# Patient Record
Sex: FEMALE | Race: WHITE | NOT HISPANIC OR LATINO | Employment: UNEMPLOYED | URBAN - METROPOLITAN AREA
[De-identification: names, ages, dates, MRNs, and addresses within clinical notes are randomized per-mention and may not be internally consistent; named-entity substitution may affect disease eponyms.]

---

## 2017-01-13 ENCOUNTER — ALLSCRIPTS OFFICE VISIT (OUTPATIENT)
Dept: OTHER | Facility: OTHER | Age: 52
End: 2017-01-13

## 2017-02-04 ENCOUNTER — TRANSCRIBE ORDERS (OUTPATIENT)
Dept: ADMINISTRATIVE | Age: 52
End: 2017-02-04

## 2017-02-04 ENCOUNTER — APPOINTMENT (OUTPATIENT)
Dept: LAB | Age: 52
End: 2017-02-04
Payer: COMMERCIAL

## 2017-02-04 DIAGNOSIS — E78.00 PURE HYPERCHOLESTEROLEMIA: ICD-10-CM

## 2017-02-04 DIAGNOSIS — E78.00 PURE HYPERCHOLESTEROLEMIA: Primary | ICD-10-CM

## 2017-02-04 LAB
ALBUMIN SERPL BCP-MCNC: 3.8 G/DL (ref 3.5–5)
ALP SERPL-CCNC: 67 U/L (ref 46–116)
ALT SERPL W P-5'-P-CCNC: 27 U/L (ref 12–78)
ANION GAP SERPL CALCULATED.3IONS-SCNC: 7 MMOL/L (ref 4–13)
AST SERPL W P-5'-P-CCNC: 20 U/L (ref 5–45)
BILIRUB SERPL-MCNC: 0.35 MG/DL (ref 0.2–1)
BUN SERPL-MCNC: 13 MG/DL (ref 5–25)
CALCIUM SERPL-MCNC: 9.3 MG/DL (ref 8.3–10.1)
CHLORIDE SERPL-SCNC: 103 MMOL/L (ref 100–108)
CHOLEST SERPL-MCNC: 182 MG/DL (ref 50–200)
CK MB SERPL-MCNC: 2 % (ref 0–2.5)
CK MB SERPL-MCNC: 3.3 NG/ML (ref 0–5)
CK SERPL-CCNC: 161 U/L (ref 26–192)
CO2 SERPL-SCNC: 29 MMOL/L (ref 21–32)
CREAT SERPL-MCNC: 0.73 MG/DL (ref 0.6–1.3)
GFR SERPL CREATININE-BSD FRML MDRD: >60 ML/MIN/1.73SQ M
GLUCOSE SERPL-MCNC: 87 MG/DL (ref 65–140)
HDLC SERPL-MCNC: 83 MG/DL (ref 40–60)
LDLC SERPL CALC-MCNC: 90 MG/DL (ref 0–100)
POTASSIUM SERPL-SCNC: 4.5 MMOL/L (ref 3.5–5.3)
PROT SERPL-MCNC: 7.2 G/DL (ref 6.4–8.2)
SODIUM SERPL-SCNC: 139 MMOL/L (ref 136–145)
TRIGL SERPL-MCNC: 44 MG/DL

## 2017-02-04 PROCEDURE — 82550 ASSAY OF CK (CPK): CPT

## 2017-02-04 PROCEDURE — 80061 LIPID PANEL: CPT

## 2017-02-04 PROCEDURE — 82553 CREATINE MB FRACTION: CPT

## 2017-02-04 PROCEDURE — 36415 COLL VENOUS BLD VENIPUNCTURE: CPT

## 2017-02-04 PROCEDURE — 80053 COMPREHEN METABOLIC PANEL: CPT

## 2017-02-07 DIAGNOSIS — Z12.31 ENCOUNTER FOR SCREENING MAMMOGRAM FOR MALIGNANT NEOPLASM OF BREAST: ICD-10-CM

## 2017-02-07 RX ORDER — CALCIUM GLUCONATE 45(500) MG
500 TABLET ORAL DAILY
Status: ON HOLD | COMMUNITY
End: 2017-02-10

## 2017-02-07 RX ORDER — DIAZEPAM 5 MG/1
5 TABLET ORAL
Status: ON HOLD | COMMUNITY
End: 2018-04-25 | Stop reason: ALTCHOICE

## 2017-02-07 RX ORDER — METHOCARBAMOL 750 MG/1
750 TABLET, FILM COATED ORAL 3 TIMES DAILY
Status: ON HOLD | COMMUNITY
End: 2018-04-25 | Stop reason: ALTCHOICE

## 2017-02-07 RX ORDER — DIPHENHYDRAMINE HYDROCHLORIDE 25 MG/1
5000 TABLET ORAL 2 TIMES DAILY
Status: ON HOLD | COMMUNITY
End: 2017-02-10

## 2017-02-07 RX ORDER — OMEGA-3 FATTY ACIDS/FISH OIL 300-1000MG
1000 CAPSULE ORAL
Status: ON HOLD | COMMUNITY
End: 2017-02-10

## 2017-02-10 ENCOUNTER — GENERIC CONVERSION - ENCOUNTER (OUTPATIENT)
Dept: GASTROENTEROLOGY | Facility: MEDICAL CENTER | Age: 52
End: 2017-02-10

## 2017-02-10 ENCOUNTER — ANESTHESIA EVENT (OUTPATIENT)
Dept: GASTROENTEROLOGY | Facility: MEDICAL CENTER | Age: 52
End: 2017-02-10
Payer: COMMERCIAL

## 2017-02-10 ENCOUNTER — HOSPITAL ENCOUNTER (OUTPATIENT)
Facility: MEDICAL CENTER | Age: 52
Setting detail: OUTPATIENT SURGERY
Discharge: HOME | End: 2017-02-10
Attending: INTERNAL MEDICINE | Admitting: INTERNAL MEDICINE
Payer: COMMERCIAL

## 2017-02-10 ENCOUNTER — ANESTHESIA (OUTPATIENT)
Dept: GASTROENTEROLOGY | Facility: MEDICAL CENTER | Age: 52
End: 2017-02-10
Payer: COMMERCIAL

## 2017-02-10 VITALS
BODY MASS INDEX: 20.39 KG/M2 | OXYGEN SATURATION: 97 % | WEIGHT: 108 LBS | RESPIRATION RATE: 16 BRPM | HEIGHT: 61 IN | DIASTOLIC BLOOD PRESSURE: 60 MMHG | HEART RATE: 79 BPM | SYSTOLIC BLOOD PRESSURE: 111 MMHG | TEMPERATURE: 98 F

## 2017-02-10 DIAGNOSIS — K59.00 CONSTIPATION: ICD-10-CM

## 2017-02-10 PROCEDURE — 88305 TISSUE EXAM BY PATHOLOGIST: CPT | Performed by: INTERNAL MEDICINE

## 2017-02-10 RX ORDER — PROPOFOL 10 MG/ML
INJECTION, EMULSION INTRAVENOUS AS NEEDED
Status: DISCONTINUED | OUTPATIENT
Start: 2017-02-10 | End: 2017-02-10 | Stop reason: SURG

## 2017-02-10 RX ORDER — SODIUM CHLORIDE 9 MG/ML
125 INJECTION, SOLUTION INTRAVENOUS CONTINUOUS
Status: DISCONTINUED | OUTPATIENT
Start: 2017-02-10 | End: 2017-02-10 | Stop reason: HOSPADM

## 2017-02-10 RX ORDER — ONDANSETRON 2 MG/ML
INJECTION INTRAMUSCULAR; INTRAVENOUS AS NEEDED
Status: DISCONTINUED | OUTPATIENT
Start: 2017-02-10 | End: 2017-02-10 | Stop reason: SURG

## 2017-02-10 RX ADMIN — PROPOFOL 50 MG: 10 INJECTION, EMULSION INTRAVENOUS at 10:32

## 2017-02-10 RX ADMIN — PROPOFOL 50 MG: 10 INJECTION, EMULSION INTRAVENOUS at 10:38

## 2017-02-10 RX ADMIN — SODIUM CHLORIDE 125 ML/HR: 0.9 INJECTION, SOLUTION INTRAVENOUS at 09:55

## 2017-02-10 RX ADMIN — ONDANSETRON HYDROCHLORIDE 4 MG: 2 INJECTION, SOLUTION INTRAVENOUS at 10:22

## 2017-02-10 RX ADMIN — PROPOFOL 100 MG: 10 INJECTION, EMULSION INTRAVENOUS at 10:24

## 2017-02-17 ENCOUNTER — GENERIC CONVERSION - ENCOUNTER (OUTPATIENT)
Dept: OTHER | Facility: OTHER | Age: 52
End: 2017-02-17

## 2017-05-04 ENCOUNTER — ALLSCRIPTS OFFICE VISIT (OUTPATIENT)
Dept: OTHER | Facility: OTHER | Age: 52
End: 2017-05-04

## 2017-05-12 ENCOUNTER — ALLSCRIPTS OFFICE VISIT (OUTPATIENT)
Dept: OTHER | Facility: OTHER | Age: 52
End: 2017-05-12

## 2017-05-12 DIAGNOSIS — R14.0 ABDOMINAL DISTENSION (GASEOUS): ICD-10-CM

## 2017-05-12 DIAGNOSIS — K59.01 SLOW TRANSIT CONSTIPATION: ICD-10-CM

## 2017-05-12 DIAGNOSIS — E78.00 PURE HYPERCHOLESTEROLEMIA: ICD-10-CM

## 2017-05-15 ENCOUNTER — HOSPITAL ENCOUNTER (OUTPATIENT)
Dept: RADIOLOGY | Age: 52
Discharge: HOME | End: 2017-05-15
Payer: COMMERCIAL

## 2017-05-15 ENCOUNTER — TRANSCRIBE ORDERS (OUTPATIENT)
Dept: ADMINISTRATIVE | Age: 52
End: 2017-05-15

## 2017-05-15 DIAGNOSIS — K59.01 SLOW TRANSIT CONSTIPATION: ICD-10-CM

## 2017-05-15 PROCEDURE — 74000 HB X-RAY EXAM OF ABDOMEN (SINGLE ANTEROPOSTERIOR VIEW): CPT

## 2017-05-16 ENCOUNTER — HOSPITAL ENCOUNTER (OUTPATIENT)
Dept: RADIOLOGY | Age: 52
Discharge: HOME | End: 2017-05-16
Payer: COMMERCIAL

## 2017-05-16 DIAGNOSIS — R14.0 ABDOMINAL DISTENSION (GASEOUS): ICD-10-CM

## 2017-05-16 PROCEDURE — 76705 ECHO EXAM OF ABDOMEN: CPT

## 2017-06-16 ENCOUNTER — GENERIC CONVERSION - ENCOUNTER (OUTPATIENT)
Dept: OTHER | Facility: OTHER | Age: 52
End: 2017-06-16

## 2017-09-08 ENCOUNTER — GENERIC CONVERSION - ENCOUNTER (OUTPATIENT)
Dept: INTERNAL MEDICINE CLINIC | Facility: CLINIC | Age: 52
End: 2017-09-08

## 2017-09-14 ENCOUNTER — ALLSCRIPTS OFFICE VISIT (OUTPATIENT)
Dept: OTHER | Facility: OTHER | Age: 52
End: 2017-09-14

## 2017-10-26 NOTE — PROGRESS NOTES
Assessment  1  Acquired trigger thumb (727 03) (M65 30)    Discussion/Summary    Right trigger thumbinjection given into the right tendon sheath  follow-up with us in a month see how she is doing she like to have surgery on Thanksgiving if she needs it  She does see how she does after the month  Chief Complaint  1  Finger Problem    History of Present Illness  HPI: She comes in today with regards to her right thumb  She's been having locking and burning sensation along the flexor tendon sheath  Pain can be 10 out of 10 at times  It's aggravated with movement  It's alleviated with rest      Review of Systems    Constitutional: No fever, no chills, feels well, no tiredness, no recent weight gain or loss  Eyes: No complaints of eyesight problems, no red eyes  ENT: no loss of hearing, no nosebleeds, no sore throat  Cardiovascular: No complaints of chest pain, no palpitations, no leg claudication or lower extremity edema  Respiratory: no compliants of shortness of breath, no wheezing, no cough  Gastrointestinal: no complaints of abdominal pain, no constipation, no nausea or diarrhea, no vomiting, no bloody stools  Genitourinary: no complaints of dysuria, no incontinence  Musculoskeletal: as noted in HPI  Integumentary: no complaints of skin rash or lesion, no itching or dry skin, no skin wounds  Neurological: no complaints of headache, no confusion, no numbness or tingling, no dizziness  Endocrine: No complaints of muscle weakness, no feelings of weakness, no frequent urination, no excessive thirst    Psychiatric: No suicidal thoughts, no anxiety, no feelings of depression  Active Problems  1  Abdominal bloating (787 3) (R14 0)   2  Acquired trigger thumb (727 03) (M65 30)   3  Acute medial meniscus tear of left knee, initial encounter (836 0) (S83 242A)   4  Acute pain of left knee (719 46) (M25 562)   5  Aftercare following surgery of the musculoskeletal system (P84 78) (G65 15)   6   Anemia (285  9) (D64 9)   7  Anxiety (300 00) (F41 9)   8  Cervical post-laminectomy syndrome (722 81) (M96 1)   9  Cervical radiculopathy (723 4) (M54 12)   10  Chronic low back pain (724 2,338 29) (M54 5,G89 29)   11  Chronic pain syndrome (338 4) (G89 4)   12  Constipation (564 00) (K59 00)   13  History of allergy (V15 09) (Z88 9)   14  Hypercholesterolemia (272 0) (E78 00)   15  Lumbar degenerative disc disease (722 52) (M51 36)   16  Lumbar radiculopathy (724 4) (M54 16)   17  Neck pain, chronic (723 1,338 29) (M54 2,G89 29)   18  Other abnormal finding of urine (791 9) (R82 99)   19  Overactive bladder (596 51) (N32 81)   20  Pain of left thumb (729 5) (M79 645)   21  Right elbow pain (719 42) (M25 521)   22  Slow transit constipation (564 01) (K59 01)   23  Spondylosis of cervical region without myelopathy or radiculopathy (721 0) (M47 812)   24  Spondylosis of lumbar region without myelopathy or radiculopathy (721 3) (M47 816)    Past Medical History   · History of Acute pain of right knee (719 46) (M25 561)   · History of Acute URI (465 9) (J06 9)   · History of Acute UTI (599 0) (N39 0)   · History of headache (V13 89) (K32 239)   · History of hematuria (V13 09) (Z87 448)   · History of urinary incontinence (V13 09) (V72 919)   · History of Influenza vaccine needed (V04 81) (Z23)   · History of Other chronic pain (338 29) (G89 29)   · History of Other muscle spasm (728 85) (A06 406)   · History of Other screening mammogram (V76 12) (Z12 31)   · History of Preoperative examination (V72 84) (Z01 818)   · History of Screening for colon cancer (V76 51) (Z12 11)   · History of Visit for pre-operative examination (V72 84) (Z01 818)    The active problems and past medical history were reviewed and updated today        Surgical History   · History of Knee Surgery   · History of Lipectomy Of Thigh   · History of Neck Surgery   · History of Total Abdominal Hysterectomy   · History of Tubal Ligation    The surgical history was reviewed and updated today  Family History  Mother    · Family history of Carcinoma   · Family history of Hypertension (V17 49)  Father    · Family history of prostate cancer (V16 39) (Z80 45)  Daughter    · Family history of Carcinoma  Family History    · Family history of Stroke Syndrome (V17 1)    The family history was reviewed and updated today  Social History   · Being A Social Drinker   · Current every day smoker (305 1) (F17 200)   · Never Used Drugs  The social history was reviewed and updated today  Current Meds   1  Atorvastatin Calcium 10 MG Oral Tablet (Lipitor); TAKE 1 TABLET DAILY; Therapy: 86DFU4204 to (Evaluate:18Nov2017)  Requested for: 20WPG4431; Last   Rx:35Tcb1324; Status: ACTIVE - Renewal Denied Ordered   2  Colace 100 MG Oral Capsule; Take 1 capsule twice daily; Therapy: 61XUZ4670 to (Evaluate:95Nkb0177)  Requested for: 39LUA6422; Last   Rx:15Jun2017 Ordered   3  DiazePAM 5 MG Oral Tablet; TAKE 2 TABLET Bedtime; Therapy: 97EBA6193 to (21 ); Last Rx:72Ooh4625 Ordered   4  DULoxetine HCl - 30 MG Oral Capsule Delayed Release Particles; TAKE 1 CAPSULE   DAILY; Therapy: 44Xxm7410 to (Evaluate:19Oct2017)  Requested for: 11QNA2386; Last   Rx:19Fmf0178; Status: ACTIVE - Renewal Denied Ordered   5  Meloxicam 15 MG Oral Tablet; TAKE 1 TABLET DAILY AS NEEDED; Therapy: 25DAX3080 to (Evaluate:04Vxj7088)  Requested for: 10UWV8116; Last   Rx:14Oct2016 Ordered   6  Methocarbamol 750 MG Oral Tablet; TAKE 1 TABLET 3 TIMES DAILY; Therapy: 02TBY5162 to (Laura Willoughby)  Requested for: 15JDK7917; Last   Rx:72Ohu8647 Ordered   7  MiraLax Oral Packet (Polyethylene Glycol 3350); MIX 1 PACKET IN 8 OUNCES OF   LIQUID AND DRINK ONCE DAILY  Hold if loose diarrhea; Therapy: 26UBM0946 to (Evaluate:42Ucz4226)  Requested for: 09VUE5308; Last   Rx:15Jun2017 Ordered   8  Multivitamins Oral Capsule; TAKE 1 CAPSULE DAILY; Therapy: 02MWY3670 to Recorded   9   Oxaprozin 600 MG Oral Tablet (Daypro); TAKE 1 TABLET TWICE DAILY; Therapy: 53UKF6166 to (Laura Cooper)  Requested for: 01Apr2016; Last   Rx:01Apr2016 Ordered   10  OxyCODONE HCl - 5 MG Oral Tablet; TAKE 1 TO 2 TABLETS EVERY 4 TO 6 HOURS AS    NEEDED FOR PAIN; Last Rx:85Trb6757 Ordered   11  PA Biotin 5000 MCG Oral Capsule; TAKE 2 CAPSULE Daily; Therapy: 42Vws8885 to Recorded   12  RA Vitamin D-3 2000 UNIT Oral Capsule; TAKE 2 CAPSULE Daily; Therapy: 05Sjt6022 to Recorded   13  TraMADol HCl - 50 MG Oral Tablet; Take 1-2 tablets po q6h for pain; Therapy: 43USO8046 to (Last Rx:14Oct2016)  Requested for: 14Oct2016 Ordered   14  Voltaren 1 % Transdermal Gel (Diclofenac Sodium); apply to effected areas BID; Therapy: 56BMM5416 to (Last Rx:01Apr2016)  Requested for: 01Apr2016 Ordered    The medication list was reviewed and updated today  Allergies  1  Blue Dyes   2  Macrobid CAPS   3  Nitrofurantoin Monohyd Macro CAPS   4  Ultram TABS    Vitals   Recorded: 14Sep2017 11:37AM   Heart Rate 79   Systolic 960   Diastolic 69   Height 5 ft 1 in   Weight 119 lb 4 oz   BMI Calculated 22 53   BSA Calculated 1 52     Physical Exam  Positive palpable nodular density over the flexor tendon of the thumb  Constitutional - General appearance: Normal    Musculoskeletal - Digits and nails: Normal -- Inspection/palpation of joints, bones, and muscles: Normal -- Muscle strength/tone: Normal -- Upper extremity compartments: Normal    Cardiovascular - Pulses: Normal    Skin - Skin and subcutaneous tissue: Normal    Neurologic - Sensation: Normal -- Upper extremity peripheral neuro exam: Normal    Psychiatric - Orientation to person, place, and time: Normal -- Mood and affect: Normal    Eyes   Conjunctiva and lids: Normal        Procedure    Procedure: Injection of the tendon sheath on the right   Indication: inflammation  Risk and bleeding risk were discussed with the patient  Verbal consent was obtained prior to the procedure  Preparation: alcohol was used to prep the area  ethyl chloride spray was used as a topical anesthetic  Procedure Note: Using sterile technique, the aspiration/injection needle was then directed from a direct aspect  Was used to inject 1 mL 0 25% Bupivacaine  Post-Procedure: the patient tolerated the procedure well  Complications: None        Future Appointments    Date/Time Provider Specialty Site   11/20/2017 09:00 AM Angy Rodriguez DO Internal Medicine [facility]     Signatures   Electronically signed by : Alexandrea Faust DO; Sep 14 2017 12:01PM EST                       (Author)

## 2017-11-29 ENCOUNTER — GENERIC CONVERSION - ENCOUNTER (OUTPATIENT)
Dept: OTHER | Facility: OTHER | Age: 52
End: 2017-11-29

## 2017-11-29 ENCOUNTER — APPOINTMENT (OUTPATIENT)
Dept: RADIOLOGY | Facility: MEDICAL CENTER | Age: 52
End: 2017-11-29
Payer: COMMERCIAL

## 2017-11-29 ENCOUNTER — ALLSCRIPTS OFFICE VISIT (OUTPATIENT)
Dept: OTHER | Facility: OTHER | Age: 52
End: 2017-11-29

## 2017-11-29 DIAGNOSIS — M72.2 PLANTAR FASCIAL FIBROMATOSIS: ICD-10-CM

## 2017-11-29 DIAGNOSIS — M79.673 PAIN OF FOOT: ICD-10-CM

## 2017-11-29 DIAGNOSIS — M54.50 LOW BACK PAIN: ICD-10-CM

## 2017-11-29 DIAGNOSIS — R31.9 HEMATURIA: ICD-10-CM

## 2017-11-29 DIAGNOSIS — M54.12 RADICULOPATHY OF CERVICAL REGION: ICD-10-CM

## 2017-11-29 PROCEDURE — 73630 X-RAY EXAM OF FOOT: CPT

## 2017-12-01 ENCOUNTER — GENERIC CONVERSION - ENCOUNTER (OUTPATIENT)
Dept: OTHER | Facility: OTHER | Age: 52
End: 2017-12-01

## 2017-12-05 NOTE — PROGRESS NOTES
Assessment    1  Hypercholesterolemia (272 0) (E78 00)   2  Elevated CPK (790 5) (R74 8)   3  Neck pain, chronic (723 1,338 29) (M54 2,G89 29)   4  Cervical radiculopathy (723 4) (M54 12)   5  Chronic low back pain (724 2,338 29) (M54 5,G89 29)    Plan  Acute pain of left knee    · Meloxicam 15 MG Oral Tablet; TAKE 1 TABLET DAILY AS NEEDED   · OxyCODONE HCl - 5 MG Oral Tablet; TAKE 1 TO 2 TABLETS EVERY 4 TO 6 HOURS AS  NEEDED FOR PAIN  Chronic low back pain    · (1) CK (CPK); Status:Active; Requested for:29Nov2017;    · Follow-up Visit in 4 Weeks Evaluation and Treatment  Follow-up  Status: Hold For - Scheduling   Requested for: 68RJO2821  Hypercholesterolemia    · Fish Oil 1200 MG Oral Capsule  Spondylosis of cervical region without myelopathy or radiculopathy    · Oxaprozin 600 MG Oral Tablet (Daypro)   · Methocarbamol 750 MG Oral Tablet; TAKE 1 TABLET 3 TIMES DAILY   · TraMADol HCl - 50 MG Oral Tablet; Take 1-2 tablets po q6h for pain    Discussion/Summary  Discussion Summary:   Patient has been taking daypro and meloxicam simultaneously   will d/c daypro as she states the meloxicam helps in the am     pt to stay off atorvastatin and repeat cpk in 4 weeks then f/u with Dr Mag Escudero for tx plan for cholesterol  Chief Complaint  Chief Complaint Free Text Note Form: Patient is here today for a follow up visit for hypercholesterolemia  She stopped the Atorvastatin per instructions due to elevated CK level  Blood testing done 11/18/17  She questions dose of Vitamin D  Med refills needed        History of Present Illness  HPI: f/u for elevated CPK (679) - has been controlled in past  lfts stable   pt has been taking atorvastatin (1/2 10mg tablet) daily   patient has noted cramping in muscles and achiness in muscles  she has not noticed a change in this since stopping the atorvastatin 1 week ago   has been taking fish oils daily   pt is very active at work and does heavy lifting for this as well     pt reports she takes the tramadol but sometimes it causes constipation  she takes this in am and feels ok   she tries to take this first but states when she is at work and lifting and labor she needs the oxycodone     pt reports the oxycodone does not constipate her   pt states the meloxicam helps in am now (this time of year her arthritis is worse)       Review of Systems  Complete-Female:   Gastrointestinal: constipation, but no abdominal pain, no nausea and no diarrhea  Genitourinary: no dysuria  Musculoskeletal: arthralgias and myalgias, but no joint swelling  Integumentary: no rashes and no itching  Neurological: no headache and no numbness  Psychiatric: no anxiety, no sleep disturbances and no depression  Endocrine: no hot flashes  Hematologic/Lymphatic: no swollen glands and no tendency for easy bleeding  ROS Reviewed:   ROS reviewed  Active Problems    1  Abdominal bloating (787 3) (R14 0)   2  Acquired trigger thumb (727 03) (M65 30)   3  Acute medial meniscus tear of left knee, initial encounter (836 0) (S83 242A)   4  Acute pain of left knee (719 46) (M25 562)   5  Aftercare following surgery of the musculoskeletal system (V58 78) (Z47 89)   6  Anemia (285 9) (D64 9)   7  Anxiety (300 00) (F41 9)   8  Cervical post-laminectomy syndrome (722 81) (M96 1)   9  Cervical radiculopathy (723 4) (M54 12)   10  Chronic low back pain (724 2,338 29) (M54 5,G89 29)   11  Chronic pain syndrome (338 4) (G89 4)   12  Constipation (564 00) (K59 00)   13  Heel pain (729 5) (M79 673)   14  History of allergy (V15 09) (Z88 9)   15  Hypercholesterolemia (272 0) (E78 00)   16  Lumbar degenerative disc disease (722 52) (M51 36)   17  Lumbar radiculopathy (724 4) (M54 16)   18  Neck pain, chronic (723 1,338 29) (M54 2,G89 29)   19  Other abnormal finding of urine (791 9) (R82 99)   20  Overactive bladder (596 51) (N32 81)   21  Pain of left thumb (729 5) (M79 645)   22  Plantar fasciitis, right (242 66) (M72 2)   23  Right elbow pain (719 42) (M25 521)   24  Slow transit constipation (564 01) (K59 01)   25  Spondylosis of cervical region without myelopathy or radiculopathy (721 0) (M47 812)   26  Spondylosis of lumbar region without myelopathy or radiculopathy (721 3) (M47 816)    Past Medical History    1  History of Acute pain of right knee (719 46) (M25 561)   2  History of Acute URI (465 9) (J06 9)   3  History of Acute UTI (599 0) (N39 0)   4  History of headache (V13 89) (Z87 898)   5  History of hematuria (V13 09) (Z87 448)   6  History of urinary incontinence (V13 09) (Z87 898)   7  History of Influenza vaccine needed (V04 81) (Z23)   8  History of Other chronic pain (338 29) (G89 29)   9  History of Other muscle spasm (728 85) (M62 838)   10  History of Other screening mammogram (V76 12) (Z12 31)   11  History of Preoperative examination (V72 84) (Z01 818)   12  History of Screening for colon cancer (V76 51) (Z12 11)   13  History of Visit for pre-operative examination (V72 84) (G84 539)  Active Problems And Past Medical History Reviewed: The active problems and past medical history were reviewed and updated today  Surgical History    1  History of Knee Surgery   2  History of Lipectomy Of Thigh   3  History of Neck Surgery   4  History of Total Abdominal Hysterectomy   5  History of Tubal Ligation  Surgical History Reviewed: The surgical history was reviewed and updated today  Family History  Mother    1  Family history of Carcinoma   2  Family history of Hypertension (V17 49)  Father    3  Family history of prostate cancer (V16 42) (Z80 42)  Daughter    4  Family history of Carcinoma  Family History    5  Family history of Stroke Syndrome (V17 1)  Family History Reviewed: The family history was reviewed and updated today  Social History    · Being A Social Drinker   · Current every day smoker (305 1) (F17 200)   · Never Used Drugs  Social History Reviewed:  The social history was reviewed and updated today  Current Meds   1  Colace 100 MG Oral Capsule; Take 1 capsule twice daily; Therapy: 73PZP1926 to (Evaluate:71Mut3550)  Requested for: 59NYW1752; Last Rx:15Jun2017   Ordered   2  DiazePAM 5 MG Oral Tablet; TAKE 2 TABLET Bedtime; Therapy: 15STW5245 to [phone]); Last Rx:29Ifj3750 Ordered   3  Fish Oil 1200 MG Oral Capsule; 1 CAPSULE DAILY; Therapy: (Recorded:29Nov2017) to Recorded   4  Magnesium 400 MG CAPS; 2 CAPSULES DAILY; Therapy: (Recorded:29Nov2017) to Recorded   5  Meloxicam 15 MG Oral Tablet; TAKE 1 TABLET DAILY AS NEEDED; Therapy: 82WZN4566 to (Evaluate:74Qjb7834)  Requested for: 74BPB6159; Last Rx:14Oct2016   Ordered   6  Methocarbamol 750 MG Oral Tablet; TAKE 1 TABLET 3 TIMES DAILY; Therapy: 15HVI8847 to (Paul Mejia)  Requested for: 46IIC3560; Last Rx:35Ybx8850   Ordered   7  MiraLax Oral Packet; MIX 1 PACKET IN 8 OUNCES OF LIQUID AND DRINK ONCE DAILY  Hold if loose   diarrhea; Therapy: 96DIA2862 to (Evaluate:79Zgu5507)  Requested for: 61HWP8992; Last Rx:15Jun2017   Ordered   8  Multivitamins Oral Capsule; TAKE 1 CAPSULE DAILY; Therapy: 71HRY6458 to Recorded   9  Oxaprozin 600 MG Oral Tablet; TAKE 1 TABLET TWICE DAILY; Therapy: 92EMY5376 to (Davian Mccloud)  Requested for: 01Apr2016; Last Rx:01Apr2016   Ordered   10  OxyCODONE HCl - 5 MG Oral Tablet; TAKE 1 TO 2 TABLETS EVERY 4 TO 6 HOURS AS NEEDED FOR    PAIN; Last Rx:19Oct2017 Ordered   11  PA Biotin 5000 MCG Oral Capsule; TAKE 2 CAPSULE Daily; Therapy: 88Yfx3016 to Recorded   12  RA Vitamin D-3 2000 UNIT Oral Capsule; TAKE 2 CAPSULE Daily; Therapy: 22Yxg1335 to Recorded   13  TraMADol HCl - 50 MG Oral Tablet; Take 1-2 tablets po q6h for pain; Therapy: 47PYR5217 to (Last Rx:14Oct2016)  Requested for: 14Oct2016 Ordered  Medication List Reviewed: The medication list was reviewed and updated today  Allergies    1  Blue Dyes   2  Macrobid CAPS   3   Nitrofurantoin Monohyd Macro CAPS 4  Penicillins   5  Ultram TABS    Vitals  Vital Signs    Recorded: 29FFI3938 04:27PM   Temperature 98 5 F, Tympanic   Heart Rate 72, L Radial   Pulse Quality Regular, L Radial   Respiration 16   Systolic 584, LUE, Sitting   Diastolic 60, LUE, Sitting   Height 5 ft 0 63 in   Weight 116 lb 9 6 oz   BMI Calculated 22 3   BSA Calculated 1 5   O2 Saturation 96, RA     Physical Exam    Constitutional   General appearance: No acute distress, well appearing and well nourished  Eyes   Conjunctiva and lids: No swelling, erythema or discharge  Pupils and irises: Equal, round and reactive to light  Ears, Nose, Mouth, and Throat   External inspection of ears and nose: Normal     Otoscopic examination: Tympanic membranes translucent with normal light reflex  Canals patent without erythema  Oropharynx: Normal with no erythema, edema, exudate or lesions  Pulmonary   Respiratory effort: No increased work of breathing or signs of respiratory distress  Auscultation of lungs: Clear to auscultation  no wheezes  Cardiovascular   Auscultation of heart: Normal rate and rhythm, normal S1 and S2, without murmurs  Examination of extremities for edema and/or varicosities: Normal     Abdomen   Abdomen: Non-tender, no masses  Lymphatic   Palpation of lymph nodes in neck: No lymphadenopathy  Musculoskeletal   Gait and station: Normal     Inspection/palpation of joints, bones, and muscles: Normal     Skin   Skin and subcutaneous tissue: Normal without rashes or lesions  Neurologic   Cranial nerves: Cranial nerves 2-12 intact  Psychiatric   Orientation to person, place, and time: Normal     Mood and affect: Normal          Health Management  Constipation   COLONOSCOPY (GI, SURG); every 3 years; Last 15XNH1977; Next Due: 83Xxb0636; Active  History of Other screening mammogram   Digital Bilateral Screening Mammogram With CAD; every 1 year; Last 74Gwr8624; Next Due:  29Ipy6193;  Overdue    Future Appointments    Date/Time Provider Specialty Site   01/09/2018 10:40 AM Viviana Hillman DO Sports Medicine [facility] [address]     Signatures   Electronically signed by : Jim Gruber [facility]; Nov 29 2017  5:22PM EST                       (Author)

## 2017-12-23 ENCOUNTER — TRANSCRIBE ORDERS (OUTPATIENT)
Dept: ADMINISTRATIVE | Age: 52
End: 2017-12-23

## 2017-12-23 ENCOUNTER — APPOINTMENT (OUTPATIENT)
Dept: LAB | Age: 52
End: 2017-12-23
Payer: COMMERCIAL

## 2017-12-23 DIAGNOSIS — M54.50 LOW BACK PAIN: ICD-10-CM

## 2017-12-23 DIAGNOSIS — R31.9 HEMATURIA: ICD-10-CM

## 2017-12-23 LAB
BACTERIA UR QL AUTO: ABNORMAL /HPF
BILIRUB UR QL STRIP: NEGATIVE
CK MB SERPL-MCNC: 1.8 % (ref 0–2.5)
CK MB SERPL-MCNC: 3.2 NG/ML (ref 0–5)
CK SERPL-CCNC: 180 U/L (ref 26–192)
CLARITY UR: CLEAR
COLOR UR: YELLOW
GLUCOSE UR STRIP-MCNC: NEGATIVE MG/DL
HGB UR QL STRIP.AUTO: ABNORMAL
HYALINE CASTS #/AREA URNS LPF: ABNORMAL /LPF
KETONES UR STRIP-MCNC: NEGATIVE MG/DL
LEUKOCYTE ESTERASE UR QL STRIP: NEGATIVE
NITRITE UR QL STRIP: NEGATIVE
NON-SQ EPI CELLS URNS QL MICRO: ABNORMAL /HPF
PH UR STRIP.AUTO: 7 [PH] (ref 4.5–8)
PROT UR STRIP-MCNC: NEGATIVE MG/DL
RBC #/AREA URNS AUTO: ABNORMAL /HPF
SP GR UR STRIP.AUTO: 1.02 (ref 1–1.03)
UROBILINOGEN UR QL STRIP.AUTO: 0.2 E.U./DL
WBC #/AREA URNS AUTO: ABNORMAL /HPF

## 2017-12-23 PROCEDURE — 82553 CREATINE MB FRACTION: CPT

## 2017-12-23 PROCEDURE — 81001 URINALYSIS AUTO W/SCOPE: CPT

## 2017-12-23 PROCEDURE — 82550 ASSAY OF CK (CPK): CPT

## 2017-12-23 PROCEDURE — 36415 COLL VENOUS BLD VENIPUNCTURE: CPT

## 2017-12-27 ENCOUNTER — GENERIC CONVERSION - ENCOUNTER (OUTPATIENT)
Dept: OTHER | Facility: OTHER | Age: 52
End: 2017-12-27

## 2017-12-29 ENCOUNTER — TRANSCRIBE ORDERS (OUTPATIENT)
Dept: ADMINISTRATIVE | Facility: HOSPITAL | Age: 52
End: 2017-12-29

## 2017-12-29 ENCOUNTER — ALLSCRIPTS OFFICE VISIT (OUTPATIENT)
Dept: OTHER | Facility: OTHER | Age: 52
End: 2017-12-29

## 2017-12-29 DIAGNOSIS — M51.34 DEGENERATIVE DISC DISEASE, THORACIC: ICD-10-CM

## 2017-12-29 DIAGNOSIS — M51.36 DEGENERATIVE LUMBAR DISC: ICD-10-CM

## 2017-12-29 DIAGNOSIS — M50.30 DEGENERATIVE DISC DISEASE, CERVICAL: Primary | ICD-10-CM

## 2017-12-30 NOTE — PROGRESS NOTES
Assessment   1  Neck pain, chronic (723 1,338 29) (M54 2,G89 29)   2  Cervical radiculopathy (723 4) (M54 12)   3  Bilateral carpal tunnel syndrome (354 0) (G56 03)   4  Neuroforaminal stenosis of cervical spine (723 0) (M99 81)   5  Lumbar radiculopathy (724 4) (M54 16)   6  Chronic bilateral thoracic back pain (724 1,338 29) (M54 6,G89 29)    Plan   Acute pain of left knee    · OxyCODONE HCl - 5 MG Oral Tablet; TAKE 1 TO 2 TABLETS EVERY 4 TO 6 HOURS AS    NEEDED FOR PAIN  Cervical radiculopathy    · * MRI CERVICAL SPINE WO CONTRAST; Status:Need Information - Financial Authorization; Requested for:26Meg5151;   Chronic bilateral thoracic back pain    · * MRI THORACIC SPINE WO CONTRAST; Status:Need Information - Financial Authorization; Requested for:18Tyw4794;   Lumbar radiculopathy    · Lyrica 50 MG Oral Capsule; TAKE 1 CAPSULE TWICE DAILY   · * MRI LUMBAR SPINE WO CONTRAST; Status:Need Information - Financial Authorization; Requested    for:17Nxj6965;    · Follow Up if Not Better Evaluation and Treatment  Follow-up  Status: Complete  Done: 57TIO9030    10:23AM  PMH: Other muscle spasm    · DiazePAM 5 MG Oral Tablet; TAKE 2 TABLET Bedtime    Discussion/Summary   Discussion Summary:    EMG noted, refill meds, labs reviewed  check MRI of entire spine due to worsening symptoms and radiculopathy  last one was 2013  add lyrica- call if any issues  may need to increase  Counseling Documentation With Imm: The patient was counseled regarding diagnostic results  Chief Complaint   Chief Complaint Free Text Note Form: Patient is here for a 1 m f/u visit for neck pain, cervical radiculopathy and bilateral carpal tunnel syndrome  Review labs  and EMG/NCV  Refill meds  Chief Complaint Chronic Condition [facility]: Patient is here today for follow up of chronic conditions described in HPI        History of Present Illness   HPI: f/u for elevated CPK (679) - has been controlled in past stable  has been taking atorvastatin (1/2 10mg tablet) daily  has noted cramping in muscles and achiness in muscles has not noticed a change in this since stopping the atorvastatin 1 week ago  been taking fish oils daily  is very active at work and does heavy lifting for this as well    reports she takes the tramadol but sometimes it causes constipation takes this in am and feels ok  tries to take this first but states when she is at work and lifting and labor she needs the oxycodone    reports the oxycodone does not constipate her  states the meloxicam helps in am now (this time of year her arthritis is worse)        Hyperlipidemia (Follow-Up): The patient states her hyperlipidemia has been under good control since the last visit  She has no significant interval events  Symptoms: denies chest pain-- and-- denies intermittent leg claudication  Associated symptoms include no focal neurologic deficits-- and-- no memory loss  Medications: the patient is not adherent with her medication regimen  Review of Systems   Complete-Female:      Constitutional: no fever-- and-- no chills  Eyes: no eye pain-- and-- no eyesight problems  ENT: no hearing loss--       The patient presents with complaints of no nosebleeds (Occasionally when she takes too many Excedrin for her tension headaches)  Cardiovascular: no chest pain-- and-- no palpitations  Respiratory: no shortness of breath-- and-- no shortness of breath during exertion  Gastrointestinal: abdominal pain-- and-- constipation  Genitourinary: incontinence, but-- no dysuria  Musculoskeletal: arthralgias, but-- no myalgias  Integumentary: a rash, but-- no itching-- and-- no skin lesions  Neurological: headache  Psychiatric: no anxiety-- and-- no depression  Endocrine: muscle weakness  Hematologic/Lymphatic: a tendency for easy bleeding-- and-- a tendency for easy bruising  Active Problems   1   Abdominal bloating (070 3) (R14 0)   2  Acquired trigger thumb (727 03) (M65 30)   3  Acute medial meniscus tear of left knee, initial encounter (836 0) (S83 242A)   4  Acute pain of left knee (719 46) (M25 562)   5  Aftercare following surgery of the musculoskeletal system (V58 78) (Z47 89)   6  Anemia (285 9) (D64 9)   7  Anxiety (300 00) (F41 9)   8  Bilateral carpal tunnel syndrome (354 0) (G56 03)   9  Cervical post-laminectomy syndrome (722 81) (M96 1)   10  Cervical radiculopathy (723 4) (M54 12)   11  Chronic low back pain (724 2,338 29) (M54 5,G89 29)   12  Chronic pain syndrome (338 4) (G89 4)   13  Constipation (564 00) (K59 00)   14  Elevated CPK (790 5) (R74 8)   15  Encounter for screening mammogram for breast cancer (V76 12) (Z12 31)   16  Heel pain (729 5) (M79 673)   17  Hematuria, unspecified type (599 70) (R31 9)   18  History of allergy (V15 09) (Z88 9)   19  Hypercholesterolemia (272 0) (E78 00)   20  Lumbar degenerative disc disease (722 52) (M51 36)   21  Lumbar radiculopathy (724 4) (M54 16)   22  Neck pain, chronic (723 1,338 29) (M54 2,G89 29)   23  Other abnormal finding of urine (791 9) (R82 99)   24  Overactive bladder (596 51) (N32 81)   25  Pain of left thumb (729 5) (M79 645)   26  Plantar fasciitis of right foot (728 71) (M72 2)   27  Plantar fasciitis, right (728 71) (M72 2)   28  Right elbow pain (719 42) (M25 521)   29  Slow transit constipation (564 01) (K59 01)   30  Spondylosis of cervical region without myelopathy or radiculopathy (721 0) (M47 812)   31  Spondylosis of lumbar region without myelopathy or radiculopathy (721 3) (M47 816)    Past Medical History   1  History of Acute pain of right knee (719 46) (M25 561)   2  History of Acute URI (465 9) (J06 9)   3  History of Acute UTI (599 0) (N39 0)   4  History of headache (V13 89) (Z87 898)   5  History of urinary incontinence (V13 09) (Z87 898)   6  History of Influenza vaccine needed (V04 81) (Z23)   7   History of Other chronic pain (338 29) (G89 29)   8  History of Other muscle spasm (728 85) (M62 838)   9  History of Other screening mammogram (V76 12) (Z12 31)   10  History of Preoperative examination (V72 84) (Z01 818)   11  History of Screening for colon cancer (V76 51) (Z12 11)   12  History of Visit for pre-operative examination (V72 84) (D56 343)    Surgical History   1  History of Knee Surgery   2  History of Lipectomy Of Thigh   3  History of Neck Surgery   4  History of Total Abdominal Hysterectomy   5  History of Tubal Ligation    Family History   Mother    1  Family history of Carcinoma   2  Family history of Hypertension (V17 49)  Father    3  Family history of prostate cancer (V16 42) (Z80 42)  Daughter    4  Family history of Carcinoma  Family History    5  Family history of Stroke Syndrome (V17 1)    Social History    · Being A Social Drinker   · Current every day smoker (305 1) (F17 200)   · Never Used Drugs  Social History Reviewed: The social history was reviewed and updated today  The social history was reviewed and is unchanged  Current Meds    1  Colace 100 MG Oral Capsule; Take 1 capsule twice daily; Therapy: 48ELI6384 to (Evaluate:04Loy3971)  Requested for: 62INE1427; Last Rx:15Jun2017     Ordered   2  DiazePAM 5 MG Oral Tablet; TAKE 2 TABLET Bedtime; Therapy: 36ZNZ5450 to (21 ); Last Rx:74Ypl0661 Ordered   3  Magnesium 400 MG CAPS; 2 CAPSULES DAILY; Therapy: (Recorded:29Nov2017) to Recorded   4  Meloxicam 15 MG Oral Tablet; TAKE 1 TABLET DAILY AS NEEDED; Therapy: 99KYN7805 to (Guy Cotton)  Requested for: 40PIG8074; Last Rx:29Nov2017     Ordered   5  Methocarbamol 750 MG Oral Tablet; TAKE 1 TABLET 3 TIMES DAILY; Therapy: 19GWY9683 to (Evaluate:65Pge8726)  Requested for: 30AHI0448; Last Rx:29Nov2017     Ordered   6  MiraLax Oral Packet; MIX 1 PACKET IN 8 OUNCES OF LIQUID AND DRINK ONCE DAILY  Hold if loose     diarrhea;      Therapy: 16OAR3410 to (Evaluate:48Lxg4208)  Requested for: 91FXZ2940; Last Rx:15Jun2017     Ordered   7  Multivitamins Oral Capsule; TAKE 1 CAPSULE DAILY; Therapy: 15LBV9760 to Recorded   8  OxyCODONE HCl - 5 MG Oral Tablet; TAKE 1 TO 2 TABLETS EVERY 4 TO 6 HOURS AS NEEDED FOR     PAIN; Last Rx:29Nov2017 Ordered   9  PA Biotin 5000 MCG Oral Capsule; TAKE 2 CAPSULE Daily; Therapy: 72Lnk5917 to Recorded   10  RA Vitamin D-3 2000 UNIT Oral Capsule; TAKE 2 CAPSULE Daily; Therapy: 19Sep2014 to Recorded   11  TraMADol HCl - 50 MG Oral Tablet; Take 1-2 tablets po q6h for pain; Therapy: 50GBS1865 to (Last Rx:29Nov2017)  Requested for: 52YMR4050 Ordered    Allergies   1  Blue Dyes   2  Macrobid CAPS   3  Nitrofurantoin Monohyd Macro CAPS   4  Penicillins   5  Ultram TABS    Vitals   Vital Signs    Recorded: 29Dec2017 09:53AM   Temperature 97 7 F, Tympanic   Heart Rate 92   Systolic 832, LUE, Sitting   Diastolic 78, LUE, Sitting   Height 5 ft 0 63 in   Weight 115 lb 8 oz   BMI Calculated 22 09   BSA Calculated 1 49   O2 Saturation 98     Physical Exam        Constitutional      General appearance: No acute distress, well appearing and well nourished  Eyes      Conjunctiva and lids: No swelling, erythema or discharge  Pupils and irises: Equal, round and reactive to light  Ears, Nose, Mouth, and Throat      External inspection of ears and nose: Normal        Otoscopic examination: Tympanic membranes translucent with normal light reflex  Canals patent without erythema  Oropharynx: Normal with no erythema, edema, exudate or lesions  Pulmonary      Respiratory effort: No increased work of breathing or signs of respiratory distress  Auscultation of lungs: Clear to auscultation  -- no wheezes  Cardiovascular      Auscultation of heart: Normal rate and rhythm, normal S1 and S2, without murmurs  Examination of extremities for edema and/or varicosities: Normal        Abdomen      Abdomen: Non-tender, no masses         Lymphatic Palpation of lymph nodes in neck: No lymphadenopathy  Musculoskeletal      Gait and station: Normal        Inspection/palpation of joints, bones, and muscles: Normal        Skin      Skin and subcutaneous tissue: Normal without rashes or lesions  Neurologic      Cranial nerves: Cranial nerves 2-12 intact  Psychiatric      Orientation to person, place, and time: Normal        Mood and affect: Normal           Health Management   Constipation   COLONOSCOPY (GI, SURG); every 3 years; Last 20VHB6226; Next Due: 09Pby4706; Active  Encounter for screening mammogram for breast cancer   * MAMMO SCREENING BILATERAL W CAD; every 1 year; Last 78YNZ1954; Next Due: 12Eqc6888; Active  History of Other screening mammogram   Digital Bilateral Screening Mammogram With CAD; every 1 year; Last 22Jnc4937; Next Due:    18Mzk0099;  Overdue    Future Appointments      Date/Time Provider Specialty Site   01/09/2018 10:40 AM Stevenson Zavala DO Sports Medicine [facility] [address]     Signatures    Electronically signed by : Reginald Zhang DO; Dec 29 2017 10:25AM EST                       (Author)

## 2018-01-04 ENCOUNTER — HOSPITAL ENCOUNTER (OUTPATIENT)
Dept: RADIOLOGY | Age: 53
Discharge: HOME | End: 2018-01-04
Payer: COMMERCIAL

## 2018-01-04 ENCOUNTER — APPOINTMENT (OUTPATIENT)
Dept: RADIOLOGY | Age: 53
End: 2018-01-04
Payer: COMMERCIAL

## 2018-01-04 ENCOUNTER — GENERIC CONVERSION - ENCOUNTER (OUTPATIENT)
Dept: OTHER | Facility: OTHER | Age: 53
End: 2018-01-04

## 2018-01-04 DIAGNOSIS — M54.12 RADICULOPATHY OF CERVICAL REGION: ICD-10-CM

## 2018-01-04 PROCEDURE — 72141 MRI NECK SPINE W/O DYE: CPT

## 2018-01-09 ENCOUNTER — GENERIC CONVERSION - ENCOUNTER (OUTPATIENT)
Dept: OTHER | Facility: OTHER | Age: 53
End: 2018-01-09

## 2018-01-09 DIAGNOSIS — M72.2 PLANTAR FASCIAL FIBROMATOSIS: ICD-10-CM

## 2018-01-12 VITALS
SYSTOLIC BLOOD PRESSURE: 106 MMHG | RESPIRATION RATE: 18 BRPM | BODY MASS INDEX: 21.14 KG/M2 | HEART RATE: 73 BPM | WEIGHT: 112 LBS | TEMPERATURE: 96.7 F | DIASTOLIC BLOOD PRESSURE: 78 MMHG | HEIGHT: 61 IN

## 2018-01-12 NOTE — MISCELLANEOUS
Message   Recorded as Task   Date: 07/20/2016 02:36 PM, Created By: Henrietta Osborne 17   Task Name: Care Coordination   Assigned To: [facility] stim,Team   Regarding Patient: Johnson Bueno, Status: In Progress   Comment:    Nora Tom - 20 Jul 2016 2:36 PM     TASK CREATED  Discussed results of MRI C-spine with the patient She is not interested in undergoing epidural steroid injections, but the [facility] SCS trial is something she is interested in undergoing     Please mail patient a SCS informational packet with [facility] technology and schedule educational session with [facility] for patient    Patient also requested to call as close to 9:30 am (or slightly later) as possible since she works 11:30 pm - 8:00 am and typically sleeps in the later morning and early afternoon  Mariza Castellanos - 21 Jul 2016 3:39 PM     TASK EDITED  Mailed out [facility] booklet to pt as request  Also sent email to Nannette from [facility] to contact pt for education   Mariza Castellanos - 21 Jul 2016 3:39 PM     TASK IN PROGRESS   Mariza Castellanos - 19 Aug 2016 10:13 AM     TASK EDITED   Pt to be a  [facility] cervical SCS trial with Dr Melisa Lees psych eval has been faxed to [facility] at [facility]    1  Abdominal bloating (787 3) (R14 0)   2  Acquired trigger thumb (727 03) (M65 30)   3  Acute medial meniscus tear of left knee, initial encounter (836 0) (S83 242A)   4  Acute pain of left knee (719 46) (M25 562)   5  Aftercare following surgery of the musculoskeletal system (V58 78) (Z47 89)   6  Anemia (285 9) (D64 9)   7  Anxiety (300 00) (F41 9)   8  Cervical post-laminectomy syndrome (722 81) (M96 1)   9  Cervical radiculopathy (723 4) (M54 12)   10  Chronic low back pain (724 2,338 29) (M54 5,G89 29)   11  Chronic pain syndrome (338 4) (G89 4)   12  Constipation (564 00) (K59 00)   13  History of allergy (V15 09) (Z88 9)   14  Hypercholesterolemia (272 0) (E78 00)   15   Lumbar degenerative disc disease (722 52) (M51 36)   16  Lumbar radiculopathy (724 4) (M54 16)   17  Neck pain, chronic (723 1,338 29) (M54 2,G89 29)   18  Other abnormal finding of urine (791 9) (R82 99)   19  Overactive bladder (596 51) (N32 81)   20  Pain of left thumb (729 5) (M79 645)   21  Right elbow pain (719 42) (M25 521)   22  Slow transit constipation (564 01) (K59 01)   23  Spondylosis of cervical region without myelopathy or radiculopathy (721 0) (M47 812)   24  Spondylosis of lumbar region without myelopathy or radiculopathy (721 3) (M47 816)    Current Meds   1  Atorvastatin Calcium 10 MG Oral Tablet (Lipitor); TAKE 1 TABLET DAILY; Therapy: 66VDP7349 to (Evaluate:21Jra1682)  Requested for: 27FDV4428; Last   Rx:12May2017 Ordered   2  Colace 100 MG Oral Capsule; Take 1 capsule twice daily; Therapy: 50MPT4947 to (Evaluate:42Gdw2946)  Requested for: 73JTI0616; Last   Rx:15Jun2017 Ordered   3  DiazePAM 5 MG Oral Tablet; TAKE 2 TABLET Bedtime; Therapy: 69HLT6094 to (Evaluate:14Apr2017); Last Rx:13Feb2017 Ordered   4  DULoxetine HCl - 30 MG Oral Capsule Delayed Release Particles; TAKE 1 CAPSULE   DAILY; Therapy: 87Vfq2860 to (Evaluate:08Jun2017)  Requested for: 59BEV4604; Last   Rx:10Mar2017 Ordered   5  Meloxicam 15 MG Oral Tablet; TAKE 1 TABLET DAILY AS NEEDED; Therapy: 90MCB0596 to (Evaluate:08Lgu7143)  Requested for: 53XJS0193; Last   Rx:67Vjm6979 Ordered   6  Methocarbamol 750 MG Oral Tablet; TAKE 1 TABLET 3 TIMES DAILY; Therapy: 08RZH8019 to (Ellen Laird)  Requested for: 03JTT6392; Last   Rx:12May2017 Ordered   7  MiraLax Oral Packet (Polyethylene Glycol 3350); MIX 1 PACKET IN 8 OUNCES OF   LIQUID AND DRINK ONCE DAILY  Hold if loose diarrhea; Therapy: 46CTJ2868 to (Evaluate:56Lwc6391)  Requested for: 59IYL1208; Last   Rx:15Jun2017 Ordered   8  Multivitamins Oral Capsule; TAKE 1 CAPSULE DAILY; Therapy: 39FDY4049 to Recorded   9  Oxaprozin 600 MG Oral Tablet (Daypro); TAKE 1 TABLET TWICE DAILY;    Therapy: 74QYW4013 to (Evaluate:91Ryc7071)  Requested for: 01Apr2016; Last   Rx:01Apr2016 Ordered   10  OxyCODONE HCl - 5 MG Oral Tablet; TAKE 1 TO 2 TABLETS EVERY 4 TO 6 HOURS AS    NEEDED FOR PAIN; Last Rx:43Mjg5515 Ordered   11  PA Biotin 5000 MCG Oral Capsule; TAKE 2 CAPSULE Daily; Therapy: 82Qaf7687 to Recorded   12  RA Vitamin D-3 2000 UNIT Oral Capsule; TAKE 2 CAPSULE Daily; Therapy: 09Ugo8332 to Recorded   13  TraMADol HCl - 50 MG Oral Tablet; Take 1-2 tablets po q6h for pain; Therapy: 21RUG7024 to (Last Rx:66Xlq9200)  Requested for: 14Oct2016 Ordered   14  Voltaren 1 % Transdermal Gel (Diclofenac Sodium); apply to effected areas BID; Therapy: 14IQX7208 to (Last Rx:01Apr2016)  Requested for: 01Apr2016 Ordered    Allergies    1  Blue Dyes   2  Macrobid CAPS   3  Nitrofurantoin Monohyd Macro CAPS   4   Ultram TABS    Signatures   Electronically signed by : Juanjose Scales, ; Jun 16 2017 11:04AM EST                       (Author)

## 2018-01-12 NOTE — MISCELLANEOUS
Message   Recorded as Task   Date: 10/05/2016 10:52 AM, Created By: Norma South   Task Name: Med Renewal Request   Assigned To: [address] [facility] [address] [facility],Team   Regarding Patient: Nick Tovar, Status: In Progress   Comment:    Angela Licona - 05 Oct 2016 10:52 AM     TASK CREATED  Caller: Self; Renew Medication; [phone] (Home)  Pt called the [facility] office asking for RF of her generic cymbalta 30mg 1 tab daily, she has 4 left  Stated it is working well  Pt uses LORENA Rolle on file  **Pt said it's ok to leave vm on her home # once Rx has been sent b/c she is going to bed b/c she works swing shift  **    Please send task back to the [place] clinical team    V[address] 17 - 05 Oct 2016 11:00 AM     TASK REPLIED TO: Previously Assigned To [facility]  Taylor Yanes - 05 Oct 2016 11:07 AM     TASK REPLIED TO: Previously Assigned To [address]  S/w pt  and advised of above  Pt  verbalized understanding and was appreciative  Taylor Yanes - 05 Oct 2016 11:07 AM     TASK IN PROGRESS        Active Problems    1  Acute medial meniscus tear of left knee, initial encounter (836 0) (S83 242A)   2  Acute pain of left knee (719 46) (M25 562)   3  Aftercare following surgery of the musculoskeletal system (V58 78) (Z47 89)   4  Anemia (285 9) (D64 9)   5  Anxiety (300 00) (F41 9)   6  Cervical post-laminectomy syndrome (722 81) (M96 1)   7  Cervical radiculopathy (723 4) (M54 12)   8  Chronic low back pain (724 2,338 29) (M54 5,G89 29)   9  Chronic pain syndrome (338 4) (G89 4)   10  History of allergy (V15 09) (Z88 9)   11  Hypercholesterolemia (272 0) (E78 00)   12  Lumbar degenerative disc disease (722 52) (M51 36)   13  Lumbar radiculopathy (724 4) (M54 16)   14  Neck pain, chronic (723 1,338 29) (M54 2,G89 29)   15  Overactive bladder (596 51) (N32 81)   16  Right elbow pain (719 42) (M25 521)   17  Spondylosis of cervical region without myelopathy or radiculopathy (721 0) (M47 812)   18  Spondylosis of lumbar region without myelopathy or radiculopathy (721 3) (M42 053)    Current Meds   1  Calcium 500 MG TABS; TAKE 1 TABLET DAILY; Therapy: 52YPI6447 to Recorded   2  Diazepam 5 MG Oral Tablet; TAKE 2 TABLET Bedtime; Therapy: 74EWR4539 to (Evaluate:10Qsw3278); Last Rx:99Zoj3197 Ordered   3  DULoxetine HCl - 30 MG Oral Capsule Delayed Release Particles; TAKE 1 CAPSULE   DAILY; Therapy: 56Ygk0974 to (Evaluate:00Kxc6449)  Requested for: 05Oct2016; Last   Rx:05Oct2016 Ordered   4  Meloxicam 15 MG Oral Tablet; TAKE 1 TABLET DAILY AS NEEDED; Therapy: 00UZN9837 to (Evaluate:11Mar2016)  Requested for: 94NQB9540; Last   Rx:27Qzq0145 Ordered   5  Methocarbamol 750 MG Oral Tablet; TAKE 1 TABLET 3 TIMES DAILY; Therapy: 66EVQ5349 to (Dane Wadsworth)  Requested for: 01Apr2016; Last   Rx:01Apr2016 Ordered   6  Multivitamins Oral Capsule; TAKE 1 CAPSULE DAILY; Therapy: 30IKP5530 to Recorded   7  Oxaprozin 600 MG Oral Tablet (Daypro); TAKE 1 TABLET TWICE DAILY; Therapy: 04LSC8753 to (Dane Wadsworth)  Requested for: 01Apr2016; Last   Rx:01Apr2016 Ordered   8  OxyCODONE HCl - 5 MG Oral Tablet; TAKE 1 TO 2 TABLETS EVERY 4 TO 6 HOURS AS   NEEDED FOR PAIN; Last Rx:68Ris7626 Ordered   9  PA Biotin 5000 MCG Oral Capsule; TAKE 2 CAPSULE Daily; Therapy: 62Hgx3567 to Recorded   10  RA Vitamin D-3 2000 UNIT Oral Capsule; TAKE 2 CAPSULE Daily; Therapy: 58Dtx6219 to Recorded   11  TraMADol HCl - 50 MG Oral Tablet; Take 1-2 tablets po q6h for pain; Therapy: 84KOG6495 to (Last Rx:64Klh0908)  Requested for: 21Jul2016 Ordered   12  Vitamin C Oral Tablet Chewable; Take 1 tablet daily; Therapy: 67LTR4947 to Recorded   13  Voltaren 1 % Transdermal Gel (Diclofenac Sodium); apply to effected areas BID; Therapy: 12VVI6260 to (Last Rx:01Apr2016)  Requested for: 01Apr2016 Ordered    Allergies    1  Blue Dyes   2  Macrobid CAPS   3  Nitrofurantoin Monohyd Macro CAPS   4   Ultram TABS    Signatures Electronically signed by : Alexandria Tolliver RN; Oct  5 2016 11:07AM EST                       (Author)

## 2018-01-13 VITALS
SYSTOLIC BLOOD PRESSURE: 122 MMHG | BODY MASS INDEX: 21.24 KG/M2 | WEIGHT: 112.5 LBS | HEART RATE: 82 BPM | OXYGEN SATURATION: 97 % | HEIGHT: 61 IN | TEMPERATURE: 97.5 F | DIASTOLIC BLOOD PRESSURE: 70 MMHG

## 2018-01-13 VITALS
WEIGHT: 112 LBS | DIASTOLIC BLOOD PRESSURE: 70 MMHG | BODY MASS INDEX: 21.14 KG/M2 | SYSTOLIC BLOOD PRESSURE: 143 MMHG | HEIGHT: 61 IN | HEART RATE: 96 BPM

## 2018-01-14 VITALS
SYSTOLIC BLOOD PRESSURE: 112 MMHG | DIASTOLIC BLOOD PRESSURE: 69 MMHG | HEIGHT: 61 IN | WEIGHT: 119.25 LBS | HEART RATE: 79 BPM | BODY MASS INDEX: 22.51 KG/M2

## 2018-01-14 VITALS
HEIGHT: 61 IN | OXYGEN SATURATION: 96 % | BODY MASS INDEX: 22.01 KG/M2 | RESPIRATION RATE: 16 BRPM | TEMPERATURE: 98.5 F | SYSTOLIC BLOOD PRESSURE: 106 MMHG | HEART RATE: 72 BPM | DIASTOLIC BLOOD PRESSURE: 60 MMHG | WEIGHT: 116.6 LBS

## 2018-01-15 NOTE — PROGRESS NOTES
Assessment    1  Acute pain of right knee (387 79) (M25 561)    Plan  Acute pain of right knee    · * MRI KNEE RIGHT  WO CONTRAST; Status:Need Information - Financial Authorization; Requested  for:[id number];     Discussion/Summary  Discussion Summary:   Follow up in 1 week  Counseling Documentation With Imm: The patient was counseled regarding prognosis, impressions  Chief Complaint  Chief Complaint Free Text Note Form: pt is here for 1 wk knee pain f/u  pt is still having pain      History of Present Illness  HPI: Here for follow up of medial left knee pain which began 1 5 weeks ago after she had to shovel her car out  She reports the pain is typically achey and dull but sometimes burning  She has pain with walking, standing  She states it feels best in the morning before she starts her day  She feels she does have some swelling, but she thinks it may be the elastic from her knee brace  She is currently meloxicam daily but reports it only lasts for 6 hours  She uses Kari back and body when the meloxicam wears off  She also uses heat packs and a knee brace at work  She admits to some soreness in her right hip from compensating  She had an xray last week which revealed no bony abnormalities  She states the pain is better this week, she would rate it a 4 5/10  Denies heat, redness, warmth, fevers, bruising, stiffness  Knee Pain:   Jakob Owen presents with complaints of sudden onset of intermittent episodes of moderate left and left medial knee pain, described as dull, aching and burning  On a scale of 1 to 10, the patient rates the pain as 5  Symptoms are improved by rest, ice, brace use and non-opioid analgesics   Symptoms are made worse by knee motion, weight bearing, walking, running, kneeling and squatting Symptoms are improving (Not much better get worst at work as she do standing , bending and lifting )   Associated symptoms include swelling, decreased range of motion, difficulty bearing weight and difficulty ambulating, but no warmth, no redness, no ecchymosis, no stiffness, no locking, no instability, no audible pop at the time of injury, no fever, no chills, no localized rash, no generalized rash and no pain in other joints  Review of Systems  Complete-Female:   Constitutional: no fever and no chills  Eyes: no eye pain and no eyesight problems  ENT: no earache, no sore throat and no nasal discharge  Cardiovascular: no chest pain and no palpitations  Respiratory: no shortness of breath and no cough  Gastrointestinal: no abdominal pain, no nausea, no vomiting and no diarrhea  Genitourinary: no dysuria and no pelvic pain  Musculoskeletal: as noted in HPI  Integumentary: no rashes and no skin lesions  Neurological: no numbness, no tingling, no dizziness and no fainting  Hematologic/Lymphatic: no swollen glands, no tendency for easy bleeding and no tendency for easy bruising  Active Problems    1  Acute pain of right knee (719 46) (M25 561)   2  Anemia (285 9) (D64 9)   3  Anxiety (300 00) (F41 9)   4  Cervical radiculopathy (723 4) (M54 12)   5  Constipation (564 00) (K59 00)   6  History of allergy (V15 09) (Z88 9)   7  Hypercholesterolemia (272 0) (E78 0)   8  Lumbar radiculopathy (724 4) (M54 16)   9  Other abnormal finding of urine (791 9) (R82 99)   10  Other chronic pain (338 29) (G89 29)   11  Other muscle spasm (728 85) (M62 838)   12  Overactive bladder (596 51) (N32 81)   13  Spondylosis of cervical region without myelopathy or radiculopathy (721 0) (M47 812)   14  Spondylosis of lumbar region without myelopathy or radiculopathy (721 3) (M47 816)   15  Urinary incontinence (788 30) (R32)    Past Medical History    1  History of Acute pain of left knee (719 46) (M25 562)   2  History of Acute URI (465 9) (J06 9)   3  History of Encounter for screening mammogram for malignant neoplasm of breast (V76 12) (Z12 31)   4  History of headache (V13 89) (Z87 898)   5   History of hematuria (V13 09) (Z87 448)   6  History of Influenza vaccine needed (V04 81) (Z23)   7  History of Other screening mammogram (V76 12) (Z12 31)   8  History of Preoperative examination (V72 84) (Z01 818)   9  History of Screening for colon cancer (V76 51) (Z12 11)   10  History of Visit for pre-operative examination (V72 84) (I44 107)  Active Problems And Past Medical History Reviewed: The active problems and past medical history were reviewed and updated today  Surgical History    1  History of Lipectomy Of Thigh   2  History of Neck Surgery   3  History of Total Abdominal Hysterectomy   4  History of Tubal Ligation  Surgical History Reviewed: The surgical history was reviewed and updated today  Family History    1  Family history of Carcinoma   2  Family history of Hypertension (V17 49)    3  Family history of Carcinoma    4  Family history of Stroke Syndrome (V17 1)  Family History Reviewed: The family history was reviewed and updated today  Social History    · Being A Social Drinker   · Current every day smoker (305 1) (F17 200)   · Never Used Drugs  Social History Reviewed: The social history was reviewed and updated today  Current Meds   1  Calcium 500 MG Oral Tablet; TAKE 1 TABLET DAILY; Therapy: 64NRI0047 to Recorded   2  Diazepam 5 MG Oral Tablet; TAKE 2 TABLET Bedtime; Therapy: 98FLA0818 to (Evaluate:71Rly8625); Last GH:62LFG9779 Ordered   3  Meloxicam 15 MG Oral Tablet; TAKE 1 TABLET DAILY AS NEEDED; Therapy: 32VHZ8263 to (Evaluate:12Egx3402)  Requested for: 47KJE8533; Last Rx:25Jan2016   Ordered   4  Methocarbamol 750 MG Oral Tablet; TAKE 1 TABLET 3 TIMES DAILY; Therapy: 81UWA9131 to (Jean Mosqueda)  Requested for: 93UFD0383; Last Rx:29Jan2015   Ordered   5  Multivitamins Oral Capsule; TAKE 1 CAPSULE DAILY; Therapy: 34HRB3076 to Recorded   6  Oxaprozin 600 MG Oral Tablet; TAKE 1 TABLET TWICE DAILY;    Therapy: 42SRI9281 to (Evaluate:09Mpt2032)  Requested for: 90TVM6552; Last Rx:29Jan2015   Ordered   7  Oxycodone-Acetaminophen 5-325 MG Oral Tablet; Take 1 tablet 4 times daily; Therapy: 28FJE8358 to (Last WM:66KHK8453) Ordered   8  PA Biotin 5000 MCG Oral Capsule; TAKE 2 CAPSULE Daily; Therapy: 50Ygu5367 to Recorded   9  RA Vitamin D-3 2000 UNIT Oral Capsule; TAKE 2 CAPSULE Daily; Therapy: 21Qxx5234 to Recorded   10  TraMADol HCl - 50 MG Oral Tablet; Take 1-2 tablets po q6h for pain; Therapy: 47DSW1646 to (Last XQ:66SMM1927)  Requested for: 40DPM6564 Ordered   11  Vitamin C Oral Tablet Chewable; Take 1 tablet daily; Therapy: 73JYM4827 to Recorded  Medication List Reviewed: The medication list was reviewed and updated today  Allergies    1  Blue Dyes   2  Macrobid CAPS   3  Nitrofurantoin Monohyd Macro CAPS   4  Ultram TABS    Vitals  Vital Signs [Data Includes: Current Encounter]    Recorded: 54ZEQ9904 10:08AM   Temperature 97 9 F, Oral   Heart Rate 74   Systolic 730, RUE, Sitting   Diastolic 72, RUE, Sitting   Height 5 ft 1 in   Weight 123 lb 4 oz   BMI Calculated 23 29   BSA Calculated 1 54   O2 Saturation 97, RA     Physical Exam    Constitutional   General appearance: No acute distress, well appearing and well nourished  Eyes   Conjunctiva and lids: No swelling, erythema or discharge  Pupils and irises: Equal, round and reactive to light  Ears, Nose, Mouth, and Throat   External inspection of ears and nose: Normal     Otoscopic examination: Tympanic membranes translucent with normal light reflex  Canals patent without erythema  Nasal mucosa, septum, and turbinates: Normal without edema or erythema  Oropharynx: Normal with no erythema, edema, exudate or lesions  Pulmonary   Respiratory effort: No increased work of breathing or signs of respiratory distress  Auscultation of lungs: Clear to auscultation  Cardiovascular   Auscultation of heart: Normal rate and rhythm, normal S1 and S2, without murmurs      Abdomen   Abdomen: Non-tender, no masses  Lymphatic   Palpation of lymph nodes in neck: No lymphadenopathy  Musculoskeletal   Gait and station: Abnormal   Gait evaluation demonstrated antalgia on the left  Digits and nails: Normal without clubbing or cyanosis  Inspection/palpation of joints, bones, and muscles: Abnormal   Appearance - no swelling, no erythema, no ecchymosis, no amputations, no deformity and no asymmetry  Palpation - normal except as noted: left knee tenderness, but no increased warmth, no masses, no click and no crepitus  Decreased range of motion in left knee with flexion only, no other decreased ROM  Tenderness over the medial aspect of the left knee  Skin   Skin and subcutaneous tissue: Normal without rashes or lesions  Neurologic Grossly intact  Sensation: No sensory loss  Psychiatric   Orientation to person, place, and time: Normal     Mood and affect: Normal          Health Management  History of Other screening mammogram   Digital Bilateral Screening Mammogram With CAD; every 1 year; Last 22Aug2014; Next Due:  22Aug2015;  Overdue    Future Appointments    Date/Time Provider Specialty Site   04/01/2016 10:15 AM Juan Luis John DO Internal Medicine [facility]     Signatures   Electronically signed by : Erin Trimble MD; Feb 2 2016 11:25AM EST                       (Author)

## 2018-01-17 NOTE — MISCELLANEOUS
Message  Return to work or school:   Trey Pollock is under my professional care   She was seen in my office on 02/02/2016    She is not able to return to work until     Ashleigh Delatorre is not to return to work unitl after she is seen on 2/10/2016 at 10:15am         Signatures   Electronically signed by : John Lewis MD; Feb 2 2016  2:03PM EST

## 2018-01-18 ENCOUNTER — APPOINTMENT (OUTPATIENT)
Dept: PHYSICAL THERAPY | Age: 53
End: 2018-01-18
Payer: COMMERCIAL

## 2018-01-18 DIAGNOSIS — M72.2 PLANTAR FASCIAL FIBROMATOSIS: ICD-10-CM

## 2018-01-18 PROCEDURE — G8990 OTHER PT/OT CURRENT STATUS: HCPCS

## 2018-01-18 PROCEDURE — 97162 PT EVAL MOD COMPLEX 30 MIN: CPT

## 2018-01-18 PROCEDURE — G8991 OTHER PT/OT GOAL STATUS: HCPCS

## 2018-01-18 NOTE — RESULT NOTES
Message   polyp removed was not precancerous  repeat colonscopy in 3 years due to fair prep  enter reminder  Verified Results  (1) TISSUE EXAM [id number] 10:44AM Kimberli Rodriguez     Test Name Result Flag Reference   LAB AP CASE REPORT (Report)     Surgical Pathology Report             Case: [id number]                   Authorizing Provider: Kavin Up MD       Collected:      02/10/2017 1044        Ordering Location:   [address] [place] [address]    Received:      02/13/2017 [address] [facility]                            Pathologist:      Michelle Marie MD                              Specimen:  Large Intestine, Transverse Colon, Cold bx, polyp   LAB AP FINAL DIAGNOSIS (Report)     A  Colon, transverse, polypectomy:        - Polypoid colonic mucosa with no significant pathologic   alteration         - No dysplasia or malignancy is identified  Interpretation performed at [address] [facility]  Electronically signed by Michelle Marie MD on 2/14/2017 at 2:37 PM   LAB AP SURGICAL ADDITIONAL INFORMATION (Report)     These tests were developed and their performance characteristics   determined by [facility] or [facility]  They may not be cleared or approved by the [place]  Food and   Drug Administration  The FDA has determined that such clearance or   approval is not necessary  These tests are used for clinical purposes  They should not be regarded as investigational or for research  This   laboratory has been approved by CLIA 88, designated as a high-complexity   laboratory and is qualified to perform these tests  LAB AP GROSS DESCRIPTION (Report)     A  The specimen is received in formalin, labeled with the patient's name   and hospital number, and is designated cold biopsy, polyp, is a single   irregularly shaped fragment of tan soft tissue measuring 0 3 cm in   greatest dimension  Entirely submitted   One cassette  Note: The estimated total formalin fixation time based upon information   provided by the submitting clinician and the standard processing schedule   is over 72 hours        RLR

## 2018-01-22 VITALS
WEIGHT: 117 LBS | HEART RATE: 80 BPM | DIASTOLIC BLOOD PRESSURE: 78 MMHG | HEIGHT: 61 IN | SYSTOLIC BLOOD PRESSURE: 123 MMHG | BODY MASS INDEX: 22.09 KG/M2

## 2018-01-23 VITALS
WEIGHT: 115.5 LBS | OXYGEN SATURATION: 98 % | HEART RATE: 92 BPM | SYSTOLIC BLOOD PRESSURE: 114 MMHG | TEMPERATURE: 97.7 F | DIASTOLIC BLOOD PRESSURE: 78 MMHG | HEIGHT: 61 IN | BODY MASS INDEX: 21.81 KG/M2

## 2018-01-23 NOTE — RESULT NOTES
Verified Results  (1) CK (CPK) 13Yjl3285 09:28AM Quincypj Jodi    Order Number: [id number]     Test Name Result Flag Reference   CK (CPK) 180 U/L     CK MB FRACTION 3 2 ng/mL  0 0-5 0   CK-MB INDEX 1 8 %  0 0-2 5

## 2018-01-23 NOTE — RESULT NOTES
Verified Results  * MRI CERVICAL SPINE [address] [employer] [facility] [id number] 06:48AM Dale Sierra Order Number: [id number]    - Patient Instructions: To schedule this appointment, please contact Central Scheduling at [phone]  Test Name Result Flag Reference   MRI CERVICAL SPINE WO CONTRAST (Report)     MRI CERVICAL SPINE WITHOUT CONTRAST     INDICATION: Neck pain and radiculopathy  COMPARISON: MRI dated 7/19/2016     TECHNIQUE: Sagittal T1, sagittal T2, sagittal inversion recovery, axial T2, axial 2D merge sequences were acquired on a closed 1 5 Bernice MR unit without gadolinium  IMAGE QUALITY: Diagnostic     FINDINGS:     ALIGNMENT: The patient is status post ACDF from C5 through C7 with orthopedic hardware resulting in susceptibility artifacts  MARROW SIGNAL: Normal marrow signal is identified within the visualized bony structures  No discrete marrow lesion  CERVICAL AND VISUALIZED THORACIC CORD: Normal signal within the visualized cord  PREVERTEBRAL AND PARASPINAL SOFT TISSUES:  Normal          VISUALIZED POSTERIOR FOSSA: The visualized posterior fossa demonstrates no abnormal signal      CERVICAL DISC SPACES:        C2-C3: Normal      C3-C4: Normal      C4-C5: Disc bulge asymmetric to the right and resulting in moderate right foraminal encroachment  Correlate clinically for right C5 radiculopathy  The left neural foramen remains patent  Mild right central canal stenosis evident  C5-C6: Right greater than left uncovertebral hypertrophy noted resulting in mild right central canal encroachment as well as mild to moderate right foraminal stenosis  The left neural foramen remains patent  C6-C7: Minimal bulge without central canal or foraminal stenosis  C7-T1: Prominent perineural cysts again noted bilaterally  UPPER THORACIC DISC SPACES: Normal        IMPRESSION:   1  Spondylotic changes of the cervical spine as detailed above, similar to prior study     2  Stable postoperative alignment after ACDF from C5 through C7         Workstation performed: [id number]     Signed by:   Carlitos Valdovinos MD   1/4/18

## 2018-01-23 NOTE — MISCELLANEOUS
Message   Recorded as Task   Date: 11/30/2017 07:17 AM, Created By: Janet Holm   Task Name: Care Gap   Assigned To: Janet Holm   Regarding Patient: Yaw Gill, Status: In Progress   Comment:    Cari Richards - 30 Nov 2017 7:17 AM     TASK CREATED  Testing:       Breast Cancer Screening   Date of Service: 8/2017  Place of Service: [address] [place] [address]  Place of Service Contact Info:   #: [phone]   Ree Nixon - 30 Nov 2017 10:52 AM     TASK REASSIGNED: Previously Assigned To CARE Jarne Aguilar - 30 Nov 2017 12:00 PM     TASK EDITED  [facility]30[facility]17 [facility] 30 Nov 2017 12:00 PM     TASK IN Andrea Heredia - 01 Dec 2017 9:55 AM     TASK REPLIED TO: Previously Assigned To Ree Nixon  12/1/17 I received fax from [facility] with pt most recent mammo  Date of service is 9/8/17  I scanned and resulted report in pt chart  Lianna Rodriguez - 01 Dec 2017 10:31 AM     TASK REASSIGNED: Previously Assigned To CARE [facility]        Active Problems    1  Abdominal bloating (787 3) (R14 0)   2  Acquired trigger thumb (727 03) (M65 30)   3  Acute medial meniscus tear of left knee, initial encounter (836 0) (S83 242A)   4  Acute pain of left knee (719 46) (M25 562)   5  Aftercare following surgery of the musculoskeletal system (V58 78) (Z47 89)   6  Anemia (285 9) (D64 9)   7  Anxiety (300 00) (F41 9)   8  Bilateral carpal tunnel syndrome (354 0) (G56 03)   9  Cervical post-laminectomy syndrome (722 81) (M96 1)   10  Cervical radiculopathy (723 4) (M54 12)   11  Chronic low back pain (724 2,338 29) (M54 5,G89 29)   12  Chronic pain syndrome (338 4) (G89 4)   13  Constipation (564 00) (K59 00)   14  Elevated CPK (790 5) (R74 8)   15  Encounter for screening mammogram for breast cancer (V76 12) (Z12 31)   16  Heel pain (729 5) (M79 273)   17  History of allergy (V15 09) (Z88 9)   18  Hypercholesterolemia (272 0) (E78 00)   19  Lumbar degenerative disc disease (722 52) (M51 36)   20  Lumbar radiculopathy (724 4) (M54 16)   21  Neck pain, chronic (723 1,338 29) (M54 2,G89 29)   22  Other abnormal finding of urine (791 9) (R82 99)   23  Overactive bladder (596 51) (N32 81)   24  Pain of left thumb (729 5) (M79 645)   25  Plantar fasciitis of right foot (728 71) (M72 2)   26  Plantar fasciitis, right (728 71) (M72 2)   27  Right elbow pain (719 42) (M25 521)   28  Slow transit constipation (564 01) (K59 01)   29  Spondylosis of cervical region without myelopathy or radiculopathy (721 0) (M47 812)   30  Spondylosis of lumbar region without myelopathy or radiculopathy (721 3) (M47 816)    Current Meds   1  Colace 100 MG Oral Capsule; Take 1 capsule twice daily; Therapy: 81AQC1995 to (Evaluate:85Wyj7770)  Requested for: 40EOF9147; Last   Rx:15Jun2017 Ordered   2  DiazePAM 5 MG Oral Tablet; TAKE 2 TABLET Bedtime; Therapy: 87USU5448 to [phone]); Last Rx:22Tyn9170 Ordered   3  Magnesium 400 MG CAPS; 2 CAPSULES DAILY; Therapy: (Recorded:29Nov2017) to Recorded   4  Meloxicam 15 MG Oral Tablet; TAKE 1 TABLET DAILY AS NEEDED; Therapy: 63NPE9028 to ([phone])  Requested for: 34MZS9483; Last   Rx:29Nov2017 Ordered   5  Methocarbamol 750 MG Oral Tablet; TAKE 1 TABLET 3 TIMES DAILY; Therapy: 18SHQ6406 to (Evaluate:24Qnn4655)  Requested for: 66NWA5591; Last   Rx:29Nov2017 Ordered   6  MiraLax Oral Packet (Polyethylene Glycol 3350); MIX 1 PACKET IN 8 OUNCES OF   LIQUID AND DRINK ONCE DAILY  Hold if loose diarrhea; Therapy: 53BVW3462 to (Evaluate:86Ifu4508)  Requested for: 45MMX0155; Last   Rx:35Jox9665 Ordered   7  Multivitamins Oral Capsule; TAKE 1 CAPSULE DAILY; Therapy: 27HSV0505 to Recorded   8  OxyCODONE HCl - 5 MG Oral Tablet; TAKE 1 TO 2 TABLETS EVERY 4 TO 6 HOURS AS   NEEDED FOR PAIN; Last Rx:29Nov2017 Ordered   9   PA Biotin 5000 MCG Oral Capsule; TAKE 2 CAPSULE Daily; Therapy: 19Sep2014 to Recorded   10  RA Vitamin D-3 2000 UNIT Oral Capsule; TAKE 2 CAPSULE Daily; Therapy: 19Sep2014 to Recorded   11  TraMADol HCl - 50 MG Oral Tablet; Take 1-2 tablets po q6h for pain; Therapy: 28ONQ1187 to (Last Rx:29Nov2017)  Requested for: 56UJV2140 Ordered    Allergies    1  Blue Dyes   2  Macrobid CAPS   3  Nitrofurantoin Monohyd Macro CAPS   4  Penicillins   5   Ultram TABS    Signatures   Electronically signed by : Kai Rivas RN; Dec  1 2017  2:05PM EST                       (Author)

## 2018-01-24 ENCOUNTER — OFFICE VISIT (OUTPATIENT)
Dept: PHYSICAL THERAPY | Age: 53
End: 2018-01-24
Payer: COMMERCIAL

## 2018-01-24 VITALS
WEIGHT: 115.5 LBS | BODY MASS INDEX: 21.81 KG/M2 | SYSTOLIC BLOOD PRESSURE: 91 MMHG | HEART RATE: 82 BPM | HEIGHT: 61 IN | DIASTOLIC BLOOD PRESSURE: 58 MMHG

## 2018-01-24 DIAGNOSIS — M72.2 PLANTAR FASCIAL FIBROMATOSIS: Primary | ICD-10-CM

## 2018-01-24 PROCEDURE — 97140 MANUAL THERAPY 1/> REGIONS: CPT | Performed by: PHYSICAL THERAPIST

## 2018-01-24 PROCEDURE — 97110 THERAPEUTIC EXERCISES: CPT | Performed by: PHYSICAL THERAPIST

## 2018-01-24 NOTE — PROGRESS NOTES
Daily Note     Today's date: 2018  Patient name: Js Hall  : 1965  MRN: [id number]  Referring provider: Марина Garcia  Dx:   Encounter Diagnosis   Name Primary?  Plantar fascial fibromatosis Yes                  Subjective: Pt reports post treatment soreness and feels injection was no longer working  Objective: See treatment diary below  There are no active problems to display for this patient  Precautions: HTN; Anxiety    Daily Treatment Diary     Manual  /            Ankle PROM 10'            Graston  8'            Kinesiotape                                           Exercise Diary              Bike 5'            1 st ray DF stretch with green strap :30X5            Gastroc Stretch :30X5            Soleus Stretch :30X5            Toe Splay 2X10            Leg press ecc PF             BAPS CW/CCW             Sl Eversion             Planks             Inclined squats                                                                                                                                                   Modalities              CP PRN                                                 Assessment: Tolerated treatment well  Patient exhibited good technqiue with therapeutic exercises      Plan: Progress treatment as tolerated

## 2018-01-25 ENCOUNTER — OFFICE VISIT (OUTPATIENT)
Dept: PHYSICAL THERAPY | Age: 53
End: 2018-01-25
Payer: COMMERCIAL

## 2018-01-25 DIAGNOSIS — M72.2 PLANTAR FIBROMATOSIS: Primary | ICD-10-CM

## 2018-01-25 PROCEDURE — 97140 MANUAL THERAPY 1/> REGIONS: CPT | Performed by: PHYSICAL THERAPIST

## 2018-01-25 PROCEDURE — 97110 THERAPEUTIC EXERCISES: CPT | Performed by: PHYSICAL THERAPIST

## 2018-01-25 NOTE — PROGRESS NOTES
Daily Note     Today's date: 2018  Patient name: Diana Weinstein  : 1965  MRN: [id number]  Referring provider: Allan Nicole  Dx: No diagnosis found  Subjective: Pt feels no change since last treatment      Objective: See treatment diary below  Precautions: HTN; Anxiety    Daily Treatment Diary     Manual             Ankle PROM 10' 8           Graston  8'            Kinesiotape  8            Button hole intermetatarsal 20%, 60% arch support                              Exercise Diary              Bike 5'            1 st ray DF stretch with green strap :30X5 Manual           Gastroc Stretch :30X5 :30X5           Soleus Stretch :30X5 :30X5           Toe Splay 2X10 2X10           Leg press ecc PF             BAPS CW/CCW             Sl Eversion             Planks             Inclined squats                                                                                                                                                   Modalities              CP PRN                                               Assessment: Tolerated treatment well  Patient could benefit from continued PT   Pt reported no heel pain after Kinesio        Plan: Re-assess benefit of Kinesio next visit and adjust as per pt report

## 2018-01-29 ENCOUNTER — OFFICE VISIT (OUTPATIENT)
Dept: PHYSICAL THERAPY | Age: 53
End: 2018-01-29
Payer: COMMERCIAL

## 2018-01-29 DIAGNOSIS — M72.2 PLANTAR FASCIAL FIBROMATOSIS: ICD-10-CM

## 2018-01-29 DIAGNOSIS — M72.2 PLANTAR FIBROMATOSIS: Primary | ICD-10-CM

## 2018-01-29 PROCEDURE — 97112 NEUROMUSCULAR REEDUCATION: CPT

## 2018-01-29 PROCEDURE — 97110 THERAPEUTIC EXERCISES: CPT

## 2018-01-29 PROCEDURE — 97140 MANUAL THERAPY 1/> REGIONS: CPT

## 2018-01-29 NOTE — PROGRESS NOTES
Daily Note     Today's date: 2018  Patient name: Clementine Conde  : 1965  MRN: [id number]  Referring provider: Wanda Lamb  Dx:   Encounter Diagnoses   Name Primary?  Plantar fibromatosis Yes    Plantar fascial fibromatosis                   Subjective: Pt reports pain relief with Lyrica  Objective: See treatment diary below      Assessment: Tolerated treatment well  Patient demonstrated fatigue post treatment      Plan: Continue per plan of care  Precautions: HTN;  Anxiety     Daily Treatment Diary      Manual  18                 Ankle PROM 10' 8                   Graston  8'    prone 8 min                 Kinesiotape   8  np                  Button hole intermetatarsal 20%, 60% arch support                                                     Exercise Diary       18                 Bike 5'    5 min                 1 st ray DF stretch with green strap :30X5 Manual  standing 5x30"                 Gastroc Stretch :30X5 :30X5  5x30"                 Soleus Stretch :30X5 :30X5  5x30"                 Toe Splay 2X10 2X10  2x10                 Leg press ecc PF      np                 BAPS CW/CCW      10x ea                 Sl Eversion      2x10                 Planks      np                 Inclined squats      np                                                                                                                                                                                                                                                                       Modalities       18                 CP PRN      pt defers                                                                    1:1 treatment

## 2018-02-01 ENCOUNTER — OFFICE VISIT (OUTPATIENT)
Dept: PHYSICAL THERAPY | Age: 53
End: 2018-02-01
Payer: COMMERCIAL

## 2018-02-01 DIAGNOSIS — M72.2 PLANTAR FIBROMATOSIS: Primary | ICD-10-CM

## 2018-02-01 DIAGNOSIS — M72.2 PLANTAR FASCIAL FIBROMATOSIS: ICD-10-CM

## 2018-02-01 PROCEDURE — 97140 MANUAL THERAPY 1/> REGIONS: CPT

## 2018-02-01 PROCEDURE — 97110 THERAPEUTIC EXERCISES: CPT

## 2018-02-01 PROCEDURE — 97112 NEUROMUSCULAR REEDUCATION: CPT

## 2018-02-01 NOTE — PROGRESS NOTES
Daily Note     Today's date: 2018  Patient name: Krzysztof Ibarra  : 1965  MRN: [id number]  Referring provider: Robert Hanson  Dx:   Encounter Diagnoses   Name Primary?  Plantar fibromatosis Yes    Plantar fascial fibromatosis                   Subjective: Pt reports relief with taping  Pt also reports she was fitted for new sneakers and shoe inserts and will receive them in 1-2 weeks  Objective: See treatment diary below      Assessment: Tolerated treatment well  Patient exhibited good technique with therapeutic exercises and would benefit from continued PT  Discussed with pt shoe wearing schedule and how to wean into new shoes until adjusted to their fit  Plan: Continue per plan of care  Precautions: HTN;  Anxiety     Daily Treatment Diary      Manual  18               Ankle PROM 10' 8                   Graston  8'    prone 8 min  8 min               Kinesiotape   8  np                  Button hole intermetatarsal 20%, 60% arch support        10 min                                             Exercise Diary       18               Bike 5'    5 min 5 min               1 st ray DF stretch with green strap :30X5 Manual  standing 5x30"  5x30"               Gastroc Stretch :30X5 :30X5  5x30" 5x30"               Soleus Stretch :30X5 :30X5  5x30"  5x30"               Toe Splay 2X10 2X10  2x10  20x               Leg press ecc PF      np                 BAPS CW/CCW      10x ea  2x10 ea               Sl Eversion      2x10 3x10               Planks      np                 Inclined squats      np  nv                                                                                                                                                                                                                                                                     Modalities       18                 CP PRN      pt defers                                                                    1:1 treatment

## 2018-02-05 ENCOUNTER — OFFICE VISIT (OUTPATIENT)
Dept: PHYSICAL THERAPY | Age: 53
End: 2018-02-05
Payer: COMMERCIAL

## 2018-02-05 DIAGNOSIS — M72.2 PLANTAR FIBROMATOSIS: Primary | ICD-10-CM

## 2018-02-05 DIAGNOSIS — M72.2 PLANTAR FASCIAL FIBROMATOSIS: ICD-10-CM

## 2018-02-05 PROCEDURE — 97112 NEUROMUSCULAR REEDUCATION: CPT

## 2018-02-05 PROCEDURE — 97140 MANUAL THERAPY 1/> REGIONS: CPT

## 2018-02-05 PROCEDURE — 97110 THERAPEUTIC EXERCISES: CPT

## 2018-02-05 NOTE — PROGRESS NOTES
Daily Note     Today's date: 2018  Patient name: Shyann Mario  : 1965  MRN: [id number]  Referring provider: Billie Cardoso  Dx:   Encounter Diagnoses   Name Primary?  Plantar fibromatosis Yes    Plantar fascial fibromatosis                   Subjective:  Pt reports relief with manual treatment      Objective: See treatment diary below      Assessment: Tolerated treatment well  Patient demonstrated fatigue post treatment      Plan: Continue per plan of care  Precautions: HTN;  Anxiety     Daily Treatment Diary      Manual  18             Ankle PROM 10' 8                   Graston  8'    prone 8 min  8 min  8 min             Kinesiotape   8  np                  Button hole intermetatarsal 20%, 60% arch support        10 min                                             Exercise Diary       18             Bike 5'    5 min 5 min  5 min             1 st ray DF stretch with green strap :30X5 Manual  standing 5x30"  5x30"  5x30"             Gastroc Stretch :30X5 :30X5  5x30" 5x30"  5x30"             Soleus Stretch :30X5 :30X5  5x30"  5x30"  5x30"             Toe Splay 2X10 2X10  2x10  20x 20x             Leg press ecc PF      np                 BAPS CW/CCW      10x ea  2x10 ea 20x ea             Sl Eversion      2x10 3x10  3x10 ea             Planks      np                 Inclined squats      np  nv  45# 2x10                                                                                                                                                                                                                                                                   Modalities       18                 CP PRN      pt defers                                                                    1:1 treatment

## 2018-02-07 ENCOUNTER — APPOINTMENT (OUTPATIENT)
Dept: PHYSICAL THERAPY | Age: 53
End: 2018-02-07
Payer: COMMERCIAL

## 2018-02-08 ENCOUNTER — APPOINTMENT (OUTPATIENT)
Dept: PHYSICAL THERAPY | Age: 53
End: 2018-02-08
Payer: COMMERCIAL

## 2018-02-08 ENCOUNTER — OFFICE VISIT (OUTPATIENT)
Dept: PHYSICAL THERAPY | Age: 53
End: 2018-02-08
Payer: COMMERCIAL

## 2018-02-08 DIAGNOSIS — M72.2 PLANTAR FIBROMATOSIS: Primary | ICD-10-CM

## 2018-02-08 PROCEDURE — 97140 MANUAL THERAPY 1/> REGIONS: CPT | Performed by: PHYSICAL THERAPIST

## 2018-02-08 PROCEDURE — 97110 THERAPEUTIC EXERCISES: CPT | Performed by: PHYSICAL THERAPIST

## 2018-02-08 NOTE — PROGRESS NOTES
Daily Note / Discharge Summary    Today's date: 2018  Patient name: Fredy Mack  : 1965  MRN: [id number]  Referring provider: Katie Cornelius  Dx:   Encounter Diagnosis   Name Primary?  Plantar fibromatosis Yes                  Subjective:  Pt reports relief with manual treatment  Reviewed FOTO with pt  Pt feels she has made no progress prior to treatment  Objective: See treatment diary below      Assessment: Tolerated treatment well  Patient reported pain moved from medial heel to lateral heel post treatment  Pt cont  To report less pain with kinesiotape for arch support  Consider hypomobility at [facility] contributing to pain in heel  Re-check when toe box widens  Pt's family called PT and reported pt has suffered a stroke and will no longer be attending Pt  Pt is D/C'd due to medical reasons  Plan: Continue per plan of care  Precautions: HTN; Anxiety     Daily Treatment Diary      Manual  18  2           Ankle PROM 10' 8                   Graston  8'    prone 8 min  8 min  8 min  10min Achillies, Quad P, intertarsal,and heel           Kinesiotape   8  np                  Button hole intermetatarsal 20%, 60% arch support        10 min   10min            jt   mobs            10min                 Exercise Diary       18           Bike 5'    5 min 5 min  5 min  5min           1 st ray DF stretch with green strap :30X5 Manual  standing 5x30"  5x30"  5x30"             Gastroc Stretch :30X5 :30X5  5x30" 5x30"  5x30"             Soleus Stretch :30X5 :30X5  5x30"  5x30"  5x30"             Toe Splay 2X10 2X10  2x10  20x 20x             Leg press ecc PF      np                 BAPS CW/CCW      10x ea  2x10 ea 20x ea             Sl Eversion      2x10 3x10  3x10 ea             Planks      np                 Inclined squats      np  nv  45# 2x10              short foot           2X10                                                                                                                                                                                                                                         Modalities       1/29/18                 CP PRN      pt defers                                                                    1:1 treatment

## 2018-02-09 ENCOUNTER — APPOINTMENT (INPATIENT)
Dept: NON INVASIVE DIAGNOSTICS | Facility: HOSPITAL | Age: 53
DRG: 023 | End: 2018-02-09
Payer: COMMERCIAL

## 2018-02-09 ENCOUNTER — APPOINTMENT (EMERGENCY)
Dept: RADIOLOGY | Facility: HOSPITAL | Age: 53
DRG: 023 | End: 2018-02-09
Payer: COMMERCIAL

## 2018-02-09 ENCOUNTER — APPOINTMENT (INPATIENT)
Dept: RADIOLOGY | Facility: HOSPITAL | Age: 53
DRG: 023 | End: 2018-02-09
Payer: COMMERCIAL

## 2018-02-09 ENCOUNTER — ANESTHESIA (EMERGENCY)
Dept: RADIOLOGY | Facility: HOSPITAL | Age: 53
DRG: 023 | End: 2018-02-09
Payer: COMMERCIAL

## 2018-02-09 ENCOUNTER — ANESTHESIA EVENT (EMERGENCY)
Dept: RADIOLOGY | Facility: HOSPITAL | Age: 53
DRG: 023 | End: 2018-02-09
Payer: COMMERCIAL

## 2018-02-09 ENCOUNTER — HOSPITAL ENCOUNTER (INPATIENT)
Facility: HOSPITAL | Age: 53
LOS: 5 days | DRG: 023 | End: 2018-02-14
Attending: EMERGENCY MEDICINE | Admitting: EMERGENCY MEDICINE
Payer: COMMERCIAL

## 2018-02-09 DIAGNOSIS — R29.90 STROKE-LIKE SYMPTOMS: Primary | ICD-10-CM

## 2018-02-09 DIAGNOSIS — I63.511 ACUTE ISCHEMIC RIGHT MCA STROKE (HCC): ICD-10-CM

## 2018-02-09 DIAGNOSIS — R45.851 SUICIDAL IDEATION: ICD-10-CM

## 2018-02-09 PROBLEM — S00.83XA FOREHEAD CONTUSION: Status: ACTIVE | Noted: 2018-02-09

## 2018-02-09 PROBLEM — S50.02XA LEFT ELBOW CONTUSION: Status: ACTIVE | Noted: 2018-02-09
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
## 2018-02-09 LAB
ABO GROUP BLD: NORMAL
ANION GAP BLD CALC-SCNC: 11 MMOL/L (ref 4–13)
ANION GAP SERPL CALCULATED.3IONS-SCNC: 5 MMOL/L (ref 4–13)
APTT PPP: 22 SECONDS (ref 23–35)
ATRIAL RATE: 67 BPM
BASE EXCESS BLDA CALC-SCNC: 6 MMOL/L (ref -2–3)
BASOPHILS # BLD AUTO: 0.02 THOUSANDS/ΜL (ref 0–0.1)
BASOPHILS NFR BLD AUTO: 0 % (ref 0–1)
BLD GP AB SCN SERPL QL: NEGATIVE
BUN BLD-MCNC: 20 MG/DL (ref 5–25)
BUN SERPL-MCNC: 17 MG/DL (ref 5–25)
CA-I BLD-SCNC: 1.11 MMOL/L (ref 1.12–1.32)
CA-I BLD-SCNC: 1.16 MMOL/L (ref 1.12–1.32)
CALCIUM SERPL-MCNC: 8.6 MG/DL (ref 8.3–10.1)
CHLORIDE BLD-SCNC: 104 MMOL/L (ref 100–108)
CHLORIDE SERPL-SCNC: 107 MMOL/L (ref 100–108)
CO2 SERPL-SCNC: 30 MMOL/L (ref 21–32)
CREAT BLD-MCNC: 0.9 MG/DL (ref 0.6–1.3)
CREAT SERPL-MCNC: 0.76 MG/DL (ref 0.6–1.3)
EOSINOPHIL # BLD AUTO: 0.14 THOUSAND/ΜL (ref 0–0.61)
EOSINOPHIL NFR BLD AUTO: 2 % (ref 0–6)
ERYTHROCYTE [DISTWIDTH] IN BLOOD BY AUTOMATED COUNT: 13.7 % (ref 11.6–15.1)
ERYTHROCYTE [DISTWIDTH] IN BLOOD BY AUTOMATED COUNT: 14.2 % (ref 11.6–15.1)
GFR SERPL CREATININE-BSD FRML MDRD: 73 ML/MIN/1.73SQ M
GFR SERPL CREATININE-BSD FRML MDRD: 90 ML/MIN/1.73SQ M
GLUCOSE SERPL-MCNC: 87 MG/DL (ref 65–140)
GLUCOSE SERPL-MCNC: 87 MG/DL (ref 65–140)
GLUCOSE SERPL-MCNC: 88 MG/DL (ref 65–140)
HCO3 BLDA-SCNC: 31 MMOL/L (ref 24–30)
HCT VFR BLD AUTO: 33 % (ref 34.8–46.1)
HCT VFR BLD AUTO: 33.6 % (ref 34.8–46.1)
HCT VFR BLD AUTO: 38.3 % (ref 34.8–46.1)
HCT VFR BLD CALC: 37 % (ref 34.8–46.1)
HCT VFR BLD CALC: 37 % (ref 34.8–46.1)
HGB BLD-MCNC: 11.2 G/DL (ref 11.5–15.4)
HGB BLD-MCNC: 11.4 G/DL (ref 11.5–15.4)
HGB BLD-MCNC: 13 G/DL (ref 11.5–15.4)
HGB BLDA-MCNC: 12.6 G/DL (ref 11.5–15.4)
HGB BLDA-MCNC: 12.6 G/DL (ref 11.5–15.4)
INR PPP: 0.91 (ref 0.86–1.16)
LYMPHOCYTES # BLD AUTO: 4.43 THOUSANDS/ΜL (ref 0.6–4.47)
LYMPHOCYTES NFR BLD AUTO: 52 % (ref 14–44)
MCH RBC QN AUTO: 32.2 PG (ref 26.8–34.3)
MCH RBC QN AUTO: 32.7 PG (ref 26.8–34.3)
MCHC RBC AUTO-ENTMCNC: 33.3 G/DL (ref 31.4–37.4)
MCHC RBC AUTO-ENTMCNC: 33.9 G/DL (ref 31.4–37.4)
MCV RBC AUTO: 97 FL (ref 82–98)
MCV RBC AUTO: 97 FL (ref 82–98)
MONOCYTES # BLD AUTO: 0.66 THOUSAND/ΜL (ref 0.17–1.22)
MONOCYTES NFR BLD AUTO: 8 % (ref 4–12)
NEUTROPHILS # BLD AUTO: 3.18 THOUSANDS/ΜL (ref 1.85–7.62)
NEUTS SEG NFR BLD AUTO: 38 % (ref 43–75)
NRBC BLD AUTO-RTO: 0 /100 WBCS
P AXIS: 73 DEGREES
PCO2 BLD: 31 MMOL/L (ref 21–32)
PCO2 BLD: 32 MMOL/L (ref 21–32)
PCO2 BLD: 47.5 MM HG (ref 42–50)
PH BLD: 7.42 [PH] (ref 7.3–7.4)
PLATELET # BLD AUTO: 211 THOUSANDS/UL (ref 149–390)
PLATELET # BLD AUTO: 259 THOUSANDS/UL (ref 149–390)
PMV BLD AUTO: 8.6 FL (ref 8.9–12.7)
PMV BLD AUTO: 8.9 FL (ref 8.9–12.7)
PO2 BLD: 34 MM HG (ref 35–45)
POTASSIUM BLD-SCNC: 3.9 MMOL/L (ref 3.5–5.3)
POTASSIUM BLD-SCNC: 4 MMOL/L (ref 3.5–5.3)
POTASSIUM SERPL-SCNC: 3.9 MMOL/L (ref 3.5–5.3)
PROTHROMBIN TIME: 12.3 SECONDS (ref 12.1–14.4)
QRS AXIS: 78 DEGREES
QRSD INTERVAL: 74 MS
QT INTERVAL: 404 MS
QTC INTERVAL: 410 MS
RBC # BLD AUTO: 3.48 MILLION/UL (ref 3.81–5.12)
RBC # BLD AUTO: 3.97 MILLION/UL (ref 3.81–5.12)
RH BLD: POSITIVE
SAO2 % BLD FROM PO2: 66 % (ref 95–98)
SODIUM BLD-SCNC: 141 MMOL/L (ref 136–145)
SODIUM BLD-SCNC: 142 MMOL/L (ref 136–145)
SODIUM SERPL-SCNC: 142 MMOL/L (ref 136–145)
SPECIMEN EXPIRATION DATE: NORMAL
SPECIMEN SOURCE: ABNORMAL
SPECIMEN SOURCE: NORMAL
T WAVE AXIS: 73 DEGREES
VENTRICULAR RATE: 62 BPM
WBC # BLD AUTO: 8.44 THOUSAND/UL (ref 4.31–10.16)
WBC # BLD AUTO: 9.13 THOUSAND/UL (ref 4.31–10.16)

## 2018-02-09 PROCEDURE — 93010 ELECTROCARDIOGRAM REPORT: CPT | Performed by: INTERNAL MEDICINE

## 2018-02-09 PROCEDURE — 93306 TTE W/DOPPLER COMPLETE: CPT | Performed by: INTERNAL MEDICINE

## 2018-02-09 PROCEDURE — 80048 BASIC METABOLIC PNL TOTAL CA: CPT | Performed by: EMERGENCY MEDICINE

## 2018-02-09 PROCEDURE — C1887 CATHETER, GUIDING: HCPCS

## 2018-02-09 PROCEDURE — C1757 CATH, THROMBECTOMY/EMBOLECT: HCPCS

## 2018-02-09 PROCEDURE — 82803 BLOOD GASES ANY COMBINATION: CPT

## 2018-02-09 PROCEDURE — C1894 INTRO/SHEATH, NON-LASER: HCPCS

## 2018-02-09 PROCEDURE — 99255 IP/OBS CONSLTJ NEW/EST HI 80: CPT | Performed by: NEUROLOGICAL SURGERY

## 2018-02-09 PROCEDURE — 86901 BLOOD TYPING SEROLOGIC RH(D): CPT | Performed by: EMERGENCY MEDICINE

## 2018-02-09 PROCEDURE — 93306 TTE W/DOPPLER COMPLETE: CPT

## 2018-02-09 PROCEDURE — 93005 ELECTROCARDIOGRAM TRACING: CPT

## 2018-02-09 PROCEDURE — 70450 CT HEAD/BRAIN W/O DYE: CPT

## 2018-02-09 PROCEDURE — 80061 LIPID PANEL: CPT | Performed by: FAMILY MEDICINE

## 2018-02-09 PROCEDURE — 70551 MRI BRAIN STEM W/O DYE: CPT

## 2018-02-09 PROCEDURE — 83036 HEMOGLOBIN GLYCOSYLATED A1C: CPT | Performed by: FAMILY MEDICINE

## 2018-02-09 PROCEDURE — 71045 X-RAY EXAM CHEST 1 VIEW: CPT

## 2018-02-09 PROCEDURE — 86850 RBC ANTIBODY SCREEN: CPT | Performed by: EMERGENCY MEDICINE

## 2018-02-09 PROCEDURE — C1769 GUIDE WIRE: HCPCS

## 2018-02-09 PROCEDURE — 93005 ELECTROCARDIOGRAM TRACING: CPT | Performed by: EMERGENCY MEDICINE

## 2018-02-09 PROCEDURE — 80047 BASIC METABLC PNL IONIZED CA: CPT

## 2018-02-09 PROCEDURE — 61645 PERQ ART M-THROMBECT &/NFS: CPT

## 2018-02-09 PROCEDURE — 85025 COMPLETE CBC W/AUTO DIFF WBC: CPT | Performed by: EMERGENCY MEDICINE

## 2018-02-09 PROCEDURE — B41FYZZ FLUOROSCOPY OF RIGHT LOWER EXTREMITY ARTERIES USING OTHER CONTRAST: ICD-10-PCS | Performed by: SURGERY

## 2018-02-09 PROCEDURE — 86900 BLOOD TYPING SEROLOGIC ABO: CPT | Performed by: EMERGENCY MEDICINE

## 2018-02-09 PROCEDURE — 85014 HEMATOCRIT: CPT | Performed by: INTERNAL MEDICINE

## 2018-02-09 PROCEDURE — 70496 CT ANGIOGRAPHY HEAD: CPT

## 2018-02-09 PROCEDURE — 99291 CRITICAL CARE FIRST HOUR: CPT | Performed by: EMERGENCY MEDICINE

## 2018-02-09 PROCEDURE — 3E03317 INTRODUCTION OF OTHER THROMBOLYTIC INTO PERIPHERAL VEIN, PERCUTANEOUS APPROACH: ICD-10-PCS | Performed by: SURGERY

## 2018-02-09 PROCEDURE — 82947 ASSAY GLUCOSE BLOOD QUANT: CPT

## 2018-02-09 PROCEDURE — 85014 HEMATOCRIT: CPT

## 2018-02-09 PROCEDURE — 03CG3ZZ EXTIRPATION OF MATTER FROM INTRACRANIAL ARTERY, PERCUTANEOUS APPROACH: ICD-10-PCS | Performed by: SURGERY

## 2018-02-09 PROCEDURE — 61645 PERQ ART M-THROMBECT &/NFS: CPT | Performed by: NEUROLOGICAL SURGERY

## 2018-02-09 PROCEDURE — 92610 EVALUATE SWALLOWING FUNCTION: CPT

## 2018-02-09 PROCEDURE — 85027 COMPLETE CBC AUTOMATED: CPT | Performed by: INTERNAL MEDICINE

## 2018-02-09 PROCEDURE — 73070 X-RAY EXAM OF ELBOW: CPT

## 2018-02-09 PROCEDURE — 72125 CT NECK SPINE W/O DYE: CPT

## 2018-02-09 PROCEDURE — 70498 CT ANGIOGRAPHY NECK: CPT

## 2018-02-09 PROCEDURE — 85610 PROTHROMBIN TIME: CPT | Performed by: EMERGENCY MEDICINE

## 2018-02-09 PROCEDURE — 82330 ASSAY OF CALCIUM: CPT

## 2018-02-09 PROCEDURE — 99232 SBSQ HOSP IP/OBS MODERATE 35: CPT | Performed by: SURGERY

## 2018-02-09 PROCEDURE — 99285 EMERGENCY DEPT VISIT HI MDM: CPT

## 2018-02-09 PROCEDURE — 84132 ASSAY OF SERUM POTASSIUM: CPT

## 2018-02-09 PROCEDURE — 36415 COLL VENOUS BLD VENIPUNCTURE: CPT | Performed by: EMERGENCY MEDICINE

## 2018-02-09 PROCEDURE — 85018 HEMOGLOBIN: CPT | Performed by: INTERNAL MEDICINE

## 2018-02-09 PROCEDURE — 84295 ASSAY OF SERUM SODIUM: CPT

## 2018-02-09 PROCEDURE — 99291 CRITICAL CARE FIRST HOUR: CPT | Performed by: PSYCHIATRY & NEUROLOGY

## 2018-02-09 PROCEDURE — B317YZZ FLUOROSCOPY OF LEFT INTERNAL CAROTID ARTERY USING OTHER CONTRAST: ICD-10-PCS | Performed by: SURGERY

## 2018-02-09 PROCEDURE — B313YZZ FLUOROSCOPY OF RIGHT COMMON CAROTID ARTERY USING OTHER CONTRAST: ICD-10-PCS | Performed by: SURGERY

## 2018-02-09 PROCEDURE — 85730 THROMBOPLASTIN TIME PARTIAL: CPT | Performed by: EMERGENCY MEDICINE

## 2018-02-09 PROCEDURE — C1760 CLOSURE DEV, VASC: HCPCS

## 2018-02-09 RX ORDER — AMOXICILLIN 500 MG
1200 CAPSULE ORAL
Status: ON HOLD | COMMUNITY
End: 2018-04-25 | Stop reason: ALTCHOICE

## 2018-02-09 RX ORDER — DOCUSATE SODIUM 100 MG/1
100 CAPSULE, LIQUID FILLED ORAL 2 TIMES DAILY
Status: DISCONTINUED | OUTPATIENT
Start: 2018-02-09 | End: 2018-02-13

## 2018-02-09 RX ORDER — ATORVASTATIN CALCIUM 80 MG/1
80 TABLET, FILM COATED ORAL EVERY EVENING
Status: DISCONTINUED | OUTPATIENT
Start: 2018-02-09 | End: 2018-02-14 | Stop reason: HOSPADM

## 2018-02-09 RX ORDER — GLYCOPYRROLATE 0.2 MG/ML
INJECTION INTRAMUSCULAR; INTRAVENOUS AS NEEDED
Status: DISCONTINUED | OUTPATIENT
Start: 2018-02-09 | End: 2018-02-09 | Stop reason: SURG

## 2018-02-09 RX ORDER — METHOCARBAMOL 750 MG/1
750 TABLET, FILM COATED ORAL 3 TIMES DAILY
Status: ON HOLD | COMMUNITY
End: 2018-02-09 | Stop reason: ALTCHOICE

## 2018-02-09 RX ORDER — SODIUM CHLORIDE 9 MG/ML
INJECTION, SOLUTION INTRAVENOUS CONTINUOUS PRN
Status: DISCONTINUED | OUTPATIENT
Start: 2018-02-09 | End: 2018-02-09 | Stop reason: SURG

## 2018-02-09 RX ORDER — OXYCODONE HYDROCHLORIDE 5 MG/1
5 TABLET ORAL EVERY 6 HOURS PRN
Status: DISCONTINUED | OUTPATIENT
Start: 2018-02-09 | End: 2018-02-10

## 2018-02-09 RX ORDER — HYDRALAZINE HYDROCHLORIDE 20 MG/ML
5 INJECTION INTRAMUSCULAR; INTRAVENOUS EVERY 6 HOURS PRN
Status: DISCONTINUED | OUTPATIENT
Start: 2018-02-09 | End: 2018-02-09

## 2018-02-09 RX ORDER — PREGABALIN 50 MG/1
50 CAPSULE ORAL 2 TIMES DAILY
COMMUNITY
End: 2018-02-18 | Stop reason: HOSPADM

## 2018-02-09 RX ORDER — ATORVASTATIN CALCIUM 10 MG/1
10 TABLET, FILM COATED ORAL DAILY
Status: ON HOLD | COMMUNITY
End: 2018-02-09 | Stop reason: ALTCHOICE

## 2018-02-09 RX ORDER — MULTIVITAMIN WITH IRON
250 TABLET ORAL DAILY
Status: ON HOLD | COMMUNITY
End: 2018-02-09 | Stop reason: ALTCHOICE

## 2018-02-09 RX ORDER — SUCCINYLCHOLINE CHLORIDE 20 MG/ML
INJECTION INTRAMUSCULAR; INTRAVENOUS AS NEEDED
Status: DISCONTINUED | OUTPATIENT
Start: 2018-02-09 | End: 2018-02-09 | Stop reason: SURG

## 2018-02-09 RX ORDER — DIAZEPAM 5 MG/1
5 TABLET ORAL 2 TIMES DAILY PRN
COMMUNITY
End: 2018-02-18 | Stop reason: HOSPADM

## 2018-02-09 RX ORDER — MELOXICAM 15 MG/1
15 TABLET ORAL DAILY PRN
COMMUNITY
End: 2018-02-18 | Stop reason: HOSPADM

## 2018-02-09 RX ORDER — ROCURONIUM BROMIDE 10 MG/ML
INJECTION, SOLUTION INTRAVENOUS AS NEEDED
Status: DISCONTINUED | OUTPATIENT
Start: 2018-02-09 | End: 2018-02-09 | Stop reason: SURG

## 2018-02-09 RX ORDER — OMEGA-3-ACID ETHYL ESTERS 1 G/1
2 CAPSULE, LIQUID FILLED ORAL 2 TIMES DAILY
Status: ON HOLD | COMMUNITY
End: 2018-02-09 | Stop reason: ALTCHOICE

## 2018-02-09 RX ORDER — DICYCLOMINE HYDROCHLORIDE 10 MG/1
10 CAPSULE ORAL 3 TIMES DAILY PRN
Status: ON HOLD | COMMUNITY
End: 2018-02-09 | Stop reason: ALTCHOICE

## 2018-02-09 RX ORDER — ONDANSETRON 2 MG/ML
4 INJECTION INTRAMUSCULAR; INTRAVENOUS EVERY 6 HOURS PRN
Status: DISCONTINUED | OUTPATIENT
Start: 2018-02-09 | End: 2018-02-14 | Stop reason: HOSPADM

## 2018-02-09 RX ORDER — ACETAMINOPHEN 325 MG/1
650 TABLET ORAL EVERY 6 HOURS PRN
Status: DISCONTINUED | OUTPATIENT
Start: 2018-02-09 | End: 2018-02-10

## 2018-02-09 RX ORDER — CHLORHEXIDINE GLUCONATE 0.12 MG/ML
15 RINSE ORAL EVERY 12 HOURS SCHEDULED
Status: DISCONTINUED | OUTPATIENT
Start: 2018-02-09 | End: 2018-02-09

## 2018-02-09 RX ORDER — PROPOFOL 10 MG/ML
INJECTION, EMULSION INTRAVENOUS AS NEEDED
Status: DISCONTINUED | OUTPATIENT
Start: 2018-02-09 | End: 2018-02-09 | Stop reason: SURG

## 2018-02-09 RX ORDER — ONDANSETRON 2 MG/ML
INJECTION INTRAMUSCULAR; INTRAVENOUS AS NEEDED
Status: DISCONTINUED | OUTPATIENT
Start: 2018-02-09 | End: 2018-02-09 | Stop reason: SURG

## 2018-02-09 RX ORDER — ALBUMIN (HUMAN) 12.5 G/50ML
25 SOLUTION INTRAVENOUS ONCE
Status: COMPLETED | OUTPATIENT
Start: 2018-02-09 | End: 2018-02-10

## 2018-02-09 RX ORDER — FENTANYL CITRATE 50 UG/ML
25 INJECTION, SOLUTION INTRAMUSCULAR; INTRAVENOUS EVERY 2 HOUR PRN
Status: DISCONTINUED | OUTPATIENT
Start: 2018-02-09 | End: 2018-02-10

## 2018-02-09 RX ORDER — BISACODYL 10 MG
10 SUPPOSITORY, RECTAL RECTAL DAILY PRN
Status: DISCONTINUED | OUTPATIENT
Start: 2018-02-09 | End: 2018-02-09

## 2018-02-09 RX ORDER — SENNOSIDES 8.6 MG
1 TABLET ORAL DAILY
Status: DISCONTINUED | OUTPATIENT
Start: 2018-02-09 | End: 2018-02-13

## 2018-02-09 RX ORDER — GABAPENTIN 100 MG/1
100 CAPSULE ORAL 2 TIMES DAILY
Status: DISCONTINUED | OUTPATIENT
Start: 2018-02-09 | End: 2018-02-13

## 2018-02-09 RX ORDER — LIDOCAINE HYDROCHLORIDE 10 MG/ML
INJECTION, SOLUTION INFILTRATION; PERINEURAL AS NEEDED
Status: DISCONTINUED | OUTPATIENT
Start: 2018-02-09 | End: 2018-02-09 | Stop reason: SURG

## 2018-02-09 RX ORDER — EPHEDRINE SULFATE 50 MG/ML
INJECTION, SOLUTION INTRAVENOUS AS NEEDED
Status: DISCONTINUED | OUTPATIENT
Start: 2018-02-09 | End: 2018-02-09 | Stop reason: SURG

## 2018-02-09 RX ADMIN — PHENYLEPHRINE HYDROCHLORIDE 20 MCG/MIN: 10 INJECTION INTRAVENOUS at 08:38

## 2018-02-09 RX ADMIN — EPHEDRINE SULFATE 5 MG: 50 INJECTION, SOLUTION INTRAMUSCULAR; INTRAVENOUS; SUBCUTANEOUS at 08:52

## 2018-02-09 RX ADMIN — PROPOFOL 130 MG: 10 INJECTION, EMULSION INTRAVENOUS at 08:32

## 2018-02-09 RX ADMIN — GLYCOPYRROLATE 0.4 MG: 0.2 INJECTION, SOLUTION INTRAMUSCULAR; INTRAVENOUS at 09:23

## 2018-02-09 RX ADMIN — GLYCOPYRROLATE 0.2 MG: 0.2 INJECTION, SOLUTION INTRAMUSCULAR; INTRAVENOUS at 08:52

## 2018-02-09 RX ADMIN — SODIUM CHLORIDE 500 ML: 0.9 INJECTION, SOLUTION INTRAVENOUS at 16:09

## 2018-02-09 RX ADMIN — DOCUSATE SODIUM 100 MG: 100 CAPSULE, LIQUID FILLED ORAL at 17:26

## 2018-02-09 RX ADMIN — ONDANSETRON 4 MG: 2 INJECTION INTRAMUSCULAR; INTRAVENOUS at 09:31

## 2018-02-09 RX ADMIN — IOHEXOL 85 ML: 350 INJECTION, SOLUTION INTRAVENOUS at 07:58

## 2018-02-09 RX ADMIN — ROCURONIUM BROMIDE 10 MG: 10 INJECTION INTRAVENOUS at 09:04

## 2018-02-09 RX ADMIN — ATORVASTATIN CALCIUM 80 MG: 80 TABLET, FILM COATED ORAL at 17:26

## 2018-02-09 RX ADMIN — SODIUM CHLORIDE: 0.9 INJECTION, SOLUTION INTRAVENOUS at 08:42

## 2018-02-09 RX ADMIN — IODIXANOL 45 ML: 320 INJECTION, SOLUTION INTRAVASCULAR at 09:45

## 2018-02-09 RX ADMIN — ROCURONIUM BROMIDE 15 MG: 10 INJECTION INTRAVENOUS at 08:38

## 2018-02-09 RX ADMIN — NOREPINEPHRINE BITARTRATE 2 MCG/MIN: 1 INJECTION INTRAVENOUS at 17:11

## 2018-02-09 RX ADMIN — FENTANYL CITRATE 25 MCG: 50 INJECTION INTRAMUSCULAR; INTRAVENOUS at 15:18

## 2018-02-09 RX ADMIN — GABAPENTIN 100 MG: 100 CAPSULE ORAL at 17:25

## 2018-02-09 RX ADMIN — OXYCODONE HYDROCHLORIDE 5 MG: 5 TABLET ORAL at 17:35

## 2018-02-09 RX ADMIN — LIDOCAINE HYDROCHLORIDE 50 MG: 10 INJECTION, SOLUTION INFILTRATION; PERINEURAL at 08:32

## 2018-02-09 RX ADMIN — NEOSTIGMINE METHYLSULFATE 4 MG: 1 INJECTION, SOLUTION INTRAMUSCULAR; INTRAVENOUS; SUBCUTANEOUS at 09:23

## 2018-02-09 RX ADMIN — ALBUMIN HUMAN 25 G: 0.25 SOLUTION INTRAVENOUS at 23:35

## 2018-02-09 RX ADMIN — SUCCINYLCHOLINE CHLORIDE 80 MG: 20 INJECTION, SOLUTION INTRAMUSCULAR; INTRAVENOUS at 08:32

## 2018-02-09 RX ADMIN — SODIUM CHLORIDE 500 ML: 0.9 INJECTION, SOLUTION INTRAVENOUS at 11:54

## 2018-02-09 RX ADMIN — SODIUM CHLORIDE 1000 ML: 0.9 INJECTION, SOLUTION INTRAVENOUS at 12:49

## 2018-02-09 RX ADMIN — ALTEPLASE 45.2 MG: KIT at 08:07

## 2018-02-09 RX ADMIN — PHENYLEPHRINE HYDROCHLORIDE 20 MCG/MIN: 10 INJECTION INTRAVENOUS at 14:41

## 2018-02-09 NOTE — CONSULTS
CC:  Right MCA syndrome  HPI:  68-year-old female with a history of tobacco abuse presented with acute left onset weakness followed  She was brought trauma evaluated in emergency room for stroke  Her initial NIHSS was found to be 16  CT and CTA were performed concerning for right M1 occlusion  He was given tPA at 8:03 a m     Last well was 6:30 a m  Review of systems unobtainable  Per  no significant past history  She multiple surgeries in the past including ACDF  Lajean Cassette She is a smoker  She is allergic to blue dye  Home meds include Lipitor Valium Bentyl, Lyrica, Robaxin  Temp:  [98 3 °F (36 8 °C)] 98 3 °F (36 8 °C)  HR:  [66-74] 66  Resp:  [20-22] 20  BP: (101-123)/(50-70) 101/50  She talks  Keeps saying no needles  She is inattentive  She has a right gaze preference  She has left facial weakness  Full strength on the right side  No movement in her left upper extremity  Decreased movement in her left lower extremity  No movement to pain in left upper extremity  I discussed her imaging with her  and reviewed her CTA with him  There is a right M1 occlusion  She has some collateral flow  Given her radiographic findings and symptomatology the discussed the risks and benefits of endovascular thrombectomy  He understood that these included bleeding, infection, stroke, paralysis, vessel injury, and death  He has elected to proceed with emergent procedure  We will proceed immediately to the interventional suite

## 2018-02-09 NOTE — PROGRESS NOTES
Examined postprocedure early  She is awake and more interactive  She follows commands x4  She is antigravity with her left upper extremity  There is clear discoordination with this extremity  Additionally she is moving her left lower extremity  Groin is clean dry and intact  Distal pulses intact

## 2018-02-09 NOTE — PROGRESS NOTES
Patient reexamined and wax and wanes with ability to move lue  SBP  after ivf   Plan to start priya synephrine for goal sbp> 140    Cc time 30min

## 2018-02-09 NOTE — TERTIARY TRAUMA SURVEY
Progress Note - Tertiary Trauma Survery   Jennifer Hills 47 y o  female MRN: [id number]  Unit/Bed#: ICU 06 Encounter: [id number]    Summary of Diagnosed Injuries/Plan:    Left elbow contusion   Assessment & Plan    - xray ordered on tertiary survey  - ice  - pain control        Forehead contusion   Assessment & Plan    - ice  - pain control PRN  - no fractures seen  - EOMI        Fall   Assessment & Plan    - secondary to stroke  - CTH negative for bleed, s/p tPA  - CT C/S negative for fracture   - reevaluated post procedure, moving all extremities, some L sided agnosia/neglect c/w R MCA stroke syndrome   - no midline spinal tenderness or step offs, FROM of neck   - C-Collar removed by myself at 1300h          * Acute ischemic right MCA stroke [facility])   Assessment & Plan    - care per critical care          In the presence of primary medical complaint requiring Critical Care intervention and the absence of acute traumatic injury, Trauma will sign off, please call with any additional questions or concerns  ____________________________________________________________________________________________________________    Mechanism of Injury: Other: medical event    Tertiary Exam Due on: 2/9/2018 afternoon    Vitals: Blood pressure (!) 99/49, pulse 60, temperature 98 1 °F (36 7 °C), temperature source Oral, resp  rate 21, height 5' 1" (1 549 m), weight 56 8 kg (125 lb 3 5 oz), SpO2 99 %  ,Body mass index is 23 66 kg/m²  CT / RADIOGRAPHS: ALL RESULTS MUST BE CONFIRMED BY FACULTY OR PRINTED REPORT    CT HEAD: 1  Findings suspicious for evolving right MCA territory infarction  Recommend follow-up CTA of the neck and brain  2   Cerebral atrophy with chronic small vessel ischemic change  CT CHEST:    CTA HEAD: 1  Occlusion of the M1 segment of the right middle cerebral artery  There is reconstitution of the M2 segments via collateral flow from the anterior and posterior circulations   2  Evolving right MCA territory infarction  No acute hemorrhage  3  Degenerative changes cervical spine  CT ABDOMEN / PELVIS:   CT CERVICAL SPINE: Degenerative and postoperative changes  No cervical spine fracture or traumatic malalignment  XR PELVIS:    CT THORACIC / LUMBAR SPINE:  CXR CHEST:    L Elbow XRay: No acute osseous abnormality  OTHER:    OTHER:  OTHER:    OTHER: OTHER:    OTHER:  OTHER:    OTHER:  OTHER:      Consultants - List Service/ Faculty and Date: per primary    Active medications:           Current Facility-Administered Medications:      EMS REPLENISHMENT MED, , Does not apply, Once    atorvastatin (LIPITOR) tablet 80 mg, 80 mg, Oral, QPM    bisacodyl (DULCOLAX) rectal suppository 10 mg, 10 mg, Rectal, Daily PRN    chlorhexidine (PERIDEX) 0 12 % oral rinse 15 mL, 15 mL, Swish & Spit, Q12H [address]    docusate sodium (COLACE) capsule 100 mg, 100 mg, Oral, BID    fentanyl citrate (PF) 100 MCG/2ML 25 mcg, 25 mcg, Intravenous, Q2H PRN    hydrALAZINE (APRESOLINE) injection 5 mg, 5 mg, Intravenous, Q6H PRN    ondansetron (ZOFRAN) injection 4 mg, 4 mg, Intravenous, Q6H PRN    senna (SENOKOT) tablet 8 6 mg, 1 tablet, Oral, Daily    sodium chloride 0 9 % bolus 1,000 mL, 1,000 mL, Intravenous, Once, 1,000 mL at 02/09/18 1249      Intake/Output Summary (Last 24 hours) at 02/09/18 1316  Last data filed at 02/09/18 1154   Gross per 24 hour   Intake           798 21 ml   Output              350 ml   Net           448 21 ml     Invasive Devices     Peripheral Intravenous Line            Peripheral IV 02/09/18 Left Hand less than 1 day    Peripheral IV 02/09/18 Right Antecubital less than 1 day          Drain            Urethral Catheter Temperature probe 16 Fr  less than 1 day              CAGE-AID Questionnaire:    Was the patient able to participate in the CAGE-AID screening questions on admission? No:   1  Does the patient consume alcohol? b  YES-Patient does consume alcohol     2   Does the patient use street drugs or abuse prescription drugs? a  NO-Patient does not use street drugs or abuse prescription drugs    Did the patient answer YES in one of the questions above? Yes:      3  Has the patient felt he/she ought to cut down on drinking and/or drug use? a  NO    4  Has the patient felt annoyed by others criticizing their drinking and/or drug use? a  NO    5  Has the patient felt bad or guilty about their drinking or drug use? a  NO    6  Has the patient ever had a drink (or used drugs) first thing in the morning to steady their nerves or to get rid of a hangover (eye-opener)? a  NO    Is the patient 65 years or older: No    1  GCS:  GCS Total:  14, Eye Opening: To voice = 3, Motor Response: Obeys commands = 6 and Verbal Response:  Oriented = 5  2  Head:   L sided hematoma  3  Neck:   WNL  4  Chest:   WNL  5  Abdomen/Pelvis:   WNL  6  Back:   WNL  7  Extremities:   Lacs, abrasions, swelling, ecchymosis: L elbow hematoma, L forearm eccymosis form IV infiltration, normal ROM and neurovascular exam   Tenderness, pain with motor, instability: RLE in immobilizer s/p procedure  8  Peripheral Nerves: WNL    Do NOT use the following abbreviations: DTO, gr, Markie, MS, MSO4, MgSO4, Nitro, QD, QID, QOD, u, , ?, ?g or trailing zeros   Always use a zero before a decimal     Labs:   CBC:   Lab Results   Component Value Date    WBC 8 44 02/09/2018    HGB 12 6 02/09/2018    HCT 38 3 02/09/2018    MCV 97 02/09/2018     02/09/2018    MCH 32 7 02/09/2018    MCHC 33 9 02/09/2018    RDW 13 7 02/09/2018    MPV 8 9 02/09/2018    NRBC 0 02/09/2018     CMP:   Lab Results   Component Value Date     02/09/2018     02/09/2018    CO2 30 02/09/2018    ANIONGAP 5 02/09/2018    BUN 17 02/09/2018    CREATININE 0 76 02/09/2018    GLUCOSE 87 02/09/2018    CALCIUM 8 6 02/09/2018    EGFR 73 02/09/2018     Magnesium: No results found for: MG

## 2018-02-09 NOTE — PLAN OF CARE
Problem: SLP ADULT - SWALLOWING, IMPAIRED  Goal: Initial SLP swallow eval performed  Outcome: Completed Date Met: 02/09/18

## 2018-02-09 NOTE — ASSESSMENT & PLAN NOTE
- secondary to stroke  - CTH negative for bleed, s/p tPA  - CT C/S negative for fracture   - reevaluated post procedure, moving all extremities, some L sided agnosia/neglect c/w R MCA stroke syndrome   - no midline spinal tenderness or step offs, FROM of neck   - C-Collar removed by myself at 1300h

## 2018-02-09 NOTE — H&P
History and Physical - Critical Care  Wicho Burrows 47 y o  female MRN: [id number]  Unit/Bed#: ICU 06 Encounter: [id number]     Reason for Admission / Chief Complaint:  Acute ischemic RIGHT MCA stroke     History of Present Illness:  Wicho Burrows is a 47 y o  female with no significant past medical history presents for evaluation of right M1 occlusion and acute onset left-sided weakness  Patient reports that this morning she went outside to have a cigarette and she subsequently fell to the ground hitting her right knee and left side of her head  She states that she did not lose consciousness   came outside saw her on the ground  She had trouble getting back up  Appear to be somewhat confused per   Upon arrival she was noted to have left-sided weakness and left-sided facial droop, right-sided gaze preference  She was last seen well at 6:30 a m  this morning  Stroke alert was called at 7:25 a m  this morning  Patient was given a tPA bolus at 8:03 a m  Gerhardt Sep Upon arrival NIH SS score was 16  Patient had thrombectomy performs  Patient currently alert and oriented x4 with continued off left-sided weakness and left-sided facial droop and mild dysarthria  Of note patient's past medical history includes prior C-spine surgery, lumbar stenosis, chronic pain, anxiety  She denies a history of hypertension, diabetes, previous strokes, coronary artery disease  History obtained from the patient  Past Medical History:  No past medical history on file  Past Surgical History:  No past surgical history on file  Past Family History:  No family history on file       Social History:  History   Smoking Status    Not on file   Smokeless Tobacco    Not on file     History   Alcohol use Not on file     History   Drug use: Unknown     Marital Status: /Civil Union       Medications:  Current Facility-Administered Medications   Medication Dose Route Frequency     EMS REPLENISHMENT MED Does not apply Once     Home medications:  Prior to Admission medications    Medication Sig Start Date End Date Taking? Authorizing Provider   atorvastatin (LIPITOR) 10 mg tablet Take 10 mg by mouth daily   Yes Historical Provider, MD   canagliflozin (INVOKANA) 100 mg Take 100 mg by mouth daily before breakfast   Yes Historical Provider, MD   diazepam (VALIUM) 5 mg tablet Take 5 mg by mouth 2 (two) times a day Take two tablets by mouth at bedtime  Yes Historical Provider, MD   dicyclomine (BENTYL) 10 mg capsule Take 10 mg by mouth 3 (three) times a day as needed   Yes Historical Provider, MD   Magnesium 250 MG TABS Take 250 mg by mouth daily   Yes Historical Provider, MD   meloxicam (MOBIC) 15 mg tablet Take 15 mg by mouth daily   Yes Historical Provider, MD   methocarbamol (ROBAXIN) 750 mg tablet Take 750 mg by mouth 3 (three) times a day   Yes Historical Provider, MD   Omega-3 Fatty Acids (FISH OIL) 1200 MG CAPS Take 1,200 mg by mouth   Yes Historical Provider, MD   omega-3-acid ethyl esters (LOVAZA) 1 g capsule Take 2 g by mouth 2 (two) times a day   Yes Historical Provider, MD   oxaprozin (DAYPRO) 600 MG tablet Take 600 mg by mouth 2 (two) times a day   Yes Historical Provider, MD   OxyCODONE HCl 5 MG TABA Take 1 each by mouth 4 (four) times a day as needed   Yes Historical Provider, MD   pregabalin (LYRICA) 50 mg capsule Take 50 mg by mouth 2 (two) times a day   Yes Historical Provider, MD     Allergies:  Not on File     ROS:   Review of Systems   Constitutional: Negative for activity change, appetite change, fatigue, fever and unexpected weight change  HENT: Negative for congestion, rhinorrhea and sneezing  Eyes: Negative for photophobia, redness and visual disturbance  Respiratory: Negative for cough, chest tightness, shortness of breath and wheezing  Cardiovascular: Negative for chest pain, palpitations and leg swelling     Gastrointestinal: Negative for abdominal distention, abdominal pain, blood in stool, constipation, diarrhea, nausea and rectal pain  Genitourinary: Negative for difficulty urinating, dysuria, flank pain, frequency and hematuria  Musculoskeletal: Positive for neck pain  Negative for back pain  Skin: Negative for color change, pallor and rash  Neurological: Positive for dizziness, facial asymmetry, speech difficulty, weakness and headaches  Negative for seizures, syncope and numbness  Psychiatric/Behavioral: Negative for agitation, confusion and decreased concentration  Vitals:  Vitals:    18 0825 18 1010 18 1039 18 1045   BP: 101/50 98/50 97/54 (!) 76/48   Pulse: 66 (!) 53 70 60   Resp: 20 18 (!) 39 (!) 23   Temp:       TempSrc:       SpO2: 96% 100% 100% 100%   Weight:         Temperature:   Temp (24hrs), Av 3 °F (36 8 °C), Min:98 3 °F (36 8 °C), Max:98 3 °F (36 8 °C)    Current: Temperature: 98 3 °F (36 8 °C)     Weights:   IBW: -92 5 kg  There is no height or weight on file to calculate BMI  Hemodynamic Monitoring:  ICP Mean:  , CPP:       Non-Invasive/Invasive Ventilation Settings:  Respiratory    Lab Data (Last 4 hours)    None         O2/Vent Data (Last 4 hours)    None              No results found for: PHART, DHY9PQS, PO2ART, IKS0VQE, K5WHEQWM, BEART, SOURCE  SpO2: SpO2: 98 %     Physical Exam:  Physical Exam   Constitutional: She is oriented to person, place, and time  She appears well-developed and well-nourished  No distress  HENT:   Head: Normocephalic and atraumatic  Nose: Nose normal    Mouth/Throat: Oropharynx is clear and moist  No oropharyngeal exudate  Eyes: EOM are normal  Pupils are equal, round, and reactive to light  Right eye exhibits no discharge  Left eye exhibits no discharge  No scleral icterus  Neck: Normal range of motion  Neck supple  No JVD present  Cardiovascular: Normal rate, regular rhythm, normal heart sounds and intact distal pulses  Exam reveals no gallop and no friction rub      No murmur heard   Pulmonary/Chest: Effort normal and breath sounds normal  No respiratory distress  She has no wheezes  She has no rales  She exhibits no tenderness  Abdominal: Soft  Bowel sounds are normal  She exhibits no distension and no mass  There is no tenderness  There is no rebound and no guarding  Musculoskeletal: Normal range of motion  She exhibits no edema, tenderness or deformity  Lymphadenopathy:     She has no cervical adenopathy  Neurological: She is alert and oriented to person, place, and time  She has normal reflexes  GCS eye subscore is 4  GCS verbal subscore is 5  GCS motor subscore is 6    3+/5 strength in left upper and left lower extremity  5/5 strength in right upper and lower extremity  Left-sided facial droop noted     Skin: Skin is warm and dry  No rash noted  She is not diaphoretic  No erythema  No pallor  Psychiatric: She has a normal mood and affect  Her behavior is normal         Labs:    Results from last 7 days  Lab Units 02/09/18 0751 02/09/18  0733 02/09/18  0731   WBC Thousand/uL  --  8 44  --    HEMOGLOBIN g/dL  --  13 0  --    I STAT HEMOGLOBIN g/dl 12 6  --  12 6   HEMATOCRIT %  --  38 3  --    PLATELETS Thousands/uL  --  259  --    NEUTROS PCT %  --  38*  --    MONOS PCT %  --  8  --       Results from last 7 days  Lab Units 02/09/18 0751 02/09/18  0733 02/09/18  0731   SODIUM mmol/L  --  142  --    POTASSIUM mmol/L  --  3 9  --    CHLORIDE mmol/L  --  107  --    CO2 mmol/L  --  30  --    BUN mg/dL  --  17  --    CREATININE mg/dL  --  0 76  --    CALCIUM mg/dL  --  8 6  --    GLUCOSE RANDOM mg/dL  --  87  --    GLUCOSE, ISTAT mg/dl 87  --  88                Results from last 7 days  Lab Units 02/09/18  0733   INR  0 91   PTT seconds 22*         No results found for: TROPONINI     Imaging:  I have personally reviewed pertinent reports  EKG: This was personally reviewed by myself     Micro:  No results found for: Yeyo Glez SPUTUMCULTUR    Assessment: 52 y o  female with no significant past medical history presents for evaluation of right M1 occlusion and acute onset left-sided weakness  Plan:                  Neuro:    1  Post tPA and post thrombectomy days 0  · CTA head and neck 2/9/18: Occlusion of the M1 segment of the right middle cerebral artery  There is reconstitution of the M2 segments via collateral flow from the anterior and posterior circulations  No acute hemorrhage  · Goal systolic blood pressure less than 180 / 100  · BP currently 82/48 will give 1 L normal saline bolus  · Will give aspirin 24 hours after tPA  · Will need 24 hour were giving her or she of sleep CC post tPA CT scan without contrast of the head   · Will order echocardiogram, MRI, lipid panel, A1c  · Will hold off on DVT prophylaxis for 24 hours  · Will start high-intensity statin atorvastatin 80 mg  · Given relatively young AH may be need potential JNOATAN and possible hypercoagulable profile in the near future  · Frequent neuro checks    2    Cephalgia  Will start patient on scheduled Tylenol 650 mg q 6h                   CV:    BP goal less than 180/105  Currently BP 82/48 will hold any blood pressure medications at this time  Will give 1 L saline bolus to increase BP  Lipid panel pending  Echo pending                 Lung:  Chest x-ray performed yesterday showed no active pulmonary disease  No active issues at this time, continue to monitor                 GI:  No active issues at this time  Will give bowel regimen if restart home pain meds but currently will hold off on pain meds                 FEN:  Electrolytes within normal limits  Will keep patient NPO  Will order formal speech eval                 :  Monitor I/O's                 ID:  No active issues, no gross evidence of any infection at this time  Afebrile, no leukocytosis will continue to monitor                 Heme:  No active issues, hemoglobin stable  Will hold DVT prophylaxis release 24 hours Endo:  A1c pending, BMP glucose 87  Continue to monitor                  Msk/Skin:  Frequent turning/repositioning  PT/OT  Pm& R eval                 Disposition:  Continue ICU level of care  Management as outlined above     VTE Pharmacologic Prophylaxis: Sequential compression device (Venodyne)   VTE Mechanical Prophylaxis: sequential compression device     Invasive lines and devices: Invasive Devices     Peripheral Intravenous Line            Peripheral IV 02/09/18 Left Hand less than 1 day    Peripheral IV 02/09/18 Right Antecubital less than 1 day    Peripheral IV 02/09/18 Right Antecubital less than 1 day          Drain            Urethral Catheter Temperature probe 16 Fr  less than 1 day                 Code Status: No Order  POA:    POLST:       Given critical illness, patient length of stay will require greater than two midnights  Counseling / Coordination of Care  Total time spent today 60 minutes  Greater than 50% of total time was spent with the patient and / or family counseling and / or coordination of care  A description of the counseling / coordination of care: see hpi     Portions of the record may have been created with voice recognition software  Occasional wrong word or "sound a like" substitutions may have occurred due to the inherent limitations of voice recognition software  Read the chart carefully and recognize, using context, where substitutions have occurred          Srinivasa Viera MD

## 2018-02-09 NOTE — OP NOTE
OPERATIVE REPORT  PATIENT NAME: Katerine Gruber    :  1965  MRN: [id number]   Pt Location: Interventional radiology     SURGERY DATE: 18      Preop Diagnosis:  1  Stroke, right M1 Large Vessel Occlusion  2  Hypertension  3  Tobacco Abuse        Postop Diagnosis  1  Stroke, right M1 Large Vessel Occlusion  2  Hypertension  3  Tobacco Abuse        Procedure:  1  Right Internal Carotid Arteriogram  2  Right MCA thrombectomy with Solitiare Stent Retreiver  3  Right common carotid arteriogram  4  Limited Right Femoral Arteriogram     Surgeon:   Dominic Machuca MD     Specimen(s):  None     Estimated Blood Loss:   None     Drains:  None     Anesthesia Type:   Monitored Anesthesia Care     Complications:  None     Operative Indications: is a very pleasant 47 y o  female who presented with right MCA syndrome  NIHSS was 16  CTA was significant for right M1LVO  The last known well was 2 hours prior  After speaking to his POA the patient was brought emergently for thrombectomy  They understood the risks bleeding, stroke, groin hematoma, and death      Procedure Details:  After obtaining written informed consent, the patient was brought into the operating room and moved to the OR table in supine fashion  The groin was prepped and draped in the usual sterile fashion  Monitored anesthesia care was induced  5cc of intradermal lidocaine was infused into the right femoral area and percutaneous access with a 5-Kyrgyz micropuncture kit was obtained into the right femoral artery  A 6-Kyrgyz introducer sheath was placed  This was then exchanged for a Neuron 088  A Berenstein Selector catheter was then advanced over the aortic arch over an [employer]  The Neuron 088 was advanced into the left ICA       AP and lateral images of the left intracranial carotid circulation were obtained       Next a 5Ace 060 was advanced through the [employer], over a Marksman microcatheter over a Traxcess microwire   The clot was passed, and a microcatheter angiogram was preformed  Next a 4mm x 40mm Solitaire Stent Retriever was deployed into the M2 to M1 segments  The 5Ace intermediate catheter was advanced into the M1 alond the face of the clot  A Solumbra technique was preformed and suction applied  The stent retriever and suction was allowed to sit in place for 5 minutes  These were then removed under fluoroscopy and under constant suction       An AP and lateral L ICA angiogram was preformed  The Neuron sheath was then withdrawn and a limited right femoral arteriogram was run  Puncture site was found to be compatible with a Mynx Closure device and this was done successfully  There was appropriate hemostasis  The patient was then awoken from his monitored anesthesia care and found to be in his neurologic baseline  All sponge and needle counts were correct          INTERPRETATION OF ANGIOGRAPHIC FINDINGS:   1  The right internal carotid artery circulation reveals antegrade flow into the right A1 and contralateral hemisphere with abrupt right M1 occlusion  The capillary and venous phases are unremarkable       2  Microcatheter injection reveals we are past the lesion       3  Post-thrombectomy angiogram reveals TICI 3 revascularization       4  The right common carotid artery has no hemodynamic flow limitation for stenosis as defined by NASCET criteria  5   Limited Right femoral arteriogram reveals normal puncture site anatomy      Impression:  TICI 3 revascularization of a right M1 occlusion       Important times:   In room 0825  Puncture 0842  Past lesion 0858  Recan (TICI 3) 0904     Patient Disposition:  Critical Care Unit     SIGNATURE: Allegra Loyn MD  DATE: 02/09/18

## 2018-02-09 NOTE — ANESTHESIA PREPROCEDURE EVALUATION
Review of Systems/Medical History  Patient summary reviewed  Chart reviewed  History of anesthetic complications PONV    Cardiovascular  Hyperlipidemia,    Pulmonary  Smoker cigarette smoker  ,        GI/Hepatic            Endo/Other     GYN       Hematology   Musculoskeletal    Comment: Degenerative disc disease      Neurology      Comment: Acute right M1 occlusion, receiving tPA  Chronic pain  Hx of cervical spine surgery  Psychology   Anxiety,          History obtained from chart and brief discussion with   No prior complications from anesthesia outside of PONV  Anesthesia Plan  ASA Score- 4 Emergent    Anesthesia Type- general with ASA Monitors  Additional Monitors: arterial line  Airway Plan: ETT  Comment:  interviewed immediately prior to procedure  Consent obtained for GETA and arterial line placement  Discussed possibility of continued mechanical ventilation post procedure pending mental status    Pre-evaluation filed after start of case due to emergent circumstances        Plan Factors-    Induction- intravenous  Postoperative Plan- Plan for postoperative opioid use       Informed Consent-

## 2018-02-09 NOTE — ED ATTENDING ATTESTATION
Radha Fernandes MD, saw and evaluated the patient  I have discussed the patient with the resident/non-physician practitioner and agree with the resident's/non-physician practitioner's findings, Plan of Care, and MDM as documented in the resident's/non-physician practitioner's note, except where noted  All available labs and Radiology studies were reviewed  At this point I agree with the current assessment done in the Emergency Department    I have conducted an independent evaluation of this patient a history and physical is as follows:    Primary note dictated by me    Critical Care Time  CritCare Time    Procedures

## 2018-02-09 NOTE — ANESTHESIA POSTPROCEDURE EVALUATION
Post-Op Assessment Note      CV Status:  Stable    Mental Status:  Alert and awake    Hydration Status:  Euvolemic    PONV Controlled:  Controlled    Airway Patency:  Patent    Post Op Vitals Reviewed: Yes          Staff: Anesthesiologist, CRNA       Comments: A/O x 4, reesting comfortably on RA in holding area; report to neuro RN at bedside          BP   95/54   Temp      Pulse 55   Resp   14   SpO2   100

## 2018-02-09 NOTE — H&P
H&P Exam - Trauma   Miguelangel Palmer 47 y o  female MRN: [id number]  Unit/Bed#: TR 02 Encounter: [id number]    Assessment/Plan   Trauma Alert: Level B  Model of Arrival: Ambulance  Trauma Team: Attending Arti Sharma and Residents Ry Nice  Consultants: Other: Neurology  Time Called 7:30am    Trauma Active Problems:  L eyebrow hematoma  R MCA stroke  L knee abrasion    Trauma Plan:   Admit to neurology  Stroke pathway per neurology  Local wound care to L knee abrasion  Monitor L eyebrow hematoma  Tertiary exam within 24 hours  Will follow  Care per primary team    Chief Complaint: "I don't want any IV lines"    History of Present Illness   HPI:  Miguelangel Palmer is a 47 y o  female who presents s/p fall  Patient reportedly was outside smoking when her  heard her fall  She apparently landed on her L side and was confused and acting erratically on arrival of EMS  Patient reportedly had limited movement of her L side and had R sided gaze preference at the scene  She arrived to trauma bay with GCS 14 with R sided gaze preference and only moving R side  Also with R facial droop  Stroke alert was immediately called  FAST was negative and patient was immediately taken to CT scanner  Patient reportedly takes multiple different medications for chronic pain and hypercholestorolemia per her   Mechanism:Fall    Review of Systems  Unable to obtain 2/2 to confusion    Historical Information   History is unobtainable from the patient due to   Efforts to obtain history included the following sources: other medical personnel, obtained from other records    No past medical history on file  Anxiety  Degenerative disk disease  Hypercholesterolemia  Anemia    No past surgical history on file     C-Spine surgery  Tubal ligation  Hysterectomy    Social History   History   Alcohol use Not on file     History   Drug use: Unknown     History   Smoking Status    Not on file   Smokeless Tobacco    Not on file       There is no immunization history on file for this patient  Last Tetanus: unknown  Family History: Non-contributory  Unable to obtain/limited by       Meds/Allergies   PTA meds:   None       Allergies not on file    PHYSICAL EXAM    PE limited by:     Objective   Vitals:   First set: Temperature: 98 3 °F (36 8 °C) (02/09/18 0722)  Pulse: 74 (02/09/18 0722)  Respirations: 20 (02/09/18 0722)  Blood Pressure: 123/70 (02/09/18 0722)    Primary Survey:   (A) Airway: Intact  (B) Breathing: lungs clear bilaterally  (C) Circulation: Pulses:   carotid  2/4, pedal  2/4, radial  2/4 and femoral  2/4  (D) Disabliity:  GCS Total:  14, Eye Opening:   Spontaneous = 4, Motor Response: Obeys commands = 6 and Verbal Response:  Confused = 4  (E) Expose:  Completed    Secondary Survey: (Click on Physical Exam tab above)  Physical Exam   Constitutional: She appears well-developed and well-nourished  HENT:   Head: Normocephalic  L eyebrow hematoma   Eyes: Pupils are equal, round, and reactive to light  Right eye exhibits no discharge  No scleral icterus  R gaze preference   Neck: Normal range of motion  Neck supple  No tracheal deviation present  No thyromegaly present  Cardiovascular: Normal rate, regular rhythm and normal heart sounds  Pulmonary/Chest: Effort normal and breath sounds normal  No respiratory distress  Abdominal: Soft  Bowel sounds are normal  She exhibits no distension  There is no tenderness  Musculoskeletal: She exhibits no edema or deformity  5/5 strength RUE and RLE  1/5 strength LUE and LLE   Neurological: She is alert  R facial droop   Skin: Skin is warm and dry     L knee abrasion       Invasive Devices          No matching active lines, drains, or airways          Lab Results:   BMP/CMP:   Lab Results   Component Value Date    GLUCOSE 88 02/09/2018   , CBC:   Lab Results   Component Value Date    WBC 8 44 02/09/2018    HGB 13 0 02/09/2018    HCT 38 3 02/09/2018    MCV 97 02/09/2018     02/09/2018 MCH 32 7 02/09/2018    MCHC 33 9 02/09/2018    RDW 13 7 02/09/2018    MPV 8 9 02/09/2018    NRBC 0 02/09/2018   , ABG: No results found for: PHART, FDR6MJK, PO2ART, GBP0MZN, Y4FEMQSM, BEART, SOURCE and ISTAT: No components found for: VBG  Imaging/EKG Studies:     Other Studies:     Code Status: No Order  Advance Directive and Living Will:      Power of :    POLST:

## 2018-02-09 NOTE — CONSULTS
Consultation - Neurology   Nelson Luther 47 y o  female MRN: [id number]  Unit/Bed#: ED 01 Encounter: [id number]      Assessment/Plan   Assessment:  Acute onset left hemiparesis, with associated right M1 occlusion  NIHSS 16  Origin of her stroke not yet clarified  Plan:  1  Patient was given tPA, bolus infused 0803  2  Discussed with neuro interventionalist, endovascular alert activated for IA thrombectomy  3  Postprocedure, admit to ICU for complete stroke workup including echocardiogram, MRI, lipid profile, A1c  Given relatively young age, she may need additional testing with JONATAN and/or hypercoagulable profile  4   Neurology will follow with you and advise further pending her progress    Physician Requesting Consult: No att  providers found  Reason for Consult / Principal Problem:  Stroke alert  Hx and PE limited by:  NA  Patient last known well:  0630  Stroke alert called:  Chan Siddiqui  Neurology time of arrival:  0728  HPI: Nelson Luther is a 47y o  year old female who presents with acute onset left-sided weakness  Patient reportedly stepped out to have a cigarette, her  heard her fall, and discovered her to be somewhat confused  EMS was summoned and noticed her left-sided weakness, right-sided gaze preference  She was brought in as a trauma patient but stroke alert was rapidly activated  She has no prior history of stroke  Per prior record history includes anxiety, prior C-spine surgery, lumbar stenosis, chronic pain  Inpatient consult to Neurology  Consult performed by: Dylan Esposito ordered by: Jenae WORKMAN          Review of Systems   Unable to perform ROS: Acuity of condition       Historical Information   No past medical history on file  No past surgical history on file    Social History   History   Alcohol use Not on file     History   Drug use: Unknown     History   Smoking Status    Not on file   Smokeless Tobacco    Not on file     Family History: Reportedly mother had a stroke at a young age      Meds/Allergies   all current active meds have been reviewed, current meds:   Current Facility-Administered Medications   Medication Dose Route Frequency     EMS REPLENISHMENT MED   Does not apply Once    alteplase (ACTIVASE) 100 mg injection **AcuDose Override Pull**        alteplase (ACTIVASE) infusion 45 2 mg  0 81 mg/kg Intravenous Once    and PTA meds:   None       Allergies not on file    Objective   Vitals:Blood pressure 108/70, pulse 72, temperature 98 3 °F (36 8 °C), temperature source Oral, resp  rate 22, weight 55 8 kg (123 lb 1 oz), SpO2 96 %  ,There is no height or weight on file to calculate BMI  No intake or output data in the 24 hours ending 02/09/18 0806    Invasive Devices: Invasive Devices     Peripheral Intravenous Line            Peripheral IV 02/09/18 Right Antecubital less than 1 day                Physical Exam   Constitutional: She appears well-developed and well-nourished  She appears distressed  HENT:   Head: Normocephalic and atraumatic  Eyes: Conjunctivae are normal  No scleral icterus  Cardiovascular: Normal rate and regular rhythm  No murmur heard  Pulmonary/Chest: Breath sounds normal    Skin: Skin is warm and dry  Psychiatric: Her speech is slurred  Vitals reviewed  Neurologic Exam     Mental Status   Follows 1 step commands  Attention: decreased  Speech: slurred   Level of consciousness: Mildly drowsy  Able to name object  Able to read  Able to repeat  Normal comprehension  Mildly inattentive, but answers questions appropriately and is correctly oriented to month and location  There is no aphasia  Cranial Nerves   Left hemianopsia  Rightward gaze preference, but able to cross midline on command  Left facial weakness, moderate dysarthria     Motor Exam Normal strength with right-sided limbs  Left upper extremity is flaccid  Able to lift left leg against gravity but not sustained       Sensory Exam Absent sensory perception left side limbs     Gait, Coordination, and Reflexes Finger to nose and heel to shin testing within normal limits on the right, unable on the left due to weakness  Babinski sign present on the left     NIHSS:    1a Level of Consciousness: 1 = Not alert, but arousable with minimal stimulation   1b  LOC Questions: 0 = Answers both correctly   1c  LOC Commands: 0 = Obeys both correctly   2  Best Gaze: 2 = Forced Deviation   3  Visual: 1 = Partial hemianopia   4  Facial Palsy: 2=Partial paralysis (total or near total paralysis of the lower face)   5a  Motor Right Arm: 0=No drift, limb holds 90 (or 45) degrees for full 10 seconds   5b  Motor Left Arm: 4=No movement   6a  Motor Right Le=No drift, limb holds 90 (or 45) degrees for full 10 seconds   6b  Motor Left Le=Some effort against gravity, limb cannot get to or maintain (if cured) 90 (or 45) degrees, drifts down to bed, but has some effort against gravity   7  Limb Ataxia:  0=Absent   8  Sensory: 2=Severe to total sensory loss; patient is not aware of being touched in face, arm, leg   9  Best Language:  0=No aphasia, normal   10  Dysarthria: 1=Mild to moderate, patient slurs at least some words and at worst, can be understood with some difficulty   11  Extinction and Inattention (formerly Neglect): 1=Visual, tactile, auditory, spatial or personal inattention or extinction to bilateral simultaneous stimulation in one of the sensory modalities   Total Score: 16                   Lab Results:   I have personally reviewed pertinent reports    , CBC:   Results from last 7 days  Lab Units 18  0751 18  0733 18  0731   WBC Thousand/uL  --  8 44  --    RBC Million/uL  --  3 97  --    HEMOGLOBIN g/dL  --  13 0  --    I STAT HEMOGLOBIN g/dl 12 6  --  12 6   HEMATOCRIT %  --  38 3  --    MCV fL  --  97  --    PLATELETS Thousands/uL  --  259  --    , BMP/CMP:   Results from last 7 days  Lab Units 18  0751 18  47 02/09/18  0731   SODIUM mmol/L  --  142  --    POTASSIUM mmol/L  --  3 9  --    CHLORIDE mmol/L  --  107  --    CO2 mmol/L  --  30  --    ANION GAP mmol/L  --  5  --    BUN mg/dL  --  17  --    CREATININE mg/dL  --  0 76  --    GLUCOSE RANDOM mg/dL  --  87  --    GLUCOSE, ISTAT mg/dl 87  --  88   CALCIUM mg/dL  --  8 6  --    EGFR ml/min/1 73sq m 73 90  --    , Coagulation:   Results from last 7 days  Lab Units 02/09/18  0733   INR  0 91     Imaging Studies: I have personally reviewed pertinent films in PACS  EKG, Pathology, and Other Studies: I have personally reviewed pertinent reports  Code Status: No Order  Advance Directive and Living Will:      Power of :    POLST:      Counseling / Coordination of Care  Total Critical Care time spent 40 minutes excluding procedures, teaching and family updates

## 2018-02-09 NOTE — ED PROVIDER NOTES
History obtained from EMS  History  No chief complaint on file  HPI  This is a 58-year-old woman who works 3rd shift  She apparently went outside to smoke a cigarette at 6:25 a m  This morning  The patient's  heard her hit the ground, and came out to find her unresponsive  He believes that she has slipped on ice  The patient then awoke, but was confused and altered  EMS was called and on arrival, found the patient to be somnolent, following some commands, with right gaze preference  They noted that she was weak on the left, and had signs of trauma to her left temporal area  They could not get any history from the patient whatsoever  The patient's  stated that the patient had a history of chronic pain, and took narcotic pain medications, but was unable to provide the names of any medications that she took her any other medical history  The patient was given Narcan with no improvement in symptoms  Her fingerstick in the field was normal   The patient cannot provide any history, and cannot provide any review of systems  None       No past medical history on file  Per chart review, the patient has a history of disc disease, chronic pain, spinal stenosis  She is not on blood thinners in her chart  No past surgical history on file  The patient has no surgical visits in the last 6 months on the chart  No family history on file  I have reviewed and agree with the history as documented  Social History   Substance Use Topics    Smoking status: Not on file    Smokeless tobacco: Not on file    Alcohol use Not on file        Review of Systems    Unable to be obtained      Physical Exam  ED Triage Vitals [02/09/18 0722]   Temperature Pulse Respirations Blood Pressure SpO2   98 3 °F (36 8 °C) 74 20 123/70 95 %      Temp Source Heart Rate Source Patient Position - Orthostatic VS BP Location FiO2 (%)   Oral Monitor Lying -- --      Pain Score       --           Orthostatic Vital Signs  Vitals:    02/09/18 0722 02/09/18 0729   BP: 123/70 108/70   Pulse: 74 72   Patient Position - Orthostatic VS: Lying Lying       Physical Exam on exam the patient is awake  She follows some commands  She is perseverating, wanting her IV out of her arm  The patient's GCS is 13, -1 for confusion and -1 for eyes  The patient is maintaining an airway with no pooling secretions  She has clear and equal breath sounds with adequate chest wall excursion  The patient's trachea is midline  She has no chest wall crepitus  She has intact pulses throughout  On secondary survey, the patient's pupils are round reactive  She has right gaze deviation, and will not track past midline  She follows some commands, and moves her  Right arm and leg spontaneously, but does not move her left arm or leg  She has upgoing toes on the left  The patient is not compliant with cerebellar testing  She is not compliant with visual field testing  She states that she can feel sharp sensation on her right side, but does not seem to respond to touching the left  The patient's abdomen is soft and nontender  It is not distended  The patient was rolled, and she has no signs of trauma to the back or to the neck  She has a small ecchymotic area to her left frontotemporal area  Her midface is stable  She has no rashes or skin changes beyond the 1 bruise      ED Medications  Medications   alteplase (ACTIVASE) 100 mg injection **AcuDose Override Pull** (not administered)    EMS REPLENISHMENT MED (not administered)   alteplase (ACTIVASE) bolus 5 mg (5 mg Intravenous Given 2/9/18 0803)     Followed by   alteplase (ACTIVASE) infusion 45 2 mg (45 2 mg Intravenous New Bag 2/9/18 0807)   iohexol (OMNIPAQUE) 350 MG/ML injection (MULTI-DOSE) 85 mL (85 mL Intravenous Given 2/9/18 0758)       Diagnostic Studies  Results Reviewed     Procedure Component Value Units Date/Time    APTT [[id number]]  (Abnormal) Collected:  02/09/18 0733    Lab Status: Final result Specimen:  Blood from Arm, Right Updated:  02/09/18 0759     PTT 22 (L) seconds     Narrative: Therapeutic Heparin Range = 60-90 seconds    Protime-INR [[id number]]  (Normal) Collected:  02/09/18 0733    Lab Status:  Final result Specimen:  Blood from Arm, Right Updated:  02/09/18 0759     Protime 12 3 seconds      INR 0 91    POCT Chem 8+ [[id number]]  (Normal) Collected:  02/09/18 0751    Lab Status:  Final result Updated:  02/09/18 0756     SODIUM, I-STAT 141 mmol/l      Potassium, i-STAT 3 9 mmol/L      Chloride, istat 104 mmol/L      CO2, i-STAT 31 mmol/L      Anion Gap, Istat 11 mmol/L      Calcium, Ionized i-STAT 1 16 mmol/L      BUN, I-STAT 20 mg/dl      Creatinine, i-STAT 0 9 mg/dl      eGFR 73 ml/min/1 73sq m      Glucose, i-STAT 87 mg/dl      Hct, i-STAT 37 %      Hgb, i-STAT 12 6 g/dl      Specimen Type VENOUS    Basic metabolic panel [[id number]] Collected:  02/09/18 0733    Lab Status:  Final result Specimen:  Blood from Arm, Right Updated:  02/09/18 0754     Sodium 142 mmol/L      Potassium 3 9 mmol/L      Chloride 107 mmol/L      CO2 30 mmol/L      Anion Gap 5 mmol/L      BUN 17 mg/dL      Creatinine 0 76 mg/dL      Glucose 87 mg/dL      Calcium 8 6 mg/dL      eGFR 90 ml/min/1 73sq m     Narrative:         National Kidney Disease Education Program recommendations are as follows:  GFR calculation is accurate only with a steady state creatinine  Chronic Kidney disease less than 60 ml/min/1 73 sq  meters  Kidney failure less than 15 ml/min/1 73 sq  meters      CBC and differential [[id number]]  (Abnormal) Collected:  02/09/18 0733    Lab Status:  Final result Specimen:  Blood from Arm, Right Updated:  02/09/18 0740     WBC 8 44 Thousand/uL      RBC 3 97 Million/uL      Hemoglobin 13 0 g/dL      Hematocrit 38 3 %      MCV 97 fL      MCH 32 7 pg      MCHC 33 9 g/dL      RDW 13 7 %      MPV 8 9 fL      Platelets 541 Thousands/uL      nRBC 0 /100 WBCs      Neutrophils Relative 38 (L) % Lymphocytes Relative 52 (H) %      Monocytes Relative 8 %      Eosinophils Relative 2 %      Basophils Relative 0 %      Neutrophils Absolute 3 18 Thousands/µL      Lymphocytes Absolute 4 43 Thousands/µL      Monocytes Absolute 0 66 Thousand/µL      Eosinophils Absolute 0 14 Thousand/µL      Basophils Absolute 0 02 Thousands/µL     POCT Blood Gas (CG8+) [[id number]]  (Abnormal) Collected:  02/09/18 0731    Lab Status:  Final result Updated:  02/09/18 0735     ph, Taz ISTAT 7 423 (H)     pCO2, Taz i-STAT 47 5 mm HG      pO2, Taz i-STAT 34 0 (L) mm HG      BE, i-STAT 6 (H) mmol/L      HCO3, Taz i-STAT 31 0 (H) mmol/L      CO2, i-STAT 32 mmol/L      O2 Sat, i-STAT 66 (L) %      SODIUM, I-STAT 142 mmol/l      Potassium, i-STAT 4 0 mmol/L      Calcium, Ionized i-STAT 1 11 (L) mmol/L      Hct, i-STAT 37 %      Hgb, i-STAT 12 6 g/dl      Glucose, i-STAT 88 mg/dl      Specimen Type VENOUS                 TRAUMA - CT spine cervical wo contrast   Final Result by Marbin Morse DO (02/09 1243)   Degenerative and postoperative changes  No cervical spine fracture or traumatic malalignment  I personally discussed this study with Emily Wheatley on 2/9/2018 at 7:47 AM                 Workstation performed: [id number]         CT stroke alert brain   Final Result by Marbin Morse DO (02/09 6481)   1  Findings suspicious for evolving right MCA territory infarction  Recommend follow-up CTA of the neck and brain  2   Cerebral atrophy with chronic small vessel ischemic change  I personally discussed this study with Emily Wheatley on 2/9/2018 at 7:47 AM             Workstation performed: [id number]         CTA stroke alert (head/neck)   Final Result by Marbin Morse DO (02/09 3268)   1  Occlusion of the M1 segment of the right middle cerebral artery  There is reconstitution of the M2 segments via collateral flow from the anterior and posterior circulations  2   Evolving right MCA territory infarction    No acute hemorrhage  3   Degenerative changes cervical spine  I personally discussed this study with Bárbara Lee on 2/9/2018 at 7:47 AM                      Workstation performed: [id number]         XR Trauma multiple    (Results Pending)   XR chest 1 view    (Results Pending)              Procedures  Procedures       Phone Contacts  ED Phone Contact    ED Course  ED Course as of Feb 09 0819 Fri Feb 09, 2018   0818   CT head with dense MCA, CT neck negative        ED course:  Patient seen in the trauma [place], during primary survey decision made to make patient a stroke alert  Differential includes intracranial bleeding versus ischemic stroke, possible etiology for fall  Patient to CT scanner, no overt bleed  Neurology at bedside with patient, reviewed CT scan with both Neurology and with Radiology  Patient with dense MCA  Patient's medications reviewed, no contraindications for tPA  TPA ordered and given, neuro interventional at the bedside at 8:15 a m , plan to take patient to Interventional Radiology  Care discussed with the ICU  Patient required 65 minutes of bedside critical care time outside of separately billable procedures  All labs and imaging were reviewed  MDM  CritCare Time    Disposition  Final diagnoses:   Stroke-like symptoms     Time reflects when diagnosis was documented in both MDM as applicable and the Disposition within this note     Time User Action Codes Description Comment    2/9/2018  7:39 AM Isabel Killian Add [R29 90] Stroke-like symptoms       ED Disposition     None      Follow-up Information    None       Patient's Medications    No medications on file     No discharge procedures on file      ED Provider  Electronically Signed by           Gabbi Awad MD  02/09/18 2574

## 2018-02-09 NOTE — SPEECH THERAPY NOTE
Speech Language/Pathology  Speech/Language Pathology  Assessment    Patient Name: Dashawn Dobbs  JNJOG'E Date: 2/9/2018     Problem List  There is no problem list on file for this patient  Past Medical History  No past medical history on file  Past Surgical History  No past surgical history on file  47 y o  female adm 2/9/18 of right M1 occlusion and acute onset left-sided weakness  Patient reports that this morning she went outside to have a cigarette and she subsequently fell to the ground hitting her right knee and left side of her head  She states that she did not lose consciousness   came outside saw her on the ground  She had trouble getting back up  Appear to be somewhat confused per   Upon arrival she was noted to have left-sided weakness and left-sided facial droop, right-sided gaze preference  She was last seen well at 6:30 a m  this morning  Stroke alert was called at 7:25 a m  this morning  Patient was given a tPA bolus at 8:03 a m  Ruben Barnes Upon arrival NIH SS score was 16  Patient had thrombectomy performed  Patient currently alert and oriented x4 with continued off left-sided weakness and left-sided facial droop and mild dysarthria  Of note patient's past medical history includes prior C-spine surgery, lumbar stenosis, chronic pain, anxiety  She denies a history of hypertension, diabetes, previous strokes, coronary artery disease  CT head-1  Findings suspicious for evolving right MCA territory infarction  Recommend follow-up CTA of the neck and brain  2   Cerebral atrophy with chronic small vessel ischemic change      Past Medical History from past encounter   1  History of Acute pain of right knee (719 46) (M25 561)   2  History of Acute URI (465 9) (J06 9)   3  History of Acute UTI (599 0) (N39 0)   4  History of headache (V13 89) (Z87 898)   5  History of hematuria (V13 09) (Z87 448)   6  History of urinary incontinence (V13 09) (Z87 898)   7   History of Influenza vaccine needed (V04 81) (Z23)   8  History of Other chronic pain (338 29) (G89 29)   9  History of Other muscle spasm (728 85) (M62 838)   10  History of Other screening mammogram (V76 12) (Z12 31)   11  History of Preoperative examination (V72 84) (Z01 818)   12  History of Screening for colon cancer (V76 51) (Z12 11)   13  History of Visit for pre-operative examination (V72 84) (O10 837)     Surgical History  1  History of Knee Surgery   2  History of Lipectomy Of Thigh   3  History of Neck Surgery   4  History of Total Abdominal Hysterectomy   5  History of Tubal Ligation    Reason for consult:  R/o aspiration  New neuro event    Precautions:  Contact  Droplet  [place]    Current diet:  NPO  Premorbid diet[de-identified]  Regular w/ thin  Previous VBS:  -  O2 requirement:  nc  Voice/Speech:  Intelligible, slow at times w/ mild dysarthria  Social:  Works at [employer], lives at home w/   Pt is independent  Follows commands:                   yes       Cognitive Status:  A&OX3  [facility]:  Full dentition  Labial weakness on the L  No lingual deviation  R gaze preference but is looking past midline at her   Items administered:  Puree, soft solid, hard solid,  nectar thick liquid, thin liquids, liquids by cup/straw  Oral stage:  Lip closure: fair, mildly reduced seal but maintains on cup rim  Mastication: slow, deliberate  Bolus formation: mildly reduced  Bolus control: wfl  Transfer: mildly reduced  Oral residue: pt aware of the slower oral stage, waits & clears independently      Pharyngeal stage:  Swallow promptness: +  Laryngeal rise: min reduced  Wet voice: -  Throat clear: -  Cough: no cough    Esophageal stage:  No gross s/s reported- intermittent reflux    Summary:  Pt presents w/ mild oropharyngeal dysphagia w/ decreased oral seal, decreased transfers & reduced hyolaryngeal rise  Her movement are deliberate & slow but she is in control of the bolus & able to tolerate  Recommendations:  Diet: begin level 3 dysphagia advanced  Liquid: thin- ok cup or straw  Meds: as able, none available to trial for assessment  Supervision: please assist  Positioning:Upright  Strategies: slow intake, alternate bites w/ sips  Aspiration precautions    Aspiration precautions posted    Results d/w:  Pt, nursing, family, physician    Goal(s):    Pt will tolerate least restrictive diet w/out s/s aspiration or oral/pharyngeal difficulties

## 2018-02-10 ENCOUNTER — APPOINTMENT (INPATIENT)
Dept: RADIOLOGY | Facility: HOSPITAL | Age: 53
DRG: 023 | End: 2018-02-10
Payer: COMMERCIAL

## 2018-02-10 LAB
ALBUMIN SERPL BCP-MCNC: 3.5 G/DL (ref 3.5–5)
ALP SERPL-CCNC: 59 U/L (ref 46–116)
ALT SERPL W P-5'-P-CCNC: 17 U/L (ref 12–78)
ANION GAP SERPL CALCULATED.3IONS-SCNC: 5 MMOL/L (ref 4–13)
AST SERPL W P-5'-P-CCNC: 12 U/L (ref 5–45)
BILIRUB SERPL-MCNC: 0.48 MG/DL (ref 0.2–1)
BUN SERPL-MCNC: 10 MG/DL (ref 5–25)
CALCIUM SERPL-MCNC: 8.8 MG/DL (ref 8.3–10.1)
CHLORIDE SERPL-SCNC: 108 MMOL/L (ref 100–108)
CHOLEST SERPL-MCNC: 235 MG/DL (ref 50–200)
CO2 SERPL-SCNC: 28 MMOL/L (ref 21–32)
CREAT SERPL-MCNC: 0.82 MG/DL (ref 0.6–1.3)
ERYTHROCYTE [DISTWIDTH] IN BLOOD BY AUTOMATED COUNT: 14 % (ref 11.6–15.1)
EST. AVERAGE GLUCOSE BLD GHB EST-MCNC: 111 MG/DL
GFR SERPL CREATININE-BSD FRML MDRD: 82 ML/MIN/1.73SQ M
GLUCOSE SERPL-MCNC: 114 MG/DL (ref 65–140)
HBA1C MFR BLD: 5.5 % (ref 4.2–6.3)
HCT VFR BLD AUTO: 33.5 % (ref 34.8–46.1)
HDLC SERPL-MCNC: 78 MG/DL (ref 40–60)
HGB BLD-MCNC: 11.5 G/DL (ref 11.5–15.4)
LDLC SERPL CALC-MCNC: 139 MG/DL (ref 0–100)
MAGNESIUM SERPL-MCNC: 1.9 MG/DL (ref 1.6–2.6)
MCH RBC QN AUTO: 33 PG (ref 26.8–34.3)
MCHC RBC AUTO-ENTMCNC: 34.3 G/DL (ref 31.4–37.4)
MCV RBC AUTO: 96 FL (ref 82–98)
PHOSPHATE SERPL-MCNC: 3.8 MG/DL (ref 2.7–4.5)
PLATELET # BLD AUTO: 257 THOUSANDS/UL (ref 149–390)
PMV BLD AUTO: 8.3 FL (ref 8.9–12.7)
POTASSIUM SERPL-SCNC: 4 MMOL/L (ref 3.5–5.3)
PROT SERPL-MCNC: 6.3 G/DL (ref 6.4–8.2)
RBC # BLD AUTO: 3.48 MILLION/UL (ref 3.81–5.12)
SODIUM SERPL-SCNC: 141 MMOL/L (ref 136–145)
TRIGL SERPL-MCNC: 89 MG/DL
WBC # BLD AUTO: 10.85 THOUSAND/UL (ref 4.31–10.16)

## 2018-02-10 PROCEDURE — 99291 CRITICAL CARE FIRST HOUR: CPT | Performed by: EMERGENCY MEDICINE

## 2018-02-10 PROCEDURE — 85027 COMPLETE CBC AUTOMATED: CPT | Performed by: FAMILY MEDICINE

## 2018-02-10 PROCEDURE — 99232 SBSQ HOSP IP/OBS MODERATE 35: CPT | Performed by: PSYCHIATRY & NEUROLOGY

## 2018-02-10 PROCEDURE — 84100 ASSAY OF PHOSPHORUS: CPT | Performed by: FAMILY MEDICINE

## 2018-02-10 PROCEDURE — 99231 SBSQ HOSP IP/OBS SF/LOW 25: CPT | Performed by: NEUROLOGICAL SURGERY

## 2018-02-10 PROCEDURE — 70450 CT HEAD/BRAIN W/O DYE: CPT

## 2018-02-10 PROCEDURE — 80053 COMPREHEN METABOLIC PANEL: CPT | Performed by: FAMILY MEDICINE

## 2018-02-10 PROCEDURE — 93005 ELECTROCARDIOGRAM TRACING: CPT

## 2018-02-10 PROCEDURE — 83735 ASSAY OF MAGNESIUM: CPT | Performed by: FAMILY MEDICINE

## 2018-02-10 RX ORDER — MIRTAZAPINE 15 MG/1
15 TABLET, FILM COATED ORAL
Status: DISCONTINUED | OUTPATIENT
Start: 2018-02-10 | End: 2018-02-14 | Stop reason: HOSPADM

## 2018-02-10 RX ORDER — MIDODRINE HYDROCHLORIDE 5 MG/1
5 TABLET ORAL
Status: DISCONTINUED | OUTPATIENT
Start: 2018-02-10 | End: 2018-02-13

## 2018-02-10 RX ORDER — ACETAMINOPHEN 325 MG/1
650 TABLET ORAL EVERY 6 HOURS PRN
Status: DISCONTINUED | OUTPATIENT
Start: 2018-02-10 | End: 2018-02-14 | Stop reason: HOSPADM

## 2018-02-10 RX ORDER — MIDODRINE HYDROCHLORIDE 5 MG/1
5 TABLET ORAL
Status: DISCONTINUED | OUTPATIENT
Start: 2018-02-10 | End: 2018-02-10

## 2018-02-10 RX ORDER — MAGNESIUM SULFATE HEPTAHYDRATE 40 MG/ML
2 INJECTION, SOLUTION INTRAVENOUS ONCE
Status: COMPLETED | OUTPATIENT
Start: 2018-02-10 | End: 2018-02-10

## 2018-02-10 RX ADMIN — SENNOSIDES 8.6 MG: 8.6 TABLET, FILM COATED ORAL at 08:09

## 2018-02-10 RX ADMIN — MIRTAZAPINE 15 MG: 15 TABLET, FILM COATED ORAL at 21:29

## 2018-02-10 RX ADMIN — DOCUSATE SODIUM 100 MG: 100 CAPSULE, LIQUID FILLED ORAL at 18:19

## 2018-02-10 RX ADMIN — ACETAMINOPHEN 650 MG: 325 TABLET, FILM COATED ORAL at 21:29

## 2018-02-10 RX ADMIN — GABAPENTIN 100 MG: 100 CAPSULE ORAL at 18:19

## 2018-02-10 RX ADMIN — DOCUSATE SODIUM 100 MG: 100 CAPSULE, LIQUID FILLED ORAL at 08:09

## 2018-02-10 RX ADMIN — GABAPENTIN 100 MG: 100 CAPSULE ORAL at 08:09

## 2018-02-10 RX ADMIN — ATORVASTATIN CALCIUM 80 MG: 80 TABLET, FILM COATED ORAL at 18:22

## 2018-02-10 RX ADMIN — MIDODRINE HYDROCHLORIDE 5 MG: 5 TABLET ORAL at 14:51

## 2018-02-10 RX ADMIN — MAGNESIUM SULFATE HEPTAHYDRATE 2 G: 40 INJECTION, SOLUTION INTRAVENOUS at 09:37

## 2018-02-10 RX ADMIN — NOREPINEPHRINE BITARTRATE 6 MCG/MIN: 1 INJECTION INTRAVENOUS at 03:22

## 2018-02-10 RX ADMIN — ACETAMINOPHEN 650 MG: 325 TABLET, FILM COATED ORAL at 02:37

## 2018-02-10 RX ADMIN — ACETAMINOPHEN 650 MG: 325 TABLET, FILM COATED ORAL at 12:20

## 2018-02-10 NOTE — PROGRESS NOTES
Progress Note - Critical Care   Marcell Bruno 47 y o  female MRN: [id number]  Unit/Bed#: ICU 06 Encounter: [id number]    Attending Physician: Aarti Nunez DO      ______________________________________________________________________  Assessment and Plan:   Principal Problem:    Acute ischemic right MCA stroke [facility])  Active Problems:    Fall    Forehead contusion    Left elbow contusion  Resolved Problems:    * No resolved hospital problems  *    Neuro:    R MCA stroke sp tPA and post thrombectomy days 1    · Goal systolic blood pressure less than 180 / 100  · MRI and CT head report pending this morning   · Will give aspirin 24 hours after tPA  · ECHO sowed EF 65% no RWMA normal R and L atrium   · A1c 5 5%, and lipids   HDL 78, ,   · Can start DVT ppx this evening   · Continue atorvastatin 80 mg  · Frequent neuro checks    Suicidal ideations   · Psych consult      Cephalgia    · Tylenol 650 mg q 6h        CV:    · No current issues      Lung:    · No active issues at this time     GI:    · No active issues at this time  · Senna and colace      FEN:    Fluids: none   Electrolytes: will replace mg   Nutrition: Diet ordered      :    · Monitor I/O's  · Bladder scan as needed      ID:   · Afebrile, no leukocytosis      Heme:    · No active issues  · DVT ppx today      Endo:    · No active issues       Msk/Skin:  Frequent turning/repositioning, PT/OT, OOB in a chair      Disposition:  If CT head and MRI are ok then trx to floor; psych consult, 1:1 till psych clears the patient, DC pressors     Code Status: Level 3 - DNAR and DNI  ______________________________________________________________________    Chief Complaint: Pt was seen and examined this morning  She appears depressed and sad, she reports that she wanted to come in front of a train to kill herself  She is refusing picc line     24 Hour Events: Pt levo stayed at 7 overnight   No changes in neuro exam but pt is suicidal      ______________________________________________________________________    Physical Exam   Constitutional: She is oriented to person, place, and time  She appears well-developed and well-nourished  No distress  Hematoma above left eye, left sided facial droop   HENT:   Head: Normocephalic and atraumatic  Eyes: Conjunctivae are normal  Right eye exhibits no discharge  Left eye exhibits no discharge  Neck: Neck supple  No JVD present  Cardiovascular: Normal rate and regular rhythm  Pulmonary/Chest: Breath sounds normal  No respiratory distress  She has no wheezes  Abdominal: Soft  She exhibits no distension  There is no tenderness  There is no rebound and no guarding  Musculoskeletal: She exhibits no edema  Neurological: She is alert and oriented to person, place, and time  No cranial nerve deficit  Pt is weaker on the left side  4/5 on the left upper and lower extremity when compared to right  She does have a facial droop on the left  She seems depressed nd down   Skin: Skin is warm and dry  No rash noted  She is not diaphoretic  No erythema  Psychiatric: Her affect is angry  She exhibits a depressed mood  ______________________________________________________________________  Vitals:    02/10/18 0300 02/10/18 0400 02/10/18 0500 02/10/18 0600   BP: 129/62 128/61 137/75 140/66   Pulse: 58 58 58 72   Resp: 16 13 17 18   Temp:  98 8 °F (37 1 °C)     TempSrc:  Oral     SpO2: 99% 98% 99% 98%   Weight:       Height:           Temperature:   Temp (24hrs), Av 6 °F (37 °C), Min:98 1 °F (36 7 °C), Max:99 8 °F (37 7 °C)    Current Temperature: 98 8 °F (37 1 °C)  Weights:   IBW: 47 8 kg    Body mass index is 23 66 kg/m²    Weight (last 2 days)     Date/Time   Weight    18 1138  56 8 (125 22)    18 0754  55 8 (123 06)          Non-Invasive/Invasive Ventilation Settings:  Respiratory    Lab Data (Last 4 hours)    None         O2/Vent Data (Last 4 hours)    None No results found for: PHART, DSM6VVM, PO2ART, NTF7FGG, Y4FOCFQM, BEART, SOURCE    Intake and Outputs:  I/O       02/08 0701 - 02/09 0700 02/09 0701 - 02/10 0700    I V  (mL/kg)  543 4 (9 6)    IV Piggyback  2048  2    Total Intake(mL/kg)  2591 6 (45 6)    Urine (mL/kg/hr)  2325    Total Output   2325    Net   +266 6              Nutrition:        Diet Orders            Start     Ordered    02/09/18 1413  Diet Dysphagia/Modified Consistency; Dysphagia 3-Dental Soft; Thin Liquid  Diet effective now     Question Answer Comment   Diet Type Dysphagia/Modified Consistency    Dysphagia/Modified Consistency Dysphagia 3-Dental Soft    Liquid Modifier Thin Liquid    RD to adjust diet per protocol? Yes        02/09/18 1412        Labs:     Results from last 7 days  Lab Units 02/10/18  0433 02/09/18  2030 02/09/18  1721  02/09/18  0733   WBC Thousand/uL 10 85*  --  9 13  --  8 44   HEMOGLOBIN g/dL 11 5 11 4* 11 2*  --  13 0   I STAT HEMOGLOBIN   --   --   --   < >  --    HEMATOCRIT % 33 5* 33 0* 33 6*  --  38 3   PLATELETS Thousands/uL 257  --  211  --  259   NEUTROS PCT %  --   --   --   --  38*   MONOS PCT %  --   --   --   --  8   < > = values in this interval not displayed      Results from last 7 days  Lab Units 02/10/18  0433 02/09/18  0751 02/09/18  0733   SODIUM mmol/L 141  --  142   POTASSIUM mmol/L 4 0  --  3 9   CHLORIDE mmol/L 108  --  107   CO2 mmol/L 28  --  30   BUN mg/dL 10  --  17   CREATININE mg/dL 0 82  --  0 76   CALCIUM mg/dL 8 8  --  8 6   TOTAL PROTEIN g/dL 6 3*  --   --    BILIRUBIN TOTAL mg/dL 0 48  --   --    ALK PHOS U/L 59  --   --    ALT U/L 17  --   --    AST U/L 12  --   --    GLUCOSE RANDOM mg/dL 114  --  87   GLUCOSE, ISTAT mg/dl  --  87  --        Results from last 7 days  Lab Units 02/10/18  0433   MAGNESIUM mg/dL 1 9     Lab Results   Component Value Date    PHOS 3 8 02/10/2018        Results from last 7 days  Lab Units 02/09/18  0733   INR  0 91   PTT seconds 22*     No results found for: Gamal Lutz      ABG:No results found for: PHART, QXM4ZIL, PO2ART, ZKN6LJP, C8MFTKMI, BEART, SOURCE  Imaging:   Xr Elbow 2 Vw Left  Result Date: 2/9/2018  Impression: No acute osseous abnormality  Trauma - Ct Spine Cervical Wo Contrast  Result Date: 2/9/2018  Impression: Degenerative and postoperative changes  No cervical spine fracture or traumatic malalignment  Xr Trauma Multiple  Result Date: 2/9/2018  Impression: No acute traumatic injury No active cardiopulmonary disease      Ct Stroke Alert Brain  Result Date: 2/9/2018  Impression: 1  Findings suspicious for evolving right MCA territory infarction  Recommend follow-up CTA of the neck and brain  2   Cerebral atrophy with chronic small vessel ischemic change  Cta Stroke Alert (head/neck)  Result Date: 2/9/2018  Impression: 1  Occlusion of the M1 segment of the right middle cerebral artery  There is reconstitution of the M2 segments via collateral flow from the anterior and posterior circulations  2   Evolving right MCA territory infarction  No acute hemorrhage  3   Degenerative changes cervical spine       EKG: NSR to sinus bradycardia     Medications:   Scheduled Meds:  Current Facility-Administered Medications:  acetaminophen 650 mg Oral Q6H PRN Jannet Wilkins DO    atorvastatin 80 mg Oral QPM Patt Morales MD    docusate sodium 100 mg Oral BID Patt Morales MD    fentanyl citrate (PF) 25 mcg Intravenous Q2H PRN Jannet Wilkins DO    gabapentin 100 mg Oral BID Jannet Wilkins DO    norepinephrine 1-30 mcg/min Intravenous Titrated Jannet Wilkins DO Last Rate: 6 mcg/min (02/10/18 0322)   ondansetron 4 mg Intravenous Q6H PRN Patt Morales MD    oxyCODONE 5 mg Oral Q6H PRN Jannet Wilkins DO    senna 1 tablet Oral Daily Patt Morales MD      Continuous Infusions:  norepinephrine 1-30 mcg/min Last Rate: 6 mcg/min (02/10/18 0322)     PRN Meds:    acetaminophen 650 mg Q6H PRN   fentanyl citrate (PF) 25 mcg Q2H PRN   ondansetron 4 mg Q6H PRN   oxyCODONE 5 mg Q6H PRN     VTE Pharmacologic Prophylaxis: TPA  VTE Mechanical Prophylaxis: sequential compression device  Invasive lines and devices: Invasive Devices     Peripheral Intravenous Line            Peripheral IV 02/09/18 Left Hand less than 1 day    Peripheral IV 02/09/18 Right Antecubital less than 1 day                     Portions of the record may have been created with voice recognition software  Occasional wrong word or "sound a like" substitutions may have occurred due to the inherent limitations of voice recognition software  Read the chart carefully and recognize, using context, where substitutions have occurred      Layne Seo DO

## 2018-02-10 NOTE — CONSULTS
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 not otherwise specified  F41 9 anxiety disorder not otherwise specified    PLAN:   1) start mirtazapine 15 mg p o  q h s  for depression and anxiety and sleep  2) continue one on one observation for the 24 hour this writer will reexamine patient tomorrow however patient currently is not suicidal but could have unpredictable behavior  3) please have the pharmacy sent patient written information regarding mirtazapine as patient wanted to read all the effects and side effects of the medication before starting it she also want to be very sure that the medication does not have anything with blue dye in it that she is allergic to blue dyes  4)   Communicated with patients' RN  Chief Complaint: "I do not like my body  "    History of Present Illness: This is a 58-year-old  female who was admitted after she presented with a fall patient had a had a stroke  Psych consult was requested as patient made comments the has that she did one liver she was suicidal   When this writer talked to patient patient says that she just very unhappy with regarding all the medical problems she is having and the changes her body has had and she cannot do several things that she could do before  It took her awhile to tell this writer that she is not suicidal and she made those comments out of frustration    She has very flat affect and she made little eye contact however she denied any hallucinations delusions denied any suicidal homicidal ideas she said that in the past she has taken Cymbalta that did not help much and currently she is taking Valium it is helping a little bit but she is still not sleeping well she was evasive and guarded however she said that she is just unhappy because of her medical condition and she wants to get better but she feels that she might not get better  She again told this writer that she is not suicidal and she will not hurt herself  PAST PSYCH HISTORY:  Denied any previous inpatient psychiatric hospitalization or previous suicide attempts she said that she was treated for anxiety and depression by her outpatient doctor would not much success      Substance Abuse History:    Denied any drug or alcohol      Family Psychiatric History:   Denied any family history of mental illness    Social History:  Education:  12 grade education  Learning Disabilities: None  Marital history:    Living arrangement, social support:  Patient lives at home with her    Occupational History:  Unemployed Functioning Relationships:  Good support system  Other Pertinent History: None      Traumatic History:   Abuse: None  Other Traumatic Events: None  No past medical history on file  Medical Review Of Systems:  All 12 point review of system is normal except for what is mention in medical hisotry      Scheduled Meds:  Current Facility-Administered Medications:  acetaminophen 650 mg Oral Q6H PRN Jannet Wilkins, DO   atorvastatin 80 mg Oral QPM Oscra Coles MD   docusate sodium 100 mg Oral BID Oscar Coles MD   gabapentin 100 mg Oral BID Jannet Wilkins,    midodrine 5 mg Oral TID AC Jannet Wilkins DO   mirtazapine 15 mg Oral HS Bon Andujar MD   ondansetron 4 mg Intravenous Q6H PRN Oscar Coles MD   senna 1 tablet Oral Daily Oscar Coles MD     Continuous Infusions:   PRN Meds:   acetaminophen    ondansetron     Allergies   Allergen Reactions    Blue Dyes (Parenteral) Anaphylaxis       Vitals:    02/10/18 1500   BP: (!) 75/43   Pulse: 60   Resp: 17   Temp:    SpO2: 99%             Mental Status Evaluation:  Appearance:  She patient was guarded and withdrawn   Behavior:  She agreed to talk to this writer but remained ways if did not make much eye contact and did not want to fall into a lot of information   Speech:  Speech was soft monotonous mumbling however it was goal-directed   Mood:  Depressed   Affect Flat   Language: naming objects and Goal directed  Thought Process:   logical and linear    Thought Content:  Has hopelessness she feels that she is never going to get better   Perceptual Disturbances:  denying any auditory and visual hallucinations  Risk Potential: She is denying any suicidal ideas she said she would never hurt herself however she does not like the way she is living and that is why she made those comments out of frustration  Sensorium:  person, place, time/date, situation, day of week, month of year and year   Cognition:  grossly intact   Consciousness:   alert, awake, and oriented ×3    Attention: Fair attention span   Intellect: Normal   Fund of Knowledge: awareness of current events: normal    Insight:  Good   Judgment: Good   Muscle Strength and Tone: Normal    Gait/Station:  gait was not assessed    Motor Activity: no abnormal movements     Lab Results: I have personally reviewed pertinent lab results  NOTE:  Total of 40 minutes were spent in talking to patient completing this medical record reviewing medical chart medical decision making    Jodie Barbosa MD

## 2018-02-10 NOTE — PROGRESS NOTES
Progress Note - Neurology   Gentry Angel 47 y o  female MRN: [id number]  Unit/Bed#: ICU 06 Encounter: [id number]    Assessment:  1  Status post tPA, thrombectomy for acute right M1 occlusion  2  MRI evidence of a resultant multifocal infarction, right MCA territory, with petechial hemorrhagic conversion (not visible by follow-up CT)  3  CTA describing mild atherosclerotic plaque in the right bulb, but with ulceration, similar changes noted in the aorta  This may be most likely etiology of her stroke  4   2D echo without any apparent embolic source  5   Persistent deficits include left visual field neglect, minimal hemipareses, moderate left facial weakness without significant dysarthria  6  History dyslipidemia, noncompliance with meds  7  Clinically depressed    Plan:  1  In light of MRI evident petechial hemorrhage, which delayed initiation aspirin until tomorrow a m  2   Continue on telemetry to screen for paroxysmal AFib  3  Restart Lipitor 80 mg daily  4  Therapy evaluations and treatment  5  We will consider possible JONATAN, but I think that more likely this was athero embolus from either arch or bulb  6  Agree with psych consult  7  SBP goal 120-160      Subjective:   Patient verbalized some suicidal ideation earlier  She does look quite depressed  Denies any significant headache at present  Acknowledges left side is moving better  ROS:    Review of Systems   Constitutional: Positive for fatigue  Negative for fever  HENT: Negative for trouble swallowing  Eyes: Negative for visual disturbance  Respiratory: Negative for cough and choking  Cardiovascular: Negative for chest pain  Gastrointestinal: Negative  Genitourinary: Negative  Neurological: Positive for weakness  Negative for speech difficulty, numbness and headaches  Psychiatric/Behavioral: Positive for dysphoric mood           Vitals: Blood pressure 142/54, pulse (!) 54, temperature 98 4 °F (36 9 °C), temperature source Oral, resp  rate 16, height 5' 1" (1 549 m), weight 56 8 kg (125 lb 3 5 oz), SpO2 100 %  ,Body mass index is 23 66 kg/m²  Physical Exam   Constitutional: She appears well-developed and well-nourished  No distress  HENT:   Head: Normocephalic  Left periorbital ecchymosis   Eyes: Conjunctivae are normal  No scleral icterus  Neck: Neck supple  Cardiovascular: Normal rate and regular rhythm  Pulmonary/Chest: Effort normal and breath sounds normal    Musculoskeletal: She exhibits no edema  Skin: She is not diaphoretic  Vitals reviewed  Neurologic Exam     Mental Status   Flat affect, poor eye contact  Oriented x3  Speech is fluent without aphasia or significant dysarthria  Reluctant to participate in exam     Cranial Nerves   Mild right gaze preference persists, but able to demonstrate full EOMs with extra cuing  Shows visual neglect on the left  Moderate left facial weakness (lower)     Motor Exam Somewhat limited as patient complains of discomfort with testing but has only minimal decreased left versus right  strength, full bilateral biceps and triceps  Bilateral hip flexion and ankle dorsiflexion also intact  Mild left motor neglect requiring repeat cuing     Sensory Exam   Reports symmetric appreciation of vibration and temperature, no extinction       Lab Results: I have personally reviewed pertinent reports  Imaging Studies: I have personally reviewed pertinent films in PACS  EKG, Pathology, and Other Studies: I have personally reviewed pertinent reports          Counseling / Coordination of Care  The total floor time 30 minutes

## 2018-02-10 NOTE — CASE MANAGEMENT
Initial Clinical Review    Admission: Date/Time/Statement: 2/9/18 @ 0915     Orders Placed This Encounter   Procedures    Inpatient Admission     Standing Status:   Standing     Number of Occurrences:   1     Order Specific Question:   Admitting Physician     Answer:   Emma Crook [[id number]]     Order Specific Question:   Level of Care     Answer:   Critical Care [15]     Order Specific Question:   Bed request comments     Answer:   Neuro ICU s/p TPA     Order Specific Question:   Estimated length of stay     Answer:   More than 2 Midnights     Order Specific Question:   Certification     Answer:   I certify that inpatient services are medically necessary for this patient for a duration of greater than two midnights  See H&P and MD Progress Notes for additional information about the patient's course of treatment  ED: Date/Time/Mode of Arrival:   ED Arrival Information     Expected Arrival Acuity Means of Arrival Escorted By Service Admission Type    - 2/9/2018 07:21 - Ambulance [address] Complaint    fall          Chief Complaint: No chief complaint on file  History of Illness:   Arlys Cogan is a 47 y o  female who presents s/p fall  Patient reportedly was outside smoking when her  heard her fall  She apparently landed on her L side and was confused and acting erratically on arrival of EMS  Patient reportedly had limited movement of her L side and had R sided gaze preference at the scene  She arrived to trauma bay with GCS 14 with R sided gaze preference and only moving R side  Also with R facial droop  Stroke alert was immediately called  FAST was negative and patient was immediately taken to CT scanner   Patient reportedly takes multiple different medications for chronic pain and hypercholestorolemia per her          ED Vital Signs:   ED Triage Vitals   Temperature Pulse Respirations Blood Pressure SpO2   02/09/18 1460 02/09/18 0722 02/09/18 0722 02/09/18 0722 02/09/18 0722   98 3 °F (36 8 °C) 74 20 123/70 95 %      Temp Source Heart Rate Source Patient Position - Orthostatic VS BP Location FiO2 (%)   02/09/18 0722 02/09/18 0722 02/09/18 0722 02/09/18 1100 --   Oral Monitor Lying Left arm       Pain Score       02/09/18 1100       9        Wt Readings from Last 1 Encounters:   02/09/18 56 8 kg (125 lb 3 5 oz)       Vital Signs (abnormal):   02/09/18 1215  --  64   41   98/45  61  100 %  --  --   02/09/18 1209  --  56   28   96/48  63  --  --  --   02/09/18 1154  --  56   28   91/47  58  --  --  --   02/09/18 1139  --  58   31   85/49  64  --  --  --   02/09/18 1124  --   54   33   82/48  62  --  --  --   02/09/18 1109  --  56   31  --  --  --  --  --   02/09/18 1100  98 1 °F (36 7 °C)  58   23   79/47  65  98 %  --  Lying   02/09/18 1054  --  56  20   87/48  60  --  --  --   02/09/18 1045  --  60   23   76/48  59  100 %  --  --   02/09/18 1039  --  70   39  97/54  74  100              Abnormal Labs/Diagnostic Test Results: ptt  22  CTA- 1   Occlusion of the M1 segment of the right middle cerebral artery   There is reconstitution of the M2 segments via collateral flow from the anterior and posterior circulations  2   Evolving right MCA territory infarction   No acute hemorrhage  3   Degenerative changes cervical spine     CT cervical spine-wnl   CT brain - 1   Findings suspicious for evolving right MCA territory infarction   Recommend follow-up CTA of the neck and brain  2   Cerebral atrophy with chronic small vessel ischemic change    EKG-    Age and gender specific ECG analysis   Sinus rhythm with A-V dissociation and Junctional rhythm with Sinus/atrial capture  Septal infarct , age undetermined          ED Treatment:   Medication Administration from 02/09/2018 0717 to 02/09/2018 1032       Date/Time Order Dose Route Action Action by Comments     02/09/2018 1130 iohexol (OMNIPAQUE) 350 MG/ML injection (MULTI-DOSE) 85 mL 85 mL Intravenous Given Nannette Curry      02/09/2018 0803 alteplase (ACTIVASE) bolus 5 mg 5 mg Intravenous Given Alex Pedersen, RN      02/09/2018 0911 alteplase (ACTIVASE) infusion 45 2 mg 0 mg/kg Intravenous Stopped Luiza Jones, RN      02/09/2018 9421 alteplase (ACTIVASE) infusion 45 2 mg 45 2 mg Intravenous [address], RN      02/09/2018 0955 iodixanol (VISIPAQUE) 320 MG/ML injection 200 mL 45 mL Intra-arterial Given Silverio Richardson           Past Medical/Surgical History:    Active Ambulatory Problems     Diagnosis Date Noted    No Active Ambulatory Problems     Resolved Ambulatory Problems     Diagnosis Date Noted    No Resolved Ambulatory Problems     No Additional Past Medical History       Admitting Diagnosis: Injury [T14 90XA]  Stroke-like symptoms [R29 90]    Age/Sex: 47 y o  female    Assessment/Plan:    Assessment/Plan   Trauma Alert: Level B  Model of Arrival: Ambulance  Trauma Team: Attending Berry Vivar and Residents Ahorro Libre  Consultants: Other: Neurology  Time Called 7:30am     Trauma Active Problems:  L eyebrow hematoma  R MCA stroke  L knee abrasion     Trauma Plan:   Admit to neurology  Stroke pathway per neurology  Local wound care to L knee abrasion  Monitor L eyebrow hematoma  Tertiary exam within 24 hours  Will follow  Care per primary team    Admission Orders:  Scheduled Meds:   Current Facility-Administered Medications:  acetaminophen 650 mg Oral Q6H PRN Jannet Kileyid, DO    atorvastatin 80 mg Oral QPM Maru Wahl MD    docusate sodium 100 mg Oral BID Maru Wahl MD    gabapentin 100 mg Oral BID Jannet Kileyid, DO    magnesium sulfate 2 g Intravenous Once Jannet Kileyid, DO    norepinephrine 1-30 mcg/min Intravenous Titrated Jannet Mahid, DO Last Rate: 6 mcg/min (02/10/18 0322)   ondansetron 4 mg Intravenous Q6H PRN Maru Wahl MD    senna 1 tablet Oral Daily Maru Wahl MD      Continuous Infusions:   norepinephrine 1-30 mcg/min Last Rate: 6 mcg/min (02/10/18 0322)     PRN Meds: Nasrin Cheng acetaminophen    ondansetron     Speech eval   Dysphagia diet   PICC line  Bedrest   Neuro checks q1hr  CT head   MRI brain   OT PT eval   O2 keep sat > 92 %   Daily weight  I&O   Neuro and neuro surgical consult     Neuro consult 2/9  Assessment/Plan   Assessment:  Acute onset left hemiparesis, with associated right M1 occlusion  NIHSS 16  Origin of her stroke not yet clarified  Plan:  1  Patient was given tPA, bolus infused 0803  2  Discussed with neuro interventionalist, endovascular alert activated for IA thrombectomy  3  Postprocedure, admit to ICU for complete stroke workup including echocardiogram, MRI, lipid profile, A1c  Given relatively young age, she may need additional testing with JONATAN and/or hypercoagulable profile  4   Neurology will follow with you and advise further pending her progress    Neurosurgical consult 2/9  I discussed her imaging with her  and reviewed her CTA with him  There is a right M1 occlusion  She has some collateral flow  Given her radiographic findings and symptomatology the discussed the risks and benefits of endovascular thrombectomy  He understood that these included bleeding, infection, stroke, paralysis, vessel injury, and death  He has elected to proceed with emergent procedure      We will proceed immediately to the interventional suite        OP note  2/9     Procedure:  1  Right Internal Carotid Arteriogram  2  Right MCA thrombectomy with Solitiare Stent Retreiver  3  Right common carotid arteriogram  4  Limited Right Femoral Arteriogram    Speech eval  2/9  Recommendations:  Diet: begin level 3 dysphagia advanced  Liquid: thin- ok cup or straw  Meds: as able, none available to trial for assessment  Supervision: please assist  Positioning:Upright  Strategies: slow intake, alternate bites w/ sips     Aspiration precautions        Thank you,  [address] [facility] in the [facility] by George Roche for 2017  Network Utilization Review Department  Phone: [phone]; Fax [phone]  ATTENTION: The Network Utilization Review Department is now centralized for our 7 Facilities  Make a note that we have a new phone and fax numbers for our Department  Please call with any questions or concerns to [phone] and carefully follow the prompts so that you are directed to the right person  All voicemails are confidential  Fax any determinations, approvals, denials, and requests for initial or continue stay review clinical to [phone]  Due to HIGH CALL volume, it would be easier if you could please send faxed requests to expedite your requests and in part, help us provide discharge notifications faster

## 2018-02-10 NOTE — PROGRESS NOTES
Postop day 1 right MCA thrombectomy  Temp:  [98 1 °F (36 7 °C)-99 8 °F (37 7 °C)] 98 4 °F (36 9 °C)  HR:  [48-74] 54  Resp:  [10-41] 20  BP: ()/(40-84) 105/52  She is awake alert and oriented  She answers questions slowly  He follows commands x4  She has some left upper extremity drift and discoordination  She has a left facial   She has good strength in her left lower extremity  She has full strength on her right  Her groin is soft and intact  A/p POD1 r m1 thrombectomy  No further neurosurgical intervention required at this time  Defer to Neurology for stroke management and care  Can follow up with Neurosurgery in 3 months  I spent 15 minutes in the care of this patient including 50% of which was care coordination and counseling

## 2018-02-11 LAB
ANION GAP SERPL CALCULATED.3IONS-SCNC: 6 MMOL/L (ref 4–13)
BUN SERPL-MCNC: 11 MG/DL (ref 5–25)
CALCIUM SERPL-MCNC: 8.6 MG/DL (ref 8.3–10.1)
CHLORIDE SERPL-SCNC: 110 MMOL/L (ref 100–108)
CO2 SERPL-SCNC: 27 MMOL/L (ref 21–32)
CREAT SERPL-MCNC: 0.81 MG/DL (ref 0.6–1.3)
ERYTHROCYTE [DISTWIDTH] IN BLOOD BY AUTOMATED COUNT: 14 % (ref 11.6–15.1)
GFR SERPL CREATININE-BSD FRML MDRD: 83 ML/MIN/1.73SQ M
GLUCOSE SERPL-MCNC: 111 MG/DL (ref 65–140)
HCT VFR BLD AUTO: 33.4 % (ref 34.8–46.1)
HGB BLD-MCNC: 11.2 G/DL (ref 11.5–15.4)
MCH RBC QN AUTO: 32.7 PG (ref 26.8–34.3)
MCHC RBC AUTO-ENTMCNC: 33.5 G/DL (ref 31.4–37.4)
MCV RBC AUTO: 98 FL (ref 82–98)
PLATELET # BLD AUTO: 194 THOUSANDS/UL (ref 149–390)
PMV BLD AUTO: 9.5 FL (ref 8.9–12.7)
POTASSIUM SERPL-SCNC: 3.9 MMOL/L (ref 3.5–5.3)
RBC # BLD AUTO: 3.42 MILLION/UL (ref 3.81–5.12)
SODIUM SERPL-SCNC: 143 MMOL/L (ref 136–145)
WBC # BLD AUTO: 7.52 THOUSAND/UL (ref 4.31–10.16)

## 2018-02-11 PROCEDURE — 80048 BASIC METABOLIC PNL TOTAL CA: CPT | Performed by: INTERNAL MEDICINE

## 2018-02-11 PROCEDURE — 99231 SBSQ HOSP IP/OBS SF/LOW 25: CPT | Performed by: PSYCHIATRY & NEUROLOGY

## 2018-02-11 PROCEDURE — 85027 COMPLETE CBC AUTOMATED: CPT | Performed by: INTERNAL MEDICINE

## 2018-02-11 RX ORDER — HEPARIN SODIUM 5000 [USP'U]/ML
5000 INJECTION, SOLUTION INTRAVENOUS; SUBCUTANEOUS EVERY 8 HOURS SCHEDULED
Status: DISCONTINUED | OUTPATIENT
Start: 2018-02-11 | End: 2018-02-14 | Stop reason: HOSPADM

## 2018-02-11 RX ORDER — ASPIRIN 81 MG/1
81 TABLET, CHEWABLE ORAL DAILY
Status: DISCONTINUED | OUTPATIENT
Start: 2018-02-12 | End: 2018-02-14 | Stop reason: HOSPADM

## 2018-02-11 RX ORDER — ASPIRIN 81 MG/1
81 TABLET, CHEWABLE ORAL ONCE
Status: COMPLETED | OUTPATIENT
Start: 2018-02-11 | End: 2018-02-11

## 2018-02-11 RX ADMIN — ACETAMINOPHEN 650 MG: 325 TABLET, FILM COATED ORAL at 14:33

## 2018-02-11 RX ADMIN — MIDODRINE HYDROCHLORIDE 5 MG: 5 TABLET ORAL at 11:51

## 2018-02-11 RX ADMIN — GABAPENTIN 100 MG: 100 CAPSULE ORAL at 08:11

## 2018-02-11 RX ADMIN — MIRTAZAPINE 15 MG: 15 TABLET, FILM COATED ORAL at 21:22

## 2018-02-11 RX ADMIN — ATORVASTATIN CALCIUM 80 MG: 80 TABLET, FILM COATED ORAL at 17:21

## 2018-02-11 RX ADMIN — HEPARIN SODIUM 5000 UNITS: 5000 INJECTION, SOLUTION INTRAVENOUS; SUBCUTANEOUS at 21:22

## 2018-02-11 RX ADMIN — MIDODRINE HYDROCHLORIDE 5 MG: 5 TABLET ORAL at 08:11

## 2018-02-11 RX ADMIN — SENNOSIDES 8.6 MG: 8.6 TABLET, FILM COATED ORAL at 08:11

## 2018-02-11 RX ADMIN — DOCUSATE SODIUM 100 MG: 100 CAPSULE, LIQUID FILLED ORAL at 17:21

## 2018-02-11 RX ADMIN — GABAPENTIN 100 MG: 100 CAPSULE ORAL at 17:21

## 2018-02-11 RX ADMIN — ASPIRIN 81 MG 81 MG: 81 TABLET ORAL at 15:38

## 2018-02-11 RX ADMIN — DOCUSATE SODIUM 100 MG: 100 CAPSULE, LIQUID FILLED ORAL at 08:11

## 2018-02-11 RX ADMIN — HEPARIN SODIUM 5000 UNITS: 5000 INJECTION, SOLUTION INTRAVENOUS; SUBCUTANEOUS at 15:45

## 2018-02-11 RX ADMIN — ACETAMINOPHEN 650 MG: 325 TABLET, FILM COATED ORAL at 05:06

## 2018-02-11 RX ADMIN — MIDODRINE HYDROCHLORIDE 5 MG: 5 TABLET ORAL at 15:38

## 2018-02-11 NOTE — PROGRESS NOTES
The patient without any acute changes  She is more awake today but still with flat affect  Denies any headache or palpitations  Was seen by Psychiatry yesterday, Remeron initiated  Midodrine started per primary service  Had some suspicious cardiac a arrhythmia is with possible AFib, but that was while she was on Levophed  Currently sinus bradycardia  Pressures are low, but acceptable  She was correctly oriented  She is able to speak fluently, follow all commands  Speech is without significant dysarthria  Left lower facial weakness persistent  EOMs were full though again requires extra cuing for full left lateral gaze deviation  Visual field testing intact  Motor exam showed well-preserved strength throughout other than minor left shoulder abductors weakness  Finger taps were symmetric  Finger-to-nose testing normal bilaterally  No sensory asymmetry to light touch and no sensory extinction    16-year-old woman presented with M1 occlusion, status post tPA and thrombectomy, with excellent clinical result  MRI did show resultant multifocal infarction MCA territory associated with petechial hemorrhage  Etiology was stroke not entirely clear  2D echocardiogram was unremarkable  CTA showed atherosclerotic plaque with ulcerative change in the bulb and aorta (no flow limiting stenosis in the carotid)  Now with a couple of episodes of possible paroxysmal AFib (though not clear if that was medication related)  Initiate aspirin today  Okay to transfer to the floor on telemetry  If further PAF is documented, then eventually we should transition to full anticoagulation with NOAC    If there are no additional episodes recorded, (in the absence of epinephrine), then will consider loop recorder placement to better exclude that possibility  JONATAN might better delineate the severity of aortic arch disease which if grade 4, might also warrant anticoagulation independent of AF  Total time spent 20 minutes

## 2018-02-11 NOTE — PROGRESS NOTES
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  F41 9 anxiety disorder not otherwise specified    PLAN:   1) cont mirtazapine 15 mg p o  q h s  for depression and anxiety and sleep  2) DC 1:1 observation   3)    Communicated with patients' RN  4) Out patient follow up for therapy  Interval History:  Patient said that she slept well last night and is doing well now  She deniend any suicidal or homicidal ideas  Her brother was visiting her  She report no side effects from her medications  Scheduled Meds:    Current Facility-Administered Medications:  acetaminophen 650 mg Oral Q6H PRN Jannet Mahid, DO   atorvastatin 80 mg Oral QPM Casandra Garcia MD   docusate sodium 100 mg Oral BID Casandra Garcia MD   gabapentin 100 mg Oral BID Jannet Kileyid, DO   midodrine 5 mg Oral TID AC Jannet Mahid, DO   mirtazapine 15 mg Oral HS Radha Payton MD   ondansetron 4 mg Intravenous Q6H PRN Casandra Garcia MD   senna 1 tablet Oral Daily Casandra Garcia MD     Continuous Infusions:   PRN Meds:   acetaminophen    ondansetron     Allergies   Allergen Reactions    Blue Dyes (Parenteral) Anaphylaxis       Vitals:    02/11/18 0800   BP: 92/57   Pulse: (!) 26   Resp: 13   Temp: 98 6 °F (37 °C)   SpO2: 100%             Mental Status Evaluation:  Appearance:  Improved   Behavior:  cooperative   Speech:  Normal   Mood:  Improved but still depressed  Affect Improved still flat   Language: naming objects and Goal directed  Thought Process:   logical and linear    Thought Content:  normal   Perceptual Disturbances:  denying any auditory and visual hallucinations      Risk Potential: None   Sensorium:  person, place, time/date, situation, day of week, month of year and year   Cognition:  grossly intact   Consciousness:   alert, awake, and oriented ×3    Attention: Fair attention span   Intellect: Normal   Fund of Knowledge: awareness of current events: normal    Insight:  Good   Judgment: Good   Muscle Strength and Tone: Normal    Gait/Station:  gait was not assessed    Motor Activity: no abnormal movements     Lab Results: I have personally reviewed pertinent lab results  NOTE:  Total of 15 minutes were spent in talking to patient completing this medical record reviewing medical chart medical decision making    Nydia Martinez MD

## 2018-02-11 NOTE — PROGRESS NOTES
Progress Note - ICU Transfer to Step down/med  surg  Paula Salinas 47 y o  female MRN: [id number]    Unit/Bed#: ICU 06 Encounter: [id number]      Code Status: Level 3 - DNAR and DNI    Date of ICU admission:2/09/18    Reason for ICU adm:     Active problems:   Patient Active Problem List   Diagnosis    Acute ischemic right MCA stroke ([address] )    Fall    Forehead contusion    Left elbow contusion       Summary of clinical course: Wicho Burrows is a 47 y o  female with no significant past medical history presents Right MCA stroke status post tPA and thrombectomy day 2  Patient reports that this morning she went outside to have a cigarette and she subsequently fell to the ground hitting her right knee and left side of her head  She states that she did not lose consciousness   came outside saw her on the ground  She had trouble getting back up  Appear to be somewhat confused per   Upon arrival she was noted to have left-sided weakness and left-sided facial droop, right-sided gaze preference  She was last seen well at 6:30 a m  this morning  Stroke alert was called at 7:25 a m  this morning  Patient was given a tPA bolus at 8:03 a m  Gerhardt Sep Upon arrival NIH SS score was 16  Patient had thrombectomy performs  Patient remained alert and oriented x4 throughout ICU course  Her left-sided weakness improved as no longer present  Patient still so left-sided facial droop  Patient noted to have hematoma over left eye when she fell  Noted to have hematomas over left arm and abrasions  Patient had x-rays performed which were negative  Of note patient's past medical history includes prior C-spine surgery, lumbar stenosis, chronic pain, anxiety  She denies a history of hypertension, diabetes, previous strokes, coronary artery disease  MRI 2 days ago showed evidence of petechial hemorrhage which delayed initiation of aspirin  Started heparin and DVT prophylaxis today    CT head preformed 2/10/18 showed No intraparenchymal hematoma , evolving infarct right basal ganglia, right insular region, right posterior parietal region, and  no hematoma seen corresponding to the petechial hemorrhage detected on the recent to MRI in the right basal ganglionic region  Echo showed EF 65% no regional wall abnormalities with a normal left atrium  Of note patient's BP was approximately 70/40 status post thrombectomy and was given a 1 5 L normal saline bolus  Norepinephrine and phenylephrine were use peripherally for short duration of time to keep maps greater than 65  Patient was titrated off vasopressors and started on Midrin drain and sustained maps greater than 65  Heart rate was noted to be in the 30s 40s during hospitalization, patient was asymptomatic EKG showed sinus bradycardia  The patient was monitored on telemetry and was noted to have possible runs of atrial fibrillation on February 10th  Patient may need outpatient loop recorder  Patient was also noted to have suicidal ideation and a plan yesterday  Psychiatry saw the patient evaluated and started on mirtazapine at night and a 1 to 1 for approximately 24 hours and then was discontinued  Patient no longer with suicidal ideation  Recent or scheduled procedures:   Xr Elbow 2 Vw Left    Result Date: 2/9/2018  Impression: No acute osseous abnormality  Workstation performed: [id number]     Ct Head Wo Contrast    Result Date: 2/10/2018  Impression: No intraparenchymal hematoma seen Evolving infarct right basal ganglia, right insular region, right posterior parietal region  There is no hematoma seen corresponding to the petechial hemorrhage detected on the recent to MRI in the right basal ganglionic region  Workstation performed: [id number]     Trauma - Ct Spine Cervical Wo Contrast    Result Date: 2/9/2018  Impression: Degenerative and postoperative changes  No cervical spine fracture or traumatic malalignment   I personally discussed this study with Beryle Cristal on 2/9/2018 at 7:47 AM   Workstation performed: [id number]     Mri Brain Wo Contrast    Result Date: 2/10/2018  Impression: Multiple infarcts in the right cerebral hemisphere in the distribution of the right MCA  Deep infarct involving the basal ganglia and cortical infarcts involving the frontal and parietal region are noted  There is a petechial hemorrhage in the infarct noted in the right lentiform nucleus Reconstitution of the flow void in the right MCA post embolectomy No intraparenchymal hematoma seen No midline shift seen  I personally discussed this study with Dr Jarrod Vick on 2/10/2018 at 8:50 AM  Workstation performed: [id number]     Xr Trauma Multiple    Result Date: 2/9/2018  Impression: No acute traumatic injury No active cardiopulmonary disease  Workstation performed: [id number]     Ct Stroke Alert Brain    Result Date: 2/9/2018  Impression: 1  Findings suspicious for evolving right MCA territory infarction  Recommend follow-up CTA of the neck and brain  2   Cerebral atrophy with chronic small vessel ischemic change  I personally discussed this study with Beryle Cristal on 2/9/2018 at 7:47 AM  Workstation performed: [id number]     Cta Stroke Alert (head/neck)    Result Date: 2/9/2018  Impression: 1  Occlusion of the M1 segment of the right middle cerebral artery  There is reconstitution of the M2 segments via collateral flow from the anterior and posterior circulations  2   Evolving right MCA territory infarction  No acute hemorrhage  3   Degenerative changes cervical spine  I personally discussed this study with Beryle Cristal on 2/9/2018 at 7:47 AM  Workstation performed: [id number]     Outstanding/pending diagnostics:  A1C 5 5    Hospital discharge planning:  Continue telemetry monitoring to rule out atrial fibrillation as a cause  Patient will need PT OT evaluation  Patient however is walking currently into and out of bed without problem

## 2018-02-11 NOTE — PROGRESS NOTES
[facility] Unit Transfer Note  Unit/Bed # @DBLINK (REUBEN,78428)@ Encounter: [id number]  SOD Team C           Marifer Alcocer 47 y o  female [address] [facility] Problems: Principal Problem:    Acute ischemic right MCA stroke [facility])  Active Problems:    Fall    Forehead contusion    Left elbow contusion        1  Right MCA stroke status post tPA and thrombectomy day 2  · Goal systolic blood pressure between 120 and 160  · MRI 2/10/18 showed evidence of petechial hemorrhage was delayed initiation of aspirin  · CT head preformed 2/10/18 :No intraparenchymal hematoma , evolving infarct right basal ganglia, right insular region, right posterior parietal region, and  no hematoma seen corresponding to the petechial hemorrhage detected on the recent to MRI in the right basal ganglionic region  · Start aspirin and DVT ppx with heparin  · Echo showed EF 65% no regional wall abnormalities with a normal left atrium  · Continue atorvastatin 80 mg  · Q4 neuro checks   · Mitodrine TID with goal MAP greater than 65  · Continue Dysphagia 3 diet pending Speech swallow eval     2  Suicide ideation  · Patient was noted yesterday to have suicidal ideation and plan  · Continue mirtazapine 15 mg p o  q h s  · Discontinue 1:1 at this time     3  Cephalalgia  · Continue Tylenol 650 mg q 6h  · Gabapentin 100 BID    4  Hypotension  · SBP has been ranging from 's today  · Mitodrine TID with goal MAP greater than 65, -160  · Monitor vitals    5  Bradycardia  · HR has been as low as 28 today but pt remains asymptomatic  · Monitor with tele  · Consider EP consult  · Per neuro, pt will need NOAC if PAF seen on tele again; will consider loop recorder if not seen on tele      VTE Pharmacologic Prophylaxis: Heparin  VTE Mechanical Prophylaxis: sequential compression device    Disposition: Patient requires Med/Surg with Telemetry    Hospital Course: Frieda Artis a 47 y  o  female with no significant past medical history presents Right MCA stroke s/p tPA and thrombectomy day #2  Patient reports that on the morning of 2/9/18 she went outside to have a cigarette and she subsequently fell to the ground hitting her right knee and left side of her head  Augie Ford states that she did not lose consciousness  Laly Kulkarni came outside and saw her on the ground   She had trouble getting back up and appeared to be somewhat confused per        Upon arrival, she was noted to have left-sided weakness and left-sided facial droop, right-sided gaze preference  Augie Ford was last seen well at 6:30 a m on  2/9/18   Stroke alert was called at 7:25 a m  2/9/18  Juan Carlos Carter was given a tPA bolus at 8:03 a m  Oracio Cruz arrival NIHSS score was 16   Patient had thrombectomy performed  Patient remained alert and oriented x4 throughout ICU course  Her left-sided weakness improved and is no longer present  Patient still so left-sided facial droop  Patient noted to have hematoma over left eye from her fall  Noted to have hematomas over left arm and abrasions  Patient had x-rays performed which were negative    Of note patient's past medical history includes prior C-spine surgery, lumbar stenosis, chronic pain, anxiety   She denies a history of hypertension, diabetes, previous strokes, coronary artery disease  MRI 2 days ago showed evidence of petechial hemorrhage which delayed initiation of aspirin  Will start aspirin and DVT prophylaxis with subQ heparin today  CT head preformed 2/10/18 showed no intraparenchymal hematoma , evolving infarct right basal ganglia, right insular region, right posterior parietal region, and  no hematoma seen corresponding to the petechial hemorrhage detected on the recent to MRI in the right basal ganglionic region  Echo showed EF 65% no regional wall abnormalities with a normal left atrium  Of note patient's BP was approximately 70/40 status post thrombectomy and was given a 1 5 L normal saline bolus    Norepinephrine and phenylephrine were use peripherally for short duration of time to keep MAP greater than 65  Patient was titrated off vasopressors and started on mitodrine and sustained MAP greater than 65  Heart rate was noted to be as low as 28 during hospitalization though patient was asymptomatic  EKG showed sinus bradycardia  The patient was monitored on telemetry and was noted to have possible runs of atrial fibrillation on February 10th  Patient may need outpatient loop recorder per neurology  Patient was also noted to have suicidal ideation with plan yesterday  Psychiatry evaluated patient and started on mirtazapine at night and a 1 to 1 for approximately 24 hours which has since been discontinued  Patient no longer with suicidal ideation  Pt will be transferred to floor for Med/Surg with Tele  Heart rate and rhythm will be monitored closely for bradycardia and AFIB  If AFIB is present, will order loop recorder per neuro  Pt will need to be started on NOAC if PAF confirmed  Subjective: Pt states that she feels "fine"  Pt examined at bedside  No acute complaints except mild headache  Objective:     Vitals:    02/11/18 1107 02/11/18 1200 02/11/18 1207 02/11/18 1400   BP: 97/57  110/65 99/57   Pulse: (!) 54 60 60 (!) 48   Resp: (!) 41 19 (!) 27 (!) 31   Temp:  98 5 °F (36 9 °C)     TempSrc:  Oral     SpO2:  100%     Weight:       Height:         I/O last 24 hours: In: 1721 4 [P O :1520; I V :151 4; IV Piggyback:50]  Out: 2750 [Urine:2750]    Physical Exam   Constitutional: She is oriented to person, place, and time  Eyes: Conjunctivae and EOM are normal  Pupils are equal, round, and reactive to light  Cardiovascular: Regular rhythm, normal heart sounds and intact distal pulses  Exam reveals no gallop and no friction rub  No murmur heard  HR 50   Pulmonary/Chest: Effort normal and breath sounds normal  No respiratory distress  She has no wheezes  She has no rales  She exhibits no tenderness  Abdominal: Soft  Bowel sounds are normal  She exhibits no distension and no mass  There is no tenderness  There is no rebound and no guarding  Neurological: She is alert and oriented to person, place, and time  No FND except mild left lower facial droop  Skin: Skin is warm and dry  Contusion L eyebrow  Psychiatric:   Flat affect           Suri Savage MD

## 2018-02-11 NOTE — PROGRESS NOTES
Progress Note - Critical Care   Robby Wolf 47 y o  female MRN: [id number]  Unit/Bed#: ICU 06 Encounter: [id number]    Assessment:  54-year-old male with right MCA stroke status post tPA and thrombectomy day 2    Plan:          Neuro: 1  Right MCA stroke status post tPA and thrombectomy day 2  · Goal systolic blood pressure between 120 and 160  · MRI yesterday showed evidence of petechial hemorrhage was delayed initiation of aspirin  · CT head preformed 2/10/18 :No intraparenchymal hematoma , evolving infarct right basal ganglia, right insular region, right posterior parietal region, and  no hematoma seen corresponding to the petechial hemorrhage detected on the recent to MRI in the right basal ganglionic region  · Will possibly start aspirin today, to discuss with Neurology  · Echo showed EF 65% no regional wall abnormalities with a normal left atrium  · Continue atorvastatin 80 mg  · Frequent neuro checks      2  Suicide ideation  · Patient was noted yesterday to have suicidal ideation and plan  · Psych consult yesterday recommended starting mirtazapine 15 mg p o  q h s  · To continue 1 on 1 observation for the next 24 hours per Psychiatry    3  Cephalalgia  · Continue Tylenol 650 mg q 6h    4  Neuropathy, cervical spine  Continue gabapentin 100 mg b i d  p o  Cv:    · Pt at Goal MAP > 65, continue Midrodrine, NE weaned off yesterday  If neuro exam remains unchanged no pressures to be restarted  · Possible to runs of atrial fibrillation overnight  Patient may require anticoagulation  Continue telemetry to rule out paroxysmal atrial fibrillation as cause  · Sinus bradycardia with heart rates in the 40s and 50s  Currently asymptomatic, on no sedation  Continue to monitor                 Lung:    No active issues at this time                 GI:    No active issues at this time  Continue bowel regimen with senna and Colace                FEN:     P o  diet  Will replace electrolytes as necessary                 :   Monitor I/O's                  ID:   Afebrile, no leukocytosis                 Heme:   No active issues, hemoglobin stable  Will most likely start DVT prophylaxis today                 Endo:   No active issues                            Msk/Skin:   Frequent turning/repositioning  PT/OT                 Disposition:  Continue ICU level care  HPI/24hr events:  Patient seen examined  No acute overnight events  Resting comfortably in bed     Denies any complaints  Physical Exam:     Physical Exam   Constitutional: She is oriented to person, place, and time  She appears well-developed and well-nourished  No distress  HENT:   Head: Normocephalic  Nose: Nose normal    Mouth/Throat: Oropharynx is clear and moist    Hematoma above left eyebrow   Eyes: Conjunctivae and EOM are normal  Pupils are equal, round, and reactive to light  No scleral icterus  Neck: Normal range of motion  Neck supple  No JVD present  Cardiovascular: Normal rate, regular rhythm, normal heart sounds and intact distal pulses  Exam reveals no gallop and no friction rub  No murmur heard  Pulmonary/Chest: Effort normal and breath sounds normal  No respiratory distress  She has no wheezes  Abdominal: Soft  Bowel sounds are normal  She exhibits no distension  There is no tenderness  There is no guarding  Musculoskeletal: Normal range of motion  She exhibits no edema, tenderness or deformity  Lymphadenopathy:     She has no cervical adenopathy  Neurological: She is alert and oriented to person, place, and time  Left facial droop, 5/5 strength left upper and lower extremity  Right upper and lower extremity 5/5 strength   Skin: Skin is warm and dry  She is not diaphoretic         Vitals:    02/11/18 0309 02/11/18 0406 02/11/18 0436 02/11/18 0500   BP: 110/59 96/56 115/61 101/61   Pulse: (!) 54 (!) 50 58 (!) 50   Resp: 13 13 14 18   Temp:  99 2 °F (37 3 °C)     TempSrc:  Oral     SpO2: 99% 100% 100% 100%   Weight:       Height:                 Temperature:   Temp (24hrs), Av 7 °F (37 1 °C), Min:98 4 °F (36 9 °C), Max:99 2 °F (37 3 °C)    Current: Temperature: 99 2 °F (37 3 °C)    Weights:   IBW: 47 8 kg    Body mass index is 23 66 kg/m²  Weight (last 2 days)     Date/Time   Weight    18 1138  56 8 (125 22)    18 0754  55 8 (123 06)              Hemodynamic Monitoring:  ICP Mean:  , CPP:       Non-Invasive/Invasive Ventilation Settings:  Respiratory    Lab Data (Last 4 hours)    None         O2/Vent Data (Last 4 hours)    None              No results found for: PHART, FYS1HFW, PO2ART, SLI3WYY, H0MRSJEP, BEART, SOURCE  SpO2: SpO2: 97 %    Intake and Outputs:  I/O       701 - 02/10 0700 02/10 07 -  0700    P  O   1280    I V  (mL/kg) 543 4 (9 6) 151 4 (2 7)    IV Piggyback 2048  2 50    Total Intake(mL/kg) 2591 6 (45 6) 1481 4 (26 1)    Urine (mL/kg/hr) 2400 2350 (1 7)    Total Output 2400 2350    Net +191 6 -868  6          Unmeasured Urine Occurrence  1 x          Nutrition:        Diet Orders            Start     Ordered    18 1413  Diet Dysphagia/Modified Consistency; Dysphagia 3-Dental Soft; Thin Liquid  Diet effective now     Question Answer Comment   Diet Type Dysphagia/Modified Consistency    Dysphagia/Modified Consistency Dysphagia 3-Dental Soft    Liquid Modifier Thin Liquid    RD to adjust diet per protocol? Yes        18 1412            Labs:     Results from last 7 days  Lab Units 18  0444 02/10/18  0433 18  2030 18  1721  18  0733   WBC Thousand/uL 7 52 10 85*  --  9 13  --  8 44   HEMOGLOBIN g/dL 11 2* 11 5 11 4* 11 2*  --  13 0   I STAT HEMOGLOBIN   --   --   --   --   < >  --    HEMATOCRIT % 33 4* 33 5* 33 0* 33 6*  --  38 3   PLATELETS Thousands/uL 194 257  --  211  --  259   NEUTROS PCT %  --   --   --   --   --  38*   MONOS PCT %  --   --   --   --   --  8   < > = values in this interval not displayed     Results from last 7 days  Lab Units 02/11/18  0444 02/10/18  0433 02/09/18  0751 02/09/18  0733   SODIUM mmol/L 143 141  --  142   POTASSIUM mmol/L 3 9 4 0  --  3 9   CHLORIDE mmol/L 110* 108  --  107   CO2 mmol/L 27 28  --  30   BUN mg/dL 11 10  --  17   CREATININE mg/dL 0 81 0 82  --  0 76   CALCIUM mg/dL 8 6 8 8  --  8 6   TOTAL PROTEIN g/dL  --  6 3*  --   --    BILIRUBIN TOTAL mg/dL  --  0 48  --   --    ALK PHOS U/L  --  59  --   --    ALT U/L  --  17  --   --    AST U/L  --  12  --   --    GLUCOSE RANDOM mg/dL 111 114  --  87   GLUCOSE, ISTAT mg/dl  --   --  87  --        Results from last 7 days  Lab Units 02/10/18  0433   MAGNESIUM mg/dL 1 9       Results from last 7 days  Lab Units 02/10/18  0433   PHOSPHORUS mg/dL 3 8        Results from last 7 days  Lab Units 02/09/18  0733   INR  0 91   PTT seconds 22*         No results found for: TROPONINI    Imaging:  I have personally reviewed pertinent reports  Micro:  No results found for: Haze Rower, SPUTUMCULTUR    Allergies: Allergies   Allergen Reactions    Blue Dyes (Parenteral) Anaphylaxis       Medications:   Scheduled Meds:  Current Facility-Administered Medications:  acetaminophen 650 mg Oral Q6H PRN Jannet Wilkins,    atorvastatin 80 mg Oral QPM Aníbal Aaron MD   docusate sodium 100 mg Oral BID Aníbal Aaron MD   gabapentin 100 mg Oral BID Jannet Wilkins DO   midodrine 5 mg Oral TID AC Jannet Wilkins DO   mirtazapine 15 mg Oral HS Ayde Soler MD   ondansetron 4 mg Intravenous Q6H PRN Aníbal Aaron MD   senna 1 tablet Oral Daily Aníbal Aaron MD     Continuous Infusions:   PRN Meds:    acetaminophen 650 mg Q6H PRN   ondansetron 4 mg Q6H PRN       VTE Pharmacologic Prophylaxis: Sequential compression device (Venodyne)   VTE Mechanical Prophylaxis: sequential compression device    Invasive lines and devices:   Invasive Devices     Peripheral Intravenous Line            Peripheral IV 02/10/18 Left Forearm less than 1 day    Peripheral IV 02/11/18 Right Forearm less than 1 day                   Counseling / Coordination of Care  Total time spent today 30 minutes  Greater than 50% of total time was spent with the patient and / or family counseling and / or coordination of care  A description of the counseling / coordination of care: see hpi     Code Status: Level 3 - DNAR and DNI     Portions of the record may have been created with voice recognition software  Occasional wrong word or "sound a like" substitutions may have occurred due to the inherent limitations of voice recognition software  Read the chart carefully and recognize, using context, where substitutions have occurred       Floresita May MD

## 2018-02-12 ENCOUNTER — APPOINTMENT (OUTPATIENT)
Dept: PHYSICAL THERAPY | Age: 53
End: 2018-02-12
Payer: COMMERCIAL

## 2018-02-12 LAB — DEPRECATED AT III PPP: 113 % OF NORMAL (ref 92–136)

## 2018-02-12 PROCEDURE — 86147 CARDIOLIPIN ANTIBODY EA IG: CPT | Performed by: PSYCHIATRY & NEUROLOGY

## 2018-02-12 PROCEDURE — G8988 SELF CARE GOAL STATUS: HCPCS

## 2018-02-12 PROCEDURE — 99254 IP/OBS CNSLTJ NEW/EST MOD 60: CPT | Performed by: NURSE PRACTITIONER

## 2018-02-12 PROCEDURE — G8979 MOBILITY GOAL STATUS: HCPCS

## 2018-02-12 PROCEDURE — 85670 THROMBIN TIME PLASMA: CPT | Performed by: PSYCHIATRY & NEUROLOGY

## 2018-02-12 PROCEDURE — 85705 THROMBOPLASTIN INHIBITION: CPT | Performed by: PSYCHIATRY & NEUROLOGY

## 2018-02-12 PROCEDURE — 85732 THROMBOPLASTIN TIME PARTIAL: CPT | Performed by: PSYCHIATRY & NEUROLOGY

## 2018-02-12 PROCEDURE — 97167 OT EVAL HIGH COMPLEX 60 MIN: CPT

## 2018-02-12 PROCEDURE — 99233 SBSQ HOSP IP/OBS HIGH 50: CPT | Performed by: INTERNAL MEDICINE

## 2018-02-12 PROCEDURE — G8978 MOBILITY CURRENT STATUS: HCPCS

## 2018-02-12 PROCEDURE — 85303 CLOT INHIBIT PROT C ACTIVITY: CPT | Performed by: PSYCHIATRY & NEUROLOGY

## 2018-02-12 PROCEDURE — 85613 RUSSELL VIPER VENOM DILUTED: CPT | Performed by: PSYCHIATRY & NEUROLOGY

## 2018-02-12 PROCEDURE — 99232 SBSQ HOSP IP/OBS MODERATE 35: CPT | Performed by: PHYSICIAN ASSISTANT

## 2018-02-12 PROCEDURE — 81240 F2 GENE: CPT | Performed by: PSYCHIATRY & NEUROLOGY

## 2018-02-12 PROCEDURE — 97163 PT EVAL HIGH COMPLEX 45 MIN: CPT

## 2018-02-12 PROCEDURE — G8987 SELF CARE CURRENT STATUS: HCPCS

## 2018-02-12 PROCEDURE — 86146 BETA-2 GLYCOPROTEIN ANTIBODY: CPT | Performed by: PSYCHIATRY & NEUROLOGY

## 2018-02-12 PROCEDURE — 85300 ANTITHROMBIN III ACTIVITY: CPT | Performed by: PSYCHIATRY & NEUROLOGY

## 2018-02-12 PROCEDURE — 99253 IP/OBS CNSLTJ NEW/EST LOW 45: CPT | Performed by: NURSE PRACTITIONER

## 2018-02-12 PROCEDURE — 81241 F5 GENE: CPT | Performed by: PSYCHIATRY & NEUROLOGY

## 2018-02-12 PROCEDURE — 85305 CLOT INHIBIT PROT S TOTAL: CPT | Performed by: PSYCHIATRY & NEUROLOGY

## 2018-02-12 PROCEDURE — 85306 CLOT INHIBIT PROT S FREE: CPT | Performed by: PSYCHIATRY & NEUROLOGY

## 2018-02-12 RX ORDER — PREGABALIN 50 MG/1
50 CAPSULE ORAL 2 TIMES DAILY
Status: DISCONTINUED | OUTPATIENT
Start: 2018-02-12 | End: 2018-02-12

## 2018-02-12 RX ORDER — PREGABALIN 50 MG/1
50 CAPSULE ORAL ONCE
Status: COMPLETED | OUTPATIENT
Start: 2018-02-12 | End: 2018-02-12

## 2018-02-12 RX ORDER — GINSENG 100 MG
1 CAPSULE ORAL 2 TIMES DAILY
Status: COMPLETED | OUTPATIENT
Start: 2018-02-12 | End: 2018-02-14

## 2018-02-12 RX ADMIN — MIDODRINE HYDROCHLORIDE 5 MG: 5 TABLET ORAL at 05:38

## 2018-02-12 RX ADMIN — PREGABALIN 50 MG: 50 CAPSULE ORAL at 21:15

## 2018-02-12 RX ADMIN — GABAPENTIN 100 MG: 100 CAPSULE ORAL at 08:02

## 2018-02-12 RX ADMIN — MIRTAZAPINE 15 MG: 15 TABLET, FILM COATED ORAL at 21:15

## 2018-02-12 RX ADMIN — MIDODRINE HYDROCHLORIDE 5 MG: 5 TABLET ORAL at 11:36

## 2018-02-12 RX ADMIN — HEPARIN SODIUM 5000 UNITS: 5000 INJECTION, SOLUTION INTRAVENOUS; SUBCUTANEOUS at 05:33

## 2018-02-12 RX ADMIN — SENNOSIDES 8.6 MG: 8.6 TABLET, FILM COATED ORAL at 08:02

## 2018-02-12 RX ADMIN — DOCUSATE SODIUM 100 MG: 100 CAPSULE, LIQUID FILLED ORAL at 17:09

## 2018-02-12 RX ADMIN — DOCUSATE SODIUM 100 MG: 100 CAPSULE, LIQUID FILLED ORAL at 08:02

## 2018-02-12 RX ADMIN — MIDODRINE HYDROCHLORIDE 5 MG: 5 TABLET ORAL at 17:09

## 2018-02-12 RX ADMIN — ASPIRIN 81 MG 81 MG: 81 TABLET ORAL at 08:02

## 2018-02-12 RX ADMIN — GABAPENTIN 100 MG: 100 CAPSULE ORAL at 17:09

## 2018-02-12 RX ADMIN — BACITRACIN ZINC 1 SMALL APPLICATION: 500 OINTMENT TOPICAL at 21:16

## 2018-02-12 RX ADMIN — ACETAMINOPHEN 650 MG: 325 TABLET, FILM COATED ORAL at 21:15

## 2018-02-12 RX ADMIN — ATORVASTATIN CALCIUM 80 MG: 80 TABLET, FILM COATED ORAL at 17:09

## 2018-02-12 NOTE — CONSULTS
PHYSICAL MEDICINE AND REHABILITATION CONSULT NOTE  Marifer Alcocer 47 y o  female MRN: [id number]  Unit/Bed#: [facility] 705-01 Encounter: [id number]    Requested by (Physician/Service): Fran Dandy, MD  Reason for Consultation:  Assessment of rehabilitation needs    Chief complaint: Right MCA CVA     HPI: Marifer Alcocer is a 47year old female with a PMH of anxiety, prior C-spine surgery, lumbar stenosis, chronic pain and tobacco abuse who presented with acute onset of left sided weakness  Per her  she had stepped out to have a cigarette when he heard fall  She was confused as well  EMS was called and they noted left sided weakness and right gaze preference  CT showed an evolving infarct in the right basal ganglia, right insular region, right posterior parietal region  CTA showed right M1 occlusion  She underwent endovascular thrombectomy  MRI was done on 2/10/18 that showed evidence of petechial hemorrhage  Currently she is on Midodrine TID for MAP goal > 65  ECHO showed EF of 65% with no regional wall abnormalities  She has been bradycardic with heart rate as low as 28  There was questionable afib on telemetry  EP consult was recommended with possible plan for JONATAN and loop recorder placement  She was on a 1:1 and followed by behavioral health for suicidal ideation; however she currently denies further ideation and 1:1 was discontinued  She was started on mirtazapine q hs  She is currently on ASA and Lipitor for stroke prevention  Therapy evaluations are pending      Subjective: Patient reports neck pain which is chronic  She wants to know when she can go home  She reports her  will be able to provide 24/7 supervision/assitance  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  and was independent       Review of Systems: A 10-point review of systems was performed   Negative except as listed above      Assessment:   - Right MCA CVA s/p tPA and thrombectomy  - Hypotension  - Tobacco abuse  - Anxiety/depression  - Bradycardia  - Cephalalgia     Plan:    - Chart reviewed  - Labs reviewed  - Imaging reviewed  - Continue PT/OT while in hospital  - Patient may be a good candidate for acute inpatient rehabilitation once medically stable  Will need completed therapy evaluations to assess current function  Patient will need insurance authorization if appropriate for acute inpatient rehabilitation  Thank you for allowing the PM&R service to participate in the care of this patient  We will continue to follow Paige Rojas progress with you  Please do not hesitate to call with questions or concerns    Physical Exam:  General: alert and fatigued  Head: Normal, normocephalic, atraumatic  Eye: Normal external eye, conjunctiva, lids   Ears: Normal external ears  Nose: Normal external nose, mucus membranes  Pharynx: Dental Hygiene adequate  Normal buccal mucosa  Normal pharynx  Neck / Thyroid: Supple, no masses, nodes, nodules or enlargement    Pulmonary: clear to auscultation bilaterally and no crackles, no wheezes, chest expansion normal  Cardiovascular: normal rate, regular rhythm, normal S1, S2, no murmurs, rubs, clicks or gallops  Abdomen: soft, nontender, nondistended, no masses or organomegaly  Skin/Extremity: ecchymoses - arm(s) bilateral  Neurologic: II: visual acuity abnormal on the left, II: visual field homonymous hemianopsia on the left, VII: lower facial muscle function abnormal on the left and LUE weakness and drift, LUE dysmetria and ataxia  Psych: Very flat affect, alert and oriented to person, place and time   Musculoskeletal - Strength:   Right  Left  Site  Right  Left  Site    5 4 S Ab: Shoulder Abductors  5  5  HF: Hip Flexors    5 4 EF: Elbow Flexors  5 5 KF: Knee Flexors    5  4- EE: Elbow Extensors  5  5  KE: Knee Extensors    5  4- WE: Wrist Extensors  5  5  DR: Dorsi Flexors    5  4- FF: Finger Flexors  5  5  PF: Plantar Flexors    5  4- HI: Hand Intrinsics  5  5 EHL: Extensor Hallucis Longus      [facility] Stroke Scale (NIHSS)          1a  Level of  Consciousness (LOC) 0 = Alert, keenly responsive  1 = Not alert, but arousable by minor        stimulation  2 = Not alert, required repeated stimulation to         attend  3 = Responds only with reflex motor or totally         unresponsive       1a   0   1b  LOC Questions  Asked to say month and his/her age 0 = Answers both questions correctly  1 = Answers one question correctly        (dysarthric, intubated)  2 = Answers neither question correctly        (aphasic, stupor)  1b   0   1c  LOC Commands  Asked to open & close eyes, then  & release with non-affected hand  0 = Performs both tasks correctly  1 = Performs one task correctly  2 = Performs neither task correctly  1c   0     2  Best Gaze  Asked to follow with eyes through horizontal plane  0 = Normal  1 = Partial gaze palsy  2 = Forced deviation or total gaze paresis  2   0   3  Visual  Visual fields (quadrants) tested with finger counting or visual threat  0 = No visual loss  1 = Partial hemianopia (extinction)  2 = Complete hemianopia  3 = Bilateral hemianopia (including         blindness)  3   1   4  Facial Palsy  Asked to show teeth & raise eyebrows  0 = Normal symmetrical movement  1 = Minor paralysis  2 = Partial paralysis (total/near total         paralysis of lower face)  3 = Complete paralysis of one or both         sides (upper & lower)  4   1   5  Motor Arm  Asked to extend arms (palm down) 90º (if sitting) or 45º (if supine) & hold for 10 seconds  0 = No drift; arm stays at 90º/45º for full 10        seconds  1 = Drift; arm drifts down but does not hit         bed or other support  2 = Some effort against gravity; drifts down         to bed or support  3 = No effort against gravity: arm falls to         bed or support  4 = No movement  9 = Amputation, joint fusion   5a  (Left) 1      5b   (Right)        0    6  Motor Leg  While supine, asked to hold leg at 30º for 5 seconds  0 = No drift; leg stays at 30º for full 5        seconds  1 = Drift; leg drifts down but does not hit         the bed or other support  2 = Some effort against gravity; drifts down         to bed or support  3 = No effort against gravity; leg falls to         bed or support  4 = No movement  9 = Amputation, joint fusion  6a   (Left)       0        6b  (Right)       0   7  Limb Ataxia  Finger - nose & heel shin tests on both sides  0 = Absent  1 = Present in one limb  2 = Present in two limbs  7   1   8  Sensory  Sensation or grimace to pin prick or withdrawal from noxious stimuli in obtunded or aphasic patient   0 = Normal; no sensory loss  1 = Mild / moderate sensory loss; may be        dulled / "not as sharp"  2 = Severe / total sensory loss; coma     8   0   9  Best Language  Asked to describe a picture, name objects & read simple words  (See NIHSS language tools)  0 = No aphasia; normal  1 = Mild / moderate aphasia; some loss of        fluency / comprehension without        limitation of expression of ideas (can        identify picture from patient's        responses)  2 = Severe aphasia (cannot identify         pictures from responses)  3 = Mute; global aphasia; no usable        speech; cannot follow simple        commands  9    0   10  Dysarthria   0 = Normal   1 = Mild / moderate; slurs some words;         can be understood  2 = Severe; so slurred as to be         unintelligible; mute;anarthric     9 = Intubated or other physical barrier  10   0   11  Extinction & Inattention  Look at Visual (from #3) and double simultaneous tactile     0 = No abnormality  1 = Inattention or extinction in one sensory         modality    2 = Profound claire inattention or        inattention to more than one modality;        does not recognize own hand; orients        to only one side of space          11   1     Complete NIHSS Score (0-42): 6             [employer] Lives with: lives with their spouse  She lives in [facility]) single family home  The living area: can live on one level  Equipment in home: None  There 1 step to enter the home  Patient/family's goals: Return to previous home/apartment  The patient will have 24 hour ARC Supervision/physical assistance: supervision/physical assistance available upon discharge      Allergies: Allergies   Allergen Reactions    Blue Dyes (Parenteral) Anaphylaxis        Past Medical History:   Past Surgical History:   Family History:   Social history:   No past medical history on file  No past surgical history on file  No family history on file     Social History     Social History    Marital status: /Civil Union     Spouse name: N/A    Number of children: N/A    Years of education: N/A     Social History Main Topics    Smoking status: Current Every Day Smoker     Packs/day: 0 50     Years: 40 00     Types: Cigarettes    Smokeless tobacco: Never Used    Alcohol use No    Drug use: No    Sexual activity: Not on file     Other Topics Concern    Not on file     Social History Narrative    No narrative on file          Vital Signs: Reviewed    Temp:  [98 1 °F (36 7 °C)-99 2 °F (37 3 °C)] 98 2 °F (36 8 °C)  HR:  [48-63] 52  Resp:  [16-31] 18  BP: ()/(52-74) 113/59   Intake/Output Summary (Last 24 hours) at 02/12/18 1133  Last data filed at 02/12/18 0445   Gross per 24 hour   Intake             1300 ml   Output             1150 ml   Net              150 ml        Laboratory: Reviewed    Lab Results   Component Value Date    HGB 11 2 (L) 02/11/2018    HCT 33 4 (L) 02/11/2018    WBC 7 52 02/11/2018     Lab Results   Component Value Date    BUN 11 02/11/2018     02/11/2018    K 3 9 02/11/2018     (H) 02/11/2018    GLUCOSE 111 02/11/2018    GLUCOSE 87 02/09/2018    CREATININE 0 81 02/11/2018     Lab Results   Component Value Date    PROTIME 12 3 02/09/2018    INR 0 91 02/09/2018        Imaging: Reviewed  Xr Elbow 2 Vw Left    Result Date: 2/9/2018  Impression: No acute osseous abnormality  Workstation performed: [id number]     Ct Head Wo Contrast    Result Date: 2/10/2018  Impression: No intraparenchymal hematoma seen Evolving infarct right basal ganglia, right insular region, right posterior parietal region  There is no hematoma seen corresponding to the petechial hemorrhage detected on the recent to MRI in the right basal ganglionic region  Workstation performed: [id number]     Trauma - Ct Spine Cervical Wo Contrast    Result Date: 2/9/2018  Impression: Degenerative and postoperative changes  No cervical spine fracture or traumatic malalignment  I personally discussed this study with Domenico Lan on 2/9/2018 at 7:47 AM   Workstation performed: [id number]     Mri Brain Wo Contrast    Result Date: 2/10/2018  Impression: Multiple infarcts in the right cerebral hemisphere in the distribution of the right MCA  Deep infarct involving the basal ganglia and cortical infarcts involving the frontal and parietal region are noted  There is a petechial hemorrhage in the infarct noted in the right lentiform nucleus Reconstitution of the flow void in the right MCA post embolectomy No intraparenchymal hematoma seen No midline shift seen  I personally discussed this study with Dr Loni Vick on 2/10/2018 at 8:50 AM  Workstation performed: [id number]     Xr Chest 1 View    Result Date: 2/12/2018  Impression: No acute traumatic injury No active cardiopulmonary disease  Workstation performed: [id number]     Xr Trauma Multiple    Result Date: 2/9/2018  Impression: No acute traumatic injury No active cardiopulmonary disease  Workstation performed: [id number]     Ct Stroke Alert Brain    Result Date: 2/9/2018  Impression: 1  Findings suspicious for evolving right MCA territory infarction  Recommend follow-up CTA of the neck and brain   2   Cerebral atrophy with chronic small vessel ischemic change  I personally discussed this study with Jhon Garvina on 2/9/2018 at 7:47 AM  Workstation performed: [id number]     Cta Stroke Alert (head/neck)    Result Date: 2/9/2018  Impression: 1  Occlusion of the M1 segment of the right middle cerebral artery  There is reconstitution of the M2 segments via collateral flow from the anterior and posterior circulations  2   Evolving right MCA territory infarction  No acute hemorrhage  3   Degenerative changes cervical spine     I personally discussed this study with Jhon Holt on 2/9/2018 at 7:47 AM  Workstation performed: [id number]       Current Medications:     Current Facility-Administered Medications:     acetaminophen (TYLENOL) tablet 650 mg, 650 mg, Oral, Q6H PRN, Jannet Wilkins DO, 650 mg at 02/11/18 1433    aspirin chewable tablet 81 mg, 81 mg, Oral, Daily, Deandre Self MD, 81 mg at 02/12/18 0802    atorvastatin (LIPITOR) tablet 80 mg, 80 mg, Oral, QPM, Guillermina Velásquez MD, 80 mg at 02/11/18 1721    docusate sodium (COLACE) capsule 100 mg, 100 mg, Oral, BID, Guillermina Velásquez MD, 100 mg at 02/12/18 0802    gabapentin (NEURONTIN) capsule 100 mg, 100 mg, Oral, BID, Jannet Wilkins DO, 100 mg at 02/12/18 0802    heparin (porcine) subcutaneous injection 5,000 Units, 5,000 Units, Subcutaneous, Q8H [address], Deandre Self MD, 5,000 Units at 02/12/18 0533    midodrine (PROAMATINE) tablet 5 mg, 5 mg, Oral, TID AC, Jannet Wilkins DO, 5 mg at 02/12/18 0538    mirtazapine (REMERON) tablet 15 mg, 15 mg, Oral, HS, Saad Block MD, 15 mg at 02/11/18 2122    ondansetron (ZOFRAN) injection 4 mg, 4 mg, Intravenous, Q6H PRN, Guillermina Velásquez MD    [facility] [employer] [facility]) tablet 8 6 mg, 1 tablet, Oral, Daily, Guillermina Velásquez MD, 8 6 mg at 02/12/18 7310

## 2018-02-12 NOTE — SOCIAL WORK
Met with pt and explained CM role  Pt lives with her  in a 2 story house with 1 [address] and 1st floor setup  Pt was independent PTA and was able to drive  Pt's PCP is Dr Gricel Arroyo  Pt has a shower chair at home  No hx of HHC or rehab  Pt reports no having no hx of mental health issues or drug use  Per pt's medical record and progress note from Dr Yovani Herrera, pt has major depression and anxiety  Pt has a prescription plan and uses [facility] for her prescriptions  Primary contact is pt's  Yue Gregory, but she is unsure of his contact information at this time  Pt's  will provide pt with transportation home upon discharge  Informed pt of therapy's recommendation for inpt acute rehab and pt was agreeable and preferred a referral to [address] referral sent for same

## 2018-02-12 NOTE — SOCIAL WORK
CM met with Pt, spouse and sister per their request to discuss the Pt's emergency contact list  Pt requested her spouse Jaelyn Pruitt be listed as her first emergency contact and daughter Jennifer Sandoval second

## 2018-02-12 NOTE — CONSULTS
Consultation - Cardiology   Dahlia Holman 47 y o  female MRN: [id number]  Unit/Bed#: [facility] 705-01 Encounter: [id number]    Assessment/Plan     Principal Problem:    Acute ischemic right MCA stroke [facility])  Active Problems:    Fall    Forehead contusion    Left elbow contusion      Assessment/Plan    1  Multifocal infarction right MCA territory  There was an acute right M1 occlusion status post tPA and thrombectomy  MRI with resultant multifocal infarction MCA territory with associated petechial hemorrhage  Embolic stroke of unknown source  2D echocardiogram is unremarkable  CTA showed mild atherosclerotic plaque in right bulb and aorta  There is also been some question of atrial fibrillation on telemetry however this was not verified  Pt agreeable for JONATAN and possible loop recorder  Anticoagulant workup per primary team   Patient is currently on midodrine to prevent hypotension and keep SBP greater than 120  She is on aspirin and high-dose statin  Smoking cessation was strongly encouraged  She apparently had suicide ideation and was on a one-to-one which has since been discontinued  History of Present Illness   Physician Requesting Consult: Allie Lynn MD  Reason for Consult / Principal Problem:  Stroke and request for JONATAN/loop recorder  Bradycardia and question atrial fibrillation  HPI: Dahlia Holman is a 47y o  year old female with anxiety, chronic pain and tobacco abuse who presented with acute onset of left-sided weakness  Per her  she had stepped out to have a cigarette when he heard her fall  She was confused  EMS was summoned and they noted left-sided weakness with right gaze preference  CT scan of her head showed an evolving infarct in the right basal ganglia, right insular region, right posterior parietal region  CTA showed right M1 occlusion  She underwent endovascular thrombectomy  Brain MRI was done which showed evidence of petechiae hemorrhage    Her transthoracic echocardiogram revealed normal LV function with no regional wall motion abnormalities  There is mention of bradycardia with heart rate as low as 28 but I do not see this recorded anywhere and I cannot verify  There also is a question of atrial fibrillation on telemetry and once again there are no strips to confirm this  Cardiology was consulted for possible JONATAN and loop recorder  She is a smoker  Her mother had a stroke in her 52's  Inpatient consult to Cardiology  Consult performed by: Aida Dang ordered by: Chela Zhou          Review of Systems    Historical Information   No past medical history on file  No past surgical history on file  History   Alcohol Use No     History   Drug Use No     History   Smoking Status    Current Every Day Smoker    Packs/day: 0 50    Years: 40 00    Types: Cigarettes   Smokeless Tobacco    Never Used     Family History: No family history on file      Meds/Allergies   current meds:   Current Facility-Administered Medications   Medication Dose Route Frequency    acetaminophen (TYLENOL) tablet 650 mg  650 mg Oral Q6H PRN    aspirin chewable tablet 81 mg  81 mg Oral Daily    atorvastatin (LIPITOR) tablet 80 mg  80 mg Oral QPM    docusate sodium (COLACE) capsule 100 mg  100 mg Oral BID    gabapentin (NEURONTIN) capsule 100 mg  100 mg Oral BID    heparin (porcine) subcutaneous injection 5,000 Units  5,000 Units Subcutaneous Q8H [address]    midodrine (PROAMATINE) tablet 5 mg  5 mg Oral TID AC    mirtazapine (REMERON) tablet 15 mg  15 mg Oral HS    ondansetron (ZOFRAN) injection 4 mg  4 mg Intravenous Q6H PRN    senna (SENOKOT) tablet 8 6 mg  1 tablet Oral Daily    and PTA meds:  Prescriptions Prior to Admission   Medication    diazepam (VALIUM) 5 mg tablet    meloxicam (MOBIC) 15 mg tablet    Omega-3 Fatty Acids (FISH OIL) 1200 MG CAPS    oxaprozin (DAYPRO) 600 MG tablet    OxyCODONE HCl 5 MG TABA    pregabalin (LYRICA) 50 mg capsule Allergies   Allergen Reactions    Blue Dyes (Parenteral) Anaphylaxis       Objective   Vitals: Blood pressure 109/62, pulse 64, temperature 98 3 °F (36 8 °C), temperature source Oral, resp  rate 18, height 5' 1" (1 549 m), weight 56 8 kg (125 lb 3 5 oz), SpO2 99 %  Orthostatic Blood Pressures    Flowsheet Row Most Recent Value   Blood Pressure  109/62 filed at 02/12/2018 1138   Patient Position - Orthostatic VS  Sitting filed at 02/12/2018 1138            Intake/Output Summary (Last 24 hours) at 02/12/18 1517  Last data filed at 02/12/18 1140   Gross per 24 hour   Intake              760 ml   Output              550 ml   Net              210 ml       Invasive Devices     Peripheral Intravenous Line            Peripheral IV 02/10/18 Left Forearm 2 days    Peripheral IV 02/11/18 Right Forearm 1 day                Physical Exam: /62 (BP Location: Right arm)   Pulse 64   Temp 98 3 °F (36 8 °C) (Oral)   Resp 18   Ht 5' 1" (1 549 m)   Wt 56 8 kg (125 lb 3 5 oz)   SpO2 99%   BMI 23 66 kg/m²      General Appearance:    Alert, cooperative, no distress, appears stated age   Head:    Normocephalic, no scleral icterus   Eyes:    PERRL   Nose:   Nares normal, septum midline, mucosa normal, no drainage    Throat:   Lips, mucosa, and tongue normal   Neck:   Supple, symmetrical, trachea midline     no JVD       Lungs:     Clear to auscultation bilaterally, respirations unlabored   Chest Wall:    No tenderness or deformity    Heart:    Regular rate and rhythm, S1 and S2 normal, no murmur, rub   or gallop       Extremities:   Extremities normal, atraumatic, no cyanosis or edema       Skin:   Skin warm   Neurologic:   Alert and oriented to person place and time   Flat affect       Lab Results:   Recent Results (from the past 72 hour(s))   CBC    Collection Time: 02/09/18  5:21 PM   Result Value Ref Range    WBC 9 13 4 31 - 10 16 Thousand/uL    RBC 3 48 (L) 3 81 - 5 12 Million/uL    Hemoglobin 11 2 (L) 11 5 - 15 4 g/dL    Hematocrit 33 6 (L) 34 8 - 46 1 %    MCV 97 82 - 98 fL    MCH 32 2 26 8 - 34 3 pg    MCHC 33 3 31 4 - 37 4 g/dL    RDW 14 2 11 6 - 15 1 %    Platelets 963 267 - 891 Thousands/uL    MPV 8 6 (L) 8 9 - 12 7 fL   Hemoglobin and hematocrit, blood    Collection Time: 02/09/18  8:30 PM   Result Value Ref Range    Hemoglobin 11 4 (L) 11 5 - 15 4 g/dL    Hematocrit 33 0 (L) 34 8 - 46 1 %   Hemoglobin A1c    Collection Time: 02/09/18  8:30 PM   Result Value Ref Range    Hemoglobin A1C 5 5 4 2 - 6 3 %     mg/dl   Comprehensive metabolic panel    Collection Time: 02/10/18  4:33 AM   Result Value Ref Range    Sodium 141 136 - 145 mmol/L    Potassium 4 0 3 5 - 5 3 mmol/L    Chloride 108 100 - 108 mmol/L    CO2 28 21 - 32 mmol/L    Anion Gap 5 4 - 13 mmol/L    BUN 10 5 - 25 mg/dL    Creatinine 0 82 0 60 - 1 30 mg/dL    Glucose 114 65 - 140 mg/dL    Calcium 8 8 8 3 - 10 1 mg/dL    AST 12 5 - 45 U/L    ALT 17 12 - 78 U/L    Alkaline Phosphatase 59 46 - 116 U/L    Total Protein 6 3 (L) 6 4 - 8 2 g/dL    Albumin 3 5 3 5 - 5 0 g/dL    Total Bilirubin 0 48 0 20 - 1 00 mg/dL    eGFR 82 ml/min/1 73sq m   Magnesium    Collection Time: 02/10/18  4:33 AM   Result Value Ref Range    Magnesium 1 9 1 6 - 2 6 mg/dL   Phosphorus    Collection Time: 02/10/18  4:33 AM   Result Value Ref Range    Phosphorus 3 8 2 7 - 4 5 mg/dL   CBC (With Platelets)    Collection Time: 02/10/18  4:33 AM   Result Value Ref Range    WBC 10 85 (H) 4 31 - 10 16 Thousand/uL    RBC 3 48 (L) 3 81 - 5 12 Million/uL    Hemoglobin 11 5 11 5 - 15 4 g/dL    Hematocrit 33 5 (L) 34 8 - 46 1 %    MCV 96 82 - 98 fL    MCH 33 0 26 8 - 34 3 pg    MCHC 34 3 31 4 - 37 4 g/dL    RDW 14 0 11 6 - 15 1 %    Platelets 772 968 - 814 Thousands/uL    MPV 8 3 (L) 8 9 - 12 7 fL   CBC    Collection Time: 02/11/18  4:44 AM   Result Value Ref Range    WBC 7 52 4 31 - 10 16 Thousand/uL    RBC 3 42 (L) 3 81 - 5 12 Million/uL    Hemoglobin 11 2 (L) 11 5 - 15 4 g/dL    Hematocrit 33 4 (L) 34 8 - 46 1 %    MCV 98 82 - 98 fL    MCH 32 7 26 8 - 34 3 pg    MCHC 33 5 31 4 - 37 4 g/dL    RDW 14 0 11 6 - 15 1 %    Platelets 411 413 - 976 Thousands/uL    MPV 9 5 8 9 - 12 7 fL   Basic metabolic panel    Collection Time: 18  4:44 AM   Result Value Ref Range    Sodium 143 136 - 145 mmol/L    Potassium 3 9 3 5 - 5 3 mmol/L    Chloride 110 (H) 100 - 108 mmol/L    CO2 27 21 - 32 mmol/L    Anion Gap 6 4 - 13 mmol/L    BUN 11 5 - 25 mg/dL    Creatinine 0 81 0 60 - 1 30 mg/dL    Glucose 111 65 - 140 mg/dL    Calcium 8 6 8 3 - 10 1 mg/dL    eGFR 83 ml/min/1 [address] [facility] 175  [facility] [place], [address] [facility]  [phone]     Transthoracic Echocardiogram  2D, M-mode, Doppler, and Color Doppler     Study date:  2018     Patient: Army Case  MR number: [id number]  Account number: [de-identified]  : 1965  Age: 47 years  Gender: Female  Status: Inpatient  Location: Bedside  Height: 61 in  Weight: 125 lb  BP: 76/ 48 mmHg     Indications: Arterial embolism      Diagnoses: I74 9 - Embolism and thrombosis of unspecified artery     Sonographer:  Ashley Valencia RDCS  Referring Physician:  Grazyna Hunt MD  Group:  [facility]  Interpreting Physician:  Salvador Shanks DO     SUMMARY     LEFT VENTRICLE:  Systolic function was normal  Ejection fraction was estimated to be 65 %  There were no regional wall motion abnormalities  Left ventricular diastolic function parameters were normal      RIGHT VENTRICLE:  The size was normal   Systolic function was normal      TRICUSPID VALVE:  There was mild regurgitation      HISTORY: PRIOR HISTORY: Patient has no history of cardiovascular disease      PROCEDURE: The procedure was performed at the bedside  This was a routine study  The transthoracic approach was used  The study included complete 2D imaging, M-mode, complete spectral Doppler, and color Doppler   The heart rate was 60 bpm,  at the start of the study  Images were obtained from the parasternal, apical, subcostal, and suprasternal notch acoustic windows  Image quality was adequate      LEFT VENTRICLE: Size was normal  Systolic function was normal  Ejection fraction was estimated to be 65 %  There were no regional wall motion abnormalities  Wall thickness was normal  DOPPLER: Left ventricular diastolic function parameters  were normal      RIGHT VENTRICLE: The size was normal  Systolic function was normal  Wall thickness was normal      LEFT ATRIUM: Size was normal      RIGHT ATRIUM: Size was normal      MITRAL VALVE: Valve structure was normal  There was normal leaflet separation  DOPPLER: The transmitral velocity was within the normal range  There was no evidence for stenosis  There was trace regurgitation      AORTIC VALVE: The valve was trileaflet  Leaflets exhibited normal thickness and normal cuspal separation  DOPPLER: Transaortic velocity was within the normal range  There was no evidence for stenosis  There was no regurgitation      TRICUSPID VALVE: The valve structure was normal  There was normal leaflet separation  DOPPLER: The transtricuspid velocity was within the normal range  There was no evidence for stenosis  There was mild regurgitation  Pulmonary artery  systolic pressure was within the normal range      PULMONIC VALVE: Leaflets exhibited normal thickness, no calcification, and normal cuspal separation  DOPPLER: The transpulmonic velocity was within the normal range  There was trace regurgitation      PERICARDIUM: There was no pericardial effusion  The pericardium was normal in appearance      AORTA: The root exhibited normal size      SYSTEMIC VEINS: IVC: The inferior vena cava was normal in size and course  Respirophasic changes were normal      SYSTEM MEASUREMENT TABLES       Imaging: I have personally reviewed pertinent reports      EKG: NSR      Code Status: Level 3 - DNAR and DNI  Advance Directive and Living Will:      Power of :    POLST:      Counseling / Coordination of Care  Total floor / unit time spent today 40 minutes  Greater than 50% of total time was spent with the patient and / or family counseling and / or coordination of care

## 2018-02-12 NOTE — CASE MANAGEMENT
Initial Clinical Review     Admission: Date/Time/Statement: 2/9/18 @ 0915            Orders Placed This Encounter   Procedures    Inpatient Admission       Standing Status:   Standing       Number of Occurrences:   1       Order Specific Question:   Admitting Physician       Answer:   Dionicio Nolen [[id number]]       Order Specific Question:   Level of Care       Answer:   Critical Care [15]       Order Specific Question:   Bed request comments       Answer:   Neuro ICU s/p TPA       Order Specific Question:   Estimated length of stay       Answer:   More than 2 Midnights       Order Specific Question:   Certification       Answer:   I certify that inpatient services are medically necessary for this patient for a duration of greater than two midnights  See H&P and MD Progress Notes for additional information about the patient's course of treatment             ED: Date/Time/Mode of Arrival:             ED Arrival Information      Expected Arrival Acuity Means of Arrival Escorted By Service Admission Type     - 2/9/2018 07:21 - Ambulance [address]     fall             Chief Complaint: No chief complaint on file         History of Illness: Stacey Foss a 47 y  o  female who presents s/p fall  Patient reportedly was outside smoking when her  heard her fall  She apparently landed on her L side and was confused and acting erratically on arrival of EMS  Patient reportedly had limited movement of her L side and had R sided gaze preference at the scene  She arrived to trauma bay with GCS 14 with R sided gaze preference and only moving R side  Also with R facial droop  Stroke alert was immediately called  FAST was negative and patient was immediately taken to CT scanner   Patient reportedly takes multiple different medications for chronic pain and hypercholestorolemia per her           ED Vital Signs:            ED Triage Vitals Temperature Pulse Respirations Blood Pressure SpO2   02/09/18 0722 02/09/18 0722 02/09/18 0722 02/09/18 0722 02/09/18 0722   98 3 °F (36 8 °C) 74 20 123/70 95 %       Temp Source Heart Rate Source Patient Position - Orthostatic VS BP Location FiO2 (%)   02/09/18 0722 02/09/18 0722 02/09/18 0722 02/09/18 1100 --   Oral Monitor Lying Left arm         Pain Score           02/09/18 1100           9                Wt Readings from Last 1 Encounters:   02/09/18 56 8 kg (125 lb 3 5 oz)         Vital Signs (abnormal):   02/09/18 1215   --   64    41    98/45   61   100 %   --   --   02/09/18 1209   --   56    28    96/48   63   --   --   --   02/09/18 1154   --   56    28    91/47   58   --   --   --   02/09/18 1139   --   58    31    85/49   64   --   --   --   02/09/18 1124   --    54    33    82/48   62   --   --   --   02/09/18 1109   --   56    31   --   --   --   --   --   02/09/18 1100   98 1 °F (36 7 °C)   58    23    79/47   65   98 %   --   Lying   02/09/18 1054   --   56   20    87/48   60   --   --   --   02/09/18 1045   --   60    23    76/48   59   100 %   --   --   02/09/18 1039   --   70    39   97/54   74   100                     Abnormal Labs/Diagnostic Test Results: ptt  22  CTA- 1   Occlusion of the M1 segment of the right middle cerebral artery   There is reconstitution of the M2 segments via collateral flow from the anterior and posterior circulations  2   Evolving right MCA territory infarction   No acute hemorrhage  3   Degenerative changes cervical spine     CT cervical spine-wnl   CT brain - 1   Findings suspicious for evolving right MCA territory infarction   Recommend follow-up CTA of the neck and brain  2   Cerebral atrophy with chronic small vessel ischemic change    EKG-     Age and gender specific ECG analysis   Sinus rhythm with A-V dissociation and Junctional rhythm with Sinus/atrial capture  Septal infarct , age undetermined            ED Treatment:              Medication Administration from 02/09/2018 0717 to 02/09/2018 1032        Date/Time Order Dose Route Action Action by Comments       02/09/2018 0758 iohexol (OMNIPAQUE) 350 MG/ML injection (MULTI-DOSE) 85 mL 85 mL Intravenous Given Nannette Pepe         02/09/2018 0803 alteplase (ACTIVASE) bolus 5 mg 5 mg Intravenous Given Марина Hodges, RN         02/09/2018 0911 alteplase (ACTIVASE) infusion 45 2 mg 0 mg/kg Intravenous Stopped Abiodun Farias RN         02/09/2018 0807 alteplase (ACTIVASE) infusion 45 2 mg 45 2 mg Intravenous [address], RN         02/09/2018 0945 iodixanol (VISIPAQUE) 320 MG/ML injection 200 mL 45 mL Intra-arterial Given Ival Grandchild               Past Medical/Surgical History:         Active Ambulatory Problems     Diagnosis Date Noted    No Active Ambulatory Problems           Resolved Ambulatory Problems     Diagnosis Date Noted    No Resolved Ambulatory Problems      No Additional Past Medical History         Admitting Diagnosis: Injury [T14 90XA]  Stroke-like symptoms [R29 90]     Age/Sex: 47 y o  female     Assessment/Plan:    Assessment/Plan   Trauma Alert: Level B  Model of Arrival: Ambulance  Trauma Team: Attending [address] [place] [address] [address]  Consultants: Other: Neurology  Time Called 7:30am     Trauma Active Problems:  L eyebrow hematoma  R MCA stroke  L knee abrasion     Trauma Plan:   Admit to neurology  Stroke pathway per neurology  Local wound care to L knee abrasion  Monitor L eyebrow hematoma  Tertiary exam within 24 hours  Will follow  Care per primary team     Admission Orders:  Scheduled Meds:   Current Facility-Administered Medications:  acetaminophen 650 mg Oral Q6H PRN Jannet Wilkins DO     atorvastatin 80 mg Oral QPM Patt Morales MD     docusate sodium 100 mg Oral BID Patt Morales MD     gabapentin 100 mg Oral BID Jannet Wilkins DO     magnesium sulfate 2 g Intravenous Once Jannet Wilkins DO     norepinephrine 1-30 mcg/min Intravenous Titrated Jannet Wilkins DO Last Rate: 6 mcg/min (02/10/18 0322)   ondansetron 4 mg Intravenous Q6H PRN Governor Paola MD     senna 1 tablet Oral Daily Governor Paola MD        Continuous Infusions:   norepinephrine 1-30 mcg/min Last Rate: 6 mcg/min (02/10/18 0322)      PRN Meds:   acetaminophen    ondansetron      Speech eval   Dysphagia diet   PICC line  Bedrest   Neuro checks q1hr  CT head   MRI brain   OT PT eval   O2 keep sat > 92 %   Daily weight  I&O   Neuro and neuro surgical consult      Neuro consult 2/9  Assessment/Plan   Assessment:  Acute onset left hemiparesis, with associated right M1 occlusion   NIHSS 16  Origin of her stroke not yet clarified  Plan:  1   Patient was given tPA, bolus infused 0803  2   Discussed with neuro interventionalist, endovascular alert activated for IA thrombectomy  3   Postprocedure, admit to ICU for complete stroke workup including echocardiogram, MRI, lipid profile, A1c  Starlet Harness relatively young age, she may need additional testing with JONATAN and/or hypercoagulable profile    4   Neurology will follow with you and advise further pending her progress     Neurosurgical consult 2/9  I discussed her imaging with her  and reviewed her CTA with him  Susu Smith is a right M1 occlusion   She has some collateral flow   Given her radiographic findings and symptomatology the discussed the risks and benefits of endovascular thrombectomy   He understood that these included bleeding, infection, stroke, paralysis, vessel injury, and death  Conrado Kolb has elected to proceed with emergent procedure      We will proceed immediately to the interventional suite         OP note  2/9     Procedure:  1   Right Internal Carotid Arteriogram  2   Right MCA thrombectomy with Solitiare Stent Retreiver  3   Right common carotid arteriogram  4   Limited Right Femoral Arteriogram     Speech eval  2/9  Recommendations:  Diet: begin level 3 dysphagia advanced  Liquid: thin- ok cup or straw  Meds: as able, none available to trial for assessment  Supervision: please assist  Positioning:Upright  Strategies: slow intake, alternate bites w/ sips  Aspiration precautions        Thank you,  [facility] in the [facility] by George Roceh for 2017  Network Utilization Review Department  Phone: [phone]; Fax [phone]  ATTENTION: The Network Utilization Review Department is now centralized for our 7 Facilities  Make a note that we have a new phone and fax numbers for our Department  Please call with any questions or concerns to [phone] and carefully follow the prompts so that you are directed to the right person  All voicemails are confidential  Fax any determinations, approvals, denials, and requests for initial or continue stay review clinical to [phone]   Due to HIGH CALL volume, it would be easier if you could please send faxed requests to expedite your requests and in part, help us provide discharge notifications faster

## 2018-02-12 NOTE — RESTORATIVE TECHNICIAN NOTE
Restorative Specialist Mobility Note       Activity: Ambulate in [place], Ambulate in room, Bathroom privileges, Chair, Stand at bedside, Dangle (Educated/encouraged pt to ambulate w/assistance 3-4 x's/day  Chair alarm on   Pt callbell, phone/tray within reach )     Assistive Device: Front wheel walker       Laura HENDRICKS, Restorative Technician, [employer] [facility]

## 2018-02-12 NOTE — PROGRESS NOTES
Progress Note - Neurology   Nelson Luther 47 y o  female MRN: [id number]  [facility] Encounter: [id number]    Assessment/Plan:  1  Multifocal infarction right MCA territory  -Acute right M1 occlusion s/p tPA and thrombectomy  MRI with resultant multifocal infarction MCA territory with associated petechial hemorrhage   -Embolic stroke of unknown source  Echo unremarkable  CTA showed mild atherosclerotic plaque in right bulb and aorta  Possible PAF on telemetry that may have been medication related  -JONATAN ordered to assess for vegetation/thrombi, made NPO  Will assess need for [facility] and consider ILR at that time   -Thrombosis panel ordered  -Recommend starting to taper of midodrine and only continue if patient becomes symptomatic  -Continue Aspirin and Lipitor 80mg  -Continue Telemetry  -Therapies  -Smoking cessation  -Continue to monitor and notify with changes    2  Depression/Anxiety  -Started on Remeron  -Per Psychiatry    Subjective:   Nelson Luther is a 47 y o  female who presented on 02/09/2018 with acute onset of left-sided weakness, right gaze preference, and confusion after fall  CT showed findings suspicious for evolving right MCA territory infarction  CTA showed occlusion of the M1 segment of the right middle cerebral artery  TPA given  Underwent thrombectomy for acute right M1 occlusion  MRI showed multiple infarcts in right MCA territory with petechial hemorrhage conversion  Today on exam, patient is resting comfortable in chair  No complaints  States she is ready to go home  Denies CP, SOB, headache, dizziness, vision changes, N/V, abdominal pain, weakness or numbness  ROS:  12 point ROS performed, as stated above, all others negative      Scheduled Meds:  Current Facility-Administered Medications:  acetaminophen 650 mg Oral Q6H PRN Jannet Wilkins DO   aspirin 81 mg Oral Daily Rory Almonte MD   atorvastatin 80 mg Oral QPM Isaiah Head MD   docusate sodium 100 mg Oral BID Isaiah Head MD   gabapentin 100 mg Oral BID Jannet Wilkins DO   heparin (porcine) 5,000 Units Subcutaneous [facility] Shelby Kumar MD   midodrine 5 mg Oral TID AC Jannet Wilkins DO   mirtazapine 15 mg Oral HS Harsha Colby MD   ondansetron 4 mg Intravenous Q6H PRN Ignacio Pal MD   senna 1 tablet Oral Daily Ignacio Pal MD     Continuous Infusions:   PRN Meds:   acetaminophen    ondansetron    Vitals: Blood pressure (!) 113/5, pulse (!) 52, temperature 98 2 °F (36 8 °C), temperature source Oral, resp  rate 18, height 5' 1" (1 549 m), weight 56 8 kg (125 lb 3 5 oz), SpO2 97 %  ,Body mass index is 23 66 kg/m²  Physical Exam:  Physical Exam   Constitutional: No distress  HENT:   Head: Normocephalic  Ecchymotic area lateral to left orbit   Eyes: EOM are normal  Pupils are equal, round, and reactive to light  Neck: Normal range of motion  Neck supple  Cardiovascular: Normal rate  Bradycardic   Pulmonary/Chest: Effort normal and breath sounds normal    Abdominal: Soft  Bowel sounds are normal    Neurological: She has a normal Finger-Nose-Finger Test    Skin: Skin is warm and dry  She is not diaphoretic  Psychiatric:   Flat affect     Neurologic Exam     Mental Status   Patient alert and oriented to person place and time  Flat affect, although made a few jokes throughout exam   Attention and concentration normal  No significant dysarthricanoted, no aphasia  Able to identify objects, read and repeat sentences without difficulty  Cranial Nerves     CN II   Visual fields full to confrontation  CN III, IV, VI   Pupils are equal, round, and reactive to light  Extraocular motions are normal    Nystagmus: none   Diplopia: none  Upgaze: normal  Downgaze: normal  Conjugate gaze: present    CN V   Facial sensation intact  CN VIII   CN VIII normal      CN IX, X   CN IX normal      CN XI   CN XI normal      CN XII   CN XII normal    Left lower facial weakness  No gaze preference observed       Motor Exam   Right-sided strength 5/5 throughout  4+/5 left upper extremity  4+/5 left hip flexion, left knee flexion, otherwise 5/5 left lower extremity     Sensory Exam   Light touch intact throughout, no lateralizing deficits or extinction  Gait, Coordination, and Reflexes     Coordination   Finger to nose coordination: normal  Gait deferred  Lab Results: I have personally reviewed pertinent reports  No results found for this or any previous visit (from the past 24 hour(s))  Imaging Studies: No new neuro imaging  EKG, Pathology, and Other Studies: I have personally reviewed pertinent reports      VTE Prophylaxis: Sequential compression device (Venodyne)

## 2018-02-12 NOTE — PLAN OF CARE
Problem: OCCUPATIONAL THERAPY ADULT  Goal: Performs self-care activities at highest level of function for planned discharge setting  See evaluation for individualized goals  Treatment Interventions: ADL retraining, Functional transfer training, UE strengthening/ROM, Visual perceptual retraining, Endurance training, Cognitive reorientation, Patient/family training, Equipment evaluation/education, Neuromuscular reeducation, Compensatory technique education, Continued evaluation, Activityengagement, Energy conservation          See flowsheet documentation for full assessment, interventions and recommendations  Limitation: Decreased ADL status, Decreased UE strength, Decreased Safe judgement during ADL, Decreased cognition, Decreased endurance, Decreased self-care trans, Decreased high-level ADLs, Visual deficit, Mood limitation (BALANCE, MEDICAL STATUS)  Prognosis: Good  Assessment: PT IS A 46 YO F ADMIT S/P FALL  PT NOTED W/ LIMITED MOVEMENT OF L SIDE, R GAZE PREFERENCE AND L SIDED FACIAL DROOP  STROKE ALERT INITIATED  CT BRAIN REVEALED EVOLVING R MCA TERRITORY INFARCTION  TPA ADMINISTERED  PT IS NOW S/P THROMBECTOMY 2/9  PT ADD'L UNDERGOING W/U FOR L ELBOW CONTUSION, FOREHEAD CONTUSION AND SUICIDAL IDEATION  MRI BRAIN REVEALED MULTIPLE INFARCTS IN THE R CEREBRAL HEMISPHERE IN THE DISTRIBUTION OF THE R MCA, DEEP INFARCT INVOLVING THE BASAL GANGLIA,  CORTICAL INFARCTS INVOLVING THE FRONTAL AND PARIETAL REGION AND PETECHIAL HEMORRHAGE IN THE INFARCT NOTED IN THE R LENTIFORM NUCLEUS  PT W/ PMH SIGNIFICANT FOR ANXIETY, DDD, ANEMIA, C SPINE SX, TOBACCO ABUSE, HTN AND DEPRESSION  PT IS FROM HOME W/ SPOUSE AND REPORTS BASELINE INDEPENDENCE IN ADLS/IADLS/DRIVING  NO DME USE AT BASELINE AND NO RECENT H/O FALLS   CURRENTLY PT REQUIRING MIN A UB ADLS, MOD A LB ADLS, MIN A SIT>STAND TRANSFERS/SHORT DISTANCE AMBULATION USING RW  PT APPEARS LIMITED AT THIS TIME 2* IMPAIRED BALANCE, ACTIVITY TOLERANCE, MEDICAL STATUS, ADL IMPAIRMENTS, L VISUAL DEFICIT, MINIMAL LUE STRENGTH DEFICIT, L HEMIBODY/ENVIRONMENTAL INATTENTION AS WELL AS IMPAIRED INSIGHT, SAFETY AND JUDGEMENT  FROM OT PERSPECTIVE, PT WILL BENEFIT FROM CONTINUED OT SERVICES IN AN INPT REHAB SETTING PENDING FURTHER PROGRESS AND MEDICAL STABILITY  WILL CONTINUE TO FOLLOW PT 3-5X/WEEK IN ORDER TO MEET THE BELOW DESCRIBED GOALS IN 7-10 DAYS     Recommendation: Physiatry Consult  OT Discharge Recommendation: Short Term Rehab  OT - OK to Discharge:  (TO STR AT THIS TIME)

## 2018-02-12 NOTE — PLAN OF CARE
Problem: PHYSICAL THERAPY ADULT  Goal: Performs mobility at highest level of function for planned discharge setting  See evaluation for individualized goals  Treatment/Interventions: Functional transfer training, LE strengthening/ROM, Therapeutic exercise, Bed mobility, Gait training, Endurance training, Equipment eval/education  Equipment Recommended: Walker (Rolling walker)       See flowsheet documentation for full assessment, interventions and recommendations  Prognosis: Fair  Problem List: Decreased strength, Decreased endurance, Impaired balance, Decreased mobility, Decreased coordination, Decreased cognition, Decreased safety awareness  Assessment: pt is seen for high complexity PT evaluation  pt is a 46 y/o female with a history of degenerative disk disease, anxiety, anemia, and hypercholestrolemia who is now admitted for acute ischemic R MCA and fall with L sided weakness and R sided gaze  PT consulted to assess transfers, ambulation, and overall transfers  pt presents with decrease LLE strength and coordination, decrease safe ambulation with RW, left sided inattention, decrease standing and ambulation balance  pt will continue to benefit from skilled PT to work on these problems  Recommend rehab when d/c  Recommendation: Post acute IP rehab          See flowsheet documentation for full assessment

## 2018-02-12 NOTE — PLAN OF CARE
Problem: DISCHARGE PLANNING - CARE MANAGEMENT  Goal: Discharge to post-acute care or home with appropriate resources  INTERVENTIONS:  - Conduct assessment to determine patient/family and health care team treatment goals, and need for post-acute services based on payer coverage, community resources, and patient preferences, and barriers to discharge  - Address psychosocial, clinical, and financial barriers to discharge as identified in assessment in conjunction with the patient/family and health care team  - Arrange appropriate level of post-acute services according to patient's   needs and preference and payer coverage in collaboration with the physician and health care team  - Communicate with and update the patient/family, physician, and health care team regarding progress on the discharge plan  - Arrange appropriate transportation to post-acute venues   - Pt will be discharged to home self care  Outcome: Progressing

## 2018-02-12 NOTE — SOCIAL WORK
MCG Guide Used for Initial Round: Stroke: Ischemic RRG  Optimal GLOS: 2  Hospital Day: 3 days  DC Readiness:   Discharge Readiness  Return to top of Stroke: Ischemic RRG - ISC  · Discharge readiness is indicated by patient meeting Recovery Milestones, including ALL of the following:  ? Hemodynamic stability  ? Mental status at baseline or stable  ? Neurologic deficits absent or stable  ? Unimpaired swallowing  ? Tolerates sitting in chair  ? Dangerous arrhythmia absent  ? Oral hydration, medications, diet  ? Discharge plans and education understood    Identified Barriers: May need JONATAN as part of stroke work up  Continue to [facility] telemetry  Discussion Date (Time): 02/12/18 reviewed Dr Lenin Jimenez  Branch's progress note

## 2018-02-12 NOTE — OCCUPATIONAL THERAPY NOTE
[address]  Evaluation     Patient Name: Wicho Burrows  UKERI Date: 2/12/2018  Problem List  Patient Active Problem List   Diagnosis    Acute ischemic right MCA stroke [facility])    Fall    Forehead contusion    Left elbow contusion     Past Medical History  No past medical history on file  Past Surgical History  No past surgical history on file  02/12/18 1017   Note Type   Note type Eval/Treat   Restrictions/Precautions   Weight Bearing Precautions Per Order No   Other Precautions Fall Risk; Chair Alarm; Bed Alarm;Telemetry; Visual impairment;Cognitive   Pain Assessment   Pain Assessment No/denies pain   Pain Score No Pain   Home Living   Type of Home House   Home Layout One level  (0 MAGDALENA)   Bathroom Shower/Tub Tub/shower unit   Bathroom Toilet Standard   Bathroom Accessibility Accessible   Additional Comments NO DME USE AT BASELINE  Prior Function   Level of [place] Independent with ADLs and functional mobility   Lives With Spouse   Receives Help From Family   ADL Assistance Independent   IADLs Independent   Falls in the last 6 months 1 to 4  (NO ADD'L FALLS BESIDES ADMITTING FALL)   Vocational Full time employment   Lifestyle   Autonomy PT REPORTS BASELINE INDEPENDENCE IN ADLS/IADLS/DRIVING  NO DME USE AT BASELINE  Reciprocal Relationships PT RESIDES W/ SPOUSE  SPOUSE WORKS DAY SHIFT  Service to Others PT WORKS FT AT [address], [place]  Intrinsic Gratification PT STATES SHE ENJOYS SMOKING     Psychosocial   Psychosocial (WDL) X   Patient Behaviors/Mood Flat affect   Subjective   Subjective "I HAVE TO GO TO THE BATHROOM"   ADL   Where Assessed Chair   Eating Assistance 5  Supervision/Setup   Grooming Assistance 4  Minimal Assistance   UB Bathing Assistance 4  Minimal [address] 3  Moderate [address] 4  Minimal [address] 3  Moderate [address]  4  Minimal Assistance   Bed Mobility   Supine to Sit 5  Supervision   Sit to Supine Unable to assess   Additional Comments PT LEFT IN ROOM CHAIR POST EVAL W/ CHAIR ALARM ACTIVATED  Transfers   Sit to Stand 4  Minimal assistance   Additional items Assist x 1; Increased time required;Verbal cues   Stand to Sit 4  Minimal assistance   Additional items Assist x 1; Increased time required;Verbal cues   Functional Mobility   Functional Mobility 4  Minimal assistance   Additional Comments ASSIST X1 USING RW -- CUES TO MAINTAIN BODY W/IN CENTER OF RW AS WELL AS CUES FOR SAFETY 2* OBSERVED L HEMIBODY/ENVIRONMENTAL INATTENTION  Additional items Rolling walker   Balance   Static Sitting Fair +   Dynamic Sitting Fair   Static Standing Fair   Dynamic Standing Fair -   Ambulatory Poor +   Activity Tolerance   Activity Tolerance Patient limited by fatigue;Treatment limited secondary to medical complications (Comment)   Medical Staff Made Aware F/U W/ CM MEI/AYAKA   Nurse Made Aware OKAY TO SEE PER RN ELDA   RUE Assessment   RUE Assessment WFL   LUE Assessment   LUE Assessment X   LUE Overall AROM   L Shoulder Flexion WFL   L Elbow Flexion WFL   L Mass Grasp WFL   LUE Strength   L Shoulder Flexion 4/5   L Elbow Flexion 4/5   Hand Function   Gross Motor Coordination Functional   Fine Motor Coordination Functional   Sensation   Light Touch No apparent deficits   Sharp/Dull No apparent deficits   Stereognosis No apparent deficits   Proprioception   Proprioception No apparent deficits   Vision-Basic Assessment   Current Vision Does not wear glasses   Vision - Complex Assessment   Ocular Range of Motion Restricted on the left   Head Position Head turned  (R HEAD TURN PREFERENCE  CUES TO MAINTAIN MIDLINE )   Tracking Requires cues, head turns, or add eye shifts to track; Decreased smoothness of horizontal tracking   Acuity Able to read employee name badge without difficulty; Able to read clock/calendar on wall without difficulty   Additional Comments PT NOTED W/ IMPAIRED TRACKING TO L HOWEVER PT ABLE TO READ NAME BADGE AND CLOCK CORRECTLY  Perception   Inattention/Neglect Cues to attend left visual field;Cues to attend to left side of body   Cognition   Overall Cognitive Status Impaired   Arousal/Participation Alert   Attention Attends with cues to redirect   Orientation Level Oriented X4   Memory Within functional limits   Following Commands Follows one step commands with increased time or repetition   Comments PT ENGAGES IN CONVERSATION HOWEVER DOES APPEAR LIMITED 2* IMPAIRED SAFETY, INSIGHT AND JUDGEMENT  REQUIRES CUES FOR SAFETY T/O FUNCTIONAL ACTIVITY  Assessment   Limitation Decreased ADL status; Decreased UE strength;Decreased Safe judgement during ADL;Decreased cognition;Decreased endurance;Decreased self-care trans;Decreased high-level ADLs; Visual deficit;Mood limitation  (BALANCE, MEDICAL STATUS)   Prognosis Good   Assessment PT IS A 48 YO F ADMIT S/P FALL  PT NOTED W/ LIMITED MOVEMENT OF L SIDE, R GAZE PREFERENCE AND L SIDED FACIAL DROOP  STROKE ALERT INITIATED  CT BRAIN REVEALED EVOLVING R MCA TERRITORY INFARCTION  TPA ADMINISTERED  PT IS NOW S/P THROMBECTOMY 2/9  PT ADD'L UNDERGOING W/U FOR L ELBOW CONTUSION, FOREHEAD CONTUSION AND SUICIDAL IDEATION  MRI BRAIN REVEALED MULTIPLE INFARCTS IN THE R CEREBRAL HEMISPHERE IN THE DISTRIBUTION OF THE R MCA, DEEP INFARCT INVOLVING THE BASAL GANGLIA,  CORTICAL INFARCTS INVOLVING THE FRONTAL AND PARIETAL REGION AND PETECHIAL HEMORRHAGE IN THE INFARCT NOTED IN THE R LENTIFORM NUCLEUS  PT W/ PMH SIGNIFICANT FOR ANXIETY, DDD, ANEMIA, C SPINE SX, TOBACCO ABUSE, HTN AND DEPRESSION  PT IS FROM HOME W/ SPOUSE AND REPORTS BASELINE INDEPENDENCE IN ADLS/IADLS/DRIVING  NO DME USE AT BASELINE AND NO RECENT H/O FALLS   CURRENTLY PT REQUIRING MIN A UB ADLS, MOD A LB ADLS, MIN A SIT>STAND TRANSFERS/SHORT DISTANCE AMBULATION USING RW  PT APPEARS LIMITED AT THIS TIME 2* IMPAIRED BALANCE, ACTIVITY TOLERANCE, MEDICAL STATUS, ADL IMPAIRMENTS, L VISUAL DEFICIT, MINIMAL LUE STRENGTH DEFICIT, L HEMIBODY/ENVIRONMENTAL INATTENTION, MOOD IMPAIRMENT, AS WELL AS IMPAIRED INSIGHT, SAFETY AND JUDGEMENT  FROM OT PERSPECTIVE, PT WILL BENEFIT FROM CONTINUED OT SERVICES IN AN INPT REHAB SETTING PENDING FURTHER PROGRESS AND MEDICAL STABILITY  WILL CONTINUE TO FOLLOW PT 3-5X/WEEK IN ORDER TO MEET THE BELOW DESCRIBED GOALS IN 7-10 DAYS  Goals   Patient Goals PT WOULD LIKE TO GET BETTER   LTG Time Frame 7-10   Long Term Goal #1 PLEASE SEE BELOW DESCRIBED GOALS  Plan   Treatment Interventions ADL retraining;Functional transfer training;UE strengthening/ROM; Visual perceptual retraining; Endurance training;Cognitive reorientation;Patient/family training;Equipment evaluation/education; Neuromuscular reeducation; Compensatory technique education;Continued evaluation; Activityengagement; Energy conservation   Goal Expiration Date 02/22/18   OT Frequency 3-5x/wk   Recommendation   Recommendation Physiatry Consult   OT Discharge Recommendation Short Term Rehab   OT - OK to Discharge (TO STR AT THIS TIME)   Barthel Index   Feeding 10   Bathing 0   Grooming Score 0   Dressing Score 5   Bladder Score 10   Bowels Score 10   Toilet Use Score 5   Transfers (Bed/Chair) Score 10   Mobility (Level Surface) Score 10   Stairs Score 0   Barthel Index Score 60   Modified Scurry Scale   Modified [place]urry Scale 4     GOALS TO BE MET IN 7-10 DAYS:    1) Pt will increase bed mobility to MOD I and transfer EOB to participate in functional activity with G tolerance and balance  2) Pt will improve functional transfers to MOD I on/off all surfaces using DME PRN w/ G balance/safety including toileting  3) Pt will increase independence in all ADLS to MOD I with G balance sitting upright in chair  4) Pt will complete toileting w/ MOD I w/ G hygiene/thoroughness using DME PRN  5) Pt will improve activity tolerance to G for min 30 min txment sessions      6) Pt will participate in light grooming task with MOD I using setup standing at sink ~3-5mins with G safety and balance  7) Pt will be monitored and screened for signs/symptoms of depression through screening tools and discuss positive coping mechanisms and leisure interests to increase positive affect and promote overall well being  8) Pt will ID 2-3 leisure interests to participate while hospitalized to increase overall positive affect, activity tolerance, and increase positive coping  9) Pt will participate in completion of functional recovery indeces to evaluate and identify long term prognostic outcome potential with focus on affected UE, trunk control, anxiety and depression  10) Pt will increase UB strength through therex and review of HEP with G carryover of tech and MOD I to increase strength and coordination 1 MMT     11) Pt will engage in ongoing cognitive assessment(S) w/ G participation to A w/ safe d/c planning/recommendations      12) Pt will follow 100% 2 step commands and be A&O x4 consistently with environmental cues to increase activity participation to [address], [place], OTR/L

## 2018-02-12 NOTE — PHYSICAL THERAPY NOTE
PT EVALUATION      02/12/18 1020   Note Type   Note type Eval only   Pain Assessment   Pain Assessment 0-10   Pain Score No Pain   Home Living   Type of [address] One level   Additional Comments no MAGDALENA   Prior Function   Level of [place] Independent with ADLs and functional mobility   Lives With Spouse   Receives Help From Family   ADL Assistance Independent   IADLs Independent   Falls in the last 6 months 1 to 4   Vocational Full time employment   Restrictions/Precautions   Weight Bearing Precautions Per Order No   Other Precautions Fall Risk; Chair Alarm   Cognition   Overall Cognitive Status WFL   Arousal/Participation Alert   Orientation Level Oriented X4   RLE Assessment   RLE Assessment X  (MMT 4-/5)   LLE Assessment   LLE Assessment X  (MMT 3+/5 strength )   Coordination   Movements are Fluid and Coordinated 0   Coordination and Movement Description pt has a left side inattention    Bed Mobility   Supine to Sit 5  Supervision   Sit to Supine 5  Supervision   Transfers   Sit to Stand 4  Minimal assistance   Additional items Assist x 1   Stand to Sit 4  Minimal assistance   Additional items Assist x 1   Ambulation/Elevation   Gait pattern Ataxia; Short stride; Inconsistent leonidas;Decreased foot clearance;L Knee Isai   Gait Assistance 4  Minimal assist   Additional items Assist x 1;Verbal cues  (pt needed VC to keep walker close and in the center of her)   Assistive Device Rolling walker   Distance 75ft   Balance   Static Sitting Fair +   Dynamic Sitting Fair +   Static Standing Fair -   Dynamic Standing Poor +   Ambulatory Poor   Endurance Deficit   Endurance Deficit Yes   Activity Tolerance   Activity Tolerance Patient limited by fatigue   Nurse Made Aware yes-Donald   Assessment   Prognosis Fair   Problem List Decreased strength;Decreased endurance; Impaired balance;Decreased mobility; Decreased coordination;Decreased cognition;Decreased safety awareness   Assessment pt is seen for high complexity PT evaluation  pt is a 46 y/o female with a history of degenerative disk disease, anxiety, anemia, and hypercholestrolemia who is now admitted for acute ischemic R MCA and fall with L sided weakness and R sided gaze  PT consulted to assess transfers, ambulation, and overall transfers  pt presents with decrease LLE strength and coordination, decrease safe ambulation with RW, left sided inattention, decrease standing and ambulation balance  pt will continue to benefit from skilled PT to work on these problems  Recommend rehab when d/c  Goals   Patient Goals none stated   [facility] Expiration Date 02/26/18   Short Term Goal #1 1-2wks: ambulate 150ft with RW at independent level, improve LLE strenght by 1/2-1 grade, improve transfers to independent, improve standing and ambulation balance to good  Treatment Day 0   Plan   Treatment/Interventions Functional transfer training;LE strengthening/ROM; Therapeutic exercise; Bed mobility;Gait training; Endurance training;Equipment eval/education   PT Frequency 5x/wk  (1 weekend day )   Recommendation   Recommendation Post acute IP rehab   Equipment Recommended Walker  (Rolling walker)   Modified [place] Scale   Modified [place] Scale 4   Barthel Index   Feeding 10   Bathing 0   Grooming Score 5   Dressing Score 5   Bladder Score 10   Bowels Score 10   Toilet Use Score 5   Transfers (Bed/Chair) Score 10   Mobility (Level Surface) Score 10   Stairs Score 0   Barthel Index Score 65     Janeen Mcdonnell, SPT

## 2018-02-13 ENCOUNTER — ANESTHESIA EVENT (INPATIENT)
Dept: NON INVASIVE DIAGNOSTICS | Facility: HOSPITAL | Age: 53
DRG: 023 | End: 2018-02-13
Payer: COMMERCIAL

## 2018-02-13 ENCOUNTER — APPOINTMENT (INPATIENT)
Dept: NON INVASIVE DIAGNOSTICS | Facility: HOSPITAL | Age: 53
DRG: 023 | End: 2018-02-13
Attending: PSYCHIATRY & NEUROLOGY
Payer: COMMERCIAL

## 2018-02-13 ENCOUNTER — APPOINTMENT (INPATIENT)
Dept: NON INVASIVE DIAGNOSTICS | Facility: HOSPITAL | Age: 53
DRG: 023 | End: 2018-02-13
Attending: INTERNAL MEDICINE
Payer: COMMERCIAL

## 2018-02-13 LAB
ANION GAP SERPL CALCULATED.3IONS-SCNC: 9 MMOL/L (ref 4–13)
ATRIAL RATE: 52 BPM
ATRIAL RATE: 52 BPM
BUN SERPL-MCNC: 16 MG/DL (ref 5–25)
CALCIUM SERPL-MCNC: 9 MG/DL (ref 8.3–10.1)
CARDIOLIPIN IGA SER IA-ACNC: <9 APL U/ML (ref 0–11)
CARDIOLIPIN IGG SER IA-ACNC: <9 GPL U/ML (ref 0–14)
CARDIOLIPIN IGM SER IA-ACNC: <9 MPL U/ML (ref 0–12)
CHLORIDE SERPL-SCNC: 106 MMOL/L (ref 100–108)
CO2 SERPL-SCNC: 27 MMOL/L (ref 21–32)
CREAT SERPL-MCNC: 0.86 MG/DL (ref 0.6–1.3)
ERYTHROCYTE [DISTWIDTH] IN BLOOD BY AUTOMATED COUNT: 13.9 % (ref 11.6–15.1)
GFR SERPL CREATININE-BSD FRML MDRD: 77 ML/MIN/1.73SQ M
GLUCOSE SERPL-MCNC: 91 MG/DL (ref 65–140)
HCT VFR BLD AUTO: 34.3 % (ref 34.8–46.1)
HGB BLD-MCNC: 11.5 G/DL (ref 11.5–15.4)
MCH RBC QN AUTO: 32.3 PG (ref 26.8–34.3)
MCHC RBC AUTO-ENTMCNC: 33.5 G/DL (ref 31.4–37.4)
MCV RBC AUTO: 96 FL (ref 82–98)
PLATELET # BLD AUTO: 265 THOUSANDS/UL (ref 149–390)
PMV BLD AUTO: 9.4 FL (ref 8.9–12.7)
POTASSIUM SERPL-SCNC: 3.8 MMOL/L (ref 3.5–5.3)
QRS AXIS: 66 DEGREES
QRS AXIS: 79 DEGREES
QRSD INTERVAL: 67 MS
QRSD INTERVAL: 75 MS
QT INTERVAL: 408 MS
QT INTERVAL: 425 MS
QTC INTERVAL: 380 MS
QTC INTERVAL: 396 MS
RBC # BLD AUTO: 3.56 MILLION/UL (ref 3.81–5.12)
SODIUM SERPL-SCNC: 142 MMOL/L (ref 136–145)
T WAVE AXIS: 64 DEGREES
T WAVE AXIS: 76 DEGREES
VENTRICULAR RATE: 52 BPM
VENTRICULAR RATE: 52 BPM
WBC # BLD AUTO: 8.41 THOUSAND/UL (ref 4.31–10.16)

## 2018-02-13 PROCEDURE — 0JH632Z INSERTION OF MONITORING DEVICE INTO CHEST SUBCUTANEOUS TISSUE AND FASCIA, PERCUTANEOUS APPROACH: ICD-10-PCS | Performed by: INTERNAL MEDICINE

## 2018-02-13 PROCEDURE — 33282 HB IMPLANT PAT-ACTIVE HT RECORD: CPT | Performed by: INTERNAL MEDICINE

## 2018-02-13 PROCEDURE — 97530 THERAPEUTIC ACTIVITIES: CPT

## 2018-02-13 PROCEDURE — 99232 SBSQ HOSP IP/OBS MODERATE 35: CPT | Performed by: INTERNAL MEDICINE

## 2018-02-13 PROCEDURE — 93010 ELECTROCARDIOGRAM REPORT: CPT | Performed by: INTERNAL MEDICINE

## 2018-02-13 PROCEDURE — 33282 PR IMPLANTATION PT-ACTIVATED CARDIAC EVENT RECORDER: CPT | Performed by: INTERNAL MEDICINE

## 2018-02-13 PROCEDURE — 93312 ECHO TRANSESOPHAGEAL: CPT | Performed by: INTERNAL MEDICINE

## 2018-02-13 PROCEDURE — 93312 ECHO TRANSESOPHAGEAL: CPT

## 2018-02-13 PROCEDURE — 97116 GAIT TRAINING THERAPY: CPT

## 2018-02-13 PROCEDURE — 80048 BASIC METABOLIC PNL TOTAL CA: CPT | Performed by: INTERNAL MEDICINE

## 2018-02-13 PROCEDURE — 99233 SBSQ HOSP IP/OBS HIGH 50: CPT | Performed by: PHYSICIAN ASSISTANT

## 2018-02-13 PROCEDURE — 97535 SELF CARE MNGMENT TRAINING: CPT

## 2018-02-13 PROCEDURE — 93320 DOPPLER ECHO COMPLETE: CPT | Performed by: INTERNAL MEDICINE

## 2018-02-13 PROCEDURE — B24BZZ4 ULTRASONOGRAPHY OF HEART WITH AORTA, TRANSESOPHAGEAL: ICD-10-PCS | Performed by: INTERNAL MEDICINE

## 2018-02-13 PROCEDURE — 93325 DOPPLER ECHO COLOR FLOW MAPG: CPT | Performed by: INTERNAL MEDICINE

## 2018-02-13 PROCEDURE — C1764 EVENT RECORDER, CARDIAC: HCPCS

## 2018-02-13 PROCEDURE — 85027 COMPLETE CBC AUTOMATED: CPT | Performed by: INTERNAL MEDICINE

## 2018-02-13 RX ORDER — DOCUSATE SODIUM 100 MG/1
100 CAPSULE, LIQUID FILLED ORAL 2 TIMES DAILY PRN
Status: DISCONTINUED | OUTPATIENT
Start: 2018-02-13 | End: 2018-02-14 | Stop reason: HOSPADM

## 2018-02-13 RX ORDER — PROPOFOL 10 MG/ML
INJECTION, EMULSION INTRAVENOUS AS NEEDED
Status: DISCONTINUED | OUTPATIENT
Start: 2018-02-13 | End: 2018-02-13 | Stop reason: SURG

## 2018-02-13 RX ORDER — MIDODRINE HYDROCHLORIDE 5 MG/1
2.5 TABLET ORAL
Status: DISCONTINUED | OUTPATIENT
Start: 2018-02-13 | End: 2018-02-14

## 2018-02-13 RX ORDER — SENNOSIDES 8.6 MG
1 TABLET ORAL
Status: DISCONTINUED | OUTPATIENT
Start: 2018-02-13 | End: 2018-02-14 | Stop reason: HOSPADM

## 2018-02-13 RX ORDER — PROPOFOL 10 MG/ML
INJECTION, EMULSION INTRAVENOUS CONTINUOUS PRN
Status: DISCONTINUED | OUTPATIENT
Start: 2018-02-13 | End: 2018-02-13 | Stop reason: SURG

## 2018-02-13 RX ORDER — LIDOCAINE HYDROCHLORIDE 10 MG/ML
INJECTION, SOLUTION INFILTRATION; PERINEURAL CODE/TRAUMA/SEDATION MEDICATION
Status: COMPLETED | OUTPATIENT
Start: 2018-02-13 | End: 2018-02-13

## 2018-02-13 RX ORDER — PREGABALIN 25 MG/1
25 CAPSULE ORAL 2 TIMES DAILY
Status: DISCONTINUED | OUTPATIENT
Start: 2018-02-13 | End: 2018-02-14 | Stop reason: HOSPADM

## 2018-02-13 RX ORDER — EPHEDRINE SULFATE 50 MG/ML
INJECTION, SOLUTION INTRAVENOUS AS NEEDED
Status: DISCONTINUED | OUTPATIENT
Start: 2018-02-13 | End: 2018-02-13 | Stop reason: SURG

## 2018-02-13 RX ADMIN — PROPOFOL 100 MCG/KG/MIN: 10 INJECTION, EMULSION INTRAVENOUS at 12:05

## 2018-02-13 RX ADMIN — MIDODRINE HYDROCHLORIDE 2.5 MG: 5 TABLET ORAL at 17:31

## 2018-02-13 RX ADMIN — PREGABALIN 25 MG: 25 CAPSULE ORAL at 17:33

## 2018-02-13 RX ADMIN — DOCUSATE SODIUM 100 MG: 100 CAPSULE, LIQUID FILLED ORAL at 08:01

## 2018-02-13 RX ADMIN — EPHEDRINE SULFATE 5 MG: 50 INJECTION, SOLUTION INTRAMUSCULAR; INTRAVENOUS; SUBCUTANEOUS at 12:25

## 2018-02-13 RX ADMIN — PROPOFOL 20 MG: 10 INJECTION, EMULSION INTRAVENOUS at 12:06

## 2018-02-13 RX ADMIN — SENNOSIDES 8.6 MG: 8.6 TABLET, FILM COATED ORAL at 08:01

## 2018-02-13 RX ADMIN — BACITRACIN ZINC 1 SMALL APPLICATION: 500 OINTMENT TOPICAL at 17:31

## 2018-02-13 RX ADMIN — PROPOFOL 50 MG: 10 INJECTION, EMULSION INTRAVENOUS at 12:05

## 2018-02-13 RX ADMIN — BACITRACIN ZINC 1 SMALL APPLICATION: 500 OINTMENT TOPICAL at 08:01

## 2018-02-13 RX ADMIN — HEPARIN SODIUM 5000 UNITS: 5000 INJECTION, SOLUTION INTRAVENOUS; SUBCUTANEOUS at 05:31

## 2018-02-13 RX ADMIN — LIDOCAINE HYDROCHLORIDE 17 ML: 10 INJECTION, SOLUTION INFILTRATION; PERINEURAL at 15:52

## 2018-02-13 RX ADMIN — MIDODRINE HYDROCHLORIDE 2.5 MG: 5 TABLET ORAL at 13:15

## 2018-02-13 RX ADMIN — EPHEDRINE SULFATE 10 MG: 50 INJECTION, SOLUTION INTRAMUSCULAR; INTRAVENOUS; SUBCUTANEOUS at 12:05

## 2018-02-13 RX ADMIN — HEPARIN SODIUM 5000 UNITS: 5000 INJECTION, SOLUTION INTRAVENOUS; SUBCUTANEOUS at 13:16

## 2018-02-13 RX ADMIN — MIDODRINE HYDROCHLORIDE 5 MG: 5 TABLET ORAL at 05:31

## 2018-02-13 RX ADMIN — MIRTAZAPINE 15 MG: 15 TABLET, FILM COATED ORAL at 21:58

## 2018-02-13 RX ADMIN — GABAPENTIN 100 MG: 100 CAPSULE ORAL at 08:01

## 2018-02-13 RX ADMIN — ATORVASTATIN CALCIUM 80 MG: 80 TABLET, FILM COATED ORAL at 17:31

## 2018-02-13 RX ADMIN — ASPIRIN 81 MG 81 MG: 81 TABLET ORAL at 08:01

## 2018-02-13 RX ADMIN — PROPOFOL 40 MG: 10 INJECTION, EMULSION INTRAVENOUS at 12:11

## 2018-02-13 RX ADMIN — HEPARIN SODIUM 5000 UNITS: 5000 INJECTION, SOLUTION INTRAVENOUS; SUBCUTANEOUS at 21:59

## 2018-02-13 RX ADMIN — PREGABALIN 25 MG: 25 CAPSULE ORAL at 08:37

## 2018-02-13 NOTE — ANESTHESIA POSTPROCEDURE EVALUATION
Post-Op Assessment Note      CV Status:  Stable    Mental Status:  Alert and awake    Hydration Status:  Euvolemic    PONV Controlled:  Controlled    Airway Patency:  Patent    Post Op Vitals Reviewed: Yes          Staff: CRNA, Anesthesiologist           BP   105/53   Temp      Pulse 65   Resp   12   SpO2   100

## 2018-02-13 NOTE — PLAN OF CARE
Activity Intolerance/Impaired Mobility     Mobility/activity is maintained at optimum level for patient Progressing        Communication Impairment     Ability to express needs and understand communication [place]     Discharge to home or other facility with appropriate resources Progressing        DISCHARGE PLANNING - CARE MANAGEMENT     Discharge to post-acute care or home with appropriate resources Progressing        INFECTION - ADULT     Absence or prevention of progression during hospitalization Progressing     Absence of fever/infection during neutropenic period Progressing        Knowledge Deficit     Patient/family/caregiver demonstrates understanding of disease process, treatment plan, medications, and discharge instructions Progressing        Neurological Deficit     Neurological status is stable or improving Progressing        Nutrition     Nutrition/Hydration status is improving Progressing        Nutrition/Hydration-ADULT     Nutrient/Hydration intake appropriate for improving, restoring or maintaining nutritional needs Progressing        PAIN - ADULT     Verbalizes/displays adequate comfort level or baseline comfort level Progressing        Potential for Aspiration     Non-ventilated patient's risk of aspiration is minimized Progressing     Ventilated patient's risk of aspiration is minimized Progressing        Potential for Falls     Patient will remain free of falls Progressing        Prexisting or High Potential for Compromised Skin Integrity     Skin integrity is maintained or improved Progressing        SAFETY ADULT     Maintain or return to baseline ADL function Progressing     Maintain or return mobility status to optimal level Progressing

## 2018-02-13 NOTE — PROGRESS NOTES
Progress Note - Neurology   Gordon Gordillo 47 y o  female [id number]  Unit/Bed#: [facility] 705/[facility] 705-01    Assessment/Plan:  Multifocal infarction right MCA territory  Embolic stroke of unknown origin  Concerning for hypercoagulable state in the setting of multiple family members with stroke in their 46s and daughter with PEs  S/p TPA  S/p R M1 thrombectomy  MRI confirming multiple infarcts in R MCA territory with associated petechial hemorrage  CTA with M1 occlusion  Normal carotids  ECHO with EF 65%, no regional wall motion abnormalities, normal sized atria  JONATAN scheduled for today to assess for vegetation/thrombus  Possible PAF on tele that may have been medication related  Acute ischemic stroke protocol  Continue Telemetry  Aspirin 81mg  Atorvastatin 80mg  Thrombosis panel in progress  Lipid panel with total cholesterol 235, triglycerides 89, HDL 78 and   HbA1C 5 5  Secondary risk factor modification  Smoking cessation  OT/PT/ST  Frequent neurological checks  Stat CT head with acute decline of in exam/mental status  Notify neurology with any changes in examination  Hypotension  Midodrine  SBP goal 120-160  As per primary team    Depression/Anxiety  Remeron  As per psychiatry    Chronic pain  Lyrica    Tobacco use  Smoking cessation       Subjective:   Gordon Gordillo is a 47 y o  female who reports being ambidextrous though writes with her left hand who has a history of chronic pain, anxiety, tobacco use and history of prior C spine surgery who presented on 02/09/2018 with acute onset of left-sided weakness, left facial droop, right gaze preference, and confusion after fall  NIHSS on arrival was 16  CT showed findings suspicious for evolving right MCA territory infarction  CTA showed occlusion of the M1 segment of the right middle cerebral artery  TPA was given  Patient underwent thrombectomy for acute right M1 occlusion    MRI showed multiple infarcts in right MCA territory with petechial hemorrhage conversion  Today on exam, the patient is supine in bed  She has a very noticeable flat affect  She denies being in pain  She also denies chest pain, shortness of breath, headache, vision changes, weakness or numbness  The patient's  and sister were present at bedside  The patient and her family clarified her family history stating that mother, sister and brother all had strokes in tehir 46s, all of them now    Additionally, daughter with history of PE        Past Medical History:   Diagnosis Date    Anxiety     Chronic pain     Lumbar stenosis      Past Surgical History:   Procedure Laterality Date    CERVICAL SPINE SURGERY       Family history:  Family History   Problem Relation Age of Onset    Stroke Mother      46s    Pulmonary embolism Other      In mid 35s    Stroke Sister      46s    Stroke Brother      46s       Social History     Social History    Marital status: /Civil Union     Spouse name: N/A    Number of children: N/A    Years of education: N/A     Social History Main Topics    Smoking status: Current Every Day Smoker     Packs/day: 0 50     Years: 40 00     Types: Cigarettes    Smokeless tobacco: Never Used    Alcohol use No    Drug use: No    Sexual activity: Not Asked     Other Topics Concern    None     Social History Narrative    None       Scheduled Meds:    Current Facility-Administered Medications:  acetaminophen 650 mg Oral Q6H PRN Jannet Claudette, DO   aspirin 81 mg Oral Daily Courtney Fernández MD   atorvastatin 80 mg Oral QPM Gregory Orozco MD   bacitracin 1 small application Topical BID Mary Glass DO   docusate sodium 100 mg Oral BID PRN Max Sites   heparin (porcine) 5,000 Units Subcutaneous Q8H [address] Courtney Fernández MD   midodrine 2 5 mg Oral TID AC Blaine Escalera   mirtazapine 15 mg Oral HS Cheko Chan MD   ondansetron 4 mg Intravenous Q6H PRN Gregory Orozco MD   pregabalin 25 mg Oral BID Blaine Jw   senna 1 tablet Oral HS PRN Max Sites Continuous Infusions:   PRN Meds:   acetaminophen    docusate sodium    ondansetron    senna    ROS: A 12 point ROS was completed and other than the complaints mentioned above in the HPI, all remaining systems were negative  Vitals: /64 (BP Location: Right arm)   Pulse 56   Temp 98 2 °F (36 8 °C) (Oral)   Resp 18   Ht 5' 1" (1 549 m)   Wt 56 8 kg (125 lb 3 5 oz)   SpO2 99%   BMI 23 66 kg/m²     Physical Exam:   Physical Exam   Constitutional: She is oriented to person, place, and time  She appears well-developed and well-nourished  No distress  Awake, supine in bed   HENT:   Head: Normocephalic and atraumatic  Eyes: Conjunctivae and EOM are normal  Pupils are equal, round, and reactive to light  Right eye exhibits no discharge  Left eye exhibits no discharge  No scleral icterus  L lateral eye ecchymosis   Neck: Normal range of motion  Neck supple  Cardiovascular: Normal rate, regular rhythm and intact distal pulses  Exam reveals no gallop and no friction rub  No murmur heard  Pulmonary/Chest: Effort normal and breath sounds normal  No stridor  No respiratory distress  She has no wheezes  She has no rales  She exhibits no tenderness  Musculoskeletal: Normal range of motion  She exhibits no edema, tenderness or deformity  Lymphadenopathy:     She has no cervical adenopathy  Neurological: She is alert and oriented to person, place, and time  She has a normal Finger-Nose-Finger Test (no ataxia, though slightly slower on the L) and a normal Heel to [employer]    Reflex Scores:       Tricep reflexes are 2+ on the right side and 2+ on the left side  Bicep reflexes are 2+ on the right side and 2+ on the left side  Brachioradialis reflexes are 3+ on the right side and 3+ on the left side  Patellar reflexes are 2+ on the right side and 2+ on the left side  Achilles reflexes are 2+ on the right side and 2+ on the left side  Skin: Skin is warm and dry   No rash noted  No erythema  Psychiatric: Her speech is normal    Significantly flat affect     Neurologic Exam     Mental Status   Oriented to person, place, and time  Follows 2 step commands  Attention: normal  Concentration: normal    Speech: speech is normal   Level of consciousness: alert  Knowledge: consistent with education  Able to name object  Normal comprehension  When asked what a quarter, dime and nickel total, patient stating 41 cents  Able to correct herself when I broke the task down into steps  Cranial Nerves     CN II   Visual acuity: (grossly, can count fingers)  Right visual field deficit: none  Left visual field deficit: upper temporal and lower temporal quadrant(s)    CN III, IV, VI   Pupils are equal, round, and reactive to light  Extraocular motions are normal    Nystagmus: none     CN V   Facial sensation intact  CN VIII   Hearing: intact    CN XI   CN XI normal      CN XII   CN XII normal    Patient looses track of examiner's finger when tracking, requiring verbal cuing to refocus  Asymmetric eyes appearing chronic  Left lower face weakness     Motor Exam   Muscle bulk: normal  Overall muscle tone: normal  Right arm pronator drift: absent  Left arm pronator drift: present    Strength   Strength 5/5 except as noted     L wrist extension,  4+  L triceps/biceps 5, though fatiguing quicker than R  L iliopsoas 4+     Sensory Exam   Light touch normal    Vibration normal    Pinprick normal    Sensation to temperature intact bilaterally  Decreased LUE proprioception noted with finger to nose with eyes closed     Gait, Coordination, and Reflexes     Coordination   Finger to nose coordination: normal (no ataxia, though slightly slower on the L)  Heel to shin coordination: normal    Reflexes   Right brachioradialis: 3+  Left brachioradialis: 3+  Right biceps: 2+  Left biceps: 2+  Right triceps: 2+  Left triceps: 2+  Right patellar: 2+  Left patellar: 2+  Right achilles: 2+  Left achilles: 2+  Right plantar: normal  Left plantar: normal      Labs:  Recent Results (from the past 24 hour(s))   Antithrombin III Activity    Collection Time: 02/12/18  3:14 PM   Result Value Ref Range    AntiThrombIN III Activity 113 92 - 136 % of Normal   CBC    Collection Time: 02/13/18  4:44 AM   Result Value Ref Range    WBC 8 41 4 31 - 10 16 Thousand/uL    RBC 3 56 (L) 3 81 - 5 12 Million/uL    Hemoglobin 11 5 11 5 - 15 4 g/dL    Hematocrit 34 3 (L) 34 8 - 46 1 %    MCV 96 82 - 98 fL    MCH 32 3 26 8 - 34 3 pg    MCHC 33 5 31 4 - 37 4 g/dL    RDW 13 9 11 6 - 15 1 %    Platelets 568 962 - 279 Thousands/uL    MPV 9 4 8 9 - 12 7 fL   Basic metabolic panel    Collection Time: 02/13/18  4:44 AM   Result Value Ref Range    Sodium 142 136 - 145 mmol/L    Potassium 3 8 3 5 - 5 3 mmol/L    Chloride 106 100 - 108 mmol/L    CO2 27 21 - 32 mmol/L    Anion Gap 9 4 - 13 mmol/L    BUN 16 5 - 25 mg/dL    Creatinine 0 86 0 60 - 1 30 mg/dL    Glucose 91 65 - 140 mg/dL    Calcium 9 0 8 3 - 10 1 mg/dL    eGFR 77 ml/min/1 73sq m     Imaging:  I have personally reviewed the above imaging and PACS reports       VTE Prophylaxis: Sequential compression device (Venodyne)  and Heparin

## 2018-02-13 NOTE — OCCUPATIONAL THERAPY NOTE
OccupationalTherapy Progress Note     Patient Name: Henok Marks  KBODP'M Date: 2/13/2018  Problem List  Patient Active Problem List   Diagnosis    Acute ischemic right MCA stroke ([address] )    Fall    Forehead contusion    Left elbow contusion                       02/13/18 1050   Restrictions/Precautions   Weight Bearing Precautions Per Order No   Other Precautions Fall Risk;Telemetry; Chair Alarm; Bed Alarm;Pain   Pain Assessment   Pain Assessment 0-10   Pain Score 4   Pain Type Acute pain   Pain Location Back; Foot   Pain Orientation Left   Hospital Pain Intervention(s) Repositioned   Response to Interventions TOLERATED   Bed Mobility   Supine to Sit 5  Supervision   Additional Comments PT LEFT IN ROOM CHAIR POST EVAL W/ CHAIR ALARM ACTIVATED  Transfers   Sit to Stand 5  Supervision   Additional items Increased time required   Stand to Sit 5  Supervision   Additional items Increased time required   Functional Mobility   Functional Mobility 4  Minimal assistance   Additional Comments ASSIST X1 USING RW -- VERBAL CUES AS PT REMAINS UNAWARE OF OBJECTS IN L VISUAL FIELD/ENVIRONMENT  PT HIT TWO OBJECTS -- DOOR AND BEDSIDE TABLE  Additional items Rolling walker   Cognition   Overall Cognitive Status Impaired   Arousal/Participation Alert   Attention Attends with cues to redirect   Orientation Level Oriented X4   Memory Within functional limits   Following Commands Follows multistep commands with increased time or repetition   Comments PT ENGAGES IN APPRROPRIATE CONVERSATION THIS AM  MORE TALKATIVE AND ENGAGED WITH THERAPIST  PT HOWEVER DOES REQUIRE MIN CUES FOR SAFETY T/O FUNCTIONAL ACTIVITY      Neuro QOL Depression Screen - In the Past 7 Days:   I felt depressed 3   I felt hopeless 3   I felt like nothing could cheer me up 4   I felt that my life was empty 4   I felt worthless 4   I felt unhappy 4   I felt I had no reason for living 3   I felt that nothing was interesting 3   Depression Score 28   Neuro QOL Anxiety Screen - In the Past 7 Days:   I felt uneasy 3   I felt nervous 3   Many situations made me worry 3   My worries overwhelmed me 3   I felt tense 2   I had difficulty calming down 2   I had sudden feelings of panic 3   I felt nervous when my normal routine was disturbed 4   Anxiety Score 23     Pt seen for OT treatment session focused on     as well as completion of functional recovery indeces to identify long term prognostic outcome potential with focus on: UE/LE functioning, trunk control, balance, proprioception, cognition and anxious/depressive symptoms  Pt participated in the following assessments: The [place] Prognostic Scale, Motricity Index and Trunk Control Test, Neuro QOL Anxiety Short Form, and Neuro QOL Depression Short Form  Please see detailed scores and interpretation below  Pt educated on outcome measures and functional implications of the same  Pt completed bed mobility and functional transfers w/ SBA this AM  Pt however continues to require MIN A for mobility using RW  Pt completed UB dressing including gown adjustment w/ MIN A and donned socks w/ MIN A sitting at EOB  Pt completed grooming standing sinkside with MIN A to maintain standing balance  Pt required MIN A to complete grooming w/ use of electric toothbrush  Pt unable to maintain toothbrush in L hand and pt utilized R hand to assist LUE with task  Pt remains limited at this time 2* impaired balance, activity tolerance, pain, medical status, ADL impairments, LUE FMC/coordination deficits, mood impairments as well as impaired safety and insight  From OT perspective, pt will benefit from continued OT services in an inpt rehab setting pending further progress and medical stability  Will continue to follow pt on caseload in order to meet previously established goals  [place] Prognostic Score Results  Used to assess UE motor deficit, proprioception, balance and cognition   Scores < 3 2 indicate high likelihood of returning home, scores between 3 2-5 2 indicate that pt 's generally respond better to rehabilitation, scores >5 2 indicate pt 's are typically dependent with an increased risk of institutionalization  Motor deficits in arm: 0 0     Proprioception: 0 8     Balance: 0 4    Cognition (Hodkinson's Mental Test): 0 4     Total: 3 2 - indicating a generally better response to rehabilitation  Anxiety- Short Form:   Used to measure anxious s/s  For scoring purposes, scores of 25+ indicate the threshold for treatment per neurology/SLIM  Score: 23/40  Pt most often chose the response sometimes  Depression- Short Form:   Used to measure depressive s/s  For scoring purposes, scores of 25+ indicate the threshold for treatment per neurology/SLIM    Score: 28/40  Pt most often chose the responses sometimes and often

## 2018-02-13 NOTE — PROCEDURES
Brief procedure note:    Procedure: JONATAN  : BONI Martinez    Anesthesia: Propofol, by the anesthesia service  Easy intubation, one attempt    Briefly, LV and RV function were normal   No significant valvular heart disease  No DENISE thrombus  No PFO or ASD  There was nodular calcium in the proximal ascending aorta, and otherwise mild-moderate atheroma in the more distal descending thoracic aorta  However, no large plaque was seen in the visualized portions of the intrathoracic aorta to explain the CVA  No complications  Full report to follow

## 2018-02-13 NOTE — PROGRESS NOTES
Progress Note - Cardiology   Shabana Mcleod 47 y o  female MRN: [id number]  [facility] Encounter: [id number]    Assessment/Recommendations:  47year old female with ischemic stroke  Asked to evaluate for cardiac source  JONATAN today unrevealing  Given young age, no large atheroma found to explain CVA, reasonable to ask EP to implant a loop recorder to observe for any occult afib  Will arrange  Otherwise for now, from cardiac standpoint no indication for anticoagulation, and would continue with antiplatelet therapy and lipid lowering therapy  Subjective/Objective     Subjective:   No events overnight  Denies palpitations  No chest pain, or shortness of breath  Objective:    Vitals: /56   Pulse (!) 54   Temp 98 °F (36 7 °C) (Oral)   Resp 18   Ht 5' 1" (1 549 m)   Wt 56 8 kg (125 lb 3 5 oz)   SpO2 98%   BMI 23 66 kg/m²   Vitals:    02/09/18 0754 02/09/18 1138   Weight: 55 8 kg (123 lb 1 oz) 56 8 kg (125 lb 3 5 oz)     Orthostatic Blood Pressures    Flowsheet Row Most Recent Value   Blood Pressure  101/56 filed at 02/13/2018 1145   Patient Position - Orthostatic VS  Lying filed at 02/13/2018 0747            Intake/Output Summary (Last 24 hours) at 02/13/18 1230  Last data filed at 02/13/18 0749   Gross per 24 hour   Intake              280 ml   Output             1425 ml   Net            -1145 ml       Physical Exam:   General appearance: Flat affect  Head: eccymoses noted  Neck: no carotid bruit, no JVD and supple, symmetrical, trachea midline  Lungs: clear to auscultation bilaterally  Normal air entry  Normal effort  Heart: S1, S2 normal and no S3 or S4  No murmurs    Abdomen: soft, non-tender; bowel sounds normal; no masses,  no organomegaly  Extremities: extremities normal, atraumatic, no cyanosis or edema  Pulses: 2+ and symmetric bilaterally  Skin: Skin color, texture, turgor normal  No rashes or lesions    Medications:    Current Facility-Administered Medications:    acetaminophen (TYLENOL) tablet 650 mg, 650 mg, Oral, Q6H PRN, Jannetnina Wilkins, DO, 650 mg at 02/12/18 2115    aspirin chewable tablet 81 mg, 81 mg, Oral, Daily, Dudley Bull MD, 81 mg at 02/13/18 0801    atorvastatin (LIPITOR) tablet 80 mg, 80 mg, Oral, QPM, Portia Ellsworth MD, 80 mg at 02/12/18 1709    bacitracin topical ointment 1 small application, 1 small application, Topical, BID, Naveen Juares, DO, 1 small application at 22/99/37 0801    docusate sodium (COLACE) capsule 100 mg, 100 mg, Oral, BID PRN, [place] Melena    heparin (porcine) subcutaneous injection 5,000 Units, 5,000 Units, Subcutaneous, Q8H [facility], Dudley Bull MD, 5,000 Units at 02/13/18 0531    midodrine (PROAMATINE) tablet 2 5 mg, 2 5 mg, Oral, TID AC, [place] Melena    mirtazapine (REMERON) tablet 15 mg, 15 mg, Oral, HS, Mey Burris MD, 15 mg at 02/12/18 2115    ondansetron (ZOFRAN) injection 4 mg, 4 mg, Intravenous, Q6H PRN, Portia Ellsworth MD    pregabalin (LYRICA) capsule 25 mg, 25 mg, Oral, BID, [place] Melena, 25 mg at 02/13/18 7281    senna (SENOKOT) tablet 8 6 mg, 1 tablet, Oral, HS PRN, [place] Melena    Facility-Administered Medications Ordered in Other Encounters:     ePHEDrine injection, , Intravenous, PRN, Daniel Christian CRNA, 5 mg at 81/35/21 1225    lidocaine bolus from bag, , Intravenous, PRN, Daniel Christian CRNA, 60 mg at 02/13/18 1205    propofol (DIPRIVAN) 1000 mg in 100 mL infusion (premix), , Intravenous, Continuous PRN, Daniel Christian CRNA, Stopped at 02/13/18 1220    propofol (DIPRIVAN) 200 MG/20ML bolus injection, , Intravenous, PRN, Daniel Christian CRNA, 40 mg at 02/13/18 1211    Lab Results:        Results from last 7 days  Lab Units 02/13/18  0444 02/11/18  0444 02/10/18  0433   WBC Thousand/uL 8 41 7 52 10 85*   HEMOGLOBIN g/dL 11 5 11 2* 11 5   HEMATOCRIT % 34 3* 33 4* 33 5*   PLATELETS Thousands/uL 265 194 257       Results from last 7 days  Lab Units 02/09/18  0733   CHOLESTEROL mg/dL 235*   TRIGLYCERIDES mg/dL 89   HDL mg/dL 78* Results from last 7 days  Lab Units 02/13/18  0444 02/11/18  0444 02/10/18  0433   SODIUM mmol/L 142 143 141   POTASSIUM mmol/L 3 8 3 9 4 0   CHLORIDE mmol/L 106 110* 108   CO2 mmol/L 27 27 28   BUN mg/dL 16 11 10   CREATININE mg/dL 0 86 0 81 0 82   CALCIUM mg/dL 9 0 8 6 8 8   TOTAL PROTEIN g/dL  --   --  6 3*   BILIRUBIN TOTAL mg/dL  --   --  0 48   ALK PHOS U/L  --   --  59   ALT U/L  --   --  17   AST U/L  --   --  12   GLUCOSE RANDOM mg/dL 91 111 114       Results from last 7 days  Lab Units 02/09/18  0733   INR  0 91   PTT seconds 22*       Results from last 7 days  Lab Units 02/10/18  0433   MAGNESIUM mg/dL 1 9       Telemetry: Personally reviewed  JONATAN:  Briefly, LV and RV function were normal   No significant valvular heart disease  No DENISE thrombus  No PFO or ASD      There was nodular calcium in the proximal ascending aorta, and otherwise mild-moderate atheroma in the more distal descending thoracic aorta  However, no large plaque was seen in the visualized portions of the intrathoracic aorta to explain the CVA

## 2018-02-13 NOTE — DISCHARGE INSTRUCTIONS
Keep loop recorder incision dry for one week  Do not use lotions/powders/creams on incision  Remove outer bandage 48 hours after procedure - leave underlying steri-strips in place, they will either fall off on their own or will be removed at 2 week follow up appointment  Please call the office if you notice redness, swelling, bleeding, or drainage from incision or if you develop fevers  Cardiac Loop Recorder Insertion   WHAT YOU NEED TO KNOW:   A cardiac loop recorder is a device used to diagnose heart rhythm problems, such as a fast or irregular heartbeat  It is implanted in your left chest or armpit, just under the skin  The device records a pattern of your heart's rhythm, called an EKG  Your device records automatic EKGs, depending on how your healthcare provider programs it  You may also receive a handheld controller  You press a button on the controller when you have symptoms, such as dizziness or lightheadedness  The device will record an EKG at that moment  The recording can help your healthcare provider see if your symptoms may be caused by heart rhythm problems  Your healthcare provider will remove the device after it has collected enough data  You may need the device for up to 3 years  The procedure to remove the device is similar to the procedure used to implant it  DISCHARGE INSTRUCTIONS:   Follow up with your cardiologist as directed: You will need to return in 1 to 2 weeks  Your cardiologist will check your incision  He may also program your device settings again  He will retrieve data from the device every 1 to 3 months with a monitor held over your skin  You may be able to transmit data from your device over the phone  You will do this by calling a number provided by your cardiologist  Ask for information about this process  Write down your questions so you remember to ask them during your visits  Wound care:  Carefully wash your incision with soap and water   Keep the area clean and dry until it heals  Return to activity:  Most people can return to normal activities soon after the procedure  Your cardiologist may want to know if your work involves electrical current or high-voltage equipment  Ask about other electrical items that could interfere with your cardiac loop recorder  Contact your cardiologist if:   · You have a fever or chills  · Your wound is red, swollen, or draining pus  · You have questions or concerns about your condition or care  Seek care immediately or call 911 if:   · You feel weak, dizzy, or faint  · You lose consciousness  © 2017 [facility] Information is for End User's use only and may not be sold, redistributed or otherwise used for commercial purposes  All illustrations and images included in CareNotes® are the copyrighted property of [employer]  or George [employer]  The above information is an  only  It is not intended as medical advice for individual conditions or treatments  Talk to your doctor, nurse or pharmacist before following any medical regimen to see if it is safe and effective for you

## 2018-02-13 NOTE — PHYSICAL THERAPY NOTE
PT TREATMENT     02/13/18 1100   Pain Assessment   Pain Assessment 0-10   Pain Score 3   Pain Type Acute pain   Pain Location Hip   Pain Orientation Left   Hospital Pain Intervention(s) Repositioned; Ambulation/increased activity   Response to Interventions pt tolerated treatment    Restrictions/Precautions   Weight Bearing Precautions Per Order No   Other Precautions Fall Risk; Chair Alarm; Bed Alarm;Pain;Telemetry   General   Chart Reviewed Yes   Family/Caregiver Present Yes   Cognition   Overall Cognitive Status [facility]   Arousal/Participation Alert   Attention Attends with cues to redirect   Orientation Level Oriented X4   Memory Within functional limits   Subjective   Subjective was willing to participate in treatment session    Bed Mobility   Sit to Supine 5  Supervision   Additional Comments pt got back in bed at end of session with bed alarm on    Transfers   Sit to Stand 5  Supervision   Additional items Increased time required;Armrests   Stand to Sit 5  Supervision   Additional items Increased time required   Ambulation/Elevation   Gait pattern Excessively slow; Short stride; Inconsistent leonidas;Decreased foot clearance   Gait Assistance 5  Supervision   Additional items Verbal cues  (at times pt runs into things with RW )   Assistive Device Rolling walker   Distance 100ft x2 with seated rest break  (TUG was performed)   Balance   Static Sitting Fair +   Dynamic Sitting Fair +   Static Standing Fair -   Dynamic Standing Fair -   Ambulatory Poor +   Activity Tolerance   Activity Tolerance Patient tolerated treatment well   Nurse Made Aware yes-Penelope   Exercises   Hip Flexion Standing;Bilateral;10 reps  (w/ RW)   Hip Extension Standing;Bilateral;10 reps  (w/ RW)   Marching Standing;10 reps;Bilateral  (w/ RW)   Balance training  side stepping at wall w/ handrail 10ftx2 at supervision level    Assessment   Prognosis Fair   Problem List Decreased strength;Decreased endurance; Impaired balance;Decreased mobility; Decreased coordination;Decreased safety awareness   Assessment pt is seen for PT treatment session and to perform outcome measures  session began with pt seated in chair  pt performed standing balance exercises w/ RW at supervision level  pt demonstrated improved safe ambulation w/ RW, having improved attention of the L side  pt performed the TUG w/ RW which took her 25 89 seconds to perform, increasing her risk of falls  session ended with pt getting back into bed  pt will continue to benefit from skilled PT to improve safe ambulation and standing and ambulation balance and coordination  Continue to recommend rehab when d/c  Goals   STG Expiration Date 02/26/18   Treatment Day 1   Plan   Treatment/Interventions Functional transfer training;LE strengthening/ROM; Therapeutic exercise; Endurance training;Equipment eval/education; Bed mobility;Gait training   PT Frequency 5x/wk  (1 weekend day )   Recommendation   Recommendation Post acute IP rehab   Equipment Recommended Walker  (Rolling walker)     Lucio Jeronimo, EDSON

## 2018-02-13 NOTE — PROGRESS NOTES
IM Residency Progress Note   Unit/Bed#: [facility] 705-01 Encounter: [id number]  SOD Team C       Karel Sessions 47 y o  female [facility] Stay Days: 4      Assessment/Plan:    Principal Problem:    Acute ischemic right MCA stroke [facility])  Active Problems:    Fall    Forehead contusion    Left elbow contusion    1  Right MCA stroke status post tPA and thrombectomy  · Patient intermittently below goal  in first 72 hours post procedure  · MRI 2/10/18 showed evidence of petechial hemorrhage was delayed initiation of aspirin  · CT head preformed 2/10/18 :No intraparenchymal hematoma , evolving infarct right basal ganglia, right insular region, right posterior parietal region, and  no hematoma seen corresponding to the petechial hemorrhage detected on the recent to MRI in the right basal ganglionic region  · Echo showed EF 65% no regional wall abnormalities with a normal left atrium  · Continue atorvastatin 80 mg  · Q4 neuro checks   · Continue telemetry monitoring as atrial fibrillation with embolic stroke remains probability  · Mitodrine TID with goal MAP greater than 65, no strict BP goal for cerebral perfusion at this time  · Continue Dysphagia 3 diet pending Speech swallow eval     2   Suicide ideation  · Patient was noted earlier in hospitalization to have ideation and plan  · Continue mirtazapine 15 mg p o  q h s  · Patient denies further ideation  · Patient states that she takes Lyrica BID  Will investigate and restart if possible      3  Cephalalgia  · Continue Tylenol 650 mg q 6h  · Gabapentin 100 BID     4  Hypotension  · Mitodrine TID with goal MAP greater than 65, -160     5  Bradycardia  · Asymptomatic-continue to monitor on telemetry  Disposition: Continued medical management of MCA stoke, characterization of aortic arch with JONATAN  PT assessment and placement planning  Subjective:   Patient again reports lack of energy this morning    She has continued pain in the left arm   She denies weakness or paresthesia  She denies fevers or chills  She currently has no headache, though requests narcotic medications be available prior to her headache occurring because Tylenol 'works too slow '    No significant overnight events per nursing staff  Telemetry with sinus bradycardia, no atrial fibrillation since admission to hospital        Vitals: Temp (24hrs), Av 2 °F (36 8 °C), Min:97 9 °F (36 6 °C), Max:98 3 °F (36 8 °C)  Current: Temperature: 98 °F (36 7 °C)  Vitals:    18 1523 18 1938 18 2311 18 0405   BP: 108/56 121/53 109/60 (!) 98/43   BP Location: Right arm Right arm Right arm Right arm   Pulse: 56 55 68 60   Resp:    Temp: 97 9 °F (36 6 °C) 98 2 °F (36 8 °C) 98 3 °F (36 8 °C) 98 °F (36 7 °C)   TempSrc: Oral Oral Oral Oral   SpO2: 99% 98% 97%    Weight:       Height:        Body mass index is 23 66 kg/m²  I/O last 24 hours: In: 460 [P O :460]  Out: 1425 [Urine:1425]      Physical Exam:   Gen: Somnolent, lying in bed, thin appearing  HEENT: Ecchymosis lateral to left orbit  Anicteric, no injection, membranes moist  CV: RRR  Pulm: CTA BL  Extr: Ecchymosis left arm  No peripheral C/C/E          Invasive Devices     Peripheral Intravenous Line            Peripheral IV 02/10/18 Left Forearm 2 days    Peripheral IV 18 Right Forearm 2 days                Labs:   Recent Results (from the past 24 hour(s))   Antithrombin III Activity    Collection Time: 18  3:14 PM   Result Value Ref Range    AntiThrombIN III Activity 113 92 - 136 % of Normal   CBC    Collection Time: 18  4:44 AM   Result Value Ref Range    WBC 8 41 4 31 - 10 16 Thousand/uL    RBC 3 56 (L) 3 81 - 5 12 Million/uL    Hemoglobin 11 5 11 5 - 15 4 g/dL    Hematocrit 34 3 (L) 34 8 - 46 1 %    MCV 96 82 - 98 fL    MCH 32 3 26 8 - 34 3 pg    MCHC 33 5 31 4 - 37 4 g/dL    RDW 13 9 11 6 - 15 1 %    Platelets 610 724 - 243 Thousands/uL    MPV 9 4 8 9 - 12 7 fL   Basic metabolic panel    Collection Time: 02/13/18  4:44 AM   Result Value Ref Range    Sodium 142 136 - 145 mmol/L    Potassium 3 8 3 5 - 5 3 mmol/L    Chloride 106 100 - 108 mmol/L    CO2 27 21 - 32 mmol/L    Anion Gap 9 4 - 13 mmol/L    BUN 16 5 - 25 mg/dL    Creatinine 0 86 0 60 - 1 30 mg/dL    Glucose 91 65 - 140 mg/dL    Calcium 9 0 8 3 - 10 1 mg/dL    eGFR 77 ml/min/1 73sq m       Radiology Results: I have personally reviewed pertinent reports  and I have personally reviewed pertinent films in PACS  Xr Elbow 2 Vw Left    Result Date: 2/9/2018  Impression: No acute osseous abnormality  Workstation performed: [id number]     Ct Head Wo Contrast    Result Date: 2/10/2018  Impression: No intraparenchymal hematoma seen Evolving infarct right basal ganglia, right insular region, right posterior parietal region  There is no hematoma seen corresponding to the petechial hemorrhage detected on the recent to MRI in the right basal ganglionic region  Workstation performed: [id number]     Trauma - Ct Spine Cervical Wo Contrast    Result Date: 2/9/2018  Impression: Degenerative and postoperative changes  No cervical spine fracture or traumatic malalignment  I personally discussed this study with Robert Mills on 2/9/2018 at 7:47 AM   Workstation performed: [id number]     Mri Brain Wo Contrast    Result Date: 2/10/2018  Impression: Multiple infarcts in the right cerebral hemisphere in the distribution of the right MCA  Deep infarct involving the basal ganglia and cortical infarcts involving the frontal and parietal region are noted   There is a petechial hemorrhage in the infarct noted in the right lentiform nucleus Reconstitution of the flow void in the right MCA post embolectomy No intraparenchymal hematoma seen No midline shift seen  I personally discussed this study with Dr Clinton Keen on 2/10/2018 at 8:50 AM  Workstation performed: [id number]     Xr Chest 1 View    Result Date: 2/12/2018  Impression: No acute traumatic injury No active cardiopulmonary disease  Workstation performed: [id number]     Xr Trauma Multiple    Result Date: 2/9/2018  Impression: No acute traumatic injury No active cardiopulmonary disease  Workstation performed: [id number]     Ct Stroke Alert Brain    Result Date: 2/9/2018  Impression: 1  Findings suspicious for evolving right MCA territory infarction  Recommend follow-up CTA of the neck and brain  2   Cerebral atrophy with chronic small vessel ischemic change  I personally discussed this study with Martinnina Marengo on 2/9/2018 at 7:47 AM  Workstation performed: [id number]     Cta Stroke Alert (head/neck)    Result Date: 2/9/2018  Impression: 1  Occlusion of the M1 segment of the right middle cerebral artery  There is reconstitution of the M2 segments via collateral flow from the anterior and posterior circulations  2   Evolving right MCA territory infarction  No acute hemorrhage  3   Degenerative changes cervical spine     I personally discussed this study with Yolanda Pizarroval on 2/9/2018 at 7:47 AM  Workstation performed: [id number]       Active Meds:   Current Facility-Administered Medications   Medication Dose Route Frequency    acetaminophen (TYLENOL) tablet 650 mg  650 mg Oral Q6H PRN    aspirin chewable tablet 81 mg  81 mg Oral Daily    atorvastatin (LIPITOR) tablet 80 mg  80 mg Oral QPM    bacitracin topical ointment 1 small application  1 small application Topical BID    docusate sodium (COLACE) capsule 100 mg  100 mg Oral BID    gabapentin (NEURONTIN) capsule 100 mg  100 mg Oral BID    heparin (porcine) subcutaneous injection 5,000 Units  5,000 Units Subcutaneous Q8H [address]    midodrine (PROAMATINE) tablet 5 mg  5 mg Oral TID AC    mirtazapine (REMERON) tablet 15 mg  15 mg Oral HS    ondansetron (ZOFRAN) injection 4 mg  4 mg Intravenous Q6H PRN    senna (SENOKOT) tablet 8 6 mg  1 tablet Oral Daily         VTE Pharmacologic Prophylaxis: Heparin  VTE Mechanical Prophylaxis: sequential compression device    Alex Glass

## 2018-02-13 NOTE — PLAN OF CARE
Problem: OCCUPATIONAL THERAPY ADULT  Goal: Performs self-care activities at highest level of function for planned discharge setting  See evaluation for individualized goals  Treatment Interventions: ADL retraining, Functional transfer training, UE strengthening/ROM, Visual perceptual retraining, Endurance training, Cognitive reorientation, Patient/family training, Equipment evaluation/education, Neuromuscular reeducation, Compensatory technique education, Continued evaluation, Activityengagement, Energy conservation          See flowsheet documentation for full assessment, interventions and recommendations  Outcome: Progressing  Limitation: Decreased ADL status, Decreased UE strength, Decreased Safe judgement during ADL, Decreased cognition, Decreased endurance, Decreased self-care trans, Decreased high-level ADLs, Visual deficit, Mood limitation (BALANCE, MEDICAL STATUS)  Prognosis: Good  Recommendation: Physiatry Consult  OT Discharge Recommendation: Short Term Rehab  OT - OK to Discharge:  (TO STR AT THIS TIME)    Pt seen for OT treatment session focused on     as well as completion of functional recovery indeces to identify long term prognostic outcome potential with focus on: UE/LE functioning, trunk control, balance, proprioception, cognition and anxious/depressive symptoms  Pt participated in the following assessments: The [place] Prognostic Scale, Motricity Index and Trunk Control Test, Neuro QOL Anxiety Short Form, and Neuro QOL Depression Short Form  Please see detailed scores and interpretation below  Pt educated on outcome measures and functional implications of the same  Pt completed bed mobility and functional transfers w/ SBA this AM  Pt however continues to require MIN A for mobility using RW  Pt completed UB dressing including gown adjustment w/ MIN A and donned socks w/ MIN A sitting at EOB  Pt completed grooming standing sinkside with MIN A to maintain standing balance   Pt required MIN A to complete grooming w/ use of electric toothbrush  Pt unable to maintain toothbrush in L hand and pt utilized R hand to assist LUE with task  Pt remains limited at this time 2* impaired balance, activity tolerance, pain, medical status, ADL impairments, LUE FMC/coordination deficits, mood impairments as well as impaired safety and insight  From OT perspective, pt will benefit from continued OT services in an inpt rehab setting pending further progress and medical stability  Will continue to follow pt on caseload in order to meet previously established goals  [place] Prognostic Score Results  Used to assess UE motor deficit, proprioception, balance and cognition  Scores < 3 2 indicate high likelihood of returning home, scores between 3 2-5 2 indicate that pt 's generally respond better to rehabilitation, scores >5 2 indicate pt 's are typically dependent with an increased risk of institutionalization  Motor deficits in arm: 0 0     Proprioception: 0 8     Balance: 0 4    Cognition (Hodkinson's Mental Test): 0 4     Total: 3 2 - indicating a generally better response to rehabilitation  Anxiety- Short Form:   Used to measure anxious s/s  For scoring purposes, scores of 25+ indicate the threshold for treatment per neurology/SLIM  Score: 23/40  Pt most often chose the response sometimes  Depression- Short Form:   Used to measure depressive s/s  For scoring purposes, scores of 25+ indicate the threshold for treatment per neurology/SLIM    Score: 28/40  Pt most often chose the responses sometimes and often

## 2018-02-13 NOTE — ANESTHESIA PREPROCEDURE EVALUATION
Review of Systems/Medical History  Patient summary reviewed  Chart reviewed      Cardiovascular  Hyperlipidemia,   Comment: SUMMARY     LEFT VENTRICLE:  Systolic function was normal  Ejection fraction was estimated to be 65 %  There were no regional wall motion abnormalities  Left ventricular diastolic function parameters were normal      RIGHT VENTRICLE:  The size was normal   Systolic function was normal      TRICUSPID VALVE:  There was mild regurgitation      HISTORY: PRIOR HISTORY: Patient has no history of cardiovascular disease      PROCEDURE: The procedure was performed at the bedside  This was a routine study  The transthoracic approach was used  The study included complete 2D imaging, M-mode, complete spectral Doppler, and color Doppler  The heart rate was 60 bpm,  at the start of the study  Images were obtained from the parasternal, apical, subcostal, and suprasternal notch acoustic windows  Image quality was adequate      LEFT VENTRICLE: Size was normal  Systolic function was normal  Ejection fraction was estimated to be 65 %  There were no regional wall motion abnormalities  Wall thickness was normal  DOPPLER: Left ventricular diastolic function parameters  were normal      RIGHT VENTRICLE: The size was normal  Systolic function was normal  Wall thickness was normal      LEFT ATRIUM: Size was normal      RIGHT ATRIUM: Size was normal      MITRAL VALVE: Valve structure was normal  There was normal leaflet separation  DOPPLER: The transmitral velocity was within the normal range  There was no evidence for stenosis  There was trace regurgitation      AORTIC VALVE: The valve was trileaflet  Leaflets exhibited normal thickness and normal cuspal separation  DOPPLER: Transaortic velocity was within the normal range  There was no evidence for stenosis  There was no regurgitation      TRICUSPID VALVE: The valve structure was normal  There was normal leaflet separation   DOPPLER: The transtricuspid velocity was within the normal range  There was no evidence for stenosis  There was mild regurgitation  Pulmonary artery  systolic pressure was within the normal range      PULMONIC VALVE: Leaflets exhibited normal thickness, no calcification, and normal cuspal separation  DOPPLER: The transpulmonic velocity was within the normal range  There was trace regurgitation      PERICARDIUM: There was no pericardial effusion  The pericardium was normal in appearance      AORTA: The root exhibited normal size      SYSTEMIC VEINS: IVC: The inferior vena cava was normal in size and course  Respirophasic changes were normal      SYSTEM MEASUREMENT TABLES     2D mode  AV Diam: 2 2 cm  LA Diam (2D): 2 3 cm  IVSd (2D): 0 81 cm  LVIDd (2D): 2 91 cm  LVIDs (2D): 2 02 cm  LVPWd (2D): 0 7 cm  RVIDd (2D): 2 73 cm   ,  Pulmonary  Smoker ,        GI/Hepatic  Negative GI/hepatic ROS          Negative  ROS        Endo/Other     GYN       Hematology  Anemia ,     Musculoskeletal       Neurology    CVA , no residual symptoms,   Comment: Acute right M1 occlusion, receiving tPA and thrombectomy  Chronic pain  Hx of cervical spine surgery  Psychology   Anxiety,              Physical Exam    Airway    Mallampati score: I  TM Distance: >3 FB  Neck ROM: limited     Dental       Cardiovascular  Rhythm: regular, Rate: normal,     Pulmonary  Pulmonary exam normal Breath sounds clear to auscultation,     Other Findings        Anesthesia Plan  ASA Score- 3     Anesthesia Type- IV sedation with anesthesia with ASA Monitors  Additional Monitors:   Airway Plan:         Plan Factors-  Patient did not smoke on day of surgery  Induction- intravenous  Postoperative Plan-     Informed Consent- Anesthetic plan and risks discussed with patient  I personally reviewed this patient with the CRNA  Discussed and agreed on the Anesthesia Plan with the CRNA  Rafi Farah

## 2018-02-13 NOTE — PLAN OF CARE
Problem: PHYSICAL THERAPY ADULT  Goal: Performs mobility at highest level of function for planned discharge setting  See evaluation for individualized goals  Treatment/Interventions: Functional transfer training, LE strengthening/ROM, Therapeutic exercise, Bed mobility, Gait training, Endurance training, Equipment eval/education  Equipment Recommended: Walker (Rolling walker)       See flowsheet documentation for full assessment, interventions and recommendations  Outcome: Progressing  Prognosis: Fair  Problem List: Decreased strength, Decreased endurance, Impaired balance, Decreased mobility, Decreased coordination, Decreased safety awareness  Assessment: pt is seen for PT treatment session and to perform outcome measures  session began with pt seated in chair  pt performed standing balance exercises w/ RW at supervision level  pt demonstrated improved safe ambulation w/ RW, having improved attention of the L side  pt performed the TUG w/ RW which took her 25 89 seconds to perform, increasing her risk of falls  session ended with pt getting back into bed  pt will continue to benefit from skilled PT to improve safe ambulation and standing and ambulation balance and coordination  Continue to recommend rehab when d/c  Recommendation: Post acute IP rehab          See flowsheet documentation for full assessment

## 2018-02-13 NOTE — RESTORATIVE TECHNICIAN NOTE
Restorative Specialist Mobility Note       Activity: Ambulate in [place], Ambulate in room, Bathroom privileges, Chair, Dangle, Stand at bedside (Educated/encouraged pt to ambulate w/assistance 3-4 x's/day  Bed alarm on   Pt callbell, phone/tray within reach )     Assistive Device: Front wheel walker          Aurelio HENDRICKS, Restorative Technician, [employer] [facility]

## 2018-02-14 ENCOUNTER — HOSPITAL ENCOUNTER (INPATIENT)
Facility: HOSPITAL | Age: 53
LOS: 4 days | Discharge: HOME | DRG: 057 | End: 2018-02-18
Attending: PHYSICAL MEDICINE & REHABILITATION | Admitting: PHYSICAL MEDICINE & REHABILITATION
Payer: COMMERCIAL

## 2018-02-14 VITALS
TEMPERATURE: 98.1 F | RESPIRATION RATE: 16 BRPM | SYSTOLIC BLOOD PRESSURE: 118 MMHG | HEART RATE: 66 BPM | WEIGHT: 125.22 LBS | OXYGEN SATURATION: 98 % | DIASTOLIC BLOOD PRESSURE: 60 MMHG | BODY MASS INDEX: 23.64 KG/M2 | HEIGHT: 61 IN

## 2018-02-14 DIAGNOSIS — F32.9 MAJOR DEPRESSION: ICD-10-CM

## 2018-02-14 DIAGNOSIS — G89.29 CHRONIC BACK PAIN: ICD-10-CM

## 2018-02-14 DIAGNOSIS — M54.9 CHRONIC BACK PAIN: ICD-10-CM

## 2018-02-14 DIAGNOSIS — I63.511 ACUTE ISCHEMIC RIGHT MCA STROKE (HCC): Primary | ICD-10-CM
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
## 2018-02-14 LAB
APTT SCREEN TO CONFIRM RATIO: 0.9 RATIO (ref 0–1.4)
CONFIRM APTT/NORMAL: 32 SEC (ref 0–55)
LA PPP-IMP: NORMAL
PROT S ACT/NOR PPP: 107 % (ref 57–157)
PROT S ACT/NOR PPP: 90 % (ref 63–140)
PROT S PPP-ACNC: 90 % (ref 60–150)
SCREEN APTT: 27.1 SEC (ref 0–51.9)
SCREEN DRVVT: 28.4 SEC (ref 0–47)
THROMBIN TIME: 21.3 SEC (ref 0–23)
TROPONIN I SERPL-MCNC: <0.02 NG/ML

## 2018-02-14 PROCEDURE — 84484 ASSAY OF TROPONIN QUANT: CPT | Performed by: INTERNAL MEDICINE

## 2018-02-14 PROCEDURE — 97110 THERAPEUTIC EXERCISES: CPT

## 2018-02-14 PROCEDURE — 99233 SBSQ HOSP IP/OBS HIGH 50: CPT | Performed by: INTERNAL MEDICINE

## 2018-02-14 PROCEDURE — 99223 1ST HOSP IP/OBS HIGH 75: CPT | Performed by: PHYSICAL MEDICINE & REHABILITATION

## 2018-02-14 PROCEDURE — 97127 HB COGNITIVE SKILLS DEVELOPMENT, EACH 15 MUNUTES: CPT

## 2018-02-14 PROCEDURE — 99238 HOSP IP/OBS DSCHRG MGMT 30/<: CPT | Performed by: INTERNAL MEDICINE

## 2018-02-14 PROCEDURE — 97530 THERAPEUTIC ACTIVITIES: CPT

## 2018-02-14 PROCEDURE — 97112 NEUROMUSCULAR REEDUCATION: CPT

## 2018-02-14 PROCEDURE — G0515 COGNITIVE SKILLS DEVELOPMENT: HCPCS

## 2018-02-14 PROCEDURE — 97535 SELF CARE MNGMENT TRAINING: CPT

## 2018-02-14 PROCEDURE — 97116 GAIT TRAINING THERAPY: CPT

## 2018-02-14 RX ORDER — ASPIRIN 81 MG/1
81 TABLET, CHEWABLE ORAL DAILY
Status: CANCELLED | OUTPATIENT
Start: 2018-02-15

## 2018-02-14 RX ORDER — DOCUSATE SODIUM 100 MG/1
100 CAPSULE, LIQUID FILLED ORAL 2 TIMES DAILY PRN
Status: CANCELLED | OUTPATIENT
Start: 2018-02-14

## 2018-02-14 RX ORDER — ATORVASTATIN CALCIUM 80 MG/1
80 TABLET, FILM COATED ORAL EVERY EVENING
Status: DISCONTINUED | OUTPATIENT
Start: 2018-02-14 | End: 2018-02-15

## 2018-02-14 RX ORDER — POLYETHYLENE GLYCOL 3350 17 G/17G
17 POWDER, FOR SOLUTION ORAL DAILY PRN
Status: DISCONTINUED | OUTPATIENT
Start: 2018-02-14 | End: 2018-02-18 | Stop reason: HOSPADM

## 2018-02-14 RX ORDER — SENNOSIDES 8.6 MG
1 TABLET ORAL
Status: DISCONTINUED | OUTPATIENT
Start: 2018-02-14 | End: 2018-02-18 | Stop reason: HOSPADM

## 2018-02-14 RX ORDER — ONDANSETRON 2 MG/ML
4 INJECTION INTRAMUSCULAR; INTRAVENOUS EVERY 6 HOURS PRN
Status: DISCONTINUED | OUTPATIENT
Start: 2018-02-14 | End: 2018-02-14

## 2018-02-14 RX ORDER — ATORVASTATIN CALCIUM 80 MG/1
80 TABLET, FILM COATED ORAL EVERY EVENING
Status: CANCELLED | OUTPATIENT
Start: 2018-02-14

## 2018-02-14 RX ORDER — PREGABALIN 25 MG/1
25 CAPSULE ORAL 2 TIMES DAILY
Status: DISCONTINUED | OUTPATIENT
Start: 2018-02-14 | End: 2018-02-18 | Stop reason: HOSPADM

## 2018-02-14 RX ORDER — CHLORAL HYDRATE 500 MG
1000 CAPSULE ORAL DAILY
Status: DISCONTINUED | OUTPATIENT
Start: 2018-02-15 | End: 2018-02-18 | Stop reason: HOSPADM

## 2018-02-14 RX ORDER — MIRTAZAPINE 15 MG/1
15 TABLET, FILM COATED ORAL
Status: DISCONTINUED | OUTPATIENT
Start: 2018-02-14 | End: 2018-02-18 | Stop reason: HOSPADM

## 2018-02-14 RX ORDER — DOCUSATE SODIUM 100 MG/1
100 CAPSULE, LIQUID FILLED ORAL 2 TIMES DAILY PRN
Status: DISCONTINUED | OUTPATIENT
Start: 2018-02-14 | End: 2018-02-18 | Stop reason: HOSPADM

## 2018-02-14 RX ORDER — LACTULOSE 20 G/30ML
20 SOLUTION ORAL ONCE
Status: COMPLETED | OUTPATIENT
Start: 2018-02-14 | End: 2018-02-14

## 2018-02-14 RX ORDER — ACETAMINOPHEN 325 MG/1
650 TABLET ORAL EVERY 6 HOURS PRN
Status: DISCONTINUED | OUTPATIENT
Start: 2018-02-14 | End: 2018-02-18 | Stop reason: HOSPADM

## 2018-02-14 RX ORDER — ONDANSETRON 4 MG/1
4 TABLET, ORALLY DISINTEGRATING ORAL EVERY 6 HOURS PRN
Status: DISCONTINUED | OUTPATIENT
Start: 2018-02-14 | End: 2018-02-18 | Stop reason: HOSPADM

## 2018-02-14 RX ORDER — MIRTAZAPINE 15 MG/1
15 TABLET, FILM COATED ORAL
Status: CANCELLED | OUTPATIENT
Start: 2018-02-14

## 2018-02-14 RX ORDER — ONDANSETRON 2 MG/ML
4 INJECTION INTRAMUSCULAR; INTRAVENOUS EVERY 6 HOURS PRN
Status: CANCELLED | OUTPATIENT
Start: 2018-02-14

## 2018-02-14 RX ORDER — BISACODYL 10 MG
10 SUPPOSITORY, RECTAL RECTAL DAILY PRN
Status: DISCONTINUED | OUTPATIENT
Start: 2018-02-14 | End: 2018-02-18 | Stop reason: HOSPADM

## 2018-02-14 RX ORDER — CHLORAL HYDRATE 500 MG
1000 CAPSULE ORAL DAILY
Status: CANCELLED | OUTPATIENT
Start: 2018-02-14

## 2018-02-14 RX ORDER — ACETAMINOPHEN 325 MG/1
650 TABLET ORAL EVERY 6 HOURS PRN
Status: CANCELLED | OUTPATIENT
Start: 2018-02-14

## 2018-02-14 RX ORDER — SENNOSIDES 8.6 MG
1 TABLET ORAL
Status: CANCELLED | OUTPATIENT
Start: 2018-02-14

## 2018-02-14 RX ORDER — ASPIRIN 81 MG/1
81 TABLET, CHEWABLE ORAL DAILY
Status: DISCONTINUED | OUTPATIENT
Start: 2018-02-15 | End: 2018-02-18 | Stop reason: HOSPADM

## 2018-02-14 RX ORDER — PREGABALIN 25 MG/1
25 CAPSULE ORAL 2 TIMES DAILY
Status: CANCELLED | OUTPATIENT
Start: 2018-02-14

## 2018-02-14 RX ADMIN — HEPARIN SODIUM 5000 UNITS: 5000 INJECTION, SOLUTION INTRAVENOUS; SUBCUTANEOUS at 13:40

## 2018-02-14 RX ADMIN — ASPIRIN 81 MG 81 MG: 81 TABLET ORAL at 08:46

## 2018-02-14 RX ADMIN — LACTULOSE 20 G: 20 SOLUTION ORAL at 17:32

## 2018-02-14 RX ADMIN — HEPARIN SODIUM 5000 UNITS: 5000 INJECTION, SOLUTION INTRAVENOUS; SUBCUTANEOUS at 05:23

## 2018-02-14 RX ADMIN — MIDODRINE HYDROCHLORIDE 2.5 MG: 5 TABLET ORAL at 05:23

## 2018-02-14 RX ADMIN — PREGABALIN 25 MG: 25 CAPSULE ORAL at 08:46

## 2018-02-14 RX ADMIN — MIRTAZAPINE 15 MG: 15 TABLET, FILM COATED ORAL at 21:56

## 2018-02-14 RX ADMIN — BACITRACIN ZINC 1 SMALL APPLICATION: 500 OINTMENT TOPICAL at 08:46

## 2018-02-14 RX ADMIN — PREGABALIN 25 MG: 25 CAPSULE ORAL at 17:32

## 2018-02-14 NOTE — SOCIAL WORK
Informed that pt was medically stable for discharge to [facility] today  CM spoke with Jodi Randle [facility] liaison, to inform of same  She stated that they received insurance auth for her to come today  She stated that she will go to room 966 with a 3:30 pm   Pt, pt's , and pt's bedside RN Marlene Guzmán made aware of same

## 2018-02-14 NOTE — H&P
PHYSICAL MEDICINE AND REHABILITATION H&P/ADMISSION NOTE  Davis Carrel 47 y o  female MRN: [id number]  Unit/Bed#: -01 Encounter: [id number]     Rehab Diagnosis: Stroke:  01 1  Left Body Involvement (Right Brain)    History of Present Illness:   Davis Carrel is a 47 y o  female who presented to the [address] [place], [place] [address] with left sided weakness  Subsequently found to have a right basal ganglia, right insular region, and right posterior parietal region infarction  CTA was performed which demonstrated a right M1 occlusion, for which she underwent an endovascular thrombectomy  MRI confirmed multiple infarcts in the right cerebral hemisphere in the distribution of the right MCA, as well as deep infarctions in the basal ganglia and cortical infarctions in the frontal and parietal regions; there was also noted to be a petechial hemorrhage in the infarction noted in the right lentiform nucleus  TTE demonstrated an EF of 65% with no regional wall motion abnormalities  Patient had a loop recorder placed on 2/13/18  She was accepted to the [facility] on 2/14/18  Of note patient endorsed suicidal idealization early in her hospital course, for which she was started on 1:1; eventually this was discontinued  Patient was seen and examined at bedside  She denies any complaints  Reports improvement with therapies on acute service  Excited to start aggressive inpatient rehabilitation  Restrictions include: Fall precautions     Last Weight:    Wt Readings from Last 1 Encounters:   02/14/18 55 5 kg (122 lb 5 7 oz)     Last BMI:  Estimated body mass index is 23 12 kg/m² as calculated from the following:    Height as of this encounter: 5' 1" (1 549 m)  Weight as of this encounter: 55 5 kg (122 lb 5 7 oz)      Functional History:  Prior to Admission:      Functional Status: Patient is independent with mobility/ambulation, transfers, ADL's, IADL's      Present:  PT:re-eval pending  OT:re-eval pending  SLP:re-eval pending    Past Medical History:   Past Surgical History:   Family History:   Social history:   Past Medical History:   Diagnosis Date    Anxiety     Chronic pain     Lumbar stenosis     Past Surgical History:   Procedure Laterality Date    CERVICAL SPINE SURGERY       Family History   Problem Relation Age of Onset    Stroke Mother      46s    Pulmonary embolism Other      In mid 35s    Stroke Sister      46s    Stroke Brother      46s      Social History     Social History    Marital status: /Civil Union     Spouse name: N/A    Number of children: N/A    Years of education: N/A     Social History Main Topics    Smoking status: Current Every Day Smoker     Packs/day: 0 50     Years: 40 00     Types: Cigarettes    Smokeless tobacco: Never Used    Alcohol use No    Drug use: No    Sexual activity: Not on file     Other Topics Concern    Not on file     Social History Narrative    No narrative on file          Medications:    Current Facility-Administered Medications:     acetaminophen (TYLENOL) tablet 650 mg, 650 mg, Oral, Q6H PRN, Dc Christopher MD    [START ON 2/15/2018] aspirin chewable tablet 81 mg, 81 mg, Oral, Daily, Driss Rodriguez MD    atorvastatin (LIPITOR) tablet 80 mg, 80 mg, Oral, QPM, Driss Rodriguez MD    bisacodyl (DULCOLAX) rectal suppository 10 mg, 10 mg, Rectal, Daily PRN, Dc Christopher MD    docusate sodium (COLACE) capsule 100 mg, 100 mg, Oral, BID PRN, Dc Christopher MD    [START ON 2/15/2018] fish oil capsule 1,000 mg, 1,000 mg, Oral, Daily, Driss Rodriguez MD    mirtazapine (REMERON) tablet 15 mg, 15 mg, Oral, HS, Driss Rodriguez MD    ondansetron (ZOFRAN-ODT) dispersible tablet 4 mg, 4 mg, Oral, Q6H PRN, Driss Rodriguez MD    polyethylene glycol (MIRALAX) packet 17 g, 17 g, Oral, Daily PRN, Driss Rodriguez MD    pregabalin (LYRICA) capsule 25 mg, 25 mg, Oral, BID, Driss Rodriguez MD    senna (SENOKOT) tablet 8 6 mg, 1 tablet, Oral, HS RADHAN, Casandra Lilly MD    Allergies: Allergies   Allergen Reactions    Blue Dyes (Parenteral) Anaphylaxis        Marifer Alcocer lives with their family  She lives in [facility]) single family home  The living area: can live on one level  Equipment in home: None  There 1 step to enter the home  Patient/family's goals: Return to previous home/apartment  The patient will have 24 hour supervision/physical assistance available upon discharge      Review of Systems: A 10-point review of systems was performed  Negative except as listed above  Physical Exam:    Temp:  [97 6 °F (36 4 °C)-98 7 °F (37 1 °C)] 97 7 °F (36 5 °C)  HR:  [62-73] 73  Resp:  [16-18] 18  BP: ()/(42-60) 99/59 No intake or output data in the 24 hours ending 02/14/18 1644 Estimated body mass index is 23 12 kg/m² as calculated from the following:    Height as of this encounter: 5' 1" (1 549 m)  Weight as of this encounter: 55 5 kg (122 lb 5 7 oz)       General: alert, no apparent distress, cooperative and comfortable  Head: Normal, normocephalic, atraumatic  Eye: Normal external eye, conjunctiva, lids   Ears: Normal external ears  Nose: Normal external nose, mucus membranes  Pharynx: Dental Hygiene adequate  Normal buccal mucosa  Normal pharynx  Pulmonary: no retractions, no abnormal respiratory pattern, chest expansion normal  Abdomen: soft, nontender, nondistended, no masses or organomegaly  Skin/Extremity: no rashes, no erythema, no peripheral edema  Neurologic: continues to have left facial droop, left UE dysmetria, mild left sided neglect  Comprehension and repetition are otherwise intact   Following 2 step commands appropriately  Psych: Appropriate affect, alert and oriented to person, place and time   Musculoskeletal - Strength:   Right  Left  Site  Right  Left  Site    5 4  S Ab: Shoulder Abductors  5  4  HF: Hip Flexors    5 4  EF: Elbow Flexors  5  5 KF: Knee Flexors    5  4  EE: Elbow Extensors  5  5  KE: Knee Extensors    5  5  WE: Wrist Extensors  5  5  DR: Dorsi Flexors    5  5  FF: Finger Flexors  5  5  PF: Plantar Flexors    5  5  HI: Hand Intrinsics  5  5  EHL: Extensor Hallucis Longus     Laboratory:      Lab Results   Component Value Date    HGB 11 5 02/13/2018    HCT 34 3 (L) 02/13/2018    WBC 8 41 02/13/2018     Lab Results   Component Value Date    BUN 16 02/13/2018     02/13/2018    K 3 8 02/13/2018     02/13/2018    GLUCOSE 91 02/13/2018    GLUCOSE 87 02/09/2018    CREATININE 0 86 02/13/2018     Lab Results   Component Value Date    PROTIME 12 3 02/09/2018    INR 0 91 02/09/2018        Imaging: reviewed     Medical Diagnoses:   Patient Active Problem List   Diagnosis    Acute ischemic right MCA stroke ([facility] [address] )    Fall    Forehead contusion    Left elbow contusion        Rehabilitation Prognosis: good     Tolerance for three hours of therapy a day: good      1    Current Medical Problems/Risks/Management:    # Stroke: right MCA infarct  - Acute comprehensive interdisciplinary inpatient rehabilitation including PT, OT, SLP, RN, CM, SW, dietary, psychology, etc   - Appreciate Internal Medicine following - Dr Zhane Malave service  - S/p thrombectomy   - continue ASA 81mg   - Continue atorvastatin, will monitor CPK levels during [facility] admission (patient has a history of elevated CPK as outpatient)  - s/p loop recorder placed, f/u with device clinic as outpatient     # Hypotension/bradycardia  - Patient was weaned off midodrine on acute service  - continue to monitor closely    Temp:  [97 6 °F (36 4 °C)-98 7 °F (37 1 °C)] 97 7 °F (36 5 °C)  HR:  [62-73] 73  Resp:  [16-18] 18  BP: ()/(42-60) 99/59    # Leukocytosis (improved)  - VS stable, no fever noted  - continue to monitor    Results from last 7 days  Lab Units 02/13/18  0444 02/11/18  0444 02/10/18  0433   WBC [place]/uL 8 41 7 52 10 85*     # Plantar fasciitis  - patient reports ordering orthotics, family to bring in once delivered to home  - continue with regular stretching activities in therapy    # History of ACDF in 2013  -  MRI c-spine in January 2017 demonstrates spondolytic changes of c-spine as well as stable alignment after ACDF from C5-C7  - patient was prescribed oxycodone and lyrica by PCP at last visit (december 2017)    # Pain  - Continue tylenol PRN, for max of 3gm daily  - Continue lyrica  - hold off on starting oxycodone at this time (patient has not needed since 2/09)    # Rehab Psych  - Continue mirtazipine  - Neuropsych consult, appreciate recs    # FENA/prophy  - Diet: dysphagia diet  SLP and nutrition to monitor and adjust as necessary   - DVT prophy: Sequential compression device (Venodyne)  and Heparin  - GI ppx: None  - Bowel: colace , dulcolax suppository  and miralax PRN  - Nausea: Zofran PRn  - Supplements: MVI  - Sleep: None     CODE: Level 3: DNAR and DNI    2  Bladder and Bowel Problems/Risks/Management:  Monitor bowel/bladder function  3  Nutrition Problems/Risks/Managment:  Appetite: fair  Current Diet: dysphagia diet  4  Psychological/social/spiritual/vocational  referral to Rehabilitation Psychology  5  Family/Patient Goals:  Patient/family's goals: Return to previous home/apartment  Patient will receive PT, OT and ST 60 minutes each per day, five days per week to achieve rehab goals  6   Mobility Goals:   Bed Mobility: Moderate [place]  Bed to wheelchair transfer: Moderate [place]  Sit to stand: Moderate [place]  Ambulation: Moderate [place]  Stairs: Moderate [place]  7  Activities of Daily Living (ADLs) Goals:  Eating: Moderate [place]  Hygiene and Grooming: Moderate [place]  Bathing: Moderate [place]  Upper Extremity Dressing: Moderate [place]  Diet / Swallowing: Moderate [place]  Lower Extremity Dressing: Moderate [place]  Tub/shower Transfers: Moderate [place]  Toilet Transfers: Moderate [place]  Toileting / Hygiene:   Moderate [place]  8  Cognition / Communication:  SLP to evaluate and treat  9  Discharge Planning:  Rehabilitation and discharge goals discussed with the patient and/or family  Case Managment and Social Work to review patient/family resources and to coordinate Discharge Planning  Estimated length of stay: 7 to 10 days    Patient and Family Education and Training:  Rehabilitation and discharge goals discussed with the patient and/or family  Patient/family education/training needs to be discussed in weekly team meeting  Changes Since Pre-admission Assessment: None -This patient's participation in rehab continues to be reasonable, necessary and appropriate  CMS Required Post-Admission Physician Evaluation Elements  History and Physical, including medical history, functional history and active comorbidities as in above text      PostAdmission Physician Evaluation:  The patient has the potential to make improvement and is in need of physical And, occupational Therapy services  The patient may also need nutritional services  Given the patient's complex medical condition and risk of further medical complications, rehabilitative services cannot be safely provided at a lower level of care, such as a skilled nursing facility  I have reviewed the patient's functional and medical status at the time of the preadmission screening and they are the same as on the day of this admission  I acknowledge that I have personally performed a full physical examination on this patient within 24 hours of admission  The patient demonstrated understanding the rehabilitation program and the discharge process after we discussed them      Agree in entirety: yes  Minor adaptions: none    Major changes: none     Melchor Tenorio MD MPH  Physical Medicine and Rehabilitation    ** Please Note: Fluency Direct voice to text software may have been used in the creation of this document   **

## 2018-02-14 NOTE — OCCUPATIONAL THERAPY NOTE
Occupational Therapy Treatment Note         02/14/18 1125   Restrictions/Precautions   Weight Bearing Precautions Per Order No   Other Precautions Telemetry; Fall Risk;Bed Alarm; Chair Alarm   Pain Assessment   Pain Assessment No/denies pain   Pain Score No Pain   ADL   Where Assessed Standing at sink  (eob cog assessment)   Eating Assistance 7  Independent   Grooming Assistance 5  Supervision/Setup   Grooming Deficit Supervision/safety; Impulsive; Increased time to complete   Grooming Comments Pt  in stance at sink ~15 minutes grooming, decreased safety awareness   UB Bathing Assistance 4  Minimal Assistance   LB Bathing Assistance 3  Moderate Assistance   LB Bathing Deficit Supervision/safety; Impulsive; Increased time to complete;Steadying   UB Dressing Assistance 5  Supervision/Setup   LB Dressing Assistance 5  Supervision/Setup   LB Dressing Deficit Impulsive;Supervision/safety; Increased time to complete   LB Dressing Comments pt poor safety awareness   Toileting Assistance  4  Minimal Assistance   Functional Standing Tolerance   Time ~15 minutes   Activity in stance at sink-grooming   Comments fair activity tolerance, poor safety awareness   Bed Mobility   Rolling R 5  Supervision   Rolling L 5  Supervision   Supine to Sit 5  Supervision   Sit to Supine 5  Supervision   Additional Comments pt in bed with alarm set   Transfers   Sit to Stand 5  Supervision   Additional items Increased time required   Stand to Sit 5  Supervision   Additional items Increased time required   Cognition   Overall Cognitive Status [facility]   Arousal/Participation Alert   Attention Attends with cues to redirect   Orientation Level Oriented X4   Memory Within functional limits   Following Commands Follows multistep commands with increased time or repetition   Comments pt flat and blunted, required multiple vc to attend to tasks   Cognition Assessment Tools MOCA   Score 26  (26/30)   Activity Tolerance   Activity Tolerance Patient tolerated treatment well   Medical Staff Made Aware Ruth Ramírez   Assessment   Assessment Pt  Participated in occupational therapy session with focus on  Activity tolerance,Cognitive assessment, grooming/ ADLS  Pt  Cleared by Ruth/ Desiree for participation in occupational therapy session  Pt  Identifiers confirmed  Pt  Received supine in bed  Pt required mod vc to attend to therapy session and not be on the phone while doing so  Pt  Presents with flat/blunted affect  Pt displays decreased safety awareness : walking away from walker and putting RLE up on walker bar while brushing teeth  Pt required mod vc to attend safely to task with proper body mechanics  Pt  Don/doff socks and pants with supervision 2* decreased safety awareness  Pt  In stance ~15 min at sink for ADLS, pt supervision 2* impulsive behavior and decreased balance  Pt  Participated in Tarrs Cognitive Assessment [facility]) with a score of 26/30- normal cognitive range- see full results below  Pt  Required multiple vc to sit up and attend to task during assessment  Pt  Returned to supine and bed alarm on, call bell within reach  Patient will benefit from further rehab for achieving optimal performance with all functional tasks with safety awareness  Plan   Treatment Interventions ADL retraining;Cognitive reorientation; Activityengagement; Compensatory technique education; Functional transfer training;Visual perceptual retraining;Continued evaluation   Goal Expiration Date 02/22/18   Treatment Day 3   OT Frequency 3-5x/wk   Barthel Index   Feeding 10   Bathing 0   Grooming Score 5   Dressing Score 5   Bladder Score 10   Bowels Score 10   Toilet Use Score 5   Transfers (Bed/Chair) Score 10   Mobility (Level Surface) Score 10   Stairs Score 0   Barthel Index Score 65   Modified [place] Scale   Modified [place] Scale 4     Pt completed EVAN COGNITIVE ASSESSMENT (MOCA)  Pt  scored  26 /30 indicating normal range cognition for pt age/education    Pt scores the following in these cognitive areas:    Visuospatial/Executive:   2 /5    Naming: 3 /3    Attention:   6/6    Language:  3 /3    Abstraction: 2 /2    Delayed Recall: 3/5    Orientation:  6 /6    High School Education:  +1    TOTAL   26 /30          IVONNE Thompson student

## 2018-02-14 NOTE — PLAN OF CARE
Problem: PHYSICAL THERAPY ADULT  Goal: Performs mobility at highest level of function for planned discharge setting  See evaluation for individualized goals  Treatment/Interventions: Functional transfer training, LE strengthening/ROM, Therapeutic exercise, Bed mobility, Gait training, Endurance training, Equipment eval/education  Equipment Recommended: Walker (Rolling walker)       See flowsheet documentation for full assessment, interventions and recommendations  Outcome: Progressing  Prognosis: Good  Problem List: Decreased strength, Decreased endurance, Impaired balance, Decreased cognition, Decreased safety awareness  Assessment: Pt is progressing well with functional mobility  Cues required for safety with rolling walker due to impulsivity  Mild loss of balance however pt was able to self correct with contact guard assist   Educated on importance of safety with use of rolling walker as well  Trialed ambulating without device  Pt required min assist to contact guard due to safety  Pt would benefit from continued physical therapy to maximize functional mobility  Recommendation: Post acute IP rehab          See flowsheet documentation for full assessment

## 2018-02-14 NOTE — RESTORATIVE TECHNICIAN NOTE
Restorative Specialist Mobility Note       Activity: Ambulate in [place], Ambulate in room, Bathroom privileges, Chair, Dangle, Stand at bedside (Educated/encouraged pt to ambulate w/assistance 3-4 x's/day  Bed alarm on  Pt callbell, phone/tray within reach )     Assistive Device: Front wheel walker                       Anti-Embolism Device On:  Bilateral, Sequential compression devices, below knee     Taylor HENDRICKS, Restorative Technician, [facility]

## 2018-02-14 NOTE — PLAN OF CARE
Activity Intolerance/Impaired Mobility     Mobility/activity is maintained at optimum level for patient Adequate for Discharge        Communication Impairment     Ability to express needs and understand communication Adequate for Discharge        DISCHARGE PLANNING     Discharge to home or other facility with appropriate resources Adequate for Discharge        DISCHARGE PLANNING - CARE MANAGEMENT     Discharge to post-acute care or home with appropriate resources Adequate for Discharge        INFECTION - ADULT     Absence or prevention of progression during hospitalization Adequate for Discharge     Absence of fever/infection during neutropenic period Adequate for Discharge        Knowledge Deficit     Patient/family/caregiver demonstrates understanding of disease process, treatment plan, medications, and discharge instructions Adequate for Discharge        MUSCULOSKELETAL - ADULT     Maintain or return mobility to safest level of function Adequate for Discharge        Neurological Deficit     Neurological status is stable or improving Adequate for Discharge        NEUROSENSORY - ADULT     Achieves stable or improved neurological status Adequate for Discharge     Achieves maximal functionality and self care Adequate for Discharge        Nutrition     Nutrition/Hydration status is improving Adequate for Discharge        Nutrition/Hydration-ADULT     Nutrient/Hydration intake appropriate for improving, restoring or maintaining nutritional needs Adequate for Discharge        PAIN - ADULT     Verbalizes/displays adequate comfort level or baseline comfort level Adequate for Discharge        Potential for Aspiration     Non-ventilated patient's risk of aspiration is minimized Adequate for Discharge     Ventilated patient's risk of aspiration is minimized Adequate for Discharge        Potential for Falls     Patient will remain free of falls Adequate for Discharge        Prexisting or High Potential for Compromised Skin Integrity     Skin integrity is maintained or improved Adequate for Discharge        SAFETY ADULT     Maintain or return to baseline ADL function Adequate for Discharge     Maintain or return mobility status to optimal level Adequate for Discharge

## 2018-02-14 NOTE — PHYSICAL THERAPY NOTE
Physical Therapy Progress Note     02/14/18 1135   Pain Assessment   Pain Assessment No/denies pain   Pain Score No Pain   Restrictions/Precautions   Weight Bearing Precautions Per Order No   Other Precautions Bed Alarm; Fall Risk   General   Chart Reviewed Yes   Family/Caregiver Present No   Bed Mobility   Rolling R 5  Supervision   Additional items Assist x 1   Rolling L 5  Supervision   Additional items Assist x 1   Supine to Sit 5  Supervision   Additional items Assist x 1   Sit to Supine 5  Supervision   Additional items Assist x 1   Transfers   Sit to Stand 5  Supervision   Additional items Assist x 1;Verbal cues   Stand to Sit 5  Supervision   Additional items Assist x 1;Verbal cues   Ambulation/Elevation   Gait pattern Short stride;Decreased foot clearance   Gait Assistance 5  Supervision   Additional items Assist x 1;Verbal cues   Assistive Device Rolling walker   Distance 200 feet   Balance   Dynamic Standing Fair -   Ambulatory Fair -   Endurance Deficit   Endurance Deficit Yes   Endurance Deficit Description fatigue   Activity Tolerance   Activity Tolerance Patient limited by fatigue   Nurse Made Aware Desiree ROBERTS aware   Exercises   THR Supine;Sitting;20 reps;AROM; Bilateral   Balance training  sidestepping at handrail and ambulation without device 90 feet   Assessment   Prognosis Good   Problem List Decreased strength;Decreased endurance; Impaired balance;Decreased cognition;Decreased safety awareness   Assessment Pt is progressing well with functional mobility  Cues required for safety with rolling walker due to impulsivity  Mild loss of balance however pt was able to self correct with contact guard assist   Educated on importance of safety with use of rolling walker as well  Trialed ambulating without device  Pt required min assist to contact guard due to safety  Pt would benefit from continued physical therapy to maximize functional mobility     Goals   Patient Goals none stated today   STG Expiration Date 02/26/18   Treatment Day 2   Plan   Treatment/Interventions Functional transfer training;LE strengthening/ROM; Therapeutic exercise; Endurance training;Patient/family training;Bed mobility;Gait training   Progress Progressing toward goals   PT Frequency 5x/wk   Recommendation   Recommendation Post acute IP rehab   Equipment Recommended Ray Eddy, PTA

## 2018-02-14 NOTE — PROGRESS NOTES
Consultation - Karma Ibarra 47 y o  female MRN: [id number]    Unit/Bed#: -57 Encounter: [id number]        History of Present Illness     HPI: Karma Ibarra is a 47y o  year old female with a history of HLD, anemia, ACDF and nicotine abuse who presented to the ER after falling at home  According to the , the patient was outside smoking a cigarette when he hard her fall  She was confused when he got to her and was brought to the ER  She had left sided weakness, dysarthria, right gaze preference and left facial droop  CTH was suspect for evolving right MCA territory infarction  CTA head/neck showed occlusion of the M1 segment of the right middle cerebral artery with an evolving right MCA territory infarction  TPA was given  She then went to IR for a right MCA thrombectomy (done 2/9/18)  MRI showed multiple infarcts in the right cerebral hemisphere in the distribution of the right MCA; deep infarct involving the basal ganglia with cortical infarcts involving the frontal and parietal region; petechial hemorrhage in the infarct in the right lentiform nucleus; reconstitution of the flow void in the right MCA post embolectomy; no intraparenchymal hematoma seen  There was no midline shift seen  Repeat CTH on 2/10/18 showed no intraparenchymal hematoma; evolving infarct right basal ganglia, right insular region, right posterior parietal region and no hematoma seen corresponding to the petechial hemorrhage detected on the recent to MRI in the right basal ganglionic region  She was then placed on ASA along with high dose statin  She had some bradycardia which resolved  Cardiology saw her in consult and performed a JONATAN that showed no DENISE thrombus/PFO/ASD  There was some nodular calcium noted in the ascending aorta  Cardiology felt there was no strong indication for anticoagulation  During her stay she required Levophed and then was placed on Midodrine    Eventually, her Midodrine was weaned off and her last dose was early this AM   Her sister mentioned that she had an episode of LLE weakness at work 1-2 weeks prior to admission presumed to be a TIA  Additionally, it was found that she has a strong family history of strokes including her mother at a young age and that her daughter had a PE  A hypercoagulability panel was sent  During her stay, she made comments about suicide and was placed on a 1:1 watch  Psychiatry saw her and placed her on Remeron 15 mg qhs  A LOOP was implanted 2/13/18  Currently, patient has no CP, SOB, dizziness, N/V/D     ROS:  As in HPI, otherwise negative 12 point ROS        Historical Information   Past Medical History:   Diagnosis Date    Anxiety     Chronic pain     Lumbar stenosis      Past Surgical History:   Procedure Laterality Date    CERVICAL SPINE SURGERY       Social History   History   Alcohol Use No     History   Drug Use No     History   Smoking Status    Current Every Day Smoker    Packs/day: 0 50    Years: 40 00    Types: Cigarettes   Smokeless Tobacco    Never Used     Family History   Problem Relation Age of Onset    Stroke Mother      46s    Pulmonary embolism Other      In mid 35s    Stroke Sister      46s    Stroke Brother      46s       Meds/Allergies   current meds:  No current facility-administered medications for this encounter  PTA meds:   Prescriptions Prior to Admission   Medication    diazepam (VALIUM) 5 mg tablet    meloxicam (MOBIC) 15 mg tablet    Omega-3 Fatty Acids (FISH OIL) 1200 MG CAPS    oxaprozin (DAYPRO) 600 MG tablet    OxyCODONE HCl 5 MG TABA    pregabalin (LYRICA) 50 mg capsule     Allergies   Allergen Reactions    Blue Dyes (Parenteral) Anaphylaxis       Objective   Vitals: Blood pressure 99/59, pulse 73, temperature 97 7 °F (36 5 °C), temperature source Tympanic, resp  rate 18, height 5' 1" (1 549 m), weight 55 5 kg (122 lb 5 7 oz), SpO2 95 %      Physical Exam     Constitutional:  NAD; pleasant; nontoxic  HEENT:  AT/NC; oropharynx negative for thrush; ecchymosis left forehead  Neck: negative for JVD  CV:  +S1, S2;  RRR; no rub/murmur;  LOOP site dressing is dry w/o drainage present  Pulmonary:  BBS without crackles/wheeze/rhonci; resp are unlabored  Abdominal:  soft, +BS, ND/NT; no mass  Musculoskeletal:  no edema of LE   :  no case  Skin:  no rashes  Neuro AAO; ZUNIGA 5/5; face is symmetric/tongue midline; speech clear; very flat affect      Lab Results:   Results from last 7 days  Lab Units 02/13/18  0444 02/11/18  0444   WBC Thousand/uL 8 41 7 52   HEMOGLOBIN g/dL 11 5 11 2*   HEMATOCRIT % 34 3* 33 4*   PLATELETS Thousands/uL 265 194       Results from last 7 days  Lab Units 02/13/18  0444 02/11/18  0444   SODIUM mmol/L 142 143   POTASSIUM mmol/L 3 8 3 9   CHLORIDE mmol/L 106 110*   CO2 mmol/L 27 27   BUN mg/dL 16 11   CREATININE mg/dL 0 86 0 81   GLUCOSE RANDOM mg/dL 91 111   CALCIUM mg/dL 9 0 8 6       Results from last 7 days  Lab Units 02/09/18  2030   HEMOGLOBIN A1C % 5 5       Results from last 7 days  Lab Units 02/09/18  0733   INR  0 91       Glucose (mg/dL)   Date Value   02/13/2018 91   02/11/2018 111   02/10/2018 114   02/09/2018 87     Glucose, i-STAT (mg/dl)   Date Value   02/09/2018 87   02/09/2018 88       Labs reviewed    Imaging: reviewed  EKG, Pathology, and Other Studies: I have personally reviewed pertinent reports  VTE Prophylaxis: Heparin    Code Status: Prior       Assessment/Plan     1  Right MCA CVA, suspect embolic, involving right basal ganglia/right insular region/right posterior parietal region:  Continue ASA 81mg qd, Lipitor 80mg qd  Follow up hypercoagulability panel  2   LOOP implant (2/13/18): Watch site    3  Hypotension:  Was on Midodrine 10 mg TID which was weaned to off; LD was this AM   Will watch  She says chronically runs low BP as an OP    4  Depression:  Continue Remeron 15 mg qhs which will be new for her this hospital stay    5    Chronic pain with hx ACDF:  Has chr pain down arms R>L  Continue Lyrica 25 mg BID    6  Tobacco abuse:  Counseled    7  Bradycardia:  Resolved; will watch        Counseling / Coordination of Care  Total floor / unit time spent today 60 minutes  Greater than 50% of total time was spent with the patient and / or family counseling and / or coordination of care        ROSEY Granado

## 2018-02-14 NOTE — PROGRESS NOTES
IM Residency Progress Note   Unit/Bed#: [facility] 705-01 Encounter: [id number]  SOD Team C       Karel Sessions 47 y o  female [facility] Stay Days: 5      Assessment/Plan:    Principal Problem:    Acute ischemic right MCA stroke [facility])  Active Problems:    Fall    Forehead contusion    Left elbow contusion    1  Right MCA stroke status post tPA and thrombectomy  · Patient intermittently below goal  in first 72 hours post procedure  · MRI 2/10/18 showed evidence of petechial hemorrhage was delayed initiation of aspirin  · CT head preformed 2/10/18 :No intraparenchymal hematoma , evolving infarct right basal ganglia, right insular region, right posterior parietal region, and  no hematoma seen corresponding to the petechial hemorrhage detected on the recent to MRI in the right basal ganglionic region  · Echo showed EF 65% no regional wall abnormalities with a normal left atrium  · JONATAN with no significant athroma  · Continue atorvastatin 80 mg  · Q4 neuro checks   · Loop placement as atrial fibrillation with embolic stroke remains probability  · Taper off midodrine TID with goal MAP greater than 65, no strict BP goal for cerebral perfusion at this time  · Patient maintained on ASA and not anticoagulated at this time      2   Suicide ideation  · Patient was noted earlier in hospitalization to have ideation and plan  · Continue mirtazapine 15 mg p o  q h s  · Continue low dose Lyrica BID      3  Cephalalgia  · Continue Tylenol 650 mg q 6h     4  Hypotension  · Titrate off midodrine TID with goal MAP greater than 65, -160     5  Bradycardia  · Asymptomatic-continue to monitor on telemetry  Disposition: Transfer to [facility] for rehabilitation  Discontinue midodrine  Subjective:   Patient feels less tired this morning  Tolerated her procedure yesterday well  Has good appetite  No fevers or chills  No increase in weakness         Vitals: Temp (24hrs), Av 1 °F (36 7 °C), Min:97 6 °F (36 4 °C), Max:98 7 °F (37 1 °C)  Current: Temperature: 98 1 °F (36 7 °C)  Vitals:    02/13/18 1900 02/13/18 2334 02/14/18 0522 02/14/18 0736   BP: 104/53 109/50 94/55 (!) 101/42   BP Location: Right arm Right arm Right arm Right arm   Pulse: 63 69 62 66   Resp: 18 18 16 16   Temp: 97 6 °F (36 4 °C) 98 7 °F (37 1 °C)  98 1 °F (36 7 °C)   TempSrc: Oral Oral  Oral   SpO2: 100% 98%  98%   Weight:       Height:        Body mass index is 23 66 kg/m²  I/O last 24 hours: In: 524 [P O :885]  Out: 1000 [Urine:1000]      Physical Exam:   Gen: Lying in bed, NAD  HEENT: No injection, anicteric, membranes moist   CV: RRR  Pulm: CTA BL  Extr: Ecchymosis left arm  No peripheral C/C/E  Invasive Devices     Peripheral Intravenous Line            Peripheral IV 02/10/18 Left Forearm 4 days    Peripheral IV 02/11/18 Right Forearm 3 days                Labs:   No results found for this or any previous visit (from the past 24 hour(s))  Radiology Results: I have personally reviewed pertinent reports  and I have personally reviewed pertinent films in PACS  Xr Elbow 2 Vw Left    Result Date: 2/9/2018  Impression: No acute osseous abnormality  Workstation performed: [id number]     Ct Head Wo Contrast    Result Date: 2/10/2018  Impression: No intraparenchymal hematoma seen Evolving infarct right basal ganglia, right insular region, right posterior parietal region  There is no hematoma seen corresponding to the petechial hemorrhage detected on the recent to MRI in the right basal ganglionic region  Workstation performed: [id number]     Trauma - Ct Spine Cervical Wo Contrast    Result Date: 2/9/2018  Impression: Degenerative and postoperative changes  No cervical spine fracture or traumatic malalignment   I personally discussed this study with Robert Mills on 2/9/2018 at 7:47 AM   Workstation performed: [id number]     Mri Brain Wo Contrast    Result Date: 2/10/2018  Impression: Multiple infarcts in the right cerebral hemisphere in the distribution of the right MCA  Deep infarct involving the basal ganglia and cortical infarcts involving the frontal and parietal region are noted  There is a petechial hemorrhage in the infarct noted in the right lentiform nucleus Reconstitution of the flow void in the right MCA post embolectomy No intraparenchymal hematoma seen No midline shift seen  I personally discussed this study with Dr Sanjuanita Sandhoff on 2/10/2018 at 8:50 AM  Workstation performed: [id number]     Xr Chest 1 View    Result Date: 2/12/2018  Impression: No acute traumatic injury No active cardiopulmonary disease  Workstation performed: [id number]     Xr Trauma Multiple    Result Date: 2/9/2018  Impression: No acute traumatic injury No active cardiopulmonary disease  Workstation performed: [id number]     Ct Stroke Alert Brain    Result Date: 2/9/2018  Impression: 1  Findings suspicious for evolving right MCA territory infarction  Recommend follow-up CTA of the neck and brain  2   Cerebral atrophy with chronic small vessel ischemic change  I personally discussed this study with Park Lindsay on 2/9/2018 at 7:47 AM  Workstation performed: [id number]     Cta Stroke Alert (head/neck)    Result Date: 2/9/2018  Impression: 1  Occlusion of the M1 segment of the right middle cerebral artery  There is reconstitution of the M2 segments via collateral flow from the anterior and posterior circulations  2   Evolving right MCA territory infarction  No acute hemorrhage  3   Degenerative changes cervical spine     I personally discussed this study with Park Lindsay on 2/9/2018 at 7:47 AM  Workstation performed: [id number]       Active Meds:   Current Facility-Administered Medications   Medication Dose Route Frequency    acetaminophen (TYLENOL) tablet 650 mg  650 mg Oral Q6H PRN    aspirin chewable tablet 81 mg  81 mg Oral Daily    atorvastatin (LIPITOR) tablet 80 mg  80 mg Oral QPM    docusate sodium (COLACE) capsule 100 mg  100 mg Oral BID PRN    heparin (porcine) subcutaneous injection 5,000 Units  5,000 Units Subcutaneous Q8H [facility]    midodrine (PROAMATINE) tablet 2 5 mg  2 5 mg Oral TID AC    mirtazapine (REMERON) tablet 15 mg  15 mg Oral HS    ondansetron (ZOFRAN) injection 4 mg  4 mg Intravenous Q6H PRN    pregabalin (LYRICA) capsule 25 mg  25 mg Oral BID    senna (SENOKOT) tablet 8 6 mg  1 tablet Oral HS PRN         VTE Pharmacologic Prophylaxis: Heparin  VTE Mechanical Prophylaxis: sequential compression device    Amy Sanchez

## 2018-02-14 NOTE — DISCHARGE SUMMARY
IMR Discharge Summary - Medical Seymour Leroy 47 y o  female MRN: [id number]    [facility] Room / Bed: [facility] 705/[facility] 010-24 Encounter: [id number]    BRIEF OVERVIEW    Admitting Provider: Peng Anderson DO  Discharge Provider: Allie Casper MD  Primary Care Physician at Discharge: Harpreet Thomas MD    Dis[place] To:  [place] [id number]     Admission Date: 2/9/2018     Discharge Date: 2/14/2018  3:48 PM    Primary Discharge Diagnosis  Principal Problem:    Acute ischemic right MCA stroke [facility])  Active Problems:    Fall    Forehead contusion    Left elbow contusion    Chronic back pain    Major depression  Resolved Problems:    * No resolved hospital problems  *      Other Problems Addressed: Suicidal ideation  Consulting Providers   Neurosurgery - Dr Yosi Nicolas - Acute Stroke  Neurology - Dr Chantell Tate Stroke  Cardiology - Dr Marleen Boyd - Embolic Stroke   Psychiatry - Dr Justin Keith - Charley Estimable, Major Depression  Trauma - Dr Jayant Pablo - Trauma to Head      Therapeutic Operative Procedures Performed  2/10/2019 - Right MCA embolectomy  Diagnostic Procedures Performed  Procedure Component Value Units Date/Time   CT head wo contrast [[id number]] Collected: 02/10/18 1323   Order Status: Completed Updated: 02/10/18 1330   Narrative:     CT BRAIN - WITHOUT CONTRAST    INDICATION:  Status post embolectomy    COMPARISON:  None      TECHNIQUE:  CT examination of the brain was performed   In addition to axial images, coronal reformatted images were created and submitted for interpretation       Radiation dose length product (DLP) for this visit:  5024 mGy-cm    This examination, like all CT scans performed in the [facility], was performed utilizing techniques to minimize radiation dose exposure, including the use of iterative   reconstruction and automated exposure control       IMAGE QUALITY:  Diagnostic  FINDINGS:     PARENCHYMA: There is a new hypodense area in the right insular region in the region of the right frontal upper,, measuring about 1 3 x 1 2 cm   Compatible with infarct  An additional hypodense area seen in the right parietal region in image 27 of series 2 compatible with infarct  There is no intraparenchymal hematoma seen  Additional hypodense infarct seen in the right caudate nucleus, lentiform nucleus   VENTRICLES AND EXTRA-AXIAL SPACES:  Normal for patient's age  VISUALIZED ORBITS AND PARANASAL SINUSES:  Unremarkable  CALVARIUM AND EXTRACRANIAL SOFT TISSUES:   Normal    Impression:       No intraparenchymal hematoma seen    Evolving infarct right basal ganglia, right insular region, right posterior parietal region  There is no hematoma seen corresponding to the petechial hemorrhage detected on the recent to MRI in the right basal ganglionic region  Workstation performed: [id number]   MRI brain wo contrast [[id number]] Collected: 02/10/18 0819   Order Status: Completed Updated: 02/10/18 0851   Narrative:     MRI BRAIN WITHOUT CONTRAST    INDICATION:  Status posterior thrombolytic therapy    COMPARISON:   None  TECHNIQUE:  Sagittal T1, axial T2, axial FLAIR, axial T1, axial [place] and axial diffusion imaging  IMAGE QUALITY:  Diagnostic  FINDINGS:    BRAIN PARENCHYMA: There are multiple areas of foot diffusion restriction in the right cerebral hemisphere involving the caudate nucleus, lentiform nucleus   Additional cortical foci of diffusion stricture noted in the right frontal, parietal and insular   region   Petechial hemorrhage is noted in the infarct involving the right lentiform nucleus    Additional area of petechial hemorrhage seen in the right frontal region in image 55 of series 10  Mild white matter T2 hyperintensities noted in the supratentorial region   VENTRICLES:  Mild compression of the right frontal horn noted    SELLA AND PITUITARY GLAND:  Normal     ORBITS:  Normal     PARANASAL SINUSES:  Normal     VASCULATURE: Patient had a occlusion of the right MCA   There is a reconstitution of the flow void of the right MCA embolectomy/lysis     CALVARIUM AND SKULL BASE:  Normal     EXTRACRANIAL SOFT TISSUES:  Normal    Impression:       Multiple infarcts in the right cerebral hemisphere in the distribution of the right MCA   Deep infarct involving the basal ganglia and cortical infarcts involving the frontal and parietal region are noted  There is a petechial hemorrhage in the infarct noted in the right lentiform nucleus    Reconstitution of the flow void in the right MCA post embolectomy    No intraparenchymal hematoma seen    No midline shift seen         I personally discussed this study with Dr Olive Sesay on 2/10/2018 at 8:50 AM           Workstation performed: [id number]   XR elbow 2 vw left [[id number]] Collected: 18 1506   Order Status: Completed Updated: 18 150   Narrative:     LEFT ELBOW    INDICATION: Left elbow pain  COMPARISON: None    VIEWS:  2    IMAGES:  2    FINDINGS:    There is no acute fracture or dislocation  There is no joint effusion  No degenerative changes  No lytic or blastic lesions are seen  Soft tissues are unremarkable  Impression:       No acute osseous abnormality  Workstation performed: [id number]   IR cerebral embolectomy/lysis [[id number]] Resulted: 18 1409   Order Status: Completed Updated: 18 1410   Narrative:     OPERATIVE REPORT  PATIENT NAME: Sammie Cohen    :  1965  MRN: [id number]   Pt Location: Interventional radiology     SURGERY DATE: 18      Preop Diagnosis:  1  Stroke, right M1 Large Vessel Occlusion  2  Hypertension  3  Tobacco Abuse        Postop Diagnosis  1  Stroke, right M1 Large Vessel Occlusion  2  Hypertension  3   Tobacco Abuse        Procedure:  1   Right Internal Carotid Arteriogram  2   Right MCA thrombectomy with Solitiare Stent Retreiver  3   Right common carotid arteriogram  4   Limited Right Femoral Arteriogram     Surgeon:   Juan Miguel Page MD     Specimen(s):  None     Estimated Blood Loss:   None     Drains:  None     Anesthesia Type:   Monitored Anesthesia Care     Complications:  None     Operative Indications: is a very pleasant 47 y o  female who presented   with right MCA syndrome  NIHSS was 16  CTA was significant for right   M1LVO  The last known well was 2 hours prior  After speaking to his POA   the patient was brought emergently for thrombectomy  They understood the   risks bleeding, stroke, groin hematoma, and death      Procedure Details:  After obtaining written informed consent, the patient was brought into the   operating room and moved to the OR table in supine fashion  The groin was   prepped and draped in the usual sterile fashion  Monitored anesthesia care   was induced  5cc of intradermal lidocaine was infused into the right   femoral area and percutaneous access with a 5-Northern Irish micropuncture kit was   obtained into the right femoral artery  A 6-Northern Irish introducer sheath was   placed  This was then exchanged for a Neuron 088  A "[employer]enstein Selector   catheter was then advanced over the aortic arch over an [employer]  The   Neuron 088 was advanced into the left ICA       AP and lateral images of the left intracranial carotid circulation were   obtained       Next a 5Ace 060 was advanced through the [place], over a Marksman   microcatheter over a Tra[employer]cess microwire  The clot was passed, and a   microcatheter angiogram was preformed  Next a 4mm x 40mm Solitaire Stent   Retriever was deployed into the M2 to M1 segments  The 5Ace intermediate   catheter was advanced into the M1 alond the face of the clot  A Solumbra   technique was preformed and suction applied  The stent retriever and   suction was allowed to sit in place for 5 minutes  These were then removed   under fluoroscopy and under constant suction     An AP and lateral L ICA angiogram was preformed  The Neuron sheath was   then withdrawn and a limited right femoral arteriogram was run  Puncture   site was found to be compatible with a Mynx Closure device and this was   done successfully  There was appropriate hemostasis  The patient was   then awoken from his monitored anesthesia care and found to be in his   neurologic baseline  All sponge and needle counts were correct          INTERPRETATION OF ANGIOGRAPHIC FINDINGS:   1  The right internal carotid artery circulation reveals antegrade flow   into the right A1 and contralateral hemisphere with abrupt right M1   occlusion  The capillary and venous phases are unremarkable       2  Microcatheter injection reveals we are past the lesion       3  Post-thrombectomy angiogram reveals TICI 3 revascularization       4   The right common carotid artery has no hemodynamic flow limitation for   stenosis as defined by NASCET criteria       5   Limited Right femoral arteriogram reveals normal puncture site   anatomy      Impression:  TICI 3 revascularization of a right M1 occlusion       Important times: In room 0825  Puncture 0842  Past lesion 0858  Recan (TICI 3) 0904     Patient Disposition:  Critical Care Unit     Sheree Russell MD  DATE: 02/09/18     CTA stroke alert (head/neck) [[id number]] Collected: 02/09/18 0745   Order Status: Completed Updated: 02/09/18 0806   Narrative:     CTA NECK AND BRAIN WITH AND WITHOUT CONTRAST    INDICATION: Trauma   Right-sided weakness  COMPARISON:   None  TECHNIQUE:  Routine CT imaging of the Brain without contrast   Post contrast imaging was performed after administration of iodinated contrast through the neck and brain  Post contrast axial 0 625 mm images timed to opacify the arterial system       3D rendering was performed on an independent workstation    MIP reconstructions performed   Coronal reconstructions were performed of the noncontrast portion of the brain       Radiation dose length product (DLP) for this visit:  450 9 mGy-cm    This examination, like all CT scans performed in the [facility], was performed utilizing techniques to minimize radiation dose exposure, including the use of iterative   reconstruction and automated exposure control        IV Contrast:  100 mL Omnipaque 350     IMAGE QUALITY:   Diagnostic    FINDINGS:  NONCONTRAST BRAIN    PARENCHYMA:    No acute intraparenchymal hemorrhage or extra-axial fluid collections  There is subtle loss of the gray-white matter differentiation in the right temporal lobe   There is increased density of the proximal M1 segment of the right middle cerebral artery       Areas of decreased attenuation in the periventricular regions and centrum semiovale bilaterally, consistent with chronic small vessel ischemic change  VENTRICLES AND EXTRA-AXIAL SPACES:  Age-appropriate volume loss is noted   No hydrocephalus  VISUALIZED ORBITS AND PARANASAL SINUSES:  Orbits appear normal   Mild scattered sinus mucosal thickening is noted   No fluid levels are seen  CALVARIUM AND EXTRACRANIAL SOFT TISSUES:   Normal       CERVICAL VASCULATURE    AORTIC ARCH AND GREAT VESSELS:  Normal aortic arch and great vessel origins   Areas of calcified atherosclerotic plaque formation in the aortic arch with ulceration   Calcification at the origin of the left subclavian artery   No flow-limiting stenosis  RIGHT VERTEBRAL ARTERY CERVICAL SEGMENT:  Normal origin  The vessel is normal in caliber throughout the neck  LEFT VERTEBRAL ARTERY CERVICAL SEGMENT:  Normal origin  The vessel is normal in caliber throughout the neck      RIGHT EXTRACRANIAL CAROTID SEGMENT:  Normal caliber common carotid artery   Mild calcified and noncalcified atherosclerotic plaque formation in the carotid bulb, extending into the proximal internal carotid artery   Focal area of ulceration within the   plaque   No flow-limiting stenosis or dissection  LEFT EXTRACRANIAL CAROTID SEGMENT:  Normal caliber common carotid artery   Mild calcified atherosclerotic plaque formation in the carotid bulb   No flow-limiting stenosis or dissection  NASCET criteria was used to determine the degree of internal carotid artery diameter stenosis  INTRACRANIAL VASCULATURE     INTERNAL CAROTID ARTERIES:    The cervical internal carotid arteries are normal bilaterally  The petrous, cavernous and supraclinoid segments are normal   Ophthalmic artery origins are normal     ANTERIOR CIRCULATION:    The entire left anterior cerebral circulation is hypoplastic but patent  The right anterior cerebral circulation is normal     Normal anterior communicating artery  MIDDLE CEREBRAL ARTERY CIRCULATION:    There is occlusion of the M1 segment of the right middle cerebral artery, just beyond the M1 and A1 junction   There is reconstitution of the distal right middle cerebral artery branches via collateral flow from the anterior circulation as well as the   posterior circulation, along the high frontal and parietal convexities  The left middle cerebral artery circulation is normal     DISTAL VERTEBRAL ARTERIES:  Normal distal vertebral arteries   Posterior inferior cerebellar artery origins are normal  Normal vertebral basilar junction  BASILAR ARTERY:  Basilar artery is normal in caliber   Normal superior cerebellar arteries  POSTERIOR CEREBRAL ARTERIES:   The right posterior cerebral artery circulation is normal     The P1 segment of the left posterior cerebral artery is congenitally absent   There is filling of the P2 segment via a dominant left posterior communicating artery, consistent with fetal origin   This is a normal variant        Normal posterior communicating arteries  DURAL VENOUS SINUSES:  Normal       NON VASCULAR ANATOMY    BONY STRUCTURES:  Mild pansinus disease   Degenerative changes cervical spine      SOFT TISSUES OF THE NECK:  Normal     THORACIC INLET:  Emphysematous changes in the lung apices bilaterally  Impression:     1   Occlusion of the M1 segment of the right middle cerebral artery   There is reconstitution of the M2 segments via collateral flow from the anterior and posterior circulations  2   Evolving right MCA territory infarction   No acute hemorrhage  3   Degenerative changes cervical spine          I personally discussed this study with Bárbara Lee on 2/9/2018 at 7:47 AM               Workstation performed: [id number]   TRAUMA - CT spine cervical wo contrast [[id number]] Collected: 02/09/18 0744   Order Status: Completed Updated: 02/09/18 0811   Narrative:     CT CERVICAL SPINE - WITHOUT CONTRAST    INDICATION: Fall   Left-sided weakness  COMPARISON: None  TECHNIQUE:  CT examination of the cervical spine was performed without intravenous contrast   Contiguous axial images were obtained   Sagittal and coronal reconstructions were performed       Radiation dose length product (DLP) for this visit:    This examination, like all CT scans performed in the [facility], was performed utilizing techniques to minimize radiation dose exposure, including the use of iterative reconstruction   and automated exposure control       IMAGE QUALITY:  Diagnostic  FINDINGS:    ALIGNMENT: Swanson Protestant is straightening of normal cervical lordosis   No subluxation or compression deformity  VERTEBRAL BODIES:  Osseous structures demineralized   No acute fracture   Postoperative changes with anterior cervical fusion C5-C7   Plate and screws are present and in satisfactory position   Intervertebral disc space grafts are present at C5-C6 and   C6-C7   Hardware appears intact  DEGENERATIVE CHANGES:  Disc space narrowing diffusely throughout the cervical spine, most pronounced C4-C5   Small central disc herniation C3-C4   Uncinate process hypertrophy and facet hypertrophic changes throughout the cervical spine   Right-sided   neural foraminal narrowing C4-C5 and C5-C6  PREVERTEBRAL AND PARASPINAL SOFT TISSUES: Normal     THORACIC INLET:  Emphysematous changes are present in the upper lobes bilaterally  Impression:     Degenerative and postoperative changes   No cervical spine fracture or traumatic malalignment  I personally discussed this study with Elvira Sorensen on 2/9/2018 at 7:47 AM            Workstation performed: [id number]   CT stroke alert brain [[id number]] Collected: 02/09/18 0743   Order Status: Completed Updated: 02/09/18 0807   Narrative:     CT BRAIN - WITHOUT CONTRAST    INDICATION:  Fall   Left-sided weakness  COMPARISON:  None  TECHNIQUE:  CT examination of the brain was performed   In addition to axial images, coronal reformatted images were created and submitted for interpretation       Radiation dose length product (DLP) for this visit:    This examination, like all CT scans performed in the [facility], was performed utilizing techniques to minimize radiation dose exposure, including the use of iterative reconstruction   and automated exposure control       IMAGE QUALITY:  Diagnostic  FINDINGS:     PARENCHYMA:    No acute intraparenchymal hemorrhage or extra-axial fluid collections  There is subtle loss of the gray-white matter differentiation in the right temporal lobe   The proximal right MCA appears hyperdense (series 2, image 21)       Areas of decreased attenuation in the periventricular regions and centrum semiovale bilaterally  VENTRICLES AND EXTRA-AXIAL SPACES:  Age-appropriate volume loss is noted   No hydrocephalus  VISUALIZED ORBITS AND PARANASAL SINUSES:  Orbits appear normal   Mild scattered sinus mucosal thickening is noted   No fluid levels are seen  CALVARIUM AND EXTRACRANIAL SOFT TISSUES:   Normal    Impression:     1   Findings suspicious for evolving right MCA territory infarction   Recommend follow-up CTA of the neck and brain    2   Cerebral atrophy with chronic small vessel ischemic change  I personally discussed this study with Sukhjinder Stein on 2/9/2018 at 7:47 AM         Workstation performed: [id number]   XR Trauma multiple [[id number]] Collected: 02/09/18 0832   Order Status: Completed Updated: 02/09/18 0834   Narrative:     CHEST    INDICATION: 79-year-old female, fell, altered mental status  COMPARISON: None    VIEWS:  Single frontal projection    FINDINGS:  The cardiomediastinal silhouette is unremarkable  The lungs are clear  No pleural effusions  Bony thorax unremarkable  Partially visualized cervical plate and screw ACDF    Two-view examination of the chest is recommended when the patient's clinical condition permits   Impression:     No acute traumatic injury  No active cardiopulmonary disease             Workstation performed: [id number]   XR chest 1 view [[id number]] Collected: 02/09/18 6128   Order Status: Completed Updated: 02/12/18 0903   Narrative:     CHEST    INDICATION: 79-year-old female, fell, altered mental status  COMPARISON: None    VIEWS:  Single frontal projection    FINDINGS:  The cardiomediastinal silhouette is unremarkable  The lungs are clear  No pleural effusions  Bony thorax unremarkable  Partially visualized cervical plate and screw ACDF    Two-view examination of the chest is recommended when the patient's clinical condition permits   Impression:     No acute traumatic injury  No active cardiopulmonary disease             Workstation performed: [id number]         Discharge Disposition: [facility]  Discharged With Lines: no    Test Results Pending at Discharge: Full results from hypercoagulability panel  Outpatient Follow-Up  To be scheduled within 2 weeks of discharge from short term rehabilitation  Follow up with consulting providers  To be scheduled with cardiology and neurology as needed  Active Issues Requiring Follow-up   History of CVA, loop recorder placement      Code Status: Level 3 - DNAR and DNI    Medications   See after visit summary for reconciled discharge medications provided to patient and family  Allergies  Allergies   Allergen Reactions    Blue Dyes (Parenteral) Anaphylaxis     Discharge Diet: cardiac diet  Activity restrictions: none    [facility]  Patient presented with MCA CVA and mechanical fall as noted in HPI  She had no significant injury from mechanical fall  She was treated with tPA and embolectomy with resolution of the majority of her symptoms  She was transferred to the ICU for critical care monitoring and was administered midodrine in increase cerebral perfusion pressure  The patient did well through 24 hours and was transferred to the medical floor  CVA was suspected to be from embolic source due to distribution and other imaging findings  Telemetry during hospitalization totaling over 96 hours only demonstrated atrial fibrillation when administered medication with strong ionotropy  JONATAN was performed by cardiology consultant to assess the aortic arch, which demonstrated no significant source of embolization  Ultimately implanted loop recorder was placed to monitor for atrial fibrillation or other arrhythmia that may contribute to stroke  Hypercoagulability panel was drawn and current results demonstrate no abnormalities, though some testing is pending  At time of discharge patient is maintained on aspirin antiplatelet and no anticoagulation  Of note, early in hospitalization patient had suicidal ideation and was started on medication for depression  Formal psychiatric consult was obtained  At time of discharge she jose ramon SI or HI for past 48 hours  She is transferred to [address] rehabilitation [place] for further care        Presenting Problem/History of Present Illness  Principal Problem:    Acute ischemic right MCA stroke [facility])  Active Problems:    Fall    Forehead contusion    Left elbow contusion    Chronic back pain    Major depression  Resolved Problems:    * No resolved hospital problems  *        Discharge Condition: good      Discharge  Statement   I spent 30 minutes minutes discharging the patient  This time was spent on the day of discharge  I had direct contact with the patient on the day of discharge  Additional documentation is required if more than 30 minutes were spent on discharge

## 2018-02-14 NOTE — PROGRESS NOTES
PHYSICAL MEDICINE AND REHABILITATION   PREADMISSION ASSESSMENT     Projected [facility] Diagnoses:  Impairment of mobility, safety, Activities of Daily Living (ADLs), and cognitive/communication skills due to Stroke:  01 1  Left Body Involvement (Right Brain)   Etiologic: Right MCA CVA  Date of Onset: 2/9/18   Date of surgery: 2/9/18    Procedures:  1  Right Internal Carotid Arteriogram  2  Right MCA thrombectomy with Solitiare Stent Retreiver  3  Right common carotid arteriogram  4  Limited Right Femoral Arteriogram    PATIENT INFORMATION  Name: Isma Mandujano Phone #: [phone] (home)   Address: [address] [place] [address]  YOB: 1965 Age: 47 y o  SS#   Marital Status: /Civil Union  Ethnicity:   Employment Status: currently employed  Extended Emergency Contact Information  [facility]  Mobile Phone: [phone]  Relation: Spouse  Secondary Emergency Contact: Kam [address] Phone: [phone]  Relation: Child  Advance Directive: Level 3 DNR and DNI, Unknown Advanced Directive    INSURANCE/COVERAGE:     Primary Payor: BLUE CROSS / Plan: Local Reputation PLAN 361 / Product Type: Blue Fee for Service /   Secondary Payer: Private Pay   Payer Contact: Avery Coles Payer Contact:   Contact Phone: [phone]  Contact Fax: [phone] Contact Phone:   Authorization #: [id number]   Coverage Dates: 2/14/18 - 2/20/18  LCD: 2/20/18 (7days)  Medicare Days:  Medical Record #: [id number]    REFERRAL SOURCE:   Referring provider: Arianna Corrales MD  Referring facility: [address] [place] [address]  Room: [place] [address]  PCP: Tanmay Quinn DO PCP phone number: [phone]    MEDICAL INFORMATION  HPI: Patient is a 47year old female who presented to [facility] on 2/9/18    Patient was on the phone with her , while she was at work   Per  patient seemed to be confused when suddenly he heard her fall  Came to find that patient had become unresponsive  Upon EMS arrival patient was found to be somnolent, following only some commands and with a right gaze preference  Patient was given tPA  Patient was found to have acute onset of left hemiparesis with associated right M1 occlusion and with an NIH SS score of 16  On 2/9/18 patient had gone to IR to have the following done: right internal carotid arteriogram, right MCA thrombectomy with solitiare stent retreiver, right common carotid arteriogram, as well as a limited right femoral arteriogram   CTH was negative for a bleed  MRI did however reveal evidence of a resultant multifocal infarction, right MCA territory, with petechial hemorrhagic conversion (not visible by follow-up Ct)  Of note patient's BP was approximately 70/40 status post thrombectomy and was given a 1 5 L normal saline bolus  Norepinephrine and phenylephrine were use peripherally for short duration of time to keep maps greater than 65  Patient was titrated off vasopressors and started on Midrin drain and sustained maps greater than 65  Heart rate was noted to be in the 30s 40s during hospitalization, patient was asymptomatic EKG showed sinus bradycardia  The patient was monitored on telemetry and was noted to have possible runs of atrial fibrillation on February 10th  Psych was consulted during patients stay secondary to patient making comments about being suicidal   Per psych consult patient was unhappy regarding all of the medical problems she was having and the changes that her body has undergone  Patient had expressed how she was upset that she could not do the things that she could do before  Per psych patient is admitting that she is no longer suicidal and that she will not hurt herself  Patient did have an echo done which showed an EF 65% and no regional wall abnormalities with a normal left atrium    PM&R was consulted and stated that patient would be a good candidate for inpatient acute rehab  On 2/13/18 patient did undergo a JONATAN with no significant athroma  Patient did have a loop recorder placed on 2/13/18 as well  PT/OT/ST continue to follow patient and are recommending inpatient acute rehab  Insurance authorization has been obtained and patient has been cleared by her attending to be admitted to inpatient acute rehab  Past Medical History:   Past Surgical History: Allergies:     Past Medical History:   Diagnosis Date    Anxiety     Chronic pain     Lumbar stenosis     Past Surgical History:   Procedure Laterality Date    CERVICAL SPINE SURGERY       Allergies   Allergen Reactions    Blue Dyes (Parenteral) Anaphylaxis         Comorbidities: forehead contusion, left elbow contusion, cephalalgia, suicideal ideation (noted in earlier part of hospitalization), hypotension, bradycardia, anxiety, major depressive disorder    CURRENT VITAL SIGNS:   Temp:  [97 6 °F (36 4 °C)-98 7 °F (37 1 °C)] 98 1 °F (36 7 °C)  HR:  [62-69] 66  Resp:  [16-18] 16  BP: ()/(42-60) 118/60   Intake/Output Summary (Last 24 hours) at 02/14/18 1454  Last data filed at 02/14/18 1125   Gross per 24 hour   Intake             1245 ml   Output             1000 ml   Net              245 ml        LABORATORY RESULTS:      Lab Results   Component Value Date    HGB 11 5 02/13/2018    HCT 34 3 (L) 02/13/2018    WBC 8 41 02/13/2018     Lab Results   Component Value Date    BUN 16 02/13/2018     02/13/2018    K 3 8 02/13/2018     02/13/2018    GLUCOSE 91 02/13/2018    GLUCOSE 87 02/09/2018    CREATININE 0 86 02/13/2018     Lab Results   Component Value Date    PROTIME 12 3 02/09/2018    INR 0 91 02/09/2018        DIAGNOSTIC STUDIES:  Xr Elbow 2 Vw Left    Result Date: 2/9/2018  Impression: No acute osseous abnormality   Workstation performed: [id number]     Ct Head Wo Contrast    Result Date: 2/10/2018  Impression: No intraparenchymal hematoma seen Evolving infarct right basal ganglia, right insular region, right posterior parietal region  There is no hematoma seen corresponding to the petechial hemorrhage detected on the recent to MRI in the right basal ganglionic region  Workstation performed: [id number]     Trauma - Ct Spine Cervical Wo Contrast    Result Date: 2/9/2018  Impression: Degenerative and postoperative changes  No cervical spine fracture or traumatic malalignment  I personally discussed this study with Domenico Lan on 2/9/2018 at 7:47 AM   Workstation performed: [id number]     Mri Brain Wo Contrast    Result Date: 2/10/2018  Impression: Multiple infarcts in the right cerebral hemisphere in the distribution of the right MCA  Deep infarct involving the basal ganglia and cortical infarcts involving the frontal and parietal region are noted  There is a petechial hemorrhage in the infarct noted in the right lentiform nucleus Reconstitution of the flow void in the right MCA post embolectomy No intraparenchymal hematoma seen No midline shift seen  I personally discussed this study with Dr Loni Vick on 2/10/2018 at 8:50 AM  Workstation performed: [id number]     Xr Chest 1 View    Result Date: 2/12/2018  Impression: No acute traumatic injury No active cardiopulmonary disease  Workstation performed: [id number]     Xr Trauma Multiple    Result Date: 2/9/2018  Impression: No acute traumatic injury No active cardiopulmonary disease  Workstation performed: [id number]     Ct Stroke Alert Brain    Result Date: 2/9/2018  Impression: 1  Findings suspicious for evolving right MCA territory infarction  Recommend follow-up CTA of the neck and brain  2   Cerebral atrophy with chronic small vessel ischemic change  I personally discussed this study with Domenico Lan on 2/9/2018 at 7:47 AM  Workstation performed: [id number]     Cta Stroke Alert (head/neck)    Result Date: 2/9/2018  Impression: 1    Occlusion of the M1 segment of the right middle cerebral artery  There is reconstitution of the M2 segments via collateral flow from the anterior and posterior circulations  2   Evolving right MCA territory infarction  No acute hemorrhage  3   Degenerative changes cervical spine     I personally discussed this study with Gloria Galarza on 2/9/2018 at 7:47 AM  Workstation performed: [id number]       PRECAUTIONS/SPECIAL NEEDS:  Tobacco:   History   Smoking Status    Current Every Day Smoker    Packs/day: 0 50    Years: 40 00    Types: Cigarettes   Smokeless Tobacco    Never Used   , Alcohol:    History   Alcohol Use No   , Anticoagulation:  heparin, Blood Sugar Management: per MD recommendations, Edema Management, Safety Concerns, Pain Management, Aspiration Risk/Precautions, Dietary Restrictions: Dysphagia 3 Dental Soft - Thin Liquids, Language Preference: English and fall precautions    MEDICATIONS:     Current Facility-Administered Medications:     acetaminophen (TYLENOL) tablet 650 mg, 650 mg, Oral, Q6H PRN, Jannet Wilkins, , 650 mg at 02/12/18 2115    aspirin chewable tablet 81 mg, 81 mg, Oral, Daily, Lucas Lugo MD, 81 mg at 02/14/18 0846    atorvastatin (LIPITOR) tablet 80 mg, 80 mg, Oral, QPM, Casandra Garcia MD, 80 mg at 02/13/18 1731    docusate sodium (COLACE) capsule 100 mg, 100 mg, Oral, BID PRN, Vianey Carlton    heparin (porcine) subcutaneous injection 5,000 Units, 5,000 Units, Subcutaneous, Q8H [address], Lucas Lugo MD, 5,000 Units at 02/14/18 1340    mirtazapine (REMERON) tablet 15 mg, 15 mg, Oral, HS, Radha Payton MD, 15 mg at 02/13/18 2158    ondansetron (ZOFRAN) injection 4 mg, 4 mg, Intravenous, Q6H PRN, Casandra Garcia MD    pregabalin (LYRICA) capsule 25 mg, 25 mg, Oral, BID, Vianey Carlton, 25 mg at 02/14/18 0846    senna (SENOKOT) tablet 8 6 mg, 1 tablet, Oral, HS PRN, Duongtta Blanks    SKIN INTEGRITY:   left mid chest incision (had loop placed), right anterior groin incison    PRIOR LEVEL OF FUNCTION:  She lives in a(n) single family home  Mariya Mcduffie is  and lives with their spouse  Self Care: Independent, Indoor Mobility: Independent, Stairs (in/outdoor): Independent and Cognition: Independent    HOME ENVIRONMENT:  The living area: can live on one level  There 1 step to enter the home  The patient will have 24 hour supervision/physical assistance available upon discharge  After speaking with both patients  and sister, they stated that someone will be with the patient 24/7 between the both of them, to help out at time of discharge  PREVIOUS DME:  Equipment in home (previous DME): None    FUNCTIONAL STATUS:  Physical Therapy Occupational Therapy Speech Therapy   2/14/18    Bed Mobility   Rolling R 5  Supervision   Additional items Assist x 1   Rolling L 5  Supervision   Additional items Assist x 1   Supine to Sit 5  Supervision   Additional items Assist x 1   Sit to Supine 5  Supervision   Additional items Assist x 1   Transfers   Sit to Stand 5  Supervision   Additional items Assist x 1;Verbal cues   Stand to Sit 5  Supervision   Additional items Assist x 1;Verbal cues   Ambulation/Elevation   Gait pattern Short stride;Decreased foot clearance   Gait Assistance 5  Supervision   Additional items Assist x 1;Verbal cues   Assistive Device Rolling walker   Distance 200 feet   Balance   Dynamic Standing Fair -   Ambulatory Fair -   Endurance Deficit   Endurance Deficit Yes   Endurance Deficit Description fatigue   Activity Tolerance   Activity Tolerance Patient limited by fatigue   Nurse Made Aware Desiree ROBERTS aware   Exercises   THR Supine;Sitting;20 reps;AROM; Bilateral   Balance training  sidestepping at handrail and ambulation without device 90 feet   Assessment   Prognosis Good   Problem List Decreased strength;Decreased endurance; Impaired balance;Decreased cognition;Decreased safety awareness   Assessment Pt is progressing well with functional mobility    Cues required for safety with rolling walker due to impulsivity  Mild loss of balance however pt was able to self correct with contact guard assist   Educated on importance of safety with use of rolling walker as well  Trialed ambulating without device  Pt required min assist to contact guard due to safety  Pt would benefit from continued physical therapy to maximize functional mobility  2/14/18    ADL   Where Assessed Standing at sink  (eob cog assessment)   Eating Assistance 7  Independent   Grooming Assistance 5  Supervision/Setup   Grooming Deficit Supervision/safety; Impulsive; Increased time to complete   Grooming Comments Pt  in stance at sink ~15 minutes grooming, decreased safety awareness   UB Bathing Assistance 4  Minimal Assistance   LB Bathing Assistance 3  Moderate Assistance   LB Bathing Deficit Supervision/safety; Impulsive; Increased time to complete;Steadying   UB Dressing Assistance 5  Supervision/Setup   LB Dressing Assistance 5  Supervision/Setup   LB Dressing Deficit Impulsive;Supervision/safety; Increased time to complete   LB Dressing Comments pt poor safety awareness   Toileting Assistance  4  Minimal Assistance   Functional Standing Tolerance   Time ~15 minutes   Activity in stance at sink-grooming   Comments fair activity tolerance, poor safety awareness   Bed Mobility   Rolling R 5  Supervision   Rolling L 5  Supervision   Supine to Sit 5  Supervision   Sit to Supine 5  Supervision   Additional Comments pt in bed with alarm set   Transfers   Sit to Stand 5  Supervision   Additional items Increased time required   Stand to Sit 5  Supervision   Additional items Increased time required   Cognition   Overall Cognitive Status [facility]   Arousal/Participation Alert   Attention Attends with cues to redirect   Orientation Level Oriented X4   Memory Within functional limits   Following Commands Follows multistep commands with increased time or repetition   Comments pt flat and blunted, required multiple vc to attend to tasks   Cognition Assessment Tools MOCA   Score 26  (26/30)   Activity Tolerance   Activity Tolerance Patient tolerated treatment well   Medical Staff Made Aware Ruth Ramírez   Assessment   Assessment Pt  Participated in occupational therapy session with focus on  Activity tolerance,Cognitive assessment, grooming/ ADLS  Pt  Cleared by Ruth/ Desiree for participation in occupational therapy session  Pt  Identifiers confirmed  Pt  Received supine in bed  Pt required mod vc to attend to therapy session and not be on the phone while doing so  Pt  Presents with flat/blunted affect  Pt displays decreased safety awareness : walking away from walker and putting RLE up on walker bar while brushing teeth  Pt required mod vc to attend safely to task with proper body mechanics  Pt  Don/doff socks and pants with supervision 2* decreased safety awareness  Pt  In stance ~15 min at sink for ADLS, pt supervision 2* impulsive behavior and decreased balance  Pt  Participated in [place] Cognitive Assessment [facility]) with a score of 26/30- normal cognitive range- see full results below  Pt  Required multiple vc to sit up and attend to task during assessment  Pt  Returned to supine and bed alarm on, call bell within reach  Patient will benefit from further rehab for achieving optimal performance with all functional tasks with safety awareness  2/9/18    Past Medical History  Medical History   No past medical history on file  Past Surgical History  Surgical History   No past surgical history on file  47 y  o  female adm 2/9/18 of right M1 occlusion and acute onset left-sided weakness   Patient reports that this morning she went outside to have a cigarette and she subsequently fell to the ground hitting her right knee and left side of her head   She states that she did not lose consciousness  Sammibernadine Carpio came outside saw her on the ground   She had trouble getting back up   Appear to be somewhat confused per   Aleida Lissy arrival she was noted to have left-sided weakness and left-sided facial droop, right-sided gaze preference   She was last seen well at 6:30 a m  this morning   Stroke alert was called at 7:25 a m  this morning   Patient was given a tPA bolus at 8:03 a m  Jeneen Fire arrival NIH SS score was 16   Patient had thrombectomy performed  Patient currently alert and oriented x4 with continued off left-sided weakness and left-sided facial droop and mild dysarthria   Of note patient's past medical history includes prior C-spine surgery, lumbar stenosis, chronic pain, anxiety   She denies a history of hypertension, diabetes, previous strokes, coronary artery disease  CT head-1   Findings suspicious for evolving right MCA territory infarction   Recommend follow-up CTA of the neck and brain    2   Cerebral atrophy with chronic small vessel ischemic change      Past Medical History from past encounter   1  History of Acute pain of right knee (719 46) (M25 561)   2  History of Acute URI (465 9) (J06 9)   3  History of Acute UTI (599 0) (N39 0)   4  History of headache (V13 89) (O72 567)   5  History of hematuria (V13 09) (P07 971)   6  History of urinary incontinence (V13 09) (P77 793)   7  History of Influenza vaccine needed (V04 81) (Z23)   8  History of Other chronic pain (338 29) (G89 29)   9  History of Other muscle spasm (728 85) (G14 995)   10  History of Other screening mammogram (V76 12) (Z12 31)   11  History of Preoperative examination (V72 84) (Z01 818)   12  History of Screening for colon cancer (V76 51) (Z12 11)   13  History of Visit for pre-operative examination (V72 84) (J18 899)     Surgical History  1  History of Knee Surgery   2  History of Lipectomy Of Thigh   3  History of Neck Surgery   4  History of Total Abdominal Hysterectomy   5  History of Tubal Ligation     Reason for consult:  R/o aspiration  New neuro event     Precautions:  Contact  Droplet  [place]     Current diet:  NPO  Premorbid diet[de-identified]  Regular w/ thin  Previous VBS:  -  O2 requirement:  nc  Voice/Speech:  Intelligible, slow at times w/ mild dysarthria  Social:  Works at [employer], lives at home w/   Pt is independent  Follows commands:                   yes       Cognitive Status:  A&OX3  Oral [facility] exam:  Full dentition  Labial weakness on the L  No lingual deviation  R gaze preference but is looking past midline at her        Items administered:  Puree, soft solid, hard solid,  nectar thick liquid, thin liquids, liquids by cup/straw  Oral stage:  Lip closure: fair, mildly reduced seal but maintains on cup rim  Mastication: slow, deliberate  Bolus formation: mildly reduced  Bolus control: wfl  Transfer: mildly reduced  Oral residue: pt aware of the slower oral stage, waits & clears independently        Pharyngeal stage:  Swallow promptness: +  Laryngeal rise: min reduced  Wet voice: -  Throat clear: -  Cough: no cough     Esophageal stage:  No gross s/s reported- intermittent reflux     Summary:  Pt presents w/ mild oropharyngeal dysphagia w/ decreased oral seal, decreased transfers & reduced hyolaryngeal rise  Her movement are deliberate & slow but she is in control of the bolus & able to tolerate       Recommendations:  Diet: begin level 3 dysphagia advanced  Liquid: thin- ok cup or straw  Meds: as able, none available to trial for assessment  Supervision: please assist  Positioning:Upright  Strategies: slow intake, alternate bites w/ sips  Aspiration precautions     Aspiration precautions posted     Results d/w:  Pt, nursing, family, physician     Goal(s):     Pt will tolerate least restrictive diet w/out s/s aspiration or oral/pharyngeal difficulties  CURRENT GAP IN FUNCTION     Prior to Admission:     Functional Status: Patient was independent with mobility/ambulation, transfers, ADL's, IADL's  Patient was working Full Time prior to admission as well      Estimated length of stay: 7 to 10 days    Anticipated Post-Discharge Disposition/Treatment  Disposition: Return to previous home/apartment  Outpatient Services: Physical Therapy (PT), Occupational Therapy (OT) and Speech Therapy    BARRIERS TO DISCHARGE  Weakness, Pain, Diminished cognition/Mentation change, Balance Difficulty, Fatigue, Home Accessibility, Caregiver Accessibility, Financial Resources, Equipment Needs and Resource Availability    INTERVENTIONS FOR DISCHARGE  Adaptive equipment, Patient/Family/Caregiver Education, Support Group, Financial Assistance, Arrange DME needs, Medication Changes per MD recommendations, Therapy exercises, Center of balance support  and Energy conservation education     REQUIRED THERAPY:  Patient will require PT, OT and ST 60 minutes each per day, five days per week to achieve rehab goals  REQUIRED FUNCTIONAL AND MEDICAL MANAGEMENT FOR INPATIENT REHABILITATION:  Skin:  left mid chest incision, right groin incision, Pain Management: Overall pain is well controlled, Deep Vein Thrombosis (DVT) Prophylaxis:  heparin, Diabetes Management: continue sliding scale insulin, patient to do finger sticks as ordered, SLIM to continue to manage diabetes, and diet, and further internal medicine managemetn of additional medical conditions while on the ARC, PT/OT/ST intervention, patient/family education and training, and any needed consults PRN    RECOMMENDED LEVEL OF CARE:  Patient is a 47year old female who presented to the [address] [place] [address] on 2/9/18 after she had fallen she was on the phone with her   Prior to the fall  states that patient sounded confused  Upon EMS arrival patient was found to be somnolent, following only some commands and with a right gaze preference  Imaging confirmed that patient had sustained a Right MCA CVA  Prior to admission patient was fully independent with both ADL's and IADLs  Patient was driving and working full time    Currently patient is supervision level with both transfers and ambulation with the use of a rolling walker  Patient requires min assist with UB bathing, and then moderate assist with LB bathing  Patient is also being seen by speech therapy and is on a dysphagia 3 dental soft diet with thin liquids  Patient would benefit from the stroke education series as well as participation in the [place] program while on the [facility]  Close medical management and PM&R management is recommended at this time for patient while on the ARC to help monitor labs as well as other medical conditions  Nursing management will be required to monitor bowel/bladder function to prevent incontinent episodes and skin breakdown as well as education on medication changes  Inpatient acute rehab is recommended at this time for patient to maximize overall strength, endurance, self care, and mobility upon discharge to home with the support of her family

## 2018-02-14 NOTE — CONSULTS
Consultation - Marcell Bruno 47 y o  female MRN: [id number]    Unit/Bed#: -77 Encounter: [id number]        History of Present Illness     HPI: Marcell Bruno is a 47y o  year old female with a history of HLD, anemia, ACDF and nicotine abuse who presented to the ER after falling at home  According to the , the patient was outside smoking a cigarette when he hard her fall  She was confused when he got to her and was brought to the ER  She had left sided weakness, dysarthria, right gaze preference and left facial droop  CTH was suspect for evolving right MCA territory infarction  CTA head/neck showed occlusion of the M1 segment of the right middle cerebral artery with an evolving right MCA territory infarction  TPA was given  She then went to IR for a right MCA thrombectomy (done 2/9/18)  MRI showed multiple infarcts in the right cerebral hemisphere in the distribution of the right MCA; deep infarct involving the basal ganglia with cortical infarcts involving the frontal and parietal region; petechial hemorrhage in the infarct in the right lentiform nucleus; reconstitution of the flow void in the right MCA post embolectomy; no intraparenchymal hematoma seen  There was no midline shift seen  Repeat CTH on 2/10/18 showed no intraparenchymal hematoma; evolving infarct right basal ganglia, right insular region, right posterior parietal region and no hematoma seen corresponding to the petechial hemorrhage detected on the recent to MRI in the right basal ganglionic region  She was then placed on ASA along with high dose statin      She had some bradycardia which resolved  Cardiology saw her in consult and performed a JONATAN that showed no DENISE thrombus/PFO/ASD  There was some nodular calcium noted in the ascending aorta  Cardiology felt there was no strong indication for anticoagulation  During her stay she required Levophed and then was placed on Midodrine    Eventually, her Midodrine was weaned off and her last dose was early this AM   Her sister mentioned that she had an episode of LLE weakness at work 1-2 weeks prior to admission presumed to be a TIA  Additionally, it was found that she has a strong family history of strokes including her mother at a young age and that her daughter had a PE  A hypercoagulability panel was sent      During her stay, she made comments about suicide and was placed on a 1:1 watch  Psychiatry saw her and placed her on Remeron 15 mg qhs  A LOOP was implanted 2/13/18       Currently, patient has no CP, SOB, dizziness, N/V/D  No BM since ? ? - she does not know  Some CW soreness associated with the LOOP implant      ROS:     As in HPI, otherwise negative 12 point ROS        Historical Information   Past Medical History:   Diagnosis Date    Anxiety     Chronic pain     Lumbar stenosis      Past Surgical History:   Procedure Laterality Date    CERVICAL SPINE SURGERY       Social History   History   Alcohol Use No     History   Drug Use No     History   Smoking Status    Current Every Day Smoker    Packs/day: 0 50    Years: 40 00    Types: Cigarettes   Smokeless Tobacco    Never Used     Family History   Problem Relation Age of Onset    Stroke Mother      46s    Pulmonary embolism Other      In mid 35s    Stroke Sister      46s    Stroke Brother      46s       Meds/Allergies   current meds:  Current Facility-Administered Medications   Medication Dose Route Frequency    acetaminophen (TYLENOL) tablet 650 mg  650 mg Oral Q6H PRN    [START ON 2/15/2018] aspirin chewable tablet 81 mg  81 mg Oral Daily    atorvastatin (LIPITOR) tablet 80 mg  80 mg Oral QPM    bisacodyl (DULCOLAX) rectal suppository 10 mg  10 mg Rectal Daily PRN    docusate sodium (COLACE) capsule 100 mg  100 mg Oral BID PRN    [START ON 2/15/2018] fish oil capsule 1,000 mg  1,000 mg Oral Daily    mirtazapine (REMERON) tablet 15 mg  15 mg Oral HS    ondansetron (ZOFRAN-ODT) dispersible tablet 4 mg  4 mg Oral Q6H PRN    polyethylene glycol (MIRALAX) packet 17 g  17 g Oral Daily PRN    pregabalin (LYRICA) capsule 25 mg  25 mg Oral BID    senna (SENOKOT) tablet 8 6 mg  1 tablet Oral HS PRN       PTA meds:   Prescriptions Prior to Admission   Medication    diazepam (VALIUM) 5 mg tablet    meloxicam (MOBIC) 15 mg tablet    Omega-3 Fatty Acids (FISH OIL) 1200 MG CAPS    oxaprozin (DAYPRO) 600 MG tablet    OxyCODONE HCl 5 MG TABA    pregabalin (LYRICA) 50 mg capsule     Allergies   Allergen Reactions    Blue Dyes (Parenteral) Anaphylaxis       Objective   Vitals: Blood pressure 99/59, pulse 73, temperature 97 7 °F (36 5 °C), temperature source Tympanic, resp  rate 18, height 5' 1" (1 549 m), weight 55 5 kg (122 lb 5 7 oz), SpO2 95 %      Physical Exam     Constitutional:  NAD; pleasant; nontoxic  HEENT:  AT/NC; oropharynx negative for thrush; ecchymosis left forehead  Neck: negative for JVD  CV:  +S1, S2;  RRR; no rub/murmur;  LOOP site dressing is dry w/o drainage present  Pulmonary:  BBS without crackles/wheeze/rhonci; resp are unlabored  Abdominal:  soft, +BS, ND/NT; no mass  Musculoskeletal:  no edema of LE   :  no case  Skin:  no rashes  Neuro AAO; ZUNIGA 5/5; face is symmetric/tongue midline; speech clear; very flat affect      Lab Results:   Results from last 7 days  Lab Units 02/13/18  0444 02/11/18  0444   WBC Thousand/uL 8 41 7 52   HEMOGLOBIN g/dL 11 5 11 2*   HEMATOCRIT % 34 3* 33 4*   PLATELETS Thousands/uL 265 194       Results from last 7 days  Lab Units 02/13/18  0444 02/11/18  0444   SODIUM mmol/L 142 143   POTASSIUM mmol/L 3 8 3 9   CHLORIDE mmol/L 106 110*   CO2 mmol/L 27 27   BUN mg/dL 16 11   CREATININE mg/dL 0 86 0 81   GLUCOSE RANDOM mg/dL 91 111   CALCIUM mg/dL 9 0 8 6       Results from last 7 days  Lab Units 02/09/18  2030   HEMOGLOBIN A1C % 5 5       Results from last 7 days  Lab Units 02/09/18  0733   INR  0 91       Glucose (mg/dL)   Date Value   02/13/2018 91 02/11/2018 111   02/10/2018 114   02/09/2018 87     Glucose, i-STAT (mg/dl)   Date Value   02/09/2018 87   02/09/2018 88       Labs reviewed    Imaging: reviewed  EKG, Pathology, and Other Studies: I have personally reviewed pertinent reports  VTE Prophylaxis: Heparin    Code Status: Level 3 - DNAR and DNI     Assessment/Plan     1  Right MCA CVA, suspect embolic, involving right basal ganglia/right insular region/right posterior parietal region:  Continue ASA 81mg qd, Lipitor 80mg qd  Follow up hypercoagulability panel        2  LOOP implant (2/13/18): Watch site     3  Hypotension:  Was on Midodrine 10 mg TID which was weaned to off; LD was this AM   Will watch  She says chronically runs low BP as an OP     4  Depression:  Continue Remeron 15 mg qhs which will be new for her this hospital stay     5  Chronic pain with hx ACDF:  Has chr pain down arms R>L  Continue Lyrica 25 mg BID     6  Tobacco abuse:  Counseled     7  Bradycardia:  Resolved; will watch    8  Constipation:  No BM for quite some time and at least since before admit to the hospital; will give Lactulose  She states that she goes long periods at home w/o having a BM up to 2 weeks sometimes  Doesn't want Senokot or Colace      Counseling / Coordination of Care  Total floor / unit time spent today 65 minutes  Greater than 50% of total time was spent with the patient and / or family counseling and / or coordination of care        ROSEY Reza

## 2018-02-14 NOTE — PLAN OF CARE
Problem: OCCUPATIONAL THERAPY ADULT  Goal: Performs self-care activities at highest level of function for planned discharge setting  See evaluation for individualized goals  Treatment Interventions: ADL retraining, Functional transfer training, UE strengthening/ROM, Visual perceptual retraining, Endurance training, Cognitive reorientation, Patient/family training, Equipment evaluation/education, Neuromuscular reeducation, Compensatory technique education, Continued evaluation, Activityengagement, Energy conservation          See flowsheet documentation for full assessment, interventions and recommendations  Outcome: Progressing  Limitation: Decreased ADL status, Decreased UE strength, Decreased Safe judgement during ADL, Decreased cognition, Decreased endurance, Decreased self-care trans, Decreased high-level ADLs, Visual deficit, Mood limitation (BALANCE, MEDICAL STATUS)  Prognosis: Good  Assessment: Pt  Participated in occupational therapy session with focus on  Activity tolerance,Cognitive assessment, grooming/ ADLS  Pt  Cleared by Ruth/ Desiree for participation in occupational therapy session  Pt  Identifiers confirmed  Pt  Received supine in bed  Pt required mod vc to attend to therapy session and not be on the phone while doing so  Pt  Presents with flat/blunted affect  Pt displays decreased safety awareness : walking away from walker and putting RLE up on walker bar while brushing teeth  Pt required mod vc to attend safely to task with proper body mechanics  Pt  Don/doff socks and pants with supervision 2* decreased safety awareness  Pt  In stance ~15 min at sink for ADLS, pt supervision 2* impulsive behavior and decreased balance  Pt  Participated in [place] Cognitive Assessment [facility]) with a score of 26/30- normal cognitive range- see full results below  Pt  Required multiple vc to sit up and attend to task during assessment  Pt  Returned to supine and bed alarm on, call bell within reach  Patient will benefit from further rehab for achieving optimal performance with all functional tasks with safety awareness     Recommendation: Physiatry Consult  OT Discharge Recommendation: Short Term Rehab  OT - OK to Discharge:  (TO STR AT THIS TIME)  Ludivina Conway  ot student

## 2018-02-14 NOTE — PLAN OF CARE
Activity Intolerance/Impaired Mobility     Mobility/activity is maintained at optimum level for patient Progressing        Communication Impairment     Ability to express needs and understand communication [place]     Discharge to home or other facility with appropriate resources Progressing        DISCHARGE PLANNING - CARE MANAGEMENT     Discharge to post-acute care or home with appropriate resources Progressing        INFECTION - ADULT     Absence or prevention of progression during hospitalization Progressing     Absence of fever/infection during neutropenic period Progressing        Knowledge Deficit     Patient/family/caregiver demonstrates understanding of disease process, treatment plan, medications, and discharge instructions Progressing        MUSCULOSKELETAL - ADULT     Maintain or return mobility to safest level of function Progressing        Neurological Deficit     Neurological status is stable or improving Progressing        NEUROSENSORY - ADULT     Achieves stable or improved neurological status Progressing     Achieves maximal functionality and self care Progressing        Nutrition     Nutrition/Hydration status is improving Progressing        Nutrition/Hydration-ADULT     Nutrient/Hydration intake appropriate for improving, restoring or maintaining nutritional needs Progressing        PAIN - ADULT     Verbalizes/displays adequate comfort level or baseline comfort level Progressing        Potential for Aspiration     Non-ventilated patient's risk of aspiration is minimized Progressing     Ventilated patient's risk of aspiration is minimized Progressing        Potential for Falls     Patient will remain free of falls Progressing        Prexisting or High Potential for Compromised Skin Integrity     Skin integrity is maintained or improved Progressing        SAFETY ADULT     Maintain or return to baseline ADL function Progressing     Maintain or return mobility status to optimal level Progressing

## 2018-02-15 ENCOUNTER — APPOINTMENT (OUTPATIENT)
Dept: PHYSICAL THERAPY | Age: 53
End: 2018-02-15
Payer: COMMERCIAL

## 2018-02-15 PROBLEM — M54.9 CHRONIC BACK PAIN: Status: ACTIVE | Noted: 2018-02-15

## 2018-02-15 PROBLEM — F32.9 MAJOR DEPRESSION: Status: ACTIVE | Noted: 2018-02-15

## 2018-02-15 PROBLEM — G89.29 CHRONIC BACK PAIN: Status: ACTIVE | Noted: 2018-02-15

## 2018-02-15 LAB
B2 GLYCOPROT1 IGA SER-ACNC: <9 GPI IGA UNITS (ref 0–25)
B2 GLYCOPROT1 IGG SER-ACNC: <9 GPI IGG UNITS (ref 0–20)
B2 GLYCOPROT1 IGM SER-ACNC: <9 GPI IGM UNITS (ref 0–32)
PROT C AG ACT/NOR PPP IA: >150 % OF NORMAL (ref 60–150)

## 2018-02-15 PROCEDURE — 97530 THERAPEUTIC ACTIVITIES: CPT

## 2018-02-15 PROCEDURE — 93325 DOPPLER ECHO COLOR FLOW MAPG: CPT | Performed by: INTERNAL MEDICINE

## 2018-02-15 PROCEDURE — 93312 ECHO TRANSESOPHAGEAL: CPT | Performed by: INTERNAL MEDICINE

## 2018-02-15 PROCEDURE — 97535 SELF CARE MNGMENT TRAINING: CPT

## 2018-02-15 PROCEDURE — 92610 EVALUATE SWALLOWING FUNCTION: CPT

## 2018-02-15 PROCEDURE — 93320 DOPPLER ECHO COMPLETE: CPT | Performed by: INTERNAL MEDICINE

## 2018-02-15 PROCEDURE — 97162 PT EVAL MOD COMPLEX 30 MIN: CPT

## 2018-02-15 PROCEDURE — 99232 SBSQ HOSP IP/OBS MODERATE 35: CPT | Performed by: PHYSICAL MEDICINE & REHABILITATION

## 2018-02-15 PROCEDURE — 97166 OT EVAL MOD COMPLEX 45 MIN: CPT

## 2018-02-15 PROCEDURE — 97112 NEUROMUSCULAR REEDUCATION: CPT

## 2018-02-15 PROCEDURE — 97116 GAIT TRAINING THERAPY: CPT

## 2018-02-15 RX ORDER — PRAVASTATIN SODIUM 40 MG
40 TABLET ORAL
Status: DISCONTINUED | OUTPATIENT
Start: 2018-02-15 | End: 2018-02-18 | Stop reason: HOSPADM

## 2018-02-15 RX ORDER — LACTULOSE 20 G/30ML
20 SOLUTION ORAL ONCE
Status: COMPLETED | OUTPATIENT
Start: 2018-02-15 | End: 2018-02-15

## 2018-02-15 RX ADMIN — Medication 1000 MG: at 10:03

## 2018-02-15 RX ADMIN — ASPIRIN 81 MG 81 MG: 81 TABLET ORAL at 10:02

## 2018-02-15 RX ADMIN — ACETAMINOPHEN 650 MG: 325 TABLET, FILM COATED ORAL at 22:03

## 2018-02-15 RX ADMIN — PRAVASTATIN SODIUM 40 MG: 40 TABLET ORAL at 16:19

## 2018-02-15 RX ADMIN — MIRTAZAPINE 15 MG: 15 TABLET, FILM COATED ORAL at 22:04

## 2018-02-15 RX ADMIN — LACTULOSE 20 G: 20 SOLUTION ORAL at 16:19

## 2018-02-15 RX ADMIN — PREGABALIN 25 MG: 25 CAPSULE ORAL at 10:02

## 2018-02-15 RX ADMIN — PREGABALIN 25 MG: 25 CAPSULE ORAL at 19:00

## 2018-02-15 NOTE — SOCIAL WORK
Met w/pt and spouse and reviewed rehab routine and cm role  Pt resides w/spouse and plans on return home  Therapy does not expect a long los due to how quickly pt has recovered  Pt reports she has a shower seat and her dtr has a walker if needed  Pt has been going to outpt physical therapy at [facility] location and is wondering if she can continue to go there if recommended  Pt uses [facility] needs and has been made aware of [facility]  Pt and spouse had questions about driving on dc, made dr garcia aware  Following to assist w/dc planning needs

## 2018-02-15 NOTE — PROGRESS NOTES
OT LTG     02/15/18 0730   Rehab Team Interventions   OT Interventions Self Care;Home Management; Therapeutic Exercise   Grooming Goal   QI: Oral Hygiene Goal 06  Independent - Patient completes the activity by him/herself with no assistance from a helper  FIM Grooming Goal Independant   Task Wash/Dry Face;Wash/Dry Hands;Brush Teeth;Comb Hair; Shave; Make Up;Acquire Items; Initiate Task;Complete Groom   Environment Stand at [employer] Precaution   Status Target goal - one week   Intervention Assistive Device;Balance Work; Therapeutic Exercise; Tolerance Work   Bathing Goal   QI: Shower/bathe self Goal 06  Independent - Patient completes the activity by him/herself with no assistance from a helper  FIM Bathing Goal Moderate [place]   Environment Tub; Shower;Seated;Standing   Adaptive Equipment Seat without back   Safety Precautions Safety Precaution   Status Target goal - one week   Intervention ADL Training; Therapeutic Exercise   Upper Body Dressing Goal   QI: Upper body dressing Goal 06  Independent - Patient completes the activity by him/herself with no assistance from a helper  FIM Upper Body Dressing Goal Independant   Environment Seated;Standing   Safety Precautions Safety Precaution   Status Target goal - one week   Intervention Balance Work; Therapeutic Exercise; Tolerance Work   [place] Dressing Goal   QI: Lower body dressing Goal 06  Independent - Patient completes the activity by him/herself with no assistance from a helper  QI: Putting on/taking off footwear Goal 06  Independent - Patient completes the activity by him/herself with no assistance from a helper  FIM Lower Body Dressing Goal Independant   Task Lower Body;Shoe/Slipper;Socks;Pants; Undergarment   Environment Seated;Standing   Safety Precautions Safety Precaution   Status Target goal - one week   Intervention Balance Work; Therapeutic Exercise; Tolerance Work   Toileting Goal   QI: Toileting hygiene Goal 06  Independent - Patient completes the activity by him/herself with no assistance from a helper  FIM Toileting Goal Independant   Task Pants Up;Pants Down;Hygiene   Status Target goal - one week   Intervention ADL Training;Balance Work   Toileting Transfer Goal   QI: Toilet transfer Goal 06  Independent - Patient completes the activity by him/herself with no assistance from a helper  FIM Toilet Transfer Goal Independant   Status Target goal - one week   Intervention ADL Training;Balance Work;Assistive Device   Tub/Shower Transfer Goal   FIM Tub/ Shower Transfer Goal Modified [place]   Method Tub Shower   Status Target goal - one week   Interventions ADL Training;Assistive Device   Comprehension Goal   Comprehension Assist Level Moderate [place]   Function Demand Safety Strategy   Status Target goal - one week   Interventions Receptive Language Tasks; Reading Tasks;Use of Gesture   Expression Goal   Expression Assist Level Independant   Status Target goal - one week   Intervention Assistive Device; Expressive Language Tasks;Writing Tasks   Social Interaction Goal   Social Interaction Assist Level Independant   Behaviors Appropriate; Cooperative;Participate; Initiate;Safe;Follow Routine; Motivated;Pragmatic   Status Target goal - one week   Intervention Social Skill Training;Stress Management;1:1 Counseling   Problem Solving Goal   Problem Solving Assist Level Moderate [place]   Basic Function Problem Recognition;Routine Solution   Executive Function Cause/Effect;Provide Solution;Reason/; Thought Organ;Sequencing   Status Target goal - one week   Intervention Safety Education   Memory Goal   Memory Assist Level Moderate [place]   Short-Term Memory Orientation; Recent Recall   Long-Term Memory Past Events; [facility];Medication; Safety Strategy   Status Target goal - one week   Intervention Assistive Device;Cognitive Training;Memory Book;Precautions Review Community Reintegration Goal   Goal pt will be mod I with commnity reintegration activities    Status Target - one week   Interventions [employer]; Activity Tolerance;Leisure Activity;Pre-vocation Re-Education   Object Retrieval Goal   QI: Picking up object Goal 06  Independent - Patient completes the activity by him/herself with no assistance from a helper  Home Management Goal   Goal Pt will be I with home management task stated below   Assist Level Independent   Status Target - one week   Interventions Activity Tolerance;Clothing Care;House Cleaning;Leisure Skills;Meal Preparation;Money Management; Shopping;Telephone Use

## 2018-02-15 NOTE — PROGRESS NOTES
Physical Therapy Initial Evaluation   02/15/18 1000   Patient Data   Rehab Impairment Decline in functional mobility due to Stroke   Etiologic Diagnosis Right MCA CVA   Date of Onset 02/09/18   Support System   Name Juan   Relationship spouse   Home Setup   Type of Home Multi Level   Method of Entry Curb   Number of Stairs 1   Number of Stairs in Home 12   First Floor Bathroom Full   First Floor Setup Available Yes   Home Modifications Necessary? No   Prior Level of Function   Self-Care 3  Independent - Patient completed the activities by him/herself, with or without an assistive device, with no assistance from a helper  Indoor-Mobility (Ambulation) 3  Independent - Patient completed the activities by him/herself, with or without an assistive device, with no assistance from a helper  Stairs 3  Independent - Patient completed the activities by him/herself, with or without an assistive device, with no assistance from a helper  Functional Cognition 3  Independent - Patient completed the activities by him/herself, with or without an assistive device, with no assistance from a helper     Psychosocial   Psychosocial (WDL) WDL   Restrictions/Precautions   Precautions Fall Risk   Pain Assessment   Pain Assessment No/denies pain   QI: Picking Up Object   Assistance Needed Set-up / clean-up   Picking Up Object CARE Score 5   QI: Roll Left and Right   Assistance Needed Independent   Roll Left and Right CARE Score 6   QI: Sit to Lying   Assistance Needed Independent   Sit to Lying CARE Score 6   QI: Lying to Sitting on Side of Bed   Assistance Needed Independent   Lying to Sitting on Side of Bed CARE Score 6   QI: Sit to Stand   Assistance Needed Independent   Sit to Stand CARE Score 6   QI: Chair/Bed-to-Chair Transfer   Assistance Needed Independent   Chair/Bed-to-Chair Transfer CARE Score 6   QI: Car Transfer   Assistance Needed Set-up / clean-up   Car Transfer CARE Score 5   Transfer Bed/Chair/Wheelchair   Adaptive Equipment None   Bed, Chair, Wheelchair Transfer (FIM) 6 - Patient requires assistive device/extra time/safety concerns but completes independently   QI: Walk 10 Feet   Assistance Needed Set-up / clean-up   Walk 10 Feet CARE Score 5   QI: Walk 50 Feet with Two Turns   Assistance Needed Set-up / clean-up   Walk 50 Feet with Two Turns CARE Score 5   QI: Walk 150 Feet   Assistance Needed Supervision   Walk 150 Feet CARE Score 4   QI: Walking 10 Feet on Uneven Surfaces   Assistance Needed Supervision   Walking 10 Feet on Uneven Surfaces CARE Score 4   Ambulation   Does the patient walk? 2  Yes   Primary Discharge Mode of Locomotion Walk   Walk Assist Level Supervision   Assist Device (no device)   Distance Walked (feet) 600 ft   Limitations Noted In Heel Strike   Walking (FIM) 5 - Patient requires supervision/monitoring AND distance 150 feet or more, no rest   Wheelchair mobility   QI: Does the patient use a wheelchair? 0   No   QI: 1 Step (Curb)   Assistance Needed Supervision   1 Step (Curb) CARE Score 4   QI: 4 Steps   Assistance Needed Supervision   4 Steps CARE Score 4   QI: 12 Steps   Assistance Needed Supervision   12 Steps CARE Score 4   Stairs   Type Stairs   # of Steps 12   Assist Devices Single Rail   Findings pt easily distracted, reminder to make sure she gets left foot completely on step above   Stairs (FIM) 5 - Patient requires supervision/monitoring AND goes up and down full flight (12- 14 stairs)   Comprehension   Comprehension (FIM) 6 - Understands complex/abstract but requires more time   Expression   Expression (FIM) 6 - Expresses complex/abstract but requires:  more time   Social Interaction   Social Interaction (FIM) 6 - Interacts appropriately with others BUT requires extra  time   Problem Solving   Problem solving (FIM) 5 - Solves complex problems But requires cues from helper   Memory   Memory (FIM) 5 - Recalls/performs request 90% of time   RLE Assessment   RLE Assessment WFL   LLE Assessment LLE Assessment WFL   Strength LLE   LLE Overall Strength 4/5   Coordination   Movements are Fluid and Coordinated 1   Sensation   Light Touch No apparent deficits   Cognition   Overall Cognitive Status WFL   Objective Measure   PT Measure(s) TUG multi, Jones Balance, 10 m walk   PT Findings TUG= 10 sec for all 3 trials, 10 m walk= 8 sec, Jones = Low fall risk 54/56   Discharge Information   Patient's Discharge Plan To return home with family A from  and sister   Patient's Rehab Expectations To continue to get strongre and go home soon   Barriers to Discharge Home Safety Considerations;Decreased Endurance   Impressions Pt seen for initial evaluation s/p Right MCA CVA  Pt received TPA and had arteriograms and thrombectomy as well  Pt presents with very minimal left sided strength deficits  Performed standardized tests, pt Low fall risk, demonstrates normal community gait speed  Practiced functional mobilit in room and walking outsode all without device  Pt spouse present throughout , discussed POC, PLOF and safety  Spouse reports he has noted drastic improvement of his wife functionally over the past few days  Pt admits that she is easily distracted, forgetful at times, used to constantly multitasking  Pt works 2nd shift, poor sleep habits and has HX of bladder surgery, now with urinary urgency  Pt also with Hx of R UE radiculopathy, on [place]  Pt will benefit from short rehab stay to work on overall activity tolerance, safety awarenss and education for stroke prevention  Excellent rehab potential    PT Therapy Minutes   PT Time In 1000   PT Time Out 1130   PT Total Time (minutes) 90   PT Mode of treatment - Individual (minutes) 0   PT Mode of treatment - Concurrent (minutes) 0   PT Mode of treatment - Group (minutes) 0   PT Mode of treatment - Co-treat (minutes) 0   PT Mode of Teatment - Total time(minutes) 0 minutes

## 2018-02-15 NOTE — PROGRESS NOTES
Physical Medicine and Rehabilitation Progress Note:  Stroke:  01 1  Left Body Involvement (Right Brain)  Miguelangel Palmer 47 y o  female MRN: [id number]  Unit/Bed#: -77 Encounter: [id number]    Assessment:   Miguelangel Palmer is a 47 y o  female who presented to the [address] with left sided weakness  Subsequently found to have a right basal ganglia, right insular region, and right posterior parietal region infarction,  for which she underwent an endovascular thrombectomy  She was accepted to the [facility] on 2/14/18  Subjective: Patient seen and examined at bedside  States dressing covering loop recorder site itches and burns  Otherwise no other complaints  SO at bedside, happy with her progress  No other complaints  ROS: A 10-point ROS was performed  Negative except as listed above  Disposition: Likely home early next week    Plan:    # Stroke: right MCA infarct  - Acute comprehensive interdisciplinary inpatient rehabilitation including PT, OT, SLP, RN, CM, SW, dietary, psychology, etc   - Appreciate Internal Medicine following - Dr Ela Caldera service  - S/p thrombectomy   - continue ASA 81mg   - patient refusing atorvastatin, will switch to pravastatin   - s/p loop recorder placed, f/u with device clinic as outpatient  Removed dressing, covered lightly with 2x2       # Hypotension/bradycardia  - Patient was weaned off midodrine on acute service  - continue to monitor closely     Temp:  [97 7 °F (36 5 °C)-98 1 °F (36 7 °C)] 97 9 °F (36 6 °C)  HR:  [61-73] 61  Resp:  [18-20] 20  BP: ()/(58-59) 98/58     # Leukocytosis (improved)  - VS stable, no fever noted  - continue to monitor       Results from last 7 days  Lab Units 02/13/18  0444 02/11/18  0444 02/10/18  0433   WBC Thousand/uL 8 41 7 52 10 85*     # Plantar fasciitis  - patient reports ordering orthotics, family to bring in once delivered to home  - continue with regular stretching activities in therapy     # History of ACDF in 2013  -  MRI c-spine in January 2017 demonstrates spondolytic changes of c-spine as well as stable alignment after ACDF from C5-C7  - patient was prescribed oxycodone and lyrica by PCP at last visit (december 2017)     # Pain  - Continue tylenol PRN, for max of 3gm daily     - Continue lyrica  - hold off on starting oxycodone at this time (patient has not needed since 2/09)     # Rehab Psych  - Continue mirtazipine  - Neuropsych consult, appreciate recs     # FENA/prophy  - Diet: dysphagia diet  SLP and nutrition to monitor and adjust as necessary   - DVT prophy: Sequential compression device (Venodyne)  and Heparin  - GI ppx: None  - Bowel: colace , dulcolax suppository  and miralax PRN  - Nausea: Zofran PRn  - Supplements: MVI  - Sleep: None      CODE: Level 3: DNAR and DNI    Objective:  Nursing staff reporting: no problems    Functional Update:  Physical Therapy Occupational Therapy Speech Therapy      Transfers   Sit to Stand 5  Supervision   Additional items Increased time required;Armrests   Stand to Sit 5  Supervision   Additional items Increased time required   Ambulation/Elevation   Gait pattern Excessively slow; Short stride; Inconsistent leonidas;Decreased foot clearance   Gait Assistance 5  Supervision   Additional items Verbal cues  (at times pt runs into things with RW )   Assistive Device Rolling walker   Distance 100ft x2 with seated rest break  (TUG was performed)         Functional Mobility   Functional Mobility 4  Minimal assistance   Additional Comments ASSIST X1 USING RW -- VERBAL CUES AS PT REMAINS UNAWARE OF OBJECTS IN L VISUAL FIELD/ENVIRONMENT  PT HIT TWO OBJECTS -- DOOR AND BEDSIDE TABLE     Additional items Rolling walker   Cognition   Overall Cognitive Status Impaired   Arousal/Participation Alert   Attention Attends with cues to redirect   Orientation Level Oriented X4   Memory Within functional limits   Following Commands Follows multistep commands with increased time or repetition   Comments PT ENGAGES IN APPRROPRIATE CONVERSATION THIS AM  MORE TALKATIVE AND ENGAGED WITH THERAPIST  PT HOWEVER DOES REQUIRE MIN CUES FOR SAFETY T/O FUNCTIONAL ACTIVITY  Allergies and Medications per EMR    Physical Exam:  General: alert, no apparent distress, cooperative and comfortable  HENMT: Head: Normal, normocephalic, atraumatic  Eye: Normal external eye, conjunctiva, lids   Ears: Normal external ears  Nose: Normal external nose, mucus membranes  Pulmonary: chest expansion normal, no retractions, no accessory muscle usage  Abdomen: soft, nontender, nondistended, no masses or organomegaly  Skin/Extremity: no rashes, no erythema, no peripheral edema  Loop recorder Incisions: C/D/I, with no erythema  No striketrough into dressing  Mildly tender around incision site  Neurologic: Awake alert orientedx3   Continues to have left side facial droop  Psych: normal mood, behavior, speech, dress, and thought processes    Diagnostic Studies: reviewed, no new imaging      Vitals:  Temp:  [97 7 °F (36 5 °C)-98 1 °F (36 7 °C)] 97 9 °F (36 6 °C)  HR:  [61-73] 61  Resp:  [18-20] 20  BP: ()/(58-59) 98/58   Intake/Output Summary (Last 24 hours) at 02/15/18 1211  Last data filed at 02/15/18 0934   Gross per 24 hour   Intake              240 ml   Output              300 ml   Net              -60 ml        Laboratory:    Lab Results   Component Value Date    HGB 11 5 02/13/2018    HCT 34 3 (L) 02/13/2018    WBC 8 41 02/13/2018     Lab Results   Component Value Date    BUN 16 02/13/2018     02/13/2018    K 3 8 02/13/2018     02/13/2018    GLUCOSE 91 02/13/2018    GLUCOSE 87 02/09/2018    CREATININE 0 86 02/13/2018     Lab Results   Component Value Date    PROTIME 12 3 02/09/2018    INR 0 91 02/09/2018        Patient Active Problem List   Diagnosis    Acute ischemic right MCA stroke ([facility] [address] )    Fall    Forehead contusion    Left elbow contusion    Chronic back pain    Major depression       ** Please Note: Fluency Direct voice to text software may have been used in the creation of this document  **    [ x ] Total time spent: 30 Mins, and greater than 50% of this time was spent counseling/coordinating care

## 2018-02-15 NOTE — PROGRESS NOTES
SLP TAA       02/15/18 1150   Patient Data   Rehab Impairment Left Body Involvment (Right Brain)   Etiologic Diagnosis R MCA CVA   Date of Onset 02/09/18   Prior Level of Function   Self-Care 3  Independent - Patient completed the activities by him/herself, with or without an assistive device, with no assistance from a helper  Indoor-Mobility (Ambulation) 3  Independent - Patient completed the activities by him/herself, with or without an assistive device, with no assistance from a helper  Stairs 3  Independent - Patient completed the activities by him/herself, with or without an assistive device, with no assistance from a helper  Functional Cognition 3  Independent - Patient completed the activities by him/herself, with or without an assistive device, with no assistance from a helper  Restrictions/Precautions   Precautions Aspiration; Fall Risk   Pain Assessment   Pain Assessment No/denies pain   QI: [address] Provided by [place] No physical assistance   Eating CARE Score 6   Eating Assessment   Bedside Swallow Results Yes  (initially seen and diet was advanced to level 3/ thins)   VBS Study Results No   Food To Mouth Yes   Able To Cut Yes   Positioning Upright  (EOB)   Meal Assessed Lunch   QI: Swallowing/Nutritional Status Modified food consistency   Current Diet Dysphia III; Thin   Intake Mode PO;Self   Dentures (natural dentition  upper and lower)   Finishes Timely Yes   Opens Packages Yes   Findings Pt's swallow function appears to be grossly [facility]  Refer to SLP Rehab note for full details  Eating (FIM) 6 - Patient requires increased time or safety concern   Discharge Information   Patient's Discharge Plan Home w/ family support   Patient's Rehab Expectations "To get home asap"   Impressions Pt is a good candidate to establish safest least restrictive diet w/o increased signs/sxs of aspiration   Will recommend to upgrade diet to regular w/ thin liquids w/ brief f/u to monitor tolerance of diet upgrade     SLP Therapy Minutes   SLP Time In 1150   SLP Time Out 1713   SLP Total Time (minutes) 30   SLP Mode of treatment - Individual (minutes) 30   SLP Mode of treatment - Concurrent (minutes) 0   SLP Mode of treatment - Group (minutes) 0   SLP Mode of treatment - Co-treat (minutes) 0   SLP Mode of Teatment - Total time(minutes) 30 minutes

## 2018-02-15 NOTE — PROGRESS NOTES
02/15/18 1150   Pain Assessment   Pain Assessment No/denies pain   Restrictions/Precautions   Precautions Aspiration; Fall Risk   Swallow Information   Current Risks for Dysphagia & Aspiration Recent intubation;General debilitation;New Neuro event   Current Symptoms/Concerns Difficulty chewing   Current Diet Dysphagia advance; Thin liquid   Baseline Diet Regular; Thin liquids   Consistencies Assessed and Performance   Materials Admnistered Soft/Level 3;Regular/Solid; Thin liquid   Oral Stage WFL   Phargngeal Stage [facility]   Swallow Mechanics WFL; Good Larygneal rise   Esophageal Concerns No s/s reported   Recommendations   Diet Solid Recommendation Regular consistency   Diet Liquid Recommendation Thin liquid   Recommended Form of Meds As tolerated   General Precautions Aspiration precautions; Other (Comment)  (OOB for meals)   Compensatory Swallowing Strategies Alternate solids and liquids;Voluntary throat clear/cough to clear penetration   Results Reviewed with PAC/CRNP;PT/Family/Caregiver   QI: [address] Provided by [place] No physical assistance   Eating CARE Score 6   Swallow Assessment   Swallow Treatment Assessment Pt was seen for dysphagia assessment  Currently pt is on level 3/thin liquid diet  Pt sat EOB for meal and was mod I w/ setup and able to feed self  Consumed 75% of meal and 120cc of thin liquids by cup  Pt demonstrated functional mastication of soft/solid textures (grilled cheese with tomato, potato soup, apple pie) w/o oral residual or pocketing  No anterior spillage noted w/ consistenices  A-p transfer of thin liquids and ambient fluid from soup by cup was [facility]  Swallow initiation was prompt and hyolaryngeal elevation is WFL  No overt signs/sxs of aspiration noted w/ meal  Will recommend to upgrade diet to regular w/ thin liquids  Will f/u briefly to monitor tolerance of diet upgrade w/o increased oropharyngeal or aspiration sxs   D/w pt and pt's  about session and plan of diet advancement  Swallow Assessment Prognosis   Prognosis Good   Prognosis Considerations Co-morbidities; Medical prognosis   SLP Therapy Minutes   SLP Time In 1150   SLP Time Out 5064   SLP Total Time (minutes) 30   SLP Mode of treatment - Individual (minutes) 30   SLP Mode of treatment - Concurrent (minutes) 0   SLP Mode of treatment - Group (minutes) 0   SLP Mode of treatment - Co-treat (minutes) 0   SLP Mode of Teatment - Total time(minutes) 30 minutes   Therapy Time missed   Time missed?  No   Daily FIM Score   Eating (FIM) 6 - Patient requires increased time or safety concern

## 2018-02-15 NOTE — PROGRESS NOTES
Internal Medicine Progress Note  Patient: Alejandro Maguire  Age/sex: 47 y o  female  Medical Record #: [id number]      ASSESSMENT/PLAN:  Alejandro Maguire is seen and examined and mangement for following issues:    1   Right MCA CVA, suspect embolic, involving right basal ganglia/right insular region/right posterior parietal region:  Continue ASA 81mg qd, Lipitor 80mg qd   Follow up hypercoagulability panel        2   LOOP implant (2/13/18):  continue to watch site     3   Hypotension:  Was on Midodrine 10 mg TID which was weaned off; LD was this AM  Rene Must watch   She says chronically runs low BP as an OP     4   Depression:  Continue Remeron 15 mg qhs which will be new for her this hospital stay     5   Chronic pain with hx ACDF:  Has chr pain down arms R>L   Continue Lyrica 25 mg BID     6   Tobacco abuse:  Counseled     7   Bradycardia:  Resolved; will watch     8  Constipation:  No BM for quite some time and at least since before admit to the hospital   She states that she goes long periods at home w/o having a BM up to 2 weeks sometimes  Doesn't want Senokot or Colace  She was given Lactulose last evening and had a BM  She has had a c-scope in past and saw GI who gave her Linzess that she said did not help  May need to go home with some prn Lactulose        Subjective: Patient seen and examined   No new or overnight issues     ROS:   GI: denies abdominal pain, change bowel habits or reflux symptoms  Neuro: No new neurologic changes  Respiratory: No Cough, SOB  Cardiovascular: No CP, palpitations     Scheduled Meds:    Current Facility-Administered Medications:  acetaminophen 650 mg Oral Q6H PRN Yosi Larson MD   aspirin 81 mg Oral Daily Driss Rodriguez MD   atorvastatin 80 mg Oral QPM Driss Rodriguez MD   bisacodyl 10 mg Rectal Daily PRN Driss Rodriguez MD   docusate sodium 100 mg Oral BID PRN Driss Rodriguez MD   fish oil 1,000 mg Oral Daily Driss Rodriguez MD   mirtazapine 15 mg Oral HS Driss MERLENE Rodriguez MD   ondansetron 4 mg Oral Q6H PRN Driss Rodriguez MD   polyethylene glycol 17 g Oral Daily PRN Cata Parkinson MD   pregabalin 25 mg Oral BID Cata Parkinson MD   senna 1 tablet Oral HS PRN Cata Parkinson MD       Labs:       Results from last 7 days  Lab Units 02/13/18  0444 02/11/18  0444   WBC Thousand/uL 8 41 7 52   HEMOGLOBIN g/dL 11 5 11 2*   HEMATOCRIT % 34 3* 33 4*   PLATELETS Thousands/uL 265 194       Results from last 7 days  Lab Units 02/13/18  0444 02/11/18  0444   SODIUM mmol/L 142 143   POTASSIUM mmol/L 3 8 3 9   CHLORIDE mmol/L 106 110*   CO2 mmol/L 27 27   BUN mg/dL 16 11   CREATININE mg/dL 0 86 0 81   GLUCOSE RANDOM mg/dL 91 111   CALCIUM mg/dL 9 0 8 6       Results from last 7 days  Lab Units 02/09/18  2030   HEMOGLOBIN A1C % 5 5       Results from last 7 days  Lab Units 02/09/18  0733   INR  0 91        Glucose (mg/dL)   Date Value   02/13/2018 91   02/11/2018 111   02/10/2018 114   02/09/2018 87     Glucose, i-STAT (mg/dl)   Date Value   02/09/2018 87   02/09/2018 88       Labs reviewed    Physical Examination:  Vitals:   Vitals:    02/14/18 1607 02/14/18 2044 02/15/18 0547   BP: 99/59 110/59 98/58   BP Location: Left arm Left arm Right arm   Pulse: 73 70 61   Resp: 18 20 20   Temp: 97 7 °F (36 5 °C) 98 1 °F (36 7 °C) 97 9 °F (36 6 °C)   TempSrc: Tympanic Oral Oral   SpO2: 95% 99% 97%   Weight: 55 5 kg (122 lb 5 7 oz)     Height: 5' 1" (1 549 m)         GEN: NAD  HEENT: NC/AT, EOMI  RESP: BBS w/o crackles/wheeze/rhonci; resp unlabored  CV: +S1 S2, regular rate, no rubs/murmurs  ABD: soft, NT, ND, normal BS   : no case  EXT: no edema  Skin: no rashes  Neuro: AAO; ZUNIGA 5/5; very slight left facial droop      [x ] Total time spent: 30 Mins and greater than 50% of this time was spent counseling/coordinating care        Shelly Shaw, [address] [facility]  Internal Medicine

## 2018-02-15 NOTE — PROGRESS NOTES
OT EVALUATION      02/15/18 0730   Patient Data   Rehab Impairment Stroke left body involvment (right brain0    Etiologic Diagnosis R MCA CVA   Date of Onset 02/09/18   Home Setup   Type of Home Single Level   First Floor Bathroom Full;Tub; Shower   First Floor Bathroom Accessibility Grab bars in [address] [place] [address] Available Yes   Available Equipment Shower [address] Work Full Time   Transportation    Prior IADL Participation   Money Management Identify Money;Estimate Costs;Estimate Change;Combine Bills;Manage Checkbook   Meal Preparation Full Participation   Laundry Full Participation   Home Cleaning Full Participation   Prior Level of Function   Self-Care 3  Independent - Patient completed the activities by him/herself, with or without an assistive device, with no assistance from a helper  Indoor-Mobility (Ambulation) 3  Independent - Patient completed the activities by him/herself, with or without an assistive device, with no assistance from a helper  Stairs 3  Independent - Patient completed the activities by him/herself, with or without an assistive device, with no assistance from a helper  Functional Cognition 3  Independent - Patient completed the activities by him/herself, with or without an assistive device, with no assistance from a helper  Prior Device Used Other (comments)  (NONE)   Patient Preference   Patient Normally Goes to Sleep at 0300   Psychosocial   Psychosocial (WDL) X   Patient Behaviors/Mood Flat affect   Ability to Express Feelings Able to express   Ability to Express Needs Able to express   Ability to Express Thoughts Able to express   Ability to Understand Others Understands   Restrictions/Precautions   Precautions Bed/chair alarms; Fall Risk  (Simultaneous filing   User may not have seen previous data )   Weight Bearing Restrictions No   ROM Restrictions No   Pain Assessment   Pain Assessment 0-10   Pain Score 4   Pain Type Acute pain   Pain Location Chest   Pain Orientation Mid   Pain Descriptors Itching   Grooming   Able To Comb/Brush Hair;Wash/Dry Face;Wash/Dry Hands   Limitation Noted In Safety;Timeliness; Sequencing   Findings Pt participated in grooming while standing and sitting  Pt demo ability to to wash face with independence while seated in shower chair with independence  Pt demo ability to brush/comb hair while standing with CGA/S 2* impulsivity and decreased safety awareness  Grooming (FIM) 4 - Patient requires steadying assist or light touching   QI: Shower/Bathe Self   Assistance Needed Supervision   Assistance Provided by [place] No physical assistance   Shower/Bathe Self CARE Score 4   Bathing   Assessed Bath Style Shower   Anticipated D/C Bath Style Tub; Shower   Able to [place] No   Able to [employer] Temperature Yes   Able to Wash/Rinse/Dry (body part) Left Arm;Right Arm;L Upper Leg;R Upper Leg;L Lower Leg/Foot;R Lower Leg/Foot;Chest;Abdomen;Perineal Area; Buttocks   Limitations Noted in Safety;Timeliness   Positioning Seated;Standing   Adaptive Equipment Shower Seat;Shower Bars;Hand Held Shower   Findings  Pt participated in bathing with with the use of a shower chair with a back  Pt demo ability to participate in shower at S level  Pt demo ability to wash all body parts  Pt demo ability to wash perineal and buttocks while standing in in stance with use of removable shower head  Pt reports she has a shower stool at home and grab bars in her shower     Bathing (FIM) 5 - Patient requires supervision/monitoring but completes 10/10 parts   QI: Upper Body Dressing   Assistance Needed Supervision   Assistance Provided by [place] No physical assistance   Upper Body Dressing CARE Score 4   QI: Lower Body Dressing   Assistance Needed Incidental touching   Assistance Provided by [place] No physical assistance   Lower Body Dressing CARE Score 4   QI: Putting On/Taking Off Footwear   Assistance Needed Supervision Assistance Provided by [place] No physical assistance   Putting On/Taking Off Footwear CARE Score 4   QI: Picking Up Object   Reason if not Attempted Safety concerns   Picking Up Object CARE Score 88   Dressing/Undressing Clothing   Remove UB Clothes Rosa M  (gown)   Remove LB Clothes Pants; Undergarment;Socks   Don UB Clothes Rosa M;Pullover [place]; Undergarment;Socks; Shoes   Limitations Noted In Safety;Timeliness   Positioning Standing;Supported Sit  (unsupported sitting)   Findings Pt participated in dressing at w/c level and standing  Pt demo ability to don/doff UB clothing with S  Pt demo ability to don/doff LB clothing with CGA 2* decreased safety awareness  Pt donned socks, shoes , pants, and undergarments while sitting, and then stood to pull up pants with CGA  UB Dressing (FIM) 5 - Patient requires supervision/monitoring   LB Dressing (FIM) 4 - Patient requires steadying assist or light touching   QI: [address] Needed Incidental touching   Assistance Provided by [place] No physical assistance   Toileting Hygiene CARE Score 4   Toileting   Able to [address] down yes, up yes  Manage Hygiene Bladder   Limitations Noted In Safety   Adaptive Equipment Grab Bar   Findings Pt participated toileting with CGA 2* decreased safety awareness and impulsivity  Pt demo ability to pull pants up/down with no physical assistance and performed hygiene appropriately  Pt reports PTA she always had issues with her bladder, she often feels like she needs to go to the bathroom, but cannot go or goes very little  Pt reports she often sits on the toilet for long periods 2* to feeling the urge to go  Pt reports using bhavin pads for leakage      Toileting (FIM) 4 - Patient requires steadying assist or light touching   QI: Roll Left and Right   Assistance Needed Independent   Assistance Provided by [place] No physical assistance   Roll Left and Right CARE Score 6   Bed Mobility   Able to Roll Left to Right;Right to Left   QI: Sit to [address] to Lying CARE Score 6   QI: Lying to Sitting on Side of Bed   Assistance Needed Independent   Assistance Provided by [place] No physical assistance   Lying to Sitting on Side of Bed CARE Score 6   QI: Sit to Stand   Assistance Needed Incidental touching   Assistance Provided by [place] No physical assistance   Sit to Stand CARE Score 4   QI: Chair/Bed-to-Chair Transfer   Assistance Needed Supervision   Assistance Provided by [place] No physical assistance   Chair/Bed-to-Chair Transfer CARE Score 4   Transfer Bed/Chair/Wheelchair   Positioning Concerns Other  (impulsive )   Limitations Noted In Other  (impulsivity and safety)   Adaptive Equipment Roller Walker   Stand Pivot Contact Guard   Sit to Stand Supervision  (CGA)   Stand to Sit Supervision  (CGA)   Supine to Sit Supervision   Sit to Supine Supervision   Findings Pt demo ability to participate in transfers with CGA to S  Pt demonstrates impulsivity and decreased safety awareness 2* pt moves prior to therapist readiness  Bed, Chair, Wheelchair Transfer (FIM) 4 - Patient requires steadying assist or light touching   QI: Toilet Transfer   Assistance Needed Incidental touching   Assistance Provided by [place] No physical assistance   Toilet Transfer CARE Score 4   Toilet Transfer   Surface Assessed Standard Toilet   Transfer Technique Stand Pivot   Limitations Noted In Safety   Findings Pt demo ability to participate in toilet transfer with CGA to S  Toilet Transfer (FIM) 4 - Patient requires steadying assist or light touching   Tub/Shower Transfer   Limitations Noted In Safety   Adaptive Equipment Grab Bars   Assessed Shower   Findings Pt demo ability to participate in shower tranfer with CGA to S 2* impulsivity and decreased safety awareness     Shower Transfer (FIM) 4 - Patient requires steadying assist or light touching   Comprehension   Auditory Complex   Visual Complex   QI: Comprehension 4  Undestands: Clear comprehension without cues or repetitions   Comprehension (FIM) 6 - Understands complex/abstract but requires more time   Expression   Verbal Complex   Non-Verbal Complex   Intelligibility Sentence   QI: Expression 4  Express complex messages without difficulty and with speech that is clear and easy to [place]   Expression (FIM) 6 - Expresses complex/abstract but requires:  more time   Social Interaction   Cooperation with staff   Participation Small Group   Medications needed to control mood/behavior? Yes   Behaviors observed Appropriate   Social Interaction (FIM) 6 - Interacts appropriately with others BUT requires extra  time  (Simultaneous filing  User may not have seen previous data )   Problem Solving   Complex Manages discharge planning;Manages medications;Manages finances;Manages return to work   Routine Manages call bell;Manages ADL; [place] precautions   Problem solving (FIM) 5 - Solves basic problems 90% of time   Memory   Remember Routine Yes   Initiates Tasks Yes   Short-Term Intact   Long Term Intact   Memory (FIM) 6 - Recognizes with extra time   Strength - RUE   R Shoulder Flexion 4/5   R Shoulder Extension 4/5   R Shoulder ABduction 4/5   R Shoulder ADduction 4/5   R Elbow Flexion 4/5   R Elbow Extension (4-/5)   R Wrist Flexion 4/5   R Wrist Extension 4/5   Strength - LUE   L Shoulder Flexion 3/5   L Shoulder Extension 4/5   L Shoulder ABduction 3/5   L Shoulder ADduction 3/5   L Elbow Flexion 4/5   L Elbow Extension 4/5   L Wrist Flexion 4/5   L Wrist Extension 4/5   Coordination   Movements are Fluid and Coordinated 1   Sensation   Light Touch No apparent deficits   Propioception No apparent deficits   Cognition   Overall Cognitive Status WFL   Arousal/Participation Alert; Responsive   Attention Within functional limits   Orientation Level Oriented X4   Memory Within functional limits   Following Commands Follows multistep commands with increased time or repetition   Objective Measure   OT Findings Anxiety short form 23 and depression short form 24 both indicating it does not warrant need for psychotherapy consult  Discharge Information   Vocational Plan Return to work   Patient's Discharge Plan Pt plans to d/c home with continued family support  Patient's Rehab Expectations pt reports she would like to get home as soon as she can   Barriers to Discharge Home Decreased Endurance; Safety Considerations   Impressions Pt is 47 year, female, admitted 2/9/2018 with a stroke with left body involvement (R brain)  Etiology R MCA CVA with a PMH of anxiety, major depressive disorder, suicidal ideation, and lumbar stenosis  Pt PLOF PTA was independent with all ADLs, and iADLs, and FT work  Pt lives with  and plans to d/c home with continued family support  Pt has shower stool at home to use upon d/c and reports she has a grab bar in the shower  Pt presents with impulsivity, decreased safety awareness, decreased LUE strength, and decreased endurance  Pt demo impulsivity with transfer 2* transferring prior to therapist readiness  Pt presents with decreased safety awareness 2* improper hand placement on RW during ambulation after toileting (pt unilaterally grabbed and pushed walker in middle) Pt currently functioning at a CGA/S level    Pt would benefit from continued skilled OT services with a ELOS of 7 days with long terms goals at mod I/I    OT Therapy Minutes   OT Time In 0730   OT Time Out 0900   OT Total Time (minutes) 90   OT Mode of treatment - Individual (minutes) 90   OT Mode of treatment - Concurrent (minutes) 0   OT Mode of treatment - Group (minutes) 0   OT Mode of treatment - Co-treat (minutes) 0   OT Mode of Teatment - Total time(minutes) 90 minutes

## 2018-02-16 LAB
COMMENT: NORMAL
F5 GENE MUT ANL BLD/T: NORMAL

## 2018-02-16 PROCEDURE — 97530 THERAPEUTIC ACTIVITIES: CPT

## 2018-02-16 PROCEDURE — 92526 ORAL FUNCTION THERAPY: CPT

## 2018-02-16 PROCEDURE — 97530 THERAPEUTIC ACTIVITIES: CPT | Performed by: OCCUPATIONAL THERAPY ASSISTANT

## 2018-02-16 PROCEDURE — 92523 SPEECH SOUND LANG COMPREHEN: CPT

## 2018-02-16 PROCEDURE — 99232 SBSQ HOSP IP/OBS MODERATE 35: CPT | Performed by: PHYSICAL MEDICINE & REHABILITATION

## 2018-02-16 PROCEDURE — 97116 GAIT TRAINING THERAPY: CPT

## 2018-02-16 PROCEDURE — G0515 COGNITIVE SKILLS DEVELOPMENT: HCPCS | Performed by: OCCUPATIONAL THERAPY ASSISTANT

## 2018-02-16 PROCEDURE — 97110 THERAPEUTIC EXERCISES: CPT

## 2018-02-16 PROCEDURE — 97127 HB COGNITIVE SKILLS DEVELOPMENT, EACH 15 MUNUTES: CPT | Performed by: OCCUPATIONAL THERAPY ASSISTANT

## 2018-02-16 PROCEDURE — 97535 SELF CARE MNGMENT TRAINING: CPT | Performed by: OCCUPATIONAL THERAPY ASSISTANT

## 2018-02-16 RX ORDER — OXYCODONE HYDROCHLORIDE 5 MG/1
2.5 TABLET ORAL EVERY 4 HOURS PRN
Status: DISCONTINUED | OUTPATIENT
Start: 2018-02-16 | End: 2018-02-18 | Stop reason: HOSPADM

## 2018-02-16 RX ORDER — ASPIRIN 81 MG/1
81 TABLET, CHEWABLE ORAL DAILY
Qty: 30 TABLET | Refills: 0 | Status: SHIPPED | OUTPATIENT
Start: 2018-02-17

## 2018-02-16 RX ORDER — PRAVASTATIN SODIUM 40 MG
40 TABLET ORAL
Qty: 30 TABLET | Refills: 0 | Status: SHIPPED | OUTPATIENT
Start: 2018-02-17 | End: 2018-03-13 | Stop reason: SDUPTHER

## 2018-02-16 RX ORDER — PREGABALIN 25 MG/1
25 CAPSULE ORAL 2 TIMES DAILY
Qty: 60 CAPSULE | Refills: 0 | Status: ON HOLD | OUTPATIENT
Start: 2018-02-17 | End: 2018-04-25 | Stop reason: ALTCHOICE

## 2018-02-16 RX ORDER — MIRTAZAPINE 15 MG/1
15 TABLET, FILM COATED ORAL
Qty: 30 TABLET | Refills: 0 | Status: SHIPPED | OUTPATIENT
Start: 2018-02-16 | End: 2018-03-13 | Stop reason: SDUPTHER

## 2018-02-16 RX ORDER — OXYCODONE HYDROCHLORIDE 5 MG/1
5 TABLET ORAL EVERY 4 HOURS PRN
Status: DISCONTINUED | OUTPATIENT
Start: 2018-02-16 | End: 2018-02-18 | Stop reason: HOSPADM

## 2018-02-16 RX ADMIN — PREGABALIN 25 MG: 25 CAPSULE ORAL at 17:28

## 2018-02-16 RX ADMIN — ASPIRIN 81 MG 81 MG: 81 TABLET ORAL at 10:05

## 2018-02-16 RX ADMIN — OXYCODONE HYDROCHLORIDE 5 MG: 5 TABLET ORAL at 18:50

## 2018-02-16 RX ADMIN — PRAVASTATIN SODIUM 40 MG: 40 TABLET ORAL at 15:58

## 2018-02-16 RX ADMIN — Medication 1000 MG: at 11:39

## 2018-02-16 RX ADMIN — PREGABALIN 25 MG: 25 CAPSULE ORAL at 10:05

## 2018-02-16 RX ADMIN — MIRTAZAPINE 15 MG: 15 TABLET, FILM COATED ORAL at 21:45

## 2018-02-16 NOTE — OCCUPATIONAL THERAPY NOTE
Discussed pt  Visual spatial/L inattention and executive function deficits with SLP to spouse  Edu  Spouse recommendation of S and A prn with IADL tasks upon d/c  Pt  Spouse receptive and able/willing to A upon d/c  Edu  Pt  Spouse current deficits  Recommend OP OT to address executive function, visual spatial deficits, L inattention, and work simulation  CM notified and planned to s/u OP OT at [facility] site  Planned d/c Sunday 2/18/18

## 2018-02-16 NOTE — PROGRESS NOTES
SLP TAA     02/16/18 1400   Patient Data   Rehab Impairment Left Body Involvement (Right Brain)   Etiologic Diagnosis R MCA CVA   Date of Onset 02/09/18   Prior IADL Participation   Money Management Identify Money;Estimate Costs;Estimate Change;Combine Bills;Manage Checkbook   Meal Preparation Full Participation   Laundry Full Participation   Home Cleaning Full Participation   Prior Level of Function   Self-Care 3  Independent - Patient completed the activities by him/herself, with or without an assistive device, with no assistance from a helper  Indoor-Mobility (Ambulation) 3  Independent - Patient completed the activities by him/herself, with or without an assistive device, with no assistance from a helper  Stairs 3  Independent - Patient completed the activities by him/herself, with or without an assistive device, with no assistance from a helper  Functional Cognition 3  Independent - Patient completed the activities by him/herself, with or without an assistive device, with no assistance from a helper  Prior Device Used (none)   Restrictions/Precautions   Precautions Fall Risk   Pain Assessment   Pain Assessment No/denies pain   Pain Score No Pain   Comprehension   Assist Devices Glasses   Auditory Basic;Complex   Visual Basic   Findings Pt completed formalized cognitive linguistic assessment w/ results correlating to skills that are [facility] at this time  See SLP Rehab note for full details  QI: Comprehension 4  Undestands: Clear comprehension without cues or repetitions   Comprehension (FIM) 6 - Understands complex/abstract but requires more time   Expression   Verbal Basic;Complex   Non-Verbal Basic;Complex   Intelligibility Sentence   Findings Pt completed formalized cognitive linguistic assessment w/ results correlating to skills that are [facility] at this time  See SLP Rehab note for full details  QI: Expression 4   Express complex messages without difficulty and with speech that is clear and easy to understan   Expression (FIM) 6 - Expresses complex/abstract but requires:  more time   Social Interaction   Cooperation with staff   Participation Individual   Behaviors observed Appropriate   Findings Pt cooperative and participatory throughout assessment  Social Interaction (FIM) 6 - Interacts appropriately with others BUT requires extra  time   Problem Solving   Routine Manages call bell;[place] precautions;Manages ADL   Findings Pt completed formalized cognitive linguistic assessment w/ results correlating to skills that are [facility] at this time  See SLP Rehab note for full details  Problem solving (FIM) 5 - Solves complex problems But requires cues from helper   Memory   Recognize People Yes   Remember Routine Yes   Initiates Tasks Yes   Short-Term Intact   Long Term Intact   Findings Pt completed formalized cognitive linguistic assessment w/ results correlating to skills that are [facility] at this time  See SLP Rehab note for full details  Memory (FIM) 5 - Recalls/performs request 90% of time   Cognition   Overall Cognitive Status WFL   Arousal/Participation Alert; Cooperative   Attention Within functional limits   Orientation Level Oriented X4   Memory Within functional limits   Following Commands Follows multistep commands with increased time or repetition   Comments Pt completed formalized cognitive linguistic assessment w/ results correlating to skills that are [facility] at this time  See SLP Rehab note for full details  Discharge Information   Patient's Discharge Plan Home w/ family support   Patient's Rehab Expectations "To go home as soon as possible"   Barriers to Discharge Home Safety Considerations;Decreased Endurance   Impressions While pt found to have mild deficits in executive functions and visuospatial, overall cognitive linguistic skills were found to be WNL based on results of the CLQT  Following d/c home, pt will have supervision and assistance w/ IADL tasks from  and sister   Based on formal and informal assessment, pt demonstrating overall functional cognitive linguistic skills, therefore, skilled ST intervention is not warranted at this time      SLP Therapy Minutes   SLP Time In 1400   SLP Time Out 1430   SLP Total Time (minutes) 30   SLP Mode of treatment - Individual (minutes) 30   SLP Mode of treatment - Concurrent (minutes) 0   SLP Mode of treatment - Group (minutes) 0   SLP Mode of treatment - Co-treat (minutes) 0   SLP Mode of Teatment - Total time(minutes) 30 minutes

## 2018-02-16 NOTE — PCC SPEECH THERAPY
Pt previously being followed for dysphagia therapy where pt was on level 3 diet with thin liquids on admission  Pt has recently been progressed and is tolerating a regular diet with thin liquids without clinical s/s aspiration or increased s/s of oral or pharyngeal dysphagia  Pt has also completed CLQT w/ results correlating to overall cognitive linguistic skills that are WNL at time of assessment in comparison to age matched peers  Of note, pt found to have mild deficits in executive functions and visuospatial skills, however, following d/c home, pt will have supervision and assistance w/ IADL tasks from  and sister  Based on formal and informal assessment, pt demonstrating overall functional cognitive linguistic skills, therefore, skilled ST intervention is not warranted for cognitive linguistic therapy or dysphagia therapy at this time

## 2018-02-16 NOTE — DISCHARGE INSTR - APPOINTMENTS
[address] [phone] to schedule outpatient OT Wed 2/21 at 4pm  Arrive 15 minutes early  Written instruction provided to patient

## 2018-02-16 NOTE — SOCIAL WORK
CM spoke to Paras Hobbs at [facility] [phone] to schedule outpatient OT Wed 2/21 at 4pm  Arrive 15 minutes early  Written instruction provided to patient   Per Paras Hobbs, therapists will evaluate for previous foot therapy and will contact therapist at [facility]

## 2018-02-16 NOTE — PROGRESS NOTES
Physical Medicine and Rehabilitation Progress Note:  Stroke:  01 1  Left Body Involvement (Right Brain)  Mckay Collins 47 y o  female MRN: [id number]  Unit/Bed#: -75 Encounter: [id number]    Assessment:   Mckay Collins is a 47 y o  female who presented to the [address] with left sided weakness  Subsequently found to have a right basal ganglia, right insular region, and right posterior parietal region infarction,  for which she underwent an endovascular thrombectomy  She was accepted to the [facility] on 2/14/18  Subjective: Patient seen and examined at bedside  Patient asking if sutures of loop recorder incision site can be trimmed as they are bothersome  Otherwise no other complaints  Doing very well in therapies  ROS: A 10-point ROS was performed  Negative except as listed above  Disposition: Home 2/18     Plan:    # Stroke: right MCA infarct  - Acute comprehensive interdisciplinary inpatient rehabilitation including PT, OT, SLP, RN, CM, SW, dietary, psychology, etc   - Appreciate Internal Medicine following - Dr Zhane Malave service  - S/p thrombectomy   - continue ASA 81mg   - patient refusing atorvastatin, will switch to pravastatin   - s/p loop recorder placement on 2/13, f/u with device clinic as outpatient  Removed dressing, covered lightly with 2x2  Discussed with EP, ok to trim sutures (will be removed on 2 week f/u)       # Hypotension/bradycardia  - Patient was weaned off midodrine on acute service  - continue to monitor closely     Temp:  [97 9 °F (36 6 °C)-100 5 °F (38 1 °C)] 97 9 °F (36 6 °C)  HR:  [64-76] 74  Resp:  [16-20] 16  BP: ()/(48-74) 108/74     # Leukocytosis (improved)  - VS stable, no fever noted  - continue to monitor       Results from last 7 days  Lab Units 02/13/18  0444 02/11/18  0444 02/10/18  0433   WBC Thousand/uL 8 41 7 52 10 85*     # Plantar fasciitis  - patient reports ordering orthotics, family to bring in once delivered to home  - continue with regular stretching activities in therapy     # History of ACDF in 2013  -  MRI c-spine in January 2017 demonstrates spondolytic changes of c-spine as well as stable alignment after ACDF from C5-C7  - patient was prescribed oxycodone and lyrica by PCP at last visit (december 2017)     # Pain  - Continue tylenol PRN, for max of 3gm daily     - Continue lyrica  - hold off on starting oxycodone at this time (patient has not needed since 2/09)     # Rehab Psych  - Continue mirtazipine  - Neuropsych consult, appreciate recs     # FENA/prophy  - Diet: dysphagia diet  SLP and nutrition to monitor and adjust as necessary   - DVT prophy: Sequential compression device (Venodyne)  and Heparin  - GI ppx: None  - Bowel: colace , dulcolax suppository  and miralax PRN  - Nausea: Zofran PRn  - Supplements: MVI  - Sleep: None      CODE: Level 3: DNAR and DNI    Objective:  Nursing staff reporting: no problems    Functional Update:  Physical Therapy Occupational Therapy Speech Therapy      Transfers   Sit to Stand 5  Supervision   Additional items Increased time required;Armrests   Stand to Sit 5  Supervision   Additional items Increased time required   Ambulation/Elevation   Gait pattern Excessively slow; Short stride; Inconsistent leonidas;Decreased foot clearance   Gait Assistance 5  Supervision   Additional items Verbal cues  (at times pt runs into things with RW )   Assistive Device Rolling walker   Distance 100ft x2 with seated rest break  (TUG was performed)         Functional Mobility   Functional Mobility 4  Minimal assistance   Additional Comments ASSIST X1 USING RW -- VERBAL CUES AS PT REMAINS UNAWARE OF OBJECTS IN L VISUAL FIELD/ENVIRONMENT  PT HIT TWO OBJECTS -- DOOR AND BEDSIDE TABLE     Additional items Rolling walker   Cognition   Overall Cognitive Status Impaired   Arousal/Participation Alert   Attention Attends with cues to redirect   Orientation Level Oriented X4   Memory Within functional limits Following Commands Follows multistep commands with increased time or repetition   Comments PT ENGAGES IN APPRROPRIATE CONVERSATION THIS AM  MORE TALKATIVE AND ENGAGED WITH THERAPIST  PT HOWEVER DOES REQUIRE MIN CUES FOR SAFETY T/O FUNCTIONAL ACTIVITY  Allergies and Medications per EMR    Physical Exam:  General: alert, no apparent distress, cooperative and comfortable  HENMT: Head: Normal, normocephalic, atraumatic  Eye: Normal external eye, conjunctiva, lids   Ears: Normal external ears  Nose: Normal external nose, mucus membranes  Pulmonary: chest expansion normal, no retractions, no accessory muscle usage  Abdomen: soft, nontender, nondistended, no masses or organomegaly  Skin/Extremity: no rashes, no erythema, no peripheral edema  Loop recorder Incisions: C/D/I, with no erythema  No striketrough into dressing  Mildly tender around incision site  Neurologic: Awake alert orientedx3   Continues to have left side facial droop  Psych: normal mood, behavior, speech, dress, and thought processes    Diagnostic Studies: reviewed, no new imaging      Vitals:  Temp:  [97 9 °F (36 6 °C)-100 5 °F (38 1 °C)] 97 9 °F (36 6 °C)  HR:  [64-76] 74  Resp:  [16-20] 16  BP: ()/(48-74) 108/74   Intake/Output Summary (Last 24 hours) at 02/16/18 1334  Last data filed at 02/16/18 1258   Gross per 24 hour   Intake              660 ml   Output              451 ml   Net              209 ml        Laboratory:    Lab Results   Component Value Date    HGB 11 5 02/13/2018    HCT 34 3 (L) 02/13/2018    WBC 8 41 02/13/2018     Lab Results   Component Value Date    BUN 16 02/13/2018     02/13/2018    K 3 8 02/13/2018     02/13/2018    GLUCOSE 91 02/13/2018    GLUCOSE 87 02/09/2018    CREATININE 0 86 02/13/2018     Lab Results   Component Value Date    PROTIME 12 3 02/09/2018    INR 0 91 02/09/2018        Patient Active Problem List   Diagnosis    Acute ischemic right MCA stroke ([facility] [address] )    Fall    Forehead contusion    Left elbow contusion    Chronic back pain    Major depression       ** Please Note: Fluency Direct voice to text software may have been used in the creation of this document  **    [ x ] Total time spent: 30 Mins, and greater than 50% of this time was spent counseling/coordinating care

## 2018-02-16 NOTE — PROGRESS NOTES
02/16/18 1150   Pain Assessment   Pain Assessment No/denies pain   Restrictions/Precautions   Precautions Fall Risk   Swallow Information   Current Risks for Dysphagia & Aspiration Recent intubation;General debilitation;New Neuro event   Current Symptoms/Concerns Difficulty chewing   Current Diet Dysphagia advance; Thin liquid   Baseline Diet Regular; Thin liquids   Consistencies Assessed and Performance   Materials Admnistered Soft/Level 3;Regular/Solid; Thin liquid   Oral Stage WFL   Phargngeal Stage [facility]   Swallow Mechanics WFL; Good Larygneal rise   Esophageal Concerns No s/s reported   Recommendations   Diet Solid Recommendation Regular consistency   Diet Liquid Recommendation Thin liquid   Recommended Form of Meds As tolerated   General Precautions Aspiration precautions;Upright as possible for all oral intake  (OOB for meals )   Compensatory Swallowing Strategies Alternate solids and liquids;Voluntary throat clear/cough to clear penetration   Results Reviewed with RN;PT/Family/Caregiver   QI: [address] Provided by [place] No physical assistance   Eating CARE Score 6   Swallow Assessment   Swallow Treatment Assessment Pt seen for f/u dysphagia tx session to assess and monitor tolerance of recent diet upgrade to regular diet items  Pt seen during lunch, sitting EOB w/ a tuna salad platter consisting of tuna salad, raw carrots, cucumbers, celery, tomato and lettuce, along w/ cream of potato soup  Pt consumed ~50% of meal during session, along w/ ~120cc thin liquids by straw sip  Pt was independent w/ tray set up, but was mod I overall due to need for increased time to complete meal  Demonstrated ability to self feed w/o difficulty, noting adequate bolus retrieval  Mastication was overall effective and complete across all items, noting functional bolus formation w/o oral residual or pocketing   A-p transfers of thins appeared prompt w/ initiation of swallows appearing overall timely across all textures/consistencies assessed  No clinical s/s aspiration observed w/ meal  Pt appears to be tolerating regular diet and thin liquids w/o s/s of oral or pharyngeal dysphagia and has achieved safest and least restrictive diet, therefore, further skilled ST services to dysphagia tx are not warranted to continue at this time  Swallow Assessment Prognosis   Prognosis Good   Prognosis Considerations Co-morbidities; Medical prognosis   SLP Therapy Minutes   SLP Time In 1150   SLP Time Out 9242   SLP Total Time (minutes) 25   SLP Mode of treatment - Individual (minutes) 25   SLP Mode of treatment - Concurrent (minutes) 0   SLP Mode of treatment - Group (minutes) 0   SLP Mode of treatment - Co-treat (minutes) 0   SLP Mode of Teatment - Total time(minutes) 25 minutes   Therapy Time missed   Time missed?  No   Daily FIM Score   Eating (FIM) 6 - Patient requires increased time or safety concern

## 2018-02-16 NOTE — PROGRESS NOTES
SLP Cognitive Linguistic Assessment       18 1400   Pain Assessment   Pain Assessment No/denies pain   Pain Score No Pain   Restrictions/Precautions   Precautions Fall Risk   Cognitive Linguisitic Assessments   Cognitive Linquistic Quick Test (CLQT) Pt completed CLQT w/ Composite Severity Score of 3 6 out of 4 0, correlating to overall cognitive linguistic skills that are WNL at time of assessment in comparison to age matched peers  Pt's cognitive domain scores are as follows: Attention-WNL, Memory-WNL, Executive Functions-mild deficits, Language-WNL, Visuospatial Skills-mild deficits, and Clock Drawing-WNL  Pt scored at or above criterion cut score for 6/10 tasks assessed  Of note, pt at times required increased time for processing  Executive Function Skills   Insight Mild insight   Impulsive [facility]   Task Initiation WFL   Organization WFL   Processing Speed Delayed  (mildly slower)   Perseveration Not present   Memory Skills   Orientation Level Oriented X4   Long Term Biographical Recall WFL - Within Functional Limits   Short Term Recall of Paragraph WFL - Within Functional Limits   Short Term Working Recall Mild Impairment   Memory (FIM) 5 - Recalls/performs request 90% of time   Social Interaction (FIM) 6 - Interacts appropriately with others BUT requires extra  time   Auditory Comprehension   Word Level Comprehension WFL   Yes/No Questions WFL   Commands WFL   Comphrehends Conversation Simple   Interfering Components Processing speed   EffectiveTechniques Extra processing time   Speech/Language/Cognition Assessmetn   Treatment Assessment Pt oriented x4 to begin session where pt also demonstrated LTM recall by accurately stating , age, address, phone number, family member names/relationships and work history  During assessment, pt demonstrated adequate attention to tasks but at times required extended time for processing and eliciting responses   While pt found to have mild deficits in executive functions and visuospatial, overall cognitive linguistic skills were found to be WNL based on results of the CLQT  Following d/c home, pt will have supervision and assistance w/ IADL tasks from  and sister  Based on formal and informal assessment, pt demonstrating overall functional cognitive linguistic skills, therefore, skilled ST intervention is not warranted at this time  SLP Therapy Minutes   SLP Time In 1400   SLP Time Out 1430   SLP Total Time (minutes) 30   SLP Mode of treatment - Individual (minutes) 30   SLP Mode of treatment - Concurrent (minutes) 0   SLP Mode of treatment - Group (minutes) 0   SLP Mode of treatment - Co-treat (minutes) 0   SLP Mode of Teatment - Total time(minutes) 30 minutes   Therapy Time missed   Time missed?  No   Daily FIM Score   Problem solving (FIM) 5 - Solves complex problems But requires cues from helper   Comprehension (FIM) 6 - Understands complex/abstract but requires more time   Expression (FIM) 6 - Expresses complex/abstract but requires:  more time

## 2018-02-16 NOTE — PROGRESS NOTES
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, involving right basal ganglia/right insular region/right posterior parietal region:  Continue ASA 81mg qd, Lipitor 80mg qd   Follow up hypercoagulability panel        2   LOOP implant (2/13/18):  continue to watch site     3   Hypotension:  Was on Midodrine 10 mg TID which was weaned off; LD was this AM  Oral Colonel watch   She says chronically runs low BP as an OP; will check otho BPs but she has no sx dizziness     4   Depression:  Continue Remeron 15 mg qhs which will be new for her this hospital stay     5   Chronic pain with hx ACDF:  Has chronic pain down arms R>L   Continue Lyrica 25 mg BID     6   Tobacco abuse:  Counseled     7   Bradycardia:  Resolved; will watch     8  Constipation:  No BM for quite some time and at least since before admit to the hospital   She states that she goes long periods at home w/o having a BM up to 2 weeks sometimes  Doesn't want Senokot or Colace  She was given Lactulose on 2/14 and had a BM  She has had a c-scope in past and saw GI who gave her Linzess that she said did not help  May need to go home with some prn Lactulose        Subjective: Patient seen and examined   No new or overnight issues; has no complaints    Scheduled Meds:    Current Facility-Administered Medications:  acetaminophen 650 mg Oral Q6H PRN Dc Christopher MD   aspirin 81 mg Oral Daily Driss Rodriguez MD   bisacodyl 10 mg Rectal Daily PRN Driss Rodriguez MD   docusate sodium 100 mg Oral BID PRN Driss Rodriguez MD   fish oil 1,000 mg Oral Daily Driss Rodriguez MD   mirtazapine 15 mg Oral HS Driss Rodriguez MD   ondansetron 4 mg Oral Q6H PRN Driss Rodriguez MD   polyethylene glycol 17 g Oral Daily PRN Driss Rodriguez MD   pravastatin 40 mg Oral Daily With Licha Mae MD   pregabalin 25 mg Oral BID Gunner Nelson MD   senna 1 tablet Oral HS PRN Gunner Nelson MD       Labs:       Results from last 7 days  Lab Units 02/13/18  0444 02/11/18  0444   WBC Thousand/uL 8 41 7 52   HEMOGLOBIN g/dL 11 5 11 2*   HEMATOCRIT % 34 3* 33 4*   PLATELETS Thousands/uL 265 194       Results from last 7 days  Lab Units 02/13/18  0444 02/11/18  0444   SODIUM mmol/L 142 143   POTASSIUM mmol/L 3 8 3 9   CHLORIDE mmol/L 106 110*   CO2 mmol/L 27 27   BUN mg/dL 16 11   CREATININE mg/dL 0 86 0 81   GLUCOSE RANDOM mg/dL 91 111   CALCIUM mg/dL 9 0 8 6       Results from last 7 days  Lab Units 02/09/18 2030   HEMOGLOBIN A1C % 5 5              Glucose (mg/dL)   Date Value   02/13/2018 91   02/11/2018 111   02/10/2018 114   02/09/2018 87     Glucose, i-STAT (mg/dl)   Date Value   02/09/2018 87   02/09/2018 88       Labs reviewed    Physical Examination:  Vitals:   Vitals:    02/15/18 1323 02/15/18 2013 02/15/18 2300 02/16/18 0600   BP: 95/50 139/60  (!) 94/48   BP Location: Left arm Right arm  Right arm   Pulse: 64 76  68   Resp: 18 20  16   Temp: 97 8 °F (36 6 °C) 100 5 °F (38 1 °C) 99 3 °F (37 4 °C) 97 9 °F (36 6 °C)   TempSrc: Oral Oral Oral Oral   SpO2: 98% 98%  99%   Weight:       Height:           GEN: NAD  HEENT: NC/AT, EOMI  RESP: BBS w/o crackles/wheeze/rhonci; resp unlabored  CV: +S1 S2, regular rate, no rubs/murmurs  ABD: soft, NT, ND, normal BS   : no case  EXT: no edema  Skin: no rashes  Neuro: AAO; ZUNIGA 5/5; very slight left facial droop      [x ] Total time spent: 30 Mins and greater than 50% of this time was spent counseling/coordinating care        Kasi Baum   Internal Medicine

## 2018-02-16 NOTE — PLAN OF CARE
DISCHARGE PLANNING     Discharge to home or other facility with appropriate resources Progressing        INFECTION - ADULT     Absence or prevention of progression during hospitalization Progressing     Absence of fever/infection during neutropenic period Progressing        Nutrition/Hydration-ADULT     Nutrient/Hydration intake appropriate for improving, restoring or maintaining nutritional needs Progressing        PAIN - ADULT     Verbalizes/displays adequate comfort level or baseline comfort level Progressing        Potential for Falls     Patient will remain free of falls Progressing        SAFETY ADULT     Maintain or return to baseline ADL function Progressing     Maintain or return mobility status to optimal level Progressing

## 2018-02-16 NOTE — PROGRESS NOTES
02/16/18 1330   Pain Assessment   Pain Assessment No/denies pain   Pain Score No Pain   Restrictions/Precautions   Precautions Fall Risk   Subjective   Subjective pt reports ready for PT session     Bed Mobility   Findings indep level    Transfer Bed/Chair/Wheelchair   Bed, Chair, Wheelchair Transfer (FIM) 6 - Patient requires assistive device/extra time/safety concerns but completes independently   Ambulation   Does the patient walk? 2  Yes   Primary Discharge Mode of Locomotion Walk   Walk Assist Level Distant Supervision   Distance Walked (feet) 300 ft   Limitations Noted In Heel Strike   Walking (FIM) 5 - Patient requires supervision/monitoring AND distance 150 feet or more, no rest   Stairs   Type Stairs   # of Steps 12   Stairs (FIM) 5 - Patient requires supervision/monitoring AND goes up and down full flight (12- 14 stairs)   Toilet Transfer   Toilet Transfer (FIM) 0 - Activity does not occur   Equipment Use   NuStep LEVEL 2 for 13 min    Assessment   Treatment Assessment pt focus on walking balance box tapping , cup tapping laterial shifting , side to side walking backward , inc speed slow down , ascending and descending  steps w/o any hand rail to inc balance awareness  pt will cont toward goals    Plan   Treatment/Interventions Functional transfer training;LE strengthening/ROM; Therapeutic exercise; Endurance training;Patient/family training;Gait training   Progress Progressing toward goals   PT Therapy Minutes   PT Time In 1330   PT Time Out 1400   PT Total Time (minutes) 30   PT Mode of treatment - Individual (minutes) 30   PT Mode of treatment - Concurrent (minutes) 0   PT Mode of treatment - Group (minutes) 0   PT Mode of treatment - Co-treat (minutes) 0   PT Mode of Teatment - Total time(minutes) 30 minutes   Therapy Time missed   Time missed?  No

## 2018-02-16 NOTE — PROGRESS NOTES
02/16/18 0830   Pain Assessment   Pain Assessment 0-10   Pain Score No Pain   Restrictions/Precautions   Precautions Aspiration; Fall Risk   Subjective   Subjective pt states having no pain at this time willing to participate in PT session this AM    QI: Lying to Sitting on Side of Bed   Assistance Needed Independent   Lying to Sitting on Side of Bed CARE Score 6   QI: Sit to [address]   Sit to Stand CARE Score 6   Bed Mobility   Findings Indep level    QI: Chair/Bed-to-Chair Transfer   Assistance Needed Independent   Chair/Bed-to-Chair Transfer CARE Score 6   Transfer Bed/Chair/Wheelchair   Limitations Noted In LE Strength; Sequencing;Coordination   Adaptive Equipment None   Stand Pivot Modified Independent   Sit to Stand Modified Independent   Stand to Sit Modified Independent   Supine to Sit Independent   Sit to Supine Independent   Findings BRP LEVEL    Bed, Chair, Wheelchair Transfer (FIM) 6 - Patient requires assistive device/extra time/safety concerns but completes independently   QI: Car Transfer   Assistance Needed Set-up / clean-up   Car Transfer CARE Score 5   QI: Walk 150 Feet   Assistance Needed Set-up / clean-up   Walk 150 Feet CARE Score 5   Ambulation   Does the patient walk? 2   Yes   Primary Discharge Mode of Locomotion Walk   Walk Assist Level Distant Supervision   Gait Pattern (none )   Assist Device Other  (a little laterial sway and dec heel strike )   Distance Walked (feet) 500 ft   Limitations Noted In Heel Strike   Findings DS to CS level dennis with head turns different step pattern    Walking (FIM) 5 - Patient requires supervision/monitoring AND distance 150 feet or more, no rest   QI: 1 Step (Curb)   Assistance Needed Set-up / clean-up   1 Step (Curb) CARE Score 5   Stairs   Type Stairs;Curb;Ramp   # of Steps 24   Assist Devices Single Rail   Stairs (FIM) 5 - Patient requires supervision/monitoring AND goes up and down full flight (12- 14 stairs)   QI: Picking Up Object   Assistance Needed Set-up / clean-up   Picking Up Object CARE Score 5   Toilet Transfer   Toilet Transfer (FIM) 0 - Activity does not occur   Therapeutic Interventions   Strengthening mini squats 2 x10    Flexibility PROM B/L LE    Balance standing balance laterial stepping  walking backward  and forward side to side    Other challenge pt with head turns during walking    Equipment Use   NuStep level 2 for 12 min    Assessment   Treatment Assessment pt focus on inc ambulation endurance and overall balance and safety , pt perform thex ex's as above , inc walking activities with work mode activities carrying a 10 lb duffle bag , coffee cup of water in one hand and water bottle in the other during ambulation 350 feet  Pt  will cont to benefit with PT session    Problem List Decreased endurance;Decreased mobility; Decreased coordination   Plan   Treatment/Interventions Functional transfer training;LE strengthening/ROM; Therapeutic exercise;Elevations; Endurance training;Patient/family training;Bed mobility;Gait training   Progress Progressing toward goals   PT Therapy Minutes   PT Time In 0830   PT Time Out 0930   PT Total Time (minutes) 60   PT Mode of treatment - Individual (minutes) 60   PT Mode of treatment - Concurrent (minutes) 0   PT Mode of treatment - Group (minutes) 0   PT Mode of treatment - Co-treat (minutes) 0   PT Mode of Teatment - Total time(minutes) 60 minutes   Therapy Time missed   Time missed?  No

## 2018-02-16 NOTE — PROGRESS NOTES
Occupational Therapy Treatment Note:       02/16/18 1000   Pain Assessment   Pain Assessment No/denies pain   Pain Score No Pain   Restrictions/Precautions   Precautions Fall Risk   Weight Bearing Restrictions No   ROM Restrictions No   QI: Putting On/Taking Off Footwear   Assistance Needed Supervision   Assistance Provided by [place] No physical assistance   Putting On/Taking Off Footwear CARE Score 4   QI: Picking Up Object   Assistance Needed Supervision   Assistance Provided by [place] No physical assistance   Comment No AD; pt completed item retrieval from floor level with CS and no noted LOB  Picking Up Object CARE Score 4   QI: Lying to Sitting on Side of Bed   Assistance Needed Independent   Assistance Provided by [place] No physical assistance   Lying to Sitting on Side of Bed CARE Score 6   QI: Sit to [address] Provided by [place] No physical assistance   Comment No AD  Sit to Stand CARE Score 6   QI: Chair/Bed-to-Chair Transfer   Assistance Needed Independent   Assistance Provided by [place] No physical assistance   Comment No AD  Chair/Bed-to-Chair Transfer CARE Score 6   Transfer Bed/Chair/Wheelchair   Adaptive Equipment None   Stand Pivot Modified Independent   Sit to Stand Other  ([place])   Stand to Sit Other  ([place])   Supine to Sit Other  ([place])   Findings Pt completed functional mobility without use of an AD from pt room to OT gym at [place] level  Bed, Chair, Wheelchair Transfer (FIM) 6 - Patient requires assistive device/extra time/safety concerns but completes independently   Meal Prep   Meal Prep Level of Assistance Close supervision;Distant supervision   Meal Preparation Pt completed light meal prep task of preparing a pot of coffee at S level; VCs warranted to not grasp cup with finger inside while pouring hot coffee in order to decrease risk for burns      Kitchen Mobility   Kitchen Activity Retrieve items;Transport items   Kitchen Mobility Comments Pt reported at baseline she would purchase half gallon vs gallon containers for easier transport  Discussed rguntbo-ot-[id number] method for transporting heavier items; pt verbalized good carryover and understanding  Pt able to retrieve items from refigerator, high/low cabinets, microwave, and oven without use of an AD at S level  VCs warranted to steady self on countertop with dynamic reach below the waist vs  from movable oven door  Money Management   Money Management Level of Assistance Minimum assistance   Money Management Pt completed simulated financial mgmt handouts on check writing with S, and billing and checkbook registry with [place]  Recommending pt's  provide supervision/assist upon initial return to home in order to ensure no errors  Health Management   Health Management Level of Assistance Minimum assistance   Health Management Pt completed simulated medication mgmt task using her current medications and am/pm style pill organizer, as well as handouts on product and prescription labels, with [place]  Pt reported to take pills directly from pill container at baseline; recommending pt use weekly pill organizer and receive supervision/assist from  upon initial return to home in order to ensure no errors  Cognition   Overall Cognitive Status Impaired  (Mild impairment questionable  )   Arousal/Participation Alert; Cooperative   Attention Within functional limits   Orientation Level Oriented X4   Memory Decreased short term memory   Following Commands Follows multistep commands with increased time or repetition   Additional Activities   Additional Activities Comments Pt reported she feels one of her barriers at this time is low endurance, which she reported as an on-going limitation that she experienced at baseline  Reviewed EC techniques and provided pt with handout for reference; pt verbalized good understanding      Activity Tolerance   Activity Tolerance Patient tolerated treatment well Assessment   Treatment Assessment OT tx sessions focused on bed mobility, transfers, standing balance, functional mobility, LB dressing task of sock and shoe mgmt, item retrieval, IADL tasks of finanical mgmt, medication mgmt, kitchen mobility, and light meal prep, discussion of EC techniques, and overall activity tolerance/endurance  Refer above for details on pt performance  Pt would benefit from continued skilled OT services in order to achieve highest functional abilities  Prognosis Good   Problem List Decreased strength;Decreased endurance; Impaired balance;Decreased mobility; Decreased coordination;Decreased safety awareness   Plan   Treatment/Interventions ADL retraining;Functional transfer training;LE strengthening/ROM; Endurance training;Cognitive reorientation;Patient/family training;Equipment eval/education; Bed mobility; Compensatory technique education;OT   Progress Progressing toward goals   Recommendation   OT Discharge Recommendation Home with family support   OT Therapy Minutes   OT Time In 1000   OT Time Out 1130   OT Total Time (minutes) 90   OT Mode of treatment - Individual (minutes) 90   OT Mode of treatment - Concurrent (minutes) 0   OT Mode of treatment - Group (minutes) 0   OT Mode of treatment - Co-treat (minutes) 0   OT Mode of Teatment - Total time(minutes) 90 minutes   Therapy Time missed   Time missed?  No   Mykel Delacruz, [address]

## 2018-02-17 PROCEDURE — 97535 SELF CARE MNGMENT TRAINING: CPT

## 2018-02-17 PROCEDURE — 97110 THERAPEUTIC EXERCISES: CPT

## 2018-02-17 PROCEDURE — 97530 THERAPEUTIC ACTIVITIES: CPT

## 2018-02-17 PROCEDURE — 97116 GAIT TRAINING THERAPY: CPT

## 2018-02-17 RX ORDER — LACTULOSE 20 G/30ML
20 SOLUTION ORAL DAILY PRN
Status: DISCONTINUED | OUTPATIENT
Start: 2018-02-17 | End: 2018-02-18 | Stop reason: HOSPADM

## 2018-02-17 RX ADMIN — OXYCODONE HYDROCHLORIDE 2.5 MG: 5 TABLET ORAL at 08:56

## 2018-02-17 RX ADMIN — LACTULOSE 20 G: 20 SOLUTION ORAL at 13:18

## 2018-02-17 RX ADMIN — PRAVASTATIN SODIUM 40 MG: 40 TABLET ORAL at 17:12

## 2018-02-17 RX ADMIN — PREGABALIN 25 MG: 25 CAPSULE ORAL at 08:56

## 2018-02-17 RX ADMIN — ASPIRIN 81 MG 81 MG: 81 TABLET ORAL at 08:56

## 2018-02-17 RX ADMIN — PREGABALIN 25 MG: 25 CAPSULE ORAL at 17:12

## 2018-02-17 RX ADMIN — MIRTAZAPINE 15 MG: 15 TABLET, FILM COATED ORAL at 21:35

## 2018-02-17 RX ADMIN — Medication 1000 MG: at 08:56

## 2018-02-17 NOTE — TREATMENT PLAN
Individualized Plan of [address] 47 y o  female MRN: [id number]  Unit/Bed#: -01 Encounter: [id number]     PATIENT INFORMATION  ADMISSION DATE: 2/14/2018  4:06 PM MIKAEL CATEGORY:Stroke:  01 1  Left Body Involvement (Right Brain)   ADMISSION DIAGNOSIS: CVA (cerebral vascular accident) ([facility] [address] ) [I63 [id number]]  EXPECTED LOS: 5-7days     MEDICAL/FUNCTIONAL PROGNOSIS  Based on my assessment of the patient's medical conditions and current functional status, the prognosis for attaining medical and functional goals or the IRF stay is:  Good    Medical Goals: Patient will be medically stable for discharge to [facility] upon completion of rehab program    [address]: Home - with supervision  Is a 24-hr caregiver available? Yes  Has discharge plan been discussed with primary caregiver? Yes  Date of Discussion: admission    ANTICIPATED FOLLOW-UP SERVICE:   Outpatient Therapy Services: PT, OT and SLP          DISCIPLINE SPECIFIC PLANS:  Required Disciplines & Services: Rehabillitation Nursing, Case Management, Dietay/Nutrition and Psychology    REQUIRED THERAPY:  Therapy Hours per Day Days per Week Total Days   Physical Therapy 1 5 5   Occupational Therapy 1 5 5   Speech/Language Therapy 1 5 5   NOTE: Additional therapy time(s) may be added as appropriate to meet patient needs and to achieve functional goals      Patient will either participate in above therapy regimen or participate in 900 minutes of therapy within 7 day week consisting of PT, OT and SLP due to the following medical procedure/condition:Stroke:  01 1  Left Body Involvement (Right Brain)    ANTICIPATED FUNCTIONAL OUTCOMES:  ADL:   Patient will be independent withADLSwith least restrictive device upon completion of rehab program   Bladder/Bowel: Patient will be independent with bladder/bowel management with least restrictive device upon completion of rehab program   Transfers: Patient will be independent with transfers with least restrictive device upon completion of rehab program   Locomotion: Patient will be independent with locomotion with least restrictive device upon completion of rehab program   Cognitive:  patient to return to premorbid level of cognition     DISCHARGE PLANNING NEEDS  Equipment needs: Discharge needs to be reviewed with team    REHAB ANTICIPATED PARTICIPATION RESTRICTIONS:  None

## 2018-02-17 NOTE — PROGRESS NOTES
02/17/18 1400   Pain Assessment   Pain Assessment 0-10   Pain Score 2   Restrictions/Precautions   Precautions Fall Risk   Subjective   Subjective pt agreeable to perform PT session    QI: Roll Left and Right   Assistance Needed Independent   Roll Left and Right CARE Score 6   QI: Sit to [address] to Lying CARE Score 6   QI: Lying to Sitting on Side of Bed   Assistance Needed Independent   Lying to Sitting on Side of Bed CARE Score 6   QI: Sit to Stand   Assistance Needed Independent   Sit to Stand CARE Score 6   Bed Mobility   Findings Indep level    QI: Chair/Bed-to-Chair Transfer   Assistance Needed Independent   Chair/Bed-to-Chair Transfer CARE Score 6   Transfer Bed/Chair/Wheelchair   Limitations Noted In LE Strength; Coordination   Findings Indep level    Bed, Chair, Wheelchair Transfer (FIM) 7 - No helper, safely, timely and completes all tasks independently   QI: Car Transfer   Assistance Needed Independent   Car Transfer CARE Score 6   QI: Walk 10 Feet   Assistance Needed Independent   Walk 10 Feet CARE Score 6   QI: Walk 50 Feet with Two Turns   Assistance Needed Independent   Walk 50 Feet with Two Turns CARE Score 6   QI: Walk 150 Feet   Assistance Needed Independent   Walk 150 Feet CARE Score 6   QI: Walking 10 Feet on Uneven Surfaces   Assistance Needed Independent   Walking 10 Feet on Uneven Surfaces CARE Score 6   Ambulation   Does the patient walk? 2   Yes   Primary Discharge Mode of Locomotion Walk   Walk Assist Level Independent   Gait Pattern Improper weight shift   Assist Device Other  (none )   Distance Walked (feet) 999 ft   Limitations Noted In Heel Strike   Walking (FIM) 7 - No helper, safely, timely and completes all tasks independently AND distance 150 feet or more, no rest   QI: Wheel 50 Feet with Two Turns   Reason if not Attempted Activity not applicable   Wheel 50 Feet with Two Turns CARE Score 9   QI: Wheel 150 Feet   Reason if not Attempted Activity not applicable   Wheel 323 Feet CARE Score 9   Wheelchair mobility   QI: Does the patient use a wheelchair? 0  No   Wheelchair (FIM) 0 - Activity does not occur   QI: 1 Step (Curb)   Assistance Needed Independent   1 Step (Curb) CARE Score 6   QI: 4 Steps   Assistance Needed Independent   4 Steps CARE Score 6   QI: 12 Steps   Assistance Needed Independent   12 Steps CARE Score 6   Stairs   Type Stairs   # of Steps 24   Assist Devices Single Rail   Stairs (FIM) 6 - Patient requires assistive device/extra time/safety concerns but completes independently AND goes up and down full flight (12- 14 stairs)   QI: Picking Up Object   Reason if not Attempted Activity not applicable   Picking Up Object CARE Score 9   QI: Toilet Transfer   Assistance Needed Independent   Toilet Transfer CARE Score 6   Toilet Transfer   Toilet Transfer (FIM) 7 - No helper, safely, timely and completes all tasks independently   Therapeutic Interventions   Strengthening 2# SAQ SLR HIP ADD/ABD X20 bridges x10 with SLR    Balance standing balance awareness  ,    Other ambulation with head turns 360 stop and go side to side walking heel/toe walking no LOB    Assessment   Treatment Assessment pt perform thex ex;s functional activities/mobility and gait training without AD ,pt had no LOB but needs vc's 1-2-3 steps , pt family instructed with ascending and descending FF stairs with her  and pt  spoke with OT about cognition deficitis   Pt will cont benefit with PT session and f/u with MD post D/C    Problem List Decreased cognition   Plan   Treatment/Interventions Functional transfer training;LE strengthening/ROM; Elevations; Therapeutic exercise; Endurance training;Cognitive reorientation;Patient/family training;Bed mobility;Gait training   Progress Progressing toward goals   PT Therapy Minutes   PT Time In 1400   PT Time Out 1500   PT Total Time (minutes) 60   PT Mode of treatment - Individual (minutes) 30   PT Mode of treatment - Concurrent (minutes) 30   PT Mode of treatment - Group (minutes) 0   PT Mode of treatment - Co-treat (minutes) 0   PT Mode of Teatment - Total time(minutes) 60 minutes   Therapy Time missed   Time missed?  No

## 2018-02-17 NOTE — PROGRESS NOTES
02/17/18 0900   QI: Oral Hygiene   Assistance Needed Independent   Assistance Provided by [place] No physical assistance   Oral Hygiene CARE Score 6   Grooming   Able To Initiate Tasks;Comb/Brush Hair;Wash/Dry Face;Wash/Dry Hands   Grooming (FIM) 7 - No helper, safely, timely and completes all tasks independently   QI: Shower/Bathe Self   Assistance Needed Adaptive equipment  (shower seat)   Assistance Provided by [place] No physical assistance   Shower/Bathe Self CARE Score 6   Bathing   Assessed Bath Style Tub   Anticipated D/C Bath Style Tub   Able to Adjust Water Temperature Yes   Able to Wash/Rinse/Dry (body part) Left Arm;Right Arm;L Upper Leg;R Upper Leg;L Lower Leg/Foot;R Lower Leg/Foot;Chest;Abdomen;Perineal Area; Buttocks   Bathing (FIM) 6 - Patient requires assistive device/extra time/safety concerns but completes independently  (shower seat)   QI: Upper Body [address] Provided by [place] No physical assistance   Upper Body Dressing CARE Score 6   QI: Lower Body [address] Provided by [place] No physical assistance   Lower Body Dressing CARE Score 6   QI: Putting On/Taking Off [address] Provided by [place] No physical assistance   Putting On/Taking Off Footwear CARE Score 6   Dressing/Undressing Clothing   Able to  Obtain Clothing;Store removed clothing   Remove UB Clothes Jacket;Pullover Shirt; Undershirt   Remove LB Clothes Pants;Socks; Undergarment   Don UB Clothes Pullover Shirt;Rosa M;Bra   [address]; Undergarment;Socks   UB Dressing (FIM) 7 - No helper, safely, timely and completes all tasks independently   LB Dressing (FIM) 7 - No helper, safely, timely and completes all tasks independently   QI: Roll Left and Right   Assistance Needed Independent   Assistance Provided by [place] No physical assistance   Roll Left and Right CARE Score 6   QI: Lying to Sitting on Side of [address] Provided by [place] No physical assistance   Lying to Sitting on Side of Bed CARE Score 6   QI: Sit to [address] Provided by [place] No physical assistance   Sit to Stand CARE Score 6   QI: Chair/Bed-to-Chair Transfer   Assistance Needed Independent   Assistance Provided by [place] No physical assistance   Chair/Bed-to-Chair Transfer CARE Score 6   Transfer Bed/Chair/Wheelchair   Bed, Chair, Wheelchair Transfer (FIM) 7 - No helper, safely, timely and completes all tasks independently   QI: [address] [place] Provided by [place] No physical assistance   [address] Score 6   Toileting   Able to [address] down yes, up yes  Manage Hygiene Bladder   Toileting (FIM) 7 - No helper, safely, timely and completes all tasks independently   QI: [address] Provided by [place] No physical assistance   Toilet Transfer CARE Score 6   Toilet Transfer   Toilet Transfer (FIM) 7 - No helper, safely, timely and completes all tasks independently   Assessment   Treatment Assessment Pt participated in skilled OT with focus on self care and therex to improve ROM to cervicle region  see above for self care details  Education provided on exercises to improve ROM and decrease stiffness to neck, G carryover demonstrated  Educated pt on kitchen safety and body positioning to improve posture and safety during gardening tasks  Pt tolerated session well  Continue skileld OT with focus on safe d/c to home        OT Therapy Minutes   OT Time In 0900   OT Time Out 1030   OT Total Time (minutes) 90   OT Mode of treatment - Individual (minutes) 90   OT Mode of treatment - Concurrent (minutes) 0   OT Mode of treatment - Group (minutes) 0   OT Mode of treatment - Co-treat (minutes) 0   OT Mode of Teatment - Total time(minutes) 90 minutes   Therapy Time missed   Time missed?  No

## 2018-02-17 NOTE — PROGRESS NOTES
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:  Continue ASA 81mg qd, Lipitor 80mg qd   Hypercoag panel negative        2   LOOP implant (2/13/18):  continue to watch site  Pt reports that the sutures are bothersome      3   Hypotension:  Was on Midodrine 10 mg TID which was weaned off; LD was this AM  [employer] watch   She says chronically runs low BP as an OP  Ortho BPs negative      4   Depression:  Continue Remeron 15 mg qhs which will be new for her this hospital stay     5   Chronic pain with hx ACDF:  Has chronic pain down arms R>L   Continue Lyrica 25 mg BID     6   Tobacco abuse:  Counseled     7   Bradycardia:  Resolved; will watch     8  Constipation:  No BM for quite some time and at least since before admit to the hospital   She states that she goes long periods at home w/o having a BM up to 2 weeks sometimes  Doesn't want Senokot or Colace  She was given Lactulose on 2/14 and had a BM  She has had a c-scope in past and saw GI who gave her Linzess that she said did not help  Pt requesting prn Lactulose  Subjective: Patient seen and examined  Pt reports that the sutures at the LOOP site are bothersome  It was explained that they need to remain in for 14 days  She denies any other complaints      Scheduled Meds:    Current Facility-Administered Medications:  acetaminophen 650 mg Oral Q6H PRN Saul Whiteside MD   aspirin 81 mg Oral Daily Driss Rodriguez MD   bisacodyl 10 mg Rectal Daily PRN Driss Rodriguez MD   docusate sodium 100 mg Oral BID PRN Driss Rodriguez MD   fish oil 1,000 mg Oral Daily Driss Rodriguez MD   mirtazapine 15 mg Oral HS Driss Rodriguez MD   ondansetron 4 mg Oral Q6H PRN Driss Rodriguez MD   oxyCODONE 2 5 mg Oral Q4H PRN Driss Rodriguez MD   oxyCODONE 5 mg Oral Q4H PRN Driss Rodriguez MD   polyethylene glycol 17 g Oral Daily PRN Driss Rodriguez MD   pravastatin 40 mg Oral Daily With Corinna Ahuja MD   pregabalin 25 mg Oral BID José Angel MD   senna 1 tablet Oral HS PRN José Angel MD       Labs:       Results from last 7 days  Lab Units 02/13/18  0444 02/11/18  0444   WBC Thousand/uL 8 41 7 52   HEMOGLOBIN g/dL 11 5 11 2*   HEMATOCRIT % 34 3* 33 4*   PLATELETS Thousands/uL 265 194       Results from last 7 days  Lab Units 02/13/18  0444 02/11/18  0444   SODIUM mmol/L 142 143   POTASSIUM mmol/L 3 8 3 9   CHLORIDE mmol/L 106 110*   CO2 mmol/L 27 27   BUN mg/dL 16 11   CREATININE mg/dL 0 86 0 81   GLUCOSE RANDOM mg/dL 91 111   CALCIUM mg/dL 9 0 8 6                  Glucose (mg/dL)   Date Value   02/13/2018 91   02/11/2018 111   02/10/2018 114   02/09/2018 87     Glucose, i-STAT (mg/dl)   Date Value   02/09/2018 87   02/09/2018 88       Labs reviewed    Physical Examination:  Vitals:   Vitals:    02/16/18 1152 02/16/18 1413 02/16/18 2053 02/17/18 0606   BP: 108/74 112/66 115/55 111/56   BP Location: Left arm Right arm Right arm Right arm   Pulse: 74 65 78 68   Resp:  18 20 20   Temp:  98 3 °F (36 8 °C) 98 °F (36 7 °C) 97 9 °F (36 6 °C)   TempSrc:  Oral Oral Oral   SpO2:  99% 99% 97%   Weight:       Height:           GEN: NAD  HEENT: NC/AT, EOMI  RESP: BBS w/o crackles/wheeze/rhonci; resp unlabored  CV: +S1 S2, regular rate, no rubs/murmurs  ABD: soft, NT, ND, normal BS   : no case  EXT: no edema  Skin: no rashes  Neuro: AAO; ZUNIGA 5/5; very slight left facial droop      [ X ] Total time spent: 30 Mins and greater than 50% of this time was spent counseling/coordinating care        Cassie Engle PA-C  Internal Medicine

## 2018-02-17 NOTE — PROGRESS NOTES
02/17/18 1100   Pain Assessment   Pain Assessment 0-10   Pain Score 3   Pain Location Back   Pain Orientation Lower;Right   Restrictions/Precautions   Precautions Fall Risk   Subjective   Subjective pt agreeable to perform PT session    Transfer Bed/Chair/Wheelchair   Adaptive Equipment None   Findings Indep level    Bed, Chair, Wheelchair Transfer (FIM) 7 - No helper, safely, timely and completes all tasks independently   QI: Walk 50 Feet with Two Turns   Assistance Needed Independent   Walk 50 Feet with Two Turns CARE Score 6   QI: Walk 150 Feet   Assistance Needed Independent   Walk 150 Feet CARE Score 6   QI: Walking 10 Feet on Uneven Surfaces   Assistance Needed Independent   Walking 10 Feet on Uneven Surfaces CARE Score 6   Ambulation   Does the patient walk? 2  Yes   Primary Discharge Mode of Locomotion Walk   Walk Assist Level Independent   Distance Walked (feet) 400 ft   Limitations Noted In Heel Strike   Walking (FIM) 7 - No helper, safely, timely and completes all tasks independently AND distance 150 feet or more, no rest   Wheelchair mobility   QI: Does the patient use a wheelchair? 0  No   Wheelchair (FIM) 0 - Activity does not occur   Toilet Transfer   Toilet Transfer (FIM) 7 - No helper, safely, timely and completes all tasks independently   Equipment Use   NuStep level 2 for 20 min    Assessment   Treatment Assessment pt perform gait training w/o AD and perform Nustep as above to inc B/L LE strengthening  and conditioning , Pt will cont to benefit with PT    Problem List Decreased cognition   Plan   Treatment/Interventions Functional transfer training;LE strengthening/ROM; Therapeutic exercise; Endurance training;Cognitive reorientation;Gait training;Bed mobility; Patient/family training   Progress Progressing toward goals   PT Therapy Minutes   PT Time In 1100   PT Time Out 1130   PT Total Time (minutes) 30   PT Mode of treatment - Individual (minutes) 0   PT Mode of treatment - Concurrent (minutes) 30   PT Mode of treatment - Group (minutes) 0   PT Mode of treatment - Co-treat (minutes) 0   PT Mode of Teatment - Total time(minutes) 30 minutes

## 2018-02-18 VITALS
TEMPERATURE: 97.8 F | SYSTOLIC BLOOD PRESSURE: 101 MMHG | HEIGHT: 61 IN | WEIGHT: 122.36 LBS | DIASTOLIC BLOOD PRESSURE: 56 MMHG | HEART RATE: 70 BPM | OXYGEN SATURATION: 99 % | RESPIRATION RATE: 16 BRPM | BODY MASS INDEX: 23.1 KG/M2

## 2018-02-18 PROCEDURE — 99239 HOSP IP/OBS DSCHRG MGMT >30: CPT | Performed by: PHYSICAL MEDICINE & REHABILITATION

## 2018-02-18 RX ADMIN — Medication 1000 MG: at 08:50

## 2018-02-18 RX ADMIN — ASPIRIN 81 MG 81 MG: 81 TABLET ORAL at 08:50

## 2018-02-18 RX ADMIN — PREGABALIN 25 MG: 25 CAPSULE ORAL at 08:50

## 2018-02-18 NOTE — PROGRESS NOTES
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:  Continue ASA 81mg qd, Lipitor 80mg qd   Hypercoag panel negative  DC planned for today      2   LOOP implant (2/13/18):  continue to watch site  Pt reports that the sutures are bothersome      3   Hypotension:  Was on Midodrine 10 mg TID which was weaned off   Will watch   She says chronically runs low BP as an OP  Ortho BPs negative      4   Depression:  Continue Remeron 15 mg qhs which will be new for her this hospital stay     5   Chronic pain with hx ACDF:  Has chronic pain down arms R>L   Continue Lyrica 25 mg BID     6   Tobacco abuse:  Counseled     7   Bradycardia:  Resolved; will watch     8  Constipation:  No BM for quite some time and at least since before admit to the hospital   She states that she goes long periods at home w/o having a BM up to 2 weeks sometimes  Doesn't want Senokot or Colace  She was given Lactulose on 2/14 and had a BM  She has had a c-scope in past and saw GI who gave her Linzess that she said did not help  Given lactulose yesterday  Subjective: Patient seen and examined  Patient is looking for discharge today  She states she did not sleep well last night  She could not get comfortable  She denies any other complaints      Scheduled Meds:    Current Facility-Administered Medications:  acetaminophen 650 mg Oral Q6H PRN Zelda Herndon MD   aspirin 81 mg Oral Daily Driss Rodriguez MD   bisacodyl 10 mg Rectal Daily PRN Driss Rodriguez MD   docusate sodium 100 mg Oral BID PRN Driss Rodriguez MD   fish oil 1,000 mg Oral Daily Driss Rodriguez MD   lactulose 20 g Oral Daily PRN Denia Hung PA-C   mirtazapine 15 mg Oral HS Latoya He MD   ondansetron 4 mg Oral Q6H PRN Driss Rodriguez MD   oxyCODONE 2 5 mg Oral Q4H PRN Driss Rodriguez MD   oxyCODONE 5 mg Oral Q4H PRN Driss Rodriguez MD   polyethylene glycol 17 g Oral Daily PRN Driss Rodriguez MD   pravastatin 40 mg Oral Daily With Dinner Latoya He MD   pregabalin 25 mg Oral BID Latoya He MD   senna 1 tablet Oral HS PRN Latoya He MD       Labs:       Results from last 7 days  Lab Units 02/13/18  0444   WBC Thousand/uL 8 41   HEMOGLOBIN g/dL 11 5   HEMATOCRIT % 34 3*   PLATELETS Thousands/uL 265       Results from last 7 days  Lab Units 02/13/18  0444   SODIUM mmol/L 142   POTASSIUM mmol/L 3 8   CHLORIDE mmol/L 106   CO2 mmol/L 27   BUN mg/dL 16   CREATININE mg/dL 0 86   GLUCOSE RANDOM mg/dL 91   CALCIUM mg/dL 9 0                  Glucose (mg/dL)   Date Value   02/13/2018 91   02/11/2018 111   02/10/2018 114   02/09/2018 87     Glucose, i-STAT (mg/dl)   Date Value   02/09/2018 87   02/09/2018 88       Labs reviewed    Physical Examination:  Vitals:   Vitals:    02/17/18 1429 02/17/18 1448 02/17/18 1933 02/18/18 0535   BP: (!) 82/50 94/50 96/52 101/56   BP Location: Right arm  Left arm Right arm   Pulse: 67  71 70   Resp: 20  16 16   Temp: 97 5 °F (36 4 °C)  (!) 96 5 °F (35 8 °C) 97 8 °F (36 6 °C)   TempSrc: Oral  Tympanic Oral   SpO2: 97%  99% 99%   Weight:       Height:           GEN: NAD  HEENT: NC/AT, EOMI  RESP: BBS w/o crackles/wheeze/rhonci; resp unlabored  CV: +S1 S2, regular rate, no rubs/murmurs  ABD: soft, NT, ND, normal BS   : no case  EXT: no edema  Skin: no rashes  Neuro: AAO; ZUNIGA 5/5; very slight left facial droop      [ X ] Total time spent: 30 Mins and greater than 50% of this time was spent counseling/coordinating care        Fredy Holly PA-C  Internal Medicine

## 2018-02-18 NOTE — NURSING NOTE
Discharge instruction reviewed with pt and , all questions answered, pt taking multiple belongings home, Crockpot, a lot of food items, personal care items, scripts given, pt  Concerned about stiches, will f/u in office about stitches  PCA will help pt out with belongings

## 2018-02-18 NOTE — DISCHARGE INSTRUCTIONS
Please note you are restricted from driving/operating a motorized vehicle/operating heavy machinery/etc until you are cleared through a formal driving evaluation  This service is offered through [facility] driving evaluation program on [address] however this evaluation can be done at a site of your choosing  Please contact your family doctor for a referral when your family doctor clears you to perform this evaluation once your neurologic/physical deficits have stabilized  Please see your doctors listed in the follow up providers section of your discharge paperwork, and take the discharge paperwork with you to your appointments    Please note changes may have been made to your medications please refer to your discharge paperwork for your current medications and take this list with you to all your doctors appointments for your doctors to review    Please note the following incidental findings were found during your recent hospitalization please discuss them with your doctors so that they may arrange any tests/referrals as they deem necessary:   · Depression: you were started on an anti-depressant during your acute hospital stay  Please follow-up with your family physician upon discharge  · Loop recorder placement - please follow-up with cadriology device clinic for follow-up of your loop recorder      Unless specifically noted in your medication list provided to you in your discharge paper work, please discuss with your family doctor prior to resuming any vitamins/supplements you may have been taking prior to your hospitalization     Please note a summary of your hospital stay with relevant information for your doctors has been sent to them, please confirm with your doctors at your follow up visits that they have received this summary and have them contact [address] [place] [address] if they have not received them along with any other medical records they may require

## 2018-02-19 ENCOUNTER — APPOINTMENT (OUTPATIENT)
Dept: PHYSICAL THERAPY | Age: 53
End: 2018-02-19
Payer: COMMERCIAL

## 2018-02-19 ENCOUNTER — TRANSITIONAL CARE MANAGEMENT (OUTPATIENT)
Dept: INTERNAL MEDICINE CLINIC | Age: 53
End: 2018-02-19

## 2018-02-19 LAB
F2 GENE MUT ANL BLD/T: NORMAL
Lab: NORMAL

## 2018-02-19 RX ORDER — LACTULOSE 20 G/30ML
20 SOLUTION ORAL AS NEEDED
COMMUNITY
Start: 2018-02-14 | End: 2018-02-23 | Stop reason: SDUPTHER

## 2018-02-19 NOTE — PCC OCCUPATIONAL THERAPY
Pt progressed to mod I for bathining and independent for all other UB/LB ADLs including toileting  Pt barriers included L sided inattention, executive function, visio spatial,  decreased safety awareness, strength, endurance, mobility and coordination  Throughout stay and with skilled Ot services, pt approved upon barriers, and was safe to d/c home with continued family support at mod I level  OT spoke to spouse regarding recommendations upon d/c for supervision with iADL tasks 2* decreased executive function, safety awareness, and L sided inattention  Pt will d/c with OP OT services to address above deficits

## 2018-02-19 NOTE — CASE MANAGEMENT
Team dc summary - pt made great progress in a short amount of time  Pt returned home w/spouse and contd outpt occupational therapy at [address]  appmt scheduled for pt  No dme needs identified  Spouse present daily and for dc instructions and is aware of pts functional ability

## 2018-02-19 NOTE — PROGRESS NOTES
Results look good, no change to current therapy  No clear increased risk to make clots compared with the average person  Also, does she have her hospital follow up appointment set yet? If not, please facilitate

## 2018-02-19 NOTE — PHYSICAL THERAPY NOTE
Discharge Summary  Pt discharged home with family  Pt made excellent functional progress and will return home w/o assistive device  Safely ambulates an negotiates stairs  Pt will continue with outpt OT services only  All goals met  Safe d/c home

## 2018-02-19 NOTE — PCC PHYSICAL THERAPY
Pt has made excellent functional progress  Demonstrates good overall balance with appropriate righting reactions  Pt able to safely perform all functional transfers and ambulate w/o AD and plan for d/c home with family and no need for continued skilled physical therapy

## 2018-02-19 NOTE — TEAM CONFERENCE
Acute RehabilitationTeam Conference Note  Date: 2/19/2018   Time: 2:25 PM       Patient Name:  Marcio Almanza       Medical Record Number: [id number]   YOB: 1965  Sex: Female          Room/Bed:  [facility] 966-01  Payor Info:  Payor: BLUE CROSS / Plan: [facility] PLAN 361 / Product Type: Blue Fee for Service /      Admitting Diagnosis: CVA (cerebral vascular accident) ([facility] ) [I63 9]   Admit Date/Time:  2/14/2018  4:06 PM  Admission Comments: No comment available     Primary Diagnosis:  Acute ischemic right MCA stroke ([facility] )  Principal Problem: Acute ischemic right MCA stroke [facility])    Patient Active Problem List    Diagnosis Date Noted    Chronic back pain 02/15/2018    Major depression 02/15/2018    Acute ischemic right MCA stroke ([facility] ) 02/09/2018    Fall 02/09/2018    Forehead contusion 02/09/2018    Left elbow contusion 02/09/2018       Physical Therapy:         No notes on file    Occupational Therapy:  Eating: Independent  Grooming: Independent  Bathing: Modified Independent  Bathing: Modified Independent  Upper Body Dressing: Independent  Lower Body Dressing: Independent  Toileting: Independent  Tub/Shower Transfer: Independent  Toilet Transfer: Independent  Cognition: Within Defined Limits  Orientation: Person, Place, Time, Situation  Discharge Recommendations: Home with:  [address] with[de-identified] 24 Hour Supervision, Family Support       Pt progressed to mod I for bathining and independent for all other UB/LB ADLs including toileting  Pt barriers included L sided inattention, executive function, visio spatial,  decreased safety awareness, strength, endurance, mobility and coordination  Throughout stay and with skilled Ot services, pt approved upon barriers, and was safe to d/c home with continued family support at mod I level  OT spoke to spouse regarding recommendations upon d/c for supervision with iADL tasks 2* decreased executive function, safety awareness, and L sided inattention   Pt will d/c with OP OT services to address above deficits  Speech Therapy:  Mode of Communication: Verbal  Cognition: Within Defined Limits     Orientation: Person, Place, Time, Situation  Swallowing: Within Defined Limits  Diet Recommendations: Regular Diet, Thin  Discharge Recommendations: Home with:  [address] with[de-identified] 24 Hour Supervision, Family Support  Pt previously being followed for dysphagia therapy where pt was on level 3 diet with thin liquids on admission  Pt has recently been progressed and is tolerating a regular diet with thin liquids without clinical s/s aspiration or increased s/s of oral or pharyngeal dysphagia  Pt has also completed CLQT w/ results correlating to overall cognitive linguistic skills that are WNL at time of assessment in comparison to age matched peers  Of note, pt found to have mild deficits in executive functions and visuospatial skills, however, following d/c home, pt will have supervision and assistance w/ IADL tasks from  and sister  Based on formal and informal assessment, pt demonstrating overall functional cognitive linguistic skills, therefore, skilled ST intervention is not warranted for cognitive linguistic therapy or dysphagia therapy at this time  Nursing Notes:  Appetite: Fair  Diet Type: Regular/House                           Type of Wound (LDA): Incision           Incision 02/09/18 Groin Right; Anterior (Active)   Incision Description Clean;Dry; Intact 2/18/2018  8:50 AM   Roseanna-wound Assessment Clean;Dry; Intact 2/18/2018  8:50 AM   Closure Open to air 2/18/2018  8:50 AM   Drainage Amount None 2/18/2018  8:50 AM   Drainage Description Bloody 2/10/2018  4:00 PM   Treatments Other (Comment) 2/15/2018 10:05 AM   Dressing Open to air 2/18/2018  8:50 AM   Patient Tolerance Tolerated well 2/17/2018  8:56 AM   Dressing Status Clean;Dry; Intact 2/18/2018  8:50 AM       Incision 02/13/18 Chest Left;Mid (Active)   Incision Description Clean;Dry; Intact 2/18/2018  8:50 AM Roseanna-wound Assessment Clean;Dry; Intact 2/18/2018  8:50 AM   Closure Sutures 2/18/2018  8:50 AM   Drainage Amount None 2/18/2018  8:50 AM   Drainage Description Serosanguineous 2/14/2018  8:00 AM   Treatments Other (Comment) 2/17/2018  8:56 AM   Dressing Dry dressing 2/15/2018 10:05 AM   Dressing Changed Changed 2/17/2018  8:56 AM   Patient Tolerance Tolerated well 2/16/2018 10:10 AM   Dressing Status Dry; Intact; Clean 2/15/2018 10:05 AM                                Pain Location: Back  Pain Orientation: Lower, Right  Pain Score: 0                       Hospital Pain Intervention(s): Rest          No notes on file    Case Management:     Discharge Planning  Living Arrangements: Spouse/significant other  Support Systems: Spouse/significant other, Family members  Assistance Needed: PT/OT  Type of Current Residence: Private residence  Current [address] : No  Pt made quick progress and returned home spouse at independent level  Pt will be continuing with outpt occupational therapy at [address]  Spouse present daily and aware of pts functional status  Is the patient actively participating in therapies? yes  List any modifications to the treatment plan:     Barriers Interventions   All functional barriers resolved                      Is the patient making expected progress toward goals?  yes  List any update or changes to goals:     Medical Goals: Patient will be medically stable for discharge to [facility] upon completion of rehab program and Patient will be able to manage medical conditions and comorbid conditions with medications and follow up upon completion of rehab program    Weekly Team Goals:   Rehab Team Goals  Bowel/Bladder Team Goal: Patient will be independent with bladder/bowel management with least restrictive device upon completion of rehab program  Transfer Team Goal: Patient will be independent with transfers with least restrictive device upon completion of rehab program  Locomotion Team Goal: Patient will be independent with locomotion with least restrictive device upon completion of rehab program    Discussion: in attendance to review pts progress is rn pt ot slp cm and physician  Pt made great quick progress and is returning home mod I with spouse  Recommendations for contd outpt occupational therapy to be done at [address]       Anticipated Discharge Date:  2/19/18

## 2018-02-19 NOTE — PCC CARE MANAGEMENT
Pt made quick progress and returned home spouse at independent level  Pt will be continuing with outpt occupational therapy at [address]  Spouse present daily and aware of pts functional status

## 2018-02-19 NOTE — SPEECH THERAPY NOTE
SLP Discharge Summary    Pt was assessed for both dysphagia and cognitive linguistic skills  Upon arrival to the [facility], pt's initial diet was level 3 with thin liquids  She was quickly advanced to regular diet w/ thin liquids upon completing initial dysphagia assessment  She continued to demonstrate tolerance of regular diet and thin liquids upon brief f/u and no longer required dysphagia tx services  Pt was also assessed for cognitive linguistic deficits, where she completed the CLQT  When compared to age matched peers between the ages 22-73, pt's overall severity of cognitive skills deemed her to be WNL, but due to wanting to return to work, recommending to complete outpatient OT services to target return to work

## 2018-02-21 ENCOUNTER — APPOINTMENT (OUTPATIENT)
Dept: RADIOLOGY | Facility: MEDICAL CENTER | Age: 53
End: 2018-02-21
Payer: COMMERCIAL

## 2018-02-21 ENCOUNTER — OFFICE VISIT (OUTPATIENT)
Dept: OBGYN CLINIC | Facility: MEDICAL CENTER | Age: 53
End: 2018-02-21
Payer: COMMERCIAL

## 2018-02-21 ENCOUNTER — OFFICE VISIT (OUTPATIENT)
Dept: OCCUPATIONAL THERAPY | Facility: CLINIC | Age: 53
End: 2018-02-21
Payer: COMMERCIAL

## 2018-02-21 ENCOUNTER — TELEPHONE (OUTPATIENT)
Dept: NEUROLOGY | Facility: CLINIC | Age: 53
End: 2018-02-21

## 2018-02-21 VITALS
DIASTOLIC BLOOD PRESSURE: 67 MMHG | SYSTOLIC BLOOD PRESSURE: 100 MMHG | HEIGHT: 61 IN | WEIGHT: 118 LBS | BODY MASS INDEX: 22.28 KG/M2 | HEART RATE: 71 BPM

## 2018-02-21 DIAGNOSIS — M72.2 PLANTAR FASCIITIS: ICD-10-CM

## 2018-02-21 DIAGNOSIS — M79.644 THUMB PAIN, RIGHT: ICD-10-CM

## 2018-02-21 DIAGNOSIS — I63.511 ACUTE ISCHEMIC RIGHT MCA STROKE (HCC): Primary | ICD-10-CM

## 2018-02-21 DIAGNOSIS — M65.311 TRIGGER FINGER OF RIGHT THUMB: Primary | ICD-10-CM

## 2018-02-21 PROCEDURE — 99214 OFFICE O/P EST MOD 30 MIN: CPT | Performed by: FAMILY MEDICINE

## 2018-02-21 PROCEDURE — 97166 OT EVAL MOD COMPLEX 45 MIN: CPT

## 2018-02-21 PROCEDURE — G8985 CARRY GOAL STATUS: HCPCS

## 2018-02-21 PROCEDURE — 20600 DRAIN/INJ JOINT/BURSA W/O US: CPT | Performed by: FAMILY MEDICINE

## 2018-02-21 PROCEDURE — 73140 X-RAY EXAM OF FINGER(S): CPT

## 2018-02-21 PROCEDURE — G8984 CARRY CURRENT STATUS: HCPCS

## 2018-02-21 RX ORDER — LIDOCAINE HYDROCHLORIDE 10 MG/ML
1 INJECTION, SOLUTION INFILTRATION; PERINEURAL
Status: COMPLETED | OUTPATIENT
Start: 2018-02-21 | End: 2018-02-21

## 2018-02-21 RX ORDER — TRIAMCINOLONE ACETONIDE 40 MG/ML
20 INJECTION, SUSPENSION INTRA-ARTICULAR; INTRAMUSCULAR
Status: COMPLETED | OUTPATIENT
Start: 2018-02-21 | End: 2018-02-21

## 2018-02-21 RX ADMIN — LIDOCAINE HYDROCHLORIDE 1 ML: 10 INJECTION, SOLUTION INFILTRATION; PERINEURAL at 11:31

## 2018-02-21 RX ADMIN — TRIAMCINOLONE ACETONIDE 20 MG: 40 INJECTION, SUSPENSION INTRA-ARTICULAR; INTRAMUSCULAR at 11:31

## 2018-02-21 NOTE — PROGRESS NOTES
1  Thumb pain, right  XR thumb right first digit-min 2v   2  Trigger finger of right thumb     3  Plantar fasciitis       Orders Placed This Encounter   Procedures    Small joint arthrocentesis    XR thumb right first digit-min 2v        Imaging Studies (I personally reviewed results in PACS):  X-ray right hand:  02/21/2018:  No acute osseous abnormality  IMPRESSION:  Right thumb trigger finger status post injection today  Right plantar fasciitis injection 01/09/2018 return of symptoms  Recent ischemic CVA      Return in about 6 weeks (around 4/4/2018)  Patient Instructions   reviewed red flags with patient regarding injection including infection, skin dimpling, hypo-pigmentation, and nerve damage  patient expressed understanding and agreed to proceed with procedure  educated if any symptoms including fevers, chills, swelling, or worsening symptoms occur then to call office or go to hospital for immediate care if physician unavailable  patient expressed understanding and agreed to plan  Instructed patient return to physical therapy for plantar fasciitis          CHIEF COMPLAINT:  Follow-up plantar fasciitis and complaint trigger finger    HPI:  Amanda Davey is a 47 y o  female  who presents for follow-up plantar fasciitis  Last visit for plantar fasciitis was 01/09/2018  Patient did have injection at that time  She was referred to physical therapy  In the interim patient has had a CVA and was admitted hospital on 02/09/2018 with discharge 02/14/2018  She had acute ischemic stroke of the right MCA  Patient complains of right trigger finger in her thumb  This been ongoing for approximately 1-2 years or more  She states that she has had injection in her thumb in the past on the right side with good results  She describes triggering of her thumb as her finger becoming stuck and sometimes her  has to pull it out    She points to a nodule on the volar aspect of her MCP at the 1st phalanx as source of occasional moderate intermittent pain exacerbated by flexing and becoming stuck  Review of Systems   Constitutional: Negative for fever  Neurological: Positive for numbness  Following history reviewed and update:    Past Medical History:   Diagnosis Date    Anemia     Anxiety     Cervical disc disorder with radiculopathy     Chronic pain     Constipation     High cholesterol     Lumbar radiculopathy     Lumbar stenosis     PONV (postoperative nausea and vomiting)     must have premed    Spondylosis of cervical spine     Spondylosis of lumbosacral region     Stroke ([address] )     Urinary incontinence      Past Surgical History:   Procedure Laterality Date    BLADDER SURGERY      BLADDER SURGERY  2014    CERVICAL SPINE SURGERY      HYSTERECTOMY      KNEE ARTHROSCOPY Left     LIPOMA RESECTION      IN COLONOSCOPY FLX DX W/COLLJ SPEC WHEN PFRMD N/A 2/10/2017    Procedure: COLONOSCOPY;  Surgeon: Sharon Cage MD;  Location: [facility];   Service: Gastroenterology    IN KNEE SCOPE,MED/LAT MENISECTOMY Left 3/21/2016    Procedure: ARTHROSCOPY W/ PARTIAL MEDIAL MENISCECTOMY;  Surgeon: Annamarie Meyers MD;  [facility];  Service: Orthopedics    TUBAL LIGATION       Social History   History   Alcohol Use No     Comment: social     History   Drug Use No     History   Smoking Status    Current Every Day Smoker    Packs/day: 0 50    Years: 40 00    Types: Cigarettes   Smokeless Tobacco    Never Used     Comment: one half pack a day     Family History   Problem Relation Age of Onset    Stroke Mother      46s    Pulmonary embolism Other      In mid 35s    Stroke Sister      46s    Stroke Brother      46s     Allergies   Allergen Reactions    Blue Dyes (Parenteral) Anaphylaxis    Gabapentin Anaphylaxis     Anaphylaxis    Nitrofurantoin GI Intolerance     vomiting    Tramadol GI Intolerance     Vomiting      Blue Dyes (Parenteral) Rash    Penicillins Rash Physical Exam   Constitutional: She is oriented to person, place, and time  She appears well-developed and well-nourished  HENT:   Head: Normocephalic and atraumatic  Right Ear: External ear normal    Left Ear: External ear normal    Nose: Nose normal    Eyes: Conjunctivae are normal  Right eye exhibits no discharge  Left eye exhibits no discharge  No scleral icterus  Neck: Neck supple  Pulmonary/Chest: Effort normal  No respiratory distress  Neurological: She is alert and oriented to person, place, and time  Psychiatric: She has a normal mood and affect   Her behavior is normal  Judgment and thought content normal        Ortho Exam    RIght Hand  Erythema: no  Swelling:  Positive nodule volar MCP of thumb  Increased Warmth: no  ROM: intact flexion, extension, ulnar and radial deviation  Malrotation: none; no scissoring of fingers; finger cascade normal  Tenderness:  Mild nodule volar MCP  DRUJ: intact stability     Small joint arthrocentesis  Date/Time: 2/21/2018 11:31 AM  Consent given by: patient  Site marked: site marked  Timeout: Immediately prior to procedure a time out was called to verify the correct patient, procedure, equipment, support staff and site/side marked as required   Supporting Documentation  Indications: pain and diagnostic evaluation   Procedure Details  Location: thumb (Right thumb volar trigger finger at the MCP) - R thumb MCP  Preparation: Patient was prepped and draped in the usual sterile fashion  Needle size: 25 G  Ultrasound guidance: no  Approach: volar  Medications administered: 1 mL lidocaine 1 %; 20 mg triamcinolone acetonide 40 mg/mL    Patient tolerance: patient tolerated the procedure well with no immediate complications  Dressing:  Sterile dressing applied

## 2018-02-21 NOTE — PATIENT INSTRUCTIONS
reviewed red flags with patient regarding injection including infection, skin dimpling, hypo-pigmentation, and nerve damage  patient expressed understanding and agreed to proceed with procedure  educated if any symptoms including fevers, chills, swelling, or worsening symptoms occur then to call office or go to hospital for immediate care if physician unavailable  patient expressed understanding and agreed to plan        Instructed patient return to physical therapy for plantar fasciitis

## 2018-02-21 NOTE — TELEPHONE ENCOUNTER
Cell number not in service  Called home and LMOM to return call  F/U appt scheduled in March with Mckenna Benitez

## 2018-02-21 NOTE — PROGRESS NOTES
Occupational Therapy Stroke Evaluation    Today's Date: 2018  Patient Name: Fredy Mack  : 1965  MRN: [id number]  Referring Provider: Boogie Dickens MD  Dx: Acute ischemic right MCA stroke [facility]) [[id number]]    Active Problem List:   Patient Active Problem List   Diagnosis    Acute ischemic right MCA stroke ([facility] [address] )    Fall    Forehead contusion    Left elbow contusion    Chronic back pain    Major depression     Past Medical Hx:   Past Medical History:   Diagnosis Date    Anemia     Anxiety     Cervical disc disorder with radiculopathy     Chronic pain     Constipation     High cholesterol     Lumbar radiculopathy     Lumbar stenosis     PONV (postoperative nausea and vomiting)     must have premed    Spondylosis of cervical spine     Spondylosis of lumbosacral region     Stroke ([facility] [address] )     Urinary incontinence      Past Surgical Hx:   Past Surgical History:   Procedure Laterality Date    BLADDER SURGERY      BLADDER SURGERY      CERVICAL SPINE SURGERY      HYSTERECTOMY      KNEE ARTHROSCOPY Left     LIPOMA RESECTION      NE COLONOSCOPY FLX DX W/COLLJ SPEC WHEN PFRMD N/A 2/10/2017    Procedure: COLONOSCOPY;  Surgeon: Tarcey Malik MD;  Location: [facility] GI LAB;   Service: Gastroenterology    NE KNEE SCOPE,MED/LAT MENISECTOMY Left 3/21/2016    Procedure: ARTHROSCOPY W/ PARTIAL MEDIAL MENISCECTOMY;  Surgeon: Loren Carrillo MD;  Location:  MAIN OR;  Service: Orthopedics    TUBAL LIGATION          Pain Levels:   Resting:  3    With Activity:  8    Subjective/Patient Goal: "to get my foot to work better"    History of Present Illness:  Pt is a flat, employed full time, 47 y o  female seen for OT eval s/p referred to [address] [place] [address] s/p recently d/c'd from [facility], initially presented to [facility] w/ L sided facial droop, limited movement of L side, R gaze preference stroke alert initiated, CTB + evolving R MCA territory infarction, MRIB + multiple infarcts in the R cerebral hemisphere in the distribution of the R MCA, deep infarct involving the basal ganglia, cortical infarcts involving the frontal and parietal region and petechial hemorrhage in the infarct noted in the R lentiform nucleus, pt received TPA s/p thrombectomy 2/9/2018, managed in the ICU tferred to P7, ultimately dx'd w/ R MCA CVA s/p TPA and thrombectomy, L elbow contusion, forehead contusion, and suicidal ideation, comorbidities as listed above  Lifestyle Performance Model:  Autonomy: Pt was I w/ I/ADLs, drove, & required no use of DME PTA  Reciprocal Relationships: Supportive  works FT during am hours, is currently in slow season so presently hours are more flexible, two daughters 29 and 28 and 5 grandchildren  Service to Others: Pt is employed full time at the [employer]    Intrinsic Gratification: Enjoys baking, gardening, walking, being outdoors, going to [address] [place] [address] Ave: Pt lives in a [facility] w/ first floor setup w/ 1 [address] in [address] [facility]    Objective  Impairments Section:   UE Strength:   CECY: RUE: 38/200 LUE: 20/200   PINCER: 3 point pinch: RUE 10, LUE:8   2 point pinch: RUE: 10, LUE:7   Lateral pincher: RUE: 12, LUE: 10    Coordination:   9 HOLE PEG TEST:     RUE: 26 34 seconds, LUE: 27 28 Seconds    Range of Motion:  AROM:   Shoulder elevation: B/l UE full/intact   Shoulder FF: B/l UE full/intact   Shoulder ABD: B/l UE full/intact   ER/IR: B/l UE full/intact   Elbow ext/flex: B/l UE full/intact   Sup/Pron: B/l UE full/intact   Wrist flex/ext: B/l UE full/intact   Composite: B/l UE full/intact   Hook: B/l UE full/intact   Opposition: B/l UE full/intact   Finger to nose: B/l UE full/intact   Dysdiadochokinesia: B/l UE full/intact    PROM:   Shoulder elevation:  B/l UE full/intact   Shoulder FF:B/l UE full/intact   Shoulder ABD: B/l UE full/intact   ER/IR: B/l UE full/intact   Elbow ext/flex: B/l UE full/intact   Sup/Pron: B/l UE full/intact   Wrist flex/ext: B/l UE full/intact    Sensation:  MYOFILAMENTS:   RUE: 3 61   LUE: 3 61    Visual Perceptual and Functional Cognition:  1  Convergence Insufficiency Symptom Survey (CISS): 16/60 PASS    2  Concussion Cognitive Checklist: self report symptom checklist  *Patient indicated that she is experiencing the following symptoms:    · Memory: Remembering your schedule, Remembering previous learing and Learning new things    · Attention: Keeping attention during a conversation, Focusing or concentrating on a specific task, Sustaining attention on a task and Dividing your attention (i e , multi-tasking)    · Processing: Processing new information  and Responding to questions in a timely manner    · Executive Functions: Monitoring your own ideas, behaviors, and/or emotions, Organizing/ planning written work, emails, and/or daily tasks, Self-correcting or recognizing mistakes, Initiating tasks and Setting goals    · Communication: Word finding in conversation, Expressing thoughts and ideas fluently and Expressing thoughts and ideas into writing    · Visual: Losing spot on the page when reading, Misspelling while texting/email and Change in handwriting    · Emotional: None reported    · Increased Sensitivities to: None reported     3  Contextual Memory Test (CMT): Pt has not noticed any changes in her memory, rates her memory capacity at 75%, if was given 20 objects to study for 90 seconds would recall 15, would have recalled 17 pre stroke, reported almost never forgets things that happened the day before, forgets important details 25% of the time, forgets things that people have told her 25% of the time, forgets things that happened a few minutes ago 25% of the time, and would not remember the facts about this form a week from now  IMMEDIATE RECALL:  12/20  score falls  in suspect deficit range    DELAYED RECALL:  13/20 score falls in WNL/WFL deficit range   RECOGNITION:  25/20 with 6 confabulations resulting in score 19/20     4  Rajendra Cognitive Assessment Version 8 1 (MoCA V8 1)  Visuospatial/executive functioning: 3/5  Naming: 3/3  Memory: 1st trial: , 2nd trial:   Attention/concentration:   List of letters: 0/1  Serial Seven Subtraction: 3/3 w/ 3 errors  Language/sentence repetition: 2/  Language Fluency:   Abstract/Correlational Thinkin/2  Delayed Recall: 3/5  Orientation:    Memory Index Score: 13/15  MoCA V1 8 1 Raw Score: 25/30, MIS: 13/15, indicative of mild neurocognitive impairments  5  Vision Screening Recording Form:   vision screen: +glasses @ all times present for OT vision screen, bifocals, h/o floaters chronic in L eye, dry eye syndrome + Restasis prescription  near acuity: R 20/20, L 20/20   binocularity far: B/L eye exophoria  red green fusion: PLRG @ 6"   near point of convergence: no fusion/teaming   yousif string: alignment  Pursuits: jerky, dysmetric, nystagmus w/ rebounding for target retrieval   saccades: inaccurate   ocular ROM: full  Visual Perceptual Midline shift: severely shifted to the L and above horizon, L inattention    Assessment/Plan  Occupational Therapy Skilled Analysis Assessment and Plan of Care:  Pt requires overall mod I for ADLs/self care and mod I for fx'l mobility w/o DME  Pt is currently demonstrating the following occupational deficits: limited 2* impaired STM/immediate delayed recall, depressed mood, impaired executive functioning, attention/concentration to task, delayed processing, impaired high level balance, LUE hemiparesis distal>proximal w/ impaired FMC/FMS/GMC/GMS, impaired prehension/precision, LUE dyscoordination w/ ataxia apraxia dysmetria, L inattention, no diplopia/teaming/fusion, dysmetric pursuits w/ jerky rebounds + nystagmus, shifted to the L of midline and above horizon    The following Occupational Performance Areas to address include: medication management, socialization, health maintenance, functional mobility, community mobility, clothing management, cleaning, meal prep, money management, household maintenance, care of children, care of pets, job performance/volunteering and social participation  Based on the aforementioned OT evaluation, functional performance deficits, and assessments, pt has been identified as a moderate complexity evaluation  Pt to continue to benefit from outpatient skilled OT services to address the following goals 2x/wk to  w/in 8 weeks with special focus on self-care management, pt education,  and VM training as well as motor training to improve above defiicits and enhance overall QOL/function      Goals:  Short Term Goals:  Cognition/Vision:  - Pt will increase auditory processing to take notes while listening in multi-modal work related/classroom symptom free at baseline performance for improved work/school performance, once returned  4 weeks as applicable  - Pt will increase attention to 2+ tasks for improved work/school performance (once returned) and engagement in salient tasks 4 weeks as applicable  - Pt will increase temporal awareness for keeping to schedule within 5 min increments, recall appointments, functional addition of time with 80% accuracy 4 weeks  - Pt will demo good carryover of internal and external memory aides for improved recall of daily events, improved executive functioning with 80% accuracy in 4 weeks  - Pt will increase insight into deficits for improved carryover of recommendations, accommodations, improved rate of healing 4 weeks  - Pt will increase oculomotor control for improved saccades, con/divergent tasks for improved reading, board to table tasks with minimal increase in symptoms 4 weeks  - Pt will tolerate multi-modal envt x 15 min with 80% accuracy of cog load and min increase of symptoms of 2 levels in HA/dizziness/nausea 4 weeks  Coordination:  - Pt will increase prehension patterns for improved tripod with utensil management with <20% droppage 4 weeks  - Pt will increase rate of manipulation for all FM tests for improved functional performance with salient tasks 4 weeks  - Pt will increase automaticity of LUE  to 50% for improved grasp release of tabletop items for improved functional performance with salient tasks 4 weeks  ROM/Function:  - Pt will demo with G carryover of Home Exercise Program to improve functional progression towards goals in Plan of care and for improved functional use of  LUE 4 weeks  - Pt will increase LUE  to refined functional assist with <20% cuing for tabletop tasks for improved functional performance of life roles and salient tasks 4    Long Term Goals:  - Pt will increase attention to 3+ tasks for improved divided attention with work/school and pre driving roles as applicable  - Pt will increase verbal and written direction following with processing time of <1 min and 85% accuracy  - Pt will tolerate multimodal envt x 60 min symptom free for return to work/school  - Pt will demo with decreased anxiety and frustration for improved insight into concussion process and rate of recovery  - Pt will demo with G carryover and understanding of accommodations for school environment to allow for enhanced learning environment symptom free, if needed  Pt will increase oculomotor control for improved dynamic activities with head turns, board/screen to table tasks symptom free, improved VMI and return to baseline handwriting  Pt will increase oculomotor control for WNL saccades, con/divergent tasks symptom free  Pt will increase screen tolerance to 3 hours with min increase in HA by 1-2 levels for improved leisure pursuits and work/school performance as applicable  Coordination:  - Decrease ataxia with functional reach for normalized movement pattern of  LUE  - Increase automaticity of  LUE to 100% for resumption of B integrative tasks  - Pt will increase rate of prehension for all FM tasks for improved use of utensils, writing, ADL fasteners  - Pt will increase prehension for all FM tasks for improved independence with I/ADL/leisure tasks including utensils, writing, ADL fasteners  ROM/Function:  - Increase func mobs from various surfaces to Mod I/ I with least restrictive device and good safety  - Resume hand dominance to refined mod I functional assist with all I/ADL/leisure tasks  - Pt will increase B/L UE strength to 5/5 and  strength, through the use of strengthening exercises and home program for eventual return to driving PRN  - Pt will increase [address] to Mod I with ADL fasteners for increased independence  - Pt will resume hand dominance with I status for all activities of daily living and eventual return to driving PRN    INTERVENTION COMMENTS:  Diagnosis: R MCA CVA s/p TPA and thrombectomy  Precautions: fall risk, depression, suicidal ideations, tobacco use/abuse  FOTO: 94% with 6% limitation  Insurance: [employer]  1 of 8 visits, PN due 3/21/2018    Thank you for the consult!   Please call if you have any questions: [phone]  Alanna Yoo, OSKAR, OTR/L, C-GCM, CSRS  Director of Outpatient Neuro Occupational Therapy

## 2018-02-21 NOTE — TELEPHONE ENCOUNTER
----- Message from Deangelo Gao MD sent at 2/19/2018  5:03 PM EST -----  Results look good, no change to current therapy  No clear increased risk to make clots compared with the average person  Also, does she have her hospital follow up appointment set yet? If not, please facilitate

## 2018-02-22 ENCOUNTER — APPOINTMENT (OUTPATIENT)
Dept: PHYSICAL THERAPY | Age: 53
End: 2018-02-22
Payer: COMMERCIAL

## 2018-02-22 NOTE — DISCHARGE SUMMARY
Discharge Summary - PMR   Louie Mason 47 y o  female MRN: [id number]  Unit/Bed#: [facility] 396-71 Encounter: [id number]    Admission Date: 2/14/2018     Discharge Date: 2/18/2018    Rehabilitation Diagnosis: Impairment of mobility, safety and Activities of Daily Living (ADLs) due to Stroke:  01 1  Left Body Involvement (Right Brain)    Etiologic Diagnosis: Right M1 occlusion    HPI:   Marifer Alcocer is a 47 y o  female who presented to the [address] [place] [address] with left sided weakness  Subsequently found to have a right basal ganglia, right insular region, and right posterior parietal region infarction  CTA was performed which demonstrated a right M1 occlusion, for which she underwent an endovascular thrombectomy  MRI confirmed multiple infarcts in the right cerebral hemisphere in the distribution of the right MCA, as well as deep infarctions in the basal ganglia and cortical infarctions in the frontal and parietal regions; there was also noted to be a petechial hemorrhage in the infarction noted in the right lentiform nucleus  TTE demonstrated an EF of 65% with no regional wall motion abnormalities  Patient had a loop recorder placed on 2/13/18  She was accepted to the [facility] on 2/14/18  Procedures Performed During [facility] Admission: None    Acute Rehabilitation Center Course:   Patient participated in a comprehensive interdisciplinary inpatient rehabilitation program which included involvment of MD, therapies (PT, OT, and/or SLP), RN, CM, SW, dietary, and psychology services  she was able to be advanced to a supervision level of assist and considered safe for discharge home with family  Please see below for patient's day to day management of medical needs        # Stroke: right MCA infarct  - Acute comprehensive interdisciplinary inpatient rehabilitation including PT, OT, SLP, RN, CM, SW, dietary, psychology, etc   - Appreciate Internal Medicine following - Dr Alyson Tse service  - S/p thrombectomy   - continue ASA 81mg   - patient refusing atorvastatin, switched to pravastatin   - s/p loop recorder placement on 2/13, f/u with device clinic as outpatient  Removed dressing, covered lightly with 2x2  Discussed with EP, ok to trim sutures (will be removed on 2 week f/u)     # Hypotension/bradycardia  - Patient was weaned off midodrine on acute service  - continue to monitor closely     # Leukocytosis (improved)  - VS stable, no fever noted  - continue to monitor     # Plantar fasciitis  - patient reports ordering orthotics, family to bring in once delivered to home  - continue with regular stretching activities in therapy     # History of ACDF in 2013  -  MRI c-spine in January 2017 demonstrates spondolytic changes of c-spine as well as stable alignment after ACDF from C5-C7  - patient was prescribed oxycodone and lyrica by PCP at last visit (december 2017)     # Pain  - Continue tylenol PRN, for max of 3gm daily     - Continue lyrica  - restarted home dose of oxycodone     # Rehab Psych  - Continue mirtazipine  - Neuropsych consult, appreciate recs     # FENA/prophy  - Diet: dysphagia diet  SLP and nutrition to monitor and adjust as necessary   - DVT prophy: Sequential compression device (Venodyne)  and Heparin  - GI ppx: None  - Bowel: colace , dulcolax suppository  and miralax PRN  - Nausea: Zofran PRn  - Supplements: MVI  - Sleep: None     Discharge Physical Examination:  General: alert, no apparent distress, cooperative and comfortable  HENMT: Head: Normal, normocephalic, atraumatic  Eye: Normal external eye, conjunctiva, lids   Ears: Normal external ears  Nose: Normal external nose, mucus membranes  Pulmonary: chest expansion normal, no retractions, no accessory muscle usage  Abdomen: soft, nontender, nondistended, no masses or organomegaly  Skin/Extremity: no rashes, no erythema, no peripheral edema  Loop recorder Incisions: C/D/I, with no erythema  No striketrough into dressing   Mildly tender around incision site  Neurologic: Awake alert orientedx3  Continues to have left side facial droop  Psych: normal mood, behavior, speech, dress, and thought processes    Significant Findings, Care, Treatment and Services Provided: Acute comprehensive interdisciplinary inpatient rehabilitation including PT, OT, SLP, RN, CM, SW, dietary, psychology, etc     Complications: none    Functional Status Upon Discharge:    Physical Therapy Occupational Therapy Speech Therapy   Weight Bearing Status: Full Weight Bearing  Transfers: Independent  Bed Mobility: Independent  Amulation Distance (ft): 900 feet  Ambulation: Independent  Assistive Device for Ambulation: Other (No device)  Wheelchair Mobility:  (NA)  Number of Stairs: 24  Assistive Device for Stairs: Right Hand Rail  Stair Assistance: Modified Independent  Ramp: Independent  Assistive Device for Ramp:  (NA)  Discharge Recommendations: Home with:  [address] with[de-identified] Family Support   Eating: Independent  Grooming: Independent  Bathing: Modified Independent  Bathing: Modified Independent  Upper Body Dressing: Independent  Lower Body Dressing: Independent  Toileting: Independent  Tub/Shower Transfer: Independent  Toilet Transfer: Independent  Cognition: Within Defined Limits  Orientation: Person, Place, Time, Situation     Mode of Communication: Verbal  Cognition: Within Defined Limits     Orientation: Person, Place, Time, Situation  Swallowing: Within Defined Limits  Diet Recommendations: Regular Diet, Thin  Discharge Recommendations: Home with:  [address] with[de-identified] 24 Hour Supervision, Family Support     Discharge Diagnosis: Impairment of mobility, safety and Activities of Daily Living (ADLs) due to Stroke:  01 1  Left Body Involvement (Right Brain)    Discharge Medications:   See after visit summary for reconciled discharge medications provided to patient and family        Condition at Discharge: stable     Discharge instructions/Information to patient and family:   See after visit summary for information provided to patient and family  Provisions for Follow-Up Care:  See after visit summary for information related to follow-up care and any pertinent home health orders  Disposition: Home    Planned Readmission: No    Discharge Statement   I spent 45 minutes discharging the patient  This time was spent on the day of discharge  I had direct contact with the patient on the day of discharge  Greater than 50% of the total time was spent examining patient, answering all patient questions, arranging and discussing plan of care with patient as well as directly providing post-discharge instructions  Additional time then spent on discharge activities  Discharge Medications:  See after visit summary for reconciled discharge medications provided to patient and family

## 2018-02-23 ENCOUNTER — OFFICE VISIT (OUTPATIENT)
Dept: INTERNAL MEDICINE CLINIC | Age: 53
End: 2018-02-23
Payer: COMMERCIAL

## 2018-02-23 VITALS
DIASTOLIC BLOOD PRESSURE: 72 MMHG | HEART RATE: 74 BPM | SYSTOLIC BLOOD PRESSURE: 118 MMHG | BODY MASS INDEX: 22.47 KG/M2 | TEMPERATURE: 98.2 F | HEIGHT: 61 IN | OXYGEN SATURATION: 98 % | WEIGHT: 119 LBS

## 2018-02-23 DIAGNOSIS — I63.511 ACUTE ISCHEMIC RIGHT MCA STROKE (HCC): Primary | ICD-10-CM

## 2018-02-23 DIAGNOSIS — Z95.818 STATUS POST PLACEMENT OF IMPLANTABLE LOOP RECORDER: ICD-10-CM

## 2018-02-23 DIAGNOSIS — K59.09 OTHER CONSTIPATION: ICD-10-CM

## 2018-02-23 PROCEDURE — 99496 TRANSJ CARE MGMT HIGH F2F 7D: CPT | Performed by: INTERNAL MEDICINE

## 2018-02-23 RX ORDER — LACTULOSE 20 G/30ML
20 SOLUTION ORAL AS NEEDED
Qty: 1 BOTTLE | Refills: 3 | Status: ON HOLD | OUTPATIENT
Start: 2018-02-23 | End: 2018-04-25 | Stop reason: ALTCHOICE

## 2018-02-23 NOTE — PROGRESS NOTES
Assessment/Plan:    Acute ischemic right MCA stroke ([facility] [address] )  Continue with 81 mg aspirin and 40 mg pravastatin daily  She has a follow-up appointment with Neurology as well as with Cardiology regarding evaluation of loop recorder  Recommend continue with PT/ OT  She currently has a hold on her driving privileges for which we will re-evaluate after completing physical therapy/ occupational therapy  Follow-up in 3 months  Diagnoses and all orders for this visit:    Acute ischemic right MCA stroke ([facility] [address] )  -     Comprehensive metabolic panel; Future  -     CK (with reflex to MB); Future  -     lactulose 20 g/30 mL; Take 30 mL (20 g total) by mouth as needed (constipation)    Status post placement of implantable loop recorder  -     Suture removal    Other constipation  -     lactulose 20 g/30 mL; Take 30 mL (20 g total) by mouth as needed (constipation)          Subjective:      Patient ID: Tank Spence is a 47 y o  female  48-year-old female is seen today following hospitalization for which she sustained a right MCA stroke status post endovascular thrombectomy  She underwent TTE which showed an ejection fraction of 65%  She also had a loop recorder placed, appointment scheduled for 03/05/2018 for review  She was started on 81 mg aspirin and pravastatin  She did have a side effect with Lipitor in the past due to an elevation in CPK  Hospitalization records were reviewed today  She was discharged to [facility] acute rehab center from the hospital to which she continue with physical therapy for 3 days and was ultimately discharged to home with planned continuation of physical therapy and occupational therapy        Date and time hospital follow up call was made:  2/19/2018 10:25 AM  Hospital care reviewed:  Records reviewed  Patient was hopsitalized at:  [facility] - CLOSED  Date of admission:  2/14/18  Date of discharge:  2/18/18  Diagnosis:  Right MCA CVA, suspect embolic, involving right basal ganglia/right insular region/right posterior parietal region,  LOOP implant (2/13/18)  Were the patients medicaitons reviewed and updated:  Yes  Current symptoms:  Fatigue, Cough (Comment: L Hip Pain )  Cough Severity:  Mild  Fatigue severity:  Moderate  Post hospital issues:  None  Should patient be enrolled in anticoag monitoring?:  No  Scheduled for follow up?:  No  Did you obtain your prescribed medications:  Yes  Do you need help managing your perscriptions or medications:  No  Is transportation to your appointments needed:  Yes  Specify why:   will be driving to appointment   I have advised the patient to call PCP with any new or worsening symptoms (please type in name along with any credentials):  Liban Culp MA  Living Arrangements:  Family members  Support System:  Family  The type of support provided:  Emotional, Financial, Physical  Are you recieving outpatient services:  Yes  What type(s) of services:  Physical Therapy / OT   Are you recieving home care services:  No  Are you using any community resources:  No  Current waiver service:  No  Have you fallen in the last 12 months:  Yes  How many times:  5  Interperter language line required?:  No         The following portions of the patient's history were reviewed and updated as appropriate: allergies, current medications, past family history, past medical history, past social history, past surgical history and problem list     Review of Systems   Constitutional: Negative for activity change, appetite change, fatigue and fever  HENT: Positive for sinus pressure  Negative for congestion, sinus pain and sore throat  Eyes: Negative for discharge and itching  Respiratory: Negative for apnea, cough, choking, chest tightness, shortness of breath, wheezing and stridor  Cardiovascular: Negative for chest pain and palpitations     Gastrointestinal: Negative for abdominal distention, abdominal pain, anal bleeding, blood in stool, constipation, diarrhea, nausea and vomiting  Endocrine: Negative for cold intolerance and heat intolerance  Genitourinary: Positive for hematuria  Negative for difficulty urinating and dysuria  Musculoskeletal: Negative for arthralgias, back pain, gait problem, joint swelling, myalgias, neck pain and neck stiffness  Skin: Negative for color change, pallor, rash and wound  Neurological: Positive for facial asymmetry  Negative for dizziness, weakness, light-headedness and headaches  Hematological: Negative for adenopathy  Psychiatric/Behavioral: Negative for sleep disturbance           Past Medical History:   Diagnosis Date    Acute pain of right knee     last assessed - [id number]    Anemia     Anxiety     Cervical disc disorder with radiculopathy     Chronic pain     Constipation     Hematuria     last assessed - [id number]    High cholesterol     Lumbar radiculopathy     Lumbar stenosis     Other chronic pain     last assessed - [id number]    Other muscle spasm     last assessed - [id number]    PONV (postoperative nausea and vomiting)     must have premed    Spondylosis of cervical spine     Spondylosis of lumbosacral region     Stroke (cerebrum) (HCC)     right side of brain    Stroke [facility])     Urinary incontinence     Resolved - [id number]         Current Outpatient Prescriptions:     aspirin 81 mg chewable tablet, Chew 1 tablet (81 mg total) daily, Disp: 30 tablet, Rfl: 0    aspirin-acetaminophen-caffeine (EXCEDRIN MIGRAINE) 250-250-65 MG per tablet, Take 1 tablet by mouth every 6 (six) hours as needed for headaches , Disp: , Rfl:     mirtazapine (REMERON) 15 mg tablet, Take 1 tablet (15 mg total) by mouth daily at bedtime, Disp: 30 tablet, Rfl: 0    Omega-3 Fatty Acids (FISH OIL) 1200 MG CAPS, Take 1,200 mg by mouth, Disp: , Rfl:     pravastatin (PRAVACHOL) 40 mg tablet, Take 1 tablet (40 mg total) by mouth daily with dinner, Disp: 30 tablet, Rfl: 0    pregabalin (LYRICA) 25 mg capsule, Take 1 capsule (25 mg total) by mouth 2 (two) times a day, Disp: 60 capsule, Rfl: 0    diazepam (VALIUM) 5 mg tablet, Take 5 mg by mouth daily at bedtime, Disp: , Rfl:     diclofenac sodium (VOLTAREN) 1 %, Apply 2 g topically 2 (two) times a day, Disp: , Rfl:     DULOXETINE HCL PO, Take 30 mg by mouth daily, Disp: , Rfl:     lactulose 20 g/30 mL, Take 30 mL (20 g total) by mouth as needed (constipation), Disp: 1 Bottle, Rfl: 3    meloxicam (MOBIC) 15 mg tablet, Take 15 mg by mouth daily  , Disp: , Rfl:     methocarbamol (ROBAXIN) 750 mg tablet, Take 750 mg by mouth 3 (three) times a day, Disp: , Rfl:     oxaprozin (DAYPRO) 600 MG tablet, Take 600 mg by mouth 2 (two) times a day, Disp: , Rfl:     OXYCODONE HCL PO, Take 5 mg by mouth every 6 (six) hours as needed, Disp: , Rfl:     OXYCODONE-ACETAMINOPHEN PO, Take 0 5 tablets by mouth daily as needed  5/325mg, Disp: , Rfl:     TRAMADOL HCL PO, Take 50 mg by mouth every 6 (six) hours as needed, Disp: , Rfl:     Allergies   Allergen Reactions    Blue Dyes (Parenteral) Anaphylaxis    Gabapentin Anaphylaxis     Anaphylaxis    Nitrofurantoin GI Intolerance     vomiting    Blue Dyes (Parenteral) Rash    Penicillins Rash       Social History   Past Surgical History:   Procedure Laterality Date    BLADDER SURGERY      BLADDER SURGERY  2014    CERVICAL SPINE SURGERY      KNEE ARTHROSCOPY Left     LIPECTOMY      of thigh    LIPOMA RESECTION      NECK SURGERY      MN COLONOSCOPY FLX DX W/COLLJ SPEC WHEN PFRMD N/A 2/10/2017    Procedure: COLONOSCOPY;  Surgeon: Rochelle Antonio MD;  Location: [facility];   Service: Gastroenterology    MN KNEE SCOPE,MED/LAT MENISECTOMY Left 3/21/2016    Procedure: ARTHROSCOPY W/ PARTIAL MEDIAL MENISCECTOMY;  Surgeon: Toby Galeas MD;  [facility]: [facility];  Service: Orthopedics    TOTAL ABDOMINAL HYSTERECTOMY      TUBAL LIGATION       Family History   Problem Relation Age of Onset    Stroke Mother      46s    Cancer Mother     Hypertension Mother     Pulmonary embolism Other      In mid 35s    Stroke Sister      46s    Stroke Brother      46s    Prostate cancer Father     Cancer Daughter     Stroke Family        Objective:  /72 (BP Location: Left arm, Patient Position: Sitting, Cuff Size: Standard)   Pulse 74   Temp 98 2 °F (36 8 °C) (Tympanic)   Ht 5' 0 75" (1 543 m)   Wt 54 kg (119 lb)   LMP  (LMP Unknown)   SpO2 98%   BMI 22 67 kg/m²     Recent Results (from the past 1344 hour(s))   POCT Blood Gas (CG8+)    Collection Time: 02/09/18  7:31 AM   Result Value Ref Range    ph, Taz ISTAT 7 423 (H) 7 300 - 7 400    pCO2, Taz i-STAT 47 5 42 0 - 50 0 mm HG    pO2, Taz i-STAT 34 0 (L) 35 0 - 45 0 mm HG    BE, i-STAT 6 (H) -2 - 3 mmol/L    HCO3, Taz i-STAT 31 0 (H) 24 0 - 30 0 mmol/L    CO2, i-STAT 32 21 - 32 mmol/L    O2 Sat, i-STAT 66 (L) 95 - 98 %    SODIUM, I-STAT 142 136 - 145 mmol/l    Potassium, i-STAT 4 0 3 5 - 5 3 mmol/L    Calcium, Ionized i-STAT 1 11 (L) 1 12 - 1 32 mmol/L    Hct, i-STAT 37 34 8 - 46 1 %    Hgb, i-STAT 12 6 11 5 - 15 4 g/dl    Glucose, i-STAT 88 65 - 140 mg/dl    Specimen Type VENOUS    CBC and differential    Collection Time: 02/09/18  7:33 AM   Result Value Ref Range    WBC 8 44 4 31 - 10 16 Thousand/uL    RBC 3 97 3 81 - 5 12 Million/uL    Hemoglobin 13 0 11 5 - 15 4 g/dL    Hematocrit 38 3 34 8 - 46 1 %    MCV 97 82 - 98 fL    MCH 32 7 26 8 - 34 3 pg    MCHC 33 9 31 4 - 37 4 g/dL    RDW 13 7 11 6 - 15 1 %    MPV 8 9 8 9 - 12 7 fL    Platelets 900 224 - 530 Thousands/uL    nRBC 0 /100 WBCs    Neutrophils Relative 38 (L) 43 - 75 %    Lymphocytes Relative 52 (H) 14 - 44 %    Monocytes Relative 8 4 - 12 %    Eosinophils Relative 2 0 - 6 %    Basophils Relative 0 0 - 1 %    Neutrophils Absolute 3 18 1 85 - 7 62 Thousands/µL    Lymphocytes Absolute 4 43 0 60 - 4 47 Thousands/µL    Monocytes Absolute 0 66 0 17 - 1 22 Thousand/µL    Eosinophils Absolute 0 14 0 00 - 0 61 Thousand/µL    Basophils Absolute 0  02 0 00 - 0 10 Thousands/µL   Basic metabolic panel    Collection Time: 02/09/18  7:33 AM   Result Value Ref Range    Sodium 142 136 - 145 mmol/L    Potassium 3 9 3 5 - 5 3 mmol/L    Chloride 107 100 - 108 mmol/L    CO2 30 21 - 32 mmol/L    Anion Gap 5 4 - 13 mmol/L    BUN 17 5 - 25 mg/dL    Creatinine 0 76 0 60 - 1 30 mg/dL    Glucose 87 65 - 140 mg/dL    Calcium 8 6 8 3 - 10 1 mg/dL    eGFR 90 ml/min/1 73sq m   Protime-INR    Collection Time: 02/09/18  7:33 AM   Result Value Ref Range    Protime 12 3 12 1 - 14 4 seconds    INR 0 91 0 86 - 1 16   APTT    Collection Time: 02/09/18  7:33 AM   Result Value Ref Range    PTT 22 (L) 23 - 35 seconds   Lipid Panel with Direct LDL reflex    Collection Time: 02/09/18  7:33 AM   Result Value Ref Range    Cholesterol 235 (H) 50 - 200 mg/dL    Triglycerides 89 <=150 mg/dL    HDL, Direct 78 (H) 40 - 60 mg/dL    LDL Calculated 139 (H) 0 - 100 mg/dL   Type and screen    Collection Time: 02/09/18  7:46 AM   Result Value Ref Range    ABO Grouping A     Rh Factor Positive     Antibody Screen Negative     Specimen Expiration Date [id number]    POCT Chem 8+    Collection Time: 02/09/18  7:51 AM   Result Value Ref Range    SODIUM, I-STAT 141 136 - 145 mmol/l    Potassium, i-STAT 3 9 3 5 - 5 3 mmol/L    Chloride, istat 104 100 - 108 mmol/L    CO2, i-STAT 31 21 - 32 mmol/L    Anion Gap, Istat 11 4 - 13 mmol/L    Calcium, Ionized i-STAT 1 16 1 12 - 1 32 mmol/L    BUN, I-STAT 20 5 - 25 mg/dl    Creatinine, i-STAT 0 9 0 6 - 1 3 mg/dl    eGFR 73 ml/min/1 73sq m    Glucose, i-STAT 87 65 - 140 mg/dl    Hct, i-STAT 37 34 8 - 46 1 %    Hgb, i-STAT 12 6 11 5 - 15 4 g/dl    Specimen Type VENOUS    ECG 12 lead    Collection Time: 02/09/18  7:55 AM   Result Value Ref Range    Ventricular Rate 62 BPM    Atrial Rate 67 BPM    MO Interval  ms    QRSD Interval 74 ms    QT Interval 404 ms    QTC Interval 410 ms    P Axis 73 degrees    QRS Axis 78 degrees    T Wave Axis 73 degrees   CBC    Collection Time: 02/09/18  5:21 PM   Result Value Ref Range    WBC 9 13 4 31 - 10 16 Thousand/uL    RBC 3 48 (L) 3 81 - 5 12 Million/uL    Hemoglobin 11 2 (L) 11 5 - 15 4 g/dL    Hematocrit 33 6 (L) 34 8 - 46 1 %    MCV 97 82 - 98 fL    MCH 32 2 26 8 - 34 3 pg    MCHC 33 3 31 4 - 37 4 g/dL    RDW 14 2 11 6 - 15 1 %    Platelets 143 976 - 375 Thousands/uL    MPV 8 6 (L) 8 9 - 12 7 fL   Hemoglobin and hematocrit, blood    Collection Time: 02/09/18  8:30 PM   Result Value Ref Range    Hemoglobin 11 4 (L) 11 5 - 15 4 g/dL    Hematocrit 33 0 (L) 34 8 - 46 1 %   Hemoglobin A1c    Collection Time: 02/09/18  8:30 PM   Result Value Ref Range    Hemoglobin A1C 5 5 4 2 - 6 3 %     mg/dl   Comprehensive metabolic panel    Collection Time: 02/10/18  4:33 AM   Result Value Ref Range    Sodium 141 136 - 145 mmol/L    Potassium 4 0 3 5 - 5 3 mmol/L    Chloride 108 100 - 108 mmol/L    CO2 28 21 - 32 mmol/L    Anion Gap 5 4 - 13 mmol/L    BUN 10 5 - 25 mg/dL    Creatinine 0 82 0 60 - 1 30 mg/dL    Glucose 114 65 - 140 mg/dL    Calcium 8 8 8 3 - 10 1 mg/dL    AST 12 5 - 45 U/L    ALT 17 12 - 78 U/L    Alkaline Phosphatase 59 46 - 116 U/L    Total Protein 6 3 (L) 6 4 - 8 2 g/dL    Albumin 3 5 3 5 - 5 0 g/dL    Total Bilirubin 0 48 0 20 - 1 00 mg/dL    eGFR 82 ml/min/1 73sq m   Magnesium    Collection Time: 02/10/18  4:33 AM   Result Value Ref Range    Magnesium 1 9 1 6 - 2 6 mg/dL   Phosphorus    Collection Time: 02/10/18  4:33 AM   Result Value Ref Range    Phosphorus 3 8 2 7 - 4 5 mg/dL   CBC (With Platelets)    Collection Time: 02/10/18  4:33 AM   Result Value Ref Range    WBC 10 85 (H) 4 31 - 10 16 Thousand/uL    RBC 3 48 (L) 3 81 - 5 12 Million/uL    Hemoglobin 11 5 11 5 - 15 4 g/dL    Hematocrit 33 5 (L) 34 8 - 46 1 %    MCV 96 82 - 98 fL    MCH 33 0 26 8 - 34 3 pg    MCHC 34 3 31 4 - 37 4 g/dL    RDW 14 0 11 6 - 15 1 %    Platelets 252 620 - 200 Thousands/uL    MPV 8 3 (L) 8 9 - 12 7 fL   ECG 12 lead    Collection Time: 02/10/18  7:10 AM Result Value Ref Range    Ventricular Rate 52 BPM    Atrial Rate 52 BPM    NM Interval  ms    QRSD Interval 67 ms    QT Interval 408 ms    QTC Interval 380 ms    P Axis  degrees    QRS Axis 79 degrees    T Wave Axis 76 degrees   ECG 12 lead    Collection Time: 02/10/18 10:53 AM   Result Value Ref Range    Ventricular Rate 52 BPM    Atrial Rate 52 BPM    NM Interval  ms    QRSD Interval 75 ms    QT Interval 425 ms    QTC Interval 396 ms    P Axis  degrees    QRS Axis 66 degrees    T Wave Axis 64 degrees   CBC    Collection Time: 02/11/18  4:44 AM   Result Value Ref Range    WBC 7 52 4 31 - 10 16 Thousand/uL    RBC 3 42 (L) 3 81 - 5 12 Million/uL    Hemoglobin 11 2 (L) 11 5 - 15 4 g/dL    Hematocrit 33 4 (L) 34 8 - 46 1 %    MCV 98 82 - 98 fL    MCH 32 7 26 8 - 34 3 pg    MCHC 33 5 31 4 - 37 4 g/dL    RDW 14 0 11 6 - 15 1 %    Platelets 080 826 - 483 Thousands/uL    MPV 9 5 8 9 - 12 7 fL   Basic metabolic panel    Collection Time: 02/11/18  4:44 AM   Result Value Ref Range    Sodium 143 136 - 145 mmol/L    Potassium 3 9 3 5 - 5 3 mmol/L    Chloride 110 (H) 100 - 108 mmol/L    CO2 27 21 - 32 mmol/L    Anion Gap 6 4 - 13 mmol/L    BUN 11 5 - 25 mg/dL    Creatinine 0 81 0 60 - 1 30 mg/dL    Glucose 111 65 - 140 mg/dL    Calcium 8 6 8 3 - 10 1 mg/dL    eGFR 83 ml/min/1 73sq m   Antithrombin III Activity    Collection Time: 02/12/18  3:14 PM   Result Value Ref Range    AntiThrombIN III Activity 113 92 - 136 % of Normal   Cardiolipin antibody    Collection Time: 02/12/18  3:14 PM   Result Value Ref Range    Anticardiolipin IgA <9 0 - 11 APL U/mL    Anticardiolipin IgG <9 0 - 14 GPL U/mL    Anticardiolipin IgM <9 0 - 12 MPL U/mL   Factor 5 leiden    Collection Time: 02/12/18  3:14 PM   Result Value Ref Range    Factor V Leiden Comment     Comment Comment    Lupus anticoagulant    Collection Time: 02/12/18  3:14 PM   Result Value Ref Range    PTT Lupus Anticoagulant 27 1 0 0 - 51 9 sec    Dilute Viper Venom Time 28 4 0 0 - 47 0 sec    DILUTE PROTHROMBIN TIME(DPT) 32 0 0 0 - 55 0 sec    THROMBIN TIME (DRVW) 21 3 0 0 - 23 0 sec    DPT CONFIRM RATIO 0 90 0 00 - 1 40 Ratio    LUPUS REFLEX INTERPRETATION Comment:    Protein C activity    Collection Time: 02/12/18  3:14 PM   Result Value Ref Range    Protein C Activity >150 (H) 60 - 150 % of Normal   Protein S activity    Collection Time: 02/12/18  3:14 PM   Result Value Ref Range    Protein S Activity 90 63 - 140 %   Protein S Antigen, Total & Free    Collection Time: 02/12/18  3:14 PM   Result Value Ref Range    Protein S Ag, Total 90 60 - 150 %    Protein S Ag, Free 107 57 - 157 %   Prothrombin gene mutation    Collection Time: 02/12/18  3:14 PM   Result Value Ref Range    Prothrombin Mutation Comment     Additional Information Comment    Beta-2 glycoprotein antibodies    Collection Time: 02/12/18  3:14 PM   Result Value Ref Range    Beta-2 Glyco 1 IgG <9 0 - 20 GPI IgG units    Beta-2 Glyco 1 IgA <9 0 - 25 GPI IgA units    Beta-2 Glyco 1 IgM <9 0 - 32 GPI IgM units   CBC    Collection Time: 02/13/18  4:44 AM   Result Value Ref Range    WBC 8 41 4 31 - 10 16 Thousand/uL    RBC 3 56 (L) 3 81 - 5 12 Million/uL    Hemoglobin 11 5 11 5 - 15 4 g/dL    Hematocrit 34 3 (L) 34 8 - 46 1 %    MCV 96 82 - 98 fL    MCH 32 3 26 8 - 34 3 pg    MCHC 33 5 31 4 - 37 4 g/dL    RDW 13 9 11 6 - 15 1 %    Platelets 840 734 - 497 Thousands/uL    MPV 9 4 8 9 - 12 7 fL   Basic metabolic panel    Collection Time: 02/13/18  4:44 AM   Result Value Ref Range    Sodium 142 136 - 145 mmol/L    Potassium 3 8 3 5 - 5 3 mmol/L    Chloride 106 100 - 108 mmol/L    CO2 27 21 - 32 mmol/L    Anion Gap 9 4 - 13 mmol/L    BUN 16 5 - 25 mg/dL    Creatinine 0 86 0 60 - 1 30 mg/dL    Glucose 91 65 - 140 mg/dL    Calcium 9 0 8 3 - 10 1 mg/dL    eGFR 77 ml/min/1 73sq m   Troponin I    Collection Time: 02/14/18  2:50 PM   Result Value Ref Range    Troponin I <0 02 <=0 04 ng/mL            Physical Exam   Constitutional: She is oriented to person, place, and time  She appears well-developed and well-nourished  No distress  HENT:   Head: Normocephalic and atraumatic  Eyes: Conjunctivae and EOM are normal  Pupils are equal, round, and reactive to light  Neck: Normal range of motion  Neck supple  No JVD present  No thyromegaly present  Cardiovascular: Normal rate, regular rhythm, normal heart sounds and intact distal pulses  Exam reveals no gallop and no friction rub  No murmur heard  Pulmonary/Chest: Effort normal and breath sounds normal  No respiratory distress  She has no wheezes  She has no rales  She exhibits no tenderness  Abdominal: Soft  Bowel sounds are normal  She exhibits no distension  There is no tenderness  Musculoskeletal: Normal range of motion  She exhibits no edema, tenderness or deformity  Lymphadenopathy:     She has no cervical adenopathy  Neurological: She is alert and oriented to person, place, and time  She has normal reflexes  She displays normal reflexes  A cranial nerve deficit is present  No sensory deficit  She exhibits normal muscle tone  Coordination normal  GCS eye subscore is 4  GCS verbal subscore is 5  GCS motor subscore is 6  Left-sided facial asymmetry  Skin: Skin is warm and dry  No rash noted  She is not diaphoretic  No erythema  No pallor  Nursing note and vitals reviewed        Suture removal  Date/Time: 2/23/2018 3:41 PM  Performed by: Philomena Harden by: Arnold Zhang     Patient location:  Clinic  Other Assisting Provider: Yes (comment)    Consent:     Consent obtained:  Verbal    Consent given by:  Patient    Risks discussed:  Wound separation, pain and bleeding    Alternatives discussed:  No treatment  Universal protocol:     Procedure explained and questions answered to patient or proxy's satisfaction: yes      Relevant documents present and verified: yes      Patient identity confirmed:  Verbally with patient  Location:     Laterality:  Median    Location: Chest   Procedure details: Tools used:  Suture removal kit    Wound appearance:  No sign(s) of infection    Number of sutures removed:  3  Post-procedure details:     Post-removal:  No dressing applied    Patient tolerance of procedure:   Tolerated well, no immediate complications

## 2018-02-23 NOTE — ASSESSMENT & PLAN NOTE
Continue with 81 mg aspirin and 40 mg pravastatin daily  She has a follow-up appointment with Neurology as well as with Cardiology regarding evaluation of loop recorder  Recommend continue with PT/ OT  She currently has a hold on her driving privileges for which we will re-evaluate after completing physical therapy/ occupational therapy  Follow-up in 3 months

## 2018-02-26 ENCOUNTER — APPOINTMENT (OUTPATIENT)
Dept: PHYSICAL THERAPY | Age: 53
End: 2018-02-26
Payer: COMMERCIAL

## 2018-02-27 ENCOUNTER — OFFICE VISIT (OUTPATIENT)
Dept: OCCUPATIONAL THERAPY | Facility: CLINIC | Age: 53
End: 2018-02-27
Payer: COMMERCIAL

## 2018-02-27 DIAGNOSIS — I63.511 ACUTE ISCHEMIC RIGHT MCA STROKE (HCC): Primary | ICD-10-CM

## 2018-02-27 PROCEDURE — 97535 SELF CARE MNGMENT TRAINING: CPT

## 2018-02-27 NOTE — PROGRESS NOTES
Daily Note     Today's date: 2018  Patient name: Diana Weinstein  : 1965  MRN: [id number]  Referring provider: Lalit Ornelas MD  Dx:   Encounter Diagnosis   Name Primary? 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"  Objective: See treatment diary below      Assessment: Tolerated treatment well  Patient would benefit from continued OT   Pt engaged in multi matrix placed to right of pt and cards placed below horizon to facilitate eye ROM to right and track below horizon  Pt demo- mod difficulty with shape orientation/constancy and utilized markers to hold place on card when scanning between 2 cards  Initiated Internal/external memory strategies to be completed next session  Pt  presented with flat effect throughout session  Plan: Continued skilled OT per POC with focus on completion of Internal/external memory strategies       INTERVENTION COMMENTS:  Diagnosis: R MCA CVA s/p TPA and thrombectomy  Precautions: fall risk, depression, suicidal ideations, tobacco use/abuse  Insurance: [employer]  2 of 8 visits, PN due 3/21/2018

## 2018-02-28 NOTE — OCCUPATIONAL THERAPY NOTE
OT DISCHARGE SUMMARY    Pt  Progressed to mod I/I with all aspects of ADL tasks  Discussed pt  Visual spatial/L inattention and executive function deficits with SLP to spouse  Edu  Spouse recommendation of S and A prn with IADL tasks upon d/c  Pt  Spouse receptive and able/willing to A upon d/c  Edu  Pt  Spouse current deficits  Recommend OP OT to address executive function, visual spatial deficits, L inattention, and work simulation  CM notified and planned to s/u OP OT at [facility] site   D/c home with family support from spouse

## 2018-03-01 ENCOUNTER — OFFICE VISIT (OUTPATIENT)
Dept: OCCUPATIONAL THERAPY | Facility: CLINIC | Age: 53
End: 2018-03-01
Payer: COMMERCIAL

## 2018-03-01 DIAGNOSIS — I63.511 ACUTE ISCHEMIC RIGHT MCA STROKE (HCC): Primary | ICD-10-CM

## 2018-03-01 PROCEDURE — 97530 THERAPEUTIC ACTIVITIES: CPT

## 2018-03-01 NOTE — PROGRESS NOTES
Occupational Therapy Daily Note     Today's date: 3/1/2018  Patient name: Matt Feng  : 1965  MRN: [id number]  Referring provider: Tim Sommer MD  Dx:   Encounter Diagnosis   Name Primary?  Acute ischemic right MCA stroke (HCC) Yes       Subjective: "I feel okay I haven't done much today"  Objective: See treatment diary below    Assessment: Pt seen for OT treatment session focusing on convergence/divergence re-training w/ depth perception and L inattention re-training via tracking tube to board for /VM re-training and Spot It, denies HA/eye strain, or dizziness  Pt initiated internal/external memory strategies review previous session, completed w/ application of exercises (remembering a name for a face, erento game chunking, and remembering a picture part 1, this session w/ moderate difficulty w/ severe distractibility  Pt engaged in IQ Fit level 1 with moderate difficulty w/ depth perception, spatial awareness, executive functioning, and constructional praxis  Pt is currently demonstrating the following occupational deficits: limited 2* impaired STM/immediate delayed recall, depressed mood, impaired executive functioning, attention/concentration to task, delayed processing, impaired high level balance, LUE hemiparesis distal>proximal w/ impaired FMC/FMS/GMC/GMS, impaired prehension/precision, LUE dyscoordination w/ ataxia apraxia dysmetria, L inattention, no diplopia/teaming/fusion, dysmetric pursuits w/ jerky rebounds + nystagmus, shifted to the L of midline and above horizon  Tolerated treatment well  Patient would benefit from continued skilled OT  Plan: Continued skilled OT per POC with focus on L inattention awareness, convergence/divergence re-training, executive functioning, divided attention, LUE hemiparesis for refined FMC/FMS/GMC/GMS and distal fx'l prehension      INTERVENTION COMMENTS:  Diagnosis: R MCA CVA s/p TPA and thrombectomy  Precautions: fall risk, depression, suicidal ideations, tobacco use/abuse  FOTO: 94% with 6% limitation  Insurance: [employer]  3 of 8 visits, PN due 3/21/2018     Thank you for the consult!   Please call if you have any questions: [phone]  Marylee Squires, OTD, OTR/L, C-GCM, CSRS  Director of Outpatient Neuro Occupational Therapy

## 2018-03-05 ENCOUNTER — OFFICE VISIT (OUTPATIENT)
Dept: OCCUPATIONAL THERAPY | Facility: CLINIC | Age: 53
End: 2018-03-05
Payer: COMMERCIAL

## 2018-03-05 ENCOUNTER — CLINICAL SUPPORT (OUTPATIENT)
Dept: CARDIOLOGY CLINIC | Facility: CLINIC | Age: 53
End: 2018-03-05

## 2018-03-05 DIAGNOSIS — Z86.73 PERSONAL HISTORY OF TRANSIENT ISCHEMIC ATTACK: Primary | ICD-10-CM

## 2018-03-05 DIAGNOSIS — Z95.818 PRESENCE OF OTHER CARDIAC IMPLANTS AND GRAFTS: ICD-10-CM

## 2018-03-05 DIAGNOSIS — I63.511 ACUTE ISCHEMIC RIGHT MCA STROKE (HCC): Primary | ICD-10-CM

## 2018-03-05 PROCEDURE — 99024 POSTOP FOLLOW-UP VISIT: CPT | Performed by: INTERNAL MEDICINE

## 2018-03-05 PROCEDURE — 97535 SELF CARE MNGMENT TRAINING: CPT

## 2018-03-05 NOTE — PROGRESS NOTES
LO[facility] DEVICE [facility]  BATTERY VOLTAGE ADEQUATE  NO DEVICE DETECTED EPISODES  1 PT ACTIVATED EPISODE DONE FOR DEMONSTRATION  PRESENTING RHYTHM NSR  NORMAL DEVICE FUNCTION  WOUND CHECK: INCISION CLEAN AND DRY WITH EDGES APPROXIMATED; WOUND CARE AND RESTRICTIONS REVIEWED WITH PATIENT   GV

## 2018-03-05 NOTE — PROGRESS NOTES
Daily Note     Today's date: 3/5/2018  Patient name: Uma Daigle  : 1965  MRN: [id number]  Referring provider: April Krause MD  Dx:   Encounter Diagnosis   Name Primary?  Acute ischemic right MCA stroke ([facility] [address] ) Yes                   Subjective: "I have a 4/10 Ha "      Objective: See treatment diary below      Assessment: Tolerated treatment fair  Initiated organize the hour visual and attention  Required verbal cues to sequence tasks and worksheets  Required verbal cue to recall that Rush Hour card was not included in packet  Reported an increase in HA from a /10 to a 5/10 after Keeping in Mind task  Patient was able to complete 3/9 tasks within the hour and is to completed line tangles at home  Continue with remaining tasks next session       Plan: Continued skilled OT per POC with focus on higher level cog and attention to 2+ tasks    INTERVENTION COMMENTS:  Diagnosis: Acute ischemic right MCA stroke (HCC) [I63 129]  Precautions: fall risk, depression, suicidal ideations, tobacco use/abuse  FOTO:  4 of 8 visits, PN due 3/21

## 2018-03-08 ENCOUNTER — APPOINTMENT (OUTPATIENT)
Dept: OCCUPATIONAL THERAPY | Facility: CLINIC | Age: 53
End: 2018-03-08
Payer: COMMERCIAL

## 2018-03-09 ENCOUNTER — OFFICE VISIT (OUTPATIENT)
Dept: OCCUPATIONAL THERAPY | Facility: CLINIC | Age: 53
End: 2018-03-09
Payer: COMMERCIAL

## 2018-03-09 DIAGNOSIS — I63.511 ACUTE ISCHEMIC RIGHT MCA STROKE (HCC): Primary | ICD-10-CM

## 2018-03-09 PROCEDURE — 97535 SELF CARE MNGMENT TRAINING: CPT

## 2018-03-09 NOTE — PROGRESS NOTES
Occupational Therapy Daily Note     Today's date: 3/9/2018  Patient name: Kaitlin Nieves  : 1965  MRN: [id number]  Referring provider: Sylvain Fiore MD  Dx:   Encounter Diagnosis   Name Primary?  Acute ischemic right MCA stroke ([facility] [address] ) Yes     Subjective: "I could have used this yesterday when I was on the phone doing bills and multi-tasking"  Objective: See treatment diary below  Assessment: Pt seen for OT treatment session focusing on mental manipulation w/ order of occurrence of 4 words for sequencing, processing, divided attention amongst visual clutter w/ 25% errors, distractible w/ difficulty w/ sustained and divided attention, mild L inattention noted for identifying items located on the L peripheral field in visual clutter  Pt engaged in tacking/scanning for letter cards, placement on letter board w/ large tongs, and supination/pronation of large tongs w/ LUE for retrieval of colored matching block to place on letter board in far L peripheral field w/ mild difficulty, upgraded task w/ G tolerance of 3# weighted cuff to improve LUE strength via FMS/GMS  Pt is currently demonstrating the following occupational deficits: limited 2* impaired STM/immediate delayed recall, depressed mood, impaired executive functioning, attention/concentration to task, delayed processing, impaired high level balance, LUE hemiparesis distal>proximal w/ impaired FMC/FMS/GMC/GMS, impaired prehension/precision, LUE dyscoordination w/ ataxia apraxia dysmetria, L inattention, no diplopia/teaming/fusion, dysmetric pursuits w/ jerky rebounds + nystagmus, shifted to the L of midline and above horizon  Tolerated treatment well   Patient would benefit from continued skilled OT      Plan: Continued skilled OT per POC with focus on L inattention awareness, convergence/divergence re-training, executive functioning, divided attention, LUE hemiparesis for refined FMC/FMS/GMC/GMS and distal fx'l prehension      INTERVENTION COMMENTS:  Diagnosis: R MCA CVA s/p TPA and thrombectomy  Precautions: fall risk, depression, suicidal ideations, tobacco use/abuse  FOTO: 94% with 6% limitation  Insurance: [employer]  5 of 8 visits, PN due 3/21/2018     Thank you for the consult!   Please call if you have any questions: [phone]  Stephen Cam, OTD, OTR/L, C-GCM, CSRS  Director of Outpatient Neuro Occupational Therapy

## 2018-03-11 NOTE — PROGRESS NOTES
Occupational Therapy Daily Note     Today's date: 3/12/2018  Patient name: Kaitlin Nieves  : 1965  MRN: [id number]  Referring provider: Sylvain Fiore MD  Dx:   Encounter Diagnosis   Name Primary?  Acute ischemic right MCA stroke (HCC) Yes     Subjective: "The world wants me to be right handed"  Objective: See treatment diary below  Assessment: Pt seen for OT treatment session focusing on divided attention, word finding, LUE GMC/GMS/FMC/FMS, prehension, overhead reaching, high level dynamic balance, L inattention re-training  Pt engaged in word circles completion 4 letter to 8 letter completion w/ minimal difficulty, upon completion of word spelled w/ large tongs in LUE w/ colored blocks w/ placement overhead on back of mirror  Pt engaged in [employer] task for L inattention, LUE cylindrical grasp, supination/pronation, GMC/GMS,  re-training, and executive functioning skills  Pt tolerated session fairly well  Pt is continues to demonstrate the following occupational deficits: limited 2* impaired STM/immediate delayed recall, depressed mood, impaired executive functioning, attention/concentration to task, delayed processing, impaired high level balance, LUE hemiparesis distal>proximal w/ impaired FMC/FMS/GMC/GMS, impaired prehension/precision, LUE dyscoordination w/ ataxia apraxia dysmetria, L inattention, no diplopia/teaming/fusion, dysmetric pursuits w/ jerky rebounds + nystagmus, shifted to the L of midline and above horizon  Tolerated treatment well   Patient would benefit from continued skilled OT      Plan: Continued skilled OT per POC with focus on L inattention awareness, convergence/divergence re-training, executive functioning, divided attention, LUE hemiparesis for refined FMC/FMS/GMC/GMS and distal fx'l prehension      INTERVENTION COMMENTS:  Diagnosis: R MCA CVA s/p TPA and thrombectomy  Precautions: fall risk, depression, suicidal ideations, tobacco use/abuse  FOTO: 94% with 6% limitation  Insurance: [facility]  6 of [id number], PN due 3/21/2018     Thank you for the consult!   Please call if you have any questions: [phone]  Rios Fend, OTD, OTR/L, C-GCM, CSRS  Director of Outpatient Neuro Occupational Therapy

## 2018-03-12 ENCOUNTER — EVALUATION (OUTPATIENT)
Dept: PHYSICAL THERAPY | Facility: CLINIC | Age: 53
End: 2018-03-12
Payer: COMMERCIAL

## 2018-03-12 ENCOUNTER — OFFICE VISIT (OUTPATIENT)
Dept: OCCUPATIONAL THERAPY | Facility: CLINIC | Age: 53
End: 2018-03-12
Payer: COMMERCIAL

## 2018-03-12 DIAGNOSIS — I63.511 ACUTE ISCHEMIC RIGHT MCA STROKE (HCC): ICD-10-CM

## 2018-03-12 DIAGNOSIS — I63.511 ARTERIAL ISCHEMIC STROKE, MCA (MIDDLE CEREBRAL ARTERY), RIGHT, ACUTE (HCC): Primary | ICD-10-CM
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
## 2018-03-12 DIAGNOSIS — I63.511 ACUTE ISCHEMIC RIGHT MCA STROKE (HCC): Primary | ICD-10-CM

## 2018-03-12 PROCEDURE — 97110 THERAPEUTIC EXERCISES: CPT

## 2018-03-12 PROCEDURE — 97530 THERAPEUTIC ACTIVITIES: CPT

## 2018-03-12 PROCEDURE — G8978 MOBILITY CURRENT STATUS: HCPCS | Performed by: PHYSICAL THERAPIST

## 2018-03-12 PROCEDURE — G8979 MOBILITY GOAL STATUS: HCPCS | Performed by: PHYSICAL THERAPIST

## 2018-03-12 PROCEDURE — 97162 PT EVAL MOD COMPLEX 30 MIN: CPT | Performed by: PHYSICAL THERAPIST

## 2018-03-12 NOTE — PROGRESS NOTES
PT Evaluation     Today's date: 3/12/2018  Patient name: Nicole Schwartz  : 1965  MRN: [id number]  Referring provider: Ilir Marie MD  Dx: Arterial ischemic stroke, MCA, R; falls             Assessment  Impairments: activity intolerance, impaired balance and impaired physical strength    Assessment details: Nicole Schwartz is a 47 y o  female who presents to physical therapy with a diagnosis of R MCA CVA  Pt has a pertinent PMH of cervical and lumbar radiculopathy, chronic LBP, and R calcaneal bone spur and plantar fasciitis  Patient demo endurance and gait speed WNL and is not a high fall risk per balance testing  Patient demonstrates impaired L lower extremity strength per MMT compared to R Le and minor balance deficits per FGA  Patient would benefit from skilled physical therapy 2x per week for 4 weeks to address strength and balance deficits in order to return to PLOF and work  Plan to assess R foot pain next visit and continue treatment for plantar fibromatosis/plantar fascial fibromatosis in addition to addressing impairments from stroke  Goals  STG  1  Pt will improve L LE strength by 1/2 MMT grade within 4 weeks  2  Pt will score 30/30 on FGA within 4 weeks  LTG  1  Pt will be independent with HEP within 4 weeks  2  Pt will return to work with no difficulty within 8 weeks  Plan  Planned therapy interventions: patient education, home exercise program, therapeutic exercise, therapeutic activities, balance and strengthening  Frequency: 2x week  Duration in weeks: 4  Treatment plan discussed with: patient and family        Subjective Evaluation    History of Present Illness  Mechanism of injury: Pt reports that she had a stroke on 18  Pt states that she woke up with Ha and fell down and couldn't get back up  Per , she also had slurring of speech and facial drooping, which affected her L side   Pt states she was in hospital 9 days and acute rehab for 3 days before being discharged home  Pt currently is not working due to recent stroke  Overall, pt and  feel like she is close to returning back to normal      Pt also reports history of HA prior to stroke and R heel pain due to bone spur/plantar fasciitis  Pt wears shoe inserts and had injection on  for bone spur which only helped temporary  Pt more concerned about R calcaneal bone spur/PF than impairments from CVA and would like to continue PT for R foot  Pt works at [employer] as  and job duties include driving fork truck, pulling and pushing items, and a lot of walking     Pain  No pain reported  Current pain rating: 3  At best pain ratin  At worst pain rating: 10  Location: R heel    Social Support  Steps to enter house: no  Lives in: [facility]  Lives with: spouse    Employment status: working ([employer] )  Treatments  Previous treatment: physical therapy (for bone spur/PF)  Patient Goals  Patient goals for therapy: decreased pain, increased strength and return to work          Objective  PT/OT Neuro Exam  Neurologic Exam    Balance Test    6 Minute Walk Test (ft): 1300ft   2 Minute Walk Test (ft): NT   Gait Speed (ft/s): 4 ft/s   5x Sit To Stand (s): 10 4 sec   TU 7 sec          MCTSIB Number of Seconds   Feet Together, Eyes Open 30   Feet Together, Eyes Closed 30   Feet Together, Eyes Open Foam 30   Feet Together, Eyes Closed Foam 30         Sensation Left Right   Kinesthesia NT NT   Light Touch intact intact   Sharp/Dull NT NT   2 Point Discrimination NT NT       Manual Muscle Testing - Hip Left Right   Flexion 4 5   Extension 4- 4   Abduction 4 4+   External Rotation NT NT     Manual Muscle Testing - Knee Left Right   Flexion 4+ 5   Extension 4+ 5     Manual Muscle Testing - Ankle Left Right   Doriflexion 4+ 5   Plantarflexion 4+ 5       SLS   L 30 s, R 30 s    Tandem stance  30 sec, bilaterally    Gait Assessment: decreased hip extension bilaterally      Precautions: R CVA    Daily Treatment Diary     Manual                                                                                 * Assess R calcaneal pain NV  Exercise Diary              Squats             Lunges             Tandem gait             Ambulation with HT/HN             Standing L hip - 3 way, TB             Bike                                                                                                                                                                                                       Modalities

## 2018-03-13 DIAGNOSIS — I63.511 ACUTE ISCHEMIC RIGHT MCA STROKE (HCC): ICD-10-CM

## 2018-03-13 DIAGNOSIS — F33.9 RECURRENT MAJOR DEPRESSIVE DISORDER, REMISSION STATUS UNSPECIFIED (HCC): ICD-10-CM

## 2018-03-13 RX ORDER — MIRTAZAPINE 15 MG/1
15 TABLET, FILM COATED ORAL
Qty: 30 TABLET | Refills: 2 | Status: ON HOLD | OUTPATIENT
Start: 2018-03-13 | End: 2018-04-25 | Stop reason: ALTCHOICE

## 2018-03-13 RX ORDER — PRAVASTATIN SODIUM 40 MG
40 TABLET ORAL
Qty: 30 TABLET | Refills: 2 | Status: ON HOLD | OUTPATIENT
Start: 2018-03-13 | End: 2018-04-25 | Stop reason: ALTCHOICE

## 2018-03-14 ENCOUNTER — OFFICE VISIT (OUTPATIENT)
Dept: PHYSICAL THERAPY | Facility: CLINIC | Age: 53
End: 2018-03-14
Payer: COMMERCIAL

## 2018-03-14 ENCOUNTER — OFFICE VISIT (OUTPATIENT)
Dept: OCCUPATIONAL THERAPY | Facility: CLINIC | Age: 53
End: 2018-03-14
Payer: COMMERCIAL
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
## 2018-03-14 DIAGNOSIS — I63.511 ACUTE ISCHEMIC RIGHT MCA STROKE (HCC): Primary | ICD-10-CM

## 2018-03-14 DIAGNOSIS — I63.511 ARTERIAL ISCHEMIC STROKE, MCA (MIDDLE CEREBRAL ARTERY), RIGHT, ACUTE (HCC): Primary | ICD-10-CM

## 2018-03-14 DIAGNOSIS — I63.511 ACUTE ISCHEMIC RIGHT MCA STROKE (HCC): ICD-10-CM

## 2018-03-14 PROCEDURE — 97112 NEUROMUSCULAR REEDUCATION: CPT

## 2018-03-14 PROCEDURE — 97110 THERAPEUTIC EXERCISES: CPT | Performed by: PHYSICAL THERAPIST

## 2018-03-14 PROCEDURE — 97530 THERAPEUTIC ACTIVITIES: CPT

## 2018-03-14 PROCEDURE — 97112 NEUROMUSCULAR REEDUCATION: CPT | Performed by: PHYSICAL THERAPIST

## 2018-03-14 NOTE — PROGRESS NOTES
Daily Note     Today's date: 3/14/2018  Patient name: Essie Cho  : 1965  MRN: [id number]  Referring provider: Terrence Clarke MD  Dx:   Encounter Diagnosis   Name Primary?  Acute ischemic right MCA stroke ([facility] [address] ) Yes                  Subjective: "I don't have the motivation to do anything at home"  Objective: See treatment diary below      Assessment: Tolerated treatment well  Patient demonstrated fatigue post treatment and exhibited good technique with therapeutic exercises  Educated pt and  on routines and lists to motivate self to perform ADL and IADL tasks  Pt completed peg pattern copy retrieving pegs from low surface and placing in peg mat with forward/OH reach using LUE with focus on hand to target, dynamic stabilization of LUE, attention to left side and divided attention  Pt completed task without difficulty, no noted droppage  Matrix with mental math component with focus on crossing midline, hand to target and vertical/HR saccades  Pt demo-min difficulty to hold place on card, able to self correct errors, minimal dysmetria noted, pt kept right hand lap as in constraint strategy utilizing L-hand for functional task  Plan: Continued skilled OT per POC with focus on LUE function, endurance and sustained attention         INTERVENTION COMMENTS:  Diagnosis: R MCA CVA s/p TPA and thrombectomy  Precautions: fall risk, depression, suicidal ideations, tobacco use/abuse  Insurance: [employer]  [id number], PN due 3/21/2018

## 2018-03-14 NOTE — PROGRESS NOTES
Daily Note     Today's date: 3/14/2018  Patient name: Marisabel Robbins  : 1965  MRN: [id number]  Referring provider: Rasheed Skinner MD  Dx:   Encounter Diagnosis     ICD-10-CM    1  Arterial ischemic stroke, MCA (middle cerebral artery), right, acute ([facility] [address] ) I63 511    2  Fall, subsequent encounter W19  XXXD    3  Acute ischemic right MCA stroke (HCC) I63 511          Subjective: Reports 2/10 foot pain upon arrival  Notes pain worsens with weight bearing and activity  Objective: See treatment diary below  Precautions: R CVA     Daily Treatment Diary      Manual                                                                                                                                                     Exercise Diary   3/14                     Squats 2x10                     Lunges  3 laps                     Tandem gait  foam, 3 laps                     Ambulation with HT/HN  1 lap                     Standing L hip - 3 way, TB  green, 15x                     Bike  10 min                      tandem stance-foam Ec, 30''x3                      rockerboard taps 15x                     rockerboard balance 30''x2                      step ups-  6''+ foam, fwd/lateral                                                                                                                                                                                                                                                                           Modalities                                                                                                      Assessment: Tolerated treatment well  Reports some increasing pain in foot with exercises  Patient demonstrates very good balance overall, even with higher level proprioceptive training  Able to perform all TE without UE support and no LOB noted  Demonstrates limited cervical ROM with HT/HN   Patient would benefit from continued PT      Plan: Per Ie: Plan to assess R foot pain next visit and continue treatment for plantar fibromatosis/plantar fascial fibromatosis in addition to addressing impairments from stroke    Assess R calcaneal pain NV

## 2018-03-15 ENCOUNTER — APPOINTMENT (OUTPATIENT)
Dept: OCCUPATIONAL THERAPY | Facility: CLINIC | Age: 53
End: 2018-03-15
Payer: COMMERCIAL

## 2018-03-19 ENCOUNTER — OFFICE VISIT (OUTPATIENT)
Dept: PHYSICAL THERAPY | Facility: CLINIC | Age: 53
End: 2018-03-19
Payer: COMMERCIAL

## 2018-03-19 ENCOUNTER — EVALUATION (OUTPATIENT)
Dept: OCCUPATIONAL THERAPY | Facility: CLINIC | Age: 53
End: 2018-03-19
Payer: COMMERCIAL

## 2018-03-19 DIAGNOSIS — M79.671 PAIN OF RIGHT HEEL: Primary | ICD-10-CM

## 2018-03-19 DIAGNOSIS — M72.2 PLANTAR FASCIAL FIBROMATOSIS OF RIGHT FOOT: ICD-10-CM

## 2018-03-19 DIAGNOSIS — I63.511 ACUTE ISCHEMIC RIGHT MCA STROKE (HCC): Primary | ICD-10-CM

## 2018-03-19 PROCEDURE — G8979 MOBILITY GOAL STATUS: HCPCS | Performed by: PHYSICAL THERAPIST

## 2018-03-19 PROCEDURE — G8978 MOBILITY CURRENT STATUS: HCPCS | Performed by: PHYSICAL THERAPIST

## 2018-03-19 PROCEDURE — 97530 THERAPEUTIC ACTIVITIES: CPT

## 2018-03-19 PROCEDURE — G8985 CARRY GOAL STATUS: HCPCS

## 2018-03-19 PROCEDURE — G8984 CARRY CURRENT STATUS: HCPCS

## 2018-03-19 PROCEDURE — 97140 MANUAL THERAPY 1/> REGIONS: CPT | Performed by: PHYSICAL THERAPIST

## 2018-03-19 PROCEDURE — 97110 THERAPEUTIC EXERCISES: CPT | Performed by: PHYSICAL THERAPIST

## 2018-03-19 PROCEDURE — 97164 PT RE-EVAL EST PLAN CARE: CPT | Performed by: PHYSICAL THERAPIST

## 2018-03-19 NOTE — PROGRESS NOTES
PT Re-Evaluation     Today's date: 3/19/2018  Patient name: Shadi Rosa  : 1965  MRN: [id number]  Referring provider: Sylwia Gilbert MD  Dx:   Encounter Diagnosis     ICD-10-CM    1  Pain of right heel M79 671    2  Plantar fascial fibromatosis of right foot M72 2                   Assessment  Impairments: impaired balance, pain with function and weight-bearing intolerance    Assessment details: Pt is a 47year old female [facility], however prior to CVA was getting treatment for R heel pain from bone spur and plantar fasciitis  Assessment completed to for R heel pain in order to add R foot treatment to current POC  Pt presented today decreased DF AROM L>R, increased pain with Wb'ing and with palpation of R heel and plantar fascia, decreased PF strength B, decreased standing balance as seen with SLS R>L all which limit her functionally with walking and RTW  Pt will benefit from PT plan below added to her CVA POC needed to reduce pain and improve functional mobility  Understanding of Dx/Px/POC: excellent   Prognosis: good    Goals  ST  Pt will be able to complete PF in standing on R for 10 reps without UE support within 4 weeks  2  Pt will report pain at worst in R heel a 5/10 within 4 weeks  3  Pt will demonstrate independence with HEP within 4 weeks  LTG  1  Pt will report pain at worst in R foot a 3/10 within 6 weeks  2  Pt will be able to SLS on R without UE support for 30 sec within 6 weeks      Plan  Patient would benefit from: skilled PT  Referral necessary: No  Planned therapy interventions: manual therapy, balance/weight bearing training, therapeutic exercise, flexibility, gait training, stretching, patient education and home exercise program  Frequency: 2x week  Duration in weeks: 6  Treatment plan discussed with: family and patient        Subjective Evaluation    History of Present Illness  Mechanism of injury: Tranfered here from ortho clinic due to having a CVA, however still has her R foot pain  Started in Oct 2017, thinks it's from being on her feet at work  Got injection in January, helped initially then went away  Started ortho PT in January for R foot pain, was helping a little  Has gotten a orthotic, waiting for the shoe to be able to wear all the time  Foot pain has not gotten worse since stroke, but also not back to work  Pain  Current pain ratin  At best pain ratin  At worst pain rating: 10  Location: R foot, in heel  Progression: no change          Objective     Observations     Additional Observation Details  Pt with low arch in sitting, when standing arch decreases R>L    Tenderness     Right Ankle/Foot   Tenderness in the Achilles insertion and plantar fascia       Active Range of Motion   Left Ankle/Foot   Dorsiflexion (ke): 7 degrees   Plantar flexion: 70 degrees   Inversion: 35 degrees   Eversion: 30 degrees     Right Ankle/Foot   Dorsiflexion (ke): 10 degrees   Plantar flexion: 55 degrees   Inversion: 35 degrees   Eversion: 42 degrees     Strength/Myotome Testing     Left Ankle/Foot   Dorsiflexion: 5    Right Ankle/Foot   Dorsiflexion: 5    Additional Strength Details  Unable to complete PF on either foot for more than 2-3 reps, had issues keeping her balance, however also reported feeling weak    Tests     Additional Tests Details  EO SLS R: 16s, L: 30s    EC SLS R: 2s, L: 5 s      Flowsheet Rows    Flowsheet Row Most Recent Value   PT/OT G-Codes   Current Score  64   Projected Score  72   FOTO information reviewed  Yes   Assessment Type  Re-evaluation   G code set  Mobility: Walking & Moving Around [employer] her R foot pain]   Mobility: Walking and Moving Around Current Status ()  CJ   Mobility: Walking and Moving Around Goal Status ()  CJ          Precautions: R foot heel spur, R MCA Stroke    Daily Treatment Diary   Manual     3/19                   DTM R plantar fascia   x5min                   DTM R Achilles tendon using Graston tool   x3min                                                                                                 Exercise Diary   3/14  3/19                   Squats 2x10                     Lunges  3 laps                     Tandem gait  foam, 3 laps                     Ambulation with HT/HN  1 lap                     Standing L hip - 3 way, TB  green, 15x                     Bike  10 min                      tandem stance-foam Ec, 30''x3                      rockerboard taps 15x                     rockerboard balance 30''x2                      step ups-  6''+ foam, fwd/lateral                      SLS R   3x30s                    gastroc/soleus stretch on R   standing off step  [id number]                                                                                                                                                                                                                        **addendum: charges corrected by Bhavesh Ramachandran in [facility] desk per flow sheet

## 2018-03-19 NOTE — PROGRESS NOTES
Occupational Therapy Stroke Progress Note/Status Update    Today's Date: 3/19/2018  Patient Name: Js Hall  : 1965  MRN: [id number]  Referring Provider: Evangelina Pagan MD  Dx: Acute ischemic right MCA stroke [facility]) [I63 [id number]]    Active Problem List:   Patient Active Problem List   Diagnosis    Acute ischemic right MCA stroke ([facility] )    Fall    Forehead contusion    Left elbow contusion    Chronic back pain    Major depression     Past Medical Hx:   Past Medical History:   Diagnosis Date    Acute pain of right knee     last assessed - [id number]    Anemia     Anxiety     Cervical disc disorder with radiculopathy     Chronic pain     Constipation     Hematuria     last assessed - [id number]    High cholesterol     Lumbar radiculopathy     Lumbar stenosis     Other chronic pain     last assessed - [id number]    Other muscle spasm     last assessed - [id number]    PONV (postoperative nausea and vomiting)     must have premed    Spondylosis of cervical spine     Spondylosis of lumbosacral region     Stroke (cerebrum) ([facility] )     right side of brain    Stroke [facility])     Urinary incontinence     Resolved - [id number]     Past Surgical Hx:   Past Surgical History:   Procedure Laterality Date    BLADDER SURGERY      BLADDER SURGERY      CERVICAL SPINE SURGERY      KNEE ARTHROSCOPY Left     LIPECTOMY      of thigh    LIPOMA RESECTION      NECK SURGERY      KS COLONOSCOPY FLX DX W/COLLJ SPEC WHEN PFRMD N/A 2/10/2017    Procedure: COLONOSCOPY;  Surgeon: Shara Maldonado MD;  Location: [facility];   Service: Gastroenterology    KS KNEE SCOPE,MED/LAT MENISECTOMY Left 3/21/2016    Procedure: ARTHROSCOPY W/ PARTIAL MEDIAL MENISCECTOMY;  Surgeon: Donna Jimenes MD;  [facility];  Service: Orthopedics    TOTAL ABDOMINAL HYSTERECTOMY      TUBAL LIGATION          Pain Levels:   Restin    With Activity:  0    Subjective/Patient Goal: "I still feel like I'm having trouble"    History of Present Illness:  Pt is a flat, employed full time, 47 y o  female seen for OT eval s/p referred to [address] [place] [address] s/p recently d/c'd from [facility], initially presented to [facility] w/ L sided facial droop, limited movement of L side, R gaze preference stroke alert initiated, CTB + evolving R MCA territory infarction, MRIB + multiple infarcts in the R cerebral hemisphere in the distribution of the R MCA, deep infarct involving the basal ganglia, cortical infarcts involving the frontal and parietal region and petechial hemorrhage in the infarct noted in the R lentiform nucleus, pt received TPA s/p thrombectomy 2/9/2018, managed in the ICU tferred to P7, ultimately dx'd w/ R MCA CVA s/p TPA and thrombectomy, L elbow contusion, forehead contusion, and suicidal ideation, comorbidities as listed above      Lifestyle Performance Model:  Autonomy: Pt was I w/ I/ADLs, drove, & required no use of DME PTA  Reciprocal Relationships: Supportive  works FT during am hours, is currently in slow season so presently hours are more flexible, two daughters 29 and 28 and 5 grandchildren  Service to Others: Pt is employed full time at the [employer]    Intrinsic Gratification: Enjoys baking, gardening, walking, being outdoors, going to [address] [place] [address] Ave: Pt lives in a [facility] w/ first floor setup w/ 1 [address] in [address] [facility]    Objective  Impairments Section:   UE Strength:   CECY: RUE: 38/200 LUE: 30/200   PINCER: 3 point pinch: RUE 10, LUE: 10   2 point pinch: RUE: 11, LUE: 7   Lateral pincher: RUE: 13, LUE: 11    Coordination:   9 HOLE PEG TEST:     RUE: 21 44 seconds, LUE: 23 56 Seconds    Range of Motion:  AROM:              Shoulder elevation: B/l UE full/intact              Shoulder FF: B/l UE full/intact              Shoulder ABD: B/l UE full/intact              ER/IR: B/l UE full/intact              Elbow ext/flex: B/l UE full/intact              Sup/Pron: B/l UE full/intact Wrist flex/ext: B/l UE full/intact              Composite: B/l UE full/intact              Hook: B/l UE full/intact              Opposition: B/l UE full/intact              Finger to nose: B/l UE full/intact              Dysdiadochokinesia: B/l UE full/intact     PROM:              Shoulder elevation:  B/l UE full/intact              Shoulder FF:B/l UE full/intact              Shoulder ABD: B/l UE full/intact              ER/IR: B/l UE full/intact              Elbow ext/flex: B/l UE full/intact              Sup/Pron: B/l UE full/intact              Wrist flex/ext: B/l UE full/intact    Sensation:  MYOFILAMENTS:              RUE: 3 61   LUE: 3 61    Visual Perceptual and Functional Cognition  1  Concussion Cognitive Checklist: self report symptom checklist  *Patient indicated that she is experiencing the following symptoms:    · Memory: Remembering your schedule and Learning new things    · Attention: Keeping attention during a conversation, Focusing or concentrating on a specific task and Dividing your attention (i e , multi-tasking)    · Processing: Processing new information     · Executive Functions: None reported    · Communication: Expressing thoughts and ideas fluently and Managing tone and volume of voice    · Visual: Losing spot on the page when reading and Change in handwriting    · Emotional: None reported    · Increased Sensitivities to: Sight and Computer screen time/movies/TV     2   Contextual Memory Test (CMT): Pt reports has noticed a change in her memory, rates memory capacity at    75%, if studied 20 objects for 90 seconds would recall 16 of them, would have recalled 16 of them pre stroke, frequently forgets things that happened the day before 25% of the time, frequently forgets important details 25% of the time, frequently forgets things that people have told her 25% of the time, frequently forgets things that have happened a few minutes ago almost never, would remember facts about this form a week from now  IMMEDIATE RECALL:   15/20  score falls  in WNL/WFL deficit range    DELAYED RECALL:   score falls in suspect deficit range (distracted per sister @ clinic side likely skewing results anticipate pt would have recalled more  RECOGNITION:   with 1 confabulations resulting in score 19     3  [place] Cognitive Assessment Version 7 2 (MoCA V7 2)  Visuospatial/executive functionin/5  Naming: 3/3  Memory: 1st trial: , 2nd trial:   Attention/concentration:   List of letters:   Serial Seven Subtraction: 3/3 w/ 0 errors  Language/sentence repetition:   Language Fluency:   Abstract/Correlational Thinkin/2  Delayed Recall:   Orientation:    Memory Index Score: 15/15  MoCA V1 7 2 Raw Score: 2930, MIS: 15/15, indicative of normal neurocognitive functioning  4  Vision Screening Recording Form:   vision screen: + glasses @ all times present for vision screen  near acuity: R 20/40, L 20/50   binocularity far: orthophoria  Binocularity near: orthohporia  red green fusion: PLRG @ 5"   near point of convergence: no diplopia/fusion/teaming   yousif string: alignment  Pursuits: slightly dysmetric L eye, L inattention   saccades: accurate   ocular ROM: intact/full  Visual Perceptual Midline shift: shifted to the L of midline and at horizon    Assessment/Plan  Occupational Therapy Skilled Analysis Assessment and Plan of Care:  Pt requires overall mod I for ADLs/self care and mod I for fx'l mobility w/o DME   Pt is currently demonstrating the following occupational deficits: limited 2* impaired STM/immediate delayed recall, depressed mood, impaired executive functioning, attention/concentration to task, delayed processing, impaired high level balance, LUE hemiparesis distal>proximal w/ impaired FMC/FMS/GMC/GMS, impaired prehension/precision, LUE dyscoordination w/ ataxia apraxia dysmetria, L inattention, no diplopia/teaming/fusion, dysmetric pursuits w/ jerky rebounds + nystagmus, shifted to the L of midline and above horizon  The following Occupational Performance Areas to address include: medication management, socialization, health maintenance, functional mobility, community mobility, clothing management, cleaning, meal prep, money management, household maintenance, care of children, care of pets, job performance/volunteering and social participation  Based on the aforementioned OT evaluation, functional performance deficits, and assessments, pt has been identified as a moderate complexity evaluation   Pt to continue to benefit from outpatient skilled OT services to address the following goals 2x/wk to  w/in 4 more weeks with special focus on self-care management, pt education,  and VM training as well as motor training to improve above defiicits and enhance overall QOL/function      Goals:  Short Term Goals:  Cognition/Vision:  · Pt will increase auditory processing to take notes while listening in multi-modal work related/classroom symptom free at baseline performance for improved work/school performance, once returned  4 weeks as applicable  · Pt will increase attention to 2+ tasks for improved work/school performance (once returned) and engagement in salient tasks 4 weeks as applicable  · Pt will increase temporal awareness for keeping to schedule within 5 min increments, recall appointments, functional addition of time with 80% accuracy 4 weeks  · Pt will demo good carryover of internal and external memory aides for improved recall of daily events, improved executive functioning with 80% accuracy in 4 weeks  · Pt will increase insight into deficits for improved carryover of recommendations, accommodations, improved rate of healing 4 weeks  · Pt will increase oculomotor control for improved saccades, con/divergent tasks for improved reading, board to table tasks with minimal increase in symptoms 4 weeks  · Pt will tolerate multi-modal envt x 15 min with 80% accuracy of cog load and min increase of symptoms of 2 levels in HA/dizziness/nausea 4 weeks  Coordination:  · Pt will increase prehension patterns for improved tripod with utensil management with <20% droppage 4 weeks  · Pt will increase rate of manipulation for all FM tests for improved functional performance with salient tasks 4 weeks  · Pt will increase automaticity of LUE  to 50% for improved grasp release of tabletop items for improved functional performance with salient tasks 4 weeks  ROM/Function:  · Pt will demo with G carryover of Home Exercise Program to improve functional progression towards goals in Plan of care and for improved functional use of  LUE 4 weeks  · Pt will increase LUE  to refined functional assist with <20% cuing for tabletop tasks for improved functional performance of life roles and salient tasks 4     Long Term Goals:  · Pt will increase attention to 3+ tasks for improved divided attention with work/school and pre driving roles as applicable  · Pt will increase verbal and written direction following with processing time of <1 min and 85% accuracy  · Pt will tolerate multimodal envt x 60 min symptom free for return to work/school  · Pt will demo with decreased anxiety and frustration for improved insight into concussion process and rate of recovery  · Pt will demo with G carryover and understanding of accommodations for school environment to allow for enhanced learning environment symptom free, if needed  · Pt will increase oculomotor control for improved dynamic activities with head turns, board/screen to table tasks symptom free, improved VMI and return to baseline handwriting  · Pt will increase oculomotor control for WNL saccades, con/divergent tasks symptom free  · Pt will increase screen tolerance to 3 hours with min increase in HA by 1-2 levels for improved leisure pursuits and work/school performance as applicable  Coordination:  · Decrease ataxia with functional reach for normalized movement pattern of  LUE  · Increase automaticity of  LUE to 100% for resumption of B integrative tasks  · Pt will increase rate of prehension for all FM tasks for improved use of utensils, writing, ADL fasteners  · Pt will increase prehension for all FM tasks for improved independence with I/ADL/leisure tasks including utensils, writing, ADL fasteners  ROM/Function:  · Increase func mobs from various surfaces to Mod I/ I with least restrictive device and good safety  · Resume hand dominance to refined mod I functional assist with all I/ADL/leisure tasks  · Pt will increase B/L UE strength to 5/5 and  strength, through the use of strengthening exercises and home program for eventual return to driving PRN  · Pt will increase FMC to Mod I with ADL fasteners for increased independence  · Pt will resume hand dominance with I status for all activities of daily living and eventual return to driving PRN     INTERVENTION COMMENTS:  Diagnosis: R MCA CVA s/p TPA and thrombectomy  Precautions: fall risk, depression, suicidal ideations, tobacco use/abuse  FOTO: 80% with 20% limitation  Insurance: [employer]  1 of 8 visits, PN due 4/19/2018     Thank you for the consult!   Please call if you have any questions: [phone]  Isra Moreland, OSKAR, OTR/L, C-GCSWAPNA, CSRS  Director of Outpatient Neuro Occupational Therapy

## 2018-03-20 ENCOUNTER — OFFICE VISIT (OUTPATIENT)
Dept: NEUROLOGY | Facility: CLINIC | Age: 53
End: 2018-03-20
Payer: COMMERCIAL

## 2018-03-20 VITALS
WEIGHT: 127.1 LBS | DIASTOLIC BLOOD PRESSURE: 65 MMHG | BODY MASS INDEX: 24.21 KG/M2 | SYSTOLIC BLOOD PRESSURE: 115 MMHG | HEART RATE: 75 BPM

## 2018-03-20 DIAGNOSIS — I63.511 ACUTE ISCHEMIC RIGHT MCA STROKE (HCC): Primary | ICD-10-CM

## 2018-03-20 DIAGNOSIS — F32.5 MAJOR DEPRESSIVE DISORDER WITH SINGLE EPISODE, IN FULL REMISSION (HCC): ICD-10-CM

## 2018-03-20 PROCEDURE — 99214 OFFICE O/P EST MOD 30 MIN: CPT | Performed by: NURSE PRACTITIONER

## 2018-03-20 RX ORDER — LACTULOSE 10 G/15ML
30 SOLUTION ORAL; RECTAL
Status: ON HOLD | COMMUNITY
Start: 2018-02-23 | End: 2018-04-25 | Stop reason: ALTCHOICE

## 2018-03-20 NOTE — ASSESSMENT & PLAN NOTE
Doing well from a stroke standpoint, no residual deficit  She was encouraged to maintain good control of secondary stroke risk factors, including blood pressure, cholesterol, and blood sugar  I will defer monitoring and management of these issues to her PCP  She should continue on her aspirin and statin  She did voice some concerns about her Ck, which apparently was elevated while on lipitor, but she has orders to have this re-checked per her PCP  I encouraged her to report any increased muscle aches as well  She currently has a loop recorder in place to monitor for arrhythmias, and this will continue to be followed by cardiology  I also strongly encouraged her to quit smoking, as this is another modifiable stroke risk factor  She should continue PT and OT as currently prescribed  We discussed that any new stroke like symptoms should prompt her to call 911 or proceed to the nearest emergency department as soon as possible

## 2018-03-20 NOTE — ASSESSMENT & PLAN NOTE
She was prescribed remeron while in the acute rehab but feels that this medication is actually making her more depressed, as well as causing weight gain and increased lethargy  She would like to stop taking it and I did advise her that she should wean off of this medication and also report to her PCP that she would be weaning off of it

## 2018-03-20 NOTE — PROGRESS NOTES
Patient ID: Gwendolyn Poole is a 47 y o  female  Assessment/Plan:    Acute ischemic right MCA stroke [facility])  Doing well from a stroke standpoint, no residual deficit  She was encouraged to maintain good control of secondary stroke risk factors, including blood pressure, cholesterol, and blood sugar  I will defer monitoring and management of these issues to her PCP  She should continue on her aspirin and statin  She did voice some concerns about her Ck, which apparently was elevated while on lipitor, but she has orders to have this re-checked per her PCP  I encouraged her to report any increased muscle aches as well  She currently has a loop recorder in place to monitor for arrhythmias, and this will continue to be followed by cardiology  I also strongly encouraged her to quit smoking, as this is another modifiable stroke risk factor  She should continue PT and OT as currently prescribed  We discussed that any new stroke like symptoms should prompt her to call 911 or proceed to the nearest emergency department as soon as possible  Major depression  She was prescribed remeron while in the acute rehab but feels that this medication is actually making her more depressed, as well as causing weight gain and increased lethargy  She would like to stop taking it and I did advise her that she should wean off of this medication and also report to her PCP that she would be weaning off of it  Subjective:    Maria Ines Leroy is a 47y o  year old female who presented to [facility] on 2/9/18 with acute onset left-sided weakness  Patient reportedly stepped out to have a cigarette, her  heard her fall, and discovered her to be somewhat confused  EMS was summoned and noticed her left-sided weakness, right-sided gaze preference  She was brought in as a trauma patient but stroke alert was rapidly activated  She has no prior history of stroke    Per prior record history includes anxiety, prior C-spine surgery, lumbar stenosis, chronic pain  She was given tPA in the ER and underwent thrombectomy for acute right M1 occlusion  Follow-up MRI showed evidence of a resultant multifocal infarction, right MCA territory, with petechial hemorrhagic conversion  CTA describing mild atherosclerotic plaque in the right bulb, but with ulceration, similar changes noted in the aorta  This was felt to possibly be the source of her infarction  Echo did not show evidence of an embolic source with an EF of 65%  No flow limiting stenosis in the carotids  She did have some short episodes of a-fib on tele but this was also while she was on levophed, and was thought to be medication induced  A JONATAN was done-noting no thrombus but it did show a small, nonmobile calficied atheroma in the proximal ascending aorta  Otherwise, there was moderate atheroma in the descending thoracic aorta  She also had a loop recorder placed on 2/13  Hypercoagulation panel was negative  Cardiology did not feel that there was a need for full ac at this point in time  She was discharged to the [facility] on 2/14 and was on aspirin 81mg and Lipitor, however she refused the Lipitor and was changed to pravastatin  Today Ms Christena Lundborg presents for follow-up of her recent stroke  She is accompanied by her   She denies any new stroke like symptoms, but states that she feels that the remeron that she was prescribed in the [facility] is causing her to feel depressed as well as causing weight gain and increased lethargy  She would like to stop taking is ASAP and reports that she has had similar experiences with other anti-depressants  She denies vision changes, headaches, speech or swallowing issues, numbness/tingling/or weakness, gait disturbances, or any recent falls  She reports that she is taking her aspirin and statin as prescribed, but is concerned about her CK level as it had been elevated on the lipitor  She denies any unusual muscle aches, bruising, or bleeding   She is currently going to outpatient PT and OT 2 times per week  She has also followed up with cardiology regarding her loop recorder  Objective:    Blood pressure 115/65, pulse 75, weight 57 7 kg (127 lb 1 6 oz)  Physical Exam   Constitutional: She appears well-developed and well-nourished  HENT:   Head: Normocephalic  Eyes: EOM are normal  Pupils are equal, round, and reactive to light  Neurological: She has normal strength and normal reflexes  Gait normal    Psychiatric: She has a normal mood and affect  Her speech is normal and behavior is normal    Vitals reviewed  Neurological Exam    Mental Status  The patient is alert  Her speech is normal  She has normal attention span and concentration  She follows multi-step commands  She has a normal fund of knowledge  Cranial Nerves    CN II: The patient's visual acuity and visual fields are normal   CN III, IV, VI: The patient's pupils are equally round and reactive to light and ocular movements are normal   CN V: The patient has normal facial sensation  CN VII:  The patient has symmetric facial movement  CN VIII:  The patient's hearing is normal   CN IX, X: The patient has symmetric palate movement and normal gag reflex  CN XI: The patient's shoulder shrug strength is normal   CN XII: The patient's tongue is midline without atrophy or fasciculations  Motor  The patient has normal muscle bulk throughout  Her overall muscle tone is normal throughout  Her strength is 5/5 throughout all four extremities  Sensory  The patient's sensation is to light touch  Reflexes  Deep tendon reflexes are 2+ and symmetric in all four extremities with downgoing toes bilaterally  Gait and Coordination  The patient has normal gait and station  ROS:    Review of Systems   Constitutional: Positive for appetite change and fatigue  Recent weight gain   HENT: Negative  Eyes: Positive for pain  Blurry vision, Dry eyes      Respiratory: Negative  Cardiovascular: Positive for chest pain  Gastrointestinal: Negative  Endocrine:        Loss of sexual drive    Genitourinary: Positive for frequency and urgency  Musculoskeletal: Positive for back pain and neck pain  Joint Pain, Muscle Pain, Pain when walking    Skin: Negative  Allergic/Immunologic: Negative  Neurological: Positive for headaches  Increased sleepiness, Memory Problems, Double Vision,    Hematological: Negative  Psychiatric/Behavioral: Negative

## 2018-03-20 NOTE — PATIENT INSTRUCTIONS
Continue with good control of your secondary stroke risk factors including blood pressure, cholesterol, and blood sugar  I will defer monitoring and management of these issues to your primary care physician  Continue your aspirin and statin  You can wean off of the remeron, I would take half a tab for 5 days and then you can stop  Any new stroke-like symptoms such as facial drooping, slurred speech, numbness/tingling/weakness on one side of your body you should call 911 or go to the nearest emergency room as soon as possible  Try to quit smoking

## 2018-03-22 ENCOUNTER — OFFICE VISIT (OUTPATIENT)
Dept: PHYSICAL THERAPY | Facility: CLINIC | Age: 53
End: 2018-03-22
Payer: COMMERCIAL

## 2018-03-22 ENCOUNTER — OFFICE VISIT (OUTPATIENT)
Dept: OCCUPATIONAL THERAPY | Facility: CLINIC | Age: 53
End: 2018-03-22
Payer: COMMERCIAL

## 2018-03-22 DIAGNOSIS — M79.671 PAIN OF RIGHT HEEL: Primary | ICD-10-CM

## 2018-03-22 DIAGNOSIS — M72.2 PLANTAR FASCIAL FIBROMATOSIS OF RIGHT FOOT: ICD-10-CM

## 2018-03-22 DIAGNOSIS — I63.511 ACUTE ISCHEMIC RIGHT MCA STROKE (HCC): Primary | ICD-10-CM

## 2018-03-22 DIAGNOSIS — I63.511 ACUTE ISCHEMIC RIGHT MCA STROKE (HCC): ICD-10-CM

## 2018-03-22 PROCEDURE — 97112 NEUROMUSCULAR REEDUCATION: CPT | Performed by: PHYSICAL THERAPIST

## 2018-03-22 PROCEDURE — 97535 SELF CARE MNGMENT TRAINING: CPT

## 2018-03-22 PROCEDURE — 97110 THERAPEUTIC EXERCISES: CPT | Performed by: PHYSICAL THERAPIST

## 2018-03-22 NOTE — PROGRESS NOTES
Daily Note     Today's date: 3/22/2018  Patient name: Gina Mccarthy  : 1965  MRN: [id number]  Referring provider: Theodora Farias MD  Dx:   Encounter Diagnosis     ICD-10-CM    1  Pain of right heel M79 671    2  Plantar fascial fibromatosis of right foot M72 2    3  Acute ischemic right MCA stroke (HCC) I63 511                   Subjective: Pt with <1/10 heel pain today  No changes since last visit  Denies compliance with HEP  Objective: See treatment diary below  Precautions: R foot heel spur, R MCA Stroke     Daily Treatment Diary   Manual     3/19  3/22                 DTM R plantar fascia   x5min  5 min                         DTM R Achilles tendon using Graston tool   x3min  x3min                                                                                               Exercise Diary   3/14  3/19  3/22                 Squats 2x10   on bosu x30                 Lunges  3 laps   Walking in [place], 40'x2                 Tandem gait  foam, 3 laps   in [place], arms crossed EC 40'x2                 Ambulation with HT/HN  1 lap                     Standing L hip - 3 way, TB  green, 15x                     Bike  10 min  10 min 10 min                  tandem stance-foam Ec, 30''x3                      rockerboard taps 15x                     rockerboard balance 30''x2   M/L and A/P 1 min ea                 step ups-  6''+ foam, fwd/lateral    6" + foam FWD 2x15 R/L                 SLS R   3x30s  R/L EO/EC on foam 30s ea                 gastroc/soleus stretch on R   standing off step  [id number] standing off step  [id number]                 towel scrunching B feet in sitting     x30                                                                                                                                                                                             Assessment: Tolerated treatment well as seen by no rest breaks and no increased c/o pain    Pt required occasional UE support during session to maintain balance however minimal  Patient would benefit from continued PT  Plan: Progress treatment as tolerated  Plan for tband scrunches as pt able and issue tband for home

## 2018-03-22 NOTE — PROGRESS NOTES
Daily Note     Today's date: 3/22/2018  Patient name: Diana Weinstein  : 1965  MRN: [id number]  Referring provider: Lalit Ornelas MD  Dx:   Encounter Diagnosis   Name Primary?  Acute ischemic right MCA stroke [facility]) Yes                  Patient was treated by JESSICA Maza and was under direct supervision of Nicko Hale 26, SCHAEFER/L  Subjective: When asked about her follow-up at home with list making as a motivator to complete ADLs/IADLs, pt reported "i've been making lists and they help a lot "      Objective: See treatment diary below      Assessment: Tolerated treatment well  HA 0/10 upon arrival  Stations at high-low table with sustained convergence task at eye-level while standing on blue foam square for divided attention  Visual clutter matching task with LUE grasp and release of ABC blocks and letter/number cards from B/L sides with black tongs for L inattention and visual scanning  Vikram Shown says for L inattention awareness and saccades, with verbal cues given for error correction and for encouragement as pt became visibly frustrated  Otherwise noted with flat affect throughout treatment session  HA 0/10 upon departure        Plan: Continued skilled OT per POC with focus on oculomotor control    INTERVENTION COMMENTS:  Diagnosis: Acute ischemic right MCA stroke (HCC) [I63 511]  Precautions:  fall risk, depression, suicidal ideations, tobacco use/abuse  FOTO:  2 of 8 visits, PN due 18

## 2018-03-26 ENCOUNTER — OFFICE VISIT (OUTPATIENT)
Dept: PHYSICAL THERAPY | Facility: CLINIC | Age: 53
End: 2018-03-26
Payer: COMMERCIAL

## 2018-03-26 ENCOUNTER — OFFICE VISIT (OUTPATIENT)
Dept: OCCUPATIONAL THERAPY | Facility: CLINIC | Age: 53
End: 2018-03-26
Payer: COMMERCIAL

## 2018-03-26 DIAGNOSIS — I63.511 ARTERIAL ISCHEMIC STROKE, MCA (MIDDLE CEREBRAL ARTERY), RIGHT, ACUTE (HCC): ICD-10-CM

## 2018-03-26 DIAGNOSIS — I63.511 ACUTE ISCHEMIC RIGHT MCA STROKE (HCC): ICD-10-CM

## 2018-03-26 DIAGNOSIS — I63.9 CEREBROVASCULAR ACCIDENT (CVA), UNSPECIFIED MECHANISM (HCC): Primary | ICD-10-CM
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
## 2018-03-26 DIAGNOSIS — M72.2 PLANTAR FASCIAL FIBROMATOSIS OF RIGHT FOOT: ICD-10-CM

## 2018-03-26 DIAGNOSIS — M79.671 PAIN OF RIGHT HEEL: Primary | ICD-10-CM

## 2018-03-26 PROCEDURE — 97112 NEUROMUSCULAR REEDUCATION: CPT

## 2018-03-26 PROCEDURE — 97110 THERAPEUTIC EXERCISES: CPT

## 2018-03-26 PROCEDURE — 97535 SELF CARE MNGMENT TRAINING: CPT | Performed by: OCCUPATIONAL THERAPIST

## 2018-03-26 PROCEDURE — 97140 MANUAL THERAPY 1/> REGIONS: CPT

## 2018-03-26 NOTE — PROGRESS NOTES
Daily Note     Today's date: 3/26/2018  Patient name: Krzysztof Ibarra  : 1965  MRN: [id number]  Referring provider: Chantell Aleman MD  Dx: No diagnosis found  Subjective: "It is hard to remember all of these"  Objective: See treatment description below      Assessment: Tolerated treatment well  Patient would benefit from continued OT     Work stations with focus on divided attention in multi-modal environment with frequent distractions, visual perceptual skills, higher-level EF, near/far saccadic movements, and working memory  Pt with Max difficulties with completing word search with cog load activity with frequent repeat in instructions for improved carryover and functional performance  Pt with fair + working memory of actions performed with BOP IT activity with disrupted sustained attention with distractions to environmental stimuli  Pt able to engage in functional activity for 60 minutes with positive results of therapeutic cueing system  Pt will benefit from continuing to focus on sustained/alternating/divided attention in multi-modal environment to limit distractions  Plan: Continue per plan of care           INTERVENTION COMMENTS:  Diagnosis: Acute ischemic right MCA stroke (HCC) [I63 511]  Precautions:  fall risk, depression, suicidal ideations, tobacco use/abuse  FOTO:  Insurance: CBC  2 of 8 visits, PN due 18

## 2018-03-28 ENCOUNTER — OFFICE VISIT (OUTPATIENT)
Dept: OCCUPATIONAL THERAPY | Facility: CLINIC | Age: 53
End: 2018-03-28
Payer: COMMERCIAL

## 2018-03-28 ENCOUNTER — OFFICE VISIT (OUTPATIENT)
Dept: PHYSICAL THERAPY | Facility: CLINIC | Age: 53
End: 2018-03-28
Payer: COMMERCIAL
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
## 2018-03-28 DIAGNOSIS — I63.9 CEREBROVASCULAR ACCIDENT (CVA), UNSPECIFIED MECHANISM (HCC): Primary | ICD-10-CM

## 2018-03-28 DIAGNOSIS — M72.2 PLANTAR FASCIAL FIBROMATOSIS OF RIGHT FOOT: ICD-10-CM

## 2018-03-28 DIAGNOSIS — I63.511 ACUTE ISCHEMIC RIGHT MCA STROKE (HCC): ICD-10-CM

## 2018-03-28 DIAGNOSIS — M79.671 PAIN OF RIGHT HEEL: Primary | ICD-10-CM

## 2018-03-28 DIAGNOSIS — I63.511 ARTERIAL ISCHEMIC STROKE, MCA (MIDDLE CEREBRAL ARTERY), RIGHT, ACUTE (HCC): ICD-10-CM

## 2018-03-28 PROCEDURE — 97140 MANUAL THERAPY 1/> REGIONS: CPT

## 2018-03-28 PROCEDURE — 97110 THERAPEUTIC EXERCISES: CPT

## 2018-03-28 PROCEDURE — 97112 NEUROMUSCULAR REEDUCATION: CPT

## 2018-03-28 PROCEDURE — 97535 SELF CARE MNGMENT TRAINING: CPT

## 2018-03-28 NOTE — PROGRESS NOTES
Daily Note     Today's date: 3/28/2018  Patient name: Donna Cantu  : 1965  MRN: [id number]  Referring provider: Carolina Pierre MD  Dx:   Encounter Diagnosis   Name Primary?  Cerebrovascular accident (CVA), unspecified mechanism ([facility] ) Yes                  Subjective: "I never noticed so I don't know if its improving"  (Left inattention)    Objective: See treatment diary below      Assessment: Tolerated treatment well  Patient exhibited good technique with therapeutic exercises  Pt reported have difficulty with the calendar and recalling what day it is   crosses off days when they occur and writes all appointments on the calendar for pt  Pt engaged in various tasks with focus on handwriting, sustained attention, attention to Left  Pt completed line tangles modifying directions to accommodate self and improve accuracy  Pt completed crossword puzzle scanning to left with and without head turns to locate words, pt utilized L hand to write words in puzzle with min difficulty and Fair fluidity of movement  No noted difficulty with word deduction,   Activity completed in appropriate amount of time  Pt completed calendar task with multiple step direction follow without difficulty  Pt stated there are times when she is unable to recall words momentarily  Plan: Continued skilled OT per POC with focus on L side attention (improving), handwriting and sustained attention       INTERVENTION COMMENTS:  Diagnosis: Acute ischemic right MCA stroke ([facility] ) [I22 492]  Precautions:  fall risk, depression, suicidal ideations, tobacco use/abuse  Insurance: CBC  3 of 8 visits through , PN due 18

## 2018-03-28 NOTE — PROGRESS NOTES
Daily Note     Today's date: 3/28/2018  Patient name: Anna Segovia  : 1965  MRN: [id number]  Referring provider: Dyana Domingo MD  Dx:   Encounter Diagnosis     ICD-10-CM    1  Pain of right heel M79 671    2  Plantar fascial fibromatosis of right foot M72 2    3  Acute ischemic right MCA stroke ([facility] ) I63 511    4  Arterial ischemic stroke, MCA (middle cerebral artery), right, acute ([facility] ) I63 511    5  Fall, subsequent encounter W19  XXXD                   Subjective: Patient reported that "the last couple days have been good " regarding foot pain  Patient noted LBP decreases with walking          Objective: See treatment diary below  Precautions: R foot heel spur, R MCA Stroke     Daily Treatment Diary   Manual     3/19  3/22  3/26  3/28             DTM R plantar fascia   x5min  5 min            x8  x5             DTM R Achilles tendon using Graston tool   x3min  x3min  x5  x5                                                                                           Exercise Diary   3/14  3/19  3/22  3/26  3/28             Squats 2x10   on bosu x30  on bosu x30   on bosu x30             Lunges  3 laps   Walking in [place], 40'x2 [place] in [place], 40'x2 [place] in [place], [place]             Tandem gait  foam, 3 laps   in [place], arms crossed EC 40'x2  in [place], arms crossed EC 40'x2  in [place], arms crossed EC 40'x2             Ambulation with3 HT/HN  1 lap                     Standing L hip - 3 way, TB  green, 15x                     Bike  10 min  10 min 10 min  nu step 10 min level 6  Hand bars at 7  nu step 10 min level 6  Hand bars at 7              tandem stance-foam Ec, 30''x3                      rockerboard taps 15x                     rockerboard balance 30''x2   M/L and A/P 1 min ea  M/L and A/P 1 min ea  M/L and A/P 1 min ea             step ups-  6''+ foam, fwd/lateral    6" + foam FWD 2x15 R/L  6" + foam FWD 2x15 R/L  8" + foam FWD 2x15 R/L  15x L lat             SLS R   3x30s  R/L EO/EC on foam 30s ea  R/L EO/EC on foam 30s ea  R/L EO/EC on foam 30s ea             gastroc/soleus stretch on R   standing off step  [id number] standing off step  [id number]  HEP today  HEP today             towel scrunching B feet in sitting     x30  x30  x30             TB toe crunches                        cone taps from foam                        bosu black side          30"x2                                                                                                                    Assessment: Tolerated treatment fair  Patient was able to perform balance on bosu with good use of ankle strategy  Increased step to 8" with foam patient was able to perform 2x15 ea fwd and L 15x lat before back started to bother her  Patient noted that DTM to plantar fascia really helped and she reported that her foot has been feeling better  Add TB toe crunches Nv  Patient would benefit from continued PT      Plan: Continue per plan of care

## 2018-04-02 ENCOUNTER — OFFICE VISIT (OUTPATIENT)
Dept: PHYSICAL THERAPY | Facility: CLINIC | Age: 53
End: 2018-04-02
Payer: COMMERCIAL

## 2018-04-02 ENCOUNTER — OFFICE VISIT (OUTPATIENT)
Dept: OCCUPATIONAL THERAPY | Facility: CLINIC | Age: 53
End: 2018-04-02
Payer: COMMERCIAL
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
## 2018-04-02 DIAGNOSIS — I63.511 ARTERIAL ISCHEMIC STROKE, MCA (MIDDLE CEREBRAL ARTERY), RIGHT, ACUTE (HCC): ICD-10-CM

## 2018-04-02 DIAGNOSIS — I63.9 CEREBROVASCULAR ACCIDENT (CVA), UNSPECIFIED MECHANISM (HCC): Primary | ICD-10-CM

## 2018-04-02 DIAGNOSIS — I63.511 ACUTE ISCHEMIC RIGHT MCA STROKE (HCC): ICD-10-CM

## 2018-04-02 DIAGNOSIS — M72.2 PLANTAR FASCIAL FIBROMATOSIS OF RIGHT FOOT: ICD-10-CM

## 2018-04-02 DIAGNOSIS — M79.671 PAIN OF RIGHT HEEL: Primary | ICD-10-CM

## 2018-04-02 PROCEDURE — G8979 MOBILITY GOAL STATUS: HCPCS | Performed by: PHYSICAL THERAPIST

## 2018-04-02 PROCEDURE — 97140 MANUAL THERAPY 1/> REGIONS: CPT

## 2018-04-02 PROCEDURE — G8978 MOBILITY CURRENT STATUS: HCPCS | Performed by: PHYSICAL THERAPIST

## 2018-04-02 PROCEDURE — 97112 NEUROMUSCULAR REEDUCATION: CPT

## 2018-04-02 PROCEDURE — 97535 SELF CARE MNGMENT TRAINING: CPT

## 2018-04-02 NOTE — PROGRESS NOTES
Daily Note     Today's date: 2018  Patient name: Analia Garcia  : 1965  MRN: [id number]  Referring provider: Mandy Martin MD  Dx:   Encounter Diagnosis     ICD-10-CM    1  Pain of right heel M79 671    2  Plantar fascial fibromatosis of right foot M72 2    3  Acute ischemic right MCA stroke ([facility] ) I63 511    4  Arterial ischemic stroke, MCA (middle cerebral artery), right, acute ([facility] ) I63 511    5  Fall, subsequent encounter W19  XXXD        Subjective: Patient reports some increased pain with when she was walking yesterday  Does states that overall pain is less frequent  Objective: See treatment diary below    Precautions: R foot heel spur, R MCA Stroke     Daily Treatment Diary         Manual  18       DTM R plantar fascia    5 min       DTM R Achilles tendon using Graston tool 5 min                                   Exercise Diary  18       Bike 10 min       squats Bosu, 2x10       lunges 3 laps       Tandem gait        Tandem stance-foam        rockerboard taps 20x       sls        Step ups w/ foam        gastroc stretch Off step, 30''x3                                                                                                   Modalities                                        Assessment: See progress note for additional information  Plan: Continue per plan of care

## 2018-04-02 NOTE — PROGRESS NOTES
Daily Note / Progress Note    Today's date: 2018  Patient name: Ashley Molina  : 1965  MRN: [id number]  Referring provider: Mariama Alegre MD  Dx:   Encounter Diagnosis     ICD-10-CM    1  Pain of right heel M79 671    2  Plantar fascial fibromatosis of right foot M72 2    3  Acute ischemic right MCA stroke ([facility] ) I63 511    4  Arterial ischemic stroke, MCA (middle cerebral artery), right, acute ([facility] ) I63 511    5  Fall, subsequent encounter W19  XXXD        Start Time: 1000           Subjective: Pt reports less pain in her toes but still gets pain by her heel  She reports occasionally stumbling with balance  but less frequently  Objective: See treatment diary below  - pain at worst: 10/10 over the weekend  - independent with exercise at home      Assessment: Since initial evaluation patient has improved with lower extremity strength, scored perfectly on FGA demonstrating an improvement in balance, increased strength in gastroc muscle, improved balance in SLS with improved tolerance, however continues to have pain in her foot which increases in weight bearing  Subjectively patient feels overall pain in foot has decreased  Plan for 2 more weeks to focus on left foot plantarfasciitis  Plan: Continue per plan of care  STG  1  Pt will improve L LE strength by 1/2 MMT grade within 4 weeks  - MET  2  Pt will score 30/30 on FGA within 4 weeks  - MET    LTG  1  Pt will be independent with HEP within 4 weeks  - MET  2  Pt will return to work with no difficulty within 8 weeks  - NOT MET    1  Pt will be able to complete PF in standing on R for 10 reps without UE support within 4 weeks  - MET  2  Pt will report pain at worst in R heel a 5/10 within 4 weeks  - not MET  3  Pt will demonstrate independence with HEP within 4 weeks  - MET  LTG  1  Pt will report pain at worst in R foot a 3/10 within 6 weeks  - not met  2   Pt will be able to SLS on R without UE support for 30 sec within 6 weeks - MET     Manual Muscle Testing - Hip Left Right   Flexion 4 5   Extension 4 5   Abduction 4 5   External Rotation NT NT      Manual Muscle Testing - Knee Left Right   Flexion 4+ 5   Extension 4+ 5      Manual Muscle Testing - Ankle Left Right   Doriflexion 4+ 5   Plantarflexion 4+ 5      Balance Test     6 Minute Walk Test (ft): 1300ft

## 2018-04-02 NOTE — PROGRESS NOTES
Daily Note     Today's date: 2018  Patient name: Niki Shine  : 1965  MRN: [id number]  Referring provider: Clifford Maya MD  Dx:   Encounter Diagnosis   Name Primary?  Cerebrovascular accident (CVA), unspecified mechanism ([facility] ) Yes                  Patient was treated by JESSICA Barahona and was under direct supervision of Nicko Hale 27, SCHAEFER/L  Subjective: "easter was a little different this year because I didn't cook "      Objective: See treatment diary below      Assessment: Tolerated treatment well  Grocery item price sequencing activity in stance with utilization of L hand to write and positional changes to improve L inattention, memory, and automaticity of LUE for grasp and release  Error x1 with immediate recall but pt able to self-correct  Completed categorization task worksheet with verbal cues required x2 to identify error and skipped question  Switching attention worksheet completed in stance at mirror while standing on blue foam for divided attention in multimodal environment, sustained convergence, and saccades  Verbal cues required to recognize errors x2 and difficulty noted with following written direction  Plan: Continued skilled OT per POC with focus on L inattention, memory, automaticity of LUE for grasp and release, divided attention, and oculomotor control      INTERVENTION COMMENTS:  Diagnosis: Cerebrovascular accident (CVA), unspecified mechanism ([facility] ) [I63 9]  Precautions: fall risk, depression, suicidal ideations, tobacco use/abuse  FOTO:  4 of 8 visits, PN due 18

## 2018-04-05 ENCOUNTER — OFFICE VISIT (OUTPATIENT)
Dept: OCCUPATIONAL THERAPY | Facility: CLINIC | Age: 53
End: 2018-04-05
Payer: COMMERCIAL

## 2018-04-05 ENCOUNTER — OFFICE VISIT (OUTPATIENT)
Dept: PHYSICAL THERAPY | Facility: CLINIC | Age: 53
End: 2018-04-05
Payer: COMMERCIAL

## 2018-04-05 DIAGNOSIS — I63.511 ARTERIAL ISCHEMIC STROKE, MCA (MIDDLE CEREBRAL ARTERY), RIGHT, ACUTE (HCC): ICD-10-CM

## 2018-04-05 DIAGNOSIS — I63.9 CEREBROVASCULAR ACCIDENT (CVA), UNSPECIFIED MECHANISM (HCC): Primary | ICD-10-CM

## 2018-04-05 DIAGNOSIS — I63.511 ACUTE ISCHEMIC RIGHT MCA STROKE (HCC): ICD-10-CM

## 2018-04-05 DIAGNOSIS — M79.671 PAIN OF RIGHT HEEL: Primary | ICD-10-CM
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
## 2018-04-05 DIAGNOSIS — M72.2 PLANTAR FASCIAL FIBROMATOSIS OF RIGHT FOOT: ICD-10-CM

## 2018-04-05 PROCEDURE — 97535 SELF CARE MNGMENT TRAINING: CPT

## 2018-04-05 PROCEDURE — 97140 MANUAL THERAPY 1/> REGIONS: CPT | Performed by: PHYSICAL THERAPIST

## 2018-04-05 PROCEDURE — 97110 THERAPEUTIC EXERCISES: CPT | Performed by: PHYSICAL THERAPIST

## 2018-04-05 PROCEDURE — 97112 NEUROMUSCULAR REEDUCATION: CPT | Performed by: PHYSICAL THERAPIST

## 2018-04-05 NOTE — PROGRESS NOTES
Daily Note     Today's date: 2018  Patient name: Norberto Reina  : 1965  MRN: [id number]  Referring provider: Cassie Decker MD  Dx:   Encounter Diagnosis   Name Primary?  Cerebrovascular accident (CVA), unspecified mechanism ([facility] ) Yes                  Patient was treated by JESSICA Mcadams and was under direct supervision of Nicko Hale 27, SCHAEFER/L  Subjective: "it's not ok " (referring to errors made during task)  Pt reports wanting to go back to work but that  doesn't want her to   claims "it's up to her" while pt states "i'd like to if I get better enough "       Objective: See treatment diary below      Assessment: Tolerated treatment fair  HA 3/10 upon arrival with pressure behind R eye  4-word mental manipulation cards located in front, to left, and 10 ft to right for positional changes, auditory processing, immediate recall, mental manipulation to sequence words in order of occurrence, L attention, and visual scanning  Noted with errors with recall and sequencing on 5 of 18 items, with pt becoming visibly frustrated and verbal cues required for encouragement and to identify and fix errors  Pt appears to demonstrate increased insight into deficits  Stations at high-low table for L attention, correction of L visual midline shift, near/far saccadic movements, sustained attention, divided attention, and temporal awareness, with pt completing I Spy task seated on gray physioball, add-a-letter word search on slant board for board to table copy while in stance with blue foam under L foot, and 4-word unscrambles with blue foam under L foot and letter clutter cards on table to L  FRANCISCO decreased to 2/10 during task  Plan: Continued skilled OT per POC with focus on positional changes, auditory processing, memory, L attention, correction of L visual midline shift, oculomotor control, sustained attention, divided attention, and temporal awareness      INTERVENTION COMMENTS:  Diagnosis: Cerebrovascular accident (CVA), unspecified mechanism ([facility] ) [I63 9]  Precautions:  fall risk, depression, suicidal ideations, tobacco use/abuse  FOTO:  5 of 8 visits, PN due 4/19/18

## 2018-04-05 NOTE — PROGRESS NOTES
Daily Note     Today's date: 2018  Patient name: Ismael Johns  : 1965  MRN: [id number]  Referring provider: Talia Montez MD  Dx:   Encounter Diagnosis     ICD-10-CM    1  Pain of right heel M79 671    2  Plantar fascial fibromatosis of right foot M72 2    3  Acute ischemic right MCA stroke ([facility] [address] ) I63 511    4  Arterial ischemic stroke, MCA (middle cerebral artery), right, acute ([facility] [address] ) I63 511    5  Fall, subsequent encounter W19  XXXD                   Subjective: Pt reports no pain in heel today  States that she feels like it is getting better and the massage is really helping  Objective: See treatment diary below    Precautions: R foot heel spur, R MCA Stroke     Daily Treatment Diary            Manual  18         DTM R plantar fascia    5 min  5 min         DTM R Achilles tendon using Graston tool 5 min  5 min                                                         Exercise Diary  18         Bike 10 min  10 min          squats Bosu, 2x10           lunges 3 laps           Tandem gait             Tandem stance-foam             rockerboard taps 20x           sls    EO foam, 30" x 3 ea         Step ups w/ foam             gastroc stretch Off step, 30''x3  Off step 30" x 3         Soleus stretch    30" x 3         Single heel raises    2 x 15, R only           TM   5 min 1 8mph, 5 min 2 4 mph                                                                                                                               Modalities                                                               Assessment: Pt tolerated session well  Increased loading of R foot this session  Pt able to perform R single leg heel raises without pain, plan to progress to eccentric loading as tolerated in future sessions  Trial TM with incline next session instead of bike  Pt did not report any pain during session today  Plan: Progress treatment as tolerated

## 2018-04-09 ENCOUNTER — APPOINTMENT (OUTPATIENT)
Dept: PHYSICAL THERAPY | Facility: CLINIC | Age: 53
End: 2018-04-09
Payer: COMMERCIAL

## 2018-04-09 ENCOUNTER — APPOINTMENT (OUTPATIENT)
Dept: OCCUPATIONAL THERAPY | Facility: CLINIC | Age: 53
End: 2018-04-09
Payer: COMMERCIAL

## 2018-04-11 ENCOUNTER — OFFICE VISIT (OUTPATIENT)
Dept: PHYSICAL THERAPY | Facility: CLINIC | Age: 53
End: 2018-04-11
Payer: COMMERCIAL

## 2018-04-11 ENCOUNTER — OFFICE VISIT (OUTPATIENT)
Dept: OCCUPATIONAL THERAPY | Facility: CLINIC | Age: 53
End: 2018-04-11
Payer: COMMERCIAL

## 2018-04-11 DIAGNOSIS — M79.671 PAIN OF RIGHT HEEL: Primary | ICD-10-CM

## 2018-04-11 DIAGNOSIS — I63.9 CEREBROVASCULAR ACCIDENT (CVA), UNSPECIFIED MECHANISM (HCC): Primary | ICD-10-CM

## 2018-04-11 PROCEDURE — 97140 MANUAL THERAPY 1/> REGIONS: CPT | Performed by: PHYSICAL THERAPIST

## 2018-04-11 PROCEDURE — 97112 NEUROMUSCULAR REEDUCATION: CPT

## 2018-04-11 PROCEDURE — 97110 THERAPEUTIC EXERCISES: CPT | Performed by: PHYSICAL THERAPIST

## 2018-04-11 PROCEDURE — 97530 THERAPEUTIC ACTIVITIES: CPT

## 2018-04-11 NOTE — PROGRESS NOTES
Daily Note     Today's date: 2018  Patient name: Gagan Marx  : 1965  MRN: [id number]  Referring provider: Guillermina Judge MD  Dx:   Encounter Diagnosis     ICD-10-CM    1  Pain of right heel M79 671                   Subjective: Pt  Reports feeling well today and having no pain in her R foot  Objective: See treatment diary below    Precautions: R foot heel spur, R MCA Stroke     Daily Treatment Diary            Manual  18       DTM R plantar fascia, graston     5 min  5 min  5min       DTM R Achilles tendon using Graston tool 5 min  5 min 5min                                                       Exercise Diary  18       Bike 10 min  10 min   10 min       squats Bosu, 2x10           lunges 3 laps           Tandem gait             Tandem stance-foam             rockerboard taps 20x           sls    EO foam, 30" x 3 ea  tandem stance due to knee pain, EO foam, 30s x3       Step ups w/ foam             gastroc stretch Off step, 30''x3  Off step 30" x 3  30s x3 off step       Soleus stretch    30" x 3         Single heel raises    2 x 15, R only   2 x 15 both feet on floor       treadmill    5 min 1 8mph, 5 min 2 4 mph  10min 2  4mph       Plantar fascia stretch with towel     30s x2                                                                                                              Modalities              K Taping     achilles tendon and gastroc, plantar surface, arch                                           Assessment:  Pt  Performed well during session today  Was able to perform exercises and stretches without any reported increase in pain  Verbal cueing required for patient to perform exercises properly  Experienced some difficulty with single leg heal raises due to pain, but tolerated BL stance with no issues  Also experienced difficulty with SLS due to R knee pain and instability, performed in tandem today with minor instability but no LOB  Responded well to IASTM and K taping reporting reduced tightness and discomfort  Would benefit from continued PT to address pain, ROM, and weakness  Plan: Continue current POC

## 2018-04-11 NOTE — PROGRESS NOTES
Daily Note     Today's date: 2018  Patient name: Analia Garcia  : 1965  MRN: [id number]  Referring provider: Mandy Martin MD  Dx:   Encounter Diagnosis   Name Primary?  Cerebrovascular accident (CVA), unspecified mechanism ([facility] ) Yes                  Subjective: "I'm doing better, I follow the lists and do my exercises"  Objective: See treatment diary below      Assessment: Tolerated treatment well  Patient would benefit from continued OT  Pt engaged in "Organize the Hour" with demo-G direction follow to organize 9 tasks with min assist for clarification of directions  Pt initiated task completing rush hour card #15 with min assist   "Last Maze Shape", pt inconsistent with direction follow regarding colors and directions of arrows about 50% of time, pt demo-G fluidity of verbalizing colors, shapes and directions  G recall of directions to complete "Word Puzzles"  Pt attended to task iniating from one task to the other independently  Plan: Continued skilled OT per POC with focus on completion of OTH

## 2018-04-16 ENCOUNTER — OFFICE VISIT (OUTPATIENT)
Dept: PHYSICAL THERAPY | Facility: CLINIC | Age: 53
End: 2018-04-16
Payer: COMMERCIAL

## 2018-04-16 ENCOUNTER — OFFICE VISIT (OUTPATIENT)
Dept: OCCUPATIONAL THERAPY | Facility: CLINIC | Age: 53
End: 2018-04-16
Payer: COMMERCIAL

## 2018-04-16 DIAGNOSIS — I63.511 ACUTE ISCHEMIC RIGHT MCA STROKE (HCC): ICD-10-CM

## 2018-04-16 DIAGNOSIS — M79.671 PAIN OF RIGHT HEEL: Primary | ICD-10-CM

## 2018-04-16 DIAGNOSIS — M72.2 PLANTAR FASCIAL FIBROMATOSIS OF RIGHT FOOT: ICD-10-CM

## 2018-04-16 DIAGNOSIS — I63.9 CEREBROVASCULAR ACCIDENT (CVA), UNSPECIFIED MECHANISM (HCC): Primary | ICD-10-CM

## 2018-04-16 PROCEDURE — G8984 CARRY CURRENT STATUS: HCPCS

## 2018-04-16 PROCEDURE — 97110 THERAPEUTIC EXERCISES: CPT | Performed by: PHYSICAL THERAPIST

## 2018-04-16 PROCEDURE — 97140 MANUAL THERAPY 1/> REGIONS: CPT | Performed by: PHYSICAL THERAPIST

## 2018-04-16 PROCEDURE — 97112 NEUROMUSCULAR REEDUCATION: CPT | Performed by: PHYSICAL THERAPIST

## 2018-04-16 PROCEDURE — G8985 CARRY GOAL STATUS: HCPCS

## 2018-04-16 PROCEDURE — 97530 THERAPEUTIC ACTIVITIES: CPT

## 2018-04-16 NOTE — PROGRESS NOTES
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 2    3  Acute ischemic right MCA stroke (HCC) I63 511                   Subjective: Without pain in R foot since last visit  Some muscle soreness  Discussed d/c, pt stated she does her exercises at home  Her main concern is when she goes back to work  Objective: See treatment diary below    Precautions: R foot heel spur, R MCA Stroke     Daily Treatment Diary            Manual  18     DTM R plantar fascia, graston     5 min  5 min  5min  5 min     DTM R Achilles tendon using Graston tool 5 min  5 min 5min  5 min                                                     Exercise Diary  18     Bike 10 min  10 min   10 min  x10min     squats Bosu, 2x10           lunges 3 laps           Tandem gait        in [place], arms crossed EC  FWD 2x40'  BWD 2x40'     Tandem stance-foam             rockerboard taps 20x           sls    EO foam, 30" x 3 ea  tandem stance due to knee pain, EO foam, 30s x3 SLS EO [id number] ea    SLS EC  [id number] ea     Step ups w/ foam R/L        8"  2x15  opp hip flex     gastroc stretch Off step, 30''x3  Off step 30" x 3  30s x3 off step   30s x3 off step     Soleus stretch    30" x 3     30s x3 off step     Single heel raises    2 x 15, R only   2 x 15 both feet on floor       treadmill    5 min 1 8mph, 5 min 2 4 mph  10min 2  4mph  5 min  2 5 mph  5% incline     Plantar fascia stretch with towel     30s x2                                                                                                              Modalities            K Taping     achilles tendon and gastroc, plantar surface, arch achilles tendon and gastroc, plantar surface, arch                                         Assessment: Pt will minimal difficulty during session  Without increased c/o pain  Discussed d/c next visit, pt communicated understanding  Plan: Will d/c with 30 day hold next visit

## 2018-04-16 NOTE — PROGRESS NOTES
Occupational Therapy Stroke Progress Note/Status Update: Today's Date: 2018  Patient Name: Harry Mari  : 1965  MRN: [id number]  Referring Provider: Angus Mcknight MD  Dx: Cerebrovascular accident (CVA), unspecified mechanism [facility]) [I63 9]    Active Problem List:   Patient Active Problem List   Diagnosis    Acute ischemic right MCA stroke ([facility] [address] )    Fall    Forehead contusion    Left elbow contusion    Chronic back pain    Major depression     Past Medical Hx:   Past Medical History:   Diagnosis Date    Acute pain of right knee     last assessed - [id number]    Anemia     Anxiety     Cervical disc disorder with radiculopathy     Chronic pain     Constipation     Hematuria     last assessed - [id number]    High cholesterol     Lumbar radiculopathy     Lumbar stenosis     Other chronic pain     last assessed - [id number]    Other muscle spasm     last assessed - [id number]    PONV (postoperative nausea and vomiting)     must have premed    Spondylosis of cervical spine     Spondylosis of lumbosacral region     Stroke (cerebrum) ([facility] [address] )     right side of brain    Stroke [facility])     Urinary incontinence     Resolved - [id number]     Past Surgical Hx:   Past Surgical History:   Procedure Laterality Date    BLADDER SURGERY      BLADDER SURGERY      CERVICAL SPINE SURGERY      KNEE ARTHROSCOPY Left     LIPECTOMY      of thigh    LIPOMA RESECTION      NECK SURGERY      CA COLONOSCOPY FLX DX W/COLLJ SPEC WHEN PFRMD N/A 2/10/2017    Procedure: COLONOSCOPY;  Surgeon: Mariama Altamirano MD;  Location: [facility] GI LAB;   Service: Gastroenterology    CA KNEE SCOPE,MED/LAT MENISECTOMY Left 3/21/2016    Procedure: ARTHROSCOPY W/ PARTIAL MEDIAL MENISCECTOMY;  Surgeon: Deo Prado MD;  Location:  MAIN OR;  Service: Orthopedics    TOTAL ABDOMINAL HYSTERECTOMY      TUBAL LIGATION        Of note, pt arrived to OT session 16 minutes late 2* PT session ran over to manage RLE kinesio taping  Pain Levels:   Restin    With Activity:  0    Subjective/Patient Goal: "I still have a hard time multi-tasking and concentrating"    History of Present Illness:  Pt is a flat, employed full time, 47 y  o  female seen for OT eval s/p referred to [address] [facility] s/p recently d/c'd from [address] [place] [address], initially presented to [facility] w/ L sided facial droop, limited movement of L side, R gaze preference stroke alert initiated, CTB + evolving R MCA territory infarction, MRIB + multiple infarcts in the R cerebral hemisphere in the distribution of the R MCA, deep infarct involving the basal ganglia, cortical infarcts involving the frontal and parietal region and petechial hemorrhage in the infarct noted in the R lentiform nucleus, pt received TPA s/p thrombectomy 2018, managed in the ICU tferred to P7, ultimately dx'd w/ R MCA CVA s/p TPA and thrombectomy, L elbow contusion, forehead contusion, and suicidal ideation, comorbidities as listed above      Lifestyle Performance Model:  Autonomy: Pt was I w/ I/ADLs, drove, & required no use of DME PTA  Reciprocal Relationships: Supportive  works FT during am hours, is currently in slow season so presently hours are more flexible, two daughters 29 and 28 and 5 grandchildren  Service to Others: Pt is employed full time at the [employer]  Intrinsic Gratification: Enjoys baking, gardening, walking, being outdoors, going to [address] [place] [address] Ave: Pt lives in a [facility] w/ first floor setup w/ 1 [address] in [employer]      Objective  Impairments Section:   UE Strength:   CECY: RUE: 38/200 LUE: 38/200   PINCER: 3 point pinch: RUE 11, LUE:10   2 point pinch: RUE:  9, LUE:7   Lateral pincher: RUE: 13, LUE: 11    Coordination:   9 HOLE PEG TEST:     RUE: 25 8 seconds, LUE: 23 9 Seconds    Range of Motion:  AROM:              Shoulder elevation: B/l UE full/intact              Shoulder FF: B/l UE full/intact              Shoulder ABD: B/l UE full/intact              ER/IR: B/l UE full/intact              Elbow ext/flex: B/l UE full/intact              Sup/Pron: B/l UE full/intact              Wrist flex/ext: B/l UE full/intact              Composite: B/l UE full/intact              Hook: B/l UE full/intact              Opposition: B/l UE full/intact              Finger to nose: B/l UE full/intact              Dysdiadochokinesia: B/l UE full/intact     PROM:              Shoulder elevation:  B/l UE full/intact              Shoulder FF:B/l UE full/intact              Shoulder ABD: B/l UE full/intact              ER/IR: B/l UE full/intact              Elbow ext/flex: B/l UE full/intact              Sup/Pron: B/l UE full/intact              Wrist flex/ext: B/l UE full/intact    Sensation:  MYOFILAMENTS:              RUE: 3 61              LUE: 3 61    Visual Perceptual and Functional Cognition:  1  Convergence Insufficiency Symptom Survey (CISS): 45/60 FAIL    2   Concussion Cognitive Checklist: self report symptom checklist  *Patient indicated that she is experiencing the following symptoms:    · Memory: Remembering people's names, Remembering your schedule, Remembering previous learing, Sequencing activities, Learning new things and Driving directions    · Attention: Keeping attention during a conversation, Focusing or concentrating on a specific task, Sustaining attention on a task and Dividing your attention (i e , multi-tasking)    · Processing: Processing new information  and Responding to questions in a timely manner    · Executive Functions: Organizing/ planning written work, emails, and/or daily tasks, Self-correcting or recognizing mistakes and Initiating tasks    · Communication: Word finding in conversation, Expressing thoughts and ideas fluently and Expressing thoughts and ideas into writing    · Visual: Losing spot on the page when reading, Misspelling while texting/email and Change in handwriting    · Emotional: Increased anxiety, Frustration tolerance and Keeping cognitive and physical pace    · Increased Sensitivities to: Sight and Computer screen time/movies/TV     3  Contextual Memory Test (CMT): Pt reports has noticed a change in her memory, rates her memory capacity at 75%, if studied 20 objects for 90 seconds would recall 15 of them, would have recalled 20 of them pre injury, frequently forgets things that happened the day before 25% of the time, forgets important details 25% of the time, frequently forgets things people have told her 25% of the time, frequently forgets things that happened a few minutes ago almost never, would remember facts about this form a week from now  IMMEDIATE RECALL:   13/20  score falls  in suspect deficit range    DELAYED RECALL:  12/20 score falls in WNL/WFL deficit range   RECOGNITION:  21/20 with 1 confabulations resulting in score 20/20     4  [place] Cognitive Assessment Version 7 3 (MoCA V7 3): Deferred 2* scored 25/30 on I E  MoCA V8 1 Indicative of mild neurocognitive impairments on 2/21/2018, scored 29/30 on R E  MoCA V7 2 On 3/19/2018 indicative of normal neurocognitive functioning skills  5  Vision Screening Recording Form:   vision screen: + glasses @ all times present for vision screen  near acuity: R 20/50, L 20/50   binocularity far: orthophoria  binoculairty near: orthophoria  red green fusion: no PLRG   near point of convergence: no teaming/fusion noted, unable to converge, B/L eyes did not cross midline   yousif string: divergent insufficiency, suppression of far  Pursuits: slightly jerky in all planes, mild dysmetria, improvement w/ nystagmus, minimal rebounding for target retrieval   saccades: accurate in all planes, + hard blinking and eye tearing t/o   ocular ROM: intact/full  Visual Perceptual Midline shift: shifted mildly to the L of midline and at horizon, L inattention improved      6  Anxiety/Depression:  Pt engaged in the Neuro QOL Anxiety Short Form and Neuro QOL Depression Short Form in order to screen for anxiety and depressive s/s  Pt reported the following:    Anxiety- Short Form:   --used to measure anxious s/s  For scoring purposes, scores of 25+ indicate the threshold for treatment per neurology/SLIM  Score:29/40  Pt most often chose the response often when asked about feeling anxious s/s      Depression- Short Form:   --used to measure depressive s/s  For scoring purposes, scores of 25+ indicate the threshold for treatment per neurology/SLIM  Score:25/40  Pt most often chose the response often when asked about feeling depressive s/s  Assessment/Plan  Occupational Therapy Skilled Analysis Assessment and Plan of Care:  Pt requires overall mod I for ADLs/self care and mod I for fx'l mobility w/o DME  Pt is currently demonstrating the following occupational deficits: limited 2* impaired STM/immediate delayed recall, depressed mood, impaired executive functioning, attention/concentration to task, delayed processing, impaired high level balance, LUE hemiparesis distal>proximal w/ impaired FMC/FMS/GMC/GMS, impaired prehension/precision, LUE dyscoordination w/ ataxia apraxia dysmetria, L inattention, no diplopia/teaming/fusion, dysmetric pursuits w/ jerky rebounds + nystagmus, shifted to the L of midline and above horizon  The following Occupational Performance Areas to address include: medication management, socialization, health maintenance, functional mobility, community mobility, clothing management, cleaning, meal prep, money management, household maintenance, care of children, care of pets, job performance/volunteering and social participation  Based on the aforementioned OT evaluation, functional performance deficits, and assessments, pt has been identified as a moderate complexity evaluation   Pt to continue to benefit from outpatient skilled OT services to address the following goals 2x/wk to  w/in 4 more weeks with special focus on self-care management, pt education,  and VM training as well as motor training to improve above defiicits and enhance overall QOL/function      Goals:  Short Term Goals:  Cognition/Vision:  · Pt will increase auditory processing to take notes while listening in multi-modal work related/classroom symptom free at baseline performance for improved work/school performance, once returned  4 weeks as applicable-PARTIALLY MET  · Pt will increase attention to 2+ tasks for improved work/school performance (once returned) and engagement in salient tasks 4 weeks as applicable-PARTIALLY MET  · Pt will increase temporal awareness for keeping to schedule within 5 min increments, recall appointments, functional addition of time with 80% accuracy 4 weeks-PARTIALLY MET  · Pt will demo good carryover of internal and external memory aides for improved recall of daily events, improved executive functioning with 80% accuracy in 4 weeks-MET  · Pt will increase insight into deficits for improved carryover of recommendations, accommodations, improved rate of healing 4 weeks-MET  · Pt will increase oculomotor control for improved saccades, con/divergent tasks for improved reading, board to table tasks with minimal increase in symptoms 4 weeks-PARTIALLY MET  · Pt will tolerate multi-modal envt x 15 min with 80% accuracy of cog load and min increase of symptoms of 2 levels in HA/dizziness/nausea 4 weeks-PARTIALLY MET  Coordination:  · Pt will increase prehension patterns for improved tripod with utensil management with <20% droppage 4 weeks-PARTIALLY MET  · Pt will increase rate of manipulation for all FM tests for improved functional performance with salient tasks 4 weeks-PARTIALLY MET  · Pt will increase automaticity of LUE  to 50% for improved grasp release of tabletop items for improved functional performance with salient tasks 4 weeks-PARTIALLY MET  ROM/Function:  · Pt will demo with G carryover of Home Exercise Program to improve functional progression towards goals in Plan of care and for improved functional use of  LUE 4 weeks-MET  · Pt will increase LUE  to refined functional assist with <20% cuing for tabletop tasks for improved functional performance of life roles and salient tasks 4-MET     Long Term Goals:  · Pt will increase attention to 3+ tasks for improved divided attention with work/school and pre driving roles as applicable-PARTIALLY MET  · Pt will increase verbal and written direction following with processing time of <1 min and 85% accuracy-MET  · Pt will tolerate multimodal envt x 60 min symptom free for return to work/school-PARTIALLY MET  · Pt will demo with decreased anxiety and frustration for improved insight into concussion process and rate of recovery-MET  · Pt will demo with G carryover and understanding of accommodations for school environment to allow for enhanced learning environment symptom free, if needed-MET  · Pt will increase oculomotor control for improved dynamic activities with head turns, board/screen to table tasks symptom free, improved VMI and return to baseline handwriting-PARTIALLY MET  · Pt will increase oculomotor control for WNL saccades, con/divergent tasks symptom free-PARTIALLY MET  · Pt will increase screen tolerance to 3 hours with min increase in HA by 1-2 levels for improved leisure pursuits and work/school performance as applicable-MET  Coordination:  · Decrease ataxia with functional reach for normalized movement pattern of  LUE-MET  · Increase automaticity of  LUE to 100% for resumption of B integrative tasks-MET  · Pt will increase rate of prehension for all FM tasks for improved use of utensils, writing, ADL fasteners-MET  · Pt will increase prehension for all FM tasks for improved independence with I/ADL/leisure tasks including utensils, writing, ADL fasteners-MET  ROM/Function:  · Increase func mobs from various surfaces to Mod I/ I with least restrictive device and good safety-MET  · Resume hand dominance to refined mod I functional assist with all I/ADL/leisure tasks-MET  · Pt will increase B/L UE strength to 5/5 and  strength, through the use of strengthening exercises and home program for eventual return to driving PRN-MET  · Pt will increase [address] to Mod I with ADL fasteners for increased independence-MET  · Pt will resume hand dominance with I status for all activities of daily living and eventual return to driving PRN-MET     INTERVENTION COMMENTS:  Diagnosis: R MCA CVA s/p TPA and thrombectomy  Precautions: fall risk, depression, suicidal ideations, tobacco use/abuse  FOTO: 78 with 22% limitation  Insurance: [employer]  7 of 8 visits, PN due 5/16/2018     Thank you for the consult!   Please call if you have any questions: [phone]  OSKAR Casiano, OTR/L, C-GCSWAPNA, CSRS  Director of Outpatient Neuro Occupational Therapy

## 2018-04-18 ENCOUNTER — OFFICE VISIT (OUTPATIENT)
Dept: OCCUPATIONAL THERAPY | Facility: CLINIC | Age: 53
End: 2018-04-18
Payer: COMMERCIAL

## 2018-04-18 ENCOUNTER — OFFICE VISIT (OUTPATIENT)
Dept: PHYSICAL THERAPY | Facility: CLINIC | Age: 53
End: 2018-04-18
Payer: COMMERCIAL

## 2018-04-18 DIAGNOSIS — I63.9 CEREBROVASCULAR ACCIDENT (CVA), UNSPECIFIED MECHANISM (HCC): Primary | ICD-10-CM

## 2018-04-18 DIAGNOSIS — M79.671 PAIN OF RIGHT HEEL: Primary | ICD-10-CM

## 2018-04-18 DIAGNOSIS — I63.511 ACUTE ISCHEMIC RIGHT MCA STROKE (HCC): ICD-10-CM

## 2018-04-18 PROCEDURE — 97112 NEUROMUSCULAR REEDUCATION: CPT

## 2018-04-18 PROCEDURE — 97140 MANUAL THERAPY 1/> REGIONS: CPT | Performed by: PHYSICAL THERAPIST

## 2018-04-18 PROCEDURE — G8980 MOBILITY D/C STATUS: HCPCS | Performed by: PHYSICAL THERAPIST

## 2018-04-18 PROCEDURE — 97110 THERAPEUTIC EXERCISES: CPT | Performed by: PHYSICAL THERAPIST

## 2018-04-18 PROCEDURE — G8979 MOBILITY GOAL STATUS: HCPCS | Performed by: PHYSICAL THERAPIST

## 2018-04-18 PROCEDURE — 97112 NEUROMUSCULAR REEDUCATION: CPT | Performed by: PHYSICAL THERAPIST

## 2018-04-18 PROCEDURE — 97530 THERAPEUTIC ACTIVITIES: CPT

## 2018-04-18 NOTE — PROGRESS NOTES
Discharge Note     Today's date: 2018  Patient name: Elvira Zepeda  : 1965  MRN: [id number]  Referring provider: Vaishnavi Villalta MD  Dx:   Encounter Diagnosis     ICD-10-CM    1  Pain of right heel [id number] [id number]    2  Acute ischemic right MCA stroke (HCC) I63 511                   Subjective: Continues with no pain complaints, meeting goal she hadn't at last re-eval   Confirmed d/c today with 30 day hold  Still continuing with OT  Objective: See treatment diary below    Precautions: R foot heel spur, R MCA Stroke     Daily Treatment Diary            Manual  18   DTM R plantar fascia, graston     5 min  5 min  5min  5 min  5 min   DTM R Achilles tendon using Graston tool 5 min  5 min 5min  3 min  3 min                                                   Exercise Diary  18   Bike 10 min  10 min   10 min  x10min     squats [place], 2x10           lunges 3 laps        while walking with Hts 40'x2   Tandem gait        in [place], arms crossed EC  FWD 2x40'  BWD 2x40'   in [place], arms crossed EC  FWD 40'  BWD 40'   Tandem stance-foam          semitandem  [id number]   rockerboard taps 20x           sls    EO foam, 30" x 3 ea  tandem stance due to knee pain, EO foam, 30s x3 SLS EO [id number] ea    SLS EC  [id number] ea on foam  EO  [id number] ea   Step ups w/ foam R/L        8"  2x15  opp hip flex  8"  2x15  opp hip flex   gastroc stretch Off step, 30''x3  Off step 30" x 3  30s x3 off step   30s x3 off step  30s x3 off step   Soleus stretch    30" x 3     30s x3 off step  30s x3 off step   Single heel raises    2 x 15, R only   2 x 15 both feet on floor       treadmill    5 min 1 8mph, 5 min 2 4 mph  10min 2  4mph  5 min  2 5 mph  5% incline  5 min  2 5 mph  5% incline   Plantar fascia stretch with towel     30s x2                                                                                                              Modalities          K Taping     achilles tendon and gastroc, plantar surface, arch achilles tendon and gastroc, plantar surface, arch  achilles tendon and gastroc, plantar surface, arch                                       Assessment: Pt has met all goals at this time  No complaints of pain  Knows her exercises for home, declined updated print out  No difficulty with exercises during session  Plan: Discharge with 30 day hold

## 2018-04-18 NOTE — PROGRESS NOTES
Daily Note     Today's date: 2018  Patient name: Norberto Reina  : 1965  MRN: [id number]  Referring provider: Cassie Decker MD  Dx:   Encounter Diagnosis   Name Primary?  Cerebrovascular accident (CVA), unspecified mechanism ([facility] [address] ) Yes                  Subjective: "I feel so stupid"  Objective: See treatment diary below      Assessment: Tolerated treatment well  Patient would benefit from continued OT  Pt became tearful in beginning of session stating she felt "stupid" and knew she couldn't go back to work  Therapist educated pt on community re-entry and possibly volunteering until confidence increased following with a job search which was purposeful and meaning to her life w/less stress  Pt apprehensive admitting it was difficult to work through her current fears  Therapist requested pt make list of strengths and weaknesses to be discussed next session  Pt engaged in "Organize the Hour"  Initiating task where she left off last session  Mod difficulty using strategies to complete "copy the design", 1/3 correct  Pt demo-G direction follow and completed tasks in appropriate order  Pt continues with flat affect but decreased tearfulness post session         Plan: Continued skilled OT per POC with focus on handwriting and eye/hand coordination    INTERVENTION COMMENTS:  Diagnosis: R MCA CVA s/p TPA and thrombectomy  Precautions: fall risk, depression, suicidal ideations, tobacco use/abuse  Insurance: [employer]  8 of 8 visits through , PN due 2018

## 2018-04-22 NOTE — PROGRESS NOTES
Occupational Therapy Daily Note:    Today's date: 2018  Patient name: Shawn Gonzalez  : 1965  MRN: [id number]  Referring provider: Luis Hernandez MD  Dx:   Encounter Diagnosis   Name Primary?  Cerebrovascular accident (CVA), unspecified mechanism ([facility] [address] ) Yes     Subjective: "I hope to go back to work soon because that paycheck would be nice"  Objective: See treatment diary below  Assessment: Pt seen for OT treatment session focusing on completion of Organize the Hour via steps 8 and 9: Alternating Name Task and Rush Hour levels 34 w/ minimal difficulty  Pt engaged in taping strategies for /VM re-training via R eye occluded x5 minutes, L eye occluded x5 minutes, and B/L peripheral taping x5 minutes, ultimately binocular vision engagement  Pt requested to trial "hardest one" during taping strategies, engaged in level 40 of Rush Hour for remainder of session requiring visual cues and forward chaining, severely distractible w/ difficulty w/ attention/concentration  Denies HA/eye strain post session  Pt began Workbook for [employer] worksheets: [employer] area 5: [employer] letters in common w/o difficulty  Pt is continues to demonstrate the following occupational deficits: limited 2* impaired STM/immediate delayed recall, depressed mood, impaired executive functioning, attention/concentration to task, delayed processing, impaired high level balance, LUE hemiparesis distal>proximal w/ impaired FMC/FMS/GMC/GMS, impaired prehension/precision, LUE dyscoordination w/ ataxia apraxia dysmetria, L inattention, no diplopia/teaming/fusion, dysmetric pursuits w/ jerky rebounds + nystagmus, shifted to the L of midline and above horizon  Tolerated treatment well   Patient would benefit from continued skilled OT      Plan: Continued skilled OT per POC with focus on L inattention awareness, convergence/divergence re-training, executive functioning, divided attention, LUE hemiparesis for refined FMC/FMS/GMC/GMS and distal fx'l prehension      INTERVENTION COMMENTS:  Diagnosis: R MCA CVA s/p TPA and thrombectomy  Precautions: fall risk, depression, suicidal ideations, tobacco use/abuse  FOTO: 78 with 22% limitation  Insurance: [employer]  1 of 8 visits, PN due 5/16/2018     Thank you for the consult!   Please call if you have any questions: [phone]  Catina ePrez, OTD, OTR/L, C-GCM, CSRS  Director of Outpatient Neuro Occupational Therapy

## 2018-04-23 ENCOUNTER — OFFICE VISIT (OUTPATIENT)
Dept: OCCUPATIONAL THERAPY | Facility: CLINIC | Age: 53
End: 2018-04-23
Payer: COMMERCIAL

## 2018-04-23 ENCOUNTER — APPOINTMENT (OUTPATIENT)
Dept: PHYSICAL THERAPY | Facility: CLINIC | Age: 53
End: 2018-04-23
Payer: COMMERCIAL

## 2018-04-23 DIAGNOSIS — I63.9 CEREBROVASCULAR ACCIDENT (CVA), UNSPECIFIED MECHANISM (HCC): Primary | ICD-10-CM

## 2018-04-23 PROCEDURE — 97530 THERAPEUTIC ACTIVITIES: CPT

## 2018-04-25 ENCOUNTER — HOSPITAL ENCOUNTER (INPATIENT)
Facility: HOSPITAL | Age: 53
LOS: 2 days | Discharge: HOME HEALTH | DRG: 024 | End: 2018-04-27
Attending: EMERGENCY MEDICINE | Admitting: INTERNAL MEDICINE
Payer: COMMERCIAL

## 2018-04-25 ENCOUNTER — ANESTHESIA EVENT (EMERGENCY)
Dept: RADIOLOGY | Facility: HOSPITAL | Age: 53
DRG: 024 | End: 2018-04-25
Payer: COMMERCIAL

## 2018-04-25 ENCOUNTER — APPOINTMENT (EMERGENCY)
Dept: RADIOLOGY | Facility: HOSPITAL | Age: 53
DRG: 024 | End: 2018-04-25
Payer: COMMERCIAL

## 2018-04-25 ENCOUNTER — APPOINTMENT (OUTPATIENT)
Dept: PHYSICAL THERAPY | Facility: CLINIC | Age: 53
End: 2018-04-25
Payer: COMMERCIAL

## 2018-04-25 ENCOUNTER — APPOINTMENT (EMERGENCY)
Dept: RADIOLOGY | Facility: HOSPITAL | Age: 53
DRG: 024 | End: 2018-04-25
Attending: RADIOLOGY
Payer: COMMERCIAL

## 2018-04-25 ENCOUNTER — APPOINTMENT (INPATIENT)
Dept: RADIOLOGY | Facility: HOSPITAL | Age: 53
DRG: 024 | End: 2018-04-25
Payer: COMMERCIAL

## 2018-04-25 ENCOUNTER — ANESTHESIA (EMERGENCY)
Dept: RADIOLOGY | Facility: HOSPITAL | Age: 53
DRG: 024 | End: 2018-04-25
Payer: COMMERCIAL

## 2018-04-25 ENCOUNTER — APPOINTMENT (OUTPATIENT)
Dept: OCCUPATIONAL THERAPY | Facility: CLINIC | Age: 53
End: 2018-04-25
Payer: COMMERCIAL

## 2018-04-25 DIAGNOSIS — I63.511 ACUTE ISCHEMIC RIGHT MCA STROKE (HCC): Primary | ICD-10-CM

## 2018-04-25 DIAGNOSIS — F17.210 CIGARETTE NICOTINE DEPENDENCE WITHOUT COMPLICATION: ICD-10-CM

## 2018-04-25 DIAGNOSIS — I63.9 STROKE (HCC): ICD-10-CM

## 2018-04-25 LAB
ABO GROUP BLD: NORMAL
ANION GAP BLD CALC-SCNC: 14 MMOL/L (ref 4–13)
ANION GAP SERPL CALCULATED.3IONS-SCNC: 7 MMOL/L (ref 4–13)
APTT PPP: 26 SECONDS (ref 23–35)
ATRIAL RATE: 65 BPM
BLD GP AB SCN SERPL QL: NEGATIVE
BUN BLD-MCNC: 9 MG/DL (ref 5–25)
BUN SERPL-MCNC: 10 MG/DL (ref 5–25)
CA-I BLD-SCNC: 1.21 MMOL/L (ref 1.12–1.32)
CALCIUM SERPL-MCNC: 9.5 MG/DL (ref 8.3–10.1)
CHLORIDE BLD-SCNC: 107 MMOL/L (ref 100–108)
CHLORIDE SERPL-SCNC: 109 MMOL/L (ref 100–108)
CO2 SERPL-SCNC: 26 MMOL/L (ref 21–32)
CREAT BLD-MCNC: 0.9 MG/DL (ref 0.6–1.3)
CREAT SERPL-MCNC: 0.89 MG/DL (ref 0.6–1.3)
ERYTHROCYTE [DISTWIDTH] IN BLOOD BY AUTOMATED COUNT: 13.8 % (ref 11.6–15.1)
GFR SERPL CREATININE-BSD FRML MDRD: 73 ML/MIN/1.73SQ M
GFR SERPL CREATININE-BSD FRML MDRD: 74 ML/MIN/1.73SQ M
GLUCOSE SERPL-MCNC: 102 MG/DL (ref 65–140)
GLUCOSE SERPL-MCNC: 99 MG/DL (ref 65–140)
HCT VFR BLD AUTO: 41 % (ref 34.8–46.1)
HCT VFR BLD CALC: 42 % (ref 34.8–46.1)
HGB BLD-MCNC: 13.5 G/DL (ref 11.5–15.4)
HGB BLDA-MCNC: 14.3 G/DL (ref 11.5–15.4)
INR PPP: 0.97 (ref 0.86–1.16)
MCH RBC QN AUTO: 32.2 PG (ref 26.8–34.3)
MCHC RBC AUTO-ENTMCNC: 32.9 G/DL (ref 31.4–37.4)
MCV RBC AUTO: 98 FL (ref 82–98)
P AXIS: 40 DEGREES
PCO2 BLD: 27 MMOL/L (ref 21–32)
PLATELET # BLD AUTO: 308 THOUSANDS/UL (ref 149–390)
PMV BLD AUTO: 8.7 FL (ref 8.9–12.7)
POTASSIUM BLD-SCNC: 3.9 MMOL/L (ref 3.5–5.3)
POTASSIUM SERPL-SCNC: 3.9 MMOL/L (ref 3.5–5.3)
PR INTERVAL: 192 MS
PROTHROMBIN TIME: 12.9 SECONDS (ref 12.1–14.4)
QRS AXIS: 58 DEGREES
QRSD INTERVAL: 74 MS
QT INTERVAL: 404 MS
QTC INTERVAL: 420 MS
RBC # BLD AUTO: 4.19 MILLION/UL (ref 3.81–5.12)
RH BLD: POSITIVE
SODIUM BLD-SCNC: 142 MMOL/L (ref 136–145)
SODIUM SERPL-SCNC: 142 MMOL/L (ref 136–145)
SPECIMEN EXPIRATION DATE: NORMAL
SPECIMEN SOURCE: ABNORMAL
T WAVE AXIS: 72 DEGREES
VENTRICULAR RATE: 65 BPM
WBC # BLD AUTO: 7.53 THOUSAND/UL (ref 4.31–10.16)

## 2018-04-25 PROCEDURE — 99291 CRITICAL CARE FIRST HOUR: CPT | Performed by: PSYCHIATRY & NEUROLOGY

## 2018-04-25 PROCEDURE — 80048 BASIC METABOLIC PNL TOTAL CA: CPT | Performed by: EMERGENCY MEDICINE

## 2018-04-25 PROCEDURE — 80047 BASIC METABLC PNL IONIZED CA: CPT

## 2018-04-25 PROCEDURE — 93010 ELECTROCARDIOGRAM REPORT: CPT | Performed by: INTERNAL MEDICINE

## 2018-04-25 PROCEDURE — 86901 BLOOD TYPING SEROLOGIC RH(D): CPT | Performed by: EMERGENCY MEDICINE

## 2018-04-25 PROCEDURE — 99231 SBSQ HOSP IP/OBS SF/LOW 25: CPT | Performed by: RADIOLOGY

## 2018-04-25 PROCEDURE — 86900 BLOOD TYPING SEROLOGIC ABO: CPT | Performed by: EMERGENCY MEDICINE

## 2018-04-25 PROCEDURE — C1769 GUIDE WIRE: HCPCS

## 2018-04-25 PROCEDURE — 61645 PERQ ART M-THROMBECT &/NFS: CPT | Performed by: RADIOLOGY

## 2018-04-25 PROCEDURE — 70551 MRI BRAIN STEM W/O DYE: CPT

## 2018-04-25 PROCEDURE — 36415 COLL VENOUS BLD VENIPUNCTURE: CPT | Performed by: EMERGENCY MEDICINE

## 2018-04-25 PROCEDURE — 85014 HEMATOCRIT: CPT

## 2018-04-25 PROCEDURE — 70498 CT ANGIOGRAPHY NECK: CPT

## 2018-04-25 PROCEDURE — C1757 CATH, THROMBECTOMY/EMBOLECT: HCPCS

## 2018-04-25 PROCEDURE — 70450 CT HEAD/BRAIN W/O DYE: CPT

## 2018-04-25 PROCEDURE — 99291 CRITICAL CARE FIRST HOUR: CPT

## 2018-04-25 PROCEDURE — 71045 X-RAY EXAM CHEST 1 VIEW: CPT

## 2018-04-25 PROCEDURE — C1760 CLOSURE DEV, VASC: HCPCS

## 2018-04-25 PROCEDURE — C1894 INTRO/SHEATH, NON-LASER: HCPCS

## 2018-04-25 PROCEDURE — 86850 RBC ANTIBODY SCREEN: CPT | Performed by: EMERGENCY MEDICINE

## 2018-04-25 PROCEDURE — 85730 THROMBOPLASTIN TIME PARTIAL: CPT | Performed by: EMERGENCY MEDICINE

## 2018-04-25 PROCEDURE — 93005 ELECTROCARDIOGRAM TRACING: CPT | Performed by: EMERGENCY MEDICINE

## 2018-04-25 PROCEDURE — 85027 COMPLETE CBC AUTOMATED: CPT | Performed by: EMERGENCY MEDICINE

## 2018-04-25 PROCEDURE — 76937 US GUIDE VASCULAR ACCESS: CPT | Performed by: RADIOLOGY

## 2018-04-25 PROCEDURE — 03CG3ZZ EXTIRPATION OF MATTER FROM INTRACRANIAL ARTERY, PERCUTANEOUS APPROACH: ICD-10-PCS | Performed by: RADIOLOGY

## 2018-04-25 PROCEDURE — 85610 PROTHROMBIN TIME: CPT | Performed by: EMERGENCY MEDICINE

## 2018-04-25 PROCEDURE — C1887 CATHETER, GUIDING: HCPCS

## 2018-04-25 PROCEDURE — 70496 CT ANGIOGRAPHY HEAD: CPT

## 2018-04-25 PROCEDURE — 82948 REAGENT STRIP/BLOOD GLUCOSE: CPT

## 2018-04-25 PROCEDURE — 92610 EVALUATE SWALLOWING FUNCTION: CPT

## 2018-04-25 PROCEDURE — 61645 PERQ ART M-THROMBECT &/NFS: CPT

## 2018-04-25 RX ORDER — MIDAZOLAM HYDROCHLORIDE 1 MG/ML
INJECTION INTRAMUSCULAR; INTRAVENOUS AS NEEDED
Status: DISCONTINUED | OUTPATIENT
Start: 2018-04-25 | End: 2018-04-25 | Stop reason: SURG

## 2018-04-25 RX ORDER — GLYCOPYRROLATE 0.2 MG/ML
INJECTION INTRAMUSCULAR; INTRAVENOUS AS NEEDED
Status: DISCONTINUED | OUTPATIENT
Start: 2018-04-25 | End: 2018-04-25 | Stop reason: SURG

## 2018-04-25 RX ORDER — ASPIRIN 81 MG/1
81 TABLET, CHEWABLE ORAL DAILY
Status: DISCONTINUED | OUTPATIENT
Start: 2018-04-27 | End: 2018-04-27

## 2018-04-25 RX ORDER — SODIUM CHLORIDE, SODIUM GLUCONATE, SODIUM ACETATE, POTASSIUM CHLORIDE, MAGNESIUM CHLORIDE, SODIUM PHOSPHATE, DIBASIC, AND POTASSIUM PHOSPHATE .53; .5; .37; .037; .03; .012; .00082 G/100ML; G/100ML; G/100ML; G/100ML; G/100ML; G/100ML; G/100ML
1000 INJECTION, SOLUTION INTRAVENOUS ONCE
Status: COMPLETED | OUTPATIENT
Start: 2018-04-25 | End: 2018-04-25

## 2018-04-25 RX ORDER — PRAVASTATIN SODIUM 40 MG
40 TABLET ORAL DAILY
COMMUNITY
End: 2018-06-19 | Stop reason: SDUPTHER

## 2018-04-25 RX ORDER — SODIUM CHLORIDE 9 MG/ML
INJECTION, SOLUTION INTRAVENOUS CONTINUOUS PRN
Status: DISCONTINUED | OUTPATIENT
Start: 2018-04-25 | End: 2018-04-25 | Stop reason: SURG

## 2018-04-25 RX ORDER — ACETAMINOPHEN 325 MG/1
650 TABLET ORAL EVERY 6 HOURS PRN
Status: DISCONTINUED | OUTPATIENT
Start: 2018-04-25 | End: 2018-04-27 | Stop reason: HOSPADM

## 2018-04-25 RX ORDER — ASPIRIN 300 MG/1
300 SUPPOSITORY RECTAL ONCE
Status: COMPLETED | OUTPATIENT
Start: 2018-04-25 | End: 2018-04-25

## 2018-04-25 RX ORDER — PREGABALIN 50 MG/1
50 CAPSULE ORAL 2 TIMES DAILY
Status: DISCONTINUED | OUTPATIENT
Start: 2018-04-25 | End: 2018-04-27 | Stop reason: HOSPADM

## 2018-04-25 RX ORDER — PRAVASTATIN SODIUM 40 MG
40 TABLET ORAL
Status: DISCONTINUED | OUTPATIENT
Start: 2018-04-25 | End: 2018-04-27 | Stop reason: HOSPADM

## 2018-04-25 RX ORDER — FENTANYL CITRATE 50 UG/ML
INJECTION, SOLUTION INTRAMUSCULAR; INTRAVENOUS AS NEEDED
Status: DISCONTINUED | OUTPATIENT
Start: 2018-04-25 | End: 2018-04-25 | Stop reason: SURG

## 2018-04-25 RX ORDER — SODIUM CHLORIDE 9 MG/ML
75 INJECTION, SOLUTION INTRAVENOUS CONTINUOUS
Status: DISCONTINUED | OUTPATIENT
Start: 2018-04-25 | End: 2018-04-25

## 2018-04-25 RX ORDER — HEPARIN SODIUM 5000 [USP'U]/ML
5000 INJECTION, SOLUTION INTRAVENOUS; SUBCUTANEOUS EVERY 8 HOURS SCHEDULED
Status: DISCONTINUED | OUTPATIENT
Start: 2018-04-26 | End: 2018-04-27 | Stop reason: HOSPADM

## 2018-04-25 RX ADMIN — IODIXANOL 32 ML: 320 INJECTION, SOLUTION INTRAVASCULAR at 11:43

## 2018-04-25 RX ADMIN — FENTANYL CITRATE 50 MCG: 50 INJECTION, SOLUTION INTRAMUSCULAR; INTRAVENOUS at 10:00

## 2018-04-25 RX ADMIN — FENTANYL CITRATE 50 MCG: 50 INJECTION, SOLUTION INTRAMUSCULAR; INTRAVENOUS at 10:20

## 2018-04-25 RX ADMIN — SODIUM CHLORIDE 75 ML/HR: 0.9 INJECTION, SOLUTION INTRAVENOUS at 12:21

## 2018-04-25 RX ADMIN — IOHEXOL 100 ML: 350 INJECTION, SOLUTION INTRAVENOUS at 06:56

## 2018-04-25 RX ADMIN — GLYCOPYRROLATE 0.2 MG: 0.2 INJECTION, SOLUTION INTRAMUSCULAR; INTRAVENOUS at 10:15

## 2018-04-25 RX ADMIN — SODIUM CHLORIDE 1000 ML: 0.9 INJECTION, SOLUTION INTRAVENOUS at 20:21

## 2018-04-25 RX ADMIN — PREGABALIN 50 MG: 50 CAPSULE ORAL at 21:15

## 2018-04-25 RX ADMIN — SODIUM CHLORIDE, SODIUM GLUCONATE, SODIUM ACETATE, POTASSIUM CHLORIDE, MAGNESIUM CHLORIDE, SODIUM PHOSPHATE, DIBASIC, AND POTASSIUM PHOSPHATE 1000 ML: .53; .5; .37; .037; .03; .012; .00082 INJECTION, SOLUTION INTRAVENOUS at 12:21

## 2018-04-25 RX ADMIN — SODIUM CHLORIDE: 0.9 INJECTION, SOLUTION INTRAVENOUS at 09:57

## 2018-04-25 RX ADMIN — PRAVASTATIN SODIUM 40 MG: 40 TABLET ORAL at 17:15

## 2018-04-25 RX ADMIN — PHENYLEPHRINE HYDROCHLORIDE 60 MCG/MIN: 10 INJECTION INTRAVENOUS at 10:12

## 2018-04-25 RX ADMIN — ASPIRIN 300 MG: 300 SUPPOSITORY RECTAL at 07:57

## 2018-04-25 RX ADMIN — MIDAZOLAM HYDROCHLORIDE 1 MG: 1 INJECTION, SOLUTION INTRAMUSCULAR; INTRAVENOUS at 10:06

## 2018-04-25 RX ADMIN — ACETAMINOPHEN 650 MG: 325 TABLET, FILM COATED ORAL at 17:14

## 2018-04-25 NOTE — CONSULTS
Consultation - Neurology   Marcus Boas 47 y o  female MRN: [id number]  Unit/Bed#: ED 06 Encounter: [id number]      Assessment/Plan   Assessment/Plan:   3 47year old female admitted with L sided weakness, L facial droop, dysarthria and found to have recurrent proximal R MCA occlusion   - Recommend admit on stroke pathway  - Neuro-interventional radiology consulted for evaluation for thrombectomy  Patient will be going for thrombectomy    - Request Loop recorder interrogation     - Will check MRI brain without contrast   - Continue on rectal  mg QD     - Start on home dose of Pravastatin when able to take PO  Patient notes intolerance to atorvastatin  - Do not need to repeat echocardiogram at this time as patient recently underwent JONATAN during February 2018 admission for stroke  Also do not need to repeat fasting lipid panel or hemoglobin A1c checked in February 2018 as well  Reviewed results, hemoglobin A1c 5 5, total cholesterol 235, triglycerides 89, HDL 78,      - Check API     - PT/OT/Speech therapy evaluations  PM&R consult  - Monitor exam and notify with changes  2  Tobacco abuse    - Encourage tobacco cessation  History of Present Illness     Reason for Consult / Principal Problem: Stroke  Hx and PE limited by: Patient very tearful during exam, difficult to assess  HPI: Marcus Boas is a 47 y o  left handed female who presents with to the hospital with L facial droop, dysarthria and L arm weakness noted this morning upon wakening  She was reportedly last seen normal at 2130 last evening  Stroke alert was activated and patient was not IV tPA candidate secondary to recent stroke and patient woke up with symptoms so unclear time of onset  The patient is very tearful during exam which makes history-taking difficult, but she noted that her L side "not working " She also complains of R eye pain which her  notes she had with her initial stroke in February 2018   She does take ASA 81 mg QD and has been compliant with the medication  Her  also notes that she takes Pravastatin at home as she had side effects from atorvastatin in the past  Difficult to further assess history secondary to patient very tearful/upset during assessment and unable to provide history  NIHSS:    1a Level of Consciousness: 0 = Alert   1b  LOC Questions: 0 = Answers both correctly   1c  LOC Commands: 0 = Obeys both correctly   2  Best Gaze: 0 = Normal   3  Visual: 1 = Partial hemianopia   4  Facial Palsy: 2=Partial paralysis (total or near total paralysis of the lower face)   5a  Motor Right Arm: 0=No drift, limb holds 90 (or 45) degrees for full 10 seconds   5b  Motor Left Arm: 3=No effort against gravity, limb falls   6a  Motor Right Le=No drift, limb holds 90 (or 45) degrees for full 10 seconds   6b  Motor Left Le=Drift, limb holds 90 (or 45) degrees but drifts down before full 10 seconds: does not hit bed   7  Limb Ataxia:  0=Absent   8  Sensory: 0=Normal; no sensory loss   9  Best Language:  0=No aphasia, normal   10  Dysarthria: 1=Mild to moderate, patient slurs at least some words and at worst, can be understood with some difficulty   11  Extinction and Inattention (formerly Neglect): 0=No abnormality   Total Score: 8     Inpatient consult to Neurology  Consult performed by: Josh Rodgers ordered by: Adia Hyman          Review of Systems   Constitutional: Negative for chills, fatigue and fever  HENT: Negative for trouble swallowing  Eyes: Negative for visual disturbance  Respiratory: Negative for shortness of breath  Cardiovascular: Negative for chest pain  Gastrointestinal: Negative for abdominal pain, nausea and vomiting  Genitourinary: Negative for difficulty urinating  Musculoskeletal: Positive for back pain, gait problem and neck pain  Neurological: Positive for facial asymmetry, speech difficulty, weakness and headaches   Negative for dizziness, light-headedness and numbness  Psychiatric/Behavioral: Negative for confusion  Historical Information   Past Medical History:   Diagnosis Date    Acute pain of right knee     last assessed - [id number]    Anemia     Anxiety     Cervical disc disorder with radiculopathy     Chronic pain     Constipation     H/O ischemic right MCA stroke     Hematuria     last assessed - [id number]    High cholesterol     Lumbar radiculopathy     Lumbar stenosis     Other chronic pain     last assessed - [id number]    Other muscle spasm     last assessed - [id number]    PONV (postoperative nausea and vomiting)     must have premed    Spondylosis of cervical spine     Spondylosis of lumbosacral region     Stroke [facility])     Urinary incontinence     Resolved - [id number]     Past Surgical History:   Procedure Laterality Date    BLADDER SURGERY      BLADDER SURGERY  2014    CERVICAL SPINE SURGERY      KNEE ARTHROSCOPY Left     LIPECTOMY      of thigh    LIPOMA RESECTION      NECK SURGERY      [place] COLONOSCOPY FLX DX W/COLLJ SPEC WHEN PFRMD N/A 2/10/2017    Procedure: COLONOSCOPY;  Surgeon: Frannie Solano MD;  Location: [facility];   Service: Gastroenterology    [place] KNEE SCOPE,MED/LAT MENISECTOMY Left 3/21/2016    Procedure: ARTHROSCOPY W/ PARTIAL MEDIAL MENISCECTOMY;  Surgeon: Viri Ayala MD;  [facility];  Service: Orthopedics    TOTAL ABDOMINAL HYSTERECTOMY      TUBAL LIGATION       Social History   History   Alcohol Use    Yes     Comment: social drinker     History   Drug Use No     History   Smoking Status    Current Every Day Smoker    Packs/day: 0 50    Years: 40 00    Types: Cigarettes   Smokeless Tobacco    Never Used     Comment: one half pack a day     Family History:   Family History   Problem Relation Age of Onset    Stroke Mother      46s    Cancer Mother     Hypertension Mother     Pulmonary embolism Other      In mid 35s    Stroke Sister      46s    Stroke Brother 46s    Prostate cancer Father     Cancer Daughter     Stroke Family        Review of previous medical records was completed  Patient was seen in the hospital in February 2018 as a stroke alert secondary to acute onset of L hemiparesis  She was noted to be NIHSS 16 on admission and was given IV tPA and taken for thrombectomy  She was noted to have significant improvement of symptoms  She underwent work-up including JONATAN which showed EF 60%, no thrombus in L atrial appendage, no ASD or PFO identified, it did show small, non-mobile calcified atheroma in the proximal ascending aorta  Thrombosis panel was completed which was unremarkable except for elevated protein C activity >150  She did have cardiac arrhythmia while she was in the hospital concerning for possible A fib, but it was while she was on Levophed and was thought to be medication mediated  Meds/Allergies   Scheduled Meds:  Current Facility-Administered Medications:  aspirin 300 mg Rectal Once Red Aveyr MD     Continuous Infusions:   PRN Meds:  Allergies   Allergen Reactions    Blue Dyes (Parenteral) Anaphylaxis    Gabapentin Anaphylaxis     Anaphylaxis    Nitrofurantoin GI Intolerance     vomiting    Blue Dyes (Parenteral) Rash    Penicillins Rash       Objective   Vitals:Blood pressure 98/65, pulse 57, temperature 97 6 °F (36 4 °C), temperature source Oral, resp  rate 18, weight 58 7 kg (129 lb 8 oz), SpO2 98 %  ,Body mass index is 24 67 kg/m²  No intake or output data in the 24 hours ending 04/25/18 0750    Invasive Devices: Invasive Devices     Peripheral Intravenous Line            Peripheral IV 04/25/18 Left Hand less than 1 day    Peripheral IV 04/25/18 Right Antecubital less than 1 day                Physical Exam   Constitutional: She is oriented to person, place, and time  She appears well-developed and well-nourished  She appears distressed  Tearful throughout the exam     HENT:   Head: Normocephalic and atraumatic  Eyes: Conjunctivae and EOM are normal  Pupils are equal, round, and reactive to light  No scleral icterus  Neck: Normal range of motion  Neck supple  Cardiovascular: Normal rate and regular rhythm  Pulmonary/Chest: Effort normal and breath sounds normal  No respiratory distress  Abdominal: Soft  She exhibits no distension  There is no tenderness  Musculoskeletal: She exhibits no edema  Neurological: She is alert and oriented to person, place, and time  She has a normal Finger-Nose-Finger Test (Normal with RUE, unable to assess L secondary to weakness  )  Reflex Scores:       Bicep reflexes are 2+ on the right side and 3+ on the left side  Brachioradialis reflexes are 2+ on the right side and 3+ on the left side  Patellar reflexes are 2+ on the right side and 2+ on the left side  Achilles reflexes are 2+ on the right side and 2+ on the left side  Skin: Skin is warm and dry  She is not diaphoretic  No erythema  Psychiatric: Her speech is slurred  Flat affect, very tearful  Neurologic Exam     Mental Status   Oriented to person, place, and time  Oriented to person  Oriented to place  Oriented to time  Speech: slurred   Level of consciousness: alert  Moderate dysarthria noted, no aphasia  Cranial Nerves     CN III, IV, VI   Pupils are equal, round, and reactive to light  Extraocular motions are normal    Right pupil: Size: 3 mm  Shape: regular  Reactivity: brisk  Left pupil: Size: 3 mm  Shape: regular  Reactivity: brisk     Nystagmus: none   Diplopia: none  Ophthalmoparesis: none  Upgaze: normal  Downgaze: normal  Conjugate gaze: present    CN V   Right facial sensation deficit: none  Left facial sensation deficit: none    CN VII   Right facial weakness: none  Left facial weakness: central    CN VIII   Hearing: intact    CN IX, X   Palate: symmetric    CN XI   Right sternocleidomastoid strength: normal  Left sternocleidomastoid strength: normal    CN XII Tongue: not atrophic  Fasciculations: absent  Tongue deviation: right    Motor Exam   Muscle bulk: normal  Overall muscle tone: normal  Right arm tone: normal  Left arm tone: normal  Right arm pronator drift: absent  Left arm pronator drift: present  Right leg tone: normal  Left leg tone: normalNo movement LUE, drift noted with LLE  No drift noted RUE and RLE  Sensory Exam   Right arm light touch: normal  Left arm light touch: normal  Right leg light touch: normal  Left leg light touch: normal  No sensory extinction noted to double simultaneous stimuli  Gait, Coordination, and Reflexes     Coordination   Finger to nose coordination: normal (Normal with RUE, unable to assess L secondary to weakness  )    Reflexes   Right brachioradialis: 2+  Left brachioradialis: 3+  Right biceps: 2+  Left biceps: 3+  Right patellar: 2+  Left patellar: 2+  Right achilles: 2+  Left achilles: 2+  Right plantar: normal  Left plantar: upgoingGait deferred          Lab Results:  Recent Results (from the past 24 hour(s))   APTT    Collection Time: 04/25/18  6:35 AM   Result Value Ref Range    PTT 26 23 - 35 seconds   Basic metabolic panel    Collection Time: 04/25/18  6:35 AM   Result Value Ref Range    Sodium 142 136 - 145 mmol/L    Potassium 3 9 3 5 - 5 3 mmol/L    Chloride 109 (H) 100 - 108 mmol/L    CO2 26 21 - 32 mmol/L    Anion Gap 7 4 - 13 mmol/L    BUN 10 5 - 25 mg/dL    Creatinine 0 89 0 60 - 1 30 mg/dL    Glucose 99 65 - 140 mg/dL    Calcium 9 5 8 3 - 10 1 mg/dL    eGFR 74 ml/min/1 73sq m   CBC    Collection Time: 04/25/18  6:35 AM   Result Value Ref Range    WBC 7 53 4 31 - 10 16 Thousand/uL    RBC 4 19 3 81 - 5 12 Million/uL    Hemoglobin 13 5 11 5 - 15 4 g/dL    Hematocrit 41 0 34 8 - 46 1 %    MCV 98 82 - 98 fL    MCH 32 2 26 8 - 34 3 pg    MCHC 32 9 31 4 - 37 4 g/dL    RDW 13 8 11 6 - 15 1 %    Platelets 654 493 - 122 Thousands/uL    MPV 8 7 (L) 8 9 - 12 7 fL   Protime-INR    Collection Time: 04/25/18  6:35 AM Result Value Ref Range    Protime 12 9 12 1 - 14 4 seconds    INR 0 97 0 86 - 1 16   Type and screen    Collection Time: 04/25/18  6:35 AM   Result Value Ref Range    ABO Grouping A     Rh Factor Positive     Antibody Screen Negative     Specimen Expiration Date [id number]    POCT Chem 8+    Collection Time: 04/25/18  6:41 AM   Result Value Ref Range    SODIUM, I-STAT 142 136 - 145 mmol/l    Potassium, i-STAT 3 9 3 5 - 5 3 mmol/L    Chloride, istat 107 100 - 108 mmol/L    CO2, i-STAT 27 21 - 32 mmol/L    Anion Gap, Istat 14 (H) 4 - 13 mmol/L    Calcium, Ionized i-STAT 1 21 1 12 - 1 32 mmol/L    BUN, I-STAT 9 5 - 25 mg/dl    Creatinine, i-STAT 0 9 0 6 - 1 3 mg/dl    eGFR 73 ml/min/1 73sq m    Glucose, i-STAT 102 65 - 140 mg/dl    Hct, i-STAT 42 34 8 - 46 1 %    Hgb, i-STAT 14 3 11 5 - 15 4 g/dl    Specimen Type VENOUS        Imaging Studies: I have personally reviewed pertinent reports  and I have personally reviewed pertinent films in PACS  CTH- Increased density in the distal M1/M2 segment of the right middle cerebral artery, suspicious for acute thrombus  No acute intraparenchymal hemorrhage  Chronic right MCA territory infarction  Cerebral atrophy with chronic small vessel ischemic change  CTA head and neck with and without contrast- Recurrent proximal right MCA occlusion  There is reconstitution of distal M2 and M3 segments via collateral flow from both the anterior and posterior circulation  No evidence of acute intracranial hemorrhage  Chronic right MCA infarction  Cerebral atrophy with chronic small vessel ischemic change

## 2018-04-25 NOTE — ED NOTES
Patient transported to [address] [place] [address] with RN, Dr Mendy Nunez, and Dr Meliza Almanza at this time     Sawyer Pelayo RN  04/25/18 0095

## 2018-04-25 NOTE — ED ATTENDING ATTESTATION
Jos Leon MD, saw and evaluated the patient  I have discussed the patient with the resident/non-physician practitioner and agree with the resident's/non-physician practitioner's findings, Plan of Care, and MDM as documented in the resident's/non-physician practitioner's note, except where noted  All available labs and Radiology studies were reviewed  At this point I agree with the current assessment done in the Emergency Department  I have conducted an independent evaluation of this patient including a focused history of:    Emergency Department Note- Shawn Gonzalez 47 y o  female MRN: [id number]    Unit/Bed#: ICU 09 Encounter: [id number]    Shawn Gonzalez is a 47 y o  female who presents with   Chief Complaint   Patient presents with    CVA/TIA-like Symptoms     pt woke up at 0430 with increased Left sided weakness, last known well was 2130 last night, facial droop, and slurred speech  pt has hx of multiple strokes with little risudal facial droop         History of Present Illness   HPI:  Shawn Gonzalez is a 47 y o  female who presents for evaluation of:    Abrupt onset of left-sided weakness noted this morning at 4:30 a m  In the morning by her significant other  The  Patient was last at her baseline neurological status at 9:30 p m  Last night  The patient was noted to have a right facial droop and slurred speech and worsened his left upper extremity weakness when her significant other check after this morning  The patient has a history of a recent right-sided MCA stroke with residual left upper extremity deficits  Review of Systems   Constitutional: Negative for fatigue and fever  Respiratory: Negative for chest tightness  Neurological: Positive for facial asymmetry and speech difficulty  Negative for headaches         Historical Information   Past Medical History:   Diagnosis Date    Acute pain of right knee     last assessed - [id number]    Anemia     Anxiety     Cervical disc disorder with radiculopathy     Chronic pain     Constipation     [facility]A [facility]cere[facility] vascular accident[facility] ([facility] [address] [facility]     H/O ischemic right MCA stroke     Hematuria     last assessed - [id number]    High cholesterol     Lumbar radiculopathy     Lumbar stenosis     Other chronic pain     last assessed - [id number]    Other muscle spasm     last assessed - [id number]    PONV (postoperative nausea and vomiting)     must have premed    Spondylosis of cervical spine     Spondylosis of lumbosacral region     [facility])     Urinary incontinence     Resolved - [id number]     Past Surgical History:   Procedure Laterality Date    BLADDER SURGERY      BLADDER SURGERY  2014    CERVICAL SPINE SURGERY      KNEE ARTHROSCOPY Left     LIPECTOMY      of thigh    LIPOMA RESECTION      NECK SURGERY      [place] COLONOSCOPY FLX DX W/COLLJ SPEC WHEN PFRMD N/A 2/10/2017    Procedure: COLONOSCOPY;  Surgeon: Enedina Ortega MD;  Location: [facility] GI LAB;   Service: Gastroenterology    [place] KNEE SCOPE,MED/LAT MENISECTOMY Left 3/21/2016    Procedure: ARTHROSCOPY W/ PARTIAL MEDIAL MENISCECTOMY;  Surgeon: Chari Rose MD;  Location: [facility] OR;  Service: Orthopedics    TOTAL ABDOMINAL HYSTERECTOMY      TUBAL LIGATION       Social History   History   Alcohol Use    Yes     Comment: social drinker     History   Drug Use No     History   Smoking Status    Current Every Day Smoker    Packs/day: 0 50    Years: 40 00    Types: Cigarettes   Smokeless Tobacco    Never Used     Comment: one half pack a day     Family History: non-contributory    Meds/Allergies   all medications and allergies reviewed  Allergies   Allergen Reactions    Blue Dyes (Parenteral) Anaphylaxis    Gabapentin Anaphylaxis     Anaphylaxis    Nitrofurantoin GI Intolerance     vomiting    Blue Dyes (Parenteral) Rash    Penicillins Rash       Objective   First Vitals:   Blood Pressure: 111/63 (04/25/18 0630)  Pulse: 73 (04/25/18 0630)  Temperature: 97 6 °F (36 4 °C) (18 0714)  Temp Source: Oral (18 0714)  Respirations: 20 (18 0630)  Height: 5' 4" (162 6 cm) (18 1140)  Weight - Scale: 58 7 kg (129 lb 8 oz) (18 0630)  SpO2: 98 % (18 0630)    Current Vitals:   Blood Pressure: 96/59 (18 0930)  Pulse: (!) 54 (18 1140)  Temperature: 98 1 °F (36 7 °C) (18 1140)  Temp Source: Oral (18 1140)  Respirations: (!) 10 (18 1140)  Height: 5' 4" (162 6 cm) (18 1140)  Weight - Scale: 57 9 kg (127 lb 10 3 oz) (18 1140)  SpO2: 96 % (18 1140)      Intake/Output Summary (Last 24 hours) at 18 1233  Last data filed at 18 1035   Gross per 24 hour   Intake              500 ml   Output                0 ml   Net              500 ml       Invasive Devices     Peripheral Intravenous Line            Peripheral IV 18 Left Hand less than 1 day    Peripheral IV 18 Right Antecubital less than 1 day          Arterial Line            Arterial Line 18 Left Radial less than 1 day                Physical Exam   Constitutional: She is oriented to person, place, and time  She appears well-developed and well-nourished  No distress  HENT:   Head: Normocephalic and atraumatic  Eyes: Conjunctivae are normal  Pupils are equal, round, and reactive to light  Cardiovascular: Normal rate and regular rhythm  Pulmonary/Chest: Effort normal and breath sounds normal    Abdominal: Soft  Bowel sounds are normal    Musculoskeletal: Normal range of motion  She exhibits no deformity  Neurological: She is alert and oriented to person, place, and time  She exhibits abnormal muscle tone  Coordination abnormal    Skin: Skin is warm and dry  Psychiatric: She has a normal mood and affect  Her behavior is normal  Judgment and thought content normal    Nursing note and vitals reviewed  Medical Decision Makin   Acute LUE weakness, dysarthria, and L facial weakness: stroke alert called after arrival; stat CTH r/o acute cva; stat CTA H and N r/o CVA      Recent Results (from the past 36 hour(s))   APTT    Collection Time: 04/25/18  6:35 AM   Result Value Ref Range    PTT 26 23 - 35 seconds   Basic metabolic panel    Collection Time: 04/25/18  6:35 AM   Result Value Ref Range    Sodium 142 136 - 145 mmol/L    Potassium 3 9 3 5 - 5 3 mmol/L    Chloride 109 (H) 100 - 108 mmol/L    CO2 26 21 - 32 mmol/L    Anion Gap 7 4 - 13 mmol/L    BUN 10 5 - 25 mg/dL    Creatinine 0 89 0 60 - 1 30 mg/dL    Glucose 99 65 - 140 mg/dL    Calcium 9 5 8 3 - 10 1 mg/dL    eGFR 74 ml/min/1 73sq m   CBC    Collection Time: 04/25/18  6:35 AM   Result Value Ref Range    WBC 7 53 4 31 - 10 16 Thousand/uL    RBC 4 19 3 81 - 5 12 Million/uL    Hemoglobin 13 5 11 5 - 15 4 g/dL    Hematocrit 41 0 34 8 - 46 1 %    MCV 98 82 - 98 fL    MCH 32 2 26 8 - 34 3 pg    MCHC 32 9 31 4 - 37 4 g/dL    RDW 13 8 11 6 - 15 1 %    Platelets 289 291 - 463 Thousands/uL    MPV 8 7 (L) 8 9 - 12 7 fL   Protime-INR    Collection Time: 04/25/18  6:35 AM   Result Value Ref Range    Protime 12 9 12 1 - 14 4 seconds    INR 0 97 0 86 - 1 16   Type and screen    Collection Time: 04/25/18  6:35 AM   Result Value Ref Range    ABO Grouping A     Rh Factor Positive     Antibody Screen Negative     Specimen Expiration Date [id number]    POCT Chem 8+    Collection Time: 04/25/18  6:41 AM   Result Value Ref Range    SODIUM, I-STAT 142 136 - 145 mmol/l    Potassium, i-STAT 3 9 3 5 - 5 3 mmol/L    Chloride, istat 107 100 - 108 mmol/L    CO2, i-STAT 27 21 - 32 mmol/L    Anion Gap, Istat 14 (H) 4 - 13 mmol/L    Calcium, Ionized i-STAT 1 21 1 12 - 1 32 mmol/L    BUN, I-STAT 9 5 - 25 mg/dl    Creatinine, i-STAT 0 9 0 6 - 1 3 mg/dl    eGFR 73 ml/min/1 73sq m    Glucose, i-STAT 102 65 - 140 mg/dl    Hct, i-STAT 42 34 8 - 46 1 %    Hgb, i-STAT 14 3 11 5 - 15 4 g/dl    Specimen Type VENOUS    ECG 12 lead    Collection Time: 04/25/18  7:16 AM   Result Value Ref Range    Ventricular Rate 65 BPM    Atrial Rate 65 BPM    IA Interval 192 ms    QRSD Interval 74 ms    QT Interval 404 ms    QTC Interval 420 ms    P Axis 40 degrees    QRS Axis 58 degrees    T Wave [place] 72 degrees     CTA stroke alert (head/neck)   Final Result   Addendum 1 of 1   ADDENDUM:      ADDENDUM      There is a voice recognition software related error in the impression of    the report  A phrase which reads " Findings were directly discussed with Dr Elidia Gonzales on    April 25, 2018 at 5:57 AM  "       should read,       " Findings were directly discussed with Dr Elidia Gonzales on April 25, 2018 at    6:57 AM  "                It is uncertain as to how this time discrepancy occurred, given the fields    are auto populated at time of dictation and there is no manual input of    any information such as ordering physician, time or date  The initial    noncontrast CT scan of the brain has    the correct time of dictation  I personally discussed this study with Arie Wahl from the stroke team on    4/25/2018 at 11:00 AM                Final   1  Recurrent proximal right MCA occlusion  There is reconstitution of distal M2 and M3 segments via collateral flow from both the anterior and posterior circulation  2   No evidence of acute intracranial hemorrhage  3   Chronic right MCA infarction  4   Cerebral atrophy with chronic small vessel ischemic change  Findings were directly discussed with Dr Elidia Gonzales on April 25, 2018 at 5:57 AM                   Workstation performed: [id number]         X-ray chest 1 view portable   ED Interpretation   No acute pulmonary pathology      Final Result      No acute cardiopulmonary disease  Workstation performed: [id number]         CT stroke alert brain   Final Result   1  Increased density in the distal M1/M2 segment of the right middle cerebral artery, suspicious for acute thrombus  2   No acute intraparenchymal hemorrhage  3   Chronic right MCA territory infarction     4  Cerebral atrophy with chronic small vessel ischemic change  Findings were directly discussed with Candy Rebolledo on 4/25/2018 6:49 AM       Workstation performed: [id number]         IR cerebral embolectomy/lysis    (Results Pending)   CT head wo contrast    (Results Pending)         Portions of the record may have been created with voice recognition software  Occasional wrong word or "sound a like" substitutions may have occurred due to the inherent limitations of voice recognition software  Read the chart carefully and recognize, using context, where substitutions have occurred

## 2018-04-25 NOTE — ED PROCEDURE NOTE
PROCEDURE  CriticalCare Time  Performed by: Silke Pulido  Authorized by: Silke Pulido     Critical care provider statement:     Critical care start time:  4/25/2018 6:30 AM    Critical care end time:  4/25/2018 6:53 AM    Critical care time was exclusive of:  Separately billable procedures and treating other patients and teaching time    Critical care was necessary to treat or prevent imminent or life-threatening deterioration of the following conditions:  CNS failure or compromise    Critical care was time spent personally by me on the following activities:  Obtaining history from patient or surrogate, development of treatment plan with patient or surrogate, discussions with consultants, evaluation of patient's response to treatment, examination of patient, ordering and performing treatments and interventions, ordering and review of laboratory studies, ordering and review of radiographic studies, re-evaluation of patient's condition and review of old charts  Comments:        Discussion with on-call Neurology regarding the patient's symptoms and NIH stroke scale  Discussion with on-call Neurology about the CT scan findings  Awaiting follow-up discussion with Neurology regarding patient management           Cleopatra Smith MD  04/25/18 [id number]

## 2018-04-25 NOTE — SPEECH THERAPY NOTE
Speech-Language Pathology Bedside Swallow Evaluation        Patient Name: Blaine Blackwell    UIRMX'L Date: 4/25/2018     Problem List  Patient Active Problem List   Diagnosis    Acute ischemic right MCA stroke [facility])    Fall    Forehead contusion    Left elbow contusion    Chronic back pain    Major depression    CVA [facility]cerebral vascular accident) [facility] [address] [facility]    Hypercholesterolemia    Chronic low back pain    Cervical post-laminectomy syndrome    Cervical radiculopathy       Past Medical History  Past Medical History:   Diagnosis Date    Acute pain of right knee     last assessed - [id number]    Anemia     Anxiety     Cervical disc disorder with radiculopathy     Chronic pain     Constipation     CVA [facility]cerebral vascular accident) ([facility] [address] [facility]     H/O ischemic right MCA stroke     Hematuria     last assessed - [id number]    High cholesterol     Lumbar radiculopathy     Lumbar stenosis     Other chronic pain     last assessed - [id number]    Other muscle spasm     last assessed - [id number]    PONV (postoperative nausea and vomiting)     must have premed    Spondylosis of cervical spine     Spondylosis of lumbosacral region     [facility])     Urinary incontinence     Resolved - [id number]       Past Surgical History  Past Surgical History:   Procedure Laterality Date    BLADDER SURGERY      BLADDER SURGERY  2014    CERVICAL SPINE SURGERY      KNEE ARTHROSCOPY Left     LIPECTOMY      of thigh    LIPOMA RESECTION      NECK SURGERY      LA COLONOSCOPY FLX DX W/COLLJ SPEC WHEN PFRMD N/A 2/10/2017    Procedure: COLONOSCOPY;  Surgeon: Leanne Lino MD;  Location: [facility] GI LAB;   Service: Gastroenterology    LA KNEE SCOPE,MED/LAT MENISECTOMY Left 3/21/2016    Procedure: ARTHROSCOPY W/ PARTIAL MEDIAL MENISCECTOMY;  Surgeon: Brigette Kilgore MD;  Location: [facility] OR;  Service: Orthopedics    TOTAL ABDOMINAL HYSTERECTOMY      TUBAL LIGATION           Current Medical Status  Pt is a 47 y o  female who presented to [facility] 4/25/18 with acute ischemic R CVA  Pt  presents with to the hospital with L facial droop, dysarthria and L arm weakness noted this morning upon wakening  She was reportedly last seen normal at 2130 last evening  Stroke alert was activated and patient was not IV tPA candidate secondary to recent stroke and patient woke up with symptoms so unclear time of onset  The patient is very tearful during exam which makes history-taking difficult, but she noted that her L side "not working " She also complains of R eye pain which her  notes she had with her initial stroke in February 2018  She had a CTA head and neck which neurology reviewed showed R MCA occlusion  Spoke to Dr Shane Ku and since patient within the window for mechanical thrombectomy and patient agreeable for intervention  Patient going for intervention  Past medical history:   Please see H&P for details      Special Studies:  CT-head: 1  Increased density in the distal M1/M2 segment of the right middle cerebral artery, suspicious for acute thrombus  2   No acute intraparenchymal hemorrhage  3   Chronic right MCA territory infarction      Social/Education/Vocational Hx:  Pt lives with family      Swallow Information   Current Risks for Dysphagia & Aspiration: CVA     Current Symptoms/Concerns: new neuro event    Current Diet: NPO      Baseline Diet: regular diet and thin liquids      Baseline Assessment   Behavior/Cognition: alert    Speech/Language Status: able to participate in conversation and able to follow commands    Patient Positioning: upright in bed       Swallow Mechanism Exam   Facial: left facial droop  Labial: decreased ROM left side  Lingual: WFL  Velum: unable to visualize  Mandible: adequate ROM  Dentition: adequate  Vocal quality:clear/adequate   Volitional Cough: strong/productive   Respiratory: RA      Consistencies Assessed and Performance   Consistencies Administered: thin liquids, nectar thick, puree and soft solids    Oral Stage: able to  Bite toast, drink from cup and straw w/ good oral control  Mastication, manipulation and transfer were timely  Mild pocketing noted posterior L sulcus  Pt was instructed on lingual sweeps after primary swallow, which pt was able to do and cleared all further pocketing  Pharyngeal Stage: swallows timely and complete w/o overt s/s aspiration  Pt denied feeling of food getting stuck  Esophageal Concerns: none reported        Summary   Swallow Summary: Pt presents with Mild oral dysphagia due to pocketing on L, which pt was able to clear w/ lingual sweeps  No overt s/s aspiration w/ thin liquids by cup and straw  Recommendations: regular diet and thin liquids     Recommended Form of Meds: as tolerated      Aspiration precautions and compensatory swallowing strategies: upright posture and lingual sweeps to L sulcus    Results Reviewed with: patient, RN, MD and family     Treatment Recommendations: will follow up x 1-2 as needed for diet tolerance and use of strategies to clear L sulcus

## 2018-04-25 NOTE — ANESTHESIA PROCEDURE NOTES
Arterial Line Insertion  Date/Time: 4/25/2018 10:05 AM  Performed by: David Sadler by: Gary Ocasio   Consent: Written consent obtained  Risks and benefits: risks, benefits and alternatives were discussed  Consent given by: patient and spouse  Patient understanding: patient states understanding of the procedure being performed  Patient consent: the patient's understanding of the procedure matches consent given  Preparation: Patient was prepped and draped in the usual sterile fashion    Indications: multiple ABGs and hemodynamic monitoring  Orientation:  LeftLocation: radial artery  Anesthesia: local infiltration  Needle gauge: 20  Post-procedure: dressing applied  Post-procedure CNS: normal and unchanged  Patient tolerance: Patient tolerated the procedure well with no immediate complications

## 2018-04-25 NOTE — BRIEF OP NOTE (RAD/CATH)
IR CEREBRAL EMBOLECTOMY/LYSIS  Procedure Note    PATIENT NAME: Olga Mason  : 1965  MRN: [id number]     Pre-op Diagnosis:   1  Acute ischemic right MCA stroke (HCC)    2  Stroke (HCC)      Post-op Diagnosis:   1   Acute ischemic right MCA stroke ([facility] [address] )    2  Stroke [facility])        Surgeon:   Pollo Montez MD  Assistants: Nella    No qualified resident was available, Resident is only observing    Estimated Blood Loss: 50 cc  Findings: Right MCA M1 occlusion, TICI 0    ACCESS: 10:13  FIRST PASS: 10:20, [facility]  RECAN: 10:24, TICI 3    Specimens: none    Complications:  none    Anesthesia: FRANCK Montez MD     Date: 2018  Time: 10:53 AM

## 2018-04-25 NOTE — PROGRESS NOTES
04/25/18 0700   Plan of Care   Comments  introduces self to family member and provides a safe space for him to express his thoughts and feelings  Family member declined services at this time  Assessment Completed by:  Other (Comment)  (OC Page)

## 2018-04-25 NOTE — H&P
History and Physical - Critical Care  Phillip Mason 47 y o  female MRN: [id number]  Unit/Bed#: ICU 09 Encounter: [id number]     Reason for Admission / Chief Complaint: L-sided weakness     History of Present Illness:  Fidelia Camacho is a 47 y o  female who presents to [facility] with complaints of left arm weakness, left leg weakness, dysarthria, and left facial droop  Onset of symptoms was when she awoke at 0430 today  Last known normal was 2130 last night  Also complaints of a headache behind the right eye and radiating to the right occiput  She fell on her left hip; denies striking her head, however  Denies any pain from this fall currently  These symptoms are similar to symptoms she had in February of this year when she had a CVA secondary to thrombus of the R M2  She was treated with TPA  Had some residual LUE and facial droop deficits since then  She takes aspirin at home, but did not yet take it today  A stroke alert was called, and the CT of the head showed increased density in the M1/M2 segment of the MCA, suspicious for acute thrombus  No hemorrhage  Chronic right MCA territory infarct  CTA of the head/neck showed recurrent R MCA occlusion with reconstitution of the distal M2 and M3 segments via collateral flow from both the anterior and posterior circulation  No hemorrhage  Chronic MCA infarct  Because of her recent stroke, as well as timeframe of symptoms, she was not a candidate for tPA, but an endovascular alert was called and there was a clot retrieval from the M2  She presents to the ICU in stable condition, awake, alert, following commands  History obtained from chart review and the patient       Past Medical History:  Past Medical History:   Diagnosis Date    Acute pain of right knee     last assessed - [id number]    Anemia     Anxiety     Cervical disc disorder with radiculopathy     Chronic pain     Constipation     CVA [facility]cerebral vascular accident) ([facility] [address]     H/O ischemic right MCA stroke     Hematuria     last assessed - [id number]    High cholesterol     Lumbar radiculopathy     Lumbar stenosis     Other chronic pain     last assessed - [id number]    Other muscle spasm     last assessed - [id number]    PONV (postoperative nausea and vomiting)     must have premed    Spondylosis of cervical spine     Spondylosis of lumbosacral region     Stroke [facility])     Urinary incontinence     Resolved - [id number]        Past Surgical History:  Past Surgical History:   Procedure Laterality Date    BLADDER SURGERY      BLADDER SURGERY  2014    CERVICAL SPINE SURGERY      KNEE ARTHROSCOPY Left     LIPECTOMY      of thigh    LIPOMA RESECTION      NECK SURGERY      CO COLONOSCOPY FLX DX W/COLLJ SPEC WHEN PFRMD N/A 2/10/2017    Procedure: COLONOSCOPY;  Surgeon: Sujey Randall MD;  Location: [facility] LAB;   Service: Gastroenterology    CO KNEE SCOPE,MED/LAT MENISECTOMY Left 3/21/2016    Procedure: ARTHROSCOPY W/ PARTIAL MEDIAL MENISCECTOMY;  Surgeon: Amanda Faulkner MD;  [facility];  Service: Orthopedics    TOTAL ABDOMINAL HYSTERECTOMY      TUBAL LIGATION          Past Family History:  Family History   Problem Relation Age of Onset   Makenzie Sicks Stroke Mother      46s    Cancer Mother     Hypertension Mother     Pulmonary embolism Other      In mid 35s    Stroke Sister      46s    Stroke Brother      46s    Prostate cancer Father     Cancer Daughter     Stroke Family         Social History:  History   Smoking Status    Current Every Day Smoker    Packs/day: 0 50    Years: 40 00    Types: Cigarettes   Smokeless Tobacco    Never Used     Comment: one half pack a day     History   Alcohol Use    Yes     Comment: social drinker     History   Drug Use No     Marital Status: /Civil Union     Medications:  Current Facility-Administered Medications   Medication Dose Route Frequency    multi-electrolyte (ISOLYTE-S PH 7 4) bolus 1,000 mL  1,000 mL Intravenous Once  sodium chloride 0 9 % infusion  75 mL/hr Intravenous Continuous     Home medications:  Prior to Admission medications    Medication Sig Start Date End Date Taking? Authorizing Provider   aspirin 81 mg chewable tablet Chew 1 tablet (81 mg total) daily 2/17/18  Yes Driss Rodriguez MD   DULOXETINE HCL PO Take 30 mg by mouth daily   Yes Historical Provider, MD   OXYCODONE-ACETAMINOPHEN PO Take 0 5 tablets by mouth daily as needed  5/325mg   Yes Historical Provider, MD   pregabalin (LYRICA) 25 mg capsule Take 1 capsule (25 mg total) by mouth 2 (two) times a day  Patient taking differently: Take 50 mg by mouth 2 (two) times a day   2/17/18  Yes Luis Enrique Luz MD   aspirin-acetaminophen-caffeine (EXCEDRIN MIGRAINE) [phone] MG per tablet Take 1 tablet by mouth every 6 (six) hours as needed for headaches  Historical Provider, MD   diazepam (VALIUM) 5 mg tablet Take 5 mg by mouth daily at bedtime    Historical Provider, MD   diclofenac sodium (VOLTAREN) 1 % Apply 2 g topically 2 (two) times a day    Historical Provider, MD   ENULOSE 10 GM/15ML Take 30 mL by mouth Medrol Dose Pack scheduling ONLY 2/23/18   Historical Provider, MD   lactulose 20 g/30 mL Take 30 mL (20 g total) by mouth as needed (constipation) 2/23/18   Hawa Krishnamurthy MD   meloxicam (MOBIC) 15 mg tablet Take 15 mg by mouth daily      Historical Provider, MD   methocarbamol (ROBAXIN) 750 mg tablet Take 750 mg by mouth 3 (three) times a day    Historical Provider, MD   mirtazapine (REMERON) 15 mg tablet Take 1 tablet (15 mg total) by mouth daily at bedtime 3/13/18   ROSEY Marlow   Omega-3 Fatty Acids (FISH OIL) 1200 MG CAPS Take 1,200 mg by mouth    Historical Provider, MD   oxaprozin (DAYPRO) 600 MG tablet Take 600 mg by mouth 2 (two) times a day    Historical Provider, MD   OXYCODONE HCL PO Take 5 mg by mouth every 6 (six) hours as needed    Historical Provider, MD   pravastatin (PRAVACHOL) 40 mg tablet Take 1 tablet (40 mg total) by mouth daily with dinner 3/13/18   Bridgette Billet Cutting, CRNP   TRAMADOL HCL PO Take 50 mg by mouth every 6 (six) hours as needed    Historical Provider, MD     Allergies: Allergies   Allergen Reactions    Blue Dyes (Parenteral) Anaphylaxis    Gabapentin Anaphylaxis     Anaphylaxis    Nitrofurantoin GI Intolerance     vomiting    Blue Dyes (Parenteral) Rash    Penicillins Rash        ROS:   Review of Systems   Musculoskeletal: Positive for back pain and neck pain  Chronic   Neurological: Positive for facial asymmetry, speech difficulty, weakness and headaches  Negative for dizziness, syncope and light-headedness  All other systems reviewed and are negative  Vitals:  Vitals:    18 0845 18 0930 18 1140 18 1200   BP: 106/63 96/59     BP Location:       Pulse: (!) 54 70 (!) 54 (!) 50   Resp: 18 18 (!) 10 12   Temp:   98 1 °F (36 7 °C)    TempSrc:   Oral    SpO2: 96% 97% 96%    Weight:   57 9 kg (127 lb 10 3 oz)    Height:   5' 4" (1 626 m)      Temperature:   Temp (24hrs), Av 9 °F (36 6 °C), Min:97 6 °F (36 4 °C), Max:98 1 °F (36 7 °C)    Current: Temperature: 98 1 °F (36 7 °C)     Weights:   IBW: 54 7 kg  Body mass index is 21 91 kg/m²  Hemodynamic Monitoring:  N/A     Non-Invasive/Invasive Ventilation Settings:  Respiratory    Lab Data (Last 4 hours)    None         O2/Vent Data (Last 4 hours)    None              No results found for: PHART, BWA1TCG, PO2ART, AOV7CNI, W2KPCTQA, BEART, SOURCE  SpO2: SpO2: 98 %     Physical Exam:  Physical Exam    General: Sleeping, but wakes easily  No distress  HENT: NCAT  PERRL  EOM intact  Cv: valdemar, regular, no murmur appreciated  Normal peripheral perfusion  Pulm: CTAB anterior  Abd: soft, NT  Gu: deferred  Ms: no edema  Neuro: Alert and oriented x 4  Able to name three objects  L facial droop noted  Cranial nerves II-XII are otherwise intact  Muscle strength of the L arm is 4/5; 5/5 in R arm    strength decreased in the L hand; normal in the R  Dorsiflexion and plantar flexion are normal to resistance bilaterally  The lower extremity ROM/strength are not fully evaluated on this exam as the patient is to remains strictly supine for total of three hours  Psych: mood and affect are appropriate       Labs:    Results from last 7 days  Lab Units 04/25/18  0641 04/25/18  0635   WBC Thousand/uL  --  7 53   HEMOGLOBIN g/dL  --  13 5   I STAT HEMOGLOBIN g/dl 14 3  --    HEMATOCRIT %  --  41 0   PLATELETS Thousands/uL  --  308      Results from last 7 days  Lab Units 04/25/18  0641 04/25/18  0635   SODIUM mmol/L  --  142   POTASSIUM mmol/L  --  3 9   CHLORIDE mmol/L  --  109*   CO2 mmol/L  --  26   BUN mg/dL  --  10   CREATININE mg/dL  --  0 89   CALCIUM mg/dL  --  9 5   GLUCOSE RANDOM mg/dL  --  99   GLUCOSE, ISTAT mg/dl 102  --                 Results from last 7 days  Lab Units 04/25/18  0635   INR  0 97   PTT seconds 26           0  Lab Value Date/Time   TROPONINI <0 02 02/14/2018 1450        Imaging: CT head, CTA head I have personally reviewed pertinent reports  EKG: Not available for review  Micro:  No results found for: FREDERIC Blount    Assessment:  Acute ischemic R MCA stroke (recurrent)     Plan:      Neuro:   -Acute ischemic R MCA stroke   -S/P endovascular retrieval via R groin  Strict bedrest/completely supine for 3 hours per Dr Kami Galindo    -Unclear etiology  Had similar symptoms with a thrombus also in the R MCA 2 months ago  Has had a loop recorder implanted to search for an arrhythmia as the source, but per the patient, no cause has been identified    -Neurosurgery following   -Re-evaluate antiplatelet regimen  On ASA at home  -Monitor in ICU x 24 hours post embolectomy   -She is on multiple pain medications at home for chronic back and neck pain: Valium 5mg, Mobic 15mg, Robacin 750mg, Daypro 600mg, Percocet 5/325mg (0 5 tablet daily prn), Lyrica 25mg BID, and Tramadol 50mg Q 6 H prn   All are on hold until cleared by speech  Restart when cleared  Will add Tylenol suppository prn  If more pain medication needed, will reassess at that time, but she is resting comfortably currently  CV:    -Loop recorder in place for evaluation of previous stroke  Per nursing, was interrogated, and no events noted  -MAP goal >75  -Bradycardia: was in the 50s per IR note  Now in the 50s and into the 40s at times  Monitor for now  -Isolyte bolus ordered for soft MAPs into the 60s  Currently about 79  Will have NSS for maintenance as well  -JONATAN was done during last admission and was unremarkable except for an atheroma in the proximal ascending aorta  -During last admission, reportedly had an arrhythmia that was concerning for afib, but was on pressors at that time, and so it was felt to be related to that and was not pursued    -Restart home pravastatin when able to take PO                 Lung:    -No acute issues   -NC as needed to keep SaO2 >92%   -IS   -CXR today unremarkable                 GI:    -No acute issues   -Zofran prn   -Will add bowel regimen prn   -NPO until evaluated by speech                 FEN:    -Isolyte 1L bolus running now for low MAP  NSS 75/hour for maintenance   -Na 142, K 3 9, Cl 109, bicarb 26, Ca 9 5   -NPO until seen by speech                 :    -No acute issues   -I/O   -BUN/Cr 10/0 89                 ID:    -No infectious concerns at this time   -Afebrile   -WBC 7,000                 Heme:    -Had a thrombosis panel during last admission  Negative, except for elevated CRP   -On ASA at time   API ordered   -hold on chemical VTE ppx until 24 hours post thrombectomy                 Endo:    -No acute issues   -Will add POCT glucose and SSI while NPO                 Msk/Skin:    -On multiple pain medications at home for chronic pain in the back, neck   -PT/OT   -Out of bed once bedrest completed x 3 hours                 Disposition: ICU     VTE Pharmacologic Prophylaxis: RX contraindicated due to: acute cva  VTE Mechanical Prophylaxis: sequential compression device     Invasive lines and devices: Invasive Devices     Peripheral Intravenous Line            Peripheral IV 04/25/18 Left Hand less than 1 day    Peripheral IV 04/25/18 Right Antecubital less than 1 day          Arterial Line            Arterial Line 04/25/18 Left Radial less than 1 day                 Code Status: Prior  POA:    POLST:       Given critical illness, patient length of stay will require greater than two midnights  Counseling / Coordination of Care  Total Critical Care time spent 40 minutes excluding procedures, teaching and family updates  Portions of the record may have been created with voice recognition software  Occasional wrong word or "sound a like" substitutions may have occurred due to the inherent limitations of voice recognition software  Read the chart carefully and recognize, using context, where substitutions have occurred          Madison Oneill PA-C

## 2018-04-25 NOTE — ANESTHESIA PREPROCEDURE EVALUATION
Medical History     History Comments   PONV (postoperative nausea and vomiting) must have premed   Anemia    Cervical disc disorder with radiculopathy    Lumbar radiculopathy    High cholesterol    Urinary incontinence Resolved - [id number]   Constipation    Spondylosis of cervical spine    Spondylosis of lumbosacral region    Anxiety    Chronic pain    Lumbar stenosis    Stroke ([facility] [address] )    Hematuria last assessed - [id number]   Other chronic pain last assessed - [id number]   Other muscle spasm last assessed - [id number]   Acute pain of right knee last assessed - [id number]   H/O ischemic right MCA stroke    Pertinent Negatives Comments   No pertinent negative medical history recorded   Surgical History     LIPOMA RESECTION BLADDER SURGERY   TUBAL LIGATION WY COLONOSCOPY FLX DX W/COLLJ SPEC WHEN PFRMD   WY KNEE SCOPE,MED/LAT MENISECTOMY CERVICAL SPINE SURGERY   KNEE ARTHROSCOPY BLADDER SURGERY   LIPECTOMY NECK SURGERY   TOTAL ABDOMINAL HYSTERECTOMY     Substance History     Smoking Status: Current Every Day Smoker - 20 pack years   Smokeless Tobacco Status: Never Used   Comments: one half pack a day   Alcohol use: Yes, unspecified volume   Comments: social drinker   Drug use: No   Anesthesia Family History     No history of this type found       Obstetric History     The patient has not been asked about pregnancy  Review of Systems/Medical History  Patient summary reviewed  Chart reviewed  History of anesthetic complications     Cardiovascular  Hyperlipidemia,    Pulmonary  Smoker cigarette smoker and cigar smoker  ,        GI/Hepatic            Endo/Other     GYN       Hematology  Anemia ,     Musculoskeletal    Arthritis     Neurology    CVA ,   Comment: S/p cervical fusion Psychology   Anxiety, Depression ,          SUMMARY     LEFT VENTRICLE:  Systolic function was normal  Ejection fraction was estimated to be 65 %  There were no regional wall motion abnormalities    Left ventricular diastolic function parameters were normal      RIGHT VENTRICLE:  The size was normal   Systolic function was normal      TRICUSPID VALVE:  There was mild regurgitation      HISTORY: PRIOR HISTORY: Patient has no history of cardiovascular disease  Physical Exam    Airway    Mallampati score: I  TM Distance: >3 FB       Dental       Cardiovascular      Pulmonary      Other Findings        Anesthesia Plan  ASA Score- 3 Emergent    Anesthesia Type- IV sedation with anesthesia with ASA Monitors  Additional Monitors: arterial line  Airway Plan:     Comment: ITiffany M D , have personally seen and evaluated the patient prior to anesthetic care  I have reviewed the pre-anesthetic record, and other medical records if appropriate to the anesthetic care  If a CRNA is involved in the case, I have reviewed the CRNA assessment, if present, and agree  Risks/benefits and alternatives discussed with patient including possible PONV, sore throat, and possibility of rare anesthetic and surgical emergencies        Plan Factors-    Induction-     Postoperative Plan-     Informed Consent- Anesthetic plan and risks discussed with patient  I personally reviewed this patient with the CRNA  Discussed and agreed on the Anesthesia Plan with the CRNA  Gladys Gonzalez

## 2018-04-25 NOTE — PROGRESS NOTES
Mrs Niru Arevalo is a 46 yo woman who went to bed at 9:30 pm yesterday in her usual state of health  Woke up this morning with severe dysarthria and right sided hemiparesis  CTA demonstrated repeat right MCA occlusion  Plan is for cerebral angiogram and mechanical thrombectomy  Risks have been discussed with the patient and her   She has given us verbal permission to proceed

## 2018-04-25 NOTE — PLAN OF CARE
Problem: SLP ADULT - SWALLOWING, IMPAIRED  Goal: Initial SLP swallow eval performed  Outcome: Completed Date Met: 04/25/18

## 2018-04-25 NOTE — ED PROVIDER NOTES
History  Chief Complaint   Patient presents with    CVA/TIA-like Symptoms     pt woke up at 0430 with increased Left sided weakness, last known well was 2130 last night, facial droop, and slurred speech  pt has hx of multiple strokes with little risudal facial droop     47year old female presents for evaluation of left facial droop, dysarthria and left arm weakness beginning upon awakening at 4:30 am this morning  Patient was last seen normal at 9:30 pm last night  Patient's  became aware of the symptoms when patient dropped a coffee cup this morning and called EMS  Patient also reports waking with headache this morning which is over the right face radiating to the occiput  Similar to prior headaches  Patient took 1 Excedrin this morning for this headache with no improvement  Patient states that she fell this morning due to weakness on the left side, but denies head strike  She states that she stumbled and struck her left hip  Patient was previously admitted on 2/9/2018 for acute right MCA stroke which was treated with tPA and thrombectomy  Patient states that when she left the hospital, she had no residual deficits  Prior stroke had presented similarly with left facial droop and dysarthria, but she had more pronounced weakness of her left side with her prior stroke  Patient takes aspirin daily, but has not taken her dose yet this morning  Patient takes percocet and lyrica both of which were last taken yesterday           History provided by:  Patient and spouse  STROKE Alert   Location:  Left facial droop, dysarthria, left arm weakness  Quality:  Weakness  Severity:  Moderate  Onset quality:  Sudden  Duration:  2 hours (awoke with symptoms)  Timing:  Constant  Progression:  Unchanged  Chronicity:  Recurrent  Relieved by:  None tried  Worsened by:  Nothing  Ineffective treatments:  None tried  Associated symptoms: headaches (mild, right-sided with radiation to occiput  )    Associated symptoms: no abdominal pain, no chest pain, no congestion, no cough, no diarrhea, no fever, no myalgias, no nausea, no rhinorrhea, no shortness of breath, no sore throat and no vomiting    Risk factors:  Prior right MCA stroke, tPA within past 90 days      Prior to Admission Medications   Prescriptions Last Dose Informant Patient Reported? Taking? DULOXETINE HCL PO 4/24/2018 at Unknown time Self Yes Yes   Sig: Take 30 mg by mouth daily   ENULOSE 10 GM/15ML   Yes No   Sig: Take 30 mL by mouth Medrol Dose Pack scheduling ONLY   OXYCODONE HCL PO  Self Yes No   Sig: Take 5 mg by mouth every 6 (six) hours as needed   OXYCODONE-ACETAMINOPHEN PO 4/24/2018 at Unknown time Self Yes Yes   Sig: Take 0 5 tablets by mouth daily as needed  5/325mg   Omega-3 Fatty Acids (FISH OIL) 1200 MG CAPS  Self Yes No   Sig: Take 1,200 mg by mouth   TRAMADOL HCL PO  Self Yes No   Sig: Take 50 mg by mouth every 6 (six) hours as needed   aspirin 81 mg chewable tablet 4/24/2018 at Unknown time Self No Yes   Sig: Chew 1 tablet (81 mg total) daily   aspirin-acetaminophen-caffeine (EXCEDRIN MIGRAINE) 250-250-65 MG per tablet Unknown at Unknown time Self Yes No   Sig: Take 1 tablet by mouth every 6 (six) hours as needed for headaches  diazepam (VALIUM) 5 mg tablet  Self Yes No   Sig: Take 5 mg by mouth daily at bedtime   diclofenac sodium (VOLTAREN) 1 %  Self Yes No   Sig: Apply 2 g topically 2 (two) times a day   lactulose 20 g/30 mL   No No   Sig: Take 30 mL (20 g total) by mouth as needed (constipation)   meloxicam (MOBIC) 15 mg tablet  Self Yes No   Sig: Take 15 mg by mouth daily     methocarbamol (ROBAXIN) 750 mg tablet  Self Yes No   Sig: Take 750 mg by mouth 3 (three) times a day   mirtazapine (REMERON) 15 mg tablet   No No   Sig: Take 1 tablet (15 mg total) by mouth daily at bedtime   oxaprozin (DAYPRO) 600 MG tablet  Self Yes No   Sig: Take 600 mg by mouth 2 (two) times a day   pravastatin (PRAVACHOL) 40 mg tablet   No No   Sig: Take 1 tablet (40 mg total) by mouth daily with dinner   pregabalin (LYRICA) 25 mg capsule 4/24/2018 at Unknown time Self No Yes   Sig: Take 1 capsule (25 mg total) by mouth 2 (two) times a day   Patient taking differently: Take 50 mg by mouth 2 (two) times a day        Facility-Administered Medications: None       Past Medical History:   Diagnosis Date    Acute pain of right knee     last assessed - [id number]    Anemia     Anxiety     Cervical disc disorder with radiculopathy     Chronic pain     Constipation     H/O ischemic right MCA stroke     Hematuria     last assessed - [id number]    High cholesterol     Lumbar radiculopathy     Lumbar stenosis     Other chronic pain     last assessed - [id number]    Other muscle spasm     last assessed - [id number]    PONV (postoperative nausea and vomiting)     must have premed    Spondylosis of cervical spine     Spondylosis of lumbosacral region     Stroke [facility])     Urinary incontinence     Resolved - [id number]       Past Surgical History:   Procedure Laterality Date    BLADDER SURGERY      BLADDER SURGERY  2014    CERVICAL SPINE SURGERY      KNEE ARTHROSCOPY Left     LIPECTOMY      of thigh    LIPOMA RESECTION      NECK SURGERY      ND COLONOSCOPY FLX DX W/COLLJ SPEC WHEN PFRMD N/A 2/10/2017    Procedure: COLONOSCOPY;  Surgeon: Elizabeth Kahn MD;  Location: [facility] GI LAB;   Service: Gastroenterology    ND KNEE SCOPE,MED/LAT MENISECTOMY Left 3/21/2016    Procedure: ARTHROSCOPY W/ PARTIAL MEDIAL MENISCECTOMY;  Surgeon: Pasquale Carrera MD;  Location[facility] OR;  Service: Orthopedics    TOTAL ABDOMINAL HYSTERECTOMY      TUBAL LIGATION         Family History   Problem Relation Age of Onset    Stroke Mother      46s    Cancer Mother     Hypertension Mother     Pulmonary embolism Other      In mid 35s    Stroke Sister      46s    Stroke Brother      46s    Prostate cancer Father     Cancer Daughter     Stroke Family      I have reviewed and agree with the history as documented  Social History   Substance Use Topics    Smoking status: Current Every Day Smoker     Packs/day: 0 50     Years: 40 00     Types: Cigarettes    Smokeless tobacco: Never Used      Comment: one half pack a day    Alcohol use Yes      Comment: social drinker        Review of Systems   Constitutional: Negative for appetite change, chills and fever  HENT: Negative for congestion, rhinorrhea and sore throat  Respiratory: Negative for cough, chest tightness and shortness of breath  Cardiovascular: Negative for chest pain, palpitations and leg swelling  Gastrointestinal: Negative for abdominal pain, constipation, diarrhea, nausea and vomiting  Genitourinary: Negative for dysuria, frequency and hematuria  Musculoskeletal: Negative for myalgias, neck pain and neck stiffness  Skin: Negative for pallor  Neurological: Positive for facial asymmetry, speech difficulty, weakness (left arm) and headaches (mild, right-sided with radiation to occiput  )  Negative for syncope  All other systems reviewed and are negative  Physical Exam  ED Triage Vitals   Temperature Pulse Respirations Blood Pressure SpO2   04/25/18 0714 04/25/18 0630 04/25/18 0630 04/25/18 0630 04/25/18 0630   97 6 °F (36 4 °C) 73 20 111/63 98 %      Temp Source Heart Rate Source Patient Position - Orthostatic VS BP Location FiO2 (%)   04/25/18 0714 04/25/18 0630 04/25/18 0630 04/25/18 0630 --   Oral Monitor Lying Right arm       Pain Score       04/25/18 0845       No Pain           Orthostatic Vital Signs  Vitals:    04/25/18 0645 04/25/18 0730 04/25/18 0845 04/25/18 0930   BP: 110/60 98/65 106/63 96/59   Pulse: 58 57 (!) 54 70   Patient Position - Orthostatic VS: Sitting Sitting Lying Lying       Physical Exam   Constitutional: She is oriented to person, place, and time  She appears well-developed and well-nourished  Non-toxic appearance  No distress  HENT:   Head: Normocephalic and atraumatic     Eyes: Conjunctivae and EOM are normal  Pupils are equal, round, and reactive to light  Neck: Normal range of motion  Neck supple  No tracheal deviation present  No thyromegaly present  Cardiovascular: Normal rate, regular rhythm, normal heart sounds and intact distal pulses  Pulmonary/Chest: Effort normal and breath sounds normal    Abdominal: Soft  Bowel sounds are normal  She exhibits no distension  There is no tenderness  Musculoskeletal: She exhibits no edema  Lymphadenopathy:     She has no cervical adenopathy  Neurological: She is alert and oriented to person, place, and time  No sensory deficit  She exhibits normal muscle tone  5/5 strength right upper extremity and bilateral lower extremities  4/5 strength left upper extremity  Clumsiness of the left hand  Left facial droop  Mild dysarthria  No aphasia  No extinction or neglect  No ataxia  Skin: Skin is warm and dry  She is not diaphoretic  Nursing note and vitals reviewed        ED Medications  Medications   iohexol (OMNIPAQUE) 350 MG/ML injection (SINGLE-DOSE) 100 mL (100 mL Intravenous Given 4/25/18 0656)   aspirin rectal suppository 300 mg (300 mg Rectal Given 4/25/18 0757)       Diagnostic Studies  Results Reviewed     Procedure Component Value Units Date/Time    Aspirin Platelet Inhibitor [[id number]]     Lab Status:  No result Specimen:  Blood     APTT [[id number]]  (Normal) Collected:  04/25/18 0635    Lab Status:  Final result Specimen:  Blood from Arm, Right Updated:  04/25/18 0701     PTT 26 seconds     Basic metabolic panel [[id number]]  (Abnormal) Collected:  04/25/18 0635    Lab Status:  Final result Specimen:  Blood from Arm, Right Updated:  04/25/18 0701     Sodium 142 mmol/L      Potassium 3 9 mmol/L      Chloride 109 (H) mmol/L      CO2 26 mmol/L      Anion Gap 7 mmol/L      BUN 10 mg/dL      Creatinine 0 89 mg/dL      Glucose 99 mg/dL      Calcium 9 5 mg/dL      eGFR 74 ml/min/1 73sq m     Narrative:         National Kidney Disease Education Program recommendations are as follows:  GFR calculation is accurate only with a steady state creatinine  Chronic Kidney disease less than 60 ml/min/1 73 sq  meters  Kidney failure less than 15 ml/min/1 73 sq  meters  Protime-INR [[id number]]  (Normal) Collected:  04/25/18 0635    Lab Status:  Final result Specimen:  Blood from Arm, Right Updated:  04/25/18 0701     Protime 12 9 seconds      INR 0 97    CBC [[id number]]  (Abnormal) Collected:  04/25/18 0635    Lab Status:  Final result Specimen:  Blood from Arm, Right Updated:  04/25/18 0646     WBC 7 53 Thousand/uL      RBC 4 19 Million/uL      Hemoglobin 13 5 g/dL      Hematocrit 41 0 %      MCV 98 fL      MCH 32 2 pg      MCHC 32 9 g/dL      RDW 13 8 %      Platelets 795 Thousands/uL      MPV 8 7 (L) fL     POCT Chem 8+ [[id number]]  (Abnormal) Collected:  04/25/18 0641    Lab Status:  Final result Updated:  04/25/18 0645     SODIUM, I-STAT 142 mmol/l      Potassium, i-STAT 3 9 mmol/L      Chloride, istat 107 mmol/L      CO2, i-STAT 27 mmol/L      Anion Gap, Istat 14 (H) mmol/L      Calcium, Ionized i-STAT 1 21 mmol/L      BUN, I-STAT 9 mg/dl      Creatinine, i-STAT 0 9 mg/dl      eGFR 73 ml/min/1 73sq m      Glucose, i-STAT 102 mg/dl      Hct, i-STAT 42 %      Hgb, i-STAT 14 3 g/dl      Specimen Type VENOUS                 X-ray chest 1 view portable   ED Interpretation by Lee Perea MD (04/25 0195)   No acute pulmonary pathology      Final Result by Sarah Montez DO (04/25 0800)      No acute cardiopulmonary disease  Workstation performed: [id number]         CTA stroke alert (head/neck)   Final Result by Jenna Domínguez DO (04/25 0963)   1  Recurrent proximal right MCA occlusion  There is reconstitution of distal M2 and M3 segments via collateral flow from both the anterior and posterior circulation  2   No evidence of acute intracranial hemorrhage  3   Chronic right MCA infarction  4   Cerebral atrophy with chronic small vessel ischemic change  Findings were directly discussed with Dr Alan Berrios on April 25, 2018 at 5:57 AM                   Workstation performed: [id number]         CT stroke alert brain   Final Result by Betty Herzog DO (04/25 7165)   1  Increased density in the distal M1/M2 segment of the right middle cerebral artery, suspicious for acute thrombus  2   No acute intraparenchymal hemorrhage  3   Chronic right MCA territory infarction  4   Cerebral atrophy with chronic small vessel ischemic change           Findings were directly discussed with Reji Coates on 4/25/2018 6:49 AM       Workstation performed: [id number]         IR cerebral embolectomy/lysis    (Results Pending)         Procedures  ECG 12 Lead Documentation  Date/Time: 4/25/2018 9:25 AM  Performed by: Kina Gould  Authorized by: Claritza Smart     Indications / Diagnosis:  Stroke  ECG reviewed by me, the ED Provider: yes    Patient location:  ED  Previous ECG:     Previous ECG:  Unavailable  Interpretation:     Interpretation: normal    Rate:     ECG rate:  65    ECG rate assessment: normal    Rhythm:     Rhythm: sinus rhythm    Ectopy:     Ectopy: none    QRS:     QRS axis:  Normal    QRS intervals:  Normal  Conduction:     Conduction: normal    ST segments:     ST segments:  Normal  T waves:     T waves: inverted      Inverted:  AVL  Q waves:     Q waves:  V2          Phone Consults  ED Phone Contact    ED Course  ED Course              NIH Stroke Scale    Flowsheet Row Most Recent Value   Level of Consciousness (1a )  0 Filed at: 04/25/2018 0712   LOC Questions (1b )  0 Filed at: 04/25/2018 0712   LOC Commands (1c )  0 Filed at: 04/25/2018 2375   Best Gaze (2 )  0 Filed at: 04/25/2018 0366   Visual (3 )  0 Filed at: 04/25/2018 5396   Facial Palsy (4 )  1 Filed at: 04/25/2018 4857   Motor Arm, Left (5a )  1 Filed at: 04/25/2018 5673   Motor Arm, Right (5b )  0 Filed at: 04/25/2018 6193   Motor Leg, Left (6a )  0 Filed at: 04/25/2018 8187   Motor Leg, Right (6b )  0 Filed at: 04/25/2018 9222   Limb Ataxia (7 )  0 Filed at: 04/25/2018 1701   Sensory (8 )  0 Filed at: 04/25/2018 2824   Best Language (9 )  0 Filed at: 04/25/2018 3936   Dysarthria (10 )  1 Filed at: 04/25/2018 4297   Extinction and Inattention (11 ) (Formerly Neglect)  0 Filed at: 04/25/2018 3761   Total  3 Filed at: 04/25/2018 7443                        MDM  Number of Diagnoses or Management Options  Acute ischemic right MCA stroke [facility]): new and requires workup  Stroke [facility]): new and requires workup  Diagnosis management comments: 47year old female presents with wake-up stroke with left-sided deficits  Stroke alert initiated upon arrival  Prior right MCA stroke with tPA within last 90 days with unclear onset of symptoms therefore patient is not a tPA candidate today  Patient was taken to telestroke robot immediately following CT  Dr Bernie Samuels was alerted to the finding of right MCA occlusion on CT during her interview with the patient on telestroke  She stated that someone from neurology would be down to see the patient within an hour  I was informed that patient was seen by neurology PA, but received no update  I called neurology at 9 am to discuss whether or not patient would go for IR intervention and informed that this had not been addressed  IR to attempt thrombectomy  Patient to be admitted to ICU following procedure           Amount and/or Complexity of Data Reviewed  Clinical lab tests: reviewed  Tests in the radiology section of CPT®: reviewed  Independent visualization of images, tracings, or specimens: yes    Patient Progress  Patient progress: stable    CritCare Time    Disposition  Final diagnoses:   Stroke [facility])   Acute ischemic right MCA stroke [facility])     Time reflects when diagnosis was documented in both MDM as applicable and the Disposition within this note     Time User Action Codes Description Comment    4/25/2018  6:33 AM Vannesa Host Add [I63 9] [facility])     4/25/2018 7:47 AM Zoran  Add [L73 [id number]] Acute ischemic right MCA stroke ([facility] [address] )     4/25/2018  7:47 AM Zoran Dykes Modify [I63 9] Stroke ([facility] [address] )     4/25/2018  7:47 AM Starla Santiago Modify [Q13 967] Acute ischemic right MCA stroke [facility])       ED Disposition     ED Disposition Condition Comment    Send to Ancillary (IR, Dialysis)  Send to IR        Follow-up Information    None       Patient's Medications   Discharge Prescriptions    No medications on file     No discharge procedures on file  ED Provider  Attending physically available and evaluated Vickey Leo I managed the patient along with the ED Attending      Electronically Signed by         Karley Saavedra MD  04/25/18 8224

## 2018-04-25 NOTE — SEDATION DOCUMENTATION
Stroke Alert notes:  Pre [facility] 8  Arrival to IR 0952  IR Procedure room arrival time 0958  Puncture time 1013  First pass 1020  Recanulation 1024

## 2018-04-25 NOTE — ANESTHESIA POSTPROCEDURE EVALUATION
Post-Op Assessment Note      CV Status:  Stable    Mental Status:  Alert and awake    Hydration Status:  Euvolemic    PONV Controlled:  Controlled    Airway Patency:  Patent    Post Op Vitals Reviewed: Yes          Staff: Anesthesiologist, CRNA       Comments: Micu RN received report    Accompanying pt to CAT Scan monitored          BP   118/64   Temp      Pulse  72   Resp   16   SpO2   100

## 2018-04-25 NOTE — PROGRESS NOTES
Telestroke Consultation   Allan Mason 47 y o  female MRN: [id number]  Unit/Bed#: ED 06 Encounter: [id number]      Assessment/Plan   Assessment:  Left facial droop, left upper and lower extremity numbness/ weakness    Plan:  Patient will be admitted to the hospital for stroke workup  - Reviewed patient's CT head which per my review did not show any new change  - Given patient had TPA within the last 60 days patient will not be given TPA today  Was informed by ER physician that patient's CT may have new changes noted per radiology  At that time we did not discuss the results of the CTA  I informed the ER doctor that we will see the patient in about an hour or two given I was in the hospital today  Physician Requesting Consult: Adithya Wisdom MD  Reason for Consult / Principal Problem: Stroke Alert  Hx and PE limited by:  none  Patient last known well:  9:30 p m  Tuesday night  Stroke alert called:  6:30 a m  Neurology time of arrival/evaluation:  6:35  TPA Decision: Patient not a TPA candidate  patient had tPA within the last 60 days and intra-arterial thrombectomy        HPI: Garcia Donovan is a 47y o  year old  female who presents with   Left facial droop and left upper and lower extremity weakness  Patient went to bed on Tuesday night at about 9:30 and at that time  did not notice the patient had left facial droop or any significant weakness on the left side other than the residual weakness that she has from her prior stroke  This morning at about 6 o'clock he did notice the patient was having difficulty walking and noticed that left arm was weaker compared to her baseline  Thus EMS was called        Historical Information   Past Medical History:   Diagnosis Date    Acute pain of right knee     last assessed - [id number]    Anemia     Anxiety     Cervical disc disorder with radiculopathy     Chronic pain     Constipation     Hematuria     last assessed - [id number]   [facility] High cholesterol     Lumbar radiculopathy     Lumbar stenosis     Other chronic pain     last assessed - [id number]    Other muscle spasm     last assessed - [id number]    PONV (postoperative nausea and vomiting)     must have premed    Spondylosis of cervical spine     Spondylosis of lumbosacral region     Stroke (cerebrum) (HCC)     right side of brain    Stroke [facility])     Urinary incontinence     Resolved - [id number]     Past Surgical History:   Procedure Laterality Date    BLADDER SURGERY      BLADDER SURGERY  2014    CERVICAL SPINE SURGERY      KNEE ARTHROSCOPY Left     LIPECTOMY      of thigh    LIPOMA RESECTION      NECK SURGERY      MI COLONOSCOPY FLX DX W/COLLJ SPEC WHEN PFRMD N/A 2/10/2017    Procedure: COLONOSCOPY;  Surgeon: Carolee Gunderson MD;  Location: [facility]; Service: Gastroenterology    MI KNEE SCOPE,MED/LAT MENISECTOMY Left 3/21/2016    Procedure: ARTHROSCOPY W/ PARTIAL MEDIAL MENISCECTOMY;  Surgeon: Seven Aldana MD;  Location:  MAIN OR;  Service: Orthopedics    TOTAL ABDOMINAL HYSTERECTOMY      TUBAL LIGATION       Social History   History   Alcohol Use    Yes     Comment: social drinker     History   Drug Use No     History   Smoking Status    Current Every Day Smoker    Packs/day: 0 50    Years: 40 00    Types: Cigarettes   Smokeless Tobacco    Never Used     Comment: one half pack a day       Allergies   Allergen Reactions    Blue Dyes (Parenteral) Anaphylaxis    Gabapentin Anaphylaxis     Anaphylaxis    Nitrofurantoin GI Intolerance     vomiting    Blue Dyes (Parenteral) Rash    Penicillins Rash       Objective   Vitals:Blood pressure 111/63, pulse 73, resp  rate 20, weight 58 7 kg (129 lb 8 oz), SpO2 98 %  ,Body mass index is 24 67 kg/m²  Neurological exam  CNs: she was noted to have left complete facial droop  She was slurring her speech      Motor exam: left arm weakness and numbness, left leg weakness and numbness, rest intact    Lab Results: I have personally reviewed pertinent reports  Imaging Studies: I have personally reviewed pertinent reports  EKG, Pathology, and Other Studies: I have personally reviewed pertinent reports

## 2018-04-25 NOTE — PROGRESS NOTES
04/25/18 0700   Clinical Encounter Type   Visited With Family   Routine Visit Follow-up  (Stroke Alert)

## 2018-04-25 NOTE — DISCHARGE INSTRUCTIONS
Today, you underwent a diagnostic cerebral angiogram under the care of Dr Young Deluca for evaluation of ***  ? The following instructions will help you care for yourself, or be cared for upon your return home today  These are guidelines for your care right after your surgery only  ? Notify Your Doctor or Nurse if you have any of the following:  ? SYMPTOMS OF WOUND INFECTION--   Increased pain in or around the incision   Swelling around the incision  Any drainage from the incision  Incision separates or opens up  Warmth in the tissues around the incision  Redness or tenderness on the skin near the incision   Fever (temperature greater than 101 degrees F)   ? NEUROLOGICAL CHANGES--  Change in alertness  Increased sleepiness   Nausea and vomiting   New onset of numbness or weakness in arms or legs   New problems with your bowels or bladder  New or worse problems with balance or walking  Seizures, new or worsening  ? UNRELIEVED HEADACHE PAIN--  New or increased pain unrelieved with pain medications   Pain associated with nausea and vomiting   Pain associated with other symptoms  ? QUESTIONS OR PROBLEMS--  Any questions or problems that you are unsure about  Wound Care:  Keep Incision Clean and Dry   You may shower daily, but do not soak incision  Pat dry after showering  No tub baths, soaking, swimming for 1 week after angiogram    You do not need to cover the incision  Mild to moderate bruising and tenderness to the site is expected and may last up to 1-2 weeks after your procedure  ?  A closure device was placed at the catheter insertion site  This is MRI compatible  Remove the dressing 24 hours after your procedure  If your groin site is bleeding, apply firm pressure for 10 minutes  Reinforce dressing rather than removing and checking frequently  If continues to bleed through the dressing after 1 hour, contact your neurosurgeon's office  Anticipatory Education:  ?   PAIN MED W/ Acetaminophen (Tylenol)  --IF a prescription for pain medicine has been sent home with you:  --Narcotic pain medication may cause constipation  Be sure to take stool softeners or laxatives while you are on narcotic pain medication  --Do not drive after taking prescription pain medicine  ?  If this medicine is too strong, or no longer necessary, or we did NOT recommend/prescribe oral narcotics, you may take:   - Tylenol Extra-strength/Acetaminophen, 2 tablets every 4-6 hours as needed for mild pain  DO NOT TAKE MORE THAN 4000MG PER DAY from combined sources  NOTE: Remember to eat when taking pain medicines in order to avoid nausea  Watch for constipation  Eat plenty of fruits, vegetables, juices, and drink 6-8 glasses of water each day  Constipation: Stay active and drink at least 6-8 cups of fluid each day to prevent constipation  If you need a laxative or stool softener follow the package directions or consult with your local pharmacists if you have questions  ? After anesthesia, rest for 24 hours  Do not drive, drink alcohol beverages or make any important decisions during this time  General anesthesia may cause sore throat, jaw discomfort or muscle aches  These symptoms can last for one or two days  Activity: Please follow these instructions:  Advance your activity as you can tolerate  You may do light house work; nothing strenuous   You may walk all you want  You may go up and down the steps  Use the railing for support  Do not do excessive bending, straining or heavy lifting for 48 hours after your procedure  Do not drive or return to work until you are instructed   It is normal for your energy level and sleep patterns to change after surgery  Get extra sleep at night and take naps during the day to help you feel less tired  Take rest periods during the day  Complete recovery may take several weeks  ?  You may resume driving after 68-87 hours recovery  You may return to work after 48 hours of recovery     ?  Diet:  Your doctor has recommended that you follow these diet instructions at home  Refer to the patient education materials you received during your hospital stay  If you would like more nutrition counseling, ask your doctor about making an appointment with an outpatient dietitian  Resume your home diet  ? Medications:  Please resume your home medications as instructed  ? Home Supplies and Equipment:  none  Additional Contacts:  ? CONTACTS FOR NEUROSURGERY: You may call your neurosurgeons office if you have questions between 8:30 am and 4:30 pm  You may request to speak to the nurse practitioner who is available Monday through Friday  ?  For off hours or the weekend you may call your neurosurgeon's office to leave a message

## 2018-04-25 NOTE — ED RE-EVALUATION NOTE
Dr Loren Ackerman radiology: 1  Old MCA infarct   2  Increased density M2 segment MCA possible acute CVA    Follow-up discussion with Dr Loren Ackerman at 7:00 a m  -the patient has evidence of recurrent occlusion of the right M1       Yanira Vásquez MD  04/25/18 MD Juan  04/25/18 7714

## 2018-04-26 ENCOUNTER — APPOINTMENT (INPATIENT)
Dept: RADIOLOGY | Facility: HOSPITAL | Age: 53
DRG: 024 | End: 2018-04-26
Payer: COMMERCIAL

## 2018-04-26 PROBLEM — Z86.73 HISTORY OF ISCHEMIC RIGHT MCA STROKE: Status: ACTIVE | Noted: 2018-04-26

## 2018-04-26 LAB
ANION GAP SERPL CALCULATED.3IONS-SCNC: 8 MMOL/L (ref 4–13)
BUN SERPL-MCNC: 7 MG/DL (ref 5–25)
CALCIUM SERPL-MCNC: 8.2 MG/DL (ref 8.3–10.1)
CHLORIDE SERPL-SCNC: 114 MMOL/L (ref 100–108)
CO2 SERPL-SCNC: 23 MMOL/L (ref 21–32)
CREAT SERPL-MCNC: 0.69 MG/DL (ref 0.6–1.3)
CRP SERPL QL: <3 MG/L
ERYTHROCYTE [DISTWIDTH] IN BLOOD BY AUTOMATED COUNT: 13.9 % (ref 11.6–15.1)
ERYTHROCYTE [SEDIMENTATION RATE] IN BLOOD: 14 MM/HOUR (ref 0–20)
GFR SERPL CREATININE-BSD FRML MDRD: 100 ML/MIN/1.73SQ M
GLUCOSE SERPL-MCNC: 91 MG/DL (ref 65–140)
GLUCOSE SERPL-MCNC: 93 MG/DL (ref 65–140)
HCT VFR BLD AUTO: 34.5 % (ref 34.8–46.1)
HGB BLD-MCNC: 11.5 G/DL (ref 11.5–15.4)
MAGNESIUM SERPL-MCNC: 1.8 MG/DL (ref 1.6–2.6)
MCH RBC QN AUTO: 32.2 PG (ref 26.8–34.3)
MCHC RBC AUTO-ENTMCNC: 33.3 G/DL (ref 31.4–37.4)
MCV RBC AUTO: 97 FL (ref 82–98)
PHOSPHATE SERPL-MCNC: 3.5 MG/DL (ref 2.7–4.5)
PLATELET # BLD AUTO: 242 THOUSANDS/UL (ref 149–390)
PMV BLD AUTO: 8.5 FL (ref 8.9–12.7)
POTASSIUM SERPL-SCNC: 3.9 MMOL/L (ref 3.5–5.3)
RBC # BLD AUTO: 3.57 MILLION/UL (ref 3.81–5.12)
SODIUM SERPL-SCNC: 145 MMOL/L (ref 136–145)
WBC # BLD AUTO: 6.42 THOUSAND/UL (ref 4.31–10.16)

## 2018-04-26 PROCEDURE — 99232 SBSQ HOSP IP/OBS MODERATE 35: CPT | Performed by: RADIOLOGY

## 2018-04-26 PROCEDURE — G8978 MOBILITY CURRENT STATUS: HCPCS

## 2018-04-26 PROCEDURE — G8988 SELF CARE GOAL STATUS: HCPCS

## 2018-04-26 PROCEDURE — 71260 CT THORAX DX C+: CPT

## 2018-04-26 PROCEDURE — 86140 C-REACTIVE PROTEIN: CPT | Performed by: PHYSICIAN ASSISTANT

## 2018-04-26 PROCEDURE — G8979 MOBILITY GOAL STATUS: HCPCS

## 2018-04-26 PROCEDURE — 99253 IP/OBS CNSLTJ NEW/EST LOW 45: CPT | Performed by: PHYSICAL MEDICINE & REHABILITATION

## 2018-04-26 PROCEDURE — 80048 BASIC METABOLIC PNL TOTAL CA: CPT | Performed by: PHYSICIAN ASSISTANT

## 2018-04-26 PROCEDURE — 97162 PT EVAL MOD COMPLEX 30 MIN: CPT

## 2018-04-26 PROCEDURE — 84100 ASSAY OF PHOSPHORUS: CPT | Performed by: PHYSICIAN ASSISTANT

## 2018-04-26 PROCEDURE — G8987 SELF CARE CURRENT STATUS: HCPCS

## 2018-04-26 PROCEDURE — 85652 RBC SED RATE AUTOMATED: CPT | Performed by: PHYSICIAN ASSISTANT

## 2018-04-26 PROCEDURE — G8989 SELF CARE D/C STATUS: HCPCS

## 2018-04-26 PROCEDURE — 97166 OT EVAL MOD COMPLEX 45 MIN: CPT

## 2018-04-26 PROCEDURE — 74177 CT ABD & PELVIS W/CONTRAST: CPT

## 2018-04-26 PROCEDURE — 99233 SBSQ HOSP IP/OBS HIGH 50: CPT | Performed by: PSYCHIATRY & NEUROLOGY

## 2018-04-26 PROCEDURE — G8980 MOBILITY D/C STATUS: HCPCS

## 2018-04-26 PROCEDURE — 85027 COMPLETE CBC AUTOMATED: CPT | Performed by: PHYSICIAN ASSISTANT

## 2018-04-26 PROCEDURE — 83735 ASSAY OF MAGNESIUM: CPT | Performed by: PHYSICIAN ASSISTANT

## 2018-04-26 RX ORDER — POTASSIUM CHLORIDE 20 MEQ/1
20 TABLET, EXTENDED RELEASE ORAL ONCE
Status: COMPLETED | OUTPATIENT
Start: 2018-04-26 | End: 2018-04-26

## 2018-04-26 RX ORDER — MAGNESIUM SULFATE HEPTAHYDRATE 40 MG/ML
2 INJECTION, SOLUTION INTRAVENOUS ONCE
Status: COMPLETED | OUTPATIENT
Start: 2018-04-26 | End: 2018-04-26

## 2018-04-26 RX ADMIN — ACETAMINOPHEN 650 MG: 325 TABLET, FILM COATED ORAL at 10:47

## 2018-04-26 RX ADMIN — HEPARIN SODIUM 5000 UNITS: 5000 INJECTION, SOLUTION INTRAVENOUS; SUBCUTANEOUS at 06:23

## 2018-04-26 RX ADMIN — ACETAMINOPHEN 650 MG: 325 TABLET, FILM COATED ORAL at 17:15

## 2018-04-26 RX ADMIN — IOHEXOL 100 ML: 350 INJECTION, SOLUTION INTRAVENOUS at 14:25

## 2018-04-26 RX ADMIN — POTASSIUM CHLORIDE 20 MEQ: 1500 TABLET, EXTENDED RELEASE ORAL at 13:01

## 2018-04-26 RX ADMIN — PREGABALIN 50 MG: 50 CAPSULE ORAL at 17:15

## 2018-04-26 RX ADMIN — PREGABALIN 50 MG: 50 CAPSULE ORAL at 08:55

## 2018-04-26 RX ADMIN — HEPARIN SODIUM 5000 UNITS: 5000 INJECTION, SOLUTION INTRAVENOUS; SUBCUTANEOUS at 21:16

## 2018-04-26 RX ADMIN — MAGNESIUM SULFATE HEPTAHYDRATE 2 G: 40 INJECTION, SOLUTION INTRAVENOUS at 14:25

## 2018-04-26 RX ADMIN — ACETAMINOPHEN 650 MG: 325 TABLET, FILM COATED ORAL at 03:50

## 2018-04-26 RX ADMIN — SODIUM CHLORIDE 1000 ML: 0.9 INJECTION, SOLUTION INTRAVENOUS at 05:42

## 2018-04-26 RX ADMIN — HEPARIN SODIUM 5000 UNITS: 5000 INJECTION, SOLUTION INTRAVENOUS; SUBCUTANEOUS at 13:02

## 2018-04-26 NOTE — PROGRESS NOTES
Progress Note - Critical Care   Corazon Mason 47 y o  female MRN: [id number]  Unit/Bed#: ICU 09 Encounter: [id number]    Attending Physician: Shawn Hardy MD      ______________________________________________________________________  Assessment and Plan:   Principal Problem:    Acute ischemic right MCA stroke [facility])  Resolved Problems:    * No resolved hospital problems  *        Neuro:   -Acute ischemic R MCA stroke: recurrent   -S/P endovascular retrieval via R groin  Completed 3 hours supine bedrest   -Passed speech eval   -Unclear etiology  Was undergoing cardiac workup for source of her last stroke (loop recorder in place)  Per pt, no findings yet   -NSGY following   -Re-evaluate antiplatelet regimen  On ASA at home   -Home pain meds reconciled  On Lyrica only for chronic back and neck pain   -Keep in ICU 24H post thrombectomy   -Continue critical neuro checks   -Tylenol prn for pain, given at 0350    CV:   -Has been mildly hypotensive and bradycardic (HR down to the mid 40s at times)  This appears to be consistent with her previous stroke admission in February  Required Levophed and then midodrine  -Overnight, received 2 fluid boluses for this reason  Consider adding midodrine today  -JONATAN done last admission   Won't repeat TTE this admission  -Home pravastatin restarted      Pulm:   -No acute issues  -Encourage tobacco cessation  -SaO2 goal >90%  -IS      GI:   -No acute issues  -Zofran prn  -Passed speech eval, regular diet      :   -No acute issues  -I/O  -BUN/Cr 7/0 69  -5L in, 1 8L out  -UO 1 3 cc/kg/hour      F/E/N:     Fluids: No maintenance fluids currently, Received 1L Isolyte around 12pm yesterday, then 2L NSS -- one at 2021 and the second at 0542 today    Electrolytes:  Na 145  K 3 9 -- replete  Cl 114 -- use Isolyte for further fluid needs  Ca 8 2  Phos 3 5  Mg 1 8 -- replete    Nutrition: regular diet    ID:   -No infectious concerns at this time  -WBC 6,000  -Afebrile    Heme:   -Hgb 11 5  -Platelets 092,778  -Start heparin ppx today  -Start ASA tomorrow (but will need re-evaluation of antithrombotic therapy)  -SCDs    Endo:   -No acute issues  -BG 93 this AM     Msk/Skin:   -PT/OT  -Out of bed  -On Lyrica for chronic neck and back pain    Disposition: ICU  Consider transfer to [facility] vs med surg after 24 H post thrombectomy (11am today)    Code Status: Level 3 - DNAR and DNI    Counseling / Coordination of Care  Total Critical Care time spent 30 minutes excluding procedures, teaching and family updates  ______________________________________________________________________    Chief Complaint: None    24 Hour Events: 48 yo female presented yesterday with slurred speech, L sided facial droop, L arm/leg weakness  R MCA stroke in February, so no tPA, but had thrombectomy yesterday morning  Arrived to ICU in stable condition with slow improvement in neuro function  ______________________________________________________________________    Physical Exam:     Constitutional: Sleeping, wakes easily, NAD    HENT: NCAT  Pupils are 4mm and reactive bilat  EOM intact  CV: RRR  No murmur  NSR on tele  Pulm: CTAB anterior    GI: soft, NT    : deferred    MS: no edema    Neuro: A&O x 4  L facial droop, otherwise, CN II-XII are grossly intact  Speech is clear  Normal ROM and strength in all four extremities, except fine motor function of the L hand   Difficult to fully assess as she has a radial a-line in the L wrist     Skin: warm and dry    ______________________________________________________________________  Vitals:    18 0400 18 0500 18 0600 18 0800   BP: 97/52      Pulse: (!) 46 56 (!) 46 (!) 48   Resp: 12 13 15 (!) 23   Temp: 98 4 °F (36 9 °C)      TempSrc: Oral      SpO2: 98% 98% 98% 98%   Weight:       Height:           Temperature:   Temp (24hrs), Av 2 °F (36 8 °C), Min:98 °F (36 7 °C), Max:98 4 °F (36 9 °C)    Current Temperature: 98 4 °F (36 9 °C)  Weights:   IBW: 54 7 kg    Body mass index is 21 91 kg/m²  Weight (last 2 days)     Date/Time   Weight    04/25/18 1140  57 9 (127 65)    04/25/18 0630  58 7 (129 5)            Hemodynamic Monitoring:  N/A     Non-Invasive/Invasive Ventilation Settings:  Respiratory    Lab Data (Last 4 hours)    None         O2/Vent Data (Last 4 hours)    None              No results found for: PHART, EDN2MZQ, PO2ART, BOS7OEZ, A9AYYWYZ, BEART, SOURCE  SpO2: SpO2: 98 %  Intake and Outputs:  I/O       04/24 0701 - 04/25 0700 04/25 0701 - 04/26 0700 04/26 0701 - 04/27 0700    P  O   240     I V  (mL/kg)  2767 5 (47 8)     IV Piggyback  2000     Total Intake(mL/kg)  5007 5 (86 5)     Urine (mL/kg/hr)  1875 (1 3)     Total Output   1875      Net   +3132 5                 UOP: 1 3 cc/kg/hour   Nutrition:        Diet Orders            Start     Ordered    04/25/18 1535  Diet Regular; Regular House; Regular House  Diet effective now     Question Answer Comment   Diet Type Regular    Regular Regular House    Other Restriction(s): Regular House    RD to adjust diet per protocol?  No        04/25/18 1536        Labs:     Results from last 7 days  Lab Units 04/26/18  0544 04/25/18  0641 04/25/18  0635   WBC Thousand/uL 6 42  --  7 53   HEMOGLOBIN g/dL 11 5  --  13 5   I STAT HEMOGLOBIN g/dl  --  14 3  --    HEMATOCRIT % 34 5*  --  41 0   PLATELETS Thousands/uL 242  --  308       Results from last 7 days  Lab Units 04/26/18  0544 04/25/18  0641 04/25/18  0635   SODIUM mmol/L 145  --  142   POTASSIUM mmol/L 3 9  --  3 9   CHLORIDE mmol/L 114*  --  109*   CO2 mmol/L 23  --  26   BUN mg/dL 7  --  10   CREATININE mg/dL 0 69  --  0 89   CALCIUM mg/dL 8 2*  --  9 5   GLUCOSE RANDOM mg/dL 93  --  99   GLUCOSE, ISTAT mg/dl  --  102  --        Results from last 7 days  Lab Units 04/26/18  0544   MAGNESIUM mg/dL 1 8     Lab Results   Component Value Date    PHOS 3 5 04/26/2018    PHOS 3 8 02/10/2018        Results from last 7 days  Lab Units 04/25/18  0635   INR  0 97 PTT seconds 26       0  Lab Value Date/Time   TROPONINI <0 02 02/14/2018 1450         ABG:No results found for: PHART, NTV6BNH, PO2ART, MUL6VDH, I7EZAWIL, BEART, SOURCE  Imaging: MRI last evening pending I have personally reviewed pertinent reports  EKG: None new  Micro:  No results found for: Uzair Quiroz, SPUTUMCULTUR  Allergies: Allergies   Allergen Reactions    Blue Dyes (Parenteral) Anaphylaxis    Gabapentin Anaphylaxis     Anaphylaxis    Nitrofurantoin GI Intolerance     vomiting    Blue Dyes (Parenteral) Rash    Penicillins Rash     Medications:   Scheduled Meds:  Current Facility-Administered Medications:  acetaminophen 650 mg Oral Q6H PRN Dafne Norwood PA-C   [START ON 4/27/2018] aspirin 81 mg Oral Daily Caryn Norwood PA-C   heparin (porcine) 5,000 Units Subcutaneous Q8H [address] Caryn Norwood PA-C   pravastatin 40 mg Oral Daily With [employer] Norwood PA-C   pregabalin 50 mg Oral BID Anthony Becerra MD     Continuous Infusions:   PRN Meds:    acetaminophen 650 mg Q6H PRN     VTE Pharmacologic Prophylaxis: Pharmacologic VTE Prophylaxis contraindicated due to post thrombectomy  VTE Mechanical Prophylaxis: sequential compression device  Invasive lines and devices: Invasive Devices     Peripheral Intravenous Line            Peripheral IV 04/25/18 Left Hand 1 day    Peripheral IV 04/25/18 Right Antecubital 1 day          Arterial Line            Arterial Line 04/25/18 Left Radial less than 1 day                     Portions of the record may have been created with voice recognition software  Occasional wrong word or "sound a like" substitutions may have occurred due to the inherent limitations of voice recognition software  Read the chart carefully and recognize, using context, where substitutions have occurred      Edward Dash PA-C

## 2018-04-26 NOTE — PROGRESS NOTES
Progress Note - Neurology   Shon Mason 47 y o  female MRN: [id number]  Unit/Bed#: ICU 09 Encounter: [id number]    Assessment/Plan:   3 47year old female admitted with L sided weakness, L facial droop, dysarthria and found to have recurrent proximal R MCA occlusion now s/p thrombectomy   - Symptoms significantly improved, patient does remain with L facial droop and distal LUE weakness but otherwise no abnormalities noted  - Continue on stroke pathway  - Continue on ASA 81 mg QD and Pravastatin 40 mg QD (patient notes intolerance to atorvastatin)  - Loop interrogated and no arrhythmia noted per chart review  - Etiology for stroke unclear, consider possible underlying malignancy  Will check CTA C/A/P with contrast to evaluate for underlying malignancy  - Check ESR and CRP     - Recommend consider start NOAC tomorrow and recheck CTH in 1-2 days after this  - Do not need to repeat echocardiogram at this time as patient recently underwent ENOC during February 2018 admission for stroke  Enoc showed ejection fraction of 60%, no regional wall motion abnormalities, no thrombus identified in the left atrial appendage, no ASD or PFO identified  It did show small, non mobile calcified atheroma in the proximal ascending aorta  Hemoglobin A1c also reviewed from that admission noted to be 5 5, total cholesterol 235, triglycerides 89, HDL 78,   Do not need to repeat that at this time    - PT/OT/Speech therapy    - Monitor exam and notify with changes  2  Tobacco abuse   - Encourage tobacco cessation  Subjective:   Sarkis Quinn is a 47year old female with recent R MCA infarction in February 2018 admitted with onset of L facial droop, slurred speech and L sided weakness  She was found to have recurrent proximal R MCA occlusion and was taken to IR for thrombectomy  She was initially TICI 0 and following procedure was TICI 3   She underwent MRI brain last night which showed evolving areas of acute infarction within right temporal and parietal lobes  Patient notes that she is feeling significant improvement in her symptoms  She notes that she does have some mild difficulty with her left hand, but she feels that this is related to IV placement  She notes that she is tolerating a regular diet and having no issues with swallowing  She does complain of headache this morning as well as continued right eye pain and will be receiving Tylenol shortly for this  ROS:  History obtained from the patient  General ROS: negative for - chills, fatigue or fever  Ophthalmic ROS: negative for - blurry vision, decreased vision or double vision  Respiratory ROS: negative for - shortness of breath  Cardiovascular ROS: negative for - chest pain  Gastrointestinal ROS: negative for - abdominal pain, appetite loss or nausea/vomiting  Musculoskeletal ROS: negative for - muscular weakness  Neurological ROS: positive for - headaches  negative for - dizziness, numbness/tingling, seizures, speech problems, visual changes or weakness    Medications  Scheduled Meds:  Current Facility-Administered Medications:  acetaminophen 650 mg Oral Q6H PRN Burt Norwood PA-C   [START ON 4/27/2018] aspirin 81 mg Oral Daily Caryn Norwood PA-C   heparin (porcine) 5,000 Units Subcutaneous Q8H [address] Caryn Norwood PA-C   pravastatin 40 mg Oral Daily With [employer] Norwood PA-C   pregabalin 50 mg Oral BID Flaquita Melissa MD     Continuous Infusions:   PRN Meds:   acetaminophen    Vitals: Blood pressure 97/52, pulse (!) 48, temperature 98 4 °F (36 9 °C), temperature source Oral, resp  rate (!) 23, height 5' 4" (1 626 m), weight 57 9 kg (127 lb 10 3 oz), SpO2 98 %  ,Body mass index is 21 91 kg/m²      Physical Exam: BP 97/52   Pulse (!) 48   Temp 98 4 °F (36 9 °C) (Oral)   Resp (!) 23   Ht 5' 4" (1 626 m)   Wt 57 9 kg (127 lb 10 3 oz)   LMP  (LMP Unknown)   SpO2 98%   BMI 21 91 kg/m²   General appearance: alert and oriented, in no acute distress  Head: Normocephalic, without obvious abnormality, atraumatic  Eyes: negative findings: lids and lashes normal, conjunctivae and sclerae normal, pupils equal, round, reactive to light and accomodation and visual fields full to confrontation  Lungs: clear to auscultation bilaterally  Heart: regular rate and rhythm  Abdomen: soft, non-tender; bowel sounds normal; no masses,  no organomegaly  Extremities: extremities normal, warm and well-perfused; no cyanosis, clubbing, or edema  Skin: Skin color, texture, turgor normal  No rashes or lesions  Neurologic: Mental status: Alert, oriented, thought content appropriate, slightly flat affect  Cranial nerves: II: visual field normal, II: pupils equal, round, reactive to light and accommodation, III,VII: ptosis no ptosis noted, III,IV,VI: extraocular muscles extra-ocular motions intact, V: facial light touch sensation normal bilaterally, VII: upper facial muscle function normal bilaterally, VII: lower facial muscle function normal bilaterally, XII: tongue strength normal  Sensory: normal, Grossly intact to crude touch  Motor: Motor strength 5/5 B/L UE and LE except distal LUE 4+/5  Coordination: finger to nose normal bilaterally, slightly slower with LUE      Labs  Recent Results (from the past 24 hour(s))   Fingerstick Glucose (POCT)    Collection Time: 04/25/18  3:02 PM   Result Value Ref Range    POC Glucose 91 65 - 140 mg/dl   CBC    Collection Time: 04/26/18  5:44 AM   Result Value Ref Range    WBC 6 42 4 31 - 10 16 Thousand/uL    RBC 3 57 (L) 3 81 - 5 12 Million/uL    Hemoglobin 11 5 11 5 - 15 4 g/dL    Hematocrit 34 5 (L) 34 8 - 46 1 %    MCV 97 82 - 98 fL    MCH 32 2 26 8 - 34 3 pg    MCHC 33 3 31 4 - 37 4 g/dL    RDW 13 9 11 6 - 15 1 %    Platelets 281 174 - 612 Thousands/uL    MPV 8 5 (L) 8 9 - 12 7 fL   Basic metabolic panel    Collection Time: 04/26/18  5:44 AM   Result Value Ref Range    Sodium 145 136 - 145 mmol/L Potassium 3 9 3 5 - 5 3 mmol/L    Chloride 114 (H) 100 - 108 mmol/L    CO2 23 21 - 32 mmol/L    Anion Gap 8 4 - 13 mmol/L    BUN 7 5 - 25 mg/dL    Creatinine 0 69 0 60 - 1 30 mg/dL    Glucose 93 65 - 140 mg/dL    Calcium 8 2 (L) 8 3 - 10 1 mg/dL    eGFR 100 ml/min/1 73sq m   Magnesium    Collection Time: 04/26/18  5:44 AM   Result Value Ref Range    Magnesium 1 8 1 6 - 2 6 mg/dL   Phosphorus    Collection Time: 04/26/18  5:44 AM   Result Value Ref Range    Phosphorus 3 5 2 7 - 4 5 mg/dL     Imaging   Personally reviewed images and reports in PACs  MRI brain without contrast- Evolving areas of acute infarction within the right temporal and parietal lobes, consistent with the CTA findings of R MCA occlusion  Areas of subacute infarction right basal ganglia  Sequela of chronic right MCA territory infarction  VTE Prophylaxis: Heparin    Counseling / Coordination of Care  Total Critical Care time spent 25 minutes excluding procedures, teaching and family updates

## 2018-04-26 NOTE — PROGRESS NOTES
04/26/18 1300   Spiritual Beliefs/Perceptions   Support Systems Spouse/significant other;Children;Family members   Coping Responses   Patient Coping Open/discussion   Plan of Care   Comments Established relationship of care and support; explored spiritual and emotional resources; explored sources of strength and encouragement; provided active listening; provided support and encouragement; provided education about pastoral care   Assessment Completed by: Unit visit

## 2018-04-26 NOTE — CONSULTS
PHYSICAL MEDICINE AND REHABILITATION CONSULT NOTE  Ayde Mason 47 y o  female MRN: [id number]  Unit/Bed#: ICU 09 Encounter: [id number]    Requested by (Physician/Service): Ming Landaverde MD  Reason for Consultation:  Assessment of rehabilitation needs    Chief complaint: distal upper extremity left sided weakness     HPI: Beatrice Desai is a 47 y o  female with a PMH of recent right MCA d/c from [facility] in February who presented with acute onset of left sided weakness and left facial droop upon waking up on 4/25/2018  Stroke alert was activated and she was not a tPA candidate due to recent stroke and unclear time of onset  She was taking ASA daily and had been compliant  CT showed increased density in the M1/M2 segment of the MCA suspicious for acute thrombus  CTA demonstrated recurrent right MCA occlusion and she was taken to IR for a cerebral angiogram and mechanical thrombectomy with successful clot retrieval from the M2  She was admitted to ICU for close observation  Loop recorder was interrogated and no arrhythmia was noted  MRI was done and confirmed evolving areas of acute infarction within the right temporal and parietal lobes as well as areas of subacute infarction in the right basal ganglia and chronic right MCA infarction  She will need a CTA of the C/A/P with contrast to evaluate for underlying malignancy per neurology note        Subjective: The patient was seen in the ICU  She report improvement in her strength in the distal LLE with some residual numbness which she attributes to IV placement  She reports that she was going to outpatient OT/PT prior to admission and had been doing well at home  Her  is home to assist her as needed and transport her to appointments  She denies any chest pain, SOB, nausea, vomiting or dizziness        Review of Systems: A 10-point review of systems was performed   Negative except as listed above      Assessment:   - Acute right temporal and parietal lob infarctions  - Acute thrombus M1/M12 s/p thrombectomy for clot retrieval  - Subacute right basal ganglia infaction  - Chronic right MCA CVA  - Tobacco abuse     PT OT SLP   Pending evaluation Pending evaluation Summary   Swallow Summary: Pt presents with Mild oral dysphagia due to pocketing on L, which pt was able to clear w/ lingual sweeps  No overt s/s aspiration w/ thin liquids by cup and straw  Recommendations: regular diet and thin liquids     Recommended Form of Meds: as tolerated      Aspiration precautions and compensatory swallowing strategies: upright posture and lingual sweeps to L sulcus    Results Reviewed with: patient, RN, MD and family     Treatment Recommendations: will follow up x 1-2 as needed for diet tolerance and use of strategies to clear L sulcus     Plan:    - Chart reviewed  - Labs reviewed  - Imaging reviewed  - Continue PT/OT while in hospital  - Will need competed therapy evaluations to assess current function but suspect patient is close to baseline  If so recommend home with continued outpatient services  Thank you for allowing the PM&R service to participate in the care of this patient  We will continue to follow [facility] progress with you  Please do not hesitate to call with questions or concerns    Physical Exam:  General: alert, no apparent distress, cooperative and comfortable  Head: Normal, normocephalic, atraumatic  Eye: Normal external eye, conjunctiva, lids   Ears: Normal external ears  Nose: Normal external nose, mucus membranes  Pharynx: Dental Hygiene adequate  Normal buccal mucosa  Normal pharynx  Neck / Thyroid: Supple, no masses, nodes, nodules or enlargement    Pulmonary: clear to auscultation bilaterally and no crackles, no wheezes, chest expansion normal  Cardiovascular: normal rate, regular rhythm, normal S1, S2, no murmurs, rubs, clicks or gallops  Abdomen: soft, nontender, nondistended, no masses or organomegaly  Skin/Extremity: no rashes, no erythema, no peripheral edema  Neurologic: distal LUE weakness, slight left facial weakness  Psych: Flat affect, alert and oriented to person, place and time   Musculoskeletal - Strength:   Right  Left  Site  Right  Left  Site    5 5  S Ab: Shoulder Abductors  5  5  HF: Hip Flexors    5 5  EF: Elbow Flexors  5 5 KF: Knee Flexors    5  5  EE: Elbow Extensors  5  5  KE: Knee Extensors    5  3+ WE: Wrist Extensors  5  5  DR: Dorsi Flexors    5  3+ FF: Finger Flexors  5  5  PF: Plantar Flexors    5  3+ HI: Hand Intrinsics  5  5  EHL: Extensor Hallucis Longus   [facility] Stroke Scale (NIHSS)          1a  Level of  Consciousness (LOC) 0 = Alert, keenly responsive  1 = Not alert, but arousable by minor        stimulation  2 = Not alert, required repeated stimulation to         attend  3 = Responds only with reflex motor or totally         unresponsive       1a   0   1b  LOC Questions  Asked to say month and his/her age 0 = Answers both questions correctly  1 = Answers one question correctly        (dysarthric, intubated)  2 = Answers neither question correctly        (aphasic, stupor)  1b   0   1c  LOC Commands  Asked to open & close eyes, then  & release with non-affected hand  0 = Performs both tasks correctly  1 = Performs one task correctly  2 = Performs neither task correctly  1c   0     2  Best Gaze  Asked to follow with eyes through horizontal plane  0 = Normal  1 = Partial gaze palsy  2 = Forced deviation or total gaze paresis  2   0   3  Visual  Visual fields (quadrants) tested with finger counting or visual threat  0 = No visual loss  1 = Partial hemianopia (extinction)  2 = Complete hemianopia  3 = Bilateral hemianopia (including         blindness)  3   1   4  Facial Palsy  Asked to show teeth & raise eyebrows  0 = Normal symmetrical movement  1 = Minor paralysis  2 = Partial paralysis (total/near total         paralysis of lower face)    3 = Complete paralysis of one or both         sides (upper & lower)  4   1   5  Motor Arm  Asked to extend arms (palm down) 90º (if sitting) or 45º (if supine) & hold for 10 seconds  0 = No drift; arm stays at 90º/45º for full 10        seconds  1 = Drift; arm drifts down but does not hit         bed or other support  2 = Some effort against gravity; drifts down         to bed or support  3 = No effort against gravity: arm falls to         bed or support  4 = No movement  9 = Amputation, joint fusion  5a  (Left)       0      5b  (Right)        0    6  Motor Leg  While supine, asked to hold leg at 30º for 5 seconds  0 = No drift; leg stays at 30º for full 5        seconds  1 = Drift; leg drifts down but does not hit         the bed or other support  2 = Some effort against gravity; drifts down         to bed or support  3 = No effort against gravity; leg falls to         bed or support  4 = No movement  9 = Amputation, joint fusion  6a   (Left)       0        6b  (Right)       0   7  Limb Ataxia  Finger - nose & heel shin tests on both sides  0 = Absent  1 = Present in one limb  2 = Present in two limbs  7   0   8  Sensory  Sensation or grimace to pin prick or withdrawal from noxious stimuli in obtunded or aphasic patient   0 = Normal; no sensory loss  1 = Mild / moderate sensory loss; may be        dulled / "not as sharp"  2 = Severe / total sensory loss; coma     8   0   9  Best Language  Asked to describe a picture, name objects & read simple words  (See NIHSS language tools)  0 = No aphasia; normal  1 = Mild / moderate aphasia; some loss of        fluency / comprehension without        limitation of expression of ideas (can        identify picture from patient's        responses)  2 = Severe aphasia (cannot identify         pictures from responses)  3 = Mute; global aphasia; no usable        speech; cannot follow simple        commands  9   0   10    Dysarthria   0 = Normal   1 = Mild / moderate; slurs some words;         can be understood  2 = Severe; so slurred as to be         unintelligible; mute;anarthric     9 = Intubated or other physical barrier  10   0   11  Extinction & Inattention  Look at Visual (from #3) and double simultaneous tactile     0 = No abnormality  1 = Inattention or extinction in one sensory         modality  2 = Profound claire inattention or        inattention to more than one modality;        does not recognize own hand; orients        to only one side of space  11   0     Complete NIHSS Score (0-42): Arlene Molina Lives with: lives with their spouse  She lives in [facility]) single family home  The living area: can live on one level  Equipment in home: None  There 1 step to enter the home  Patient/family's goals: Return to previous home/apartment  The patient will have 24 hour ARC Supervision/physical assistance: supervision/physical assistance available upon discharge      Allergies:    Allergies   Allergen Reactions    Blue Dyes (Parenteral) Anaphylaxis    Gabapentin Anaphylaxis     Anaphylaxis    Nitrofurantoin GI Intolerance     vomiting    Blue Dyes (Parenteral) Rash    Penicillins Rash        Past Medical History:   Past Surgical History:   Family History:   Social history:   Past Medical History:   Diagnosis Date    Acute pain of right knee     last assessed - [id number]    Anemia     Anxiety     Cervical disc disorder with radiculopathy     Chronic pain     Constipation     CVA [facility]cerebral vascular accident) [facility] [address] [facility]     H/O ischemic right MCA stroke     Hematuria     last assessed - [id number]    High cholesterol     Lumbar radiculopathy     Lumbar stenosis     Other chronic pain     last assessed - [id number]    Other muscle spasm     last assessed - [id number]    PONV (postoperative nausea and vomiting)     must have premed    Spondylosis of cervical spine     Spondylosis of lumbosacral region  Stroke [facility])     Urinary incontinence     Resolved - [id number]    Past Surgical History:   Procedure Laterality Date    BLADDER SURGERY      BLADDER SURGERY  2014    CERVICAL SPINE SURGERY      KNEE ARTHROSCOPY Left     LIPECTOMY      of thigh    LIPOMA RESECTION      NECK SURGERY      MO COLONOSCOPY FLX DX W/COLLJ SPEC WHEN PFRMD N/A 2/10/2017    Procedure: COLONOSCOPY;  Surgeon: Shalini Barbosa MD;  [facility] GI LAB;   Service: Gastroenterology    MO KNEE SCOPE,MED/LAT MENISECTOMY Left 3/21/2016    Procedure: ARTHROSCOPY W/ PARTIAL MEDIAL MENISCECTOMY;  Surgeon: Elio Mcpherson MD;  Location:  MAIN OR;  Service: Orthopedics    TOTAL ABDOMINAL HYSTERECTOMY      TUBAL LIGATION       Family History   Problem Relation Age of Onset   Lacy Huey Stroke Mother      46s    Cancer Mother     Hypertension Mother     Pulmonary embolism Other      In mid 35s    Stroke Sister      46s    Stroke Brother      46s    Prostate cancer Father     Cancer Daughter     Stroke Family       Social History     Social History    Marital status: /Civil Union     Spouse name: N/A    Number of children: N/A    Years of education: N/A     Social History Main Topics    Smoking status: Current Every Day Smoker     Packs/day: 0 50     Years: 40 00     Types: Cigarettes    Smokeless tobacco: Never Used      Comment: one half pack a day    Alcohol use Yes      Comment: social drinker    Drug use: No    Sexual activity: Not Asked     Other Topics Concern    None     Social History Narrative    ** Merged History Encounter **               Vital Signs: Reviewed    Temp:  [98 °F (36 7 °C)-98 4 °F (36 9 °C)] 98 4 °F (36 9 °C)  HR:  [44-68] 54  Resp:  [10-30] 14  BP: ()/(44-54) 136/54  Arterial Line BP: ()/(41-64) 120/56   Intake/Output Summary (Last 24 hours) at 04/26/18 0907  Last data filed at 04/26/18 0634   Gross per 24 hour   Intake           5007 5 ml   Output             1875 ml   Net 3132 5 ml        Laboratory: Reviewed    Lab Results   Component Value Date    HGB 11 5 04/26/2018    HCT 34 5 (L) 04/26/2018    WBC 6 42 04/26/2018     Lab Results   Component Value Date    BUN 7 04/26/2018     04/26/2018    K 3 9 04/26/2018     (H) 04/26/2018    GLUCOSE 93 04/26/2018    GLUCOSE 102 04/25/2018    CREATININE 0 69 04/26/2018     Lab Results   Component Value Date    PROTIME 12 9 04/25/2018    INR 0 97 04/25/2018        Imaging: Reviewed  X-ray Chest 1 View Portable    Result Date: 4/25/2018  Impression: No acute cardiopulmonary disease  Workstation performed: [id number]     Ct Head Wo Contrast    Result Date: 4/26/2018  Impression: 1  No evidence of acute intracranial hemorrhage, status post thrombolysis  2   Cerebral atrophy with chronic small vessel ischemic change  Workstation performed: [id number]     Mri Brain Wo Contrast    Result Date: 4/26/2018  Impression: 1  Evolving areas of acute infarction within the right temporal and parietal lobes, consistent with the CTA findings of right MCA occlusion  2   Areas of subacute infarction right basal ganglia  3   Sequela of chronic right MCA territory infarction  Workstation performed: [id number]     Ct Stroke Alert Brain    Result Date: 4/25/2018  Impression: 1  Increased density in the distal M1/M2 segment of the right middle cerebral artery, suspicious for acute thrombus  2   No acute intraparenchymal hemorrhage  3   Chronic right MCA territory infarction  4   Cerebral atrophy with chronic small vessel ischemic change  Findings were directly discussed with Tom Pichardo on 4/25/2018 6:49 AM  Workstation performed: [id number]     Cta Stroke Alert (head/neck)    Addendum Date: 4/25/2018    ADDENDUM: ADDENDUM There is a voice recognition software related error in the impression of the report   A phrase which reads " Findings were directly discussed with Dr Omkar Burgos on April 25, 2018 at 5:57 AM  " should read, " Findings were directly discussed with Dr Salud Casas on April 25, 2018 at 6:57 AM  "  It is uncertain as to how this time discrepancy occurred, given the fields are auto populated at time of dictation and there is no manual input of any information such as ordering physician, time or date  The initial noncontrast CT scan of the brain has  the correct time of dictation  I personally discussed this study with Kraig Blanchard from the stroke team on 4/25/2018 at 11:00 AM      Result Date: 4/25/2018  Impression: 1  Recurrent proximal right MCA occlusion  There is reconstitution of distal M2 and M3 segments via collateral flow from both the anterior and posterior circulation  2   No evidence of acute intracranial hemorrhage  3   Chronic right MCA infarction  4   Cerebral atrophy with chronic small vessel ischemic change   Findings were directly discussed with Dr Salud Casas on April 25, 2018 at 5:57 AM  Workstation performed: [id number]       Current Medications:     Current Facility-Administered Medications:     acetaminophen (TYLENOL) tablet 650 mg, 650 mg, Oral, Q6H PRN, Frank Norwood PA-C, 650 mg at 04/26/18 0350    [START ON 4/27/2018] aspirin chewable tablet 81 mg, 81 mg, Oral, Daily, Caryn Norwood PA-C    heparin (porcine) subcutaneous injection 5,000 Units, 5,000 Units, Subcutaneous, Q8H [facility], Caryn Norwood PA-C, 5,000 Units at 04/26/18 0544    pravastatin (PRAVACHOL) tablet 40 mg, 40 mg, Oral, Daily With Dinner, Caryn Norwood PA-C, 40 mg at 04/25/18 1827    pregabalin (LYRICA) capsule 50 mg, 50 mg, Oral, BID, Karan Baldwin MD, 50 mg at 04/26/18 6914

## 2018-04-26 NOTE — PROGRESS NOTES
Progress Note - ICU Transfer to Step down/med  surg  Sara Mason 47 y o  female MRN: [id number]    Unit/Bed#: ICU 09 Encounter: [id number]      Code Status: Level 3 - DNAR and DNI    Date of ICU admission: 4/25/18    Reason for ICU adm: Acute ischemic R MCA stroke    Active problems:   Patient Active Problem List   Diagnosis    Acute ischemic right MCA stroke ([facility] [address]    Fall    Forehead contusion    Left elbow contusion    Chronic back pain    Major depression    CVA (cerebral vascular accident) ([facility] [address]    Hypercholesterolemia    Chronic low back pain    Cervical post-laminectomy syndrome    Cervical radiculopathy       Summary of clinical course: 46 yo female with  Previous history of R MCA stroke in February 2018 (treated with tPA at that time) who presents to [facility] with symptoms of dysarthria, L sided facial droop, and weakness of the L arm and L leg that she noticed when she woke up at 0430 on 4/25  These are the same symptoms she had with her stroke in February  Stroke alert was called  CT head showed no bleed  Not a candidate for tPA because of recent stroke  CTA showed thrombus in the M2, and so she went for endovascular thrombectomy with Dr Karoline Goodrich  She was noted to have excessive bleeding of the R groin site, and so Dr Karoline Goodrich specifically instructed to hold off on aspirin (on 81mg at home) until 4/27  On initial admission to the ICU, she had weakness of the L arm and leg, as well as L facial droop and slurred speech  These deficits slowly improved  She now has normal strength in the L leg and L arm, except for some mild weakness with L  strength and deficiency of fine motor movements of the L hand  L facial droop is also somewhat persistent       Since her arrival to the unit, she has been intermittently bradycardic down to the mid 40s; also has had some soft BP down to the 94B systolic at times, but chart review reveals that this was also an issue during her last admission, and she was at one point on Levophed and weaned to midodrine (not on this at home, currently, however)  She received a total of 3 L boluses (1 Isolyte, 2 NSS) since she has been in the unit  More recent BP has been better  During her last admission, a hypercoaguable workup revealed an elevated CRP/ESR  Per neurology, ESR is being repeated and is pending  Neurology has also ordered a CT of the chest/abd/pelvis to look for underlying malignancy  Patient states she is up to date on colonoscopy and mammogram  Because she has had a stroke on aspirin, neurology recommends that she will most likely need to be transitioned to an oral anticoagulant, likely one of the NOACs, but this will be managed by the neurology service  The plan for the time being, as above, is to restart her aspirin tomorrow  We also started SC heparin for VTE ppx today, but this will change depending on her plan for anticoagulation  She was soon cleared by speech for a regular diet  Restarted her home pravastatin as well as Lyrica which she takes for chronic neck and back pain  During her last admission, she had the full stroke pathway workup, including a JONATAN, so TTE was not repeated  A1C and lipid panel were also done during that admission  Recent or scheduled procedures:   -Endovascular thrombectomy of the right MCA    Outstanding/pending diagnostics:   -CT of chest/abdomen/pelvis  -ESR level  -Morning labs -- K+ and Mg++ to be repleted today (orders in)   Recheck in [address] [facility] discharge planning:  PT/OT evaluations pending

## 2018-04-26 NOTE — PROGRESS NOTES
Ms Bobby Simmons is doing well following right MCA thrombectomy  She denies headaches, vision changes, new weakness or numbness  Minimal groin pain  Advised er that the lump sensation is normal from the closure device  She would like to go home  On exam, she has minimal dysarthria and 5-/5 hand  with 4/5 intrinsic muscle strength  Also with mild dullness to ST over the left hand and forearm  Otherwise neurologically intact  MRI reviewed notable for few scattered foci of ischemia in the right MCA territory as well as a single focus on the left hemisphere  Agree with Neurology plan for NOAC  No other neurosurgical intervention planned

## 2018-04-26 NOTE — CASE MANAGEMENT
Initial Clinical Review    Admission: Date/Time/Statement: 4/25/18 @ 1019     Orders Placed This Encounter   Procedures    Inpatient Admission (expected length of stay for this patient is greater than two midnights)     Standing Status:   Standing     Number of Occurrences:   1     Order Specific Question:   Admitting Physician     Answer:   Jf Peng     Order Specific Question:   Level of Care     Answer:   Critical Care [15]     Order Specific Question:   Bed request comments     Answer:   6th floor ICU     Order Specific Question:   Estimated length of stay     Answer:   More than 2 Midnights     Order Specific Question:   Certification     Answer:   I certify that inpatient services are medically necessary for this patient for a duration of greater than two midnights  See H&P and MD Progress Notes for additional information about the patient's course of treatment  ED: Date/Time/Mode of Arrival:   ED Arrival Information     Expected Arrival Acuity Means of Arrival Escorted By Service Admission Type    - 4/25/2018 06:28 Immediate Ambulance [facility] Ambulance Critical Care/ICU Emergency    Arrival Complaint    Stroke Alert          Chief Complaint:   Chief Complaint   Patient presents with    CVA/TIA-like Symptoms     pt woke up at 0430 with increased Left sided weakness, last known well was 2130 last night, facial droop, and slurred speech  pt has hx of multiple strokes with little risudal facial droop       History of Illness    Elizabeth Ocasio is a 47 y o  female who presents to [facility] with complaints of left arm weakness, left leg weakness, dysarthria, and left facial droop  Onset of symptoms was when she awoke at 0430 today  Last known normal was 2130 last night  Also complaints of a headache behind the right eye and radiating to the right occiput  She fell on her left hip; denies striking her head, however   Denies any pain from this fall currently      These symptoms are similar to symptoms she had in February of this year when she had a CVA secondary to thrombus of the R M2  She was treated with TPA  Had some residual LUE and facial droop deficits since then       She takes aspirin at home, but did not yet take it today      A stroke alert was called, and the CT of the head showed increased density in the M1/M2 segment of the MCA, suspicious for acute thrombus  No hemorrhage  Chronic right MCA territory infarct  CTA of the head/neck showed recurrent R MCA occlusion with reconstitution of the distal M2 and M3 segments via collateral flow from both the anterior and posterior circulation  No hemorrhage   Chronic MCA infarct      Because of her recent stroke, as well as timeframe of symptoms, she was not a candidate for tPA, but an endovascular alert was called and there was a clot retrieval from the M2      ED Vital Signs:   ED Triage Vitals   Temperature Pulse Respirations Blood Pressure SpO2   04/25/18 0714 04/25/18 0630 04/25/18 0630 04/25/18 0630 04/25/18 0630   97 6 °F (36 4 °C) 73 20 111/63 98 %         Pain Score       04/25/18 0845       No Pain        Wt Readings from Last 1 Encounters:   04/25/18 57 9 kg (127 lb 10 3 oz)       Vital Signs (abnormal):  Date/Time  Temp  Pulse  Resp  BP  MAP (mmHg)  Arterial Line BP  MAP  SpO2  O2 Device  Patient Position - Orthostatic VS       04/26/18 0030  --  --  --  --  --  100/46  66 mmHg  --  --  --   04/26/18 0000  98 2 °F (36 8 °C)   52  13   84/44  57  94/46  64 mmHg  97 %  None (Room air)  --   04/25/18 2345  --   52  15   84/44  57  88/41  59 mmHg  --  --  --   04/25/18 2300  --   46  13  --  --  102/62  65 mmHg  98 %  --  --   04/25/18 2200  --   48  12  --  --  102/46  66 mmHg  98 %  --  --   04/25/18 2100  --   48  12  --  --  104/44  66 mmHg  98 %  --  --   04/25/18 2000  98 °F (36 7 °C)  58  21  --  --  94/44  62 mmHg  98 %  --  --   04/25/18 1900  --  60  20  --  --  96/48  66 mmHg  97 %  --  --   04/25/18 1830  --  68  19  -- --  122/64  84 mmHg  98 %  --  --   04/25/18 1800  --   46  20  --  --  104/46  68 mmHg  98 %  --  --   04/25/18 1730  --   46  14  --  --  104/48  68 mmHg  99 %  --  --   04/25/18 1700  --   54  12  --  --  104/50  70 mmHg  98 %  --  --   04/25/18 1630  --   46   11  --  --  100/48  66 mmHg  98 %  --  --   04/25/18 1615  --   46  20  --  --  108/52  72 mmHg  99 %  --  --   04/25/18 1600  --   44  14  --  --  108/50  72 mmHg  99 %  --  --   04/25/18 1530  --  64  22  --  --  92/50  66 mmHg  97 %  --  --   04/25/18 1515  --   48  12  --  --  98/46  66 mmHg  98 %  --  --   04/25/18 1500  --   46   24  --  --  102/44  64 mmHg  98 %  --  --   04/25/18 1445  --  62  20  --  --  98/48  68 mmHg  97 %  --  --   04/25/18 1430  --   52  12  --  --  102/48  66 mmHg  97 %  --  --   04/25/18 1415  --  66   25  --  --  108/48  70 mmHg  98 %  --  --   04/25/18 1400  --   44  12  --  --  102/46  66 mmHg  98 %  --  --   04/25/18 1330  --   46   10  --  --  108/48  70 mmHg  --  --  --   04/25/18 1310  --   44   10  --  --  102/46  66 mmHg  --  --  --   04/25/18 1300  --   52  14  --  --  --  --  --  --  --   04/25/18 1245  --  62   26  --  --  118/52  76 mmHg  --  --  --   04/25/18 1230  --   44  13  --  --  106/48  70 mmHg  98 %  --  --   04/25/18 1215  --   52   30  --  --  100/46  66 mmHg  98 %  --  --   04/25/18 1200  --   50  12  --  --  94/44  64 mmHg  --  --  --   04/25/18 1140  98 1 °F (36 7 °C)   54   10  --  --  92/46  64 mmHg  96 %  None (Room air)  --   04/25/18 0930  --  70  18  96/59  --  --  --  97 %  None (Room air)  Lying   04/25/18 0845  --   54  18  106/63  --  --  --  96 %  None (Room air)  Lying   04/25/18 0730  --  57  18  98/65  --  --  --  98 %  --       Date and Time Eye Opening Best Verbal Response Best Motor Response [place] Coma Scale Score   04/26/18 0600 4 5 6 15   04/26/18 0500 4 5 6 15   04/26/18 0400 4 5 6 15   04/26/18 0300 4 5 6 15   04/26/18 0200 4 5 6 15   04/26/18 0100 4 5 6 15   04/26/18 0000 4 5 6 15   04/25/18 2300 4 5 6 15   04/25/18 2200 4 5 6 15   04/25/18 2100 4 5 6 15   04/25/18 2000 4 5 6 15   04/25/18 1140 4 5 6 15   04/25/18 0730 4 5 6 15   04/25/18 0700 4 5 6 15   04/25/18 0648 4 5 6 15   04/25/18 0645 4 5 6 15   04/25/18 0635 4 5 6 15       4-25 TO 4-26  0350 AM   HEAD AND RIGHT  EYE   Pain Score   No Pain No Pain   4 5 2 No Pain No Pain 4        FentaNYL Citrate IJ (mcg)     50 50     Acetaminophen (mg)        650    650     Aspirin RE (mg)  300        Abnormal Labs/Diagnostic Test Results:    Positive for back pain and neck pain  Neurological: Positive for facial asymmetry, speech difficulty, weakness and headaches    CT BRAIN STROKE ALERT  Impression:     1   Increased density in the distal M1/M2 segment of the right middle cerebral artery, suspicious for acute thrombus  2   No acute intraparenchymal hemorrhage  3   Chronic right MCA territory infarction  4   Cerebral atrophy with chronic small vessel ischemic change         CTA STROKE ALERT   1   Recurrent proximal right MCA occlusion   There is reconstitution of distal M2 and M3 segments via collateral flow from both the anterior and posterior circulation  2   No evidence of acute intracranial hemorrhage  3   Chronic right MCA infarction  4   Cerebral atrophy with chronic small vessel ischemic change    MRI BRAIN  Impression:     1   Evolving areas of acute infarction within the right temporal and parietal lobes, consistent with the CTA findings of right MCA occlusion  2  Stanislav Buckle of subacute infarction right basal ganglia  3   Sequela of chronic right MCA territory infarction  ED Treatment:   Medication Administration from 04/25/2018 0621 to 04/25/2018 1128       Date/Time Order Dose Route Action Action by Comments     04/25/2018 0757 aspirin rectal suppository 300 mg 300 mg Rectal Given Tomeka Escobar RN        Past Medical/Surgical History:    Active Ambulatory Problems     Diagnosis Date Noted    Acute ischemic right MCA stroke [facility] ) 02/09/2018    Fall 02/09/2018    Forehead contusion 02/09/2018    Left elbow contusion 02/09/2018    Chronic back pain 02/15/2018    Major depression 02/15/2018    CVA (cerebral vascular accident) ([facility] ) 04/09/2018    Hypercholesterolemia 02/18/2014    Chronic low back pain 07/05/2016    Cervical post-laminectomy syndrome 07/05/2016    Cervical radiculopathy 08/13/2013       Past Medical History:    Acute pain of right knee     Anemia     Anxiety     Cervical disc disorder with radiculopathy     Chronic pain     Constipation     CVA [facility]cerebral vascular accident) [facility] [address] [facility]     H/O ischemic right MCA stroke     Hematuria     High cholesterol     Lumbar radiculopathy     Lumbar stenosis     Other chronic pain     Other muscle spasm     PONV (postoperative nausea and vomiting)     Spondylosis of cervical spine     Spondylosis of lumbosacral region     Stroke ([facility] )     Urinary incontinence        Admitting Diagnosis: CVA (cerebral vascular accident) ([facility] [address] ) [I63 9]  Stroke ([facility] [address] ) [I63 9]  Acute ischemic right MCA stroke ([facility] [address] ) [I63 511]    Age/Sex: 47 y o  female    Assessment/Plan      CONSULT NEUROLOGY   3 47year old female admitted with L sided weakness, L facial droop, dysarthria and found to have recurrent proximal R MCA occlusion   - Recommend admit on stroke pathway  - Neuro-interventional radiology consulted for evaluation for thrombectomy  Patient will be going for thrombectomy    - Request Loop recorder interrogation     - Will check MRI brain without contrast   - Continue on rectal  mg QD     - Start on home dose of Pravastatin when able to take PO  Patient notes intolerance to atorvastatin  - Do not need to repeat echocardiogram at this time as patient recently underwent JONATAN during February 2018 admission for stroke  Also do not need to repeat fasting lipid panel or hemoglobin A1c checked in February 2018 as well   Reviewed results, hemoglobin A1c 5 5, total cholesterol 235, triglycerides 89, HDL 78,      - Check API     - PT/OT/Speech therapy evaluations  PM&R consult  - Monitor exam and notify with changes       2  Tobacco abuse    - Encourage tobacco cessation  CRITICAL CARE     Neuro:   -Acute ischemic R MCA stroke              -S/P endovascular retrieval via R groin  Strict bedrest/completely supine for 3 hours per Dr Livier Omalley               -Unclear etiology  Had similar symptoms with a thrombus also in the R MCA 2 months ago  Has had a loop recorder implanted to search for an arrhythmia as the source, but per the patient, no cause has been identified               -Neurosurgery following              -Re-evaluate antiplatelet regimen  On ASA at home  -Monitor in ICU x 24 hours post embolectomy              -She is on multiple pain medications at home for chronic back and neck pain: Valium 5mg, Mobic 15mg, Robacin 750mg, Daypro 600mg, Percocet 5/325mg (0 5 tablet daily prn), Lyrica 25mg BID, and Tramadol 50mg Q 6 H prn  All are on hold until cleared by speech  Restart when cleared  Will add Tylenol suppository prn  If more pain medication needed, will reassess at that time, but she is resting comfortably currently                  CV:               -Loop recorder in place for evaluation of previous stroke  Per nursing, was interrogated, and no events noted  -MAP goal >75  -Bradycardia: was in the 50s per IR note  Now in the 50s and into the 40s at times  Monitor for now  -Isolyte bolus ordered for soft MAPs into the 60s  Currently about 79  Will have NSS for maintenance as well  -JONATAN was done during last admission and was unremarkable except for an atheroma in the proximal ascending aorta  -During last admission, reportedly had an arrhythmia that was concerning for afib, but was on pressors at that time, and so it was felt to be related to that and was not pursued  -Restart home pravastatin when able to take PO                 Lung:               -No acute issues              -NC as needed to keep SaO2 >92%              -IS              -CXR today unremarkable                 GI:               -No acute issues              -Zofran prn              -Will add bowel regimen prn              -NPO until evaluated by speech                 FEN:               -Isolyte 1L bolus running now for low MAP  NSS 75/hour for maintenance              -Na 142, K 3 9, Cl 109, bicarb 26, Ca 9 5              -NPO until seen by speech                 :               -No acute issues              -I/O              -BUN/Cr 10/0 89                 ID:               -No infectious concerns at this time              -Afebrile              -WBC 7,000                 Heme:               -Had a thrombosis panel during last admission  Negative, except for elevated CRP              -On ASA at time  API ordered              -hold on chemical VTE ppx until 24 hours post thrombectomy                 Endo:               -No acute issues              -Will add POCT glucose and SSI while NPO                 Msk/Skin:               -On multiple pain medications at home for chronic pain in the back, neck              -PT/OT              -Out of bed once bedrest completed x 3 hours                 Disposition: ICU      IR cerebral embolectomy/lysis  4-25  0950     Pre-op Diagnosis:   1   Acute ischemic right MCA stroke ([facility] [address] )    2  Stroke ([facility] [address] )     Estimated Blood Loss: 50 cc  Findings: Right MCA M1 occlusion, TICI   Complications:  none   Anesthesia: MAC         Admission Orders:    DYSPHAGIA ASSESSMENT   NEURO CHECKS  :  Every 1 hour x 4 hours, then every 2 hours x 4, then every 4 hours x 72 hours  PT ANDO T EVAL AND TREAT  SPEECH EVAL AND TREAT  SEQUENTIAL COMPRESSION DEVICE  REGULAR DIET   ARTERIAL LINE  TELEMETRY   Reason for not initiating IV thrombolytic  Intracranial or spinal surgery, significant head trauma or prior stroke in previous 3 months        Scheduled Meds:   Current Facility-Administered Medications:  acetaminophen 650 mg Oral Q6H PRN   [START ON 4/27/2018] aspirin 81 mg Oral Daily   heparin (porcine) 5,000 Units Subcutaneous Q8H [address]   pravastatin 40 mg Oral Daily With Dinner   pregabalin 50 mg Oral BID     Continuous Infusions:    PRN Meds:   acetaminophen

## 2018-04-26 NOTE — PHYSICAL THERAPY NOTE
Physical Therapy Evaluation    Patient's Name: Ismael Johns    Admitting Diagnosis  CVA (cerebral vascular accident) ([facility] [address] ) [I63 9]  Stroke [facility]) [I63 9]  Acute ischemic right MCA stroke ([facility] ) [I63 511]    Problem List  Patient Active Problem List   Diagnosis    Acute ischemic right MCA stroke [facility]    Fall    Chronic back pain    Major depression    CVA (cerebral vascular accident) [facility]    Hypercholesterolemia    Chronic low back pain    Cervical post-laminectomy syndrome    Cervical radiculopathy    History of ischemic right MCA stroke       Past Medical History  Past Medical History:   Diagnosis Date    Acute pain of right knee     last assessed - [id number]    Anemia     Anxiety     Cervical disc disorder with radiculopathy     Chronic pain     Constipation     CVA [facility]cerebral vascular accident) ([facility] )     H/O ischemic right MCA stroke     Hematuria     last assessed - [id number]    High cholesterol     Lumbar radiculopathy     Lumbar stenosis     Other chronic pain     last assessed - [id number]    Other muscle spasm     last assessed - [id number]    PONV (postoperative nausea and vomiting)     must have premed    Spondylosis of cervical spine     Spondylosis of lumbosacral region     Stroke [facility])     Urinary incontinence     Resolved - [id number]       Past Surgical History  Past Surgical History:   Procedure Laterality Date    BLADDER SURGERY      BLADDER SURGERY  2014    CERVICAL SPINE SURGERY      KNEE ARTHROSCOPY Left     LIPECTOMY      of thigh    LIPOMA RESECTION      NECK SURGERY      NC COLONOSCOPY FLX DX W/COLLJ SPEC WHEN PFRMD N/A 2/10/2017    Procedure: COLONOSCOPY;  Surgeon: Shalini Barbosa MD;  Location: [facility] GI LAB;   Service: Gastroenterology    NC KNEE SCOPE,MED/LAT MENISECTOMY Left 3/21/2016    Procedure: ARTHROSCOPY W/ PARTIAL MEDIAL MENISCECTOMY;  Surgeon: Elio Mcpherson MD;  Location:  MAIN OR;  Service: Xenia   TUBAL LIGATION            04/26/18 1025   Note Type   Note type Eval only   Pain Assessment   Pain Assessment 0-10   Pain Score 4   Pain Type Acute pain   Pain Location Eye;Head   Patient's Stated Pain Goal No pain   Hospital Pain Intervention(s) Ambulation/increased activity;Repositioned   Response to Interventions unchanged   Home Living   Type of Home House   Additional Comments Resides w/  in 1 story home  Normally indep self care  Recent CVA, and was still receiving outpatient OT for vision  Worked at [employer] prior to last CVA, yet to return to work   Prior Function   Level of [place] Independent with ADLs and functional mobility   Lives With [employer] in the last 6 months 1 to 4   Restrictions/Precautions   Weight Bearing Precautions Per Order No   Other Precautions Multiple lines   General   Family/Caregiver Present No   Cognition   Overall Cognitive Status Impaired   Arousal/Participation Responsive   Attention Attends with cues to redirect   Orientation Level Oriented X4   Memory Unable to assess   Following Commands Follows one step commands without difficulty   RLE Assessment   RLE Assessment (strength 4 to 4+/5)   LLE Assessment   LLE Assessment (strength 4 to 4+/5)   Coordination   Movements are Fluid and Coordinated 1   Transfers   Sit to Stand 5  Supervision   Additional items Assist x 1   Stand to Sit 5  Supervision   Additional items Assist x 1   Ambulation/Elevation   Gait pattern (mild sway w/ no LOB)   Gait Assistance 5  Supervision   Additional items Assist x 1   Assistive Device None  (self propels IV pole)   Distance 200'   Balance   Static Sitting Normal   Dynamic Sitting Good   Static Standing Good   Dynamic Standing Fair +   Ambulatory Fair +   Endurance Deficit   Endurance Deficit No   Activity Tolerance   Nurse Made Aware yes   Assessment   Prognosis Good   Assessment Pt seen for moderate complexity physical therapy evaluation    Pt is a 48 y/o female w/ history/comorbidities of anemia, anxiety, chronic pain, spinal stenosis, CVA recent w/ residual vision issues who is now admitted w/ L facial droop, L sided numbness and weakness  CT -, CTA showed R MCA occlusion  Had cerebral embolecomty/lysis yesterday, and consulted to see in ICU  Due to need for continued ICU monitoring, repeated admits w/ CVAs, note evolving clinical picture  At present time, despite acute medical issues, note pt to be functioning close to or at her recent baseline  will sign off    May mobilize w/ nursing, family or restorative while here, and may d/c home when stable w/ continued outpatient OT for vision issues   Goals   Patient Goals none stated   Treatment Day 0   Plan   PT Frequency (d/c PT)   Recommendation   Recommendation D/C PT  (home w/ OP OT)   PT - OK to Discharge Yes  (when stable to home w/ OP OT)   Modified [place] Scale   Modified [place] Scale 1   Barthel Index   Feeding 10   Bathing 0   Grooming Score 5   Dressing Score 10   Bladder Score 10   Bowels Score 10   Toilet Use Score 10   Transfers (Bed/Chair) Score 15   Mobility (Level Surface) Score 10   Stairs Score 5   Barthel Index Score 85         Radha Watkins PT, DPT, CSRS

## 2018-04-27 VITALS
RESPIRATION RATE: 18 BRPM | BODY MASS INDEX: 21.79 KG/M2 | SYSTOLIC BLOOD PRESSURE: 112 MMHG | TEMPERATURE: 98 F | HEART RATE: 68 BPM | DIASTOLIC BLOOD PRESSURE: 61 MMHG | OXYGEN SATURATION: 97 % | HEIGHT: 64 IN | WEIGHT: 127.65 LBS

## 2018-04-27 PROCEDURE — 99239 HOSP IP/OBS DSCHRG MGMT >30: CPT | Performed by: INTERNAL MEDICINE

## 2018-04-27 PROCEDURE — 99233 SBSQ HOSP IP/OBS HIGH 50: CPT | Performed by: PSYCHIATRY & NEUROLOGY

## 2018-04-27 PROCEDURE — 92526 ORAL FUNCTION THERAPY: CPT

## 2018-04-27 RX ORDER — MAGNESIUM SULFATE 1 G/100ML
1 INJECTION INTRAVENOUS DAILY
Status: DISCONTINUED | OUTPATIENT
Start: 2018-04-27 | End: 2018-04-27

## 2018-04-27 RX ORDER — BUTALBITAL, ACETAMINOPHEN AND CAFFEINE 50; 325; 40 MG/1; MG/1; MG/1
1 TABLET ORAL EVERY 4 HOURS PRN
Status: DISCONTINUED | OUTPATIENT
Start: 2018-04-27 | End: 2018-04-27 | Stop reason: HOSPADM

## 2018-04-27 RX ORDER — WARFARIN SODIUM 4 MG/1
4 TABLET ORAL
Qty: 30 TABLET | Refills: 1 | Status: SHIPPED | OUTPATIENT
Start: 2018-04-27 | End: 2018-05-02 | Stop reason: SDUPTHER

## 2018-04-27 RX ORDER — NICOTINE 21 MG/24HR
1 PATCH, TRANSDERMAL 24 HOURS TRANSDERMAL DAILY
Qty: 28 PATCH | Refills: 0 | Status: SHIPPED | OUTPATIENT
Start: 2018-04-28 | End: 2018-07-24 | Stop reason: ALTCHOICE

## 2018-04-27 RX ORDER — NICOTINE 21 MG/24HR
14 PATCH, TRANSDERMAL 24 HOURS TRANSDERMAL DAILY
Status: DISCONTINUED | OUTPATIENT
Start: 2018-04-27 | End: 2018-04-27 | Stop reason: HOSPADM

## 2018-04-27 RX ADMIN — NICOTINE 14 MG: 14 PATCH, EXTENDED RELEASE TRANSDERMAL at 10:29

## 2018-04-27 RX ADMIN — PREGABALIN 50 MG: 50 CAPSULE ORAL at 10:28

## 2018-04-27 RX ADMIN — ACETAMINOPHEN 650 MG: 325 TABLET, FILM COATED ORAL at 10:28

## 2018-04-27 RX ADMIN — HEPARIN SODIUM 5000 UNITS: 5000 INJECTION, SOLUTION INTRAVENOUS; SUBCUTANEOUS at 05:20

## 2018-04-27 RX ADMIN — BUTALBITAL, ACETAMINOPHEN, AND CAFFEINE 1 TABLET: 50; 325; 40 TABLET ORAL at 13:11

## 2018-04-27 RX ADMIN — ACETAMINOPHEN 650 MG: 325 TABLET, FILM COATED ORAL at 02:11

## 2018-04-27 RX ADMIN — HEPARIN SODIUM 5000 UNITS: 5000 INJECTION, SOLUTION INTRAVENOUS; SUBCUTANEOUS at 13:11

## 2018-04-27 NOTE — PROGRESS NOTES
Patient's insurance denied Eliquis, Pradaxa, Xarelto for anticoagulation  Will start on Coumadin with a Lovenox bridge  Discussed with primary team   CT head without contrast ordered in 1 week as outpatient  Continue rest of plan per prior note

## 2018-04-27 NOTE — SPEECH THERAPY NOTE
Speech Language/Pathology    Speech/Language Pathology Progress Note    Patient Name: Mima Barber  AORUD'L Date: 4/27/2018         Subjective:  Pt seen for dysphagia tx  At lunch  Pt stated she's going home tomorrow  Denied difficulty swallowing pills  Facial droop mostly resolved, denied any pocketing of food  Objective:  Pt ate a few bites of grilled cheese, some french fries  Stated her sister is bringing her steak  Pt drank gingerale by straw  Good oral control  Adequate mastication, manipulation and transfer  No pocketing noted  No s/s aspiration noted  Assessment:  Pt tolerating regular diet w/ thin liquids  Plan/Recommendations:  No further tx needed

## 2018-04-27 NOTE — PROGRESS NOTES
04/27/18 [address] Affiliation None identified   Spiritual Beliefs/Perceptions   Concept of God Accepting   Support Systems Spouse/significant other;Family members;Friends/neighbors   Stress Factors   Patient Stress Factors (Ready to go home)   Coping Responses   Patient Coping Open/discussion   Family Coping Open/discussion   Plan of Care   Comments Continued relationship of care and support; provided encouragement and expolored current treatment plan; provided blessing   Assessment Completed by: Unit visit

## 2018-04-27 NOTE — PROGRESS NOTES
Progress Note - Neurology   Katelynn Mason 47 y o  female MRN: [id number]  Unit/Bed#: [facility] 707-01 Encounter: [id number]    Assessment/Plan:  1  Recurrent R MCA stroke  -Recurrent proximal right MCA occlusion s/p thrombectomy 4/25  Presented with left sided weakness, left facial droop, and dysarthria, which has grossly resolved except for mild distal LUE weakness  Considering embolic source of undetermined etiology  -MRI brain 4/25 revealed evolving areas of acute infarction within the right temporal parietal lobes, consistent with right MCA occlusion  -Loop recorder interrogated, negative for Afib   -CT chest/abdomen/pelvis negative for malignancy  -ESR and CRP WNL  -Will start Pradaxa or Eliquis depending on which is cheaper through patient's insurance  -Will d/c ASA with start of NOAC  -Repeat CT head tomorrow after start of NOAC  -Continue  Pravastatin 40mg (h/o atorvastatin intolerance)  -Follow up as outpatient with Dr Guanakito Martin 4-6 weeks  Appointment requested  2  Headache  -As described below  Magnesium did not help yesterday   -Fioricet PRN headache q4hrs    3  Tobacco use  -Tobacco cessation counseling given    Subjective:   No acute events overnight  Patient continues to complain of headache behind right eye extending through right side of head to base of right neck  This is typical for her, but not resolving like usual  No other complaints  Denies CP, SOB, headache, dizziness, vision changes, N/V, abdominal pain, weakness or numbness  ROS:  12 point ROS performed, as stated above, all others negative  Medications:   All current active meds have been reviewed and current meds:  Scheduled Meds:  Current Facility-Administered Medications:  acetaminophen 650 mg Oral Q6H PRN Caryn Norwood PA-C   aspirin 81 mg Oral Daily Caryn Norwood PA-C   heparin (porcine) 5,000 Units Subcutaneous [facility] Caryn Norwood PA-C   nicotine 14 mg Transdermal Daily Nuvia Nugent MD   pravastatin 40 mg Oral Daily With MGM PAT Norwood PA-C   pregabalin 50 mg Oral BID Ryann De La Cruz MD     Continuous Infusions:   PRN Meds:   acetaminophen       Vitals: Blood pressure 123/68, pulse 62, temperature 98 4 °F (36 9 °C), temperature source Oral, resp  rate 16, height 5' 4" (1 626 m), weight 57 9 kg (127 lb 10 3 oz), SpO2 96 %  ,Body mass index is 21 91 kg/m²  Physical Exam:   Physical Exam   Constitutional: She is oriented to person, place, and time  No distress  HENT:   Head: Normocephalic and atraumatic  Neck: Normal range of motion  Neck supple  Cardiovascular: Normal rate and regular rhythm  Pulmonary/Chest: Effort normal and breath sounds normal    Abdominal: Soft  Bowel sounds are normal    Neurological: She is oriented to person, place, and time  She has a normal Finger-Nose-Finger Test    Reflex Scores:       Tricep reflexes are 2+ on the right side and 2+ on the left side  Bicep reflexes are 2+ on the right side and 2+ on the left side  Brachioradialis reflexes are 2+ on the right side and 2+ on the left side  Patellar reflexes are 2+ on the right side and 2+ on the left side  Achilles reflexes are 2+ on the right side and 2+ on the left side  Skin: Skin is warm and dry  She is not diaphoretic  Psychiatric: She has a normal mood and affect  Her speech is normal and behavior is normal  Judgment and thought content normal      Neurologic Exam     Mental Status   Oriented to person, place, and time  Attention: normal  Concentration: normal    Speech: speech is normal   Level of consciousness: alert    Cranial Nerves   Cranial nerves II through XII intact  Mild left lower facial weakness with smile  CN otherwise intact  Motor Exam   Right arm pronator drift: absent  Left arm pronator drift: absentLUE 5-/5 distally  LLE strength 5/5  Right sided strength 5/5  Sensory Exam   Sensation intact to light touch and temperature intact       Gait, Coordination, and Reflexes     Coordination   Finger to nose coordination: normal    Reflexes   Right brachioradialis: 2+  Left brachioradialis: 2+  Right biceps: 2+  Left biceps: 2+  Right triceps: 2+  Left triceps: 2+  Right patellar: 2+  Left patellar: 2+  Right achilles: 2+  Left achilles: 2+  Right plantar: normal  Left plantar: normal  Gait deferred  Lab Results: I have personally reviewed pertinent reports  Recent Results (from the past 24 hour(s))   Sedimentation rate, automated    Collection Time: 04/26/18 12:03 PM   Result Value Ref Range    Sed Rate 14 0 - 20 mm/hour     Imaging Studies: I have personally reviewed pertinent reports and I have personally reviewed pertinent films in PACS  CT chest/abdomen/pelvis w contrast 4/25/18: IMPRESSION:   No evidence of malignancy within the chest, abdomen or pelvis    Subcentimeter hypodensities within the liver suggestive of hepatic cysts    Mild basilar atelectatic change and/or scarring  EKG, Pathology, and Other Studies: I have personally reviewed pertinent reports      VTE Prophylaxis: Heparin

## 2018-04-27 NOTE — PLAN OF CARE
Problem: SLP ADULT - SWALLOWING, IMPAIRED  Goal: Advance to least restrictive diet without signs or symptoms of aspiration for planned discharge setting  See evaluation for individualized goals         Outcome: Completed Date Met: 04/27/18

## 2018-04-27 NOTE — DISCHARGE SUMMARY
IMR Discharge Summary - Medical Gennaro Mason 47 y o  female MRN: [id number]    [address] [place] [address] Room / Bed: [facility] 707/[facility] 315-96 Encounter: [id number]    BRIEF OVERVIEW    Admitting Provider: Gianfranco Alcazar MD  Discharge Provider: Gianfranco Alcazar MD  Primary Care Physician at Discharge: Gianfranco Alcazar MD    Discharge To: Home    Admission Date: 4/25/2018     Discharge Date:   4/27/2018  Primary Discharge Diagnosis  Principal Problem:    Acute ischemic right MCA stroke [facility])  Active Problems:    History of ischemic right MCA stroke    Dependence on nicotine from cigarettes  Resolved Problems:    * No resolved hospital problems  *      Other Problems Addressed:  None    Consulting Providers   Neurology  Neurosurgery      Therapeutic Operative Procedures Performed  Clot retrieval by Neurosurgery    Diagnostic Procedures Performed  labs: CBC, BMP, ESR, CRP   CTA of the head with thrombus in the M2 section of right MCA    Discharge Disposition: Home/Self Care  Discharged With Lines: no    Test Results Pending at Discharge:  INR in 3 days    Outpatient Follow-Up  With her PCP in 1 week  Follow up with consulting providers  With Neurology in 1 week   Active Issues Requiring Follow-up   Patient was started on Coumadin, INR will be followed as outpatient by her PCP Dr Brown Salts Status: Level 3 - DNAR and DNI  Advance Directive and Living Will: <no information>  Power of :    POLST:      Medications   See after visit summary for reconciled discharge medications provided to patient and family  aspirin 81 mg chewable tablet Chew 1 tablet (81 mg total) daily [facility] at Discharge (Prescription) [Print]   LYRICA 50 MG capsule Take 50 mg by mouth 2 (two) times a day [facility] at Discharge (Patient Reported)    OXYCODONE-ACETAMINOPHEN PO Take 0 5 tablets by mouth daily as needed   Rufina Swenson at Discharge (Patient Reported)    pravastatin (PRAVACHOL) 40 mg tablet Take 40 mg by mouth daily [facility] at Discharge (Patient Reported)    acetaminophen (TYLENOL) tablet 650 mg 650 mg, Oral, Every 6 hours PRN, mild pain, fever, Starting Wed 4/25/18 at [facility] at Discharge    butalbital-acetaminophen-caffeine [facility]) -40 mg per tablet 1 tablet 1 tablet, Oral, Every 4 hours PRN, headaches, Starting Fri 4/27/18 at [facility] Do Not Order at Discharge    enoxaparin (LOVENOX) 60 mg/0 6 mL Inject 0 6 mL (60 mg total) under the skin 2 (two) times a day for 7 days [address] at Discharge [Print]   heparin (porcine) subcutaneous injection 5,000 Units 5,000 Units, Subcutaneous, Every 8 hours scheduled, First dose on Thu 4/26/18 at 0600  Check for allergies to pork or pork derivatives/dietary restrictions before administration  High alert medication  LOOK ALIKE SOUND ALIKE MED Huber Olden Do Not Order at Discharge    nicotine (NICODERM CQ) 14 mg/24hr TD 24 hr patch Place 1 patch on the skin daily Huber Childers Prescribed [Print]   pravastatin (PRAVACHOL) tablet 40 mg 40 mg, Oral, Daily with dinner, First dose on Wed 4/25/18 at / [address] Do Not Order at Discharge    pregabalin (LYRICA) capsule 50 mg 50 mg, Oral, 2 times daily, First dose on Wed 4/25/18 at 1800  High Alert Medication   LOOK ALIKE SOUND ALIKE MED Huber Olden Do Not Order at Discharge    warfarin (COUMADIN) 4 mg tablet Take 1 tablet (4 mg total) by mouth daily [address] at Discharge [Print]         Allergies  Allergies   Allergen Reactions    Blue Dyes (Parenteral) Anaphylaxis    Gabapentin Anaphylaxis     Anaphylaxis    Nitrofurantoin GI Intolerance     vomiting    Blue Dyes (Parenteral) Rash    Penicillins Rash     Discharge Diet: regular diet  Activity restrictions: none    [facility] Course  As per Dr Priya Owens transfer note on 04/27/2018  "Ms Nury Perez is a 47 y o  female with past medical history significant for right MCA stroke in February 2018 treated with tPA at that time, she presented to the emergency department on 04/25 after awaking in the morning at 04:30 with complaints of dysarthria, left-sided facial droop and weakness of the left arm and left leg  Stroke alert was called at that time and a CT of the head was performed that did not show bleed  Patient was not a candidate for tPA secondary to recent stroke and a CT angiogram showed a thrombus in the M2 segment for which she was sent to endovascular thrombectomy with Dr Juan Kuhn  Initially on presentation, patient was noted to have weakness of the left arm and leg as well as a left-sided facial droop and slurred speech  These deficits slowly improved through her stay in the intensive care unit "    Patient was transferred out of the ICU and had no residual deficit, decision was made by Neurology to start the patient on anticoagulation, due to insurance issues the patient was started on warfarin because other options for anticoagulation were not approved, the patient was also discharged on bridging Lovenox, she will have an INR in 3 days and dose of Coumadin will be adjusted by her PCP, patient was given a script for CT of the head in 1 week and will follow up with Neurology as outpatient        Presenting Problem/History of Present Illness  Principal Problem:    Acute ischemic right MCA stroke ([facility] [address] )  Active Problems:    History of ischemic right MCA stroke    Dependence on nicotine from cigarettes  Resolved Problems:    * No resolved hospital problems  *        Other Pertinent Test Results  None    Discharge Condition: stable      Discharge  Statement   I spent 45 minutes minutes discharging the patient  This time was spent on the day of discharge   I had direct contact with the patient on the day of discharge  Additional documentation is required if more than 30 minutes were spent on discharge

## 2018-04-27 NOTE — SOCIAL WORK
Refceived ALLALYSSA María from [facility] from [address] [facility] intake stating that they are able to accept pt    Information placed on AVS

## 2018-04-27 NOTE — PROGRESS NOTES
IMR Unit Transfer Note  Unit/Bed # [facility] [address] Encounter: [id number]  SOD Team C           Jose Mason 47 y o  female [id number]      Active Hospital Problems: Principal Problem:    Acute ischemic right MCA stroke [facility])  Active Problems:    History of ischemic right MCA stroke    Dependence on nicotine from cigarettes    Acute ischemic stroke  Status post right M2 endovascular thrombectomy on 04/25  Continue Pravastatin  Per Neurosurgery recommendation, may restart aspirin 81 mg on 04/27  Per review of Neurosurgery and Neurology recommendations, patient to start on DOAC in the near future  PMR evaluated patient and recommended discharge to home when medically clear in follow-up at their clinic as an outpatient  Unclear source of recurrent stroke at this time  ESR and CRP noted to be within normal limits  CT of the chest abdomen and pelvis did not show any evidence of malignancy at this time  Continue to follow with Neurology/Neurosurgery/PMR recommendations  Nicotine dependence  Reports approximately 20 pack smoking history  Currently smoking 1/2 pack daily and working on quitting  Start nicotine patch    VTE Pharmacologic Prophylaxis: Heparin  VTE Mechanical Prophylaxis: sequential compression device    Disposition: Patient requires Med/Surg with Telemetry    Hospital Course:   Ms Florian Cardenas is a 47 y o  female with past medical history significant for right MCA stroke in February 2018 treated with tPA at that time, she presented to the emergency department on 04/25 after awaking in the morning at 04:30 with complaints of dysarthria, left-sided facial droop and weakness of the left arm and left leg  Stroke alert was called at that time and a CT of the head was performed that did not show bleed  Patient was not a candidate for tPA secondary to recent stroke and a CT angiogram showed a thrombus in the M2 segment for which she was sent to endovascular thrombectomy with Dr Joslyn Abbott    Initially on presentation, patient was noted to have weakness of the left arm and leg as well as a left-sided facial droop and slurred speech  These deficits slowly improved through her stay in the intensive care unit  Of note, patient was noted to be intermittently bradycardic down to the mid 40s while in the intensive care unit with systolic blood pressures in the 80s at times requiring Levophed which has since been weaned off  Subjective:   Patient seen and examined in bed this morning  Denied acute overnight events  Patient complains of mild right temporal and eye pain/headache, this has been constant since admission and only mildly relieved by acetaminophen  Denied fever/chills, n/v, abd pain, c/p, SOB, palpitations, diarrhea  Objective:   Vitals:    04/26/18 1619 04/26/18 1940 04/26/18 2357 04/27/18 0744   BP: 112/70 137/53 109/61 123/68   BP Location: Right arm Right arm Right arm Right arm   Pulse: 57 65 55 62   Resp: 16 18 16 16   Temp: 97 9 °F (36 6 °C) 98 1 °F (36 7 °C) 98 3 °F (36 8 °C) 98 4 °F (36 9 °C)   TempSrc: Oral Oral Oral Oral   SpO2: 97% 98% 98% 96%   Weight:       Height:         I/O last 24 hours:   In: 1940 [P O :1940]  Out: -     /68 (BP Location: Right arm)   Pulse 62   Temp 98 4 °F (36 9 °C) (Oral)   Resp 16   Ht 5' 4" (1 626 m)   Wt 57 9 kg (127 lb 10 3 oz)   LMP  (LMP Unknown)   SpO2 96%   BMI 21 91 kg/m²        Results from last 7 days  Lab Units 04/26/18  0544 04/25/18  0641 04/25/18  0635   SODIUM mmol/L 145  --  142   POTASSIUM mmol/L 3 9  --  3 9   CHLORIDE mmol/L 114*  --  109*   CO2 mmol/L 23  --  26   BUN mg/dL 7  --  10   CREATININE mg/dL 0 69  --  0 89   CALCIUM mg/dL 8 2*  --  9 5   GLUCOSE RANDOM mg/dL 93  --  99   GLUCOSE, ISTAT mg/dl  --  102  --        PHYSICAL EXAMINATION:  General Appearance:    Alert, cooperative, no distress, appears stated age   Head:    Normocephalic, without obvious abnormality, atraumatic   Throat:   Lips, mucosa, and tongue normal; teeth and gums normal   Neck:   Supple, symmetrical, trachea midline, no adenopathy   Lungs:     Clear to auscultation bilaterally, respirations unlabored   Chest Wall:    No tenderness or deformity    Heart:    Regular rate and rhythm, S1 and S2 normal   Abdomen:     Soft, non-tender, bowel sounds active all four quadrants   Extremities:   Extremities normal, atraumatic, no cyanosis or edema   Pulses:   2+ and symmetric all extremities   Skin:   Skin color, texture, turgor normal, no rashes or lesions   Lymph nodes:   Cervical, supraclavicular, and axillary nodes normal   Neurologic:   Answering questions appropriately, fluent speech, aaox3   FAROM, 5/5 strength in bilateral upper and lower extremities         Rowena Merlos MD

## 2018-04-27 NOTE — SOCIAL WORK
Informed by ORTEGA Li that he completed the prior auth and the Pradaxa and Eliquis has been denied  He stated that he will start pt on Coumadin with a Lovenox bridge  Prescription received  Met with pt to discuss checking the copay cost of Lovnox and she preferred CM to contact [facility] to obtain her copay cost     CM contacted [facility], contact # [phone] and spoke with David who stated that pt's copay cost for Lovenox for 7 days is $77 32 and he stated that they have the dose and quantity in stock  Met with pt and her sister to discuss the copay cost of Lovenox  They were agreeable to the copay cost of $77 32  Discuss [facility] for Lovenox injections and they were agreeable and preferred a referral to [facility] referral sent for same  Outpt therapy script provided to pt

## 2018-04-27 NOTE — SOCIAL WORK
Informed by ORTEGA Jackson that pt will be discharged to home on either Pradaxa or Eliquis and he wanted to have pt's copay cost checked  Prescription received and sent to [employer] to have pt's copay cost checked  Received phone call from Crispin from [facility] who stated that both medications require a prior authorization  She provided Cm with contact information for prior auth - phone [phone] and pt's id # is [id number]  Information regarding to above provided to Najma Smith and he stated that he will call and complete the prior auth

## 2018-04-27 NOTE — RESTORATIVE TECHNICIAN NOTE
Restorative Specialist Mobility Note       Activity: Ambulate in [place], Ambulate in room, Bathroom privileges, Chair, Dangle, Stand at bedside (Educated/encouraged pt to ambulate with assistance 3-4 x's/day  Bed alarm on   Pt callbell, phone/tray within reach )     Assistive Device: None       Mari HENDRICKS, Restorative Technician, [employer] [facility]

## 2018-04-30 ENCOUNTER — TRANSITIONAL CARE MANAGEMENT (OUTPATIENT)
Dept: INTERNAL MEDICINE CLINIC | Facility: CLINIC | Age: 53
End: 2018-04-30

## 2018-04-30 ENCOUNTER — APPOINTMENT (OUTPATIENT)
Dept: PHYSICAL THERAPY | Facility: CLINIC | Age: 53
End: 2018-04-30
Payer: COMMERCIAL

## 2018-04-30 ENCOUNTER — TELEPHONE (OUTPATIENT)
Dept: INTERNAL MEDICINE CLINIC | Facility: CLINIC | Age: 53
End: 2018-04-30

## 2018-04-30 ENCOUNTER — APPOINTMENT (OUTPATIENT)
Dept: OCCUPATIONAL THERAPY | Facility: CLINIC | Age: 53
End: 2018-04-30
Payer: COMMERCIAL

## 2018-04-30 ENCOUNTER — TELEPHONE (OUTPATIENT)
Dept: NEUROLOGY | Facility: CLINIC | Age: 53
End: 2018-04-30

## 2018-04-30 NOTE — TELEPHONE ENCOUNTER
candie from [facility] visiting nurse called to give update, she is doing outpatient physical therapy  She will go for bloodwork tomorrow morning

## 2018-05-01 ENCOUNTER — APPOINTMENT (OUTPATIENT)
Dept: LAB | Age: 53
End: 2018-05-01
Payer: COMMERCIAL

## 2018-05-01 DIAGNOSIS — I63.511 ACUTE ISCHEMIC RIGHT MCA STROKE (HCC): ICD-10-CM

## 2018-05-01 LAB
INR PPP: 1.33 (ref 0.86–1.16)
PROTHROMBIN TIME: 16.6 SECONDS (ref 12.1–14.4)

## 2018-05-01 PROCEDURE — 36415 COLL VENOUS BLD VENIPUNCTURE: CPT

## 2018-05-01 PROCEDURE — 85610 PROTHROMBIN TIME: CPT

## 2018-05-02 ENCOUNTER — ANTICOAG VISIT (OUTPATIENT)
Dept: INTERNAL MEDICINE CLINIC | Age: 53
End: 2018-05-02

## 2018-05-02 ENCOUNTER — APPOINTMENT (OUTPATIENT)
Dept: PHYSICAL THERAPY | Facility: CLINIC | Age: 53
End: 2018-05-02
Payer: COMMERCIAL

## 2018-05-02 DIAGNOSIS — I63.511 ACUTE ISCHEMIC RIGHT MCA STROKE (HCC): ICD-10-CM

## 2018-05-02 DIAGNOSIS — I63.511 ACUTE ISCHEMIC RIGHT MCA STROKE (HCC): Primary | ICD-10-CM

## 2018-05-02 RX ORDER — WARFARIN SODIUM 5 MG/1
TABLET ORAL
Qty: 45 TABLET | Refills: 5 | Status: SHIPPED | OUTPATIENT
Start: 2018-05-02 | End: 2018-06-05 | Stop reason: CLARIF

## 2018-05-02 RX ORDER — WARFARIN SODIUM 7.5 MG/1
TABLET ORAL
Qty: 45 TABLET | Refills: 5 | Status: SHIPPED | OUTPATIENT
Start: 2018-05-02 | End: 2018-05-02 | Stop reason: SDUPTHER

## 2018-05-02 RX ORDER — WARFARIN SODIUM 5 MG/1
TABLET ORAL
Qty: 45 TABLET | Refills: 5 | Status: CANCELLED | OUTPATIENT
Start: 2018-05-02

## 2018-05-02 NOTE — PROGRESS NOTES
Occupational Therapy Stroke Re-Evaluation    Today's Date: 5/3/2018  Patient Name: Ashley Molina  : 1965  MRN: [id number]  Referring Provider: Mariama Alegre MD  Dx: Acute ischemic right MCA stroke [facility]) [I63 [id number]]    Active Problem List:   Patient Active Problem List   Diagnosis    Acute ischemic right MCA stroke [facility])    Fall    Chronic back pain    Major depression    [facility]A [facility]cerebral vascular accident) ([facility] [address] )    Hypercholesterolemia    Chronic low back pain    Cervical post-laminectomy syndrome    Cervical radiculopathy    History of ischemic right MCA stroke    Dependence on nicotine from cigarettes     Past Medical Hx:   Past Medical History:   Diagnosis Date    Acute pain of right knee     last assessed - [id number]    Anemia     Anxiety     Cervical disc disorder with radiculopathy     Chronic pain     Constipation     CVA [facility]cerebral vascular accident) ([facility] [address] )     H/O ischemic right MCA stroke     Hematuria     last assessed - [id number]    High cholesterol     Lumbar radiculopathy     Lumbar stenosis     Other chronic pain     last assessed - [id number]    Other muscle spasm     last assessed - [id number]    PONV (postoperative nausea and vomiting)     must have premed    Spondylosis of cervical spine     Spondylosis of lumbosacral region     Stroke [facility])     Urinary incontinence     Resolved - [id number]     Past Surgical Hx:   Past Surgical History:   Procedure Laterality Date    BLADDER SURGERY      BLADDER SURGERY      CERVICAL SPINE SURGERY      KNEE ARTHROSCOPY Left     LIPECTOMY      of thigh    LIPOMA RESECTION      NECK SURGERY      OK COLONOSCOPY FLX DX W/COLLJ SPEC WHEN PFRMD N/A 2/10/2017    Procedure: COLONOSCOPY;  Surgeon: Toni Edmonds MD;  Location: [facility] GI LAB;   Service: Gastroenterology    OK KNEE SCOPE,MED/LAT MENISECTOMY Left 3/21/2016    Procedure: ARTHROSCOPY W/ PARTIAL MEDIAL MENISCECTOMY;  Surgeon: Satya Sosa MD;  Location:  MAIN OR;  Service: Orthopedics    TOTAL ABDOMINAL HYSTERECTOMY      TUBAL LIGATION        Pain Levels:   Restin    With Activity:  0    Subjective/Patient Goal: "To  where we left off"    History of Present Illness:  Pt is a flat, employed full time, 47 y  o  female seen for OT eval s/p referred to [address] [facility] s/p recently d/c'd from [facility], initially presented to B w/ L sided facial droop, limited movement of L side, R gaze preference stroke alert initiated, CTB + evolving R MCA territory infarction, MRIB + multiple infarcts in the R cerebral hemisphere in the distribution of the R MCA, deep infarct involving the basal ganglia, cortical infarcts involving the frontal and parietal region and petechial hemorrhage in the infarct noted in the R lentiform nucleus, pt received TPA s/p thrombectomy 2018, managed in the ICU tferred to P7, ultimately dx'd w/ R MCA CVA s/p TPA and thrombectomy, L elbow contusion, forehead contusion, and suicidal ideation, comorbidities as listed above  Of note, pt is a re-evaluation 2* recently adm to B for subsequent CVA, awoke prior to therapy and  noted LUE weakness, facial droop, adm to [facility] for r/o TIA/CVA, MRIB +  Evolving areas of acute infarction within the right temporal and parietal lobes, consistent with the CTA findings of right MCA occlusion, dx'd w/ acute R temporal and parietal lobes, old R MCA occlusion  Pt not a TPA candidate since received last stroke, Status post right M2 endovascular thrombectomy on 2018, evaluated by OT/PT recommended continued outpt OT  Now seen for OT re-eval      Lifestyle Performance Model:  Autonomy: Pt was I w/ I/ADLs, drove, & required no use of DME PTA  Reciprocal Relationships: Supportive  works FT during am hours, is currently in slow season so presently hours are more flexible, two daughters 29 and 28 and 5 grandchildren    Service to Others: Pt is employed full time at the [employer] shift   Intrinsic Gratification: Enjoys baking, gardening, walking, being outdoors, going to [address] [place] [address] Ave: Pt lives in a [facility] w/ first floor setup w/ 1 [address] in [facility]    Objective  Impairments Section:   UE Strength:   CECY: RUE: 55/200 LUE: 30/200   PINCER: 3 point pinch: RUE 13, LUE:7   2 point pinch: RUE:  9, LUE:7   Lateral pincher: RUE: 12, LUE: 9    Coordination:   9 HOLE PEG TEST:     RUE: 22 8 seconds, LUE: 25 2 Seconds    Range of Motion:  AROM:              Shoulder elevation: B/l UE full/intact              Shoulder FF: B/l UE full/intact              Shoulder ABD: B/l UE full/intact              ER/IR: B/l UE full/intact              Elbow ext/flex: B/l UE full/intact              Sup/Pron: B/l UE full/intact              Wrist flex/ext: B/l UE full/intact              Composite: B/l UE full/intact              Hook: B/l UE full/intact              Opposition: B/l UE full/intact              Finger to nose: B/l UE full/intact              Dysdiadochokinesia: B/l UE full/intact     PROM:              Shoulder elevation:  B/l UE full/intact              Shoulder FF:B/l UE full/intact              Shoulder ABD: B/l UE full/intact              ER/IR: B/l UE full/intact              Elbow ext/flex: B/l UE full/intact              Sup/Pron: B/l UE full/intact              Wrist flex/ext: B/l UE full/intact    Sensation:  MYOFILAMENTS:              RUE: 3 61              LUE: 3 61    Visual Perceptual and Functional Cognition:  1  Convergence Insufficiency Symptom Survey (CISS): 45/60 FAIL    2   Concussion Cognitive Checklist: self report symptom checklist  *Patient indicated that she is experiencing the following symptoms:    · Memory: Remembering your schedule and Learning new things    · Attention: Keeping attention during a conversation, Focusing or concentrating on a specific task, Sustaining attention on a task and Dividing your attention (i e , multi-tasking)    · Processing: Processing new information  and Responding to questions in a timely manner    · Executive Functions: Monitoring your own ideas, behaviors, and/or emotions, Organizing/ planning written work, emails, and/or daily tasks, Self-correcting or recognizing mistakes, Initiating tasks and Setting goals    · Communication: Expressing thoughts and ideas fluently, Expressing thoughts and ideas into writing and Managing tone and volume of voice    · Visual: Losing spot on the page when reading, Misspelling while texting/email and Change in handwriting  0  · Emotional: Increased anxiety, Frustration tolerance and Keeping cognitive and physical pace    · Increased Sensitivities to: Sight and Computer screen time/movies/TV     3  Contextual Memory Test (CMT): Pt reports has not noticed a change in her memory, rates her memory capacity at 75%, if studied 20 objects for 90 seconds would recall 16 of them, would have recalled 16 of them pre injury, frequently forgets things that happened the day before 25% of the time, forgets important details 25% of the time, frequently forgets things people have told her 25% of the time, frequently forgets things that happened a few minutes ago 25% of the time, would remember facts about this form a week from now  IMMEDIATE RECALL:     score falls  in WNL/WFL deficit range    DELAYED RECALL:   score falls in WNL/WFL deficit range   RECOGNITION:   with 0 confabulations resulting in score 16/20     4  [place]ette Cognitive Assessment Version 7 3: (MoCA 8 1 completed on I E   indicative of mild neurocognitive impairments, R E  On 7 2 normal neurocognitive functioning)   Now engaged in V7 3:  Visuospatial/executive functionin/5  Naming: 3/3  Memory: 1st trial: , 2nd trial:   Attention/concentration:   List of letters:   Serial Seven Subtraction: 3/3 w/ 0 errors  Language/sentence repetition: /  Language Fluency:   Abstract/Correlational Thinkin/2  Delayed Recall:   Orientation:    Memory Index Score: 14/15  MoCA V1 8 1 Raw Score: 29/30, MIS: 14/15, indicative of normal neurocognitive functioning  5  Vision Screening Recording Form:   vision screen: glasses @ all times present for vision screen  near acuity: R 20/50, L 20/50   binocularity far: orthophoria  Binocularity near: orthophoria  red green fusion: PLRG @ 12"   near point of convergence: diplopia @ 4"   yousif string: alignment  pursuits: smooth in all planes   saccades: accurate in all planes   ocular ROM: intact/full  Visual Perceptual Midline shift:  Shifted to the L of midline and above horizon    6  Anxiety/Depression:  Pt engaged in the Neuro QOL Anxiety Short Form and Neuro QOL Depression Short Form in order to screen for anxiety and depressive s/s  Pt reported the following:  Anxiety- Short Form:   --used to measure anxious s/s  For scoring purposes, scores of 25+ indicate the threshold for treatment per neurology/SLIM  Score:32/40  Pt most often chose the response often when asked about feeling anxious s/s  Depression- Short Form:   --used to measure depressive s/s  For scoring purposes, scores of 25+ indicate the threshold for treatment per neurology/SLIM  Score:19/40  Pt most often chose the response often when asked about feeling depressive s/s  Assessment/Plan  Occupational Therapy Skilled Analysis Assessment and Plan of Care:  Pt requires overall mod I for ADLs/self care and mod I for fx'l mobility w/o DME   Pt is currently demonstrating the following occupational deficits: limited 2* impaired STM/immediate delayed recall, depressed mood, impaired executive functioning, attention/concentration to task, delayed processing, impaired high level balance, LUE hemiparesis distal>proximal w/ impaired FMC/FMS/GMC/GMS, impaired prehension/precision, LUE dyscoordination w/ ataxia apraxia dysmetria, L inattention, no diplopia/teaming/fusion, dysmetric pursuits w/ jerky rebounds + nystagmus, shifted to the L of midline and above horizon  The following Occupational Performance Areas to address include: medication management, socialization, health maintenance, functional mobility, community mobility, clothing management, cleaning, meal prep, money management, household maintenance, care of children, care of pets, job performance/volunteering and social participation  Based on the aforementioned OT evaluation, functional performance deficits, and assessments, pt has been identified as a moderate complexity evaluation  Pt to continue to benefit from outpatient skilled OT services to address the following goals 2x/wk to  w/in 4 more weeks with special focus on self-care management, pt education,  and VM training as well as motor training to improve above defiicits and enhance overall QOL/function      Goals:  Short Term Goals:  Cognition/Vision:  · Pt will increase auditory processing to take notes while listening in multi-modal work related/classroom symptom free at baseline performance for improved work/school performance, once returned  4 weeks as applicable-PARTIALLY MET  · Pt will increase attention to 2+ tasks for improved work/school performance (once returned) and engagement in salient tasks 4 weeks as applicable-PARTIALLY MET  · Pt will increase temporal awareness for keeping to schedule within 5 min increments, recall appointments, functional addition of time with 80% accuracy 4 weeks-PARTIALLY MET  · Pt will demo good carryover of internal and external memory aides for improved recall of daily events, improved executive functioning with 80% accuracy in 4 weeks-MET  · Pt will increase insight into deficits for improved carryover of recommendations, accommodations, improved rate of healing 4 weeks-MET  · Pt will increase oculomotor control for improved saccades, con/divergent tasks for improved reading, board to table tasks with minimal increase in symptoms 4 weeks-PARTIALLY MET  · Pt will tolerate multi-modal envt x 15 min with 80% accuracy of cog load and min increase of symptoms of 2 levels in HA/dizziness/nausea 4 weeks-PARTIALLY MET  Coordination:  · Pt will increase prehension patterns for improved tripod with utensil management with <20% droppage 4 weeks-PARTIALLY MET  · Pt will increase rate of manipulation for all FM tests for improved functional performance with salient tasks 4 weeks-PARTIALLY MET  · Pt will increase automaticity of LUE  to 50% for improved grasp release of tabletop items for improved functional performance with salient tasks 4 weeks-PARTIALLY MET  ROM/Function:  · Pt will demo with G carryover of Home Exercise Program to improve functional progression towards goals in Plan of care and for improved functional use of  LUE 4 weeks-MET  · Pt will increase LUE  to refined functional assist with <20% cuing for tabletop tasks for improved functional performance of life roles and salient tasks 4-MET     Long Term Goals:  · Pt will increase attention to 3+ tasks for improved divided attention with work/school and pre driving roles as applicable-PARTIALLY MET  · Pt will increase verbal and written direction following with processing time of <1 min and 85% accuracy-MET  · Pt will tolerate multimodal envt x 60 min symptom free for return to work/school-PARTIALLY MET  · Pt will demo with decreased anxiety and frustration for improved insight into concussion process and rate of recovery-MET  · Pt will demo with G carryover and understanding of accommodations for school environment to allow for enhanced learning environment symptom free, if needed-MET  · Pt will increase oculomotor control for improved dynamic activities with head turns, board/screen to table tasks symptom free, improved VMI and return to baseline handwriting-PARTIALLY MET  · Pt will increase oculomotor control for WNL saccades, con/divergent tasks symptom free-PARTIALLY MET  · Pt will increase screen tolerance to 3 hours with min increase in HA by 1-2 levels for improved leisure pursuits and work/school performance as applicable-MET  Coordination:  · Decrease ataxia with functional reach for normalized movement pattern of  LUE-MET  · Increase automaticity of  LUE to 100% for resumption of B integrative tasks-MET  · Pt will increase rate of prehension for all FM tasks for improved use of utensils, writing, ADL fasteners-MET  · Pt will increase prehension for all FM tasks for improved independence with I/ADL/leisure tasks including utensils, writing, ADL fasteners-MET  ROM/Function:  · Increase func mobs from various surfaces to Mod I/ I with least restrictive device and good safety-MET  · Resume hand dominance to refined mod I functional assist with all I/ADL/leisure tasks-MET  · Pt will increase B/L UE strength to 5/5 and  strength, through the use of strengthening exercises and home program for eventual return to driving PRN-MET  · Pt will increase FMC to Mod I with ADL fasteners for increased independence-MET  · Pt will resume hand dominance with I status for all activities of daily living and eventual return to driving PRN-MET     INTERVENTION COMMENTS:  Diagnosis: R MCA CVA s/p TPA and thrombectomy, acute R temporal and parietal lobes, old R MCA occlusion  Precautions: fall risk, depression, suicidal ideations, tobacco use/abuse  FOTO: 88 with 12% limitation  Insurance: [employer]  2 of 8 visits, PN due 6/3/2018     Thank you for the consult!   Please call if you have any questions: [phone]  Rosangela Olivera, OSKAR, OTR/L, C-GCM, CSRS  Director of Outpatient Neuro Occupational Therapy

## 2018-05-03 ENCOUNTER — EVALUATION (OUTPATIENT)
Dept: OCCUPATIONAL THERAPY | Facility: CLINIC | Age: 53
End: 2018-05-03
Payer: COMMERCIAL

## 2018-05-03 ENCOUNTER — HOSPITAL ENCOUNTER (OUTPATIENT)
Dept: RADIOLOGY | Age: 53
Discharge: HOME | End: 2018-05-03
Payer: COMMERCIAL

## 2018-05-03 DIAGNOSIS — I63.511 ACUTE ISCHEMIC RIGHT MCA STROKE (HCC): ICD-10-CM

## 2018-05-03 DIAGNOSIS — I63.511 ACUTE ISCHEMIC RIGHT MCA STROKE (HCC): Primary | ICD-10-CM

## 2018-05-03 PROCEDURE — 97530 THERAPEUTIC ACTIVITIES: CPT

## 2018-05-03 PROCEDURE — 70450 CT HEAD/BRAIN W/O DYE: CPT

## 2018-05-04 ENCOUNTER — ANTICOAG VISIT (OUTPATIENT)
Dept: INTERNAL MEDICINE CLINIC | Age: 53
End: 2018-05-04

## 2018-05-04 ENCOUNTER — TRANSCRIBE ORDERS (OUTPATIENT)
Dept: LAB | Age: 53
End: 2018-05-04

## 2018-05-04 ENCOUNTER — TELEPHONE (OUTPATIENT)
Dept: INTERNAL MEDICINE CLINIC | Facility: CLINIC | Age: 53
End: 2018-05-04

## 2018-05-04 ENCOUNTER — APPOINTMENT (OUTPATIENT)
Dept: LAB | Age: 53
End: 2018-05-04
Payer: COMMERCIAL

## 2018-05-04 DIAGNOSIS — I63.511 ARTERIAL ISCHEMIC STROKE, MCA (MIDDLE CEREBRAL ARTERY), RIGHT, ACUTE (HCC): Primary | ICD-10-CM

## 2018-05-04 LAB
INR PPP: 1.76 (ref 0.86–1.16)
PROTHROMBIN TIME: 20.7 SECONDS (ref 12.1–14.4)

## 2018-05-04 PROCEDURE — 85610 PROTHROMBIN TIME: CPT

## 2018-05-04 PROCEDURE — 36415 COLL VENOUS BLD VENIPUNCTURE: CPT

## 2018-05-07 ENCOUNTER — ANTICOAG VISIT (OUTPATIENT)
Dept: INTERNAL MEDICINE CLINIC | Age: 53
End: 2018-05-07

## 2018-05-07 ENCOUNTER — OFFICE VISIT (OUTPATIENT)
Dept: INTERNAL MEDICINE CLINIC | Facility: CLINIC | Age: 53
End: 2018-05-07
Payer: COMMERCIAL

## 2018-05-07 ENCOUNTER — APPOINTMENT (OUTPATIENT)
Dept: LAB | Age: 53
End: 2018-05-07
Payer: COMMERCIAL

## 2018-05-07 ENCOUNTER — OFFICE VISIT (OUTPATIENT)
Dept: OCCUPATIONAL THERAPY | Facility: CLINIC | Age: 53
End: 2018-05-07
Payer: COMMERCIAL

## 2018-05-07 VITALS
BODY MASS INDEX: 23.63 KG/M2 | TEMPERATURE: 98.1 F | OXYGEN SATURATION: 98 % | SYSTOLIC BLOOD PRESSURE: 100 MMHG | HEART RATE: 72 BPM | WEIGHT: 128.4 LBS | HEIGHT: 62 IN | DIASTOLIC BLOOD PRESSURE: 64 MMHG

## 2018-05-07 DIAGNOSIS — M54.41 CHRONIC LOW BACK PAIN WITH BILATERAL SCIATICA, UNSPECIFIED BACK PAIN LATERALITY: ICD-10-CM

## 2018-05-07 DIAGNOSIS — E78.00 HYPERCHOLESTEROLEMIA: ICD-10-CM

## 2018-05-07 DIAGNOSIS — M54.42 CHRONIC LOW BACK PAIN WITH BILATERAL SCIATICA, UNSPECIFIED BACK PAIN LATERALITY: ICD-10-CM

## 2018-05-07 DIAGNOSIS — F17.210 CIGARETTE NICOTINE DEPENDENCE WITHOUT COMPLICATION: ICD-10-CM

## 2018-05-07 DIAGNOSIS — M54.12 CERVICAL RADICULOPATHY: ICD-10-CM

## 2018-05-07 DIAGNOSIS — M96.1 CERVICAL POST-LAMINECTOMY SYNDROME: ICD-10-CM

## 2018-05-07 DIAGNOSIS — I63.511 ACUTE ISCHEMIC RIGHT MCA STROKE (HCC): Primary | ICD-10-CM

## 2018-05-07 DIAGNOSIS — G89.29 CHRONIC LOW BACK PAIN WITH BILATERAL SCIATICA, UNSPECIFIED BACK PAIN LATERALITY: ICD-10-CM

## 2018-05-07 LAB
INR PPP: 2.7 (ref 0.86–1.16)
PROTHROMBIN TIME: 29 SECONDS (ref 12.1–14.4)

## 2018-05-07 PROCEDURE — 99496 TRANSJ CARE MGMT HIGH F2F 7D: CPT | Performed by: FAMILY MEDICINE

## 2018-05-07 PROCEDURE — 36415 COLL VENOUS BLD VENIPUNCTURE: CPT

## 2018-05-07 PROCEDURE — 97535 SELF CARE MNGMENT TRAINING: CPT

## 2018-05-07 PROCEDURE — 85610 PROTHROMBIN TIME: CPT

## 2018-05-07 NOTE — PROGRESS NOTES
Assessment/Plan:    No problem-specific Assessment & Plan notes found for this encounter  Problem List Items Addressed This Visit        Cardiovascular and Mediastinum    Acute ischemic right MCA stroke (HCC) - Primary       Nervous and Auditory    Cervical radiculopathy       Other    Hypercholesterolemia    Chronic low back pain    Cervical post-laminectomy syndrome    Dependence on nicotine from cigarettes        Discussed hospital course with her and her sister  Questions and concerns addressed today to the best my ability  Today's lab work for INR is still pending  Hypercoagulable studies were negative  Incidentally patient states that several years ago when she was falling with previous physicians she was told she had a small clot on the right side of her head but further than that she cannot give me any details  Patient is now status post thrombectomy x2 during both instances of strokes  range for Coumadin should be between 2 and 3  Patient is interested in changing to Pradaxa or Eliquis and I advised her to discuss with neurology and cardiology regarding this which  Several questions regarding Lyrica addressed today  She may be interested in stopping Lyrica since that was the only thing new prior to her strokes  Attempted to find studies and FTA data on increased risk of stroke for Lyrica but unsuccessful during this office visit  Patient states that it did help her with her symptoms and she is currently taking it so would like to not stop it but we will if that contributed to her stroke  Side effects reviewed today with patient  Currently stable with pravastatin and tolerating well  Due for labs for next visit  Suggest lowering LDL  Last labs reviewed  Discussed smoking in great detail  I highly advised her to stop smoking ASAP  Continue with aspirin  Recommended writing therapy, reading the newspaper or books, doing word search puzzles and crossword puzzles    Discussed life alert and no driving or work until cleared by Neurology  Subjective:      Patient ID: Geraldo Mondragon is a 47 y o  female  HPI    "Ms Ramiro Silverio a 47 y  o  female with past medical history significant for right MCA stroke in February 2018 treated with tPA at that time, she presented to the emergency department on 04/25 after awaking in the morning at 04:30 with complaints of dysarthria, left-sided facial droop and weakness of the left arm and left leg   Stroke alert was called at that time and a CT of the head was performed that did not show bleed   Patient was not a candidate for tPA secondary to recent stroke and a CT angiogram showed a thrombus in the M2 segment for which she was sent to endovascular thrombectomy with Dr Sara Morales on presentation, patient was noted to have weakness of the left arm and leg as well as a left-sided facial droop and slurred speech   These deficits slowly improved through her stay in the intensive care unit      Patient was transferred out of the ICU and had no residual deficit, decision was made by Neurology to start the patient on anticoagulation, due to insurance issues the patient was started on warfarin because other options for anticoagulation were not approved, the patient was also discharged on bridging Lovenox, she will have an INR in 3 days and dose of Coumadin will be adjusted by her PCP, patient was given a script for CT of the head in 1 week and will follow up with Neurology as outpatient "        patient states she is not having any falling or headache or stroke-like symptoms since she has been home  Tolerating the Coumadin without any problems and finished with her Lovenox dose 3 days ago  Lab work from today is pending  She still has some forgetfulness and visual disturbance in needs to find an eye doctor for an appointment    Previously she was only seen an optometrist   She has been doing her physical therapy and occupational therapies and feels that she is slightly improved   on her left hand is still slightly decreased but overall she feels better except for the emotional stress of her 2 strokes  She is very terrified to go outside the house or be alone at any time    She lives with her  who does work so she is home alone while he is working but he calls her every hour           The following portions of the patient's history were reviewed and updated as appropriate: allergies, current medications, past family history, past medical history, past social history, past surgical history and problem list     Review of Systems      Constitutional:  Denies fever or chills   Eyes:  Denies new changes in visual acuity   HENT:  Denies nasal congestion or sore throat   Respiratory:  Denies cough or shortness of breath or wheezing  Cardiovascular:  Denies palpitations or chest pain  GI:  Denies abdominal pain, nausea, or vomiting  Integument:  Denies rash   Neurologic:  Denies headache or focal weakness,  No dizziness or changes in mental status        Objective:      /64 (BP Location: Left arm, Patient Position: Sitting, Cuff Size: Adult)   Pulse 72   Temp 98 1 °F (36 7 °C) (Oral)   Ht 5' 1 5" (1 562 m)   Wt 58 2 kg (128 lb 6 4 oz)   LMP  (LMP Unknown)   SpO2 98%   BMI 23 87 kg/m²          Physical Exam    Constitutional:  Well developed, well nourished, no acute distress, non-toxic appearance   Eyes:  PERRL, conjunctiva normal , non icteric sclera  HENT:  Atraumatic, oropharynx moist  Neck-  supple   Respiratory:  CTA b/l, normal breath sounds, no rales, no wheezing   Cardiovascular:  RRR, no murmurs, no LE edema b/l  GI:  Soft, nondistended, normal bowel sounds x 4, nontender, no organomegaly, no mass, no rebound, no guarding   Neurologic:  no focal deficits noted , hand  5/5 b/l with very mild decrease L hand  Psychiatric:  Speech and behavior appropriate , AAO x 3  MS: gait without any issues

## 2018-05-07 NOTE — PROGRESS NOTES
Daily Note     Today's date: 2018  Patient name: Moose Stubbs  : 1965  MRN: [id number]  Referring provider: Dunia Palma MD  Dx:   Encounter Diagnosis   Name Primary?  Acute ischemic right MCA stroke (HCC) Yes                  Subjective: "They couldn't read what amount I wrote on the check "      Objective: See treatment diary below      Assessment: Tolerated treatment well  Patient requested to leave session 15 minutes early due to having another appointment scheduled with PCP  Provided patient with handwriting worksheets as patient displays micrographia and has had difficulty writing checks  Noted with improvement in size of letters when provided with simulated check writing task  Noted with one error while writing out check and wrote for the wrong amount and required verbal prompt to correct  Initiated theraputty HEP and will continue next session to complete       Plan: Continued skilled OT per POC with focus on multitasking, handwriting, and divided attention     INTERVENTION COMMENTS:  Diagnosis: Acute ischemic right MCA stroke ([facility] [address] ) [I63 [id number]]  Precautions: fall risk, depression, suicidal ideations, tobacco use/abuse  FOTO:  3 of 8 visits, PN due

## 2018-05-09 ENCOUNTER — OFFICE VISIT (OUTPATIENT)
Dept: OCCUPATIONAL THERAPY | Facility: CLINIC | Age: 53
End: 2018-05-09
Payer: COMMERCIAL

## 2018-05-09 DIAGNOSIS — I63.511 ACUTE ISCHEMIC RIGHT MCA STROKE (HCC): Primary | ICD-10-CM

## 2018-05-09 PROCEDURE — 97110 THERAPEUTIC EXERCISES: CPT

## 2018-05-09 PROCEDURE — 97112 NEUROMUSCULAR REEDUCATION: CPT

## 2018-05-09 NOTE — PROGRESS NOTES
Daily Note     Today's date: 2018  Patient name: Reyes Burr  : 1965  MRN: [id number]  Referring provider: Joey Lugo MD  Dx:   Encounter Diagnosis   Name Primary?  Acute ischemic right MCA stroke (HCC) Yes                  Subjective: "I don't notice that my hands are weak"  Objective: See treatment diary below      Assessment: Tolerated treatment well  Patient would benefit from continued OT  Completed Tputty WHEP with pt, G carryover as demo- in participation in exercise  Pt engaged in fm and hand strengthening tasks with min difficulty transferring marbles to power web using resistive clamp with min difficulty for sustained grasp, no noted droppage  Plan: Continued skilled OT per POC with focus on fm, b/l coordination and handwriting      INTERVENTION COMMENTS:  Diagnosis: Acute ischemic right MCA stroke (HCC) [I63 511]  Precautions: fall risk, depression, suicidal ideations, tobacco use/abuse  4 of 8 visits, PN due

## 2018-05-10 ENCOUNTER — APPOINTMENT (OUTPATIENT)
Dept: LAB | Age: 53
End: 2018-05-10
Payer: COMMERCIAL

## 2018-05-10 ENCOUNTER — ANTICOAG VISIT (OUTPATIENT)
Dept: INTERNAL MEDICINE CLINIC | Age: 53
End: 2018-05-10

## 2018-05-10 LAB
INR PPP: 3.26 (ref 0.86–1.16)
PROTHROMBIN TIME: 33.7 SECONDS (ref 12.1–14.4)

## 2018-05-10 PROCEDURE — 85610 PROTHROMBIN TIME: CPT

## 2018-05-10 PROCEDURE — 36415 COLL VENOUS BLD VENIPUNCTURE: CPT

## 2018-05-14 ENCOUNTER — TRANSCRIBE ORDERS (OUTPATIENT)
Dept: LAB | Age: 53
End: 2018-05-14

## 2018-05-14 ENCOUNTER — ANTICOAG VISIT (OUTPATIENT)
Dept: INTERNAL MEDICINE CLINIC | Facility: CLINIC | Age: 53
End: 2018-05-14

## 2018-05-14 ENCOUNTER — TELEPHONE (OUTPATIENT)
Dept: INTERNAL MEDICINE CLINIC | Age: 53
End: 2018-05-14

## 2018-05-14 ENCOUNTER — APPOINTMENT (OUTPATIENT)
Dept: LAB | Age: 53
End: 2018-05-14
Payer: COMMERCIAL

## 2018-05-14 ENCOUNTER — OFFICE VISIT (OUTPATIENT)
Dept: OCCUPATIONAL THERAPY | Facility: CLINIC | Age: 53
End: 2018-05-14
Payer: COMMERCIAL

## 2018-05-14 DIAGNOSIS — I63.511 ARTERIAL ISCHEMIC STROKE, MCA (MIDDLE CEREBRAL ARTERY), RIGHT, ACUTE (HCC): Primary | ICD-10-CM

## 2018-05-14 DIAGNOSIS — I63.511 ACUTE ISCHEMIC RIGHT MCA STROKE (HCC): Primary | ICD-10-CM

## 2018-05-14 LAB
INR PPP: 2.5 (ref 0.86–1.16)
PROTHROMBIN TIME: 27.3 SECONDS (ref 12.1–14.4)

## 2018-05-14 PROCEDURE — 85610 PROTHROMBIN TIME: CPT

## 2018-05-14 PROCEDURE — 97535 SELF CARE MNGMENT TRAINING: CPT

## 2018-05-14 PROCEDURE — 36415 COLL VENOUS BLD VENIPUNCTURE: CPT

## 2018-05-14 NOTE — TELEPHONE ENCOUNTER
[facility] lab in [place] called to get a standing order for this patient for her PT/INR    Patient had and order from when she was d/c from hospital so we never got her inr's    Patient had it drawn today and if we can put this in next time then we will get results

## 2018-05-14 NOTE — PROGRESS NOTES
Daily Note     Today's date: 2018  Patient name: Ismael Johns  : 1965  MRN: [id number]  Referring provider: Talia Montez MD  Dx:   Encounter Diagnosis   Name Primary?  Acute ischemic right MCA stroke (HCC) Yes                  Subjective: "My sister always calls and distracts me "      Objective: See treatment diary below      Assessment: Tolerated treatment fair  Alternating between 2 tasks including visual scanning and handwriting  Encouraged large printing for increased letter formation  Noted difficulty with E, R, and I's  Multitasking with color-by-number and repeating 3 words in forward order, backward order, and alphabetical order  Noted with only 3 errors out of 25 trials  Patients  reported that he read over patients checkbook and noted that it was off by "a couple thousand dollars " Provided patient with math skills worksheets for home      Plan: Continued skilled OT per POC with focus on multi tasking, money management, handeriting    INTERVENTION COMMENTS:  Diagnosis: Acute ischemic right MCA stroke (HCC) [I63 511]  Precautions: fall risk, depression, suicidal ideations, tobacco use/abuse  FOTO:  5 of 8 visits, PN due

## 2018-05-16 DIAGNOSIS — I63.511 ACUTE ISCHEMIC RIGHT MCA STROKE (HCC): ICD-10-CM

## 2018-05-17 ENCOUNTER — OFFICE VISIT (OUTPATIENT)
Dept: OCCUPATIONAL THERAPY | Facility: CLINIC | Age: 53
End: 2018-05-17
Payer: COMMERCIAL

## 2018-05-17 DIAGNOSIS — I63.511 ACUTE ISCHEMIC RIGHT MCA STROKE (HCC): Primary | ICD-10-CM

## 2018-05-17 PROCEDURE — 97112 NEUROMUSCULAR REEDUCATION: CPT

## 2018-05-17 PROCEDURE — 97530 THERAPEUTIC ACTIVITIES: CPT

## 2018-05-17 NOTE — PROGRESS NOTES
Daily Note     Today's date: 2018  Patient name: Vickey Leo  : 1965  MRN: [id number]  Referring provider: Mehrdad Garcia MD  Dx:   Encounter Diagnosis   Name Primary?  Acute ischemic right MCA stroke (HCC) Yes                  Subjective: "I'm eight days smoke free, theres not much I can eat though"  Objective: See treatment diary below      Assessment: Tolerated treatment well  Patient would benefit from continued OT  Pt reported difficulties with word finding and comprehension  SCHAEFER recommended/requested OTR to submit ST referral  Pt completed money identification worksheets focusing on mental math, fm and palm to digit translation  Pt with 100% accuracy, no noted droppage, min difficulty manipulating coin from palm to index finger utilizing D1 and dropping into slotted container  Pt stated this worksheet was easier for her to follow because the coins were contained in the small boxes  Pt engaged in matrix task focusing on mental math, visual persuits and sustained attention task  Pt attended to task completing 50% of task in 10 min with 2 errors able to self correct, min difficulty to hold place on board  Pt continues to struggle with time mngt and setting barriers for self during recovery period        Plan: Continued skilled OT per POC with focus on functional cog, fm and ocularmotor    INTERVENTION COMMENTS:  Diagnosis: Acute ischemic right MCA stroke (HCC) [I63 511]  Precautions: fall risk, depression, suicidal ideations, tobacco use/abuse  6 of 8 visits, PN due

## 2018-05-18 DIAGNOSIS — I69.328 SPEECH AND LANGUAGE DEFICIT AS LATE EFFECT OF STROKE: Primary | ICD-10-CM

## 2018-05-20 RX ORDER — PRAVASTATIN SODIUM 40 MG
40 TABLET ORAL
Qty: 30 TABLET | Refills: 2 | OUTPATIENT
Start: 2018-05-20

## 2018-05-21 ENCOUNTER — ANTICOAG VISIT (OUTPATIENT)
Dept: INTERNAL MEDICINE CLINIC | Facility: CLINIC | Age: 53
End: 2018-05-21

## 2018-05-21 ENCOUNTER — APPOINTMENT (OUTPATIENT)
Dept: OCCUPATIONAL THERAPY | Facility: CLINIC | Age: 53
End: 2018-05-21
Payer: COMMERCIAL

## 2018-05-21 ENCOUNTER — APPOINTMENT (OUTPATIENT)
Dept: LAB | Age: 53
End: 2018-05-21
Payer: COMMERCIAL

## 2018-05-21 DIAGNOSIS — I63.511 ACUTE ISCHEMIC RIGHT MCA STROKE (HCC): ICD-10-CM

## 2018-05-21 LAB
INR PPP: 4.15 (ref 0.86–1.17)
PROTHROMBIN TIME: 40.1 SECONDS (ref 11.8–14.2)

## 2018-05-21 PROCEDURE — 85610 PROTHROMBIN TIME: CPT

## 2018-05-21 PROCEDURE — 36415 COLL VENOUS BLD VENIPUNCTURE: CPT

## 2018-05-22 ENCOUNTER — EVALUATION (OUTPATIENT)
Dept: OCCUPATIONAL THERAPY | Facility: CLINIC | Age: 53
End: 2018-05-22
Payer: COMMERCIAL

## 2018-05-22 DIAGNOSIS — I63.511 ACUTE ISCHEMIC RIGHT MCA STROKE (HCC): Primary | ICD-10-CM

## 2018-05-22 PROCEDURE — G8990 OTHER PT/OT CURRENT STATUS: HCPCS | Performed by: OCCUPATIONAL THERAPIST

## 2018-05-22 PROCEDURE — G8991 OTHER PT/OT GOAL STATUS: HCPCS | Performed by: OCCUPATIONAL THERAPIST

## 2018-05-22 PROCEDURE — 97535 SELF CARE MNGMENT TRAINING: CPT | Performed by: OCCUPATIONAL THERAPIST

## 2018-05-22 NOTE — PROGRESS NOTES
Occupational Therapy Stroke Re-Evaluation    Today's Date: 2018  Patient Name: Norberto Reina  : 1965  MRN: [id number]  Referring Provider: Cassie Decker MD  Dx: No primary diagnosis found  Active Problem List:   Patient Active Problem List   Diagnosis    Acute ischemic right MCA stroke ([facility] )    Fall    Chronic back pain    Major depression    [facility]A [facility] accident[facility]    Hypercholesterolemia    Chronic low back pain    Cervical post-laminectomy syndrome    Cervical radiculopathy    History of ischemic right MCA stroke    Dependence on nicotine from cigarettes     Past Medical Hx:   Past Medical History:   Diagnosis Date    Acute pain of right knee     last assessed - [id number]    Anemia     Anxiety     Cervical disc disorder with radiculopathy     Chronic pain     Constipation     [facility]A [facility] accident) [facility]     H/O ischemic right MCA stroke     Hematuria     last assessed - [id number]    High cholesterol     Lumbar radiculopathy     Lumbar stenosis     Other chronic pain     last assessed - [id number]    Other muscle spasm     last assessed - [id number]    PONV (postoperative nausea and vomiting)     must have premed    Spondylosis of cervical spine     Spondylosis of lumbosacral region     Stroke [facility])     Urinary incontinence     Resolved - [id number]     Past Surgical Hx:   Past Surgical History:   Procedure Laterality Date    BLADDER SURGERY      BLADDER SURGERY      CERVICAL SPINE SURGERY      KNEE ARTHROSCOPY Left     LIPECTOMY      of thigh    LIPOMA RESECTION      NECK SURGERY      ND COLONOSCOPY FLX DX W/COLLJ SPEC WHEN PFRMD N/A 2/10/2017    Procedure: COLONOSCOPY;  Surgeon: Fide Pop MD;  Location: [facility];   Service: Gastroenterology    ND KNEE SCOPE,MED/LAT MENISECTOMY Left 3/21/2016    Procedure: ARTHROSCOPY W/ PARTIAL MEDIAL MENISCECTOMY;  Surgeon: Gregory Mena MD;  Location[facility];  Service: Orthopedics    TOTAL ABDOMINAL HYSTERECTOMY      TUBAL LIGATION        Pain Levels:   Restin    With Activity:  0    Subjective/Patient Goal: "To  where we left off"    History of Present Illness:  Pt is a flat, employed full time, 47 y  o  female seen for OT eval s/p referred to [address] [facility] [facility] s/p recently d/c'd from [facility], initially presented to [facility] w/ L sided facial droop, limited movement of L side, R gaze preference stroke alert initiated, CTB + evolving R MCA territory infarction, MRIB + multiple infarcts in the R cerebral hemisphere in the distribution of the R MCA, deep infarct involving the basal ganglia, cortical infarcts involving the frontal and parietal region and petechial hemorrhage in the infarct noted in the R lentiform nucleus, pt received TPA s/p thrombectomy 2018, managed in the ICU tferred to P7, ultimately dx'd w/ R MCA CVA s/p TPA and thrombectomy, L elbow contusion, forehead contusion, and suicidal ideation, comorbidities as listed above  Of note, pt is a re-evaluation 2* recently adm to [facility] for subsequent CVA, awoke prior to therapy and  noted LUE weakness, facial droop, adm to [facility] for r/o TIA/CVA, MRIB +  Evolving areas of acute infarction within the right temporal and parietal lobes, consistent with the CTA findings of right MCA occlusion, dx'd w/ acute R temporal and parietal lobes, old R MCA occlusion  Pt not a TPA candidate since received last stroke, Status post right M2 endovascular thrombectomy on 2018, evaluated by OT/PT recommended continued outpt OT  Now seen for OT re-eval      Lifestyle Performance Model:  Autonomy: Pt was I w/ I/ADLs, drove, & required no use of DME PTA  Reciprocal Relationships: Supportive  works FT during am hours, is currently in slow season so presently hours are more flexible, two daughters 29 and 28 and 5 grandchildren  Service to Others: Pt is employed full time at the [employer] Gratification: Enjoys baking, gardening, walking, being outdoors, going to [address] [place] [address] Ave: Pt lives in a [facility] w/ first floor setup w/ 1 [address] in [address] [facility]    Objective     Functional Assessment     Comments  See impairment section for further details  Impairments Section:   UE Strength:   CECY: RUE: 55/200 LUE: 30/200    R 46, L 39   PINCER: 3 point pinch: RUE 13, LUE:7  R 12, L 10   2 point pinch: RUE:  9, LUE:7  R 7, L 5   Lateral pincher: RUE: 12, LUE: 9  R 12, L 8    Coordination:   9 HOLE PEG TEST:     RUE: 22 8 seconds, LUE: 25 2 Seconds    R 19 50 seconds, L 24 97 seconds    Range of Motion:  AROM:              Shoulder elevation: B/l UE full/intact              Shoulder FF: B/l UE full/intact              Shoulder ABD: B/l UE full/intact              ER/IR: B/l UE full/intact              LQTHK ext/flex: B/l UE full/intact              Sup/Pron: B/l UE full/intact              Wrist flex/ext: B/l UE full/intact              Composite: B/l UE full/intact              Hook: B/l UE full/intact              Opposition: B/l UE full/intact              Finger to nose: B/l UE full/intact              Dysdiadochokinesia: B/l UE full/intact     PROM:              Shoulder elevation:  B/l UE full/intact              Shoulder FF:B/l UE full/intact              Shoulder ABD: B/l UE full/intact              ER/IR: B/l UE full/intact              Elbow ext/flex: B/l UE full/intact              Sup/Pron: B/l UE full/intact              Wrist flex/ext: B/l UE full/intact    Sensation:  MYOFILAMENTS:              RUE: 3 61              LUE: 3 61    Visual Perceptual and Functional Cognition:  1  Convergence Insufficiency Symptom Survey (CISS): 45/60 FAIL    2   Concussion Cognitive Checklist: self report symptom checklist  *Patient indicated that she is experiencing the following symptoms:    · Memory: Remembering your schedule and Learning new things, names, previous learning, sequencing remains    · Attention: Keeping attention during a conversation, Focusing or concentrating on a specific task, Sustaining attention on a task and Dividing your attention (i e , multi-tasking)    remains    · Processing: Processing new information  and Responding to questions in a timely manner        · Executive Functions: Monitoring your own ideas, behaviors, and/or emotions, Organizing/ planning written work, emails, and/or daily tasks, Self-correcting or recognizing mistakes, Initiating tasks and Setting goals    remains    · Communication: Expressing thoughts and ideas fluently, Expressing thoughts and ideas into writing and Managing tone and volume of voice    Word finding, expression, managing tone and volume of voice remains    · Visual: Losing spot on the page when reading, Misspelling while texting/email and Change in handwriting    remains  0  · Emotional: Increased anxiety, Frustration tolerance and Keeping cognitive and physical pace    Personality changes, increased anxiety, frustration tolerance, keeping cog and physical pace remains    · Increased Sensitivities to: Sight and Computer screen time/movies/TV    Lighting, sight, screen time remains     3  Contextual Memory Test (CMT): Pt reports has not noticed a change in her memory, rates her memory capacity at 75%, if studied 20 objects for 90 seconds would recall 16 of them, would have recalled 16 of them pre injury, frequently forgets things that happened the day before 25% of the time, forgets important details 25% of the time, frequently forgets things people have told her 25% of the time, frequently forgets things that happened a few minutes ago 25% of the time, would remember facts about this form a week from now  IMMEDIATE RECALL:   17/20  score falls  in WNL/WFL deficit range    DELAYED RECALL:  13/20 score falls in WNL/WFL deficit range   RECOGNITION:  16/20 with 0 confabulations resulting in score 16/20     4   Rajendra Cognitive Assessment Version 7 3: (MoCA 8 1 completed on I E   indicative of mild neurocognitive impairments, R E  On 7 2 normal neurocognitive functioning)  Now engaged in V7 3:  Visuospatial/executive functionin/5  Naming: 3/3  Memory: 1st trial: , 2nd trial:   Attention/concentration: /  List of letters:   Serial Seven Subtraction: 3/3 w/ 0 errors  Language/sentence repetition:   Language Fluency:   Abstract/Correlational Thinkin/2  Delayed Recall:   Orientation:    Memory Index Score: 14/15  MoCA V1 8 1 Raw Score: 2930, MIS: 14/15, indicative of normal neurocognitive functioning  5  Vision Screening Recording Form:   vision screen: glasses @ all times present for vision screen  near acuity: R 20/50, L 20/50    R 20/50 with one error, L 20/40   binocularity far: orthophoria    remains  Binocularity near: orthophoria    remains  red green fusion: PLRG @ 12"    Diplopia with no fusion   near point of convergence: decreased teaming in B/L eyes, +B/L exophoric strabismus   yousif string: alignment    Suppression of near vision  pursuits: smooth in all planes    Slightly jerky in all planes   saccades: accurate in all planes    Inaccurate in all planes   ocular ROM: intact/full  Visual Perceptual Midline shift:  Shifted to the L of midline and above horizon    Slightly shifted above horizon    6  Anxiety/Depression:  Pt engaged in the Neuro QOL Anxiety Short Form and Neuro QOL Depression Short Form in order to screen for anxiety and depressive s/s  Pt reported the following:  Anxiety- Short Form:   --used to measure anxious s/s  For scoring purposes, scores of 25+ indicate the threshold for treatment per neurology/SLIM  Score:32/40  Pt most often chose the response often when asked about feeling anxious s/s  Depression- Short Form:   --used to measure depressive s/s  For scoring purposes, scores of 25+ indicate the threshold for treatment per neurology/SLIM  Score:19/40   Pt most often chose the response often when asked about feeling depressive s/s  Pt demo with fair + functional progression towards goals in POC  OTR recommending Pt to continue skilled OT services 2x/week for 4 weeks with focus on money management, visual perceptual and motor training, and divided attention to improve on the above deficits and enhance QOL  Pt has Speech evaluation scheduled for 2018 to further assess word finding and expression deficits  Assessment    Assessment details: See skilled analysis for further details  Occupational Therapy Skilled Analysis Assessment and Plan of Care:  Pt requires overall mod I for ADLs/self care and mod I for fx'l mobility w/o DME  Pt is currently demonstrating the following occupational deficits: limited 2* impaired STM/immediate delayed recall, depressed mood, impaired executive functioning, attention/concentration to task, delayed processing, impaired high level balance, LUE hemiparesis distal>proximal w/ impaired FMC/FMS/GMC/GMS, impaired prehension/precision, LUE dyscoordination w/ ataxia apraxia dysmetria, L inattention, no diplopia/teaming/fusion, dysmetric pursuits w/ jerky rebounds + nystagmus, shifted to the L of midline and above horizon  The following Occupational Performance Areas to address include: medication management, socialization, health maintenance, functional mobility, community mobility, clothing management, cleaning, meal prep, money management, household maintenance, care of children, care of pets, job performance/volunteering and social participation  Based on the aforementioned OT evaluation, functional performance deficits, and assessments, pt has been identified as a moderate complexity evaluation   Pt to continue to benefit from outpatient skilled OT services to address the following goals 2x/wk to  w/in 4 more weeks with special focus on self-care management, pt education,  and VM training as well as motor training to improve above defiicits and enhance overall QOL/function      Goals:  Short Term Goals:  Cognition/Vision:  · Pt will increase auditory processing to take notes while listening in multi-modal work related/classroom symptom free at baseline performance for improved work/school performance, once returned  4 weeks as applicable-PARTIALLY MET  · Pt will increase attention to 2+ tasks for improved work and engagement in salient tasks 4 weeks as applicable-PARTIALLY MET  · Pt will increase temporal awareness for keeping to schedule within 5 min increments, recall appointments, functional addition of time with 80% accuracy 4 weeks-PARTIALLY MET  · Pt will demo good carryover of internal and external memory aides for improved recall of daily events, improved executive functioning with 80% accuracy in 4 weeks-MET  · Pt will increase insight into deficits for improved carryover of recommendations, accommodations, improved rate of healing 4 weeks-MET  · Pt will increase oculomotor control for improved saccades, con/divergent tasks for improved reading, board to table tasks with minimal increase in symptoms 4 weeks-PARTIALLY MET  · Pt will tolerate multi-modal envt x 15 min with 80% accuracy of cog load and min increase of symptoms of 2 levels in HA/dizziness/nausea 4 weeks-PARTIALLY MET  Coordination:  · Pt will increase prehension patterns for improved tripod with utensil management with <20% droppage 4 weeks- MET  · Pt will increase rate of manipulation for all FM tests for improved functional performance with salient tasks 4 weeks- MET  · Pt will increase automaticity of LUE  to 50% for improved grasp release of tabletop items for improved functional performance with salient tasks 4 weeks- MET  ROM/Function:  · Pt will demo with G carryover of Home Exercise Program to improve functional progression towards goals in Plan of care and for improved functional use of  LUE 4 weeks-MET  · Pt will increase LUE  to refined functional assist with <20% cuing for tabletop tasks for improved functional performance of life roles and salient tasks 4-MET     Long Term Goals:  · Pt will increase attention to 3+ tasks for improved divided attention with work/school and pre driving roles as applicable-PARTIALLY MET  · Pt will increase verbal and written direction following with processing time of <1 min and 85% accuracy-MET  · Pt will tolerate multimodal envt x 60 min symptom free for return to work/school-PARTIALLY MET  · Pt will demo with decreased anxiety and frustration for improved insight into concussion process and rate of recovery-MET  · Pt will demo with G carryover and understanding of accommodations for school environment to allow for enhanced learning environment symptom free, if needed-MET  · Pt will increase oculomotor control for improved dynamic activities with head turns, board/screen to table tasks symptom free, improved VMI and return to baseline handwriting-PARTIALLY MET  · Pt will increase oculomotor control for WNL saccades, con/divergent tasks symptom free-PARTIALLY MET  · Pt will increase screen tolerance to 3 hours with min increase in HA by 1-2 levels for improved leisure pursuits and work/school performance as applicable-MET  Coordination:  · Decrease ataxia with functional reach for normalized movement pattern of  LUE-MET  · Increase automaticity of  LUE to 100% for resumption of B integrative tasks-MET  · Pt will increase rate of prehension for all FM tasks for improved use of utensils, writing, ADL fasteners-MET  · Pt will increase prehension for all FM tasks for improved independence with I/ADL/leisure tasks including utensils, writing, ADL fasteners-MET  ROM/Function:  · Increase func mobs from various surfaces to Mod I/ I with least restrictive device and good safety-MET  · Resume hand dominance to refined mod I functional assist with all I/ADL/leisure tasks-MET  · Pt will increase B/L UE strength to 5/5 and  strength, through the use of strengthening exercises and home program for eventual return to driving PRN-MET  · Pt will increase [address] to Mod I with ADL fasteners for increased independence-MET  · Pt will resume hand dominance with I status for all activities of daily living and eventual return to driving PRN-MET     INTERVENTION COMMENTS:  Diagnosis: R MCA CVA s/p TPA and thrombectomy, acute R temporal and parietal lobes, old R MCA occlusion  Precautions: fall risk, depression, suicidal ideations, tobacco use/abuse  FOTO: 88 with 12% limitation    19% limitations  Insurance: [employer]  7 of [id number], PN due 6/3/2018

## 2018-05-23 ENCOUNTER — ANTICOAG VISIT (OUTPATIENT)
Dept: INTERNAL MEDICINE CLINIC | Facility: CLINIC | Age: 53
End: 2018-05-23

## 2018-05-23 ENCOUNTER — APPOINTMENT (OUTPATIENT)
Dept: LAB | Age: 53
End: 2018-05-23
Payer: COMMERCIAL

## 2018-05-23 ENCOUNTER — OFFICE VISIT (OUTPATIENT)
Dept: OCCUPATIONAL THERAPY | Facility: CLINIC | Age: 53
End: 2018-05-23
Payer: COMMERCIAL

## 2018-05-23 DIAGNOSIS — I63.511 ACUTE ISCHEMIC RIGHT MCA STROKE (HCC): Primary | ICD-10-CM

## 2018-05-23 LAB
INR PPP: 2.11 (ref 0.86–1.17)
PROTHROMBIN TIME: 23.7 SECONDS (ref 11.8–14.2)

## 2018-05-23 PROCEDURE — 97530 THERAPEUTIC ACTIVITIES: CPT

## 2018-05-23 PROCEDURE — 85610 PROTHROMBIN TIME: CPT

## 2018-05-23 PROCEDURE — 97112 NEUROMUSCULAR REEDUCATION: CPT

## 2018-05-23 PROCEDURE — 36415 COLL VENOUS BLD VENIPUNCTURE: CPT

## 2018-05-23 NOTE — PROGRESS NOTES
Daily Note     Today's date: 2018  Patient name: Ashley Molina  : 1965  MRN: [id number]  Referring provider: Mariama Alegre MD  Dx:   Encounter Diagnosis   Name Primary?  Acute ischemic right MCA stroke ([facility] [address] [address] ) Yes                  Subjective: "My eyes really hurt yesterday after she was done with me "       Objective: See treatment diary below      Assessment: Tolerated treatment well  Patient would benefit from continued OT  Pt reported pain to left shoulder capsule  OTR notified and will assess next visit  Pt engaged in word find in stance focusing on divided attn and ocularmotor w/working memory  Pt completed "arrow" and "abbreviations" word find worksheet in stance on blue foam square beginning task with binocular vision,  peripheral vision occluded for 10 min  Pt stated difficulty with task when looking above horizon for extended period of time  Pt completed task with 100% accuracy in appropriate amount of time  Plan: Continued skilled OT per POC with focus on ocular motor, multitasking and divided attention  INTERVENTION COMMENTS:  Diagnosis: R MCA CVA s/p TPA and thrombectomy, acute R temporal and parietal lobes, old R MCA occlusion    Precautions: fall risk, depression, suicidal ideations, tobacco use/abuse  Insurance: [employer]  [id number], PN due 6/3/2018

## 2018-05-24 PROCEDURE — G8992 OTHER PT/OT  D/C STATUS: HCPCS | Performed by: PHYSICAL THERAPIST

## 2018-05-24 PROCEDURE — G8991 OTHER PT/OT GOAL STATUS: HCPCS | Performed by: PHYSICAL THERAPIST

## 2018-05-30 ENCOUNTER — TELEPHONE (OUTPATIENT)
Dept: INTERNAL MEDICINE CLINIC | Age: 53
End: 2018-05-30

## 2018-05-30 ENCOUNTER — OFFICE VISIT (OUTPATIENT)
Dept: OCCUPATIONAL THERAPY | Facility: CLINIC | Age: 53
End: 2018-05-30
Payer: COMMERCIAL

## 2018-05-30 ENCOUNTER — EVALUATION (OUTPATIENT)
Dept: SPEECH THERAPY | Facility: CLINIC | Age: 53
End: 2018-05-30
Payer: COMMERCIAL

## 2018-05-30 ENCOUNTER — TELEPHONE (OUTPATIENT)
Dept: NEUROLOGY | Facility: CLINIC | Age: 53
End: 2018-05-30

## 2018-05-30 ENCOUNTER — OFFICE VISIT (OUTPATIENT)
Dept: NEUROLOGY | Facility: CLINIC | Age: 53
End: 2018-05-30
Payer: COMMERCIAL

## 2018-05-30 ENCOUNTER — ANTICOAG VISIT (OUTPATIENT)
Dept: INTERNAL MEDICINE CLINIC | Facility: CLINIC | Age: 53
End: 2018-05-30

## 2018-05-30 VITALS — DIASTOLIC BLOOD PRESSURE: 76 MMHG | SYSTOLIC BLOOD PRESSURE: 118 MMHG | BODY MASS INDEX: 24.17 KG/M2 | WEIGHT: 130 LBS

## 2018-05-30 DIAGNOSIS — R48.8 OTHER SYMBOLIC DYSFUNCTIONS: Primary | ICD-10-CM

## 2018-05-30 DIAGNOSIS — I63.511 ACUTE ISCHEMIC RIGHT MCA STROKE (HCC): Primary | ICD-10-CM

## 2018-05-30 DIAGNOSIS — M25.512 ACUTE PAIN OF LEFT SHOULDER: ICD-10-CM

## 2018-05-30 DIAGNOSIS — I69.328 SPEECH OR LANGUAGE DEFICIT FOLLOWING CEREBROVASCULAR ACCIDENT: ICD-10-CM

## 2018-05-30 DIAGNOSIS — I63.511 ARTERIAL ISCHEMIC STROKE, MCA (MIDDLE CEREBRAL ARTERY), RIGHT, ACUTE (HCC): ICD-10-CM

## 2018-05-30 LAB
INR PPP: 2.87 (ref 0.86–1.17)
PROTHROMBIN TIME: 30.1 SECONDS (ref 11.8–14.2)

## 2018-05-30 PROCEDURE — 85610 PROTHROMBIN TIME: CPT

## 2018-05-30 PROCEDURE — G9174 SPEECH LANG CURRENT STATUS: HCPCS

## 2018-05-30 PROCEDURE — 97535 SELF CARE MNGMENT TRAINING: CPT

## 2018-05-30 PROCEDURE — 96125 COGNITIVE TEST BY HC PRO: CPT

## 2018-05-30 PROCEDURE — G9175 SPEECH LANG GOAL STATUS: HCPCS

## 2018-05-30 PROCEDURE — 99205 OFFICE O/P NEW HI 60 MIN: CPT | Performed by: PHYSICAL MEDICINE & REHABILITATION

## 2018-05-30 PROCEDURE — 36415 COLL VENOUS BLD VENIPUNCTURE: CPT

## 2018-05-30 RX ORDER — LACTULOSE 10 G/15ML
SOLUTION ORAL; RECTAL
COMMUNITY
Start: 2018-05-07 | End: 2019-02-21 | Stop reason: ALTCHOICE

## 2018-05-30 NOTE — PROGRESS NOTES
Patient ID: Dylan Amor is a 47 y o  female  Assessment/Plan:    No problem-specific Assessment & Plan notes found for this encounter  {Assess/PlanSmartLinks:28464}       Subjective:    HPI    {[facility] HPI texts:62076}    {Common ambulatory SmartLinks:74522}         Objective: There were no vitals taken for this visit  Physical Exam    Neurological Exam      ROS:    Review of Systems   Constitutional: Positive for appetite change and fatigue  HENT: Negative  Eyes: Positive for photophobia, pain, discharge and visual disturbance  Negative for redness  Respiratory: Negative  Cardiovascular: Positive for chest pain  Shooting pain on right side of chest    Gastrointestinal: Positive for constipation  Endocrine: Negative  Genitourinary: Positive for frequency  Musculoskeletal: Positive for arthralgias, back pain and neck pain  Shoulder pain left    Skin: Negative  Allergic/Immunologic: Negative  Neurological: Positive for dizziness, weakness, light-headedness, numbness and headaches  Tingling when moving hands   Hematological: Bruises/bleeds easily  Psychiatric/Behavioral: Positive for agitation, confusion, decreased concentration and hallucinations  The patient is nervous/anxious

## 2018-05-30 NOTE — TELEPHONE ENCOUNTER
Pt asked that we fax over her office note to her [employer]  Yessenia Retana fax: [phone]  Will wait for note to be finished as pt was just seen

## 2018-05-30 NOTE — PROGRESS NOTES
Daily Note     Today's date: 2018  Patient name: Reyes Burr  : 1965  MRN: [id number]  Referring provider: Joey Lugo MD  Dx:   Encounter Diagnosis   Name Primary?  Acute ischemic right MCA stroke (HCC) Yes                   Subjective: "I still have pain in my shoulder "      Objective: See treatment diary below      Assessment: Tolerated treatment well  Patient reported having Has in the morning, but then resolves later in the day  Still reporting pain in left shoulder and describes as "achy " Completed word circles in stance on foam with use of black tongs-moderate droppage of blocks noted  Visual timed trials with monocular taping and completed one visual processing task in 5 minutes 23 seconds and reported burning sensation in eye utilizing left eye  Letter cancellation in 2 minutes 16 seconds  B/L peripheral taping with visual processing  Educated patient on importatnce of completing close work to increase convergence during binocular tasks     Plan: Continued skilled OT per POC     INTERVENTION COMMENTS:  Diagnosis: Acute ischemic right MCA stroke (HCC) [I63 511]  Precautions: fall risk, depression, suicidal ideations, tobacco use/abuse  FOTO:  1 of 8 visits, PN due

## 2018-05-30 NOTE — PROGRESS NOTES
Physical Medicine & Rehabilitation New Patient Evaluation  Danette Diesel y  o female        Assessment/Plan:    Ling Alba was seen today for stroke  Diagnoses and all orders for this visit:    Acute ischemic right MCA stroke ([address] )    Acute pain of left shoulder  -     XR shoulder 2+ vw left; Future    -patient is 4 weeks post CVA like symptoms on 4/25/18 and almost 4 months post her Right MCA ischemic CVA  -functional recovery is as expected and overall going well  -extensive time was spent counseling patient and her  of functional prognosis with most functional recovery in 3-6 months and more recovery afterwards   -patient worried about returning to work (she works at the [employer]) and needs assistance filling out short term disability paperwork - relayed we can assist her with the paperwork and to have her follow-up with her company  -patient also worried about driving given her new subjective cognitive symptoms  I have ordered her NOT to work and NOT to drive until she has completed her therapies and has another evaluation either by myself or Neurology  Patient likely to benefit from fitness to drive evaluation in the future    -Left shoulder pain: from examination today appears multifactorial from increased muscle tone and biceps tendonitis  I have ordered an x-ray to evaluate structure and alignment  Consider injection surrounding biceps tendon  Will speak to her OT about considering heat or ultrasound therapy which penetrates deeper to treat muscles surrounding left shoulder  -Consider trigger point injections of the upper trapezius/cervical area which patient is interested in receiving  -Counseled on smoking cessation and importance in reducing her risk factor for CVA  -Suggested she attended the [facility] support group  -RTC in 4 weeks       HPI:   Isaac Mason 47 y o  female right handed, with  has a past medical history of Acute pain of right knee; Anemia;  Anxiety; Cervical disc disorder with radiculopathy; Chronic pain; Constipation; CVA (cerebral vascular accident) ([facility] [address] ); H/O ischemic right MCA stroke; Hematuria; High cholesterol; Lumbar radiculopathy; Lumbar stenosis; Other chronic pain; Other muscle spasm; PONV (postoperative nausea and vomiting); Spondylosis of cervical spine; Spondylosis of lumbosacral region; Stroke [facility]); and Urinary incontinence     Patient was hospitalized from 2/9/18 after suffering a right MCA ischemic stroke, patient s/p IV TPA  MRI confirmed multiple infarcts in the right cerebral hemisphere in the distribution of the right MCA, as well as deep infarctions in the basal ganglia and cortical infarctions in the frontal and parietal regions; there was also noted to be a petechial hemorrhage in the infarction noted in the right lentiform nucleus  CTA was performed which demonstrated a right M1 occlusion, for which she underwent an endovascular thrombectomy  Patient had a loop recorder placed on 2/13/18  Patient was admitted to inpatient rehabilitation at the [facility] from 2/14/18 through 2/18/18  Patient then reported back to the hospital from 4/25/18 - 4/27/18 for recurrent stroke-like symptoms of dysarthria and left sided weakness  Symptoms improved while in the ICU  Patient was started on Warfarin upon discharge for secondary stroke prophylaxis and continued on aspirin/statin  SUBJECTIVE:  Patient presents today in the office with chief complaint of late effects related to a right MCA ischemic stroke  She is accompanied by her  today  Current deficits include mild left hemiparesis and probable mild cognitive deficits  Since her most recent hospitalization on 4/25/18 patient has noted difficulty in concentrating, thinking, feels she has difficulty focusing and difficulty with her memory  She is currently in OT and will be evaluated by SLP additionally for these symptoms    She is most concerned with the Warfarin and dietary restricitons, she would like to see the Neurologist ASAP to discuss whether this medicaiton can be changed  Patient also complains of left shoulder pain and neck pain  She reports pain radiates into her left arm but not past the elbow  It is a spasm, sharp like pain  She finds some relief with rest, Percocet, and therapy  She feels her shoulder is frozen and requires assistance putting on her shirt, which her  helps her with  Her neck also feels stiff  No associated numbness or increased weakness            Expanded Social History:   Patient lives with spouse in a single family home with 1 steps to enter  Patient is employed  Patient was working at [employer] full time, but is currently not working  Patient was a            Current Level of Function:   Bed mobility   Independent  Transfers   Independent  Ambulation   Independent  Wheelchair moblity   N/A  Stair negotiation   Supervision   Upper Body ADLs   Minimal Assistance   Lower Body ADLs   Independent  Toileting   Independent  Bathing   Independent  Cognition TBD in SLP today, but patient with mild to moderate deficits in processing, memory, attention today  Speech/Swallow WNL    Prior Level of Function: Independent without assistive device     Functional Goals: Short term goals are to feel like she can focus and think clearly  Long term goals are to get back to driving and      ROS:   Review of Systems   Constitutional: Positive for fatigue  HENT: Negative  Eyes: Negative  Respiratory: Negative  Cardiovascular: Negative  Gastrointestinal: Negative  Endocrine: Negative  Genitourinary: Negative  Musculoskeletal: Positive for arthralgias  Skin: Negative  Allergic/Immunologic: Negative  Neurological: Positive for weakness and headaches  Hematological: Negative      Psychiatric/Behavioral: Positive for confusion         OBJECTIVE:   /76 (BP Location: Left arm, Patient Position: Sitting, Cuff Size: Adult)   Wt 59 kg (130 lb)   LMP  (LMP Unknown)   BMI 24 17 kg/m²      Physical Exam  Physical Exam   Constitutional: She appears well-developed and well-nourished  HENT:   Head: Normocephalic and atraumatic  Eyes: EOM are normal  Pupils are equal, round, and reactive to light  Cardiovascular: Normal rate and regular rhythm  Pulmonary/Chest: Breath sounds normal  She has no wheezes  She has no rales  Abdominal: Soft  Bowel sounds are normal  She exhibits no distension  There is no tenderness  Musculoskeletal:   Left shoulder examination:  Inspection no swelling or erythema  Palpation surrounding biceps tendon provocative of pain  Passive ROM of shoulder reveals mild increase muscular tone with catch release in abduction and adduction  +Speeds test  Active ROM is limited in both shoulder flexion and abduction to 90 degrees and 120 degrees respectively    Cervical ROM  Limited in lateral bending to the right and extension  Negative Spurlings  Negative Hoffmans  Palpation of left upper trapezius with 1 trigger point and several tender points identified   Skin: Skin is warm  Psychiatric: She has a normal mood and affect  Nursing note and vitals reviewed  Neuro:  AAOx3  Left facial droop mild   CNII-XII grossly intact  Sensation to light touch intact  Reflexes: 2+ throughout  Coordination: finger to nose WNL  Gait: WNL  Motor Exam:   Right Left Site Right Left Site   5 3+ S Ab:  Shoulder Abductors 5 5 HF:  Hip Flexors   5 5 EF:  Elbow Flexors 5 5 H Ab: Hip Abductors   5 5 EE:  Elbow Extensors 5 5 KF: Knee Flexors   5 5 WE:  Wrist Extensors 5 5 KE:  Knee Extensors   5 5 FF:  Finger Flexors 5 5 DR:  Dorsi Flexors   5 5 HI:  Hand Intrinsics 5 5 EHL: Ext Carlson Longus   5 5  5 5 PF:  Plantar Flexors       Imaging: I personally reviewed pertinent imaging        Past Medical History:   Diagnosis Date    Acute pain of right knee     last assessed - 02Feb2016    Anemia     Anxiety     Cervical disc disorder with radiculopathy     Chronic pain     Constipation     [facility]re[facility] [address] [facility]     H/O ischemic right MCA stroke     Hematuria     last assessed - [id number]    High cholesterol     Lumbar radiculopathy     Lumbar stenosis     Other chronic pain     last assessed - [id number]    Other muscle spasm     last assessed - [id number]    PONV (postoperative nausea and vomiting)     must have premed    Spondylosis of cervical spine     Spondylosis of lumbosacral region     Stroke [facility])     Urinary incontinence     Resolved - [id number]       Patient Active Problem List    Diagnosis Date Noted    History of ischemic right MCA stroke 04/26/2018    CVA (cerebral vascular accident) ([facility] [address] ) 04/09/2018    Chronic back pain 02/15/2018    Major depression 02/15/2018    Acute ischemic right MCA stroke ([facility] [address] [facility] ) 02/09/2018    Fall 02/09/2018    Chronic low back pain 07/05/2016    Cervical post-laminectomy syndrome 07/05/2016    Hypercholesterolemia 02/18/2014    Cervical radiculopathy 08/13/2013    Dependence on nicotine from cigarettes 08/09/2012       Past Surgical History:   Procedure Laterality Date    BLADDER SURGERY      BLADDER SURGERY  2014    CERVICAL SPINE SURGERY      KNEE ARTHROSCOPY Left     LIPECTOMY      of thigh    LIPOMA RESECTION      NECK SURGERY      SC COLONOSCOPY FLX DX W/COLLJ SPEC WHEN PFRMD N/A 2/10/2017    Procedure: COLONOSCOPY;  Surgeon: Mak Shelby MD;  Location: [facility] GI LAB;   Service: Gastroenterology    SC KNEE SCOPE,MED/LAT MENISECTOMY Left 3/21/2016    Procedure: ARTHROSCOPY W/ PARTIAL MEDIAL MENISCECTOMY;  Surgeon: Joni Pablo MD;  Location: [facility] OR;  Service: Orthopedics    TOTAL ABDOMINAL HYSTERECTOMY      TUBAL LIGATION         Family History   Problem Relation Age of Onset    Stroke Mother      46s    Cancer Mother     Hypertension Mother     Pulmonary embolism Other      In mid 29s    Stroke Sister      46s    Stroke Brother      46s    Prostate cancer Father     Cancer Daughter     Stroke Family        Social History     Allergies   Allergen Reactions    Blue Dyes (Parenteral) Anaphylaxis    Gabapentin Anaphylaxis     Anaphylaxis    Anesthesia S-I-60     Nitrofurantoin GI Intolerance     vomiting    Blue Dyes (Parenteral) Rash    Penicillins Rash         Current Outpatient Prescriptions:     aspirin 81 mg chewable tablet, Chew 1 tablet (81 mg total) daily, Disp: 30 tablet, Rfl: 0    Aspirin-Acetaminophen-Caffeine (EXCEDRIN PO), Take 500 mg by mouth 2 (two) times a day as needed, Disp: , Rfl:     lactulose 10 g/15 mL, , Disp: , Rfl:     LYRICA 50 MG capsule, Take 50 mg by mouth 2 (two) times a day, Disp: , Rfl:     nicotine (NICODERM CQ) 14 mg/24hr TD 24 hr patch, Place 1 patch on the skin daily, Disp: 28 patch, Rfl: 0    OXYCODONE-ACETAMINOPHEN PO, Take 0 5 tablets by mouth daily as needed   5/325mg, Disp: , Rfl:     pravastatin (PRAVACHOL) 40 mg tablet, Take 40 mg by mouth daily, Disp: , Rfl:     warfarin (COUMADIN) 5 mg tablet, Take 1 & 1/2 tablets daily, Disp: 45 tablet, Rfl: 5       *I have spent 60 minutes with Patient and family today in which greater than 50% of this time was spent in counseling/coordination of care regarding Prognosis, Intructions for management, Patient and family education and Impressions

## 2018-05-30 NOTE — PATIENT INSTRUCTIONS
-Left shoulder x-ray   -Try to over the counter pain patches such as salonpas to the left shoulder           Cigarette Smoking and Your Health   AMBULATORY CARE:   Risks to your health if you smoke:  Nicotine and other chemicals found in tobacco damage every cell in your body  Even if you are a light smoker, you have an increased risk for cancer, heart disease, and lung disease  If you are pregnant or have diabetes, smoking increases your risk for complications  Benefits to your health if you stop smoking:   · You decrease respiratory symptoms such as coughing, wheezing, and shortness of breath  · You reduce your risk for cancers of the lung, mouth, throat, kidney, bladder, pancreas, stomach, and cervix  If you already have cancer, you increase the benefits of chemotherapy  You also reduce your risk for cancer returning or a second cancer from developing  · You reduce your risk for heart disease, blood clots, heart attack, and stroke  · You reduce your risk for lung infections, and diseases such as pneumonia, asthma, chronic bronchitis, and emphysema  · Your circulation improves  More oxygen can be delivered to your body  If you have diabetes, you lower your risk for complications, such as kidney, artery, and eye diseases  You also lower your risk for nerve damage  Nerve damage can lead to amputations, poor vision, and blindness  · You improve your body's ability to heal and to fight infections  Benefits to the health of others if you stop smoking:  Tobacco is harmful to nonsmokers who breathe in your secondhand smoke  The following are ways the health of others around you may improve when you stop smoking:  · You lower the risks for lung cancer and heart disease in nonsmoking adults  · If you are pregnant, you lower the risk for miscarriage, early delivery, low birth weight, and stillbirth   You also lower your baby's risk for SIDS, obesity, developmental delay, and neurobehavioral problems, such as ADHD  · If you have children, you lower their risk for ear infections, colds, pneumonia, bronchitis, and asthma  For more information and support to stop smoking:   · [employer] [place]  [place]  Phone: 7- 662 - 100-7701  Web Address: www Key[employer]  Follow up with your healthcare provider as directed:  Write down your questions so you remember to ask them during your visits  © 2017 [address] [employer] Information is for End User's use only and may not be sold, redistributed or otherwise used for commercial purposes  All illustrations and images included in CareNotes® are the copyrighted property of A D A M , Inc  or George [employer]  The above information is an  only  It is not intended as medical advice for individual conditions or treatments  Talk to your doctor, nurse or pharmacist before following any medical regimen to see if it is safe and effective for you

## 2018-05-30 NOTE — TELEPHONE ENCOUNTER
----- Message from Pam Llanes MD sent at 5/30/2018  2:45 PM EDT -----  Patient reports her short term disability company sent paperwork on her behalf to be filled out  She will have the [email or web address] refax to our office  Can someone look out for the fax and I'll help fill out the short term paperwork if I can

## 2018-05-30 NOTE — PROGRESS NOTES
Speech-Language Pathology Initial Evaluation    Today's date: 2018  Patients name: Cintia Duncan  : 1965  MRN: [id number]  Safety measures: depression, stroke  Referring provider: Lamberto Baker MD    Medical history significant for:   Past Medical History:   Diagnosis Date    Acute pain of right knee     last assessed - [id number]    Anemia     Anxiety     Cervical disc disorder with radiculopathy     Chronic pain     Constipation     CVA (cerebral vascular accident) ([address] )     H/O ischemic right MCA stroke     Hematuria     last assessed - [id number]    High cholesterol     Lumbar radiculopathy     Lumbar stenosis     Other chronic pain     last assessed - [id number]    Other muscle spasm     last assessed - [id number]    PONV (postoperative nausea and vomiting)     must have premed    Spondylosis of cervical spine     Spondylosis of lumbosacral region     Stroke [facility])     Urinary incontinence     Resolved - [id number]       Medication list:   Current Outpatient Prescriptions   Medication Sig Dispense Refill    aspirin 81 mg chewable tablet Chew 1 tablet (81 mg total) daily 30 tablet 0    LYRICA 50 MG capsule Take 50 mg by mouth 2 (two) times a day      nicotine (NICODERM CQ) 14 mg/24hr TD 24 hr patch Place 1 patch on the skin daily 28 patch 0    OXYCODONE-ACETAMINOPHEN PO Take 0 5 tablets by mouth daily as needed  5/325mg      pravastatin (PRAVACHOL) 40 mg tablet Take 40 mg by mouth daily      warfarin (COUMADIN) 5 mg tablet Take 1 & 1/2 tablets daily 45 tablet 5     No current facility-administered medications for this visit  Allergies:    Allergies   Allergen Reactions    Blue Dyes (Parenteral) Anaphylaxis    Gabapentin Anaphylaxis     Anaphylaxis    Nitrofurantoin GI Intolerance     vomiting    Blue Dyes (Parenteral) Rash    Penicillins Rash         Subjective comments: "I can't multi-task anymore"    Patient demonstrated a positive score on depression screen, but denied help today  Patient was educated to contact her PCP or referring provider if symptoms persist or worsen  Patient did not report any suicidal ideation or plan to harm anyone else  Clinician presented patient with contact information for behavioral health services  Reason for referral: Change in cognitive status  Prior functional status: Communication effective and appropriate in all situations  Clinically complex situations: N/A    History: Patient is a 47 y o  female who was referred to speech therapy for a cognitive linguistic evaluation post CVA  Patient was admitted to [facility] on 2/9/18 with a right MCA territory infarction  Patient reports she went outside to have a cigarette and she subsequently fell to the ground hitting her right knee and left side of her head  She states that she did not lose consciousness  Upon arrival to ED she was noted to have left-sided weakness and left-sided facial droop, right-sided gaze preference  Patient was given a tPA bolus and thrombectomy was performed  She presented again to [facility] on 4/25/18 with acute ischemic R CVA with increased density in the distal M1/M2 segment of the right middle cerebral artery, suspicious for acute thrombus  No acute intraparenchymal hemorrhage  Chronic right MCA territory infarction  Patient has been participating in Occupational Therapy for visual and cognitive rehab  Patient began to report difficulties with thought organization and word finding  Hearing: Pt reports she always had difficulty hearing in L ear - no hearing aids     Vision: Pt has visual deficits post stroke - being treated in OT     Primary language: English   Preferred language: English     Home environment/lifestyle: Lives at home with   Highest level of education: GED  Vocational status: not working due to 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   Current/prior services being received: Occupational Therapy     Mental status: Alert  Behavior status: Cooperative  Communication modalities: Verbal  Recent speech/language therapy: Home  Rehabilitation prognosis: Good rehab potential to reach the established goals    Assessments     Concussion Cognitive Checklist: self report symptom checklist  *Patient indicated that she is experiencing the following symptoms:     · Memory: Remembering your schedule and Learning new things, names, previous learning, sequencing remains     · Attention: Keeping attention during a conversation, Focusing or concentrating on a specific task, Sustaining attention on a task and Dividing your attention (i e , multi-tasking)    remains     · Processing: Processing new information  and Responding to questions in a timely manner         · Executive Functions: Monitoring your own ideas, behaviors, and/or emotions, Organizing/ planning written work, emails, and/or daily tasks, Self-correcting or recognizing mistakes, Initiating tasks and Setting goals    remains     · Communication: Expressing thoughts and ideas fluently, Expressing thoughts and ideas into writing and Managing tone and volume of voice    Word finding, expression, managing tone and volume of voice remains     · Visual: Losing spot on the page when reading, Misspelling while texting/email and Change in handwriting  · Emotional: Increased anxiety, Frustration tolerance and Keeping cognitive and physical pace    Personality changes, increased anxiety, frustration tolerance, keeping cog and physical pace remains     · Increased Sensitivities to: Sight and Computer screen time/movies/TV    Lighting, sight, screen time remains    The Cognitive Linguistic Quick Test (CLQT) is designed to measure an individuals five cognitive domains (attention, memory, executive functions, language, and visuospatial skills)  This norm-referenced tool has been standardized on adults ages 25 through 80years old with neurological impairment as a result of CVA, TBI, or dementia   The following results were obtained during the administration of the assessment  Cognitive Domain: Score: Range of Severity:   Attention 180/215 WNL   Memory 158/185 WNL   Executive Functions 30/40 WNL   Language  30/37 WNL   Visuospatial Skills  98/105 WNL        *Composite Severity Ratin 0/4 0  WNL             Clock Drawin/13 Mild     Pt scored below Criteron Cut Scores on the following subtests: Clock Drawing  *Pt named 17 concrete category members (animals) in 60 sec (norm=15+)  -- AVERAGE    *Pt named 13 abstract category members (words beginning with letter 'm') in 60 sec (norm=10+)  -- AVERAGE      Goals  Short-term goals:  1  1  Patient will complete complex auditory attention processing tasks (e g , sentence unscramble, ranking numbers/words, etc ) with 80% accuracy, to be achieved in 4-6 weeks  2  Patient will complete thought organization tasks (e g , sequencing, deduction puzzles, etc ) with 80% accuracy to facilitate increased executive functioning skills, to be achieved in 4-6 weeks  3  To target mental manipulation and working memory, patient will participate in word finding activity (i e , anagrams) with 80% accuracy, to be achieved in 4-6 weeks  4  Patient will name an appropriate synonym/antonym for a given word with 80% accuracy to build expressive vocabulary for conversation, to be achieved in 4-6 weeks  5  Patient will complete word generation tasks (e g , analogies, category matrices, etc ) with 80% accuracy using word finding strategies to facilitate improved word retrieval skills, to be achieved in 4-6 weeks  6  Patient will complete checkbook management tasks using learned strategies       Long-term goals:    1   Patient will complete cognitive-linguistic therapy that addresses patients specific deficits in processing speed, short-term working memory, attention to detail, monitoring, sequencing, and organization skills, with instruction, to alleviate effects of executive functioning disorder deficits by discharge  2  Patient will complete higher-level expressive language tasks (e g , word definitions, idioms, synonym/antonyms, etc) with 80% accuracy to improve functional communication skills by discharge  Functional Limitations Reporting (G-codes):   Flowsheet Rows      Most Recent Value   SLP G-Codes   FOTO information reviewed  N/A   Assessment Type  Evaluation   Functional Limitations  Other Speech Language Pathology [thought organization]   Other Speech-Language Pathology Functional Limitation Current Status ()  CJ   Other Speech-Language Pathology Functional Limitation Goal Status ()  CI              Impressions/Recommendations    Impressions: Patient presents with mild cognitive linguistic deficits in the areas of thought organization, executive functioning and high level word finding  Despite patient CLQT testing as WNL; patient demonstrated deficits of cognitive confusion  Her  reports she attempted checkbook management and was incorrect by $2K      Patient's goal(s): "I want to separate things in my head" - patient reports she feels her thoughts are no longer organized  She was unable to verbalize the problems she is having (which is a an example of what she wants to work on)  Recommendations:  -Patient would benefit from outpatient skilled Speech Therapy services : Cognitive-Linguistic therapy    -Frequency: 2x weekly  -Duration: 4-6 weeks    -Intervention certification from: 0/00/0464  -Intervention certification to: 9/24/2394  -Intervention comments: IE testing 9am-10am; scoring and interpretation for POC 11am-12pm    Visit Tracking:  -Referring provider: Epic  -Billing guidelines: AMA  -Visit #1/30 (90 visits combined with PT and OT   -CBC     -RE due 7/11/2018

## 2018-05-31 ENCOUNTER — OFFICE VISIT (OUTPATIENT)
Dept: OCCUPATIONAL THERAPY | Facility: CLINIC | Age: 53
End: 2018-05-31
Payer: COMMERCIAL

## 2018-05-31 ENCOUNTER — OFFICE VISIT (OUTPATIENT)
Dept: SPEECH THERAPY | Facility: CLINIC | Age: 53
End: 2018-05-31
Payer: COMMERCIAL

## 2018-05-31 DIAGNOSIS — I63.511 ARTERIAL ISCHEMIC STROKE, MCA (MIDDLE CEREBRAL ARTERY), RIGHT, ACUTE (HCC): Primary | ICD-10-CM

## 2018-05-31 DIAGNOSIS — I69.328 SPEECH OR LANGUAGE DEFICIT FOLLOWING CEREBROVASCULAR ACCIDENT: ICD-10-CM

## 2018-05-31 DIAGNOSIS — I63.511 ACUTE ISCHEMIC RIGHT MCA STROKE (HCC): Primary | ICD-10-CM

## 2018-05-31 DIAGNOSIS — R48.8 OTHER SYMBOLIC DYSFUNCTIONS: ICD-10-CM

## 2018-05-31 PROCEDURE — 97112 NEUROMUSCULAR REEDUCATION: CPT

## 2018-05-31 PROCEDURE — 92507 TX SP LANG VOICE COMM INDIV: CPT

## 2018-05-31 PROCEDURE — 97530 THERAPEUTIC ACTIVITIES: CPT

## 2018-05-31 NOTE — PROGRESS NOTES
Daily Speech Treatment Note    Today's date: 2018  Patients name: Claritza Alex  : 1965  MRN: [id number]  Safety measures: depression, stroke  Referring provider: De Napoles MD    Primary Diagnosis/Billing code: 363 [id number]  Secondary Diagnosis/ Billing code: 599 [id number], R48 8    Visit Tracking:  -Referring provider: Epic  -Billing guidelines: AMA  -Visit # (90 visits combined with PT and OT )  -CBC  -RE due 2018    Subjective/Behavioral:  -"I'm okay today  Had a headache this morning, but took pain medication and it helped "    Objective/Assessment:  -Reviewed testing results and goals in plan care with patient  Patient is in agreement at this time  Standardized Assessment:  The Test of Adult Language - Fourth Edition (TOAL-4) is a standardized, norm-referenced assessment measuring important communicative abilities in spoken and written language  The test in normed on individuals 12:0-24:11  The TOAL-4 has 6 subtests; their results are reported as percentile ranks and scaled scores  The results of the TOAL-4 are generally used for three purposes; (a) to identify adolescents and adults who score significantly below their peers and therefore might need help improving their language proficiency, (b) to determine areas of relative strength and weakness among language abilities, and (c) to serve as a research tool in studies investigating language problems in adolescents and adults  Due to these age restrictions, these standardized scores should not be compared to standard or percentile rank  Objective measures were taken to complete a diagnostic impression and prognosis for this pt  Subtest: Raw Score: Percentile:  Scaled Score:   1  Word Opposites 21/34 37%tile 9   3  Spoken Analogies  37%tile 9   4  Word Similarities  84%tile 13     Due to time constraints, only portions of the standardized assessment were administered on this date of service  Short-term goals:    1  Patient will complete complex auditory attention processing tasks (e g , sentence unscramble, ranking numbers/words, etc ) with 80% accuracy, to be achieved in 4-6 weeks      2  Patient will complete thought organization tasks (e g , sequencing, deduction puzzles, etc ) with 80% accuracy to facilitate increased executive functioning skills, to be achieved in 4-6 weeks     -Sequencing: Patient was asked to fill in missing letters to generate x 10 words in a concrete category (e g , furniture: ch_ _ _= chest)  Task completed independently in  38/40 opp (95% accuracy) increasing to 100% accuracy with min verbal semantic cues  -Patient did express an increase in visual disturbance (decreased tracking and blurry vision)  Patient indicated this is a frequent occurrence when she focuses on something for an extended period of time  Clinician removed paper and provided patient with break  -Deduction Puzzle: (Perlita 5 2 #1): Patient was given 4 x 3 puzzle and asked to fill in grid in conjunction to written clues  Task completed independently in 12/12 opp (100% accuracy) in 10 minutes  Patient reported familiarity with puzzles like these, as she completes them in a puzzle book at home         3  To target mental manipulation and working memory, patient will participate in word finding activity (i e , anagrams) with 80% accuracy, to be achieved in 4-6 weeks      4  Patient will name an appropriate synonym/antonym for a given word with 80% accuracy to build expressive vocabulary for conversation, to be achieved in 4-6 weeks  5  Patient will complete word generation tasks (e g , analogies, category matrices, etc ) with 80% accuracy using word finding strategies to facilitate improved word retrieval skills, to be achieved in 4-6 weeks       6  Patient will complete checkbook management tasks using learned strategies    Plan:  -Patient was provided with home exercises/activities to target goals in plan of care at the end of today's session   -Continue with current plan of care  Patient was treated by Jennifer Kwan, graduate SLP student, and was under the direct supervision of SWAPNA Stevens , CCC-SLP

## 2018-06-01 NOTE — TELEPHONE ENCOUNTER
Dr Yanira Barger,  Did you send a 34pg document to [employer] yesterday?  I did speak w/Awa @[employer], she states she has received everything they need @this time, they will reach back out to us if  They need anything else  I did update pt

## 2018-06-04 ENCOUNTER — OFFICE VISIT (OUTPATIENT)
Dept: OCCUPATIONAL THERAPY | Facility: CLINIC | Age: 53
End: 2018-06-04
Payer: COMMERCIAL

## 2018-06-04 DIAGNOSIS — I63.511 ACUTE ISCHEMIC RIGHT MCA STROKE (HCC): Primary | ICD-10-CM

## 2018-06-04 PROCEDURE — 97535 SELF CARE MNGMENT TRAINING: CPT

## 2018-06-04 NOTE — TELEPHONE ENCOUNTER
I did not fill any paperwork, but another physician may have before  I'm happy to fill additional paperwork if needed  Keep me updated thank you!

## 2018-06-04 NOTE — PROGRESS NOTES
Daily Note     Today's date: 2018  Patient name: Analia Garcia  : 1965  MRN: [id number]  Referring provider: Mandy Martin MD  Dx:   Encounter Diagnosis   Name Primary?  Acute ischemic right MCA stroke ([facility] [address] ) Yes                  Subjective: "I woke up with my eyes hurting "      Objective: See treatment diary below      Assessment: Tolerated treatment well  Patient reported that she had pain in eyes when waking up this morning  Completed 3 minute visual station tasks on high low mat table and noted with stinging in the left eye  No HA, but did state she had mild difficulty with multi modal environment  Noted with no loss of place       Plan: Continued skilled OT per POC     INTERVENTION COMMENTS:  Diagnosis: Acute ischemic right MCA stroke (HCC) [I63 511]  Precautions:fall risk, depression, suicidal ideations, tobacco use/abuse  FOTO:  3 of 8 visits, PN due

## 2018-06-05 ENCOUNTER — TELEPHONE (OUTPATIENT)
Dept: NEUROLOGY | Facility: CLINIC | Age: 53
End: 2018-06-05

## 2018-06-05 ENCOUNTER — DOCUMENTATION (OUTPATIENT)
Dept: NEUROLOGY | Facility: CLINIC | Age: 53
End: 2018-06-05

## 2018-06-05 ENCOUNTER — OFFICE VISIT (OUTPATIENT)
Dept: NEUROLOGY | Facility: CLINIC | Age: 53
End: 2018-06-05
Payer: COMMERCIAL

## 2018-06-05 ENCOUNTER — IN-CLINIC DEVICE VISIT (OUTPATIENT)
Dept: CARDIOLOGY CLINIC | Facility: CLINIC | Age: 53
End: 2018-06-05
Payer: COMMERCIAL

## 2018-06-05 VITALS
HEART RATE: 81 BPM | WEIGHT: 130 LBS | SYSTOLIC BLOOD PRESSURE: 118 MMHG | DIASTOLIC BLOOD PRESSURE: 80 MMHG | BODY MASS INDEX: 24.17 KG/M2

## 2018-06-05 DIAGNOSIS — Z95.818 PRESENCE OF OTHER CARDIAC IMPLANTS AND GRAFTS: ICD-10-CM

## 2018-06-05 DIAGNOSIS — Z86.73 PERSONAL HISTORY OF TRANSIENT ISCHEMIC ATTACK: Primary | ICD-10-CM

## 2018-06-05 DIAGNOSIS — E78.00 HYPERCHOLESTEROLEMIA: ICD-10-CM

## 2018-06-05 DIAGNOSIS — R61 NIGHT SWEATS: ICD-10-CM

## 2018-06-05 DIAGNOSIS — Z72.0 TOBACCO ABUSE: ICD-10-CM

## 2018-06-05 DIAGNOSIS — I63.511 ACUTE ISCHEMIC RIGHT MCA STROKE (HCC): Primary | ICD-10-CM

## 2018-06-05 PROCEDURE — 99214 OFFICE O/P EST MOD 30 MIN: CPT | Performed by: PSYCHIATRY & NEUROLOGY

## 2018-06-05 PROCEDURE — 93298 REM INTERROG DEV EVAL SCRMS: CPT | Performed by: INTERNAL MEDICINE

## 2018-06-05 PROCEDURE — 93299 PR REM INTERROG ICPMS/SCRMS <30 D TECH REVIEW: CPT | Performed by: INTERNAL MEDICINE

## 2018-06-05 NOTE — TELEPHONE ENCOUNTER
Pt called back and made aware  She asked how to transition to eliquis  I spoke to dr Karin Dumont and she stated that pt will need to contact her pcp to see when they would like her to repeat inr  INR needs to be below 2 before stopping coumadin and starting eliquis    Pt verbalized understanding and will contact pcp

## 2018-06-05 NOTE — TELEPHONE ENCOUNTER
eliquis 5mg bid approved x1year 5/6/18 - 6/5/19  I spoke w/richard @[employer] [phone]  Approval# [id number]  Dr Jacinta Aleman aware  I did LMOM for pt for update, request callback

## 2018-06-05 NOTE — PROGRESS NOTES
Patient ID: Ismael Johns is a 47 y o  female  Assessment/Plan: This is a 46 y/o F who is here as follow up for recent right MCA stroke 4 months ago, and then another right MCA stroke, unclear etiology, cannot rule out cardioembolic etiology  ESR is 14  I reviewed the hospital records while she was in the hospital  She is trying to cut it down  She has been having night sweats, chills and weight gain  PLAN:  -continue with coumadin with INR (2-3), and will defer to PCP regarding management of coumadin    -patient wanting to be on NOAC instead, will speak to insurance regarding getting NOAC approved  -continue with aspirin for stroke prevention  -continue with pravastatin as well  -loop recorder still in place, following cardiology  -BP goal< 130/80    -patient continues to smoke, talked to her regarding tobacco cessation    -she is continuing with lyrica 50mg BID  -she is PT, OT and ST twice a week    -she is currently not driving   -as far as night sweats are concerned, patient thinks I is a side effect from coumadin and or pravastatin which I do not believe to be the case, so will check TSH, CBC and CMP  I would like for her to follow up in 4 months, I would be happy to see her sooner if the need arises  Patient/Guardian was advised to the call the office if they have any questions and concerns in the meantime  Patient/Guardian does understand that if they have any new stroke like symptoms such as facial droop on one side, weakness/paralysis on either side, speech trouble, numbness on one side, balance issues, any vision changes, or any new headache, to call 9-1-1 immediately or to proceed to the nearest ER immediately        Problem List Items Addressed This Visit     Acute ischemic right MCA stroke ([facility] [address] ) - Primary    Relevant Orders    CBC and differential    Comprehensive metabolic panel    Hypercholesterolemia    Tobacco abuse      Other Visit Diagnoses     Night sweats Relevant Orders    Thyroid Panel With TSH           Subjective:    HPI    This is a 46 y/o Female who is here as a follow up for recent right MCA stroke approximately 3 months ago, and then had another stroke in right MCA stroke  She was given tpa and then had intervention and she had a loop recorder placed after the first time  She was in the hospital 4/25/18 with similar symptoms and she was evaluated again and found to have an acute right MCA stroke again  She was taken for intervention with TICI 3 revascularization  Loop recorder was in place and was interrogated and no afib was found while in the hospital   It was decided that she since had two events and that right MCA vessel itself was not thrombosed or stenosed, decision was made to start anticoagulation  She was started on Coumadin, because insurance did not cover NOAC  CT chest/abdomen/pelvis is negative for any malignancy  She is here now and states she does not want to take coumadin because she does not want to change her diet, also is continuing to smoke at this time  She had a CT head 1 week after hospital discharge which I reviewed is negative for any acute findings, no evidence of hemorrhagic conversation  She has been having night sweats and says she has gained 30 lb weight gain  The following portions of the patient's history were reviewed and updated as appropriate:   She  has a past medical history of Acute pain of right knee; Anemia; Anxiety; Cervical disc disorder with radiculopathy; Chronic pain; Constipation; CVA (cerebral vascular accident) ([address] ); H/O ischemic right MCA stroke; Hematuria; High cholesterol; Lumbar radiculopathy; Lumbar stenosis; Other chronic pain; Other muscle spasm; PONV (postoperative nausea and vomiting); Spondylosis of cervical spine; Spondylosis of lumbosacral region; Stroke [facility]); and Urinary incontinence    She   Patient Active Problem List    Diagnosis Date Noted    Tobacco abuse 06/05/2018    History of ischemic right MCA stroke 04/26/2018    CVA (cerebral vascular accident) ([facility] [address] ) 04/09/2018    Chronic back pain 02/15/2018    Major depression 02/15/2018    Acute ischemic right MCA stroke ([facility] [address] ) 02/09/2018    Fall 02/09/2018    Chronic low back pain 07/05/2016    Cervical post-laminectomy syndrome 07/05/2016    Hypercholesterolemia 02/18/2014    Cervical radiculopathy 08/13/2013    Dependence on nicotine from cigarettes 08/09/2012     She  has a past surgical history that includes Lipoma resection; Bladder surgery; Tubal ligation; pr colonoscopy flx dx w/collj spec when pfrmd (N/A, 2/10/2017); pr knee scope,med/lat menisectomy (Left, 3/21/2016); Cervical spine surgery; Knee arthroscopy (Left); Bladder surgery (2014); Lipectomy; Neck surgery; and Total abdominal hysterectomy  Her family history includes Cancer in her daughter and mother; Hypertension in her mother; Prostate cancer in her father; Pulmonary embolism in her other; Stroke in her brother, family, mother, and sister  She  reports that she has been smoking Cigarettes  She has a 20 00 pack-year smoking history  She has never used smokeless tobacco  She reports that she drinks alcohol  She reports that she does not use drugs  Current Outpatient Prescriptions   Medication Sig Dispense Refill    aspirin 81 mg chewable tablet Chew 1 tablet (81 mg total) daily 30 tablet 0    Aspirin-Acetaminophen-Caffeine (EXCEDRIN PO) Take 500 mg by mouth 2 (two) times a day as needed      lactulose 10 g/15 mL       LYRICA 50 MG capsule Take 50 mg by mouth 2 (two) times a day      OXYCODONE-ACETAMINOPHEN PO Take 0 5 tablets by mouth daily as needed  5/325mg      pravastatin (PRAVACHOL) 40 mg tablet Take 40 mg by mouth daily      warfarin (COUMADIN) 5 mg tablet Take 1 & 1/2 tablets daily 45 tablet 5    nicotine (NICODERM CQ) 14 mg/24hr TD 24 hr patch Place 1 patch on the skin daily 28 patch 0     No current facility-administered medications for this visit  Current Outpatient Prescriptions on File Prior to Visit   Medication Sig    aspirin 81 mg chewable tablet Chew 1 tablet (81 mg total) daily    Aspirin-Acetaminophen-Caffeine (EXCEDRIN PO) Take 500 mg by mouth 2 (two) times a day as needed    lactulose 10 g/15 mL     LYRICA 50 MG capsule Take 50 mg by mouth 2 (two) times a day    OXYCODONE-ACETAMINOPHEN PO Take 0 5 tablets by mouth daily as needed  5/325mg    pravastatin (PRAVACHOL) 40 mg tablet Take 40 mg by mouth daily    warfarin (COUMADIN) 5 mg tablet Take 1 & 1/2 tablets daily    nicotine (NICODERM CQ) 14 mg/24hr TD 24 hr patch Place 1 patch on the skin daily     No current facility-administered medications on file prior to visit  She is allergic to blue dyes (parenteral); gabapentin; anesthesia s-i-60; nitrofurantoin; blue dyes (parenteral); and penicillins            Objective:    Blood pressure 118/80, pulse 81, weight 59 kg (130 lb)  Physical Exam  General - alert, awake, follows commands  Speech - fluent, no dysarthria noted  skin-  no lesions  Cranial nerves: PERRL, EOMI, no facial droop noted  Motor 5/5 throughout  gait - normal  Neurological Exam      ROS:    Review of Systems   Constitutional: Negative  Negative for appetite change and fever  HENT: Negative  Negative for hearing loss, tinnitus, trouble swallowing and voice change  Eyes: Negative  Negative for photophobia and pain  Respiratory: Negative  Negative for shortness of breath  Cardiovascular: Negative  Negative for palpitations  Gastrointestinal: Negative  Negative for nausea and vomiting  Endocrine: Negative  Negative for cold intolerance and heat intolerance  Genitourinary: Negative  Negative for dysuria, frequency and urgency  Musculoskeletal: Negative  Negative for myalgias and neck pain  Skin: Negative  Negative for rash  Neurological: Negative    Negative for dizziness, tremors, seizures, syncope, facial asymmetry, speech difficulty, weakness, light-headedness, numbness and headaches  Hematological: Negative  Does not bruise/bleed easily  Psychiatric/Behavioral: Positive for sleep disturbance  Negative for confusion and hallucinations

## 2018-06-05 NOTE — PROGRESS NOTES
Results for orders placed or performed in visit on 06/05/18   Cardiac EP device report    Narrative    SJM LOOP  MERLIN TRANSMISSION: BATTERY VOLTAGE ADEQUATE  NO DEVICE DETECTED & NO PT ACTIVATED EPISODES  NORMAL DEVICE FUNCTION   GV

## 2018-06-05 NOTE — PROGRESS NOTES
Eliquis PA Approved x1 year (5mg bid)  5/6/18-6/5/19  Spoke keily/richard @[employer] [phone]  PA# [id number]

## 2018-06-07 ENCOUNTER — OFFICE VISIT (OUTPATIENT)
Dept: SPEECH THERAPY | Facility: CLINIC | Age: 53
End: 2018-06-07
Payer: COMMERCIAL

## 2018-06-07 ENCOUNTER — OFFICE VISIT (OUTPATIENT)
Dept: OCCUPATIONAL THERAPY | Facility: CLINIC | Age: 53
End: 2018-06-07
Payer: COMMERCIAL

## 2018-06-07 DIAGNOSIS — I69.328 SPEECH OR LANGUAGE DEFICIT FOLLOWING CEREBROVASCULAR ACCIDENT: ICD-10-CM

## 2018-06-07 DIAGNOSIS — I63.511 ARTERIAL ISCHEMIC STROKE, MCA (MIDDLE CEREBRAL ARTERY), RIGHT, ACUTE (HCC): ICD-10-CM

## 2018-06-07 DIAGNOSIS — I63.511 ACUTE ISCHEMIC RIGHT MCA STROKE (HCC): Primary | ICD-10-CM

## 2018-06-07 DIAGNOSIS — R48.8 OTHER SYMBOLIC DYSFUNCTIONS: Primary | ICD-10-CM

## 2018-06-07 PROCEDURE — 97535 SELF CARE MNGMENT TRAINING: CPT

## 2018-06-07 PROCEDURE — 92507 TX SP LANG VOICE COMM INDIV: CPT

## 2018-06-07 NOTE — PROGRESS NOTES
Daily Note     Today's date: 2018  Patient name: Florian Cardenas  : 1965  MRN: [id number]  Referring provider: Murphy Zaidi MD  Dx:   Encounter Diagnosis   Name Primary?  Acute ischemic right MCA stroke (HCC) Yes                  Subjective: "Everything is 3D  I can't focus "      Objective: See treatment diary below      Assessment: Tolerated treatment well  Monocular taping to each eye for 10 minutes while completing embedded word search and noted with blurriness in left eye  B/L peripheral taping while completing rubber band tree tying for sustained convergence  Wooden tetra task at table top for decreasing above horizon shift and increasing convergence of B/L eyes  HA reported at a 1/10      Plan: Continued skilled OT per POC     INTERVENTION COMMENTS:  Diagnosis: Acute ischemic right MCA stroke (HCC) [I63 511]  Precautions: fall risk, depression, suicidal ideations, tobacco use/abuse  FOTO:  4 of 8 visits, PN due

## 2018-06-07 NOTE — PROGRESS NOTES
Daily Speech Treatment Note    Today's date: 2018  Patients name: Tyler Recinos  : 1965  MRN: [id number]  Safety measures: depression, stroke  Referring provider: Josep Villalobos MD    Primary Diagnosis/Billing code: [id number]  Secondary Diagnosis/ Billing code: 767 820, R48 8    Visit Tracking:  -Referring provider: Epic  -Billing guidelines: AMA  -Visit #3/30 (90 visits combined with PT and OT )  -CBC  -RE due 2018    Subjective/Behavioral:  -"I'm okay  Had a headache this morning, but took pain medication and it helped "    -Patient reported H/A level /10 at the start of session     -Patient reported increased difficulty with visually focusing on paper to complete a task  Vision becomes blurry and headache increases  Clinician modified tasks from visual to auditory and turned lights off to aid in headache reduction  Objective/Assessment:  -Patient's family member/caregiver was present during today's session   -Reviewed patient's home exercises/activities completed since last appointment  Patient completed HEP with 100% accuracy  Short-term goals:    1  Patient will complete complex auditory attention processing tasks (e g , sentence unscramble, ranking numbers/words, etc ) with 80% accuracy, to be achieved in 4-6 weeks      2  Patient will complete thought organization tasks (e g , sequencing, deduction puzzles, etc ) with 80% accuracy to facilitate increased executive functioning skills, to be achieved in 4-6 weeks      3  To target mental manipulation and working memory, patient will participate in word finding activity (i e , anagrams) with 80% accuracy, to be achieved in 4-6 weeks  -Working Memory: Clinician verbally generated a word and asked patient to mentally rearrange the letters and verbally generate a new word   Task completed independently in  opp (87% accuracy) increasing to 100% accuracy with min verbal clues from clinician       4  Patient will name an appropriate synonym/antonym for a given word with 80% accuracy to build expressive vocabulary for conversation, to be achieved in 4-6 weeks  -Expressive Vocabulary: Clinician verbally generated a word and asked patient to verbally generate an appropriate synonym  Task completed independently in 21/22 opp (95% accuracy) increasing to 100% accuracy with mild semantic clues from clinician      -Expressive Vocabulary: Clinician verbally generated a word and asked patient to verbally generate an appropriate antonym  Task completed independently in 16/22 opp (73% accuracy) increasing to 100% accuracy with mild-moderate semantic clues from clinician  5  Patient will complete word generation tasks (e g , analogies, category matrices, etc ) with 80% accuracy using word finding strategies to facilitate improved word retrieval skills, to be achieved in 4-6 weeks      -Word Generation/Retrieval: Clinician verbally generated the first half of an analogy pair and asked patient to verbally generate an appropriate second half analogy pair  Task completed independently in 17/20 (85% accuracy) increasing to 100% accuracy with mild-moderate semantic cues from clinician  6  Patient will complete checkbook management tasks using learned strategies    Plan:  -Patient was provided with home exercises/activities to target goals in plan of care at the end of today's session   -Continue with current plan of care  Patient was treated by Fabrizio Arias, graduate SLP student, and was under the direct supervision of SWAPNA Morocho , CCC-SLP

## 2018-06-09 ENCOUNTER — APPOINTMENT (OUTPATIENT)
Dept: RADIOLOGY | Age: 53
End: 2018-06-09
Payer: COMMERCIAL

## 2018-06-09 ENCOUNTER — TRANSCRIBE ORDERS (OUTPATIENT)
Dept: LAB | Age: 53
End: 2018-06-09

## 2018-06-09 ENCOUNTER — APPOINTMENT (OUTPATIENT)
Dept: LAB | Age: 53
End: 2018-06-09
Payer: COMMERCIAL

## 2018-06-09 DIAGNOSIS — I63.511 ARTERIAL ISCHEMIC STROKE, MCA (MIDDLE CEREBRAL ARTERY), RIGHT, ACUTE (HCC): Primary | ICD-10-CM

## 2018-06-09 DIAGNOSIS — M25.512 ACUTE PAIN OF LEFT SHOULDER: ICD-10-CM

## 2018-06-09 DIAGNOSIS — R61 NIGHT SWEATS: ICD-10-CM

## 2018-06-09 DIAGNOSIS — I63.511 ACUTE ISCHEMIC RIGHT MCA STROKE (HCC): ICD-10-CM

## 2018-06-09 LAB
ALBUMIN SERPL BCP-MCNC: 3.7 G/DL (ref 3.5–5)
ALP SERPL-CCNC: 68 U/L (ref 46–116)
ALT SERPL W P-5'-P-CCNC: 32 U/L (ref 12–78)
ANION GAP SERPL CALCULATED.3IONS-SCNC: 6 MMOL/L (ref 4–13)
AST SERPL W P-5'-P-CCNC: 22 U/L (ref 5–45)
BASOPHILS # BLD AUTO: 0.02 THOUSANDS/ΜL (ref 0–0.1)
BASOPHILS NFR BLD AUTO: 0 % (ref 0–1)
BILIRUB SERPL-MCNC: 0.49 MG/DL (ref 0.2–1)
BUN SERPL-MCNC: 15 MG/DL (ref 5–25)
CALCIUM SERPL-MCNC: 9.6 MG/DL (ref 8.3–10.1)
CHLORIDE SERPL-SCNC: 104 MMOL/L (ref 100–108)
CK SERPL-CCNC: 125 U/L (ref 26–192)
CO2 SERPL-SCNC: 29 MMOL/L (ref 21–32)
CREAT SERPL-MCNC: 0.77 MG/DL (ref 0.6–1.3)
EOSINOPHIL # BLD AUTO: 0.07 THOUSAND/ΜL (ref 0–0.61)
EOSINOPHIL NFR BLD AUTO: 1 % (ref 0–6)
ERYTHROCYTE [DISTWIDTH] IN BLOOD BY AUTOMATED COUNT: 13.3 % (ref 11.6–15.1)
GFR SERPL CREATININE-BSD FRML MDRD: 88 ML/MIN/1.73SQ M
GLUCOSE P FAST SERPL-MCNC: 87 MG/DL (ref 65–99)
HCT VFR BLD AUTO: 42.8 % (ref 34.8–46.1)
HGB BLD-MCNC: 14 G/DL (ref 11.5–15.4)
IMM GRANULOCYTES # BLD AUTO: 0.02 THOUSAND/UL (ref 0–0.2)
IMM GRANULOCYTES NFR BLD AUTO: 0 % (ref 0–2)
LYMPHOCYTES # BLD AUTO: 2.83 THOUSANDS/ΜL (ref 0.6–4.47)
LYMPHOCYTES NFR BLD AUTO: 38 % (ref 14–44)
MCH RBC QN AUTO: 31.7 PG (ref 26.8–34.3)
MCHC RBC AUTO-ENTMCNC: 32.7 G/DL (ref 31.4–37.4)
MCV RBC AUTO: 97 FL (ref 82–98)
MONOCYTES # BLD AUTO: 0.54 THOUSAND/ΜL (ref 0.17–1.22)
MONOCYTES NFR BLD AUTO: 7 % (ref 4–12)
NEUTROPHILS # BLD AUTO: 4.02 THOUSANDS/ΜL (ref 1.85–7.62)
NEUTS SEG NFR BLD AUTO: 54 % (ref 43–75)
NRBC BLD AUTO-RTO: 0 /100 WBCS
PLATELET # BLD AUTO: 348 THOUSANDS/UL (ref 149–390)
PMV BLD AUTO: 9.1 FL (ref 8.9–12.7)
POTASSIUM SERPL-SCNC: 4.3 MMOL/L (ref 3.5–5.3)
PROT SERPL-MCNC: 7 G/DL (ref 6.4–8.2)
RBC # BLD AUTO: 4.42 MILLION/UL (ref 3.81–5.12)
SODIUM SERPL-SCNC: 139 MMOL/L (ref 136–145)
TSH SERPL DL<=0.05 MIU/L-ACNC: 1.13 UIU/ML (ref 0.36–3.74)
WBC # BLD AUTO: 7.5 THOUSAND/UL (ref 4.31–10.16)

## 2018-06-09 PROCEDURE — 82550 ASSAY OF CK (CPK): CPT

## 2018-06-09 PROCEDURE — 84432 ASSAY OF THYROGLOBULIN: CPT

## 2018-06-09 PROCEDURE — 36415 COLL VENOUS BLD VENIPUNCTURE: CPT

## 2018-06-09 PROCEDURE — 80053 COMPREHEN METABOLIC PANEL: CPT

## 2018-06-09 PROCEDURE — 84443 ASSAY THYROID STIM HORMONE: CPT

## 2018-06-09 PROCEDURE — 85025 COMPLETE CBC W/AUTO DIFF WBC: CPT

## 2018-06-09 PROCEDURE — 73030 X-RAY EXAM OF SHOULDER: CPT

## 2018-06-09 PROCEDURE — 86800 THYROGLOBULIN ANTIBODY: CPT

## 2018-06-10 LAB
THYROGLOB AB SERPL-ACNC: <1 IU/ML (ref 0–0.9)
THYROGLOB SERPL-MCNC: 9.7 NG/ML (ref 1.5–38.5)

## 2018-06-11 ENCOUNTER — OFFICE VISIT (OUTPATIENT)
Dept: OCCUPATIONAL THERAPY | Facility: CLINIC | Age: 53
End: 2018-06-11
Payer: COMMERCIAL

## 2018-06-11 ENCOUNTER — OFFICE VISIT (OUTPATIENT)
Dept: SPEECH THERAPY | Facility: CLINIC | Age: 53
End: 2018-06-11
Payer: COMMERCIAL

## 2018-06-11 DIAGNOSIS — I69.328 SPEECH OR LANGUAGE DEFICIT FOLLOWING CEREBROVASCULAR ACCIDENT: ICD-10-CM

## 2018-06-11 DIAGNOSIS — I63.511 ARTERIAL ISCHEMIC STROKE, MCA (MIDDLE CEREBRAL ARTERY), RIGHT, ACUTE (HCC): ICD-10-CM

## 2018-06-11 DIAGNOSIS — I63.511 ACUTE ISCHEMIC RIGHT MCA STROKE (HCC): Primary | ICD-10-CM

## 2018-06-11 DIAGNOSIS — R48.8 OTHER SYMBOLIC DYSFUNCTIONS: Primary | ICD-10-CM

## 2018-06-11 PROCEDURE — 97112 NEUROMUSCULAR REEDUCATION: CPT

## 2018-06-11 PROCEDURE — 92507 TX SP LANG VOICE COMM INDIV: CPT

## 2018-06-11 PROCEDURE — 97530 THERAPEUTIC ACTIVITIES: CPT

## 2018-06-11 NOTE — PROGRESS NOTES
Daily Note     Today's date: 2018  Patient name: Pedro Cartwright  : 1965  MRN: [id number]  Referring provider: Charlie Carpio MD  Dx:   Encounter Diagnosis   Name Primary?  Acute ischemic right MCA stroke (HCC) Yes                  Subjective: "The tape natanael feel better to make my eyes come together"  Objective: See treatment diary below      Assessment: Tolerated treatment well  Patient would benefit from continued OT  Pt engaged in memory task with focus on ocularmotor and below HR shift  Monocular vision, R eye taped, L eye taped, peripheral field taped for 20 min (pt stated she feels she does better with peripheral field taped and chose to keep tape on for remainder of session)  Pt retrieved patterned tiles in clutter w/word formation, tiles placed vertically on mat to facilitate sustained convergence with peripheral taping  Pt asymptomatic post session  Plan: Continued skilled OT per POC with focus on ocularmotor tasks       INTERVENTION COMMENTS:  Diagnosis: Acute ischemic right MCA stroke (HCC) [I63 511]  Precautions: fall risk, depression, suicidal ideations, tobacco use/abuse  5 of 8 visits, PN due

## 2018-06-11 NOTE — PROGRESS NOTES
Daily Speech Treatment Note    Today's date: 2018  Patients name: Georgia Canela  : 1965  MRN: [id number]  Safety measures: depression, stroke  Referring provider: Thu Garza MD    Primary Diagnosis/Billing code: 328 722  Secondary Diagnosis/ Billing code: 331 941, R48 8    Visit Tracking:  -Referring provider: Epic  -Billing guidelines: AMA  -Visit # (90 visits combined with PT and OT )  -CBC  -RE due 2018    Subjective/Behavioral:  -"I'm okay  Had a headache this morning, but took pain medication  I am starting with my eye pain again though "    -Patient reported H/A level 3/10 at the start of session  Patient reported presentation of eye pain in the right eye  Clinician modified tasks from visual to auditory and turned lights off to aid in headache and eye pain reduction  Objective/Assessment:  -Patient's family member/caregiver was present during today's session   -Reviewed patient's home exercises/activities completed since last appointment  Patient completed HEP with assiatance from   Providing opposities task was completed independently in  opp (90% accuracy) increasing to 100% accuracy with semantic cues from spouse  Analogies "complete second half" was completed independently in  opp 70% accuracy increaseing to 100% accuracy with semantic cues from spouse  Deduction puzzle completed independently with 100% accuracy  Short-term goals:    1  Patient will complete complex auditory attention processing tasks (e g , sentence unscramble, ranking numbers/words, etc ) with 80% accuracy, to be achieved in 4-6 weeks  -Working Memory: Clinician verbally generated 3 words and asked patient to mentally rearrange the words in reverse order  Task completed independently in  opp (100% accuracy)  Working Memory: Clinician verbally generated a scrambled sentence (4 words) and asked patient to mentally rearrange the words to generate a correct sentence  Task completed independently in 15/20 opp (75% accuracy) increasing to 100% accuracy with x 2 reading of the sentence  Patient was noted to independently utilize delay time to aid in auditory comprehension and attention     2  Patient will complete thought organization tasks (e g , sequencing, deduction puzzles, etc ) with 80% accuracy to facilitate increased executive functioning skills, to be achieved in 4-6 weeks      3  To target mental manipulation and working memory, patient will participate in word finding activity (i e , anagrams) with 80% accuracy, to be achieved in 4-6 weeks      4  Patient will name an appropriate synonym/antonym for a given word with 80% accuracy to build expressive vocabulary for conversation, to be achieved in 4-6 weeks  -Expressive Vocabulary: Clinician verbally generated a word and asked patient to verbally generate an appropriate synonym x 2  Task completed independently in 33/44 opp (75% accuracy) increasing to 100% accuracy with mild semantic clues from clinician  5  Patient will complete word generation tasks (e g , analogies, category matrices, etc ) with 80% accuracy using word finding strategies to facilitate improved word retrieval skills, to be achieved in 4-6 weeks    -Word Generation/Retrieval: Clinician verbally generated the second half of an analogy pair and asked patient to verbally generate an appropriate first half analogy pair  Task completed independently in 10/12 (83% accuracy) increasing to 100% accuracy with mild-moderate semantic cues from clinician  Due to time constraints, remaining portion of task was given for HEP  6  Patient will complete checkbook management tasks using learned strategies    Plan:  -Patient was provided with home exercises/activities to target goals in plan of care at the end of today's session   -Continue with current plan of care       Patient was treated by Noe Monreal, graduate SLP student, and was under the direct supervision of SWAPNA Bain , CCC-SLP

## 2018-06-12 ENCOUNTER — TELEPHONE (OUTPATIENT)
Dept: INTERNAL MEDICINE CLINIC | Age: 53
End: 2018-06-12

## 2018-06-12 DIAGNOSIS — E78.00 HYPERCHOLESTEREMIA: Primary | ICD-10-CM

## 2018-06-12 NOTE — TELEPHONE ENCOUNTER
Issa Xavier would like you to put an order in [facility] for a lipid panel as she is going to get her PT/INR tomorrow morning and thinks this was overlooked from the first set of labs   She is aware that this is a fasting test

## 2018-06-13 ENCOUNTER — APPOINTMENT (OUTPATIENT)
Dept: LAB | Age: 53
End: 2018-06-13
Payer: COMMERCIAL

## 2018-06-13 ENCOUNTER — ANTICOAG VISIT (OUTPATIENT)
Dept: INTERNAL MEDICINE CLINIC | Facility: CLINIC | Age: 53
End: 2018-06-13

## 2018-06-13 ENCOUNTER — TELEPHONE (OUTPATIENT)
Dept: INTERNAL MEDICINE CLINIC | Facility: CLINIC | Age: 53
End: 2018-06-13

## 2018-06-13 DIAGNOSIS — E78.00 HYPERCHOLESTEREMIA: ICD-10-CM

## 2018-06-13 LAB
CHOLEST SERPL-MCNC: 185 MG/DL (ref 50–200)
HDLC SERPL-MCNC: 69 MG/DL (ref 40–60)
INR PPP: 3.03 (ref 0.86–1.17)
LDLC SERPL CALC-MCNC: 100 MG/DL (ref 0–100)
NONHDLC SERPL-MCNC: 116 MG/DL
PROTHROMBIN TIME: 31.4 SECONDS (ref 11.8–14.2)
TRIGL SERPL-MCNC: 78 MG/DL

## 2018-06-13 PROCEDURE — 80061 LIPID PANEL: CPT

## 2018-06-13 PROCEDURE — 85610 PROTHROMBIN TIME: CPT

## 2018-06-13 PROCEDURE — 36415 COLL VENOUS BLD VENIPUNCTURE: CPT

## 2018-06-13 NOTE — TELEPHONE ENCOUNTER
Called and discussed with Shaun Zacarias to call patient  Cannot transition patient to Eliquis until PT INR is below 2  Will have patient hold her Coumadin indefinitely, patient is to check a PT INR on Friday morning, and we will review the results Friday evening and give her further instructions  Once patient is below a PT INR of to will start patient on Eliquis 5 mg b I d  Please call patient to discuss

## 2018-06-13 NOTE — TELEPHONE ENCOUNTER
I called Suni [place] with her PT/INR results and Coumadin instructions  She informed me that her neurologist gave her a prescription  and did the prior auth for Eliquis  Her insurance company approved Eliquis and she filled the script for this medication  The neuro note addresses this medications change, please see and give direction when to stop the Coumadin and start the Eliquis  She stated that she believes that the neurologist told her it was not to be started until her INR was below 2  Please advise

## 2018-06-14 ENCOUNTER — OFFICE VISIT (OUTPATIENT)
Dept: SPEECH THERAPY | Facility: CLINIC | Age: 53
End: 2018-06-14
Payer: COMMERCIAL

## 2018-06-14 ENCOUNTER — OFFICE VISIT (OUTPATIENT)
Dept: OCCUPATIONAL THERAPY | Facility: CLINIC | Age: 53
End: 2018-06-14
Payer: COMMERCIAL

## 2018-06-14 DIAGNOSIS — R48.8 OTHER SYMBOLIC DYSFUNCTIONS: Primary | ICD-10-CM

## 2018-06-14 DIAGNOSIS — I63.511 ARTERIAL ISCHEMIC STROKE, MCA (MIDDLE CEREBRAL ARTERY), RIGHT, ACUTE (HCC): ICD-10-CM

## 2018-06-14 DIAGNOSIS — I63.511 ACUTE ISCHEMIC RIGHT MCA STROKE (HCC): Primary | ICD-10-CM

## 2018-06-14 PROCEDURE — 97112 NEUROMUSCULAR REEDUCATION: CPT

## 2018-06-14 PROCEDURE — 92507 TX SP LANG VOICE COMM INDIV: CPT

## 2018-06-14 PROCEDURE — 97150 GROUP THERAPEUTIC PROCEDURES: CPT

## 2018-06-14 NOTE — PROGRESS NOTES
Daily Note     Today's date: 2018  Patient name: Marcus Boas  : 1965  MRN: [id number]  Referring provider: Lupis Reese MD  Dx:   Encounter Diagnosis   Name Primary?  Acute ischemic right MCA stroke (HCC) Yes                  Subjective: "Well, I could finish this one because you let me use a name"  Objective: See treatment diary below      Assessment: Tolerated treatment well  Patient would benefit from continued OT  Pt engaged in ocular motor task focusing on sustained convergence with word match alternating left<->rightvisual field  Monocular vision 10min-L, 10min-R, 10 min peripheral field  Activities positioned below horizon to facilitate downward gaze  Asymptomatic post session         Plan: Continued skilled OT per POC with focus on ocularmotor    INTERVENTION COMMENTS:  Diagnosis: Acute ischemic right MCA stroke ([facility] [address] ) [I63 511]  Precautions: fall risk, depression, suicidal ideations, tobacco use/abuse  6 of 8 visits, PN due :  [id number]:3 neuro re-ed  [id number]  [id number] self directed theract

## 2018-06-14 NOTE — PROGRESS NOTES
Daily Speech Treatment Note    Today's date: 2018  Patients name: Gagan Marx  : 1965  MRN: [id number]  Safety measures: depression, stroke  Referring provider: Guillermina Judge MD    Primary Diagnosis/Billing code: 119 635  Secondary Diagnosis/ Billing code: 339 984, R48 8    Visit Tracking:  -Referring provider: Epic  -Billing guidelines: AMA  -Visit # (90 visits combined with PT and OT )  -CBC  -RE due 2018    Subjective/Behavioral:  "I feeling OK"    Patient tolerated session with lights on  No headache reported  Objective/Assessment:  -Patient's family member/caregiver was present during today's session   -Reviewed patient's home exercises/activities completed since last appointment  A  Analogies (first section completed in  opp independently, increasing to  with cues from her   Opposite naming x 1 completed in  opp, increasing to  today with cues from clinician  Short-term goals:    1  Patient will complete complex auditory attention processing tasks (e g , sentence unscramble, ranking numbers/words, etc ) with 80% accuracy, to be achieved in 4-6 weeks      2  Patient will complete thought organization tasks (e g , sequencing, deduction puzzles, etc ) with 80% accuracy to facilitate increased executive functioning skills, to be achieved in 4-6 weeks      3  To target mental manipulation and working memory, patient will participate in word finding activity (i e , anagrams) with 80% accuracy, to be achieved in 4-6 weeks      4  Patient will name an appropriate synonym/antonym for a given word with 80% accuracy to build expressive vocabulary for conversation, to be achieved in 4-6 weeks  To target semantic association and lexicon building, patient asked to name an opposite/antonym and synonym of an abstract word (i e , rigid)  Opposite naming completed  opp, increased to  with verbal cues   Synonym naming completed  opp, increased to 21/22 with verbal cues  5  Patient will complete word generation tasks (e g , analogies, category matrices, etc ) with 80% accuracy using word finding strategies to facilitate improved word retrieval skills, to be achieved in 4-6 weeks  To target word finding and attention; patient completed the card game "Anomia" where they are asked to name people/places/things based on category presented on card (i e , artificial sweetener)  Target of game was for speed of naming and processing  Pt completed level 3 with average of 8 cards min (WNL; goal is 10+)  Pt skipped cards x 2  To target education and practice of the circumlocution strategy, patient was asked to provide verbal/semantic descriptions of different people/places/things using "Hedbandz" cards  Task completed in 19/20 opp  6  Patient will complete checkbook management tasks using learned strategies    Plan:  -Patient was provided with home exercises/activities to target goals in plan of care at the end of today's session   -Continue with current plan of care

## 2018-06-15 ENCOUNTER — ANTICOAG VISIT (OUTPATIENT)
Dept: INTERNAL MEDICINE CLINIC | Facility: CLINIC | Age: 53
End: 2018-06-15

## 2018-06-15 ENCOUNTER — TRANSCRIBE ORDERS (OUTPATIENT)
Dept: LAB | Age: 53
End: 2018-06-15

## 2018-06-15 ENCOUNTER — APPOINTMENT (OUTPATIENT)
Dept: LAB | Age: 53
End: 2018-06-15
Payer: COMMERCIAL

## 2018-06-15 ENCOUNTER — TELEPHONE (OUTPATIENT)
Dept: INTERNAL MEDICINE CLINIC | Facility: CLINIC | Age: 53
End: 2018-06-15

## 2018-06-15 DIAGNOSIS — I63.511 ARTERIAL ISCHEMIC STROKE, MCA (MIDDLE CEREBRAL ARTERY), RIGHT, ACUTE (HCC): ICD-10-CM

## 2018-06-15 DIAGNOSIS — I63.511 ARTERIAL ISCHEMIC STROKE, MCA (MIDDLE CEREBRAL ARTERY), RIGHT, ACUTE (HCC): Primary | ICD-10-CM

## 2018-06-15 LAB
INR PPP: 1.63 (ref 0.86–1.17)
PROTHROMBIN TIME: 19.4 SECONDS (ref 11.8–14.2)

## 2018-06-15 PROCEDURE — 36415 COLL VENOUS BLD VENIPUNCTURE: CPT

## 2018-06-15 PROCEDURE — 85610 PROTHROMBIN TIME: CPT

## 2018-06-15 NOTE — TELEPHONE ENCOUNTER
Results of Lipid Panel from 6/13/18 reviewed with the patient during the call with her anticoagulation instructions  She will discuss the results further with Dr Timothy Driver at her office visit on 6/21/18

## 2018-06-18 ENCOUNTER — OFFICE VISIT (OUTPATIENT)
Dept: SPEECH THERAPY | Facility: CLINIC | Age: 53
End: 2018-06-18
Payer: COMMERCIAL

## 2018-06-18 ENCOUNTER — OFFICE VISIT (OUTPATIENT)
Dept: OCCUPATIONAL THERAPY | Facility: CLINIC | Age: 53
End: 2018-06-18
Payer: COMMERCIAL

## 2018-06-18 DIAGNOSIS — R48.8 OTHER SYMBOLIC DYSFUNCTIONS: Primary | ICD-10-CM

## 2018-06-18 DIAGNOSIS — I69.328 SPEECH OR LANGUAGE DEFICIT FOLLOWING CEREBROVASCULAR ACCIDENT: ICD-10-CM

## 2018-06-18 DIAGNOSIS — I63.511 ARTERIAL ISCHEMIC STROKE, MCA (MIDDLE CEREBRAL ARTERY), RIGHT, ACUTE (HCC): ICD-10-CM

## 2018-06-18 DIAGNOSIS — I63.511 ACUTE ISCHEMIC RIGHT MCA STROKE (HCC): Primary | ICD-10-CM

## 2018-06-18 PROCEDURE — 92507 TX SP LANG VOICE COMM INDIV: CPT | Performed by: SPEECH-LANGUAGE PATHOLOGIST

## 2018-06-18 PROCEDURE — 97535 SELF CARE MNGMENT TRAINING: CPT

## 2018-06-18 NOTE — PROGRESS NOTES
Daily Note     Today's date: 2018  Patient name: Shawn Gonzalez  : 1965  MRN: [id number]  Referring provider: Lius Hernandez MD  Dx:   Encounter Diagnosis   Name Primary?  Acute ischemic right MCA stroke (HCC) Yes                  Subjective: "I made a cheesecake, but I think I messed up somehow "      Objective: See treatment diary below      Assessment: Tolerated treatment well  Monocular taping to each eye for 10 minutes followed by B/L peripheral taping while completing board to table copy with symbol transfer in window while seated on physioball  Noted with pain in the right eye when occluded, but symptoms decreased after removal of tape  Patient also presented with loss of place and required anchor sticker       Plan: Continued skilled OT per POC    INTERVENTION COMMENTS:  Diagnosis: Acute ischemic right MCA stroke ([facility] [address] ) [I63 511]  Precautions: fall risk, depression, suicidal ideations, tobacco use/abuse  FOTO:  7 of 8 visits, PN scheduled

## 2018-06-18 NOTE — PROGRESS NOTES
Daily Speech Treatment Note    Today's date: 2018  Patients name: Niki Shine  : 1965  MRN: [id number]  Safety measures: depression, stroke  Referring provider: Clifford Maya MD    Primary Diagnosis/Billing code: 129 000  Secondary Diagnosis/ Billing code: 325 916, R48 8    Visit Tracking:  -Referring provider: Epic  -Billing guidelines: AMA  -Visit # (90 visits combined with PT and OT )   -CBC  -RE due 2018    Subjective/Behavioral:  -"I don't have a HA today "    *Patient requested to sit on exercise ball today since it was helping to relieve back pain during OT session  Objective/Assessment:   -Patient's family member/caregiver was present during today's session   -Reviewed patient's home exercises/activities completed since last appointment  A  Analogies completed in 35/40 opp independently, increasing to 40/40 today with min semantic cues from clinician  Synonym naming x 3 completed in  opp, increasing to  today with min semantic cues from clinician  Short-term goals:  1  Patient will complete complex auditory attention processing tasks (e g , sentence unscramble, ranking numbers/words, etc ) with 80% accuracy, to be achieved in 4-6 weeks  -Mental manipulation/thought organization: Clinician presented patient with 5 words aloud and asked patient to rearrange words to form into a logical sentence (e g , [id number]-the = HE GOT THE MAIL    Task completed in  opp independently, increasing to / opp (100% acc) with min verbal cues  2  Patient will complete thought organization tasks (e g , sequencing, deduction puzzles, etc ) with 80% accuracy to facilitate increased executive functioning skills, to be achieved in 4-6 weeks      3   To target mental manipulation and working memory, patient will participate in word finding activity (i e , anagrams) with 80% accuracy, to be achieved in 4-6 weeks      4  Patient will name an appropriate synonym/antonym for a given word with 80% accuracy to build expressive vocabulary for conversation, to be achieved in 4-6 weeks     -Word finding: Patient presented with a list of words and asked to generate antonyms & synonyms  Antonyms task completed in 21/22 opp  Synonyms task completed in 22/22 opp  Patient achieved 100% acc with min cues  Patient benefited from additional processing time  5  Patient will complete word generation tasks (e g , analogies, category matrices, etc ) with 80% accuracy using word finding strategies to facilitate improved word retrieval skills, to be achieved in 4-6 weeks      -Word finding: Patient played You.i card game to target speed of naming/processing based upon category labels (e g , flower, pop band, candy bar)  Level 4 cards utilized  Patient named an average of 7 cards/min (norm 10+)  Clinician educated patient on strategies to assist with thought organization and word retrieval speed, as well as circumlocution  Reviewed 6 skipped cards at end of trials  6  Patient will complete checkbook management tasks using learned strategies    Plan:  -Patient was provided with home exercises/activities to target goals in plan of care at the end of today's session   -Continue with current plan of care

## 2018-06-19 DIAGNOSIS — E78.00 HYPERCHOLESTEROLEMIA: Primary | ICD-10-CM

## 2018-06-19 RX ORDER — PRAVASTATIN SODIUM 40 MG
40 TABLET ORAL DAILY
Qty: 30 TABLET | Refills: 1 | Status: SHIPPED | OUTPATIENT
Start: 2018-06-19 | End: 2018-07-24 | Stop reason: SDUPTHER

## 2018-06-19 NOTE — PROGRESS NOTES
Occupational Therapy Stroke Progress Note/Status Update: Today's Date: 2018  Patient Name: Tyler Recinos  : 1965  MRN: [id number]  Referring Provider: Josep Villalobos MD  Dx: Acute ischemic right MCA stroke [facility]) [I63 511]    Active Problem List:   Patient Active Problem List   Diagnosis    Acute ischemic right MCA stroke [facility])    Fall    Chronic back pain    Major depression    [facility]A [facility]re[facility]) ([facility] [address] )    Hypercholesterolemia    Chronic low back pain    Cervical post-laminectomy syndrome    Cervical radiculopathy    History of ischemic right MCA stroke    Dependence on nicotine from cigarettes    Tobacco abuse     Past Medical Hx:   Past Medical History:   Diagnosis Date    Acute pain of right knee     last assessed - [id number]    Anemia     Anxiety     Cervical disc disorder with radiculopathy     Chronic pain     Constipation     [facility] accident) [facility] [address] [facility]     H/O ischemic right MCA stroke     Hematuria     last assessed - [id number]    High cholesterol     Lumbar radiculopathy     Lumbar stenosis     Other chronic pain     last assessed - [id number]    Other muscle spasm     last assessed - [id number]    PONV (postoperative nausea and vomiting)     must have premed    Spondylosis of cervical spine     Spondylosis of lumbosacral region     Stroke [facility])     Urinary incontinence     Resolved - [id number]     Past Surgical Hx:   Past Surgical History:   Procedure Laterality Date    BLADDER SURGERY      BLADDER SURGERY      CERVICAL SPINE SURGERY      KNEE ARTHROSCOPY Left     LIPECTOMY      of thigh    LIPOMA RESECTION      NECK SURGERY      MI COLONOSCOPY FLX DX W/COLLJ SPEC WHEN PFRMD N/A 2/10/2017    Procedure: COLONOSCOPY;  Surgeon: Jayant Knott MD;  Location: [facility] GI LAB;   Service: Gastroenterology    MI KNEE SCOPE,MED/LAT MENISECTOMY Left 3/21/2016    Procedure: ARTHROSCOPY W/ PARTIAL MEDIAL MENISCECTOMY;  Surgeon: Elio Mcpherson MD;  [facility];  Service: Orthopedics    TOTAL ABDOMINAL HYSTERECTOMY      TUBAL LIGATION        Pain Levels:  Restin    With Activity:  0    Subjective/Patient Goal: "My memory and processing still isn't right"    History of Present Illness:  Pt is a flat, employed full time, 47 y  o  female seen for OT eval [facility], initially presented to [facility] w/ L sided facial droop, limited movement of L side, R gaze preference stroke alert initiated, CTB + evolving R MCA territory infarction, MRIB + multiple infarcts in the R cerebral hemisphere in the distribution of the R MCA, deep infarct involving the basal ganglia, cortical infarcts involving the frontal and parietal region and petechial hemorrhage in the infarct noted in the R lentiform nucleus, pt received TPA s/p thrombectomy 2018, managed in the ICU tferred to P7, ultimately dx'd w/ R MCA CVA s/p TPA and thrombectomy, L elbow contusion, forehead contusion, and suicidal ideation, comorbidities as listed above      Of note, pt is a re-evaluation 2* recently adm to [facility] for subsequent CVA, awoke prior to therapy and  noted LUE weakness, facial droop, adm to [facility] for r/o TIA/CVA, MRIB +  Evolving areas of acute infarction within the right temporal and parietal lobes, consistent with the CTA findings of right MCA occlusion, dx'd w/ acute R temporal and parietal lobes, old R MCA occlusion  Pt not a TPA candidate since received last stroke, Status post right M2 endovascular thrombectomy on 2018, evaluated by OT/PT recommended continued outpt OT  Now seen for OT re-eval      Lifestyle Performance Model:  Autonomy: Pt was I w/ I/ADLs, drove, & required no use of DME PTA  Reciprocal Relationships: Supportive  works FT during am hours, is currently in slow season so presently hours are more flexible, two daughters 29 and 28 and 5 grandchildren    Service to Others: Pt is employed full time at the [employer]  Intrinsic Gratification: Enjoys baking, gardening, walking, being outdoors, going to [address] [place] [address] Ave: Pt lives in a [facility] w/ first floor setup w/ 1 [address] in [facility]    Objective  Impairments Section:   UE Strength:   CECY: RUE: 50/200 LUE: 35/200   PINCER: 3 point pinch: RUE 10, LUE:10   2 point pinch: RUE:  10, LUE:7   Lateral pincher: RUE: 11, LUE: 10    Coordination:   9 HOLE PEG TEST:     RUE:20 5  seconds, LUE: 22 1 Seconds    Range of Motion:  AROM:              Shoulder elevation: B/l UE full/intact              Shoulder FF: B/l UE full/intact              Shoulder ABD: B/l UE full/intact              ER/IR: B/l UE full/intact              PHUMZ ext/flex: B/l UE full/intact              Sup/Pron: B/l UE full/intact              Wrist flex/ext: B/l UE full/intact              Composite: B/l UE full/intact              Hook: B/l UE full/intact              Opposition: B/l UE full/intact              Finger to nose: B/l UE full/intact              Dysdiadochokinesia: B/l UE full/intact     PROM:              Shoulder elevation:  B/l UE full/intact              Shoulder FF:B/l UE full/intact              Shoulder ABD: B/l UE full/intact              ER/IR: B/l UE full/intact              Elbow ext/flex: B/l UE full/intact              Sup/Pron: B/l UE full/intact              Wrist flex/ext: B/l UE full/intact    Sensation:  MYOFILAMENTS:              RUE: 3 61              LUE: 3 61    Visual Perceptual and Functional Cognition:  1  Convergence Insufficiency Symptom Survey (CISS): 45/60 FAIL    2   Cognitive Checklist:  *Patient indicated that she is experiencing the following symptoms:    · Memory: Remembering people's names, Remembering your schedule, Remembering previous learing, Sequencing activities, Learning new things and Driving directions    · Attention: Keeping attention during a conversation, Focusing or concentrating on a specific task, Sustaining attention on a task and Dividing your attention (i e , multi-tasking)    · Processing: Processing new information  and Responding to questions in a timely manner    · Executive Functions: Monitoring your own ideas, behaviors, and/or emotions, Organizing/ planning written work, emails, and/or daily tasks, Self-correcting or recognizing mistakes, Initiating tasks and Setting goals    · Communication: Word finding in conversation, Expressing thoughts and ideas fluently, Expressing thoughts and ideas into writing, Understanding others' non-verbal cues, Using non-verbal cues and Managing tone and volume of voice    · Visual: Losing spot on the page when reading, Misspelling while texting/email, Change in handwriting and New onset motion sickness in car    · Emotional: Personality changes, Monitoring mood, Increased anxiety, Frustration tolerance, Sleep changes and Keeping cognitive and physical pace    · Increased Sensitivities to: Lighting, Noise, Sight, Movement, Crowd and Computer screen time/movies/TV     3  Contextual Memory Test (CMT): Deferred 2* scored WFL/WNL on I E  5/3/2018      4  Rajendra Cognitive Assessment Version: Deferred 2* scored 29/30 indicative of WFL/WNL neurocognitive functioning on R E  5/3/2018     5  Vision Screening Recording Form:   vision screen: + glasses @ all times present for vision  near acuity: R 20/30, L 20/30  binocularity far: orthophoria  binocularity near: orthophoria  red green fusion: PLRG @ 4"  near point of convergence: diplopia @ 6"  yousif string: suppression of far w/ divergence insufficiency  Pursuits: smooth in all planes  Saccades: accurate in all planes  ocular ROM: intact/full  visual perceptual midline shift: shifted to the L of midline and @ horizon    Discussed referral to neuro optometrist for possible vision therapy, pt and  agreeable, Given information on contact for Dr Judy Gonzalez in [facility] and Dr Salima Morales in [facility] 2* pt and  live in [place]  6  Anxiety/Depression:  Pt engaged in the Neuro QOL Anxiety Short Form and Neuro QOL Depression Short Form in order to screen for anxiety and depressive s/s  Pt reported the following:  Anxiety- Short Form:   --used to measure anxious s/s  For scoring purposes, scores of 25+ indicate the threshold for treatment per neurology/SLIM  Score:30/40  Pt most often chose the response often when asked about feeling anxious s/s  Depression- Short Form:   --used to measure depressive s/s  For scoring purposes, scores of 25+ indicate the threshold for treatment per neurology/SLIM  Score:20/40  Pt most often chose the response often when asked about feeling depressive s/s  Discussed w/ pt re: tearfulness and mood regulation/emotional management and need for medicinal and/or psychotherapies, recommend discuss w/ Dr January Fuentes @ follow up appt soon, also recommend neuropsychology 2* pt reports h/o anxiety/depression, refused medications prior reporting "didn't like how they made me feel"  Will relay to Dr January Fuentes  Assessment/Plan  Occupational Therapy Skilled Analysis Assessment and Plan of Care:  Pt requires overall mod I for ADLs/self care and mod I for fx'l mobility w/o DME  Pt is currently demonstrating the following occupational deficits: limited 2* impaired STM/immediate delayed recall, depressed mood, impaired executive functioning, attention/concentration to task, delayed processing, impaired high level balance, LUE hemiparesis distal>proximal w/ impaired FMC/FMS/GMC/GMS, impaired prehension/precision, LUE dyscoordination w/ ataxia apraxia dysmetria, L inattention, no diplopia/teaming/fusion, dysmetric pursuits w/ jerky rebounds + nystagmus, shifted to the L of midline and above horizon  The following Occupational Performance Areas to address include: medication management, socialization, health maintenance, functional mobility, community mobility, clothing management, cleaning, meal prep, money management, household maintenance, care of children, care of pets, job performance/volunteering and social participation  Based on the aforementioned OT evaluation, functional performance deficits, and assessments, pt has been identified as a moderate complexity evaluation   Recommend pt continue skilled OT services 2x/week for 4 weeks with focus on money management, visual perceptual and motor training, and divided attention to improve on the above deficits and enhance QOL      Goals:  Short Term Goals:  Cognition/Vision:  · Pt will increase auditory processing to take notes while listening in multi-modal work related/classroom symptom free at baseline performance for improved work/school performance, once returned  4 weeks as applicable-PARTIALLY MET  · Pt will increase attention to 2+ tasks for improved work and engagement in salient tasks 4 weeks as applicable-PARTIALLY MET  · Pt will increase temporal awareness for keeping to schedule within 5 min increments, recall appointments, functional addition of time with 80% accuracy 4 weeks-PARTIALLY MET  · Pt will demo good carryover of internal and external memory aides for improved recall of daily events, improved executive functioning with 80% accuracy in 4 weeks-MET  · Pt will increase insight into deficits for improved carryover of recommendations, accommodations, improved rate of healing 4 weeks-MET  · Pt will increase oculomotor control for improved saccades, con/divergent tasks for improved reading, board to table tasks with minimal increase in symptoms 4 weeks-PARTIALLY MET  · Pt will tolerate multi-modal envt x 15 min with 80% accuracy of cog load and min increase of symptoms of 2 levels in HA/dizziness/nausea 4 weeks-PARTIALLY MET  Coordination:  · Pt will increase prehension patterns for improved tripod with utensil management with <20% droppage 4 weeks- MET  · Pt will increase rate of manipulation for all FM tests for improved functional performance with salient tasks 4 weeks- MET  · Pt will increase automaticity of LUE  to 50% for improved grasp release of tabletop items for improved functional performance with salient tasks 4 weeks- MET  ROM/Function:  · Pt will demo with G carryover of Home Exercise Program to improve functional progression towards goals in Plan of care and for improved functional use of  LUE 4 weeks-MET  · Pt will increase LUE  to refined functional assist with <20% cuing for tabletop tasks for improved functional performance of life roles and salient tasks 4-MET     Long Term Goals:  · Pt will increase attention to 3+ tasks for improved divided attention with work/school and pre driving roles as applicable-PARTIALLY MET  · Pt will increase verbal and written direction following with processing time of <1 min and 85% accuracy-MET  · Pt will tolerate multimodal envt x 60 min symptom free for return to work/school-PARTIALLY MET  · Pt will demo with decreased anxiety and frustration for improved insight into concussion process and rate of recovery-MET  · Pt will demo with G carryover and understanding of accommodations for school environment to allow for enhanced learning environment symptom free, if needed-MET  · Pt will increase oculomotor control for improved dynamic activities with head turns, board/screen to table tasks symptom free, improved VMI and return to baseline handwriting-PARTIALLY MET  · Pt will increase oculomotor control for WNL saccades, con/divergent tasks symptom free-PARTIALLY MET  · Pt will increase screen tolerance to 3 hours with min increase in HA by 1-2 levels for improved leisure pursuits and work/school performance as applicable-MET  Coordination:  · Decrease ataxia with functional reach for normalized movement pattern of  LUE-MET  · Increase automaticity of  LUE to 100% for resumption of B integrative tasks-MET  · Pt will increase rate of prehension for all FM tasks for improved use of utensils, writing, ADL fasteners-MET  · Pt will increase prehension for all FM tasks for improved independence with I/ADL/leisure tasks including utensils, writing, ADL fasteners-MET  ROM/Function:  · Increase func mobs from various surfaces to Mod I/ I with least restrictive device and good safety-MET  · Resume hand dominance to refined mod I functional assist with all I/ADL/leisure tasks-MET  · Pt will increase B/L UE strength to 5/5 and  strength, through the use of strengthening exercises and home program for eventual return to driving PRN-MET  · Pt will increase FMC to Mod I with ADL fasteners for increased independence-MET  · Pt will resume hand dominance with I status for all activities of daily living and eventual return to driving PRN-MET     INTERVENTION COMMENTS:  Diagnosis: R MCA CVA s/p TPA and thrombectomy, acute R temporal and parietal lobes, old R MCA occlusion  Precautions: fall risk, depression, suicidal ideations, tobacco use/abuse  FOTO: 70 with 30% limitation  Insurance: [employer]  [id number], PN due 7/21/2018    Thank you for the consult!   Please call if you have any questions: [phone]  Eddie Estrada, OTBONI, OTR/L, C-GCSWAPNA, CSRS  Director of Outpatient Neuro Occupational Therapy

## 2018-06-21 ENCOUNTER — OFFICE VISIT (OUTPATIENT)
Dept: SPEECH THERAPY | Facility: CLINIC | Age: 53
End: 2018-06-21
Payer: COMMERCIAL

## 2018-06-21 ENCOUNTER — EVALUATION (OUTPATIENT)
Dept: OCCUPATIONAL THERAPY | Facility: CLINIC | Age: 53
End: 2018-06-21
Payer: COMMERCIAL

## 2018-06-21 DIAGNOSIS — I63.511 ACUTE ISCHEMIC RIGHT MCA STROKE (HCC): Primary | ICD-10-CM

## 2018-06-21 DIAGNOSIS — R48.8 OTHER SYMBOLIC DYSFUNCTIONS: Primary | ICD-10-CM

## 2018-06-21 DIAGNOSIS — I69.328 SPEECH OR LANGUAGE DEFICIT FOLLOWING CEREBROVASCULAR ACCIDENT: ICD-10-CM

## 2018-06-21 DIAGNOSIS — I63.511 ARTERIAL ISCHEMIC STROKE, MCA (MIDDLE CEREBRAL ARTERY), RIGHT, ACUTE (HCC): ICD-10-CM

## 2018-06-21 PROCEDURE — 92507 TX SP LANG VOICE COMM INDIV: CPT

## 2018-06-21 PROCEDURE — G8984 CARRY CURRENT STATUS: HCPCS

## 2018-06-21 PROCEDURE — G8985 CARRY GOAL STATUS: HCPCS

## 2018-06-21 PROCEDURE — 97530 THERAPEUTIC ACTIVITIES: CPT

## 2018-06-21 NOTE — PROGRESS NOTES
Daily Speech Treatment Note    Today's date: 2018  Patients name: Anmol Goodrich  : 1965  MRN: [id number]  Safety measures: depression, stroke  Referring provider: Tequila Landeros MD    Primary Diagnosis/Billing code: [phone]  Secondary Diagnosis/ Billing code: [phone], R48 8    Visit Tracking:  -Referring provider: Epic  -Billing guidelines: AMA  -Visit # (90 visits combined with PT and OT )   -CBC  -RE due 2018    Subjective/Behavioral:  "I don't have a HA today "    Objective/Assessment:   -Patient's family member/caregiver was present during today's session   -Reviewed patient's home exercises/activities completed since last appointment  Deduction Puzzle #4 ([address]) not completed fully  Patient reported difficulty with it  Clinician circled incorrect answers and gave to patient to re-try at home, as per patient request   Deduction Puzzle #5 ([address] 2) completed with 100% acc  Patient reported that she enjoys these exercises  Short-term goals:  1  Patient will complete complex auditory attention processing tasks (e g , sentence unscramble, ranking numbers/words, etc ) with 80% accuracy, to be achieved in 4-6 weeks  2  Patient will complete thought organization tasks (e g , sequencing, deduction puzzles, etc ) with 80% accuracy to facilitate increased executive functioning skills, to be achieved in 4-6 weeks      3  To target mental manipulation and working memory, patient will participate in word finding activity (i e , anagrams) with 80% accuracy, to be achieved in 4-6 weeks  To target working memory and mental manipulation; Clinician verbally generated a word and asked patient to mentally rearrange the letters and verbally generate a new word  Task completed independently in  opp (97% accuracy) increasing to 100% accuracy with min verbal clues from clinician  To target word finding and use of word finding strategies (i e , circumlocution);  Clinician and patient participated in [id number] activity  Each participant must describe the targeted word on a card without using the other words listed below it  The goal is for the other participant to guess the targeted word being described  The patient successfully described 7 words and successfully guessed 6 words described by the clinician      4  Patient will name an appropriate synonym/antonym for a given word with 80% accuracy to build expressive vocabulary for conversation, to be achieved in 4-6 weeks  5  Patient will complete word generation tasks (e g , analogies, category matrices, etc ) with 80% accuracy using word finding strategies to facilitate improved word retrieval skills, to be achieved in 4-6 weeks  To target word generation/retrieval; Clinician verbally generated the first half and beginning of the second half of a higher-level analogy pair and asked patient to verbally complete an appropriate second half analogy pair  Task completed independently in 69/73 (95% accuracy) increasing to 100% accuracy with mild semantic cues from clinician  6  Patient will complete checkbook management tasks using learned strategies    Plan:  -Patient was provided with home exercises/activities to target goals in plan of care at the end of today's session   -Continue with current plan of care  Patient was treated by Kate Herndon, graduate SLP student, and was under the direct supervision of SWAPNA Leon , CCC-SLP

## 2018-06-25 ENCOUNTER — APPOINTMENT (OUTPATIENT)
Dept: OCCUPATIONAL THERAPY | Facility: CLINIC | Age: 53
End: 2018-06-25
Payer: COMMERCIAL

## 2018-06-25 ENCOUNTER — OFFICE VISIT (OUTPATIENT)
Dept: SPEECH THERAPY | Facility: CLINIC | Age: 53
End: 2018-06-25
Payer: COMMERCIAL

## 2018-06-25 DIAGNOSIS — I63.511 ARTERIAL ISCHEMIC STROKE, MCA (MIDDLE CEREBRAL ARTERY), RIGHT, ACUTE (HCC): ICD-10-CM

## 2018-06-25 DIAGNOSIS — I69.328 SPEECH OR LANGUAGE DEFICIT FOLLOWING CEREBROVASCULAR ACCIDENT: ICD-10-CM

## 2018-06-25 DIAGNOSIS — R48.8 OTHER SYMBOLIC DYSFUNCTIONS: Primary | ICD-10-CM

## 2018-06-25 PROCEDURE — 92507 TX SP LANG VOICE COMM INDIV: CPT | Performed by: SPEECH-LANGUAGE PATHOLOGIST

## 2018-06-25 NOTE — PROGRESS NOTES
Speech Treatment Note    Today's date: 2018  Patient name: Moose Stubbs  : 1965  MRN: [id number]  Referring provider: Dunia Palma MD  Dx:   Encounter Diagnosis     ICD-10-CM    1  Other symbolic dysfunctions C45 5    2  Speech or language deficit following cerebrovascular accident I69 328    3  Arterial ischemic stroke, MCA (middle cerebral artery), right, acute [facility]) I63 511                   Visit Number:     Subjective/Behavioral: "doing okay today"    Client attended with her  and presented as motivated and enthusiastic re participation  Short-term goals:  1  Patient will complete complex auditory attention processing tasks (e g , sentence unscramble, ranking numbers/words, etc ) with 80% accuracy, to be achieved in 4-6 weeks  2  Patient will complete thought organization tasks (e g , sequencing, deduction puzzles, etc ) with 80% accuracy to facilitate increased executive functioning skills, to be achieved in 4-6 weeks  Pt brought homework (word deduction puzzles) from previous session and explained areas of difficulty asking for support in problem solving  On second attempt puzzle was 85% complete  Homework was reviewed and pt verbalized understanding  Similar deduction problem solving homework was assigned for next week      3  To target mental manipulation and working memory, patient will participate in word finding activity (i e , anagrams) with 80% accuracy, to be achieved in 4-6 weeks  Pt participated in memory and mental manipulation task with 80% accuracy  Increased processing time was necessary  Pt demonstrated strategy of repeating words in order to successfully manipulate them  Pt demonstrated significant difficulty with story recall task achieving 35% accuracy with moderate cuing for immediate recall on a story recall task with 25 items  Delayed recall also required moderate cueing but 35% accuracy was again achieved with support       4  Patient will name an appropriate synonym/antonym for a given word with 80% accuracy to build expressive vocabulary for conversation, to be achieved in 4-6 weeks  Pt reported difficulty with previous synonym homework  With moderate cueing pt completed activity with 80% accuracy  5  Patient will complete word generation tasks (e g , analogies, category matrices, etc ) with 80% accuracy using word finding strategies to facilitate improved word retrieval skills, to be achieved in 4-6 weeks  6  Patient will complete checkbook management tasks using learned strategies  Pt participated in two checkbook activities  Pt completed both tasks acurately with minimal cueing  Pt demonstrated difficulty with addition  Similar activity assigned as homework  Pt reported that record keeping of checks was most difficult at home  Clinician discussed strategies for using computer aided tools (e g  Excel file for record keeping) Pt and  both reported enthusiasm regarding applying this at home  Plan:  -Patient was provided with home exercises/activities to target goals in plan of care at the end of today's session   -Continue with current plan of care  Other:Patient's family member was present was present during today's session  and Patient was provided with home exercises/ activies to target goals in plan of care    Recommendations:Continue with Plan of Care

## 2018-06-27 ENCOUNTER — OFFICE VISIT (OUTPATIENT)
Dept: NEUROLOGY | Facility: CLINIC | Age: 53
End: 2018-06-27
Payer: COMMERCIAL

## 2018-06-27 VITALS
WEIGHT: 132.4 LBS | DIASTOLIC BLOOD PRESSURE: 72 MMHG | BODY MASS INDEX: 25 KG/M2 | HEART RATE: 82 BPM | SYSTOLIC BLOOD PRESSURE: 124 MMHG | HEIGHT: 61 IN

## 2018-06-27 DIAGNOSIS — G89.29 CHRONIC BILATERAL LOW BACK PAIN WITHOUT SCIATICA: ICD-10-CM

## 2018-06-27 DIAGNOSIS — M54.50 CHRONIC BILATERAL LOW BACK PAIN WITHOUT SCIATICA: ICD-10-CM

## 2018-06-27 DIAGNOSIS — I69.398 DEPRESSION AS LATE EFFECT OF CEREBROVASCULAR ACCIDENT (CVA): ICD-10-CM

## 2018-06-27 DIAGNOSIS — I63.511 ACUTE ISCHEMIC RIGHT MCA STROKE (HCC): Primary | ICD-10-CM

## 2018-06-27 DIAGNOSIS — F06.31 DEPRESSION AS LATE EFFECT OF CEREBROVASCULAR ACCIDENT (CVA): ICD-10-CM

## 2018-06-27 PROCEDURE — 99215 OFFICE O/P EST HI 40 MIN: CPT | Performed by: PHYSICAL MEDICINE & REHABILITATION

## 2018-06-27 RX ORDER — ESCITALOPRAM OXALATE 5 MG/1
5 TABLET ORAL DAILY
Qty: 30 TABLET | Refills: 3 | Status: SHIPPED | OUTPATIENT
Start: 2018-06-27 | End: 2018-11-13 | Stop reason: SDUPTHER

## 2018-06-27 RX ORDER — DIAZEPAM 5 MG/1
5 TABLET ORAL
COMMUNITY
End: 2018-10-30 | Stop reason: SDUPTHER

## 2018-06-27 NOTE — PROGRESS NOTES
Physical Medicine & Rehabilitation Follow-up Note  Johnson Mason 47 y o  female      ASSESSMENT/PLAN:     Henry Hillman was seen today for follow-up  Patient is 2 months post stroke  Patient is progressing functionally as expected  Expect to see more functional recovery in the next 4 months  Diagnoses and all orders for this visit:    Acute ischemic right MCA stroke [facility]) with resultant left claire paresis, cognitive deficits  -continue outpatient occupational therapy and speech therapy  -patient is not cleared for work or driving  We will assess fitness to drive evaluation in the future     Chronic bilateral low back pain without sciatica  -     XR spine lumbar 2 or 3 views injury; Future    Depression as late effect of cerebrovascular accident (CVA)  -     escitalopram (LEXAPRO) 5 mg tablet; Take 1 tablet (5 mg total) by mouth daily  - no active or passive suicidality  -discussed and encouraged psychology are neuropsychology however she is not interested at this time, reports she may consider this in the future  Left shoulder pain:  Improved  Reviewed x-ray with patient and findings of calcific tendinitis  Suggested the patient try over the counter top menthol cream, ointment, patches for pain relief and continue her home exercise program with daily range of motion exercises  Counseled on smoking cessation and importance in reducing her risk factor for CVA    Referred to [facility] support group    RTC in 6 weeks      *I have spent 40 minutes with Patient and family today in which greater than 50% of this time was spent in counseling/coordination of care regarding Diagnostic results, Prognosis, Risks and benefits of tx options, Intructions for management and Patient and family education  HPI:   Johnson Mason 47 y o  female right handed, with  has a past medical history of Acute pain of right knee; Anemia; Anxiety; Cervical disc disorder with radiculopathy; Chronic pain;  Constipation; CVA (cerebral vascular accident) [facility]); H/O ischemic right MCA stroke; Hematuria; High cholesterol; Lumbar radiculopathy; Lumbar stenosis; Other chronic pain; Other muscle spasm; PONV (postoperative nausea and vomiting); Spondylosis of cervical spine; Spondylosis of lumbosacral region; Stroke [facility]); and Urinary incontinence     Patient was hospitalized from 2/9/18 after suffering a right MCA ischemic stroke, patient s/p IV TPA  MRI confirmed multiple infarcts in the right cerebral hemisphere in the distribution of the right MCA, as well as deep infarctions in the basal ganglia and cortical infarctions in the frontal and parietal regions; there was also noted to be a petechial hemorrhage in the infarction noted in the right lentiform nucleus  CTA was performed which demonstrated a right M1 occlusion, for which she underwent an endovascular thrombectomy  Patient had a loop recorder placed on 2/13/18  Patient was admitted to inpatient rehabilitation at the [facility] from 2/14/18 through 2/18/18  Patient then reported back to the hospital from 4/25/18 - 4/27/18 for recurrent stroke-like symptoms of dysarthria and left sided weakness  Symptoms improved while in the ICU  Patient was started on Warfarin upon discharge for secondary stroke prophylaxis and continued on aspirin/statin  SUBJECTIVE:  Patient was seen 5/30/18 for left shoulder pain and neck pain  Patient arrives in the office today accompanied by her   She feels her left shoulder pain has slightly improved  She is not interested in injection for today's visit but would like to consider conservative management 1st   Personally reviewed patient's left shoulder x-ray  Patient presents today in the office with chief complaint of decreased mood  She states she feels sad, tired, overall depressed  She is interested in an anti anxiety antidepressant medication  She reports difficulty sleeping    She is eating 100% of her meals and has not experienced any weight loss  She denies any passive or active suicidal thoughts or plan  In the past she has had depression and was treated with an antidepressant but she is unclear as to the name of this medication  Patient has not seen a psychologist in the past and is not interested in psychotherapy at this time    I personally reviewed her neuro QOL anxiety and depression short forms performed by occupational therapy:  Anxiety- Short Form:   --used to measure anxious s/s  For scoring purposes, scores of 25+ indicate the threshold for treatment per neurology/SLIM  Score:30/40  Pt most often chose the response often when asked about feeling anxious s/s  Depression- Short Form:   --used to measure depressive s/s  For scoring purposes, scores of 25+ indicate the threshold for treatment per neurology/SLIM  Score:20/40  Pt most often chose the response often when asked about feeling depressive s/s  Review of Systems: Pertinent positives are in HPI/Subjective, all other ROS reviewed are negative     Review of Systems   Constitutional: Negative  HENT: Negative  Eyes: Negative  Respiratory: Negative  Cardiovascular: Negative  Gastrointestinal: Negative  Endocrine: Negative  Genitourinary: Negative  Musculoskeletal: Negative  Skin: Negative  Allergic/Immunologic: Negative  Neurological: Negative  Hematological: Negative  Psychiatric/Behavioral: The patient is nervous/anxious  OBJECTIVE:   /72 (BP Location: Right arm, Patient Position: Sitting, Cuff Size: Adult)   Pulse 82   Ht 5' 1" (1 549 m)   Wt 60 1 kg (132 lb 6 4 oz)   LMP  (LMP Unknown)   BMI 25 02 kg/m²     Physical Exam  Physical Exam   Constitutional: She appears well-developed and well-nourished  HENT:   Head: Normocephalic and atraumatic  Eyes: EOM are normal  Pupils are equal, round, and reactive to light  Cardiovascular: Normal rate and regular rhythm      Pulmonary/Chest: Breath sounds normal  She has no wheezes  She has no rales  Abdominal: Soft  Bowel sounds are normal  She exhibits no distension  There is no tenderness  Skin: Skin is warm  Psychiatric:   Denies passive her active suicidal thoughts  Feels sad and down   Nursing note and vitals reviewed  Musculoskeletal: Passive, Active range of motion functional x 4  Pain is provocated with shoulder abduction and flexion located at the biceps tendon and lateral shoulder as well as triceps  No increased muscular tone    Neuro:  Sensational light touch is within normal limits x4 extremities  Motor strength is 5/5 except in the left upper extremity which is 4+ out of 5 due to pain  Current Outpatient Prescriptions:     apixaban (ELIQUIS) 5 mg, Take 1 tablet (5 mg total) by mouth 2 (two) times a day, Disp: 60 tablet, Rfl: 3    aspirin 81 mg chewable tablet, Chew 1 tablet (81 mg total) daily, Disp: 30 tablet, Rfl: 0    Aspirin-Acetaminophen-Caffeine (EXCEDRIN PO), Take 500 mg by mouth 2 (two) times a day as needed, Disp: , Rfl:     diazepam (VALIUM) 5 mg tablet, Take 5 mg by mouth TAKE 2 TABLETS DAILY PRN, Disp: , Rfl:     lactulose 10 g/15 mL, , Disp: , Rfl:     LYRICA 50 MG capsule, Take 50 mg by mouth 2 (two) times a day, Disp: , Rfl:     OXYCODONE-ACETAMINOPHEN PO, Take 0 5 tablets by mouth daily as needed   5/325mg, Disp: , Rfl:     pravastatin (PRAVACHOL) 40 mg tablet, Take 1 tablet (40 mg total) by mouth daily, Disp: 30 tablet, Rfl: 1    escitalopram (LEXAPRO) 5 mg tablet, Take 1 tablet (5 mg total) by mouth daily, Disp: 30 tablet, Rfl: 3    nicotine (NICODERM CQ) 14 mg/24hr TD 24 hr patch, Place 1 patch on the skin daily, Disp: 28 patch, Rfl: 0    Imaging: I have personally reviewed imaging with results as follows:  Suggestive evidence of calcific tendinitis in the left shoulder

## 2018-06-27 NOTE — PATIENT INSTRUCTIONS
Reminder:  Apply either Menthol cream/ointment apply 3-4x/day or a patch with Menthol to the left shoulder areas of pain for 10-12 hours at a times, then remove

## 2018-06-28 ENCOUNTER — OFFICE VISIT (OUTPATIENT)
Dept: SPEECH THERAPY | Facility: CLINIC | Age: 53
End: 2018-06-28
Payer: COMMERCIAL

## 2018-06-28 ENCOUNTER — OFFICE VISIT (OUTPATIENT)
Dept: OCCUPATIONAL THERAPY | Facility: CLINIC | Age: 53
End: 2018-06-28
Payer: COMMERCIAL

## 2018-06-28 DIAGNOSIS — I69.328 SPEECH OR LANGUAGE DEFICIT FOLLOWING CEREBROVASCULAR ACCIDENT: ICD-10-CM

## 2018-06-28 DIAGNOSIS — I63.511 ACUTE ISCHEMIC RIGHT MCA STROKE (HCC): Primary | ICD-10-CM

## 2018-06-28 DIAGNOSIS — R48.8 OTHER SYMBOLIC DYSFUNCTIONS: Primary | ICD-10-CM

## 2018-06-28 DIAGNOSIS — I63.511 ARTERIAL ISCHEMIC STROKE, MCA (MIDDLE CEREBRAL ARTERY), RIGHT, ACUTE (HCC): ICD-10-CM

## 2018-06-28 PROCEDURE — G9174 SPEECH LANG CURRENT STATUS: HCPCS

## 2018-06-28 PROCEDURE — 97112 NEUROMUSCULAR REEDUCATION: CPT

## 2018-06-28 PROCEDURE — 92507 TX SP LANG VOICE COMM INDIV: CPT

## 2018-06-28 PROCEDURE — G9175 SPEECH LANG GOAL STATUS: HCPCS

## 2018-06-28 PROCEDURE — 97535 SELF CARE MNGMENT TRAINING: CPT

## 2018-06-28 NOTE — PROGRESS NOTES
Daily Speech Treatment Note    Today's date: 2018  Patients name: Cintia Duncan  : 1965  MRN: [id number]  Safety measures: depression, stroke  Referring provider: Lamberto Baker MD    Primary Diagnosis/Billing code: 473 423  Secondary Diagnosis/ Billing code: 478 600, R48 8    Visit Tracking:  -Referring provider: Epic  -Billing guidelines: AMA  -Visit # (90 visits combined with PT and OT )  -CBC  -RE due 2018    Subjective/Behavioral:  "I woke up with a HA, but it's gone now " Patient reported HA as having a throbbing sensation  Patient reported taking Excedrin to relieve HA  Objective/Assessment:   -Patient's family member/caregiver was present during today's session   -Reviewed patient's home exercises/activities completed since last appointment  Homework was reviewed with patient:  Deduction Puzzle #6 [facility] 2) completed with 100% acc  Patient reported some difficulty with this puzzle, but benefited from note-taking  Writing Checks: Patient completed with 100% acc  Short-term goals:  1  Patient will complete complex auditory attention processing tasks (e g , sentence unscramble, ranking numbers/words, etc ) with 80% accuracy, to be achieved in 4-6 weeks  2  Patient will complete thought organization tasks (e g , sequencing, deduction puzzles, etc ) with 80% accuracy to facilitate increased executive functioning skills, to be achieved in 4-6 weeks  To target thought organization and auditory information processing, patient completed 3 deduction puzzles of WAL-[employer] (2x5, 3x4, and 3x5)  SLP read clues aloud to patient, and patient was instructed to take notes on the auditory information before completing the puzzle  Patient completed puzzles 1 and 2 with 100% acc  Patient completed puzzle 3 with 100% acc with min verbal cuing from SLP for some info repetition      3   To target mental manipulation and working memory, patient will participate in word finding activity (i e , anagrams) with 80% accuracy, to be achieved in 4-6 weeks  To target immediate memory, patient participated in a Mental Manipulation task  Words were read aloud by SLP, and patient was asked to recall words in a specific order denoted by SLP  Patient completed recall of words with different sets of instructions for each word group (i e , repeat in order of fastest to slowest) (field of 4) in 4/5 opp, increasing to 5/5 opp with min verbal cuing for info repetition      4  Patient will name an appropriate synonym/antonym for a given word with 80% accuracy to build expressive vocabulary for conversation, to be achieved in 4-6 weeks  5  Patient will complete word generation tasks (e g , analogies, category matrices, etc ) with 80% accuracy using word finding strategies to facilitate improved word retrieval skills, to be achieved in 4-6 weeks  To target generative naming with initial letter and category provided, patient completed Category Members activity (i e , SPORT, begins with /g/)  On first worksheet, patient appropriately named 27/32 words, increasing to 32/32 with min semantic verbal cuing  On second worksheet, patient appropriately named 31/32 words, increasing to 32/32 with min semantic verbal cuing  6  Patient will complete checkbook management tasks using learned strategies  Plan:  -Patient was provided with home exercises/activities to target goals in plan of care at the end of today's session   -Continue with current plan of care  Patient was treated by Adan Cabrales, graduate SLP student, and was under the direct supervision of SWAPNA Williamson , CCC-SLP

## 2018-06-28 NOTE — PROGRESS NOTES
Daily Note     Today's date: 2018  Patient name: Anmol Goodrich  : 1965  MRN: [id number]  Referring provider: Tequila Landeros MD  Dx:   Encounter Diagnosis   Name Primary?  Acute ischemic right MCA stroke ([facility] [address] ) Yes                  Subjective: "Netties going to take me to [facility]"  Objective: See treatment diary below      Assessment: Tolerated treatment well  Patient would benefit from continued OT  Ptreported seeing Dr Jaquan Solorzano who prescribed generic form of lexapro for depression in addition to participating in [facility]  Pt engaged in ocularmotor task standing on foam for divided attetion task focusing on saccadic eye movements w/ converg/diver and monocular/binocular vision  Continuing with peripheral field occluded, pt engaged in /VS activity creating word puzzle in window for added distraction, locating letters to form words working from low<->high surface  Educated pt on kitchen modifications to create functional environment to engage and enjoy leisurely activity of cooking/baking ie   rolling cart to accomodate/store ingredients and cookbooks/recipe for accessibility and memory recall or utilize command strips to hang recipes on cabinet doors for easier access  No report of HA or eye fatigue post session  Plan: Continued skilled OT per POC  INTERVENTION COMMENTS:  Diagnosis: R MCA CVA s/p TPA and thrombectomy, acute R temporal and parietal lobes, old R MCA occlusion    Precautions: fall risk, depression, suicidal ideations, tobacco use/abuse  Insurance: [employer]  2 of 8 visits through , PN due 2018

## 2018-06-30 ENCOUNTER — APPOINTMENT (OUTPATIENT)
Dept: RADIOLOGY | Age: 53
End: 2018-06-30
Payer: COMMERCIAL

## 2018-06-30 DIAGNOSIS — M54.50 CHRONIC BILATERAL LOW BACK PAIN WITHOUT SCIATICA: ICD-10-CM

## 2018-06-30 DIAGNOSIS — G89.29 CHRONIC BILATERAL LOW BACK PAIN WITHOUT SCIATICA: ICD-10-CM

## 2018-06-30 PROCEDURE — 72100 X-RAY EXAM L-S SPINE 2/3 VWS: CPT

## 2018-07-02 ENCOUNTER — OFFICE VISIT (OUTPATIENT)
Dept: OCCUPATIONAL THERAPY | Facility: CLINIC | Age: 53
End: 2018-07-02
Payer: COMMERCIAL

## 2018-07-02 ENCOUNTER — OFFICE VISIT (OUTPATIENT)
Dept: SPEECH THERAPY | Facility: CLINIC | Age: 53
End: 2018-07-02
Payer: COMMERCIAL

## 2018-07-02 ENCOUNTER — APPOINTMENT (OUTPATIENT)
Dept: OCCUPATIONAL THERAPY | Facility: CLINIC | Age: 53
End: 2018-07-02
Payer: COMMERCIAL

## 2018-07-02 DIAGNOSIS — I63.511 ACUTE ISCHEMIC RIGHT MCA STROKE (HCC): Primary | ICD-10-CM

## 2018-07-02 DIAGNOSIS — R48.8 OTHER SYMBOLIC DYSFUNCTIONS: Primary | ICD-10-CM

## 2018-07-02 DIAGNOSIS — I63.511 ARTERIAL ISCHEMIC STROKE, MCA (MIDDLE CEREBRAL ARTERY), RIGHT, ACUTE (HCC): ICD-10-CM

## 2018-07-02 DIAGNOSIS — I69.328 SPEECH OR LANGUAGE DEFICIT FOLLOWING CEREBROVASCULAR ACCIDENT: ICD-10-CM

## 2018-07-02 PROCEDURE — 92507 TX SP LANG VOICE COMM INDIV: CPT | Performed by: SPEECH-LANGUAGE PATHOLOGIST

## 2018-07-02 PROCEDURE — 97535 SELF CARE MNGMENT TRAINING: CPT

## 2018-07-02 NOTE — PROGRESS NOTES
Daily Speech Treatment Note    Today's date: 2018   Patients name: Norberto Reina  : 1965  MRN: [id number]  Safety measures: depression, stroke  Referring provider: Cassie Decker MD    Primary Diagnosis/Billing code: 865 [id number]  Secondary Diagnosis/ Billing code: 134 283, R48 8    Visit Tracking:  -Referring provider: Epic  -Billing guidelines: AMA  -Visit #10/30 (90 visits combined with PT and OT )  -CBC  -RE due 2018    Subjective/Behavioral:  -"This is my sister "  -No c/o HA upon arrival to therapy--patient reported that she took Excedrin at 4:00am this morning  Objective/Assessment:   -Patient's family member/caregiver was present during today's session   -Reviewed patient's home exercises/activities completed since last appointment  (word deduction grids & unscrambling words completed with 100% acc)    Short-term goals:  1  Patient will complete complex auditory attention processing tasks (e g , sentence unscramble, ranking numbers/words, etc ) with 80% accuracy, to be achieved in 4-6 weeks  2  Patient will complete thought organization tasks (e g , sequencing, deduction puzzles, etc ) with 80% accuracy to facilitate increased executive functioning skills, to be achieved in 4-6 weeks      3  To target mental manipulation and working memory, patient will participate in word finding activity (i e , anagrams) with 80% accuracy, to be achieved in 4-6 weeks      4  Patient will name an appropriate synonym/antonym for a given word with 80% accuracy to build expressive vocabulary for conversation, to be achieved in 4-6 weeks      5  Patient will complete word generation tasks (e g , analogies, category matrices, etc ) with 80% accuracy using word finding strategies to facilitate improved word retrieval skills, to be achieved in 4-6 weeks   -Word finding: Patient played Anomia card game to target speed of naming/processing based upon category labels (e g , flower, pop band, candy bar)  Level 5 cards utilized  Patient named an average of 3 25 cards/min (norm 10+)  Clinician educated patient on strategies to assist with thought organization and word retrieval speed, as well as circumlocution  Reviewed 13 skipped cards at end of trials  6  Patient will complete checkbook management tasks using learned strategies   -Checkbook management ([facility], pg  166): Patient was presented with a series of 8 transactions and a blank checkbook register  First, patient was instructed to sequence transactions in chronological order by date of occurrence--task completed with 100% acc  Next, patient was instructed to input transactions into register correctly (e g , check #, date, transaction name, withdrawal, deposit)--task completed in 7/8 opp  Finally, patient was instructed to complete mathematical computation (addition/subtraction) in each row to arrive at the correct balance--task completed with 100% acc with the use of a calculator     -Checkbook management ([facility], pg  167): Patient was presented with a series of 8 transactions and a blank checkbook register  First, patient was instructed to sequence transactions in chronological order by date of occurrence--task completed with 100% acc  Next, patient was instructed to input transactions into register correctly (e g , check #, date, transaction name, withdrawal, deposit)--task completed in 8/8 opp  Finally, patient was instructed to complete mathematical computation (addition/subtraction) in each row to arrive at the correct balance--task completed with 1 error with the use of a calculator  It should be noted that patient was participating in communication with sister and clinician during calculator work, which distracted her  Patient was instructed to locate error and correct  With additional time, patient was able to make the self-correction to arrival at the correct final balance      -Checkbook management ([facility], pg  168):  Patient was presented with a series of 8 transactions and a blank checkbook register  First, patient was instructed to sequence transactions in chronological order by date of occurrence--task completed with 100% acc  Next, patient was instructed to input transactions into register correctly (e g , check #, date, transaction name, withdrawal, deposit)--task completed in 7/8 opp  Finally, patient was instructed to complete mathematical computation (addition/subtraction) in each row to arrive at the correct balance--task completed with 2 errors with the use of a calculator  Patient was instructed to locate error and correct  With additional time and mod verbal cues, patient was able to make the self-correction to arrival at the correct final balance  Plan:  -Patient was provided with home exercises/activities to target goals in plan of care at the end of today's session   -Continue with current plan of care

## 2018-07-02 NOTE — PROGRESS NOTES
Daily Note     Today's date: 2018  Patient name: Dennison Schlatter  : 1965  MRN: [id number]  Referring provider: Cathy Apley, MD  Dx:   Encounter Diagnosis   Name Primary?  Acute ischemic right MCA stroke (HCC) Yes                  Subjective: "It made my head feel confused "      Objective: See treatment diary below      Assessment: Tolerated treatment well  Patient noted to present with a flat affect this session, as well as observed delayed processing when answering questions  Patients sister was present and stated that patient did not sleep well last night and is adjusting to a new medication  Completed alphabetizing states and organizing into excel spread sheet as patient reports difficulty with black and white contrast and computer screens  Minimal errors noted and only required assist from SCHAEFER 1x   Spot it cards placed at far distance to increase convergence/divergence of ocular movements and noted with hypersensitivities to visual clutter-reported mental/visual fogginess with task    Plan: Continued skilled OT per POC    INTERVENTION COMMENTS:  Diagnosis: Acute ischemic right MCA stroke ([facility] [address] ) [I63 [id number]]  Precautions: fall risk, depression, suicidal ideations, tobacco use/abuse  FOTO:  3 of 8 visits, PN due

## 2018-07-03 NOTE — PROGRESS NOTES
Daily Speech Treatment Note    Today's date: 2018   Patients name: Niki Shine  : 1965  MRN: [id number]  Safety measures: depression, stroke  Referring provider: Clifford Maya MD    Primary Diagnosis/Billing code: 604 419  Secondary Diagnosis/ Billing code: 492 103, R48 8    Visit Tracking:  -Referring provider: Epic  -Billing guidelines: AMA  -Visit # (90 visits combined with PT and OT )  -CBC  -RE due 2018    Subjective/Behavioral:  -Patient reported that she had a HA upon arrival to therapy (pain: 4/10)  Objective/Assessment:  -Patient's family member/caregiver was present during today's session   -Reviewed patient's home exercises/activities completed since last appointment  (checkbook management completed across 2 trials with assistance from --patient had a variety of erased work on worksheets (numbers in wrong column); patient's  also reported that patient was not utilizing decimal point on calculator, which lead to a variety of mathematical errors in checkbook register simulation)    Short-term goals:  1  Patient will complete complex auditory attention processing tasks (e g , sentence unscramble, ranking numbers/words, etc ) with 80% accuracy, to be achieved in 4-6 weeks  -Mental manipulation: Clinician read 4 words aloud and patient asked to recall words in a sequential order (e g , [id number]-Mrcijh = [id number])  Task completed in  opp independently, increasing to  opp (100% acc) with min verbal repetition  2  Patient will complete thought organization tasks (e g , sequencing, deduction puzzles, etc ) with 80% accuracy to facilitate increased executive functioning skills, to be achieved in 4-6 weeks      3   To target mental manipulation and working memory, patient will participate in word finding activity (i e , anagrams) with 80% accuracy, to be achieved in 4-6 weeks       4  Patient will name an appropriate synonym/antonym for a given word with 80% accuracy to build expressive vocabulary for conversation, to be achieved in 4-6 weeks  5  Patient will complete word generation tasks (e g , analogies, category matrices, etc ) with 80% accuracy using word finding strategies to facilitate improved word retrieval skills, to be achieved in 4-6 weeks  -Higher-level expressive language/thought organization: Patient was presented with common proverb/expression and asked to complete it  Task completed in 3/5 opp  Patient was then asked to describe its figurative meaning  Task completed in 1/5 opp, increasing to 3/5 opp provided mod verbal cues for expansion  6  Patient will complete checkbook management tasks using learned strategies   -Checkbook management ([facility], pg  171): Patient was presented with a series of 8 transactions and a blank checkbook register  Patient demonstrated nice carryover from last session and initiated task independently by sequencing transactions in chronological order  Next, patient independently inputted transactions into register correctly (e g , check #, date, transaction name, withdrawal, deposit) in 8/8 opp  Finally, patient completed mathematical computation (addition/subtraction) with 100% acc with the use of a calculator     -Checkbook management ([facility], pg  172): Patient was presented with a series of 8 transactions and a blank checkbook register  Patient sequenced transactions in chronological order first  Next, patient independently inputted transactions into register correctly (e g , check #, date, transaction name, withdrawal, deposit) in 8/8 opp  Finally, patient completed mathematical computation (addition/subtraction) with 100% acc with the use of a calculator  *Patient benefited from having no distractions during task  Patient was observed to make an increasing number of mistakes last session with auditory distractions      Plan:  -Patient was provided with home exercises/activities to target goals in plan of care at the end of today's session   -Continue with current plan of care

## 2018-07-05 ENCOUNTER — APPOINTMENT (OUTPATIENT)
Dept: OCCUPATIONAL THERAPY | Facility: CLINIC | Age: 53
End: 2018-07-05
Payer: COMMERCIAL

## 2018-07-05 ENCOUNTER — OFFICE VISIT (OUTPATIENT)
Dept: OCCUPATIONAL THERAPY | Facility: CLINIC | Age: 53
End: 2018-07-05
Payer: COMMERCIAL

## 2018-07-05 ENCOUNTER — OFFICE VISIT (OUTPATIENT)
Dept: SPEECH THERAPY | Facility: CLINIC | Age: 53
End: 2018-07-05
Payer: COMMERCIAL

## 2018-07-05 DIAGNOSIS — I69.328 SPEECH OR LANGUAGE DEFICIT FOLLOWING CEREBROVASCULAR ACCIDENT: ICD-10-CM

## 2018-07-05 DIAGNOSIS — R48.8 OTHER SYMBOLIC DYSFUNCTIONS: Primary | ICD-10-CM

## 2018-07-05 DIAGNOSIS — I63.511 ACUTE ISCHEMIC RIGHT MCA STROKE (HCC): Primary | ICD-10-CM

## 2018-07-05 DIAGNOSIS — I63.511 ARTERIAL ISCHEMIC STROKE, MCA (MIDDLE CEREBRAL ARTERY), RIGHT, ACUTE (HCC): ICD-10-CM

## 2018-07-05 PROCEDURE — 97112 NEUROMUSCULAR REEDUCATION: CPT

## 2018-07-05 PROCEDURE — 92507 TX SP LANG VOICE COMM INDIV: CPT | Performed by: SPEECH-LANGUAGE PATHOLOGIST

## 2018-07-05 PROCEDURE — 97530 THERAPEUTIC ACTIVITIES: CPT

## 2018-07-05 NOTE — PROGRESS NOTES
Daily Note     Today's date: 2018  Patient name: Adrianna Blas  : 1965  MRN: [id number]  Referring provider: Robyn Boyd MD  Dx:   Encounter Diagnosis   Name Primary?  Acute ischemic right MCA stroke (HCC) Yes                  Subjective:       Objective: See treatment diary below      Assessment: Tolerated treatment well  Patient would benefit from continued OT  Pt reported HA in occipital region upon arrival to session  Pt engaged in ocularmotor task focusing on conver/diverg with vision occluded  Monocular vision for 10 min each side, no increase in HA  Peripheral taping for 10 min during sustained converg task w/mental manipulation  No increase in HA post session  Pt reported appt with neuro opthalmologist Tues, 7/10  Plan: Continued skilled OT per POC      INTERVENTION COMMENTS:  Diagnosis: Acute ischemic right MCA stroke (HCC) [I63 511]  Precautions: fall risk, depression, suicidal ideations, tobacco use/abuse  4 of 8 visits, PN due

## 2018-07-06 NOTE — PROGRESS NOTES
Speech-Language Pathology Re-Evaluation    Today's date: 2018  Patients name: Sandie Pepe  : 1965  MRN: [id number]  Safety measures: Depression, CVA  Referring provider: Mey Mancia MD    Subjective comments: ***    Patient's goal(s): "I want to separate things in my head" - patient reports she feels her thoughts are no longer organized  She was unable to verbalize the problems she is having (which is a an example of what she wants to work on)  Assessments    ***    The Assessment of Language-Related Functional Activities (ODELL) was administered today  This test assesses language-related functional skills using 10 subtests: Telling Time, Counting Money, Addressing an Envelope, Solving Daily Math Problems, Writing a Check/Balancing a Checkbook, Understanding Medicine Labels, Using a Calendar, Reading Instructions, Using a Telephone, & Writing a Phone Message  Independent Functioning Ratings:    1 = high probability of independent functioning on this task    2 = indication of need for some level of assistance on this task    3 = high probability that the patient is not able to function independently on this task    TT = Telling Time  CM = Counting Money  AE = Addressing Envelope  MP = Solving Daily Math Problems  WC = Writing a Check and Balancing a Check Book  UM = Understanding Medicine Labels  UC = Using a Calendar  RI = Reading Instructions  UT = Using a Telephone  WM = Writing a Phone Message    The following results were obtained during the administration of the assessment:     Subtest: Raw score: Percentile: Independent Functioning Ratin  TT: ***/10 ***%tile ***   2  CM: ***/10 ***%tile ***   3  AE: ***/10 ***%tile ***   4  MP: ***/10 ***%tile ***   5  WC: ***/10 ***%tile ***   6  UM: ***/10 ***%tile ***   7  UC: ***/10 ***%tile ***   8  RI: ***/10 ***%tile ***   9  UT: ***10 ***%tile ***   10  WM: ***/20 ***%tile ***     Goals    Short-term goals:  1   Patient will complete complex auditory attention processing tasks (e g , sentence unscramble, ranking numbers/words, etc ) with 80% accuracy, to be achieved in 4-6 weeks  -- *** MET     2  Patient will complete thought organization tasks (e g , sequencing, deduction puzzles, etc ) with 80% accuracy to facilitate increased executive functioning skills, to be achieved in 4-6 weeks  -- *** MET     3  To target mental manipulation and working memory, patient will participate in word finding activity (i e , anagrams) with 80% accuracy, to be achieved in 4-6 weeks  -- *** MET     4  Patient will name an appropriate synonym/antonym for a given word with 80% accuracy to build expressive vocabulary for conversation, to be achieved in 4-6 weeks  -- *** MET     5  Patient will complete word generation tasks (e g , analogies, category matrices, etc ) with 80% accuracy using word finding strategies to facilitate improved word retrieval skills, to be achieved in 4-6 weeks  -- *** MET     6  Patient will complete checkbook management tasks using learned strategies, to be achieved in 4-6 weeks  -- *** MET     Long-term goals:     1  Patient will complete cognitive-linguistic therapy that addresses patients specific deficits in processing speed, short-term working memory, attention to detail, monitoring, sequencing, and organization skills, with instruction, to alleviate effects of executive functioning disorder deficits by discharge  -- PARTIALLY MET     2  Patient will complete higher-level expressive language tasks (e g , word definitions, idioms, synonym/antonyms, etc) with 80% accuracy to improve functional communication skills by discharge   -- PARTIALLY MET    Functional Limitations Reporting (G-codes):   ***  Other (TO): CJ, CI    Impressions/Recommendations    Impressions: Patient presents with ***    FROM IE: Impressions: Patient presents with mild cognitive linguistic deficits in the areas of thought organization, executive functioning and high level word finding  Despite patient CLQT testing as WNL; patient demonstrated deficits of cognitive confusion  Her  reports she attempted checkbook management and was incorrect by $2K  '    Recommendations:  -Patient would benefit from outpatient skilled Speech Therapy services : Cognitive-Linguistic therapy    -Frequency: 1-2x weekly  -Duration: 4-6 weeks    -Intervention certification from: 8/3/1928   -Intervention certification to: 33/02/2545    -Intervention comments: ***    Visit Tracking:   -Referring provider: Epic  -Billing guidelines: AMA  -Visit #12/30 (90 visits combined with PT and OT )  -CBC  -RE due 08/20/2018

## 2018-07-09 ENCOUNTER — APPOINTMENT (OUTPATIENT)
Dept: OCCUPATIONAL THERAPY | Facility: CLINIC | Age: 53
End: 2018-07-09
Payer: COMMERCIAL

## 2018-07-09 ENCOUNTER — EVALUATION (OUTPATIENT)
Dept: SPEECH THERAPY | Facility: CLINIC | Age: 53
End: 2018-07-09
Payer: COMMERCIAL

## 2018-07-09 DIAGNOSIS — I69.328 SPEECH OR LANGUAGE DEFICIT FOLLOWING CEREBROVASCULAR ACCIDENT: ICD-10-CM

## 2018-07-09 DIAGNOSIS — I63.511 ARTERIAL ISCHEMIC STROKE, MCA (MIDDLE CEREBRAL ARTERY), RIGHT, ACUTE (HCC): ICD-10-CM

## 2018-07-09 DIAGNOSIS — R48.8 OTHER SYMBOLIC DYSFUNCTIONS: Primary | ICD-10-CM

## 2018-07-09 PROCEDURE — G9175 SPEECH LANG GOAL STATUS: HCPCS | Performed by: SPEECH-LANGUAGE PATHOLOGIST

## 2018-07-09 PROCEDURE — G9174 SPEECH LANG CURRENT STATUS: HCPCS | Performed by: SPEECH-LANGUAGE PATHOLOGIST

## 2018-07-09 PROCEDURE — 96125 COGNITIVE TEST BY HC PRO: CPT | Performed by: SPEECH-LANGUAGE PATHOLOGIST

## 2018-07-09 NOTE — PROGRESS NOTES
Speech-Language Pathology Re-Evaluation    Today's date: 2018  Patients name: Nury Perez  : 1965  MRN: [id number]  Safety measures: Depression, CVA  Referring provider: Alison Zamudio MD    Subjective comments: "I have an eye doctor appointment today " Patient is following-up with a neuro optometrist this morning  Patient's goal(s): "Stay on top of my checking and keep things sorted, not jumbled"    Assessments    The Assessment of Language-Related Functional Activities (ODELL) was administered today  This test assesses language-related functional skills using 10 subtests: Telling Time, Counting Money, Addressing an Envelope, Solving Daily Math Problems, Writing a Check/Balancing a Checkbook, Understanding Medicine Labels, Using a Calendar, Reading Instructions, Using a Telephone, & Writing a Phone Message  Independent Functioning Ratings:    1 = high probability of independent functioning on this task    2 = indication of need for some level of assistance on this task    3 = high probability that the patient is not able to function independently on this task    TT = Telling Time  CM = Counting Money  AE = Addressing Envelope  MP = Solving Daily Math Problems  WC = Writing a Check and Balancing a Check Book  UM = Understanding Medicine Labels  UC = Using a Calendar  RI = Reading Instructions  UT = Using a Telephone  WM = Writing a Phone Message    The following results were obtained during the administration of the assessment:     Subtest: Raw score: Percentile: Independent Functioning Ratin  TT: 6/10 60%tile 2   2  CM: 8/10 80%tile 1   3  AE: 8/10 80%tile 2   4  MP: 10/10 100%tile 1   5  WC: 8/10 80%tile 1   6  UM: 10/10 100%tile 1   7  UC: 10/10 100%tile 1   8  RI: 9/10 90%tile 1   9  UT: Not tested     10  WM:  70%tile 2     Goals    Short-term goals:  1   Patient will complete complex auditory attention processing tasks (e g , sentence unscramble, ranking numbers/words, etc ) with 80% accuracy, to be achieved in 4-6 weeks  -- PARTIALLY MET     2  Patient will complete thought organization tasks (e g , sequencing, deduction puzzles, etc ) with 80% accuracy to facilitate increased executive functioning skills, to be achieved in 4-6 weeks  -- PARTIALLY MET     3  To target mental manipulation and working memory, patient will participate in word finding activity (i e , anagrams) with 80% accuracy, to be achieved in 4-6 weeks  -- MET     4  Patient will name an appropriate synonym/antonym for a given word with 80% accuracy to build expressive vocabulary for conversation, to be achieved in 4-6 weeks  -- MET     5  Patient will complete word generation tasks (e g , analogies, category matrices, etc ) with 80% accuracy using word finding strategies to facilitate improved word retrieval skills, to be achieved in 4-6 weeks  -- MET     6  Patient will complete checkbook management tasks using learned strategies, to be achieved in 4-6 weeks  -- PARTIALLY MET    7  (NEW GOAL) Patient will complete clock drawing/reading tasks with 80% accuracy to improve executive functioning skills (to be achieved in 4-6 weeks)  8  (NEW GOAL) Patient will define idioms with 80% accuracy to facilitate improved semantic language skills (to be achieved in 4-6 weeks)  9  (NEW GOAL) Patient will provide at least two similarities and two differences between words/objects with 80% accuracy to facilitate improved semantic language skills (to be achieved in 4-6 weeks)      Long-term goals:     1  Patient will complete cognitive-linguistic therapy that addresses patients specific deficits in processing speed, short-term working memory, attention to detail, monitoring, sequencing, and organization skills, with instruction, to alleviate effects of executive functioning disorder deficits by discharge   -- PARTIALLY MET     2  Patient will complete higher-level expressive language tasks (e g , word definitions, idioms, synonym/antonyms, etc) with 80% accuracy to improve functional communication skills by discharge  -- PARTIALLY MET    Functional Limitations Reporting (G-codes):   Flowsheet Rows      Most Recent Value   SLP G-Codes   FOTO information reviewed  N/A   Assessment Type  Re-evaluation   Functional Limitations  Other Speech Language Pathology [Thought Organization]   Other Speech-Language Pathology Functional Limitation Current Status ()  CJ   Other Speech-Language Pathology Functional Limitation Goal Status ()  CI        Impressions/Recommendations    Impressions: Patient presents with mild (improving) cognitive-linguistic deficits s/p CVA c/b reduced memory, auditory processing speed, thought organization, attention, and higher-level word finding  It is suspected that reduced verbal fluency/verbal expression is impacted by reduced thought organization  Patient and  indicated that patient recently confused AM and PM medications within plastic pillbox organizer (patient stated that she was "stressed" during that day)  Patient also continues to have difficulty with finance management (i e , keeping organized with check registry)  Recommendations:  -Patient would benefit from outpatient skilled Speech Therapy services: Cognitive-Linguistic therapy    -Frequency: 1-2x weekly  -Duration: 4-6 weeks    -Intervention certification from: 0/6/4902   -Intervention certification to: 42/34/6604    -Intervention comments:   Re-evaluation/administration of standardized test with patient (8:00am-8:55am)  Scoring and interpretation of standardized test for the development of POC (9:00am-10:00am)    Visit Tracking:   -Referring provider: Epic  -Billing guidelines: AMA  -Visit #12/30 (90 visits combined with PT and OT )  -CBC  -RE due 08/20/2018

## 2018-07-10 ENCOUNTER — TELEPHONE (OUTPATIENT)
Dept: NEUROLOGY | Facility: CLINIC | Age: 53
End: 2018-07-10

## 2018-07-10 DIAGNOSIS — M54.16 LUMBAR RADICULOPATHY: Primary | ICD-10-CM

## 2018-07-10 NOTE — TELEPHONE ENCOUNTER
----- Message from Penelope Trammell MD sent at 7/10/2018  1:08 PM EDT -----  Please call patient with result of mild findings - further plan to be discussed at next up coming appt

## 2018-07-12 ENCOUNTER — OFFICE VISIT (OUTPATIENT)
Dept: OCCUPATIONAL THERAPY | Facility: CLINIC | Age: 53
End: 2018-07-12
Payer: COMMERCIAL

## 2018-07-12 ENCOUNTER — OFFICE VISIT (OUTPATIENT)
Dept: SPEECH THERAPY | Facility: CLINIC | Age: 53
End: 2018-07-12
Payer: COMMERCIAL

## 2018-07-12 ENCOUNTER — APPOINTMENT (OUTPATIENT)
Dept: OCCUPATIONAL THERAPY | Facility: CLINIC | Age: 53
End: 2018-07-12
Payer: COMMERCIAL

## 2018-07-12 DIAGNOSIS — I63.511 ACUTE ISCHEMIC RIGHT MCA STROKE (HCC): Primary | ICD-10-CM

## 2018-07-12 DIAGNOSIS — R48.8 OTHER SYMBOLIC DYSFUNCTIONS: Primary | ICD-10-CM

## 2018-07-12 DIAGNOSIS — I63.511 ARTERIAL ISCHEMIC STROKE, MCA (MIDDLE CEREBRAL ARTERY), RIGHT, ACUTE (HCC): ICD-10-CM

## 2018-07-12 DIAGNOSIS — I69.328 SPEECH OR LANGUAGE DEFICIT FOLLOWING CEREBROVASCULAR ACCIDENT: ICD-10-CM

## 2018-07-12 PROCEDURE — 92507 TX SP LANG VOICE COMM INDIV: CPT

## 2018-07-12 PROCEDURE — 97112 NEUROMUSCULAR REEDUCATION: CPT

## 2018-07-12 PROCEDURE — 97535 SELF CARE MNGMENT TRAINING: CPT

## 2018-07-12 NOTE — PROGRESS NOTES
Daily Speech Treatment Note    Today's date: 2018   Patients name: Dennison Schlatter  : 1965  MRN: [id number]  Safety measures: depression, stroke  Referring provider: Cathy Apley, MD    Primary Diagnosis/Billing code: [id number]  Secondary Diagnosis/ Billing code: [phone], R48 8    Visit Tracking:   -Referring provider: Epic  -Billing guidelines: AMA  -Visit # (90 visits combined with PT and OT )  -CBC  -RE due 2018  Subjective/Behavioral:  Patient reported having a 1/10 HA upon arrival to therapy  Patient reported she will be starting therapy with neuro optometrist on Monday (18)  She also reported that the neuro optometrist prescribed her eyedrops for her eye pain and possibly associated HAs  Objective/Assessment:  -Patient's family member/caregiver was present during today's session   -Reviewed patient's home exercises/activities completed since last appointment  (Fill in the Letters to complete each word from [facility]-2, pg  145-147)    *Re-evaluation results from previous session were reviewed with patient  Short-term goals:  1  Patient will complete complex auditory attention processing tasks (e g , sentence unscramble, ranking numbers/words, etc ) with 80% accuracy, to be achieved in 4-6 weeks  -- PARTIALLY MET    To target thought organization and auditory information processing, patient completed 3 deduction puzzles of [facility]-9 (2x5 and 3x5)  SLP read clues aloud to patient, and patient was instructed to take notes on the auditory information before completing the puzzle  Patient completed puzzles 1 and 3 with 100% acc  Patient completed puzzle 2 with 100% acc with min verbal cuing from SLP to re-check puzzle grid  2  Patient will complete thought organization tasks (e g , sequencing, deduction puzzles, etc ) with 80% accuracy to facilitate increased executive functioning skills, to be achieved in 4-6 weeks   -- PARTIALLY MET    To target immediate memory, patient participated in a Memory and Mental Manipulation task ([facility]-10, pg  174)  Words were read aloud by clinician, and patient was asked to recall words in a specific order denoted by SLP  Patient completed recall of words in reverse order (field of 4) in 17/20 opp, increasing to 20/20 opp with repetition of words  Patient required min prolonged time to complete tasks  To target thought organization, patient participated in "Synata" game, where they are asked to provide a word when given a specific letter and category (i e , boys name __/L/ = Torin Selvin)  Patient completed task over 1 trial in 6/12 opp      3  To target mental manipulation and working memory, patient will participate in word finding activity (i e , anagrams) with 80% accuracy, to be achieved in 4-6 weeks  -- MET     4  Patient will name an appropriate synonym/antonym for a given word with 80% accuracy to build expressive vocabulary for conversation, to be achieved in 4-6 weeks  -- MET     5  Patient will complete word generation tasks (e g , analogies, category matrices, etc ) with 80% accuracy using word finding strategies to facilitate improved word retrieval skills, to be achieved in 4-6 weeks  -- MET     6  Patient will complete checkbook management tasks using learned strategies, to be achieved in 4-6 weeks  -- PARTIALLY MET     7  (NEW GOAL) Patient will complete clock drawing/reading tasks with 80% accuracy to improve executive functioning skills (to be achieved in 4-6 weeks)       8  (NEW GOAL) Patient will define idioms with 80% accuracy to facilitate improved semantic language skills (to be achieved in 4-6 weeks)  To target higher-level expressive language and thought organization, patient completed Expression Completion activity  Patient was presented with common proverbs/expressions and asked to complete it   Task completed in 9/10 opp, increasing to 10/10 opp with min semantic verbal cuing      9  (NEW GOAL) Patient will provide at least two similarities and two differences between words/objects with 80% accuracy to facilitate improved semantic language skills (to be achieved in 4-6 weeks)  Plan:  -Patient was provided with home exercises/activities to target goals in plan of care at the end of today's session   -Continue with current plan of care  Patient was treated by Fidencio Rascon, graduate SLP student, and was under the direct supervision of SWAPNA Valdivia , CCC-SLP

## 2018-07-16 ENCOUNTER — APPOINTMENT (OUTPATIENT)
Dept: SPEECH THERAPY | Facility: CLINIC | Age: 53
End: 2018-07-16
Payer: COMMERCIAL

## 2018-07-16 ENCOUNTER — APPOINTMENT (OUTPATIENT)
Dept: OCCUPATIONAL THERAPY | Facility: CLINIC | Age: 53
End: 2018-07-16
Payer: COMMERCIAL

## 2018-07-19 ENCOUNTER — APPOINTMENT (OUTPATIENT)
Dept: OCCUPATIONAL THERAPY | Facility: CLINIC | Age: 53
End: 2018-07-19
Payer: COMMERCIAL

## 2018-07-19 ENCOUNTER — OFFICE VISIT (OUTPATIENT)
Dept: SPEECH THERAPY | Facility: CLINIC | Age: 53
End: 2018-07-19
Payer: COMMERCIAL

## 2018-07-19 ENCOUNTER — OFFICE VISIT (OUTPATIENT)
Dept: OCCUPATIONAL THERAPY | Facility: CLINIC | Age: 53
End: 2018-07-19
Payer: COMMERCIAL

## 2018-07-19 DIAGNOSIS — I63.511 ARTERIAL ISCHEMIC STROKE, MCA (MIDDLE CEREBRAL ARTERY), RIGHT, ACUTE (HCC): ICD-10-CM

## 2018-07-19 DIAGNOSIS — I63.511 ACUTE ISCHEMIC RIGHT MCA STROKE (HCC): Primary | ICD-10-CM

## 2018-07-19 DIAGNOSIS — I69.328 SPEECH OR LANGUAGE DEFICIT FOLLOWING CEREBROVASCULAR ACCIDENT: ICD-10-CM

## 2018-07-19 DIAGNOSIS — R48.8 OTHER SYMBOLIC DYSFUNCTIONS: Primary | ICD-10-CM

## 2018-07-19 PROCEDURE — 97535 SELF CARE MNGMENT TRAINING: CPT

## 2018-07-19 PROCEDURE — 92507 TX SP LANG VOICE COMM INDIV: CPT | Performed by: SPEECH-LANGUAGE PATHOLOGIST

## 2018-07-19 PROCEDURE — G8992 OTHER PT/OT  D/C STATUS: HCPCS

## 2018-07-19 PROCEDURE — G8991 OTHER PT/OT GOAL STATUS: HCPCS

## 2018-07-19 NOTE — PROGRESS NOTES
Daily Speech Treatment Note    Today's date: 2018   Patients name: Anmol Goodrich  : 1965  MRN: [id number]  Safety measures: depression, stroke  Referring provider: Tequila Landeros MD    Primary Diagnosis/Billing code: [phone]  Secondary Diagnosis/ Billing code: [phone], R48 8    Visit Tracking:   -Referring provider: Epic  -Billing guidelines: AMA  -Visit # (90 visits combined with PT and OT )  -CBC  -RE due 2018  Subjective/Behavioral:  -Patient reported that she followed-up with neuro optomotrist on 18  Patient reported that she was prescribed glasses with prism and "blue block"  Objective/Assessment:  -Patient's family member/caregiver was present during today's session   -Reviewed patient's home exercises/activities completed since last appointment  (Fill in the Letters to complete each word from [facility]-2, pg  145-147)    Short-term goals:  1  Patient will complete complex auditory attention processing tasks (e g , sentence unscramble, ranking numbers/words, etc ) with 80% accuracy, to be achieved in 4-6 weeks  -- PARTIALLY MET  -Deductive reasoning/problem solving/attention processing ([facility] 9, pg  89): Patient was presented with deductive reasoning puzzles and asked to solve  It should be noted that clues were presented aloud to also target auditory attention processing--patient took adequate short-hand notes  Puzzle #1 (2x5 grid) completed in 10/10 opp (100% acc) independently  Puzzle #2 (3x4 grid) completed in  opp (100% acc) independently  2  Patient will complete thought organization tasks (e g , sequencing, deduction puzzles, etc ) with 80% accuracy to facilitate increased executive functioning skills, to be achieved in 4-6 weeks   -- PARTIALLY MET  -Thought organization/reasoning: Patient was presented with a single letter of the alphabet and asked to generate a single word for 12 separate categories beginning with that letter ([employer]) (e g , letter L - fruit=LEMON, mans name=KAREEN, city=[place])  Patient completed over 3 trials in 31/36 opp (86% acc) independently, increasing to 100% acc with min semantic cues      6  Patient will complete checkbook management tasks using learned strategies, to be achieved in 4-6 weeks  -- PARTIALLY MET     7  (NEW GOAL) Patient will complete clock drawing/reading tasks with 80% accuracy to improve executive functioning skills (to be achieved in 4-6 weeks)       8  (NEW GOAL) Patient will define idioms with 80% accuracy to facilitate improved semantic language skills (to be achieved in 4-6 weeks)  9  (NEW GOAL) Patient will provide at least two similarities and two differences between words/objects with 80% accuracy to facilitate improved semantic language skills (to be achieved in 4-6 weeks)  Plan:  -Patient was provided with home exercises/activities to target goals in plan of care at the end of today's session   -Continue with current plan of care

## 2018-07-19 NOTE — PROGRESS NOTES
Daily Note     Today's date: 2018  Patient name: Iftikhar Fuentes  : 1965  MRN: [id number]  Referring provider: Ej Damian MD  Dx:   Encounter Diagnosis   Name Primary?  Acute ischemic right MCA stroke (HCC) Yes                  Subjective: "I have glasses now with prisms, they're heavy"  Objective: See treatment diary below      Assessment: Tolerated treatment well  Patient would benefit from continued OT  Pt engaged in ocularmotor task w/mental manipulation focusing on conver/diverg, scanning, V/S, V/S and positional changes  Pt completed task in appropriate amount of time without assistance  Pt engaged in [employer] task focusing on tracking w/clutter, visual memory, and sustained attention  Pt engaged in task in multimodal environment and completed task in appropriate amount of time  Pt due for d/c, visits used for the year  Plan: Continued skilled OT per POC, d/c this day       INTERVENTION COMMENTS:  Diagnosis: Acute ischemic right MCA stroke ([facility] [address] ) [I63 822]  Precautions: fall risk, depression, suicidal ideations, tobacco use/abuse  9 of 8 visits, PN due

## 2018-07-20 ENCOUNTER — TELEPHONE (OUTPATIENT)
Dept: NEUROLOGY | Facility: CLINIC | Age: 53
End: 2018-07-20

## 2018-07-20 NOTE — TELEPHONE ENCOUNTER
Someone from [facility] called regarding medical records request   I see that this was sent ot mro 7/10    I refaxed request

## 2018-07-23 ENCOUNTER — OFFICE VISIT (OUTPATIENT)
Dept: SPEECH THERAPY | Facility: CLINIC | Age: 53
End: 2018-07-23
Payer: COMMERCIAL

## 2018-07-23 ENCOUNTER — APPOINTMENT (OUTPATIENT)
Dept: OCCUPATIONAL THERAPY | Facility: CLINIC | Age: 53
End: 2018-07-23
Payer: COMMERCIAL

## 2018-07-23 DIAGNOSIS — R48.8 OTHER SYMBOLIC DYSFUNCTIONS: Primary | ICD-10-CM

## 2018-07-23 DIAGNOSIS — I69.328 SPEECH OR LANGUAGE DEFICIT FOLLOWING CEREBROVASCULAR ACCIDENT: ICD-10-CM

## 2018-07-23 DIAGNOSIS — I63.511 ARTERIAL ISCHEMIC STROKE, MCA (MIDDLE CEREBRAL ARTERY), RIGHT, ACUTE (HCC): ICD-10-CM

## 2018-07-23 PROCEDURE — 92507 TX SP LANG VOICE COMM INDIV: CPT | Performed by: SPEECH-LANGUAGE PATHOLOGIST

## 2018-07-24 ENCOUNTER — OFFICE VISIT (OUTPATIENT)
Dept: INTERNAL MEDICINE CLINIC | Age: 53
End: 2018-07-24
Payer: COMMERCIAL

## 2018-07-24 VITALS
HEIGHT: 61 IN | TEMPERATURE: 97.9 F | WEIGHT: 133.4 LBS | HEART RATE: 72 BPM | BODY MASS INDEX: 25.19 KG/M2 | SYSTOLIC BLOOD PRESSURE: 108 MMHG | DIASTOLIC BLOOD PRESSURE: 60 MMHG | OXYGEN SATURATION: 95 %

## 2018-07-24 DIAGNOSIS — T14.8XXA NERVE DAMAGE: Primary | ICD-10-CM

## 2018-07-24 DIAGNOSIS — F06.31 DEPRESSION AS LATE EFFECT OF CEREBROVASCULAR ACCIDENT (CVA): ICD-10-CM

## 2018-07-24 DIAGNOSIS — M51.36 LUMBAR DEGENERATIVE DISC DISEASE: ICD-10-CM

## 2018-07-24 DIAGNOSIS — F41.9 ANXIETY: ICD-10-CM

## 2018-07-24 DIAGNOSIS — M54.50 CHRONIC BILATERAL LOW BACK PAIN WITHOUT SCIATICA: ICD-10-CM

## 2018-07-24 DIAGNOSIS — I69.398 DEPRESSION AS LATE EFFECT OF CEREBROVASCULAR ACCIDENT (CVA): ICD-10-CM

## 2018-07-24 DIAGNOSIS — Z72.0 TOBACCO ABUSE: ICD-10-CM

## 2018-07-24 DIAGNOSIS — I63.511 ACUTE ISCHEMIC RIGHT MCA STROKE (HCC): ICD-10-CM

## 2018-07-24 DIAGNOSIS — G89.29 CHRONIC BILATERAL LOW BACK PAIN WITHOUT SCIATICA: ICD-10-CM

## 2018-07-24 DIAGNOSIS — E78.00 HYPERCHOLESTEROLEMIA: ICD-10-CM

## 2018-07-24 DIAGNOSIS — M41.26 OTHER IDIOPATHIC SCOLIOSIS, LUMBAR REGION: ICD-10-CM

## 2018-07-24 DIAGNOSIS — G89.4 CHRONIC PAIN DISORDER: ICD-10-CM

## 2018-07-24 PROCEDURE — 99214 OFFICE O/P EST MOD 30 MIN: CPT | Performed by: FAMILY MEDICINE

## 2018-07-24 RX ORDER — OXYCODONE HYDROCHLORIDE 5 MG/1
TABLET ORAL
Qty: 90 TABLET | Refills: 0 | Status: SHIPPED | OUTPATIENT
Start: 2018-07-24 | End: 2018-10-10 | Stop reason: SDUPTHER

## 2018-07-24 RX ORDER — PRAVASTATIN SODIUM 40 MG
40 TABLET ORAL DAILY
Qty: 30 TABLET | Refills: 5 | Status: SHIPPED | OUTPATIENT
Start: 2018-07-24 | End: 2018-10-30 | Stop reason: SDUPTHER

## 2018-07-24 RX ORDER — OXYCODONE HYDROCHLORIDE 5 MG/1
5 TABLET ORAL
COMMUNITY
End: 2018-07-24 | Stop reason: SDUPTHER

## 2018-07-24 NOTE — PROGRESS NOTES
Assessment/Plan:    No problem-specific Assessment & Plan notes found for this encounter  Problem List Items Addressed This Visit        Cardiovascular and Mediastinum    Acute ischemic right MCA stroke ([address] )       Musculoskeletal and Integument    Lumbar degenerative disc disease    Idiopathic scoliosis of lumbar spine       Other    Chronic back pain    Depression as late effect of cerebrovascular accident (CVA)    Hypercholesterolemia    Tobacco abuse    Anxiety    Chronic pain disorder      Other Visit Diagnoses     Nerve damage    -  Primary            Subjective:      Patient ID: Ismael Johns is a 47 y o  female  HPI      Depression:  Feeling much better with low dose lexapro  Hyperlipidemia (Follow-Up): The patient states her hyperlipidemia has been under good control since the last visit  She has no significant interval events  Symptoms: denies chest pain-- and-- denies intermittent leg claudication  Associated symptoms include no focal neurologic deficits-- and-- no memory loss        Medications: the patient is not adherent with her medication     The following portions of the patient's history were reviewed and updated as appropriate: allergies, current medications, past family history, past medical history, past social history, past surgical history and problem list     Review of Systems      Constitutional:  Denies fever or chills   Eyes:  Denies change in visual acuity   HENT:  Denies nasal congestion or sore throat   Respiratory:  Denies cough or shortness of breath or wheezing  Cardiovascular:  Denies palpitations or chest pain  GI:  Denies abdominal pain, nausea, or vomiting  Integument:  Denies rash   Neurologic:  +  headache  No  focal weakness        Objective:      /60 (BP Location: Left arm, Patient Position: Sitting, Cuff Size: Standard)   Pulse 72   Temp 97 9 °F (36 6 °C)   Ht 5' 0 83" (1 545 m)   Wt 60 5 kg (133 lb 6 4 oz)   LMP  (LMP Unknown)   SpO2 95% BMI 25 35 kg/m²          Physical Exam      Constitutional:  Well developed, well nourished, no acute distress, non-toxic appearance   Eyes:  PERRL, conjunctiva normal , non icteric sclera  HENT:  Atraumatic, oropharynx moist  Neck-  supple   Respiratory:  CTA b/l, normal breath sounds, no rales, no wheezing   Cardiovascular:  RRR, no murmurs, no LE edema b/l  GI:  Soft, nondistended, normal bowel sounds x 4, nontender, no organomegaly, no mass, no rebound, no guarding   Neurologic:  no focal deficits noted   Psychiatric:  Speech and behavior appropriate , AAO x 3

## 2018-07-26 ENCOUNTER — OFFICE VISIT (OUTPATIENT)
Dept: SPEECH THERAPY | Facility: CLINIC | Age: 53
End: 2018-07-26
Payer: COMMERCIAL

## 2018-07-26 ENCOUNTER — APPOINTMENT (OUTPATIENT)
Dept: OCCUPATIONAL THERAPY | Facility: CLINIC | Age: 53
End: 2018-07-26
Payer: COMMERCIAL

## 2018-07-26 DIAGNOSIS — I69.328 SPEECH OR LANGUAGE DEFICIT FOLLOWING CEREBROVASCULAR ACCIDENT: ICD-10-CM

## 2018-07-26 DIAGNOSIS — R48.8 OTHER SYMBOLIC DYSFUNCTIONS: Primary | ICD-10-CM

## 2018-07-26 DIAGNOSIS — I63.511 ARTERIAL ISCHEMIC STROKE, MCA (MIDDLE CEREBRAL ARTERY), RIGHT, ACUTE (HCC): ICD-10-CM

## 2018-07-26 PROCEDURE — 92507 TX SP LANG VOICE COMM INDIV: CPT | Performed by: SPEECH-LANGUAGE PATHOLOGIST

## 2018-07-26 NOTE — PROGRESS NOTES
Daily Speech Treatment Note    Today's date: 2018   Patients name: Elvira Zepeda  : 1965  MRN: [id number]  Safety measures: depression, stroke  Referring provider: Vaishnavi Villalta MD    Primary Diagnosis/Billing code: [id number]  Secondary Diagnosis/ Billing code: 378 790, R48 8    Visit Tracking:   -Referring provider: Epic  -Billing guidelines: AMA  -Visit # (90 visits combined with PT and OT )  -CBC  -RE due 2018  Subjective/Behavioral:  -Patient reported that she had a HA upon arrival to  (pain: 3/10) in the back of her head  Objective/Assessment:  -Patient's family member/caregiver was present during today's session   -Reviewed patient's home exercises/activities completed since last appointment  (Scattergories completed with 99% acc with min verbal assistance from )    Short-term goals:  1  Patient will complete complex auditory attention processing tasks (e g , sentence unscramble, ranking numbers/words, etc ) with 80% accuracy, to be achieved in 4-6 weeks  -- PARTIALLY MET    2  Patient will complete thought organization tasks (e g , sequencing, deduction puzzles, etc ) with 80% accuracy to facilitate increased executive functioning skills, to be achieved in 4-6 weeks  -- PARTIALLY MET     6  Patient will complete checkbook management tasks using learned strategies, to be achieved in 4-6 weeks  -- PARTIALLY MET     7  (NEW GOAL) Patient will complete clock drawing/reading tasks with 80% accuracy to improve executive functioning skills (to be achieved in 4-6 weeks)        8  (NEW GOAL) Patient will define idioms with 80% accuracy to facilitate improved semantic language skills (to be achieved in 4-6 weeks)  -Thought organization/reasoning: Patient was presented with common proverbs/expressions in the format of a run-on sentence with a single letter missing throughout (e g , jckoflltrdes - letter A missing)   Patient solved sentences in 13/15 opp (87% acc), increasing to 100% acc with min semantic cues  Patient defined the proverbs/common expressions with mod semantic cues for expansion with approx  75% acc     9  (NEW GOAL) Patient will provide at least two similarities and two differences between words/objects with 80% accuracy to facilitate improved semantic language skills (to be achieved in 4-6 weeks)  -Similarities/differences: Patient was presented with pairs of objects aloud and asked to determine at least 2 ways the objects are alike and 2 way they are different (e g , desk & table, cash & check)  Naming similarities task completed in 8/10 opp (80% acc) independently, increasing to 100% acc with min semantic cues  Naming differences task completed in 10/10 opp (100% acc) independently  Plan:  -Patient was provided with home exercises/activities to target goals in plan of care at the end of today's session   -Continue with current plan of care

## 2018-07-27 NOTE — PROGRESS NOTES
Daily Speech Treatment Note    Today's date: 2018  Patients name: Dennison Schlatter  : 1965  MRN: [id number]  Safety measures: depression, stroke  Referring provider: Cathy Apley, MD    Primary Diagnosis/Billing code: [id number]  Secondary Diagnosis/ Billing code: [phone], R48 8    Visit Tracking:   -Referring provider: Epic  -Billing guidelines: AMA  -Visit # (90 visits combined with PT and OT )  -CBC  -RE due 2018  Subjective/Behavioral:  -"I'm good  I got new glasses " Patient reported that her recently-started vision therapy has been going well  "It's all computerized  They do it all on a big computer screen "    Objective/Assessment:  -Patient's family member/caregiver was present during today's session   -Reviewed patient's home exercises/activities completed since last appointment  Change One Letter ([facility]-9, pg  81-82) completed in  opp, increasing to  opp in session with min verbal cuing  Short-term goals:  1  Patient will complete complex auditory attention processing tasks (e g , sentence unscramble, ranking numbers/words, etc ) with 80% accuracy, to be achieved in 4-6 weeks  To target thought organization and auditory information processing, patient completed Deduction Puzzle #1 of [facility]-2 (3x4)  Clinician read clues aloud to patient, and patient was instructed to take notes on the auditory information before completing the puzzle  Patient completed puzzle in a timely manner to 100% acc  To target attention, auditory information processing, and following complex directions, patient verbally completed Exercises 9, 10, and 13 (General Comprehension) of [facility]  Patient was presented with visual information from top of exercises (i e , number, letters, pictures, etc ) and read aloud the accompanying complex true/false statements, yes/no questions or single-response questions (i e , to the question, "How many students went to [employer]? ")   Patient was instructed to answer the statements verbally  Patient successfully completed in 12/18 opp, increasing to 18/18 opp with min verbal cuing (i e , to repeat question, cue to look again at info, or allow additional time)      *Patient completed activity while simultaneously engaging in Pattern Coloring activity as an added challenge  Patient was instructed to color in the provided worksheet according to the accompanying math problem answers  Each math problem answer corresponds with a color on the provided coloring key  *Patient also completed activity with door partially open as an added distraction  2  Patient will complete thought organization tasks (e g , sequencing, deduction puzzles, etc ) with 80% accuracy to facilitate increased executive functioning skills, to be achieved in 4-6 weeks  To target immediate memory and thought organization, patient participated in a Memory and Mental Manipulation task ([facility]-10, pg  180-181)  Words were read aloud by clinician, and patient was asked to recall words in a specific order denoted by clinician  Patient completed recall of words in alphabetical order (field of 3) in 9/10 opp, increasing to 10/10 opp with min verbal cuing to check answer  Patient completed recall of words in alphabetical order (field of 4) in 17/20 opp, increasing to 20/20 opp with min verbal cuing to check answer  Patient required additional time in opp x 3  To target thought organization and word relationships, patient completed second half, second part analogies ([facility]-2, pg  230)  Patient was asked to provide one word to complete the second half of an analogy (i e , cold is to cool, as hot is to _____)  Task completed independently in 20/24 opp, increasing to 24/24 with min-mod verbal cuing   Patient required additional time in opp x 1      6  Patient will complete checkbook management tasks using learned strategies, to be achieved in 4-6 weeks      7  (NEW GOAL) Patient will complete clock drawing/reading tasks with 80% accuracy to improve executive functioning skills (to be achieved in 4-6 weeks)        8  (NEW GOAL) Patient will define idioms with 80% accuracy to facilitate improved semantic language skills (to be achieved in 4-6 weeks)  9  (NEW GOAL) Patient will provide at least two similarities and two differences between words/objects with 80% accuracy to facilitate improved semantic language skills (to be achieved in 4-6 weeks)  Plan:  -Patient was provided with home exercises/activities to target goals in plan of care at the end of today's session   -Continue with current plan of care  Patient was treated by Mohan Gonzalez, graduate SLP student, and was under the direct supervision of SWAPNA Santana , CCC-SLP

## 2018-07-30 ENCOUNTER — APPOINTMENT (OUTPATIENT)
Dept: OCCUPATIONAL THERAPY | Facility: CLINIC | Age: 53
End: 2018-07-30
Payer: COMMERCIAL

## 2018-07-30 ENCOUNTER — OFFICE VISIT (OUTPATIENT)
Dept: SPEECH THERAPY | Facility: CLINIC | Age: 53
End: 2018-07-30
Payer: COMMERCIAL

## 2018-07-30 DIAGNOSIS — R48.8 OTHER SYMBOLIC DYSFUNCTIONS: Primary | ICD-10-CM

## 2018-07-30 DIAGNOSIS — I69.328 SPEECH OR LANGUAGE DEFICIT FOLLOWING CEREBROVASCULAR ACCIDENT: ICD-10-CM

## 2018-07-30 DIAGNOSIS — I63.511 ARTERIAL ISCHEMIC STROKE, MCA (MIDDLE CEREBRAL ARTERY), RIGHT, ACUTE (HCC): ICD-10-CM

## 2018-07-30 PROCEDURE — 92507 TX SP LANG VOICE COMM INDIV: CPT

## 2018-07-30 NOTE — PROGRESS NOTES
Daily Speech Treatment Note    Today's date: 2018  Patients name: Iftikhar Fuentes  : 1965  MRN: [id number]  Safety measures: depression, stroke  Referring provider: Ej Damian MD    Primary Diagnosis/Billing code: [id number]  Secondary Diagnosis/ Billing code: 698 105, R48 8    Visit Tracking:   -Referring provider: Epic  -Billing guidelines: AMA  -Visit # (90 visits combined with PT and OT )  -CBC  -RE due 2018  Subjective/Behavioral:  -"I'm good  I got new glasses " Patient reported that her recently-started vision therapy has been going well  "It's all computerized  They do it all on a big computer screen "    Objective/Assessment:  -Patient's family member/caregiver was present during today's session   -Reviewed patient's home exercises/activities completed since last appointment  Change One Letter ([facility]-9, pg  81-82) completed in  opp, increasing to  opp in session with min verbal cuing  Short-term goals:  1  Patient will complete complex auditory attention processing tasks (e g , sentence unscramble, ranking numbers/words, etc ) with 80% accuracy, to be achieved in 4-6 weeks  To target thought organization and auditory information processing, patient completed Deduction Puzzle #1 of [facility] (3x4)  Clinician read clues aloud to patient, and patient was instructed to take notes on the auditory information before completing the puzzle  Patient completed puzzle in a timely manner to 100% acc  To target attention, auditory information processing, and following complex directions, patient verbally completed Exercises 9, 10, and 13 (General Comprehension) of [employer] [facility]  Patient was presented with visual information from top of exercises (i e , number, letters, pictures, etc ) and read aloud the accompanying complex true/false statements, yes/no questions or single-response questions (i e , to the question, "How many students went to [employer]? ")   Patient was instructed to answer the statements verbally  Patient successfully completed in 12/18 opp, increasing to 18/18 opp with min verbal cuing (i e , to repeat question, cue to look again at info, or allow additional time)      *Patient completed activity while simultaneously engaging in Pattern Coloring activity as an added challenge  Patient was instructed to color in the provided worksheet according to the accompanying math problem answers  Each math problem answer corresponds with a color on the provided coloring key  *Patient also completed activity with door partially open as an added distraction  2  Patient will complete thought organization tasks (e g , sequencing, deduction puzzles, etc ) with 80% accuracy to facilitate increased executive functioning skills, to be achieved in 4-6 weeks  To target immediate memory and thought organization, patient participated in a Memory and Mental Manipulation task ([facility]-10, pg  180-181)  Words were read aloud by clinician, and patient was asked to recall words in a specific order denoted by clinician  Patient completed recall of words in alphabetical order (field of 3) in 9/10 opp, increasing to 10/10 opp with min verbal cuing to check answer  Patient completed recall of words in alphabetical order (field of 4) in 17/20 opp, increasing to 20/20 opp with min verbal cuing to check answer  Patient required additional time in opp x 3  To target thought organization and word relationships, patient completed second half, second part analogies ([facility]-2, pg  230)  Patient was asked to provide one word to complete the second half of an analogy (i e , cold is to cool, as hot is to _____)  Task completed independently in 20/24 opp, increasing to 24/24 with min-mod verbal cuing   Patient required additional time in opp x 1      6  Patient will complete checkbook management tasks using learned strategies, to be achieved in 4-6 weeks      7  (NEW GOAL) Patient will complete clock drawing/reading tasks with 80% accuracy to improve executive functioning skills (to be achieved in 4-6 weeks)        8  (NEW GOAL) Patient will define idioms with 80% accuracy to facilitate improved semantic language skills (to be achieved in 4-6 weeks)  9  (NEW GOAL) Patient will provide at least two similarities and two differences between words/objects with 80% accuracy to facilitate improved semantic language skills (to be achieved in 4-6 weeks)  Plan:  -Patient was provided with home exercises/activities to target goals in plan of care at the end of today's session   -Continue with current plan of care  Patient was treated by Sam Kent, graduate SLP student, and was under the direct supervision of SWAPNA Plascencia , CCC-SLP

## 2018-08-01 ENCOUNTER — OFFICE VISIT (OUTPATIENT)
Dept: CARDIOLOGY CLINIC | Facility: CLINIC | Age: 53
End: 2018-08-01
Payer: COMMERCIAL

## 2018-08-01 VITALS
SYSTOLIC BLOOD PRESSURE: 120 MMHG | BODY MASS INDEX: 25.41 KG/M2 | HEIGHT: 61 IN | HEART RATE: 58 BPM | DIASTOLIC BLOOD PRESSURE: 72 MMHG | WEIGHT: 134.6 LBS

## 2018-08-01 DIAGNOSIS — L91.0 KELOID OF SKIN: ICD-10-CM

## 2018-08-01 DIAGNOSIS — I63.9 CEREBROVASCULAR ACCIDENT (CVA), UNSPECIFIED MECHANISM (HCC): Primary | ICD-10-CM

## 2018-08-01 PROCEDURE — 93000 ELECTROCARDIOGRAM COMPLETE: CPT | Performed by: INTERNAL MEDICINE

## 2018-08-01 PROCEDURE — 99024 POSTOP FOLLOW-UP VISIT: CPT | Performed by: INTERNAL MEDICINE

## 2018-08-01 NOTE — PROGRESS NOTES
She developed a keloid over her loop recorder insertion site and to know if it is moving around  The loop recorder appears to be in a stable  Location in her left chest wall  It does not appear to move with palpation  There is a small keloid at the loop recorder  Insertion site    I will refer her to  Plastic surgery to see if they can repair this perhaps it would be best addressed will be removed the loop recorder

## 2018-08-02 ENCOUNTER — APPOINTMENT (OUTPATIENT)
Dept: OCCUPATIONAL THERAPY | Facility: CLINIC | Age: 53
End: 2018-08-02
Payer: COMMERCIAL

## 2018-08-02 ENCOUNTER — OFFICE VISIT (OUTPATIENT)
Dept: SPEECH THERAPY | Facility: CLINIC | Age: 53
End: 2018-08-02
Payer: COMMERCIAL

## 2018-08-02 DIAGNOSIS — I63.511 ARTERIAL ISCHEMIC STROKE, MCA (MIDDLE CEREBRAL ARTERY), RIGHT, ACUTE (HCC): ICD-10-CM

## 2018-08-02 DIAGNOSIS — I69.328 SPEECH OR LANGUAGE DEFICIT FOLLOWING CEREBROVASCULAR ACCIDENT: ICD-10-CM

## 2018-08-02 DIAGNOSIS — R48.8 OTHER SYMBOLIC DYSFUNCTIONS: Primary | ICD-10-CM

## 2018-08-02 PROCEDURE — 92507 TX SP LANG VOICE COMM INDIV: CPT | Performed by: SPEECH-LANGUAGE PATHOLOGIST

## 2018-08-02 NOTE — PROGRESS NOTES
Daily Speech Treatment Note    Today's date: 2018  Patients name: Georgia Canela  : 1965  MRN: [id number]  Safety measures: depression, stroke  Referring provider: Thu Garza MD    Primary Diagnosis/Billing code: [phone]  Secondary Diagnosis/ Billing code: 508 441, R48 8    Visit Tracking:   -Referring provider: Epic  -Billing guidelines: AMA  -Visit # (90 visits combined with PT and OT )  -CBC  -RE due 2018  Subjective/Behavioral:  -Patient did not report any HA at start of session  Objective/Assessment:  -Patient's family member/caregiver was present during today's session  Short-term goals:  1  Patient will complete complex auditory attention processing tasks (e g , sentence unscramble, ranking numbers/words, etc ) with 80% accuracy, to be achieved in 4-6 weeks  -Mathematical computation/attention/working memory: Patient was presented with mathematical word problems involving the concept of money aloud and asked to solve  Task completed in  opp (87% acc) independently, increasing to  opp (100% acc) with min verbal repetition cues  Calculator used PRN  2  Patient will complete thought organization tasks (e g , sequencing, deduction puzzles, etc ) with 80% accuracy to facilitate increased executive functioning skills, to be achieved in 4-6 weeks  6  Patient will complete checkbook management tasks using learned strategies, to be achieved in 4-6 weeks   -Checkbook management ([facility] 7, pg  171): Patient was presented with a series of 8 transactions and a blank checkbook register  First, patient was instructed to sequence transactions in chronological order by date of occurrence--task completed with 100% acc  Next, patient was instructed to input transactions into register correctly (e g , check #, date, transaction name, withdrawal, deposit)--task completed in  opp--patient placed a "withdrawal" amount in the "deposit" column   Finally, patient was instructed to complete mathematical computation (addition/subtraction) in each row to arrive at the correct balance--task completed with 100% acc despite reversal in the second step  Patient benefited from treatment door closed/working in a quiet environment to facilitate increased attention to task      -Checkbook management ([facility] 7, pg  172): Patient was presented with a series of 8 transactions and a blank checkbook register  First, patient was instructed to sequence transactions in chronological order by date of occurrence--task completed with 100% acc  Next, patient was instructed to input transactions into register correctly (e g , check #, date, transaction name, withdrawal, deposit)--task completed in 8/8 opp  Finally, patient was instructed to complete mathematical computation (addition/subtraction) in each row to arrive at the correct balance--task completed with 100% acc     7  (NEW GOAL) Patient will complete clock drawing/reading tasks with 80% accuracy to improve executive functioning skills (to be achieved in 4-6 weeks)  -Clock reading: Patient was presented with drawings of clocks and asked to read time  Task completed in 24/30 opp (80% acc) independently, increasing to 100% acc with min verbal cues  (?visual disturbances play a factor on errors with reading)  -Clock drawing: Patient was presented with blank clocks and asked to draw in correct time  Task completed in 20/20 opp (100% acc) independently  8  (NEW GOAL) Patient will define idioms with 80% accuracy to facilitate improved semantic language skills (to be achieved in 4-6 weeks)  9  (NEW GOAL) Patient will provide at least two similarities and two differences between words/objects with 80% accuracy to facilitate improved semantic language skills (to be achieved in 4-6 weeks)      Plan:  -Patient was provided with home exercises/activities to target goals in plan of care at the end of today's session   -Continue with current plan of care

## 2018-08-06 ENCOUNTER — OFFICE VISIT (OUTPATIENT)
Dept: SPEECH THERAPY | Facility: CLINIC | Age: 53
End: 2018-08-06
Payer: COMMERCIAL

## 2018-08-06 DIAGNOSIS — R48.8 OTHER SYMBOLIC DYSFUNCTIONS: Primary | ICD-10-CM

## 2018-08-06 DIAGNOSIS — I63.511 ARTERIAL ISCHEMIC STROKE, MCA (MIDDLE CEREBRAL ARTERY), RIGHT, ACUTE (HCC): ICD-10-CM

## 2018-08-06 DIAGNOSIS — I69.328 SPEECH OR LANGUAGE DEFICIT FOLLOWING CEREBROVASCULAR ACCIDENT: ICD-10-CM

## 2018-08-06 PROCEDURE — 92507 TX SP LANG VOICE COMM INDIV: CPT

## 2018-08-06 NOTE — PROGRESS NOTES
Daily Speech Treatment Note    Today's date: 2018  Patients name: Garcia Donovan  : 1965  MRN: [id number]  Safety measures: depression, stroke  Referring provider: Divya Red MD    Primary Diagnosis/Billing code: [phone]  Secondary Diagnosis/ Billing code: 300 354, R48 8    Visit Tracking:   -Referring provider: Epic  -Billing guidelines: AMA  -Visit # (90 visits combined with PT and OT )  -CBC  -RE due 2018  Subjective/Behavioral:  -No HA reported at beginning of session  Pt with flat affect  Good participation throughout all activities  HA after 40 min of session, /10  Completed HEP (Money Management word problems pg 131-132) in  independently, increasing to  with min verbal cues  Objective/Assessment:  -Patient's family member/caregiver was present during today's session  Short-term goals:  1  Patient will complete complex auditory attention processing tasks (e g , sentence unscramble, ranking numbers/words, etc ) with 80% accuracy, to be achieved in 4-6 weeks  -Mathematical computation/attention/working memory: Patient was presented with mathematical word problems involving the concept of time read aloud and asked to solve  Task completed in  opp (100% acc) independently  -To target attention and following complex directions, patient was introduced to new learning task, "Math Coding " Patient must follow along a sequence of symbols, using a "key" as reference to the math code  Patient completed this complex direction following task with mod verbal cues needed throughout  Task completed with errors x7 to complete to 75% acc  Required mod verbal cues to redirect pt to acknowledge 2-step math computation directions  --To target word finding, pt provided with concrete category and asked to fill in blanks to complete word  Task completed in 39/40 opp independently, increased to 40/40 with min verbal cues          2  Patient will complete thought organization tasks (e g , sequencing, deduction puzzles, etc ) with 80% accuracy to facilitate increased executive functioning skills, to be achieved in 4-6 weeks  6  Patient will complete checkbook management tasks using learned strategies, to be achieved in 4-6 weeks  7  (NEW GOAL) Patient will complete clock drawing/reading tasks with 80% accuracy to improve executive functioning skills (to be achieved in 4-6 weeks)  -To improve executive functioning skills, pt will read clocks with appropriate time  Task completed in 18/20 opp, increasing to 20/20 with min verbal cues  Pt stated, "it just takes me long to look at a clock, so I use digital "      8  (NEW GOAL) Patient will define idioms with 80% accuracy to facilitate improved semantic language skills (to be achieved in 4-6 weeks)  9  (NEW GOAL) Patient will provide at least two similarities and two differences between words/objects with 80% accuracy to facilitate improved semantic language skills (to be achieved in 4-6 weeks)  Plan:  -Patient was provided with home exercises/activities to target goals in plan of care at the end of today's session   -Continue with current plan of care

## 2018-08-08 ENCOUNTER — OFFICE VISIT (OUTPATIENT)
Dept: NEUROLOGY | Facility: CLINIC | Age: 53
End: 2018-08-08
Payer: COMMERCIAL

## 2018-08-08 VITALS
BODY MASS INDEX: 25.43 KG/M2 | WEIGHT: 134.7 LBS | HEIGHT: 61 IN | DIASTOLIC BLOOD PRESSURE: 59 MMHG | SYSTOLIC BLOOD PRESSURE: 112 MMHG | RESPIRATION RATE: 12 BRPM | HEART RATE: 71 BPM

## 2018-08-08 DIAGNOSIS — G89.29 CHRONIC LOW BACK PAIN WITHOUT SCIATICA, UNSPECIFIED BACK PAIN LATERALITY: ICD-10-CM

## 2018-08-08 DIAGNOSIS — M54.50 CHRONIC LOW BACK PAIN WITHOUT SCIATICA, UNSPECIFIED BACK PAIN LATERALITY: ICD-10-CM

## 2018-08-08 DIAGNOSIS — I63.511 ACUTE ISCHEMIC RIGHT MCA STROKE (HCC): Primary | ICD-10-CM

## 2018-08-08 PROCEDURE — 99214 OFFICE O/P EST MOD 30 MIN: CPT | Performed by: PHYSICAL MEDICINE & REHABILITATION

## 2018-08-08 RX ORDER — FOLIC ACID 0.8 MG
1 TABLET ORAL DAILY
COMMUNITY
End: 2022-04-14 | Stop reason: ALTCHOICE

## 2018-08-08 RX ORDER — AMOXICILLIN 500 MG
1 CAPSULE ORAL DAILY
COMMUNITY
End: 2022-04-14

## 2018-08-08 NOTE — PROGRESS NOTES
Physical Medicine & Rehabilitation Follow-up Note  Brien Mason 47 y o  female      ASSESSMENT/PLAN:     Jessica Ricks was seen today for follow-up  Patient is 4 months post stroke  Patient is progressing functionally as expected  Expect to see more functional recovery in the next 2 months  In OT, vision deficits were identified and patient referred to vision therapy in [facility] which is typically a 12 week program   Patient saw Dr Masood Bennett (located [facility]) Neuro-ophthalmologist and being fitted with glasses with prisms, blue block  Also giving herself lubricating eye drops  Continues in outpatient SLP also for cognition  We have discussed to reassess in 2 months and at that time probable return to work part-time  Diagnoses and all orders for this visit:    Acute ischemic right MCA stroke ([address] ) with resultant left claire paresis, cognitive deficits  -continue outpatient occupational therapy (vision) and speech therapy  -patient is not cleared for work or driving yet as she is continuing therapy for vision and cognition and is 4 months post most recent CVA  Physically, she has made almost a full recovery  I have advised her to start discussing return to part time work with her employer  We will assess fitness to drive evaluation also after vision therapy is completed  Chronic bilateral low back pain without sciatica  -     Reviewed lumbar x-ray results showing mild L4/L5 anterolisthesis  - Referral to PT for lower back pain   - If no pain relief, patient may benefit from an hospitals SERVICES and referral to Dr Durham Duty    Depression as late effect of cerebrovascular accident (CVA)  -    Tolerating escitalopram (LEXAPRO) 5 mg tablet;  Take 1 tablet (5 mg total) by mouth daily - continue this  -  Patient with subjective improvement in mood    Referred to ATLAS support group    RTC in 8-10 weeks      *I have spent 25 minutes with Patient and family today in which greater than 50% of this time was spent in counseling/coordination of care regarding Diagnostic results, Prognosis, Risks and benefits of tx options, Intructions for management and Patient and family education  HPI:   Isaac Mason 47 y o  female right handed, with  has a past medical history of Acute pain of right knee; Anemia; Anxiety; Cervical disc disorder with radiculopathy; Chronic pain; Constipation; CVA (cerebral vascular accident) ([address] ); H/O ischemic right MCA stroke; Hematuria; High cholesterol; Lumbar radiculopathy; Lumbar stenosis; Other chronic pain; Other muscle spasm; PONV (postoperative nausea and vomiting); Spondylosis of cervical spine; Spondylosis of lumbosacral region; Stroke [facility]); and Urinary incontinence     Patient was hospitalized from 2/9/18 after suffering a right MCA ischemic stroke, patient s/p IV TPA  MRI confirmed multiple infarcts in the right cerebral hemisphere in the distribution of the right MCA, as well as deep infarctions in the basal ganglia and cortical infarctions in the frontal and parietal regions; there was also noted to be a petechial hemorrhage in the infarction noted in the right lentiform nucleus  CTA was performed which demonstrated a right M1 occlusion, for which she underwent an endovascular thrombectomy  Patient had a loop recorder placed on 2/13/18  Patient was admitted to inpatient rehabilitation at the [facility] from 2/14/18 through 2/18/18  Patient then reported back to the hospital from 4/25/18 - 4/27/18 for recurrent stroke-like symptoms of dysarthria and left sided weakness  Symptoms improved while in the ICU  Patient was started on Warfarin upon discharge for secondary stroke prophylaxis and continued on aspirin/statin  She is now switched to Eliquis  SUBJECTIVE:  Patient was seen last 6/27/18  She is here today for post CVA follow-up  Patient arrives in the office today accompanied by her   Reports since last visit she continues on Lexapro and tolerating well    Feels her mood has improved  She continues in vision therapy and SLP for cognition  She is in a 8-12 week program for vision therapy and has new glasses as well  She continues to have low back pain - chronic issue with occasional radiation into her buttocks  She is interested in PT for LBP  Denies saddle anesthesia, numbness tingling, falls, or weight loss  Review of Systems: Pertinent positives are in HPI/Subjective, all other ROS reviewed are negative     Review of Systems   Constitutional: Negative  HENT: Negative  Eyes: Negative  Respiratory: Negative  Cardiovascular: Negative  Gastrointestinal: Negative  Endocrine: Negative  Genitourinary: Negative  Musculoskeletal: Positive for back pain  Skin: Negative  Allergic/Immunologic: Negative  Neurological: Negative  Hematological: Negative  Psychiatric/Behavioral: The patient is nervous/anxious  OBJECTIVE:   /59 (BP Location: Left arm, Patient Position: Sitting, Cuff Size: Standard)   Pulse 71   Resp 12   Ht 5' 1" (1 549 m)   Wt 61 1 kg (134 lb 11 2 oz)   LMP  (LMP Unknown)   BMI 25 45 kg/m²     Physical Exam  Physical Exam   Constitutional: She is oriented to person, place, and time  She appears well-developed and well-nourished  HENT:   Head: Normocephalic and atraumatic  Eyes: EOM are normal  Pupils are equal, round, and reactive to light  Cardiovascular: Normal rate and regular rhythm  Pulmonary/Chest: Breath sounds normal  She has no wheezes  She has no rales  Abdominal: Soft  Bowel sounds are normal  She exhibits no distension  There is no tenderness  Neurological: She is alert and oriented to person, place, and time  Sensational light touch is within normal limits x4 extremities  Motor strength is 5/5 throughout   Skin: Skin is warm  Psychiatric: She has a normal mood and affect  Nursing note and vitals reviewed          Current Outpatient Prescriptions:     apixaban (ELIQUIS) 5 mg, Take 1 tablet (5 mg total) by mouth 2 (two) times a day, Disp: 60 tablet, Rfl: 3    aspirin 81 mg chewable tablet, Chew 1 tablet (81 mg total) daily, Disp: 30 tablet, Rfl: 0    Aspirin-Acetaminophen-Caffeine (EXCEDRIN PO), Take 500 mg by mouth 2 (two) times a day as needed, Disp: , Rfl:     diazepam (VALIUM) 5 mg tablet, Take 5 mg by mouth TAKE 2 TABLETS DAILY PRN, Disp: , Rfl:     escitalopram (LEXAPRO) 5 mg tablet, Take 1 tablet (5 mg total) by mouth daily, Disp: 30 tablet, Rfl: 3    lactulose 10 g/15 mL, , Disp: , Rfl:     Linaclotide (LINZESS) 145 MCG CAPS, , Disp: , Rfl:     LYRICA 50 MG capsule, Take 1 capsule (50 mg total) by mouth 2 (two) times a day, Disp: 60 capsule, Rfl: 2    Magnesium 500 MG CAPS, Take by mouth, Disp: , Rfl:     Omega-3 Fatty Acids (FISH OIL) 1200 MG CAPS, Take by mouth, Disp: , Rfl:     oxyCODONE (ROXICODONE) 5 mg immediate release tablet, 1-2 tablets q 4h, Disp: 90 tablet, Rfl: 0    pravastatin (PRAVACHOL) 40 mg tablet, Take 1 tablet (40 mg total) by mouth daily, Disp: 30 tablet, Rfl: 5    Imaging: I have personally reviewed imaging with results as follows:  Suggestive evidence of calcific tendinitis in the left shoulder

## 2018-08-09 ENCOUNTER — OFFICE VISIT (OUTPATIENT)
Dept: SPEECH THERAPY | Facility: CLINIC | Age: 53
End: 2018-08-09
Payer: COMMERCIAL

## 2018-08-09 DIAGNOSIS — K59.00 CONSTIPATION, UNSPECIFIED CONSTIPATION TYPE: Primary | ICD-10-CM

## 2018-08-09 DIAGNOSIS — I63.511 ARTERIAL ISCHEMIC STROKE, MCA (MIDDLE CEREBRAL ARTERY), RIGHT, ACUTE (HCC): ICD-10-CM

## 2018-08-09 DIAGNOSIS — I69.328 SPEECH OR LANGUAGE DEFICIT FOLLOWING CEREBROVASCULAR ACCIDENT: ICD-10-CM

## 2018-08-09 DIAGNOSIS — R48.8 OTHER SYMBOLIC DYSFUNCTIONS: Primary | ICD-10-CM

## 2018-08-09 PROCEDURE — 92507 TX SP LANG VOICE COMM INDIV: CPT

## 2018-08-09 NOTE — PROGRESS NOTES
Daily Speech Treatment Note    Today's date: 2018  Patients name: Tyler Recinos  : 1965  MRN: [id number]  Safety measures: depression, stroke  Referring provider: Josep Villalobos MD    Primary Diagnosis/Billing code: 742 [id number]  Secondary Diagnosis/ Billing code: 475 713, R48 8    Visit Tracking:   -Referring provider: Epic  -Billing guidelines: AMA  -Visit # (90 visits combined with PT and OT )  -CBC  -RE due 2018  Subjective/Behavioral:  "I'm not good  I have a HA, back pain, and arm pain " Patient reported HA of 4/10 at start of session with no increase during session  Patient reports her vision therapy is "strenuous" and that she is hoping to get her glasses back soon  She reports that the prescription in her glasses are getting changed  Patient is currently wearing her old pair of glasses  Objective/Assessment:  -Patient's family member/caregiver was present during today's session   -Reviewed patient's completed HEP  Bunny Book Exercise 15 (General Comprehension): Patient completed to 100% acc, noting that she recognized her mistakes while completing it and made changes accordingly  Telling Time ([facility]-7, pg  70): Patient completed in  opp, increasing to  opp in session with min verbal cuing  Short-term goals:  1  Patient will complete complex auditory attention processing tasks (e g , sentence unscramble, ranking numbers/words, etc ) with 80% accuracy, to be achieved in 4-6 weeks  To target attention, auditory information processing, and following complex directions, patient verbally completed Exercises 16-20 (General Comprehension) of [address]  Patient was presented with visual information from top of exercises (i e , number, letters, pictures, etc ) and read aloud the accompanying complex true/false statements, yes/no questions, or single-response questions (i e , to the question, "How many students went to [employer]? ")   Patient was instructed to answer the statements verbally  Patient successfully completed in 22/30 opp, increasing to 30/30 pp with min verbal cuing (i e , to repeat question or cue to look again at info)  2  Patient will complete thought organization tasks (e g , sequencing, deduction puzzles, etc ) with 80% accuracy to facilitate increased executive functioning skills, to be achieved in 4-6 weeks  To target thought organization and immediate memory, patient participated in a Memory and Mental Manipulation task ([facility]-10, pg  182)  Words were read aloud by clinician, and patient was asked to recall words in a specific order denoted by clinician  Patient completed recall of words in order of smallest to largest (field of 3-4) in 20/20 opp  Patient required additional time in opp x 1     *Patient attempted Following Directions (Day 1) activity, but could not complete due to vision difficulties  6  Patient will complete checkbook management tasks using learned strategies, to be achieved in 4-6 weeks  7  Patient will complete clock drawing/reading tasks with 80% accuracy to improve executive functioning skills (to be achieved in 4-6 weeks)  8  Patient will define idioms with 80% accuracy to facilitate improved semantic language skills (to be achieved in 4-6 weeks)  9  Patient will provide at least two similarities and two differences between words/objects with 80% accuracy to facilitate improved semantic language skills (to be achieved in 4-6 weeks)  Plan:  -Patient was provided with home exercises/activities to target goals in plan of care at the end of today's session   -Continue with current plan of care  Patient was treated by Mariia Aguilar, graduate SLP student, and was under the direct supervision of SWAPNA Luis , CCC-SLP

## 2018-08-13 ENCOUNTER — OFFICE VISIT (OUTPATIENT)
Dept: SPEECH THERAPY | Facility: CLINIC | Age: 53
End: 2018-08-13
Payer: COMMERCIAL

## 2018-08-13 DIAGNOSIS — R48.8 OTHER SYMBOLIC DYSFUNCTIONS: Primary | ICD-10-CM

## 2018-08-13 DIAGNOSIS — I69.328 SPEECH OR LANGUAGE DEFICIT FOLLOWING CEREBROVASCULAR ACCIDENT: ICD-10-CM

## 2018-08-13 DIAGNOSIS — I63.511 ARTERIAL ISCHEMIC STROKE, MCA (MIDDLE CEREBRAL ARTERY), RIGHT, ACUTE (HCC): ICD-10-CM

## 2018-08-13 PROCEDURE — 92507 TX SP LANG VOICE COMM INDIV: CPT

## 2018-08-13 NOTE — PROGRESS NOTES
Daily Speech Treatment Note    Today's date: 2018  Patients name: Elizabeth Ocasio  : 1965  MRN: [id number]  Safety measures: depression, stroke  Referring provider: Igor Salmeron MD    Primary Diagnosis/Billing code: 113 [id number]  Secondary Diagnosis/ Billing code: [phone], R48 8    Visit Tracking:   -Referring provider: Epic  -Billing guidelines: AMA  -Visit # (90 visits combined with PT and OT )  -CBC  -RE due 2018  Subjective/Behavioral:  "I can't really remember what I did over the weekend  I think it rained Saturday " Pt with HA of 1/10 at beginning of session  Objective/Assessment:  -Patient's family member/caregiver was present during today's session   -Reviewed patient's completed HEP  -Bunny Book Exercise 16: Patient completed to 100% acc, noting that she has a specific strategy (i e  Counting all the letters and creating a pattern )   -Telling Time ([facility]-7, pg  71): Patient completed in  opp, however, noted that pt has difficulty connecting center dot with hands    -To target attention and following complex directions, pt completed Math Coding activity  Patient must follow along a sequence of symbols, using a "key" as reference to the math code  Patient completed this complex direction following task with mod verbal cues needed throughout, with help by   Task completed with initial errors, improving to 100% accuracy when corrected by   Short-term goals:  1  Patient will complete complex auditory attention processing tasks (e g , sentence unscramble, ranking numbers/words, etc ) with 80% accuracy, to be achieved in 4-6 weeks  To target attention processing tasks, pt given 5 words and asked to unscramble into grammatically correct sentences  Task completed in  opp independently, increasing to  with repetition of sentences  Pt noted to be able to create 2 sentences (if possible) with activity 5x       2  Patient will complete thought organization tasks (e g , sequencing, deduction puzzles, etc ) with 80% accuracy to facilitate increased executive functioning skills, to be achieved in 4-6 weeks  To target reasoning and executive functioning skills, patient was asked to complete deduction puzzles  Using written clues provided, patient completed the following thought organization task with 100% acc independently in 4x3 grid  6  Patient will complete checkbook management tasks using learned strategies, to be achieved in 4-6 weeks  7  Patient will complete clock drawing/reading tasks with 80% accuracy to improve executive functioning skills (to be achieved in 4-6 weeks)  -To improve executive functioning skills, pt will write given time on clocks with correct hand placement  Task completed in 11/12 opp independently, increasing to 12/12 with verbal and visual cues  Pt noted to be looking up at analog clock in room to compare hands and numbers  Occasional difficult connecting hands to center of clock-per pt "it's my vision I think "    - To improve executive functioning skills, pt will be read TIME word problems and asked to provide correct answer  Task completed in 10/10 opp independently  8  Patient will define idioms with 80% accuracy to facilitate improved semantic language skills (to be achieved in 4-6 weeks)  9  Patient will provide at least two similarities and two differences between words/objects with 80% accuracy to facilitate improved semantic language skills (to be achieved in 4-6 weeks)  Plan:  -Patient was provided with home exercises/activities to target goals in plan of care at the end of today's session   -Continue with current plan of care

## 2018-08-16 ENCOUNTER — OFFICE VISIT (OUTPATIENT)
Dept: SPEECH THERAPY | Facility: CLINIC | Age: 53
End: 2018-08-16
Payer: COMMERCIAL

## 2018-08-16 DIAGNOSIS — R48.8 OTHER SYMBOLIC DYSFUNCTIONS: Primary | ICD-10-CM

## 2018-08-16 DIAGNOSIS — I69.328 SPEECH OR LANGUAGE DEFICIT FOLLOWING CEREBROVASCULAR ACCIDENT: ICD-10-CM

## 2018-08-16 DIAGNOSIS — I63.511 ARTERIAL ISCHEMIC STROKE, MCA (MIDDLE CEREBRAL ARTERY), RIGHT, ACUTE (HCC): ICD-10-CM

## 2018-08-16 PROCEDURE — 92507 TX SP LANG VOICE COMM INDIV: CPT | Performed by: SPEECH-LANGUAGE PATHOLOGIST

## 2018-08-16 NOTE — PROGRESS NOTES
Daily Speech Treatment Note    Today's date: 2018  Patients name: Pedro Cartwright  : 1965  MRN: [id number]  Safety measures: depression, stroke  Referring provider: Charlie Carpio MD    Primary Diagnosis/Billing code: [id number]  Secondary Diagnosis/ Billing code: 670 437, R48 8    Visit Tracking:   -Referring provider: Epic  -Billing guidelines: AMA  -Visit #/ (90 visits combined with PT and OT )   -CBC  -RE due 2018  Subjective/Behavioral:  -"I have a HA at a 2 " Patient took medication prior to ST     Objective/Assessment:  -Patient's family member/caregiver was present during today's session  Short-term goals:  1  Patient will complete complex auditory attention processing tasks (e g , sentence unscramble, ranking numbers/words, etc ) with 80% accuracy, to be achieved in 4-6 weeks  -- PARTIALLY MET  -Auditory processing: Patient was asked to determine if negative true/false statements presented aloud by clinician were TRUE or FALSE  Task completed in 37/40 opp independently, increasing to 40/40 opp (100% acc) with min verbal repetition cues  -See goal #7     2  Patient will complete thought organization tasks (e g , sequencing, deduction puzzles, etc ) with 80% accuracy to facilitate increased executive functioning skills, to be achieved in 4-6 weeks  -- PARTIALLY MET  -Convergent reasoning/organization: Patient completed Tobias numeral activity involving conversion of digits into Tobias numerals (83% acc) and solving words with those same letters (100% acc)  -Thought organization/reasoning: Patient was presented with instructions to separate combined words with a clue (e g , Cross out the bird and leave its home   rnoebisnt = GUILLERMINA  NEST)  Task completed in  opp independently provided additional time  6  Patient will complete checkbook management tasks using learned strategies, to be achieved in 4-6 weeks   -- PARTIALLY MET     7  (NEW GOAL) Patient will complete clock drawing/reading tasks with 80% accuracy to improve executive functioning skills (to be achieved in 4-6 weeks)  -Auditory processing/temporal awareness: Jayce Figueroa Book page 1-2) Patient answered temporal (time/seasons/calendars) comprehension questions regarding time  Task completed in 10/12 opp (83% acc) independently, increasing to 12/12 opp (100% acc) with min verbal repetition cues      8  (NEW GOAL) Patient will define idioms with 80% accuracy to facilitate improved semantic language skills (to be achieved in 4-6 weeks)  9  (NEW GOAL) Patient will provide at least two similarities and two differences between words/objects with 80% accuracy to facilitate improved semantic language skills (to be achieved in 4-6 weeks)  Plan:  -Patient was provided with home exercises/activities to target goals in plan of care at the end of today's session   -Continue with current plan of care

## 2018-08-18 NOTE — PROGRESS NOTES
Speech-Language Pathology Re-Evaluation    Today's date: 2018  Patients name: Tyler Recinos  : 1965  MRN: [id number]  Safety measures: Depression, CVA  Referring provider: Helder Briceño MD     Subjective comments: "We did a lot this weekend "     Patient's goal(s): "I'm too slow " Pt wants to speed up thought processing to attain answers  Assessments    The Cognitive Linguistic Quick Test (CLQT) is designed to measure an individuals five cognitive domains (attention, memory, executive functions, language, and visuospatial skills)  This norm-referenced tool has been standardized on adults ages 25 through 80years old with neurological impairment as a result of CVA, TBI, or dementia  The following results were obtained during the administration of the assessment  Cognitive Domain: Score: Range of Severity: IE: Status:   Attention 189/215  IMPROVEMENT   Memory 170/185  IMPROVEMENT   Executive Functions 27/40 WNL 30 DECLINE   Language  32/37 WNL 30 IMPROVEMENT   Visuospatial Skills  87/105 WNL 98 DECLINE          *Composite Severity Ratin 0/4 0  WNL 4 0 NO CHANGE                 Clock Drawin/13 WNL 11 IMPROVEMENT     IE indicates the scores from the initial evaluation (2018)  Pt scored below Criteron Cut Scores on the following subtests: Mazes  *Patient named 13 concrete category members (animals) in 60 sec (norm=15+)  -- BELOW AVERAGE    *Patient named 15  abstract category members (words beginning with letter 'm') in 60 sec (norm=10+)  -- BELOW AVERAGE      Goals    Short-term goals:   1  Patient will complete complex auditory attention processing tasks (e g , sentence unscramble, ranking numbers/words, etc ) with 80% accuracy, to be achieved in 4-6 weeks   -- PARTIALLY MET     2  Patient will complete thought organization tasks (e g , sequencing, deduction puzzles, etc ) with 80% accuracy to facilitate increased executive functioning skills, to be achieved in 4-6 weeks  -- PARTIALLY MET     3  To target mental manipulation and working memory, patient will participate in word finding activity (i e , anagrams) with 80% accuracy, to be achieved in 4-6 weeks  -- MET     4  Patient will name an appropriate synonym/antonym for a given word with 80% accuracy to build expressive vocabulary for conversation, to be achieved in 4-6 weeks  -- MET     5  Patient will complete word generation tasks (e g , analogies, category matrices, etc ) with 80% accuracy using word finding strategies to facilitate improved word retrieval skills, to be achieved in 4-6 weeks  -- MET     6  Patient will complete checkbook management tasks using learned strategies, to be achieved in 4-6 weeks  -- PARTIALLY MET     7  (NEW GOAL) Patient will complete clock drawing/reading tasks with 80% accuracy to improve executive functioning skills (to be achieved in 4-6 weeks)       8  (NEW GOAL) Patient will define idioms with 80% accuracy to facilitate improved semantic language skills (to be achieved in 4-6 weeks)       9  (NEW GOAL) Patient will provide at least two similarities and two differences between words/objects with 80% accuracy to facilitate improved semantic language skills (to be achieved in 4-6 weeks)      Long-term goals:     1  Patient will complete cognitive-linguistic therapy that addresses patients specific deficits in processing speed, short-term working memory, attention to detail, monitoring, sequencing, and organization skills, with instruction, to alleviate effects of executive functioning disorder deficits by discharge  -- PARTIALLY MET     2  Patient will complete higher-level expressive language tasks (e g , word definitions, idioms, synonym/antonyms, etc) with 80% accuracy to improve functional communication skills by discharge   -- PARTIALLY MET    Functional Limitations Reporting (G-codes):   Flowsheet Rows      Most Recent Value   SLP G-Codes   FOTO information reviewed N/A   Assessment Type  Re-evaluation   Functional Limitations  Other Speech Language Pathology   Other Speech-Language Pathology Functional Limitation Current Status ()  CJ   Other Speech-Language Pathology Functional Limitation Goal Status ()  CI            Impressions/Recommendations    Impressions: Patient continues to present with mild cognitive-linguistic deficits, although improving, s/p CVA c/b decreased thought processing speed and organization, higher-level word finding, and difficulties with higher-level executive functioning tasks (i e  Problem solving and organization)  Pt still with reduced verbal fluency, however suspect 2/2 reduced thought organization  On standardized testing completed today, pt improved in 4 categories  Pt continues to stay motivated, demonstrated as pt completes HEP and participates throughout therapy tasks  Pt indicates that she continues to have difficulty with her speed of processing, but also finance management (i e  Keeping organized through check management) and her ability to read clocks  Pt is still receiving vision therapy services at another location  Recommending pt continue with ST at this time to target higher-level functioning and improving thought processing speed and organization  Recommendations:  -Patient would benefit from outpatient skilled Speech Therapy services : Cognitive-Linguistic therapy    -Frequency: 2x weekly  -Duration: 4-6 weeks    -Intervention certification from: 6/86/3964  -Intervention certification to: 24/02/3525     -Intervention comments: Initial evaluation/administration of standardized test with patient (8:15-9:00)   Scoring and interpretation of standardized test for the development of POC (12:30-12:45)      Visit Tracking:   -Referring provider: Epic  -Billing guidelines: AMA  -Visit #23/30 (90 visits combined with PT and OT )   -CBC     -RE due 10/01/2018

## 2018-08-20 ENCOUNTER — EVALUATION (OUTPATIENT)
Dept: SPEECH THERAPY | Facility: CLINIC | Age: 53
End: 2018-08-20
Payer: COMMERCIAL

## 2018-08-20 DIAGNOSIS — R48.8 OTHER SYMBOLIC DYSFUNCTIONS: Primary | ICD-10-CM

## 2018-08-20 DIAGNOSIS — I63.511 ARTERIAL ISCHEMIC STROKE, MCA (MIDDLE CEREBRAL ARTERY), RIGHT, ACUTE (HCC): ICD-10-CM

## 2018-08-20 DIAGNOSIS — I69.328 SPEECH OR LANGUAGE DEFICIT FOLLOWING CEREBROVASCULAR ACCIDENT: ICD-10-CM

## 2018-08-20 PROCEDURE — G9174 SPEECH LANG CURRENT STATUS: HCPCS

## 2018-08-20 PROCEDURE — 96125 COGNITIVE TEST BY HC PRO: CPT

## 2018-08-20 PROCEDURE — G9175 SPEECH LANG GOAL STATUS: HCPCS

## 2018-08-22 NOTE — PROGRESS NOTES
Daily Speech Treatment Note    Today's date: 2018 (***update)  Patients name: Niki Shine  : 1965  MRN: [id number]  Safety measures: Depression, CVA  Referring provider: Clifford Maya MD    Primary Diagnosis/Billing code: ***  Secondary Diagnosis/ Billing code: ***    Visit Tracking:  -Referring provider: Epic  -Billing guidelines: AMA  -Visit # (90 visits combined with PT and OT ) (***update)  -CBC  -RE due 10/01/2018     Subjective/Behavioral:  -***    Objective/Assessment:  {ST note:94542}    Short-term goals: (As assessed by this clinician following review of recent RE and last certification period )  1  Patient will complete complex auditory attention processing tasks (e g , sentence unscramble, ranking numbers/words, etc ) with 80% accuracy, to be achieved in 4-6 weeks  -- PARTIALLY MET     2  Patient will complete thought organization tasks (e g , sequencing, deduction puzzles, etc ) with 80% accuracy to facilitate increased executive functioning skills, to be achieved in 4-6 weeks  -- PARTIALLY MET     3  To target mental manipulation and working memory, patient will participate in word finding activity (i e , anagrams) with 80% accuracy, to be achieved in 4-6 weeks  -- MET     4  Patient will name an appropriate synonym/antonym for a given word with 80% accuracy to build expressive vocabulary for conversation, to be achieved in 4-6 weeks  -- MET     5  Patient will complete word generation tasks (e g , analogies, category matrices, etc ) with 80% accuracy using word finding strategies to facilitate improved word retrieval skills, to be achieved in 4-6 weeks  -- MET     6  Patient will complete checkbook management tasks using learned strategies, to be achieved in 4-6 weeks  -- MET     7  Patient will complete clock drawing/reading tasks with 80% accuracy to improve executive functioning skills (to be achieved in 4-6 weeks)   -- MET     8  Patient will define idioms with 80% accuracy to facilitate improved semantic language skills (to be achieved in 4-6 weeks)  -- PARTIALLY MET     9  Patient will provide at least two similarities and two differences between words/objects with 80% accuracy to facilitate improved semantic language skills (to be achieved in 4-6 weeks)   -- MET    Plan:  {RECOMMENDATIONS:00122}

## 2018-08-23 ENCOUNTER — APPOINTMENT (OUTPATIENT)
Dept: SPEECH THERAPY | Facility: CLINIC | Age: 53
End: 2018-08-23
Payer: COMMERCIAL

## 2018-08-25 NOTE — PROGRESS NOTES
Daily Speech Treatment Note    Today's date: 2018  Patients name: Analia Garcia  : 1965  MRN: [id number]  Safety measures: Depression, CVA  Referring provider: Mandy Martin MD    Primary Diagnosis/Billing code: R47 7  Secondary Diagnosis/ Billing code: N93 500; I63 511    Visit Tracking:  -Referring provider: Epic  -Billing guidelines: AMA  -Visit # (90 visits combined with PT and OT )  -CBC  -RE due 10/01/2018     Subjective/Behavioral:  -"I never want one of these again " Patient indicated difficulty with completion of Compass Activity (Day 2) HEP--patient reported that she received assistance from her  with completion  Objective/Assessment:  -Patient's family member/caregiver was present during today's session  Short-term goals: (As assessed by this clinician following review of recent RE and last certification period )  1  Patient will complete complex auditory attention processing tasks (e g , sentence unscramble, ranking numbers/words, etc ) with 80% accuracy, to be achieved in 4-6 weeks  -- PARTIALLY MET     2  Patient will complete thought organization tasks (e g , sequencing, deduction puzzles, etc ) with 80% accuracy to facilitate increased executive functioning skills, to be achieved in 4-6 weeks  -- PARTIALLY MET  -Convergent Reasoning: Patient was presented with number sequences and asked to solve those which were left blank  Simple (1-step) missing number sequences completed in 20/20 opp (100%) independently  More complex (2-step) missing number sequences completed in 13/20 opp (65%) independently with additional processing time, increasing to 20/20 opp (100% acc) with min verbal cues to check work  -Attention/reasoning/working memory (Compass Activity; Day 3): Patient was presented with a series of numbers/directional terms (e g , 4NW, 11S, 2NE)  Patient shaded in grid blocks based upon directional terms to discover a secret word   Task completed with 3 errors  Time: 7 minutes  3  To target mental manipulation and working memory, patient will participate in word finding activity (i e , anagrams) with 80% accuracy, to be achieved in 4-6 weeks  -- MET     4  Patient will name an appropriate synonym/antonym for a given word with 80% accuracy to build expressive vocabulary for conversation, to be achieved in 4-6 weeks  -- MET     5  Patient will complete word generation tasks (e g , analogies, category matrices, etc ) with 80% accuracy using word finding strategies to facilitate improved word retrieval skills, to be achieved in 4-6 weeks  -- MET     6  Patient will complete checkbook management tasks using learned strategies, to be achieved in 4-6 weeks  -- MET     7  Patient will complete clock drawing/reading tasks with 80% accuracy to improve executive functioning skills (to be achieved in 4-6 weeks)  -- MET     8  Patient will define idioms with 80% accuracy to facilitate improved semantic language skills (to be achieved in 4-6 weeks)  -- PARTIALLY MET     9  Patient will provide at least two similarities and two differences between words/objects with 80% accuracy to facilitate improved semantic language skills (to be achieved in 4-6 weeks)  -- MET    Plan:  -Patient was provided with home exercises/activities to target goals in plan of care at the end of today's session   -Continue with current plan of care

## 2018-08-27 ENCOUNTER — OFFICE VISIT (OUTPATIENT)
Dept: SPEECH THERAPY | Facility: CLINIC | Age: 53
End: 2018-08-27
Payer: COMMERCIAL

## 2018-08-27 DIAGNOSIS — R48.8 OTHER SYMBOLIC DYSFUNCTIONS: Primary | ICD-10-CM

## 2018-08-27 DIAGNOSIS — I63.511 ARTERIAL ISCHEMIC STROKE, MCA (MIDDLE CEREBRAL ARTERY), RIGHT, ACUTE (HCC): ICD-10-CM

## 2018-08-27 DIAGNOSIS — I69.328 SPEECH OR LANGUAGE DEFICIT FOLLOWING CEREBROVASCULAR ACCIDENT: ICD-10-CM

## 2018-08-27 PROCEDURE — 92507 TX SP LANG VOICE COMM INDIV: CPT | Performed by: SPEECH-LANGUAGE PATHOLOGIST

## 2018-08-30 ENCOUNTER — OFFICE VISIT (OUTPATIENT)
Dept: SPEECH THERAPY | Facility: CLINIC | Age: 53
End: 2018-08-30
Payer: COMMERCIAL

## 2018-08-30 DIAGNOSIS — R48.8 OTHER SYMBOLIC DYSFUNCTIONS: Primary | ICD-10-CM

## 2018-08-30 DIAGNOSIS — I69.328 SPEECH OR LANGUAGE DEFICIT FOLLOWING CEREBROVASCULAR ACCIDENT: ICD-10-CM

## 2018-08-30 DIAGNOSIS — I63.511 ARTERIAL ISCHEMIC STROKE, MCA (MIDDLE CEREBRAL ARTERY), RIGHT, ACUTE (HCC): ICD-10-CM

## 2018-08-30 PROCEDURE — 92507 TX SP LANG VOICE COMM INDIV: CPT | Performed by: SPEECH-LANGUAGE PATHOLOGIST

## 2018-08-30 NOTE — PROGRESS NOTES
Daily Speech Treatment Note    Today's date: 2018  Patients name: Beatrice Desai  : 1965  MRN: [id number]  Safety measures: Depression, CVA  Referring provider: Bigg Hagen MD    Primary Diagnosis/Billing code: R47 7  Secondary Diagnosis/ Billing code: S36 416; I63 511    Visit Tracking:  -Referring provider: Epic  -Billing guidelines: AMA  -Visit # (90 visits combined with PT and OT )  -CBC  -RE due 10/01/2018     Subjective/Behavioral:  -"I couldn't get the last one "    Objective/Assessment:  -Patient's family member/caregiver was present during today's session  Reviewed HEP worksheets at start of session  Convergent reasoning (number sequences) completed in  opp independently, increasing to 100% acc with min verbal cues  Short-term goals:   1  Patient will complete complex auditory attention processing tasks (e g , sentence unscramble, ranking numbers/words, etc ) with 80% accuracy, to be achieved in 4-6 weeks  -- PARTIALLY MET     2  Patient will complete thought organization tasks (e g , sequencing, deduction puzzles, etc ) with 80% accuracy to facilitate increased executive functioning skills, to be achieved in 4-6 weeks  -- PARTIALLY MET  -Attention/reasoning/working memory (Compass Activity; Day 4): Patient was presented with a series of numbers/directional terms (e g , 4NW, 11S, 2NE)  Patient shaded in grid blocks based upon directional terms to discover a secret word  Task completed with 1 error  Time: 12 minutes  -Organization/reasoning: Patient was presented with 3x3 grids and asked to fill-in the shapes/patterns missing  Figural grids task completed in  opp independently, increasing to  opp with min-mod verbal/visual cues  8  Patient will define idioms with 80% accuracy to facilitate improved semantic language skills (to be achieved in 4-6 weeks)   -- PARTIALLY MET    Plan:  -Patient was provided with home exercises/activities to target goals in plan of care at the end of today's session   -Continue with current plan of care

## 2018-09-04 ENCOUNTER — REMOTE DEVICE CLINIC VISIT (OUTPATIENT)
Dept: CARDIOLOGY CLINIC | Facility: CLINIC | Age: 53
End: 2018-09-04
Payer: COMMERCIAL

## 2018-09-04 DIAGNOSIS — Z95.818 PRESENCE OF OTHER CARDIAC IMPLANTS AND GRAFTS: ICD-10-CM

## 2018-09-04 DIAGNOSIS — I63.9 CRYPTOGENIC STROKE (HCC): Primary | ICD-10-CM

## 2018-09-04 PROCEDURE — 93299 PR REM INTERROG ICPMS/SCRMS <30 D TECH REVIEW: CPT | Performed by: INTERNAL MEDICINE

## 2018-09-04 PROCEDURE — 93298 REM INTERROG DEV EVAL SCRMS: CPT | Performed by: INTERNAL MEDICINE

## 2018-09-04 NOTE — PROGRESS NOTES
Results for orders placed or performed in visit on 09/04/18   Cardiac EP device report    Narrative    [facility] LOOP  MERLIN TRANSMISSION (LOOP): BATTERY STATUS "OK"  PRESENTING RHYTHM: NSR @ 78 BPM   NO PATIENT OR DEVICE ACTIVATED EPISODES  NORMAL DEVICE FUNCTION    [address] [place] [address]

## 2018-09-05 ENCOUNTER — TELEPHONE (OUTPATIENT)
Dept: NEUROLOGY | Facility: CLINIC | Age: 53
End: 2018-09-05

## 2018-09-05 RX ORDER — LOTEPREDNOL ETABONATE 5 MG/ML
1 SUSPENSION/ DROPS OPHTHALMIC 2 TIMES DAILY
COMMUNITY
Start: 2018-07-09

## 2018-09-05 RX ORDER — CYCLOSPORINE 0.5 MG/ML
1 EMULSION OPHTHALMIC EVERY 12 HOURS
COMMUNITY
Start: 2018-07-16

## 2018-09-06 ENCOUNTER — OFFICE VISIT (OUTPATIENT)
Dept: SPEECH THERAPY | Facility: CLINIC | Age: 53
End: 2018-09-06
Payer: COMMERCIAL

## 2018-09-06 DIAGNOSIS — R48.8 OTHER SYMBOLIC DYSFUNCTIONS: Primary | ICD-10-CM

## 2018-09-06 DIAGNOSIS — I63.511 ARTERIAL ISCHEMIC STROKE, MCA (MIDDLE CEREBRAL ARTERY), RIGHT, ACUTE (HCC): ICD-10-CM

## 2018-09-06 DIAGNOSIS — I69.328 SPEECH OR LANGUAGE DEFICIT FOLLOWING CEREBROVASCULAR ACCIDENT: ICD-10-CM

## 2018-09-06 PROCEDURE — 92507 TX SP LANG VOICE COMM INDIV: CPT

## 2018-09-06 NOTE — PROGRESS NOTES
Daily Speech Treatment Note    Today's date: 2018  Patients name: Юлия Augustin  : 1965  MRN: [id number]  Safety measures: Depression, CVA  Referring provider: Ilan Tipton MD    Primary Diagnosis/Billing code: R47 7  Secondary Diagnosis/ Billing code: G53 657; I63 511    Visit Tracking:  -Referring provider: Epic  -Billing guidelines: AMA  -Visit # (90 visits combined with PT and OT )  -CBC  -RE due 10/01/2018      Subjective/Behavioral:  Deferred to her  for reassurance of her answers  Objective/Assessment:  -Patient's family member/caregiver was present during today's session  Reviewed HEP worksheets at start of session  Convergent reasoning (number sequences) reviewed with minimal difficulty  Short-term goals:   1  Patient will complete complex auditory attention processing tasks (e g , sentence unscramble, ranking numbers/words, etc ) with 80% accuracy, to be achieved in 4-6 weeks       2  Patient will complete thought organization tasks (e g , sequencing, deduction puzzles, etc ) with 80% accuracy to facilitate increased executive functioning skills, to be achieved in 4-6 weeks    -Attention/reasoning/working memory:   Patient was presented with a series of numbers/directional terms (e g , 4NW, 11S, 2NE)  Patient shaded in grid blocks based upon directional terms to answer three questions at the completion of the directions  Task completed with 0 errors and with verbal cues  Time: 10 minutes  -Organization/reasoning: Patient was presented with 3x3 grids and asked to find the three items that were similar in some stated aspect  (vehicles, musical instruments)   Figural grids task completed in  opp independently, increasing to  opp with min-mod verbal/visual cues  8  Patient will define idioms with 80% accuracy to facilitate improved semantic language skills (to be achieved in 4-6 weeks)       Plan:  -Patient was provided with home exercises/activities to target goals in plan of care at the end of today's session   -Continue with current plan of care

## 2018-09-10 ENCOUNTER — OFFICE VISIT (OUTPATIENT)
Dept: SPEECH THERAPY | Facility: CLINIC | Age: 53
End: 2018-09-10
Payer: COMMERCIAL

## 2018-09-10 DIAGNOSIS — I69.328 SPEECH OR LANGUAGE DEFICIT FOLLOWING CEREBROVASCULAR ACCIDENT: ICD-10-CM

## 2018-09-10 DIAGNOSIS — R48.8 OTHER SYMBOLIC DYSFUNCTIONS: Primary | ICD-10-CM

## 2018-09-10 DIAGNOSIS — I63.511 ARTERIAL ISCHEMIC STROKE, MCA (MIDDLE CEREBRAL ARTERY), RIGHT, ACUTE (HCC): ICD-10-CM

## 2018-09-10 PROCEDURE — 92507 TX SP LANG VOICE COMM INDIV: CPT

## 2018-09-10 NOTE — PROGRESS NOTES
Daily Speech Treatment Note    Today's date: 9/10/2018  Patients name: Dennison Schlatter  : 1965  MRN: [id number]  Safety measures: Depression, CVA  Referring provider: Cathy Apley, MD    Primary Diagnosis/Billing code: R47 7  Secondary Diagnosis/ Billing code: F15 053; I63 511    Visit Tracking:  -Referring provider: Epic  -Billing guidelines: AMA  -Visit # (90 visits combined with PT and OT )  -CBC  -RE due 10/01/2018      Subjective/Behavioral:  "I had a HA last night that I call a halloween headache because I hear this horror noise"  Pt with HA at start of session 4/10  Objective/Assessment:  -Patient's family member/caregiver was present during today's session  Reviewed completed HEP; completed with no difficulty  Short-term goals:   1  Patient will complete complex auditory attention processing tasks (e g , sentence unscramble, ranking numbers/words, etc ) with 80% accuracy, to be achieved in 4-6 weeks  To target working memory, pt will be read a list of 4 words and then asked to repeat them back in exact order  Then, pt asked a word placement question (i e  "What was the second word?")  Task completed in 9/10 opp independently  With list of 5 words, task completed in 8/10 opp  No strategies used, as reported by pt         2  Patient will complete thought organization tasks (e g , sequencing, deduction puzzles, etc ) with 80% accuracy to facilitate increased executive functioning skills, to be achieved in 4-6 weeks    -To target thought organization, pt completed visual/logical sequencing through use of alphabet  Pt was given written clues (i e  "What letter sounds like 'ewe'"? )  Task completed in 15/15 opp independently  - To target logical sequencing, pt completed "Locating Embedded Words" puzzle  Pt was asked to provide 3 embedded words from longer word  Task completed in  opp independently, increasing to  with min verbal cues     -"find the one that doesn't belong"; pt given visual sequences  Task completed in 8/10 opp  -"find 3 words that have a common word after them"  Pt provided 3x3 grid  Task completed in 6/6 opp with moderate difficulty, requiring additional processing time  No verbal cues provided  8  Patient will define idioms with 80% accuracy to facilitate improved semantic language skills (to be achieved in 4-6 weeks)  To improve semantic language skills, pt was read an idiom and asked to explain its meaning  Task completed in 11/15 opp independently, increasing to  15/15 with example of idiom provided through verbal cues  Plan:  -Patient was provided with home exercises/activities to target goals in plan of care at the end of today's session   -Continue with current plan of care

## 2018-09-11 NOTE — PROGRESS NOTES
Daily Speech Treatment Note    Today's date: 2018   Patients name: Norberto Reina  : 1965  MRN: [id number]  Safety measures: Depression, CVA  Referring provider: Cassie Decker MD    Primary Diagnosis/Billing code: R47 7  Secondary Diagnosis/ Billing code: T21 670; I63 511    Visit Tracking:  -Referring provider: Epic  -Billing guidelines: AMA  -Visit #28/30 (90 visits combined with PT and OT )   -CBC  -RE due 10/01/2018      Subjective/Behavioral:  -"I forgot my folder "    Objective/Assessment:  -Patient's family member/caregiver was present during today's session  Short-term goals:   1  Patient will complete complex auditory attention processing tasks (e g , sentence unscramble, ranking numbers/words, etc ) with 80% accuracy, to be achieved in 4-6 weeks    -Auditory processing/comprehension: Patient was presented with a series of instructions and asked to code what she was to do with a presented pictures/items  Task completed in  opp on 2-step direction activity and  opp on 3-step direction activity  Discussed with  the importance of breaking directions into 1-2 steps for increased accuracy/comprehension and/or writing lists  Reciprocal comprehension verbally expressed  2  Patient will complete thought organization tasks (e g , sequencing, deduction puzzles, etc ) with 80% accuracy to facilitate increased executive functioning skills, to be achieved in 4-6 weeks    -Word alterations: Patient was presented with 8 target words (e g , pans) and asked to follow a series of 4 instructions to make new words (e g , remove a letter, add a letter, change two letters, and change letter order)  Task completed in  opp (97% acc), increasing to  opp (100% acc) with min verbal cues  -Organization: Patient was presented with crossword type grids (without clues) and asked to fill-in letters that were missing by using deduction   Provided a letter bank, task completed with 100% acc  Without a word bank, task completed x2 with three verbal cues from clinician  8  Patient will define idioms with 80% accuracy to facilitate improved semantic language skills (to be achieved in 4-6 weeks)  Plan:  -Patient was provided with home exercises/activities to target goals in plan of care at the end of today's session   -Continue with current plan of care

## 2018-09-13 ENCOUNTER — OFFICE VISIT (OUTPATIENT)
Dept: SPEECH THERAPY | Facility: CLINIC | Age: 53
End: 2018-09-13
Payer: COMMERCIAL

## 2018-09-13 DIAGNOSIS — I63.511 ARTERIAL ISCHEMIC STROKE, MCA (MIDDLE CEREBRAL ARTERY), RIGHT, ACUTE (HCC): ICD-10-CM

## 2018-09-13 DIAGNOSIS — R48.8 OTHER SYMBOLIC DYSFUNCTIONS: Primary | ICD-10-CM

## 2018-09-13 DIAGNOSIS — I69.328 SPEECH OR LANGUAGE DEFICIT FOLLOWING CEREBROVASCULAR ACCIDENT: ICD-10-CM

## 2018-09-13 PROCEDURE — 92507 TX SP LANG VOICE COMM INDIV: CPT | Performed by: SPEECH-LANGUAGE PATHOLOGIST

## 2018-09-17 ENCOUNTER — APPOINTMENT (OUTPATIENT)
Dept: SPEECH THERAPY | Facility: CLINIC | Age: 53
End: 2018-09-17
Payer: COMMERCIAL

## 2018-09-19 NOTE — PROGRESS NOTES
Daily Speech Treatment Note    Today's date: 2018   Patients name: Mima Barber  : 1965  MRN: [id number]  Safety measures: Depression, CVA  Referring provider: Gilberto Villareal MD    Primary Diagnosis/Billing code: R47 7  Secondary Diagnosis/ Billing code: H23 081; I63 511    Visit Tracking:  -Referring provider: Epic  -Billing guidelines: AMA  -Visit #29/30 (90 visits combined with PT and OT )  -CBC  -RE due 10/01/2018      Subjective/Behavioral:  -"I have a headache today and pain in my L eye  I was at a 5 and I'm down at a 3 now "     Objective/Assessment:  -Patient's family member/caregiver was present during today's session    -Reviewed HEP worksheets since last session  Crossword grids completed with 100% acc and determination of wrong figure in sequence completed with 70% acc  Short-term goals:   1  Patient will complete complex auditory attention processing tasks (e g , sentence unscramble, ranking numbers/words, etc ) with 80% accuracy, to be achieved in 4-6 weeks  2  Patient will complete thought organization tasks (e g , sequencing, deduction puzzles, etc ) with 80% accuracy to facilitate increased executive functioning skills, to be achieved in 4-6 weeks  -Organization/reasoning/visual sequencing: Patient was presented with a sequence of 5-6 shapes and asked to determine which shape did not belong in the sequence by applying reasoning skills  Task completed in 9/10 opp independently for 5-shape sequences, increasing to 10/10 opp (100% acc) with min-mod verbal/visual cues  Task completed in 5/10 opp independently for 6-shape sequences, increasing to 10/10 opp (100% acc) with mod verbal/visual cues  -Reasoning/attention/working memory: Patient was presented with Beginner level Pathwords puzzles and asked to solve  Patient engaged in activity for 20 minutes achieving 6 puzzles   Patient required min verbal assistance with selecting the correct pieces x2     8  Patient will define idioms with 80% accuracy to facilitate improved semantic language skills (to be achieved in 4-6 weeks)  Plan:  -Patient was provided with home exercises/activities to target goals in plan of care at the end of today's session   -Continue with current plan of care

## 2018-09-20 ENCOUNTER — OFFICE VISIT (OUTPATIENT)
Dept: SPEECH THERAPY | Facility: CLINIC | Age: 53
End: 2018-09-20
Payer: COMMERCIAL

## 2018-09-20 DIAGNOSIS — R48.8 OTHER SYMBOLIC DYSFUNCTIONS: Primary | ICD-10-CM

## 2018-09-20 DIAGNOSIS — I63.511 ARTERIAL ISCHEMIC STROKE, MCA (MIDDLE CEREBRAL ARTERY), RIGHT, ACUTE (HCC): ICD-10-CM

## 2018-09-20 DIAGNOSIS — I69.328 SPEECH OR LANGUAGE DEFICIT FOLLOWING CEREBROVASCULAR ACCIDENT: ICD-10-CM

## 2018-09-20 PROCEDURE — 92507 TX SP LANG VOICE COMM INDIV: CPT | Performed by: SPEECH-LANGUAGE PATHOLOGIST

## 2018-09-21 NOTE — PROGRESS NOTES
Speech-Language Pathology Discharge    Today's date: 2018  Patients name: Elizabeth Ocasio  : 1965  MRN: [id number]  Safety measures: Depression  Referring provider: Igor Salmeron MD    Visit Tracking:   -Referring provider: Epic  -Billing guidelines: AMA  -Visit # (90 visits combined with PT and OT )   -CBC  Subjective comments: "The homework was tough "    Patient's goal(s): "I'm too slow " Pt wants to speed up thought processing to attain answers  Assessments    No formal/standardized assessment presented to patient on this date of service  Patient completed two attention/organization puzzles with 100% acc  Good processing speed noted         From re-evaluation on 2018: The Cognitive Linguistic Quick Test (CLQT) is designed to measure an individuals five cognitive domains (attention, memory, executive functions, language, and visuospatial skills)  This norm-referenced tool has been standardized on adults ages 25 through 80years old with neurological impairment as a result of CVA, TBI, or dementia  The following results were obtained during the administration of the assessment       Cognitive Domain: Score: Range of Severity: IE: Status:   Attention 189/215  IMPROVEMENT   Memory 170/185  IMPROVEMENT   Executive Functions 27/40 WNL 30 DECLINE   Language  32/37 WNL 30 IMPROVEMENT   Visuospatial Skills  87/105 WNL 98 DECLINE               *Composite Severity Ratin 0/4 0  WNL 4 0 NO CHANGE                           Clock Drawin/13 WNL 11 IMPROVEMENT      IE indicates the scores from the initial evaluation (2018)     Pt scored below Criteron Cut Scores on the following subtests: Mazes         *Patient named 13 concrete category members (animals) in 60 sec (norm=15+)  -- BELOW AVERAGE     *Patient named 15  abstract category members (words beginning with letter 'm') in 60 sec (norm=10+)  -- BELOW AVERAGE    Goals    Short-term goals:  1   Patient will complete complex auditory attention processing tasks (e g , sentence unscramble, ranking numbers/words, etc ) with 80% accuracy, to be achieved in 4-6 weeks  -- PARTIALLY MET     2  Patient will complete thought organization tasks (e g , sequencing, deduction puzzles, etc ) with 80% accuracy to facilitate increased executive functioning skills, to be achieved in 4-6 weeks  -- PARTIALLY MET     3  To target mental manipulation and working memory, patient will participate in word finding activity (i e , anagrams) with 80% accuracy, to be achieved in 4-6 weeks  -- MET     4  Patient will name an appropriate synonym/antonym for a given word with 80% accuracy to build expressive vocabulary for conversation, to be achieved in 4-6 weeks  -- MET     5  Patient will complete word generation tasks (e g , analogies, category matrices, etc ) with 80% accuracy using word finding strategies to facilitate improved word retrieval skills, to be achieved in 4-6 weeks  -- MET     6  Patient will complete checkbook management tasks using learned strategies, to be achieved in 4-6 weeks  -- MET     7  Patient will complete clock drawing/reading tasks with 80% accuracy to improve executive functioning skills (to be achieved in 4-6 weeks)  -- MET     8  Patient will define idioms with 80% accuracy to facilitate improved semantic language skills (to be achieved in 4-6 weeks)  -- PARTIALLY MET     9  Patient will provide at least two similarities and two differences between words/objects with 80% accuracy to facilitate improved semantic language skills (to be achieved in 4-6 weeks)  -- MET    Long-term goals:  1  Patient will complete cognitive-linguistic therapy that addresses patients specific deficits in processing speed, short-term working memory, attention to detail, monitoring, sequencing, and organization skills, with instruction, to alleviate effects of executive functioning disorder deficits by discharge   -- PARTIALLY MET     2  Patient will complete higher-level expressive language tasks (e g , word definitions, idioms, synonym/antonyms, etc) with 80% accuracy to improve functional communication skills by discharge  -- PARTIALLY MET    Functional Limitations Reporting (G-codes):   Flowsheet Rows      Most Recent Value   SLP G-Codes   Assessment Type  Discharge   Functional Limitations  Other Speech Language Pathology   Other Speech-Language Pathology Functional Limitation Goal Status ()  CI   Other Speech-Language Pathology Functional Limitation Discharge Status ()  CJ        Impressions/Recommendations    Impressions: Patient continues to present with mild (improving) cognitive-linguistic deficits s/p CVA c/b decreased thought processing speed and organization, higher-level word finding, and difficulties with higher-level executive functioning tasks  Patient was motivated during all therapy sessions and attempted all assigned HEP  Patient has reached her maximum number of outpatient therapy visits at this time  Patient has been set-up with an independent HEP and demonstrates comprehension of task expectations  Recommendations:  -Patient to be discharged from outpatient skilled Speech Therapy services: Patient to be discharged to an independent Home Exercise Program  Patient made good progress toward goals in care and has reached maximum potential  Insurance visits maxed

## 2018-09-24 ENCOUNTER — OFFICE VISIT (OUTPATIENT)
Dept: SPEECH THERAPY | Facility: CLINIC | Age: 53
End: 2018-09-24
Payer: COMMERCIAL

## 2018-09-24 DIAGNOSIS — I69.328 SPEECH OR LANGUAGE DEFICIT FOLLOWING CEREBROVASCULAR ACCIDENT: ICD-10-CM

## 2018-09-24 DIAGNOSIS — R48.8 OTHER SYMBOLIC DYSFUNCTIONS: Primary | ICD-10-CM

## 2018-09-24 DIAGNOSIS — I63.511 ARTERIAL ISCHEMIC STROKE, MCA (MIDDLE CEREBRAL ARTERY), RIGHT, ACUTE (HCC): ICD-10-CM

## 2018-09-24 PROCEDURE — G9175 SPEECH LANG GOAL STATUS: HCPCS | Performed by: SPEECH-LANGUAGE PATHOLOGIST

## 2018-09-24 PROCEDURE — 92507 TX SP LANG VOICE COMM INDIV: CPT | Performed by: SPEECH-LANGUAGE PATHOLOGIST

## 2018-09-24 PROCEDURE — G9176 SPEECH LANG D/C STATUS: HCPCS | Performed by: SPEECH-LANGUAGE PATHOLOGIST

## 2018-09-27 ENCOUNTER — APPOINTMENT (OUTPATIENT)
Dept: SPEECH THERAPY | Facility: CLINIC | Age: 53
End: 2018-09-27
Payer: COMMERCIAL

## 2018-10-05 NOTE — PROGRESS NOTES
Physical Medicine & Rehabilitation Follow-up Note  Shayan Mason 47 y o  female      ASSESSMENT/PLAN:     Smiley Saunders was seen today for follow-up post right MCA stroke and sequelae sustained in April of 2018  Patient has progressed well from a rehabilitation standpoint  Functionally she continues to have mild cognitive deficits as well as mild visual deficits  These are improving  Patient is completed out patient's SLP due to max visits with insurance  She continues a home exercise program   She currently continues in vision therapy  After I have received her notes from her eye doctor ( Dr Foreign Cardona ([facility]) Neuro-ophthalmologist) and vision therapywhich is being performed at an outside facility, I would like patient to have a simulated 's evaluation - prescription provided today  Patient is very concerned about returning to work  She is unclear whether she will be able to perform the duties of her previous job  At this time, patient is continuing in her vision therapy and I am unclear what her objective improvements are  Once I received the records, I will consider having patient return to work part time with restrictions if she chooses to return back to her previous employment  Given her concerns I will have the office  contact her regarding the office of vocational rehab for any assistance furthermore  Diagnoses and all orders for this visit:    H/O ischemic right MCA stroke  -     Ambulatory referral to Occupational Therapy;  Future    Chronic right-sided back pain, unspecified back location  -     Ambulatory Referral to Comprehensive Spine Program; Future    Foot cramps  - patient has been advised to discuss this with her primary care physician, as she may require lab work as well as further testing  - on today's examination this is not related to post stroke spasticity or apparent neuropathic symptoms      Depression as late effect of cerebrovascular accident (CVA)  -    Tolerating escitalopram (LEXAPRO) 5 mg tablet; Take 1 tablet (5 mg total) by mouth daily - continue this  -  Patient with subjective improvement in mood    *I have spent 40 minutes with Patient and family today in which greater than 50% of this time was spent in counseling/coordination of care regarding her back pain, foot cramps, return to work timeline and 's evaluation discussion      HPI:   Js Hall 47 y o  female right handed, with  has a past medical history of Acute pain of right knee; Anemia; Anxiety; Cervical disc disorder with radiculopathy; Chronic pain; Constipation; CVA (cerebral vascular accident) ([facility] [address] ); H/O ischemic right MCA stroke; Hematuria; High cholesterol; Lumbar radiculopathy; Lumbar stenosis; Other chronic pain; Other muscle spasm; PONV (postoperative nausea and vomiting); Spondylosis of cervical spine; Spondylosis of lumbosacral region; Stroke [facility]); and Urinary incontinence     Patient was hospitalized from 2/9/18 after suffering a right MCA ischemic stroke, patient s/p IV TPA  MRI confirmed multiple infarcts in the right cerebral hemisphere in the distribution of the right MCA, as well as deep infarctions in the basal ganglia and cortical infarctions in the frontal and parietal regions; there was also noted to be a petechial hemorrhage in the infarction noted in the right lentiform nucleus  CTA was performed which demonstrated a right M1 occlusion, for which she underwent an endovascular thrombectomy  Patient had a loop recorder placed on 2/13/18  Patient was admitted to inpatient rehabilitation at the [facility] from 2/14/18 through 2/18/18  Patient then reported back to the hospital from 4/25/18 - 4/27/18 for recurrent stroke-like symptoms of dysarthria and left sided weakness  Symptoms improved while in the ICU  Patient was started on Warfarin upon discharge for secondary stroke prophylaxis and continued on aspirin/statin  She is now switched to Eliquis  Expanded Social History:   Patient lives with spouse in a single family home with 1 steps to enter  Patient is employed  Patient was working at [employer] full time, but is currently not working  Patient was a   Imaging: I have personally reviewed imaging with results as follows:  X-ray left shoulder:   Suggestive evidence of calcific tendinitis in the left shoulder  X-ray lumbar spine:  Minimal anterolistheses of L4 with respect to L5  Mild scoliosis convexity to the right  SUBJECTIVE:  Patient was seen last 8/8/18  She is here today for post CVA follow-up  Patient arrives in the office today accompanied by her   Reports since last visit she continues on Lexapro and tolerating well  Feels her mood has improved  She continues in vision therapy  She has completed SLP as she has maxed out her days with insurance  She believes her vision has improved  She denies headache, increased weakness, numbness  Her and her  report that her short-term memory is sometimes impaired which is consistent with her mild cognitive deficits according to SLPs last note  Today she is very concerned about returning to work  She is fearful that she will not be able to do her previous duties  I have reassured her that she has choices and does not need to return back to her previous employment and can look for other job opportunities or have assistance through the office of vocational rehabilitation through the [facility] of [address] [facility]  She would like more information about this  She is also nervous about driving and would like to undergo the 's evaluation and perhaps even an on the road test   She continues to have low back pain - chronic issue with occasional radiation into her buttocks  She has maxed out her therapy days for the year she is unable to do outpatient physical therapy    She would like to be referred to a spine specialist for epidural steroid injections which was discussed during our last appointment       Doris Chiu   Constitutional: Negative  HENT: Negative  Eyes: Negative  Respiratory: Negative  Cardiovascular: Negative  Gastrointestinal: Negative  Endocrine: Negative  Genitourinary: Negative  Musculoskeletal: Positive for back pain  Muscle spasms, foot cramps   Skin: Negative  Allergic/Immunologic: Negative  Neurological: Negative  Hematological: Negative  Psychiatric/Behavioral: The patient is nervous/anxious  OBJECTIVE:   /66 (BP Location: Left arm, Patient Position: Sitting, Cuff Size: Standard)   Pulse 86   Ht 5' 1" (1 549 m)   Wt 64 kg (141 lb)   LMP  (LMP Unknown)   BMI 26 64 kg/m²     Physical Exam  Physical Exam   Constitutional: She appears well-developed and well-nourished  HENT:   Head: Normocephalic and atraumatic  Eyes: Pupils are equal, round, and reactive to light  EOM are normal    Cardiovascular: Normal rate and regular rhythm  Pulmonary/Chest: Breath sounds normal  She has no wheezes  She has no rales  Abdominal: Soft  Bowel sounds are normal  She exhibits no distension  There is no tenderness  Musculoskeletal:   Bilateral foot exam:  On inspection there is no edema or swelling, no erythema  There is no increase in muscular tone  Sensation to light touch is within normal limits  Sensation to vibratory sensation is within normal limits  Pulses palpable at posterior tib and dorsalis pedis bilaterally   Neurological:   Extraocular movements intact with 2 beats of nystagmus when patient looks towards the right  Pupils equal round and reactive to light and accommodation  Motor strength is 5/5 throughout, negative Romberg  Gait is within normal limits step through gait pattern without assistive device or loss of balance  Skin: Skin is warm  Psychiatric: She has a normal mood and affect  Nursing note and vitals reviewed          Current Outpatient Prescriptions:    apixaban (ELIQUIS) 5 mg, Take 1 tablet (5 mg total) by mouth 2 (two) times a day, Disp: 60 tablet, Rfl: 3    aspirin 81 mg chewable tablet, Chew 1 tablet (81 mg total) daily, Disp: 30 tablet, Rfl: 0    Aspirin-Acetaminophen-Caffeine (EXCEDRIN PO), Take 500 mg by mouth 2 (two) times a day as needed, Disp: , Rfl:     cycloSPORINE (RESTASIS MULTIDOSE) 0 05 % ophthalmic emulsion, , Disp: , Rfl:     diazepam (VALIUM) 5 mg tablet, Take 5 mg by mouth TAKE 2 TABLETS DAILY PRN, Disp: , Rfl:     escitalopram (LEXAPRO) 5 mg tablet, Take 1 tablet (5 mg total) by mouth daily, Disp: 30 tablet, Rfl: 3    lactulose 10 g/15 mL, , Disp: , Rfl:     Linaclotide (LINZESS) 145 MCG CAPS, Take 1 capsule (145 mcg total) by mouth daily, Disp: 90 capsule, Rfl: 1    loteprednol etabonate (LOTEMAX) 0 5 % ophthalmic suspension, , Disp: , Rfl:     LYRICA 50 MG capsule, Take 1 capsule (50 mg total) by mouth 2 (two) times a day, Disp: 60 capsule, Rfl: 2    Magnesium 500 MG CAPS, Take by mouth, Disp: , Rfl:     Omega-3 Fatty Acids (FISH OIL) 1200 MG CAPS, Take by mouth, Disp: , Rfl:     oxyCODONE (ROXICODONE) 5 mg immediate release tablet, 1-2 tablets q 4h, Disp: 90 tablet, Rfl: 0    pravastatin (PRAVACHOL) 40 mg tablet, Take 1 tablet (40 mg total) by mouth daily, Disp: 30 tablet, Rfl: 5    Past Medical History:   Diagnosis Date    Acute pain of right knee     last assessed - [id number]    Anemia     Anxiety     Cervical disc disorder with radiculopathy     Chronic pain     Constipation     [facility]cere[facility] accident) ([facility] [address] )     H/O ischemic right MCA stroke     Hematuria     last assessed - [id number]    High cholesterol     Lumbar radiculopathy     Lumbar stenosis     Other chronic pain     last assessed - [id number]    Other muscle spasm     last assessed - [id number]    PONV (postoperative nausea and vomiting)     must have premed    Spondylosis of cervical spine     Spondylosis of lumbosacral region     Stroke [facility])     Urinary incontinence     Resolved - [id number]     Patient Active Problem List    Diagnosis Date Noted    Idiopathic scoliosis of lumbar spine 07/24/2018    Tobacco abuse 06/05/2018    History of ischemic right MCA stroke 04/26/2018    CVA (cerebral vascular accident) ([facility] [address] ) 04/09/2018    Chronic back pain 02/15/2018    Depression as late effect of cerebrovascular accident (CVA) 02/15/2018    Acute ischemic right MCA stroke ([facility] [address] ) 02/09/2018    Fall 02/09/2018    Chronic low back pain 07/05/2016    Cervical post-laminectomy syndrome 07/05/2016    Chronic pain disorder 07/05/2016    Lumbar degenerative disc disease 07/05/2016    Anxiety 09/19/2014    Hypercholesterolemia 02/18/2014    Cervical radiculopathy 08/13/2013    Dependence on nicotine from cigarettes 08/09/2012

## 2018-10-10 ENCOUNTER — OFFICE VISIT (OUTPATIENT)
Dept: NEUROLOGY | Facility: CLINIC | Age: 53
End: 2018-10-10
Payer: COMMERCIAL

## 2018-10-10 VITALS
SYSTOLIC BLOOD PRESSURE: 112 MMHG | HEIGHT: 61 IN | HEART RATE: 86 BPM | WEIGHT: 141 LBS | DIASTOLIC BLOOD PRESSURE: 66 MMHG | BODY MASS INDEX: 26.62 KG/M2

## 2018-10-10 DIAGNOSIS — I63.511 ACUTE ISCHEMIC RIGHT MCA STROKE (HCC): ICD-10-CM

## 2018-10-10 DIAGNOSIS — F06.31 DEPRESSION AS LATE EFFECT OF CEREBROVASCULAR ACCIDENT (CVA): ICD-10-CM

## 2018-10-10 DIAGNOSIS — R25.2 FOOT CRAMPS: ICD-10-CM

## 2018-10-10 DIAGNOSIS — G89.29 CHRONIC RIGHT-SIDED BACK PAIN, UNSPECIFIED BACK LOCATION: ICD-10-CM

## 2018-10-10 DIAGNOSIS — M54.9 CHRONIC RIGHT-SIDED BACK PAIN, UNSPECIFIED BACK LOCATION: ICD-10-CM

## 2018-10-10 DIAGNOSIS — Z86.73 H/O ISCHEMIC RIGHT MCA STROKE: Primary | ICD-10-CM

## 2018-10-10 DIAGNOSIS — T14.8XXA NERVE DAMAGE: ICD-10-CM

## 2018-10-10 DIAGNOSIS — I69.398 DEPRESSION AS LATE EFFECT OF CEREBROVASCULAR ACCIDENT (CVA): ICD-10-CM

## 2018-10-10 PROCEDURE — 99215 OFFICE O/P EST HI 40 MIN: CPT | Performed by: PHYSICAL MEDICINE & REHABILITATION

## 2018-10-10 RX ORDER — OXYCODONE HYDROCHLORIDE 5 MG/1
TABLET ORAL
Qty: 90 TABLET | Refills: 0 | Status: SHIPPED | OUTPATIENT
Start: 2018-10-10 | End: 2018-10-30 | Stop reason: SDUPTHER

## 2018-10-10 RX ORDER — APIXABAN 5 MG/1
5 TABLET, FILM COATED ORAL 2 TIMES DAILY
Qty: 60 TABLET | Refills: 3 | Status: SHIPPED | OUTPATIENT
Start: 2018-10-10 | End: 2018-12-19 | Stop reason: SDUPTHER

## 2018-10-10 NOTE — LETTER
October 10, 2018     Oliver Robertson, [address]    Patient: Fredy Mack   YOB: 1965   Date of Visit: 10/10/2018       Dear Dr Heydi Haynes: Thank you for referring Mort Seats to me for evaluation  Below are my notes for this consultation  If you have questions, please do not hesitate to call me  I look forward to following your patient along with you  Sincerely,        Yevgeniy Lloyd MD        CC: No Recipients  Yevgeniy Lloyd MD  10/10/2018  8:46 AM  Sign at close encounter  Physical Medicine & Rehabilitation Follow-up Note  Aiyana Mason 47 y o  female      ASSESSMENT/PLAN:     Malika Veliz was seen today for follow-up post right MCA stroke and sequelae sustained in April of 2018  Patient has progressed well from a rehabilitation standpoint  Functionally she continues to have mild cognitive deficits as well as mild visual deficits  These are improving  Patient is completed out patient's SLP due to max visits with insurance  She continues a home exercise program   She currently continues in vision therapy  After I have received her notes from her eye doctor ( Dr Lary Garcia ([facility]) Neuro-ophthalmologist) and vision therapywhich is being performed at an outside facility, I would like patient to have a simulated 's evaluation - prescription provided today  Patient is very concerned about returning to work  She is unclear whether she will be able to perform the duties of her previous job  At this time, patient is continuing in her vision therapy and I am unclear what her objective improvements are  Once I received the records, I will consider having patient return to work part time with restrictions if she chooses to return back to her previous employment  Given her concerns I will have the office  contact her regarding the office of vocational rehab for any assistance furthermore      Diagnoses and all orders for this visit:    H/O ischemic right MCA stroke  -     Ambulatory referral to Occupational Therapy; Future    Chronic right-sided back pain, unspecified back location  -     Ambulatory Referral to Comprehensive Spine Program; Future    Foot cramps  - patient has been advised to discuss this with her primary care physician, as she may require lab work as well as further testing  - on today's examination this is not related to post stroke spasticity or apparent neuropathic symptoms      Depression as late effect of cerebrovascular accident (CVA)  -    Tolerating escitalopram (LEXAPRO) 5 mg tablet; Take 1 tablet (5 mg total) by mouth daily - continue this  -  Patient with subjective improvement in mood    *I have spent 40 minutes with Patient and family today in which greater than 50% of this time was spent in counseling/coordination of care regarding her back pain, foot cramps, return to work timeline and 's evaluation discussion      HPI:   Anne Centers 47 y o  female right handed, with  has a past medical history of Acute pain of right knee; Anemia; Anxiety; Cervical disc disorder with radiculopathy; Chronic pain; Constipation; CVA (cerebral vascular accident) ([facility] [address] ); H/O ischemic right MCA stroke; Hematuria; High cholesterol; Lumbar radiculopathy; Lumbar stenosis; Other chronic pain; Other muscle spasm; PONV (postoperative nausea and vomiting); Spondylosis of cervical spine; Spondylosis of lumbosacral region; Stroke [facility]); and Urinary incontinence     Patient was hospitalized from 2/9/18 after suffering a right MCA ischemic stroke, patient s/p IV TPA  MRI confirmed multiple infarcts in the right cerebral hemisphere in the distribution of the right MCA, as well as deep infarctions in the basal ganglia and cortical infarctions in the frontal and parietal regions; there was also noted to be a petechial hemorrhage in the infarction noted in the right lentiform nucleus   CTA was performed which demonstrated a right M1 occlusion, for which she underwent an endovascular thrombectomy  Patient had a loop recorder placed on 2/13/18  Patient was admitted to inpatient rehabilitation at the [facility] from 2/14/18 through 2/18/18  Patient then reported back to the hospital from 4/25/18 - 4/27/18 for recurrent stroke-like symptoms of dysarthria and left sided weakness  Symptoms improved while in the ICU  Patient was started on Warfarin upon discharge for secondary stroke prophylaxis and continued on aspirin/statin  She is now switched to Eliquis  Expanded Social History:   Patient lives with spouse in a single family home with 1 steps to enter  Patient is employed  Patient was working at [email or web address] full time, but is currently not working  Patient was a   Imaging: I have personally reviewed imaging with results as follows:  X-ray left shoulder:   Suggestive evidence of calcific tendinitis in the left shoulder  X-ray lumbar spine:  Minimal anterolistheses of L4 with respect to L5  Mild scoliosis convexity to the right  SUBJECTIVE:  Patient was seen last 8/8/18  She is here today for post CVA follow-up  Patient arrives in the office today accompanied by her   Reports since last visit she continues on Lexapro and tolerating well  Feels her mood has improved  She continues in vision therapy  She has completed SLP as she has maxed out her days with insurance  She believes her vision has improved  She denies headache, increased weakness, numbness  Her and her  report that her short-term memory is sometimes impaired which is consistent with her mild cognitive deficits according to SLPs last note  Today she is very concerned about returning to work  She is fearful that she will not be able to do her previous duties     I have reassured her that she has choices and does not need to return back to her previous employment and can look for other job opportunities or have assistance through the office of vocational rehabilitation through the state of [place]  She would like more information about this  She is also nervous about driving and would like to undergo the 's evaluation and perhaps even an on the road test   She continues to have low back pain - chronic issue with occasional radiation into her buttocks  She has maxed out her therapy days for the year she is unable to do outpatient physical therapy  She would like to be referred to a spine specialist for epidural steroid injections which was discussed during our last appointment       Perla Flores of [employer]   Constitutional: Negative  HENT: Negative  Eyes: Negative  Respiratory: Negative  Cardiovascular: Negative  Gastrointestinal: Negative  Endocrine: Negative  Genitourinary: Negative  Musculoskeletal: Positive for back pain  Muscle spasms, foot cramps   Skin: Negative  Allergic/Immunologic: Negative  Neurological: Negative  Hematological: Negative  Psychiatric/Behavioral: The patient is nervous/anxious  OBJECTIVE:   /66 (BP Location: Left arm, Patient Position: Sitting, Cuff Size: Standard)   Pulse 86   Ht 5' 1" (1 549 m)   Wt 64 kg (141 lb)   LMP  (LMP Unknown)   BMI 26 64 kg/m²      Physical Exam  Physical Exam   Constitutional: She appears well-developed and well-nourished  HENT:   Head: Normocephalic and atraumatic  Eyes: Pupils are equal, round, and reactive to light  EOM are normal    Cardiovascular: Normal rate and regular rhythm  Pulmonary/Chest: Breath sounds normal  She has no wheezes  She has no rales  Abdominal: Soft  Bowel sounds are normal  She exhibits no distension  There is no tenderness  Musculoskeletal:   Bilateral foot exam:  On inspection there is no edema or swelling, no erythema  There is no increase in muscular tone  Sensation to light touch is within normal limits    Sensation to vibratory sensation is within normal limits  Pulses palpable at posterior tib and dorsalis pedis bilaterally   Neurological:   Extraocular movements intact with 2 beats of nystagmus when patient looks towards the right  Pupils equal round and reactive to light and accommodation  Motor strength is 5/5 throughout, negative Romberg  Gait is within normal limits step through gait pattern without assistive device or loss of balance  Skin: Skin is warm  Psychiatric: She has a normal mood and affect  Nursing note and vitals reviewed          Current Outpatient Prescriptions:     apixaban (ELIQUIS) 5 mg, Take 1 tablet (5 mg total) by mouth 2 (two) times a day, Disp: 60 tablet, Rfl: 3    aspirin 81 mg chewable tablet, Chew 1 tablet (81 mg total) daily, Disp: 30 tablet, Rfl: 0    Aspirin-Acetaminophen-Caffeine (EXCEDRIN PO), Take 500 mg by mouth 2 (two) times a day as needed, Disp: , Rfl:     cycloSPORINE (RESTASIS MULTIDOSE) 0 05 % ophthalmic emulsion, , Disp: , Rfl:     diazepam (VALIUM) 5 mg tablet, Take 5 mg by mouth TAKE 2 TABLETS DAILY PRN, Disp: , Rfl:     escitalopram (LEXAPRO) 5 mg tablet, Take 1 tablet (5 mg total) by mouth daily, Disp: 30 tablet, Rfl: 3    lactulose 10 g/15 mL, , Disp: , Rfl:     Linaclotide (LINZESS) 145 MCG CAPS, Take 1 capsule (145 mcg total) by mouth daily, Disp: 90 capsule, Rfl: 1    loteprednol etabonate (LOTEMAX) 0 5 % ophthalmic suspension, , Disp: , Rfl:     LYRICA 50 MG capsule, Take 1 capsule (50 mg total) by mouth 2 (two) times a day, Disp: 60 capsule, Rfl: 2    Magnesium 500 MG CAPS, Take by mouth, Disp: , Rfl:     Omega-3 Fatty Acids (FISH OIL) 1200 MG CAPS, Take by mouth, Disp: , Rfl:     oxyCODONE (ROXICODONE) 5 mg immediate release tablet, 1-2 tablets q 4h, Disp: 90 tablet, Rfl: 0    pravastatin (PRAVACHOL) 40 mg tablet, Take 1 tablet (40 mg total) by mouth daily, Disp: 30 tablet, Rfl: 5    Past Medical History:   Diagnosis Date    Acute pain of right knee     last assessed - [id number]   [facility] Anemia     Anxiety     Cervical disc disorder with radiculopathy     Chronic pain     Constipation     CVA (cere[facility] accident) ([facility] [address] )     H/O ischemic right MCA stroke     Hematuria     last assessed - [id number]    High cholesterol     Lumbar radiculopathy     Lumbar stenosis     Other chronic pain     last assessed - [id number]    Other muscle spasm     last assessed - [id number]    PONV (postoperative nausea and vomiting)     must have premed    Spondylosis of cervical spine     Spondylosis of lumbosacral region     Stroke [facility])     Urinary incontinence     Resolved - [id number]     Patient Active Problem List    Diagnosis Date Noted    Idiopathic scoliosis of lumbar spine 07/24/2018    Tobacco abuse 06/05/2018    History of ischemic right MCA stroke 04/26/2018    CVA (cerebral vascular accident) ([facility] [address] ) 04/09/2018    Chronic back pain 02/15/2018    Depression as late effect of cerebrovascular accident (CVA) 02/15/2018    Acute ischemic right MCA stroke ([facility] [address] ) 02/09/2018    Fall 02/09/2018    Chronic low back pain 07/05/2016    Cervical post-laminectomy syndrome 07/05/2016    Chronic pain disorder 07/05/2016    Lumbar degenerative disc disease 07/05/2016    Anxiety 09/19/2014    Hypercholesterolemia 02/18/2014    Cervical radiculopathy 08/13/2013    Dependence on nicotine from cigarettes 08/09/2012

## 2018-10-10 NOTE — LETTER
October 10, 2018     Paula Childers, [address] [facility] [address] [facility]    Patient: Shadi Rosa   YOB: 1965   Date of Visit: 10/10/2018       Dear Dr Liz Bravo Recipients: Thank you for referring Sammie Cohen to me for evaluation  Below are my notes for this consultation  If you have questions, please do not hesitate to call me  I look forward to following your patient along with you  Sincerely,        Bhavana Strickland MD        CC: No Recipients  Bhavana Strickland MD  10/10/2018  8:46 AM  Sign at close encounter  Physical Medicine & Rehabilitation Follow-up Note  Honorio Mason 47 y o  female      ASSESSMENT/PLAN:     Alejandro Lacy was seen today for follow-up post right MCA stroke and sequelae sustained in April of 2018  Patient has progressed well from a rehabilitation standpoint  Functionally she continues to have mild cognitive deficits as well as mild visual deficits  These are improving  Patient is completed out patient's SLP due to max visits with insurance  She continues a home exercise program   She currently continues in vision therapy  After I have received her notes from her eye doctor ( Dr Clement Raza ([facility]) Neuro-ophthalmologist) and vision therapywhich is being performed at an outside facility, I would like patient to have a simulated 's evaluation - prescription provided today  Patient is very concerned about returning to work  She is unclear whether she will be able to perform the duties of her previous job  At this time, patient is continuing in her vision therapy and I am unclear what her objective improvements are  Once I received the records, I will consider having patient return to work part time with restrictions if she chooses to return back to her previous employment  Given her concerns I will have the office  contact her regarding the office of vocational rehab for any assistance furthermore      Diagnoses and all orders for this visit:    H/O ischemic right MCA stroke  -     Ambulatory referral to Occupational Therapy; Future    Chronic right-sided back pain, unspecified back location  -     Ambulatory Referral to Comprehensive Spine Program; Future    Foot cramps  - patient has been advised to discuss this with her primary care physician, as she may require lab work as well as further testing  - on today's examination this is not related to post stroke spasticity or apparent neuropathic symptoms      Depression as late effect of cerebrovascular accident (CVA)  -    Tolerating escitalopram (LEXAPRO) 5 mg tablet; Take 1 tablet (5 mg total) by mouth daily - continue this  -  Patient with subjective improvement in mood    *I have spent 40 minutes with Patient and family today in which greater than 50% of this time was spent in counseling/coordination of care regarding her back pain, foot cramps, return to work timeline and 's evaluation discussion      HPI:   Savannah Crook 47 y o  female right handed, with  has a past medical history of Acute pain of right knee; Anemia; Anxiety; Cervical disc disorder with radiculopathy; Chronic pain; Constipation; CVA (cerebral vascular accident) ([facility] [address] ); H/O ischemic right MCA stroke; Hematuria; High cholesterol; Lumbar radiculopathy; Lumbar stenosis; Other chronic pain; Other muscle spasm; PONV (postoperative nausea and vomiting); Spondylosis of cervical spine; Spondylosis of lumbosacral region; Stroke [facility]); and Urinary incontinence     Patient was hospitalized from 2/9/18 after suffering a right MCA ischemic stroke, patient s/p IV TPA  MRI confirmed multiple infarcts in the right cerebral hemisphere in the distribution of the right MCA, as well as deep infarctions in the basal ganglia and cortical infarctions in the frontal and parietal regions; there was also noted to be a petechial hemorrhage in the infarction noted in the right lentiform nucleus   CTA was performed which demonstrated a right M1 occlusion, for which she underwent an endovascular thrombectomy  Patient had a loop recorder placed on 2/13/18  Patient was admitted to inpatient rehabilitation at the [facility] from 2/14/18 through 2/18/18  Patient then reported back to the hospital from 4/25/18 - 4/27/18 for recurrent stroke-like symptoms of dysarthria and left sided weakness  Symptoms improved while in the ICU  Patient was started on Warfarin upon discharge for secondary stroke prophylaxis and continued on aspirin/statin  She is now switched to Eliquis  Expanded Social History:   Patient lives with spouse in a single family home with 1 steps to enter  Patient is employed  Patient was working at [employer] full time, but is currently not working  Patient was a   Imaging: I have personally reviewed imaging with results as follows:  X-ray left shoulder:   Suggestive evidence of calcific tendinitis in the left shoulder  X-ray lumbar spine:  Minimal anterolistheses of L4 with respect to L5  Mild scoliosis convexity to the right  SUBJECTIVE:  Patient was seen last 8/8/18  She is here today for post CVA follow-up  Patient arrives in the office today accompanied by her   Reports since last visit she continues on Lexapro and tolerating well  Feels her mood has improved  She continues in vision therapy  She has completed SLP as she has maxed out her days with insurance  She believes her vision has improved  She denies headache, increased weakness, numbness  Her and her  report that her short-term memory is sometimes impaired which is consistent with her mild cognitive deficits according to SLPs last note  Today she is very concerned about returning to work  She is fearful that she will not be able to do her previous duties     I have reassured her that she has choices and does not need to return back to her previous employment and can look for other job opportunities or have assistance through the office of vocational rehabilitation through the state of [place]  She would like more information about this  She is also nervous about driving and would like to undergo the 's evaluation and perhaps even an on the road test   She continues to have low back pain - chronic issue with occasional radiation into her buttocks  She has maxed out her therapy days for the year she is unable to do outpatient physical therapy  She would like to be referred to a spine specialist for epidural steroid injections which was discussed during our last appointment       Stephan Tadeo of [employer]   Constitutional: Negative  HENT: Negative  Eyes: Negative  Respiratory: Negative  Cardiovascular: Negative  Gastrointestinal: Negative  Endocrine: Negative  Genitourinary: Negative  Musculoskeletal: Positive for back pain  Muscle spasms, foot cramps   Skin: Negative  Allergic/Immunologic: Negative  Neurological: Negative  Hematological: Negative  Psychiatric/Behavioral: The patient is nervous/anxious  OBJECTIVE:   /66 (BP Location: Left arm, Patient Position: Sitting, Cuff Size: Standard)   Pulse 86   Ht 5' 1" (1 549 m)   Wt 64 kg (141 lb)   LMP  (LMP Unknown)   BMI 26 64 kg/m²      Physical Exam  Physical Exam   Constitutional: She appears well-developed and well-nourished  HENT:   Head: Normocephalic and atraumatic  Eyes: Pupils are equal, round, and reactive to light  EOM are normal    Cardiovascular: Normal rate and regular rhythm  Pulmonary/Chest: Breath sounds normal  She has no wheezes  She has no rales  Abdominal: Soft  Bowel sounds are normal  She exhibits no distension  There is no tenderness  Musculoskeletal:   Bilateral foot exam:  On inspection there is no edema or swelling, no erythema  There is no increase in muscular tone  Sensation to light touch is within normal limits    Sensation to vibratory sensation is within normal limits  Pulses palpable at posterior tib and dorsalis pedis bilaterally   Neurological:   Extraocular movements intact with 2 beats of nystagmus when patient looks towards the right  Pupils equal round and reactive to light and accommodation  Motor strength is 5/5 throughout, negative Romberg  Gait is within normal limits step through gait pattern without assistive device or loss of balance  Skin: Skin is warm  Psychiatric: She has a normal mood and affect  Nursing note and vitals reviewed          Current Outpatient Prescriptions:     apixaban (ELIQUIS) 5 mg, Take 1 tablet (5 mg total) by mouth 2 (two) times a day, Disp: 60 tablet, Rfl: 3    aspirin 81 mg chewable tablet, Chew 1 tablet (81 mg total) daily, Disp: 30 tablet, Rfl: 0    Aspirin-Acetaminophen-Caffeine (EXCEDRIN PO), Take 500 mg by mouth 2 (two) times a day as needed, Disp: , Rfl:     cycloSPORINE (RESTASIS MULTIDOSE) 0 05 % ophthalmic emulsion, , Disp: , Rfl:     diazepam (VALIUM) 5 mg tablet, Take 5 mg by mouth TAKE 2 TABLETS DAILY PRN, Disp: , Rfl:     escitalopram (LEXAPRO) 5 mg tablet, Take 1 tablet (5 mg total) by mouth daily, Disp: 30 tablet, Rfl: 3    lactulose 10 g/15 mL, , Disp: , Rfl:     Linaclotide (LINZESS) 145 MCG CAPS, Take 1 capsule (145 mcg total) by mouth daily, Disp: 90 capsule, Rfl: 1    loteprednol etabonate (LOTEMAX) 0 5 % ophthalmic suspension, , Disp: , Rfl:     LYRICA 50 MG capsule, Take 1 capsule (50 mg total) by mouth 2 (two) times a day, Disp: 60 capsule, Rfl: 2    Magnesium 500 MG CAPS, Take by mouth, Disp: , Rfl:     Omega-3 Fatty Acids (FISH OIL) 1200 MG CAPS, Take by mouth, Disp: , Rfl:     oxyCODONE (ROXICODONE) 5 mg immediate release tablet, 1-2 tablets q 4h, Disp: 90 tablet, Rfl: 0    pravastatin (PRAVACHOL) 40 mg tablet, Take 1 tablet (40 mg total) by mouth daily, Disp: 30 tablet, Rfl: 5    Past Medical History:   Diagnosis Date    Acute pain of right knee     last assessed - [id number]   Jacqui Whiting Anemia     Anxiety     Cervical disc disorder with radiculopathy     Chronic pain     Constipation     CVA (cerebral vascular accident) ([facility] [address] )     H/O ischemic right MCA stroke     Hematuria     last assessed - [id number]    High cholesterol     Lumbar radiculopathy     Lumbar stenosis     Other chronic pain     last assessed - [id number]    Other muscle spasm     last assessed - [id number]    PONV (postoperative nausea and vomiting)     must have premed    Spondylosis of cervical spine     Spondylosis of lumbosacral region     Stroke [facility])     Urinary incontinence     Resolved - [id number]     Patient Active Problem List    Diagnosis Date Noted    Idiopathic scoliosis of lumbar spine 07/24/2018    Tobacco abuse 06/05/2018    History of ischemic right MCA stroke 04/26/2018    CVA (cerebral vascular accident) ([facility] [address] ) 04/09/2018    Chronic back pain 02/15/2018    Depression as late effect of cerebrovascular accident (CVA) 02/15/2018    Acute ischemic right MCA stroke ([facility] [address] ) 02/09/2018    Fall 02/09/2018    Chronic low back pain 07/05/2016    Cervical post-laminectomy syndrome 07/05/2016    Chronic pain disorder 07/05/2016    Lumbar degenerative disc disease 07/05/2016    Anxiety 09/19/2014    Hypercholesterolemia 02/18/2014    Cervical radiculopathy 08/13/2013    Dependence on nicotine from cigarettes 08/09/2012

## 2018-10-11 DIAGNOSIS — M54.16 LUMBAR RADICULOPATHY: ICD-10-CM

## 2018-10-12 DIAGNOSIS — K59.00 CONSTIPATION, UNSPECIFIED CONSTIPATION TYPE: ICD-10-CM

## 2018-10-12 NOTE — TELEPHONE ENCOUNTER
Patient phoned to report that she needs a refill for Rx  Lyrica 50 mg and Rx Linzess 145 mcg 1daily

## 2018-10-18 ENCOUNTER — TELEPHONE (OUTPATIENT)
Dept: OBGYN CLINIC | Facility: HOSPITAL | Age: 53
End: 2018-10-18

## 2018-10-18 NOTE — TELEPHONE ENCOUNTER
Caller: patient  Call back number: [phone]    Patient called to schedule an appointment for back pain with Dr Rodrigue Dean  She has [employer] insurance and is being referred by Dr Jovita Jones  She saw Dr Bear Avila in 2016 and has not seen any other doctor since  She states she stopped going to him because he wanted to implant a stimulator and she did not want that  Can she be scheduled with Dr Rodrigue Dean in [place]?  Please advise

## 2018-10-22 ENCOUNTER — TELEPHONE (OUTPATIENT)
Dept: NEUROLOGY | Facility: CLINIC | Age: 53
End: 2018-10-22

## 2018-10-22 NOTE — TELEPHONE ENCOUNTER
Pt called to inform you that she had Neuro-ophthalmologist appt today  They will be faxing results   Requesting  cb from clincial team after notes are reviewed as you had ordered a driving eval

## 2018-10-27 ENCOUNTER — APPOINTMENT (OUTPATIENT)
Dept: LAB | Age: 53
End: 2018-10-27
Payer: COMMERCIAL

## 2018-10-27 DIAGNOSIS — M41.26 OTHER IDIOPATHIC SCOLIOSIS, LUMBAR REGION: ICD-10-CM

## 2018-10-27 DIAGNOSIS — I69.398 DEPRESSION AS LATE EFFECT OF CEREBROVASCULAR ACCIDENT (CVA): ICD-10-CM

## 2018-10-27 DIAGNOSIS — F41.9 ANXIETY: ICD-10-CM

## 2018-10-27 DIAGNOSIS — E78.00 HYPERCHOLESTEROLEMIA: ICD-10-CM

## 2018-10-27 DIAGNOSIS — I63.511 ACUTE ISCHEMIC RIGHT MCA STROKE (HCC): ICD-10-CM

## 2018-10-27 DIAGNOSIS — F06.31 DEPRESSION AS LATE EFFECT OF CEREBROVASCULAR ACCIDENT (CVA): ICD-10-CM

## 2018-10-27 DIAGNOSIS — T14.8XXA NERVE DAMAGE: ICD-10-CM

## 2018-10-27 LAB
25(OH)D3 SERPL-MCNC: 18.1 NG/ML (ref 30–100)
BASOPHILS # BLD AUTO: 0.05 THOUSANDS/ΜL (ref 0–0.1)
BASOPHILS NFR BLD AUTO: 1 % (ref 0–1)
EOSINOPHIL # BLD AUTO: 0.11 THOUSAND/ΜL (ref 0–0.61)
EOSINOPHIL NFR BLD AUTO: 1 % (ref 0–6)
ERYTHROCYTE [DISTWIDTH] IN BLOOD BY AUTOMATED COUNT: 13.6 % (ref 11.6–15.1)
HCT VFR BLD AUTO: 43.5 % (ref 34.8–46.1)
HGB BLD-MCNC: 14.6 G/DL (ref 11.5–15.4)
IMM GRANULOCYTES # BLD AUTO: 0.01 THOUSAND/UL (ref 0–0.2)
IMM GRANULOCYTES NFR BLD AUTO: 0 % (ref 0–2)
LYMPHOCYTES # BLD AUTO: 3.71 THOUSANDS/ΜL (ref 0.6–4.47)
LYMPHOCYTES NFR BLD AUTO: 41 % (ref 14–44)
MCH RBC QN AUTO: 32.8 PG (ref 26.8–34.3)
MCHC RBC AUTO-ENTMCNC: 33.6 G/DL (ref 31.4–37.4)
MCV RBC AUTO: 98 FL (ref 82–98)
MONOCYTES # BLD AUTO: 0.59 THOUSAND/ΜL (ref 0.17–1.22)
MONOCYTES NFR BLD AUTO: 7 % (ref 4–12)
NEUTROPHILS # BLD AUTO: 4.64 THOUSANDS/ΜL (ref 1.85–7.62)
NEUTS SEG NFR BLD AUTO: 50 % (ref 43–75)
NRBC BLD AUTO-RTO: 0 /100 WBCS
PLATELET # BLD AUTO: 309 THOUSANDS/UL (ref 149–390)
PMV BLD AUTO: 8.9 FL (ref 8.9–12.7)
RBC # BLD AUTO: 4.45 MILLION/UL (ref 3.81–5.12)
TSH SERPL DL<=0.05 MIU/L-ACNC: 1.25 UIU/ML
WBC # BLD AUTO: 9.11 THOUSAND/UL (ref 4.31–10.16)

## 2018-10-27 PROCEDURE — 84443 ASSAY THYROID STIM HORMONE: CPT

## 2018-10-27 PROCEDURE — 36415 COLL VENOUS BLD VENIPUNCTURE: CPT

## 2018-10-27 PROCEDURE — 85025 COMPLETE CBC W/AUTO DIFF WBC: CPT

## 2018-10-27 PROCEDURE — 82306 VITAMIN D 25 HYDROXY: CPT

## 2018-10-30 ENCOUNTER — OFFICE VISIT (OUTPATIENT)
Dept: INTERNAL MEDICINE CLINIC | Facility: CLINIC | Age: 53
End: 2018-10-30
Payer: COMMERCIAL

## 2018-10-30 VITALS
HEART RATE: 83 BPM | BODY MASS INDEX: 26.28 KG/M2 | SYSTOLIC BLOOD PRESSURE: 110 MMHG | HEIGHT: 61 IN | DIASTOLIC BLOOD PRESSURE: 72 MMHG | TEMPERATURE: 98.7 F | WEIGHT: 139.2 LBS | OXYGEN SATURATION: 97 %

## 2018-10-30 DIAGNOSIS — Z28.21 REFUSED INFLUENZA VACCINE: ICD-10-CM

## 2018-10-30 DIAGNOSIS — I63.511 ACUTE ISCHEMIC RIGHT MCA STROKE (HCC): ICD-10-CM

## 2018-10-30 DIAGNOSIS — E55.9 VITAMIN D DEFICIENCY: ICD-10-CM

## 2018-10-30 DIAGNOSIS — M51.36 LUMBAR DEGENERATIVE DISC DISEASE: ICD-10-CM

## 2018-10-30 DIAGNOSIS — E78.00 HYPERCHOLESTEROLEMIA: ICD-10-CM

## 2018-10-30 DIAGNOSIS — Z72.0 TOBACCO ABUSE: ICD-10-CM

## 2018-10-30 DIAGNOSIS — K59.01 SLOW TRANSIT CONSTIPATION: ICD-10-CM

## 2018-10-30 DIAGNOSIS — G89.4 CHRONIC PAIN DISORDER: ICD-10-CM

## 2018-10-30 DIAGNOSIS — G47.00 INSOMNIA, UNSPECIFIED TYPE: Primary | ICD-10-CM

## 2018-10-30 DIAGNOSIS — T14.8XXA NERVE DAMAGE: ICD-10-CM

## 2018-10-30 DIAGNOSIS — F41.9 ANXIETY: ICD-10-CM

## 2018-10-30 DIAGNOSIS — M54.16 LUMBAR RADICULOPATHY: ICD-10-CM

## 2018-10-30 DIAGNOSIS — F17.210 CIGARETTE NICOTINE DEPENDENCE WITHOUT COMPLICATION: ICD-10-CM

## 2018-10-30 PROCEDURE — 99214 OFFICE O/P EST MOD 30 MIN: CPT | Performed by: FAMILY MEDICINE

## 2018-10-30 PROCEDURE — 3008F BODY MASS INDEX DOCD: CPT | Performed by: FAMILY MEDICINE

## 2018-10-30 RX ORDER — PRAVASTATIN SODIUM 40 MG
40 TABLET ORAL DAILY
Qty: 90 TABLET | Refills: 1 | Status: SHIPPED | OUTPATIENT
Start: 2018-10-30 | End: 2019-04-16 | Stop reason: SDUPTHER

## 2018-10-30 RX ORDER — OXYCODONE HYDROCHLORIDE 5 MG/1
5 TABLET ORAL EVERY 6 HOURS PRN
Qty: 120 TABLET | Refills: 0 | Status: SHIPPED | OUTPATIENT
Start: 2018-10-30 | End: 2019-01-08 | Stop reason: SDUPTHER

## 2018-10-30 RX ORDER — ERGOCALCIFEROL 1.25 MG/1
50000 CAPSULE ORAL WEEKLY
Qty: 12 CAPSULE | Refills: 1 | Status: SHIPPED | OUTPATIENT
Start: 2018-10-30 | End: 2018-11-12 | Stop reason: SDUPTHER

## 2018-10-30 RX ORDER — PREGABALIN 75 MG/1
75 CAPSULE ORAL 2 TIMES DAILY
Qty: 60 CAPSULE | Refills: 5 | Status: SHIPPED | OUTPATIENT
Start: 2018-10-30 | End: 2020-06-24 | Stop reason: ALTCHOICE

## 2018-10-30 RX ORDER — DIAZEPAM 5 MG/1
10 TABLET ORAL DAILY PRN
Qty: 90 TABLET | Refills: 1 | Status: SHIPPED | OUTPATIENT
Start: 2018-10-30 | End: 2020-11-06 | Stop reason: SDUPTHER

## 2018-10-30 NOTE — PROGRESS NOTES
Assessment/Plan:    No problem-specific Assessment & Plan notes found for this encounter  Problem List Items Addressed This Visit        Digestive    Slow transit constipation       Cardiovascular and Mediastinum    Acute ischemic right MCA stroke (HCC)    Relevant Orders    Magnesium       Musculoskeletal and Integument    Lumbar degenerative disc disease       Other    Hypercholesterolemia    Relevant Medications    pravastatin (PRAVACHOL) 40 mg tablet    Other Relevant Orders    Lipid panel    Comprehensive metabolic panel    Magnesium    CK    Dependence on nicotine from cigarettes    Tobacco abuse    Anxiety    Relevant Orders    Magnesium    Chronic pain disorder    Relevant Medications    pregabalin (LYRICA) 75 mg capsule    Other Relevant Orders    Magnesium    Refused influenza vaccine      Other Visit Diagnoses     Insomnia, unspecified type    -  Primary    Relevant Medications    diazepam (VALIUM) 5 mg tablet    Nerve damage        Relevant Medications    oxyCODONE (ROXICODONE) 5 mg immediate release tablet    diazepam (VALIUM) 5 mg tablet    Lumbar radiculopathy        Relevant Medications    pregabalin (LYRICA) 75 mg capsule    Vitamin D deficiency        Relevant Medications    ergocalciferol (VITAMIN D2) 50,000 units    Other Relevant Orders    Vitamin D 25 hydroxy              Cont with present medications  Check labs for next visit, increase lyrica to 75 mg BID  Advised to stop smoking  Feels tired with lexapro but does not want to stop it, or want an increase  Subjective:      Patient ID: Savannah Crook is a 47 y o  female  HPI      Depression:  Feeling much better with low dose lexapro  Hyperlipidemia (Follow-Up): The patient states her hyperlipidemia has been under good control since the last visit  She has no significant interval events  Symptoms: denies chest pain-- and-- denies intermittent leg claudication   Associated symptoms include no focal neurologic deficits-- and-- no memory loss  Medications: the patient is not adherent with her medication       Hyperlipidemia (Follow-Up): The patient states her hyperlipidemia has been under good control since the last visit  She has no significant interval events  Symptoms: denies chest pain-- and-- denies intermittent leg claudication  Associated symptoms include no focal neurologic deficits-- and-- no memory loss  Medications: the patient is not adherent with her medication regimen             The following portions of the patient's history were reviewed and updated as appropriate: allergies, current medications, past family history, past medical history, past social history, past surgical history and problem list     Review of Systems      Constitutional:  Denies fever or chills , + weight gain  Eyes:  Denies change in visual acuity   HENT:  Denies nasal congestion or sore throat +allergies  Respiratory:  Denies cough or shortness of breath or wheezing  Cardiovascular:  Denies palpitations or chest pain  GI:  Denies abdominal pain, nausea, or vomiting  Integument:  Denies rash   Neurologic:  +  headache  No  focal weakness        Objective:      /72 (BP Location: Left arm, Patient Position: Sitting, Cuff Size: Standard)   Pulse 83   Temp 98 7 °F (37 1 °C) (Tympanic)   Ht 5' 1" (1 549 m)   Wt 63 1 kg (139 lb 3 2 oz)   LMP  (LMP Unknown)   SpO2 97%   BMI 26 30 kg/m²          Physical Exam      Constitutional:  Well developed, well nourished, no acute distress, non-toxic appearance   Eyes:  PERRL, conjunctiva normal , non icteric sclera  HENT:  Atraumatic, oropharynx moist  Neck-  supple   Respiratory:  CTA b/l, normal breath sounds, no rales, no wheezing   Cardiovascular:  RRR, no murmurs, no LE edema b/l  GI:  Soft, nondistended, normal bowel sounds x 4, nontender, no organomegaly, no mass, no rebound, no guarding   Neurologic:  no focal deficits noted   Psychiatric:  Speech and behavior appropriate , AAO x 3

## 2018-10-31 ENCOUNTER — TELEPHONE (OUTPATIENT)
Dept: INTERNAL MEDICINE CLINIC | Facility: CLINIC | Age: 53
End: 2018-10-31

## 2018-10-31 NOTE — TELEPHONE ENCOUNTER
[facility] pharmacy called b/c the pt was prescribed Vitamin D2 80931 units, but she has a blue dye allergy  All the manufacturers available to them have blue dye in them  The did notify the pt and she asked them to contact out office  I told them to cancel out the Rx for now and we would reach out to the pt or send them a new Rx if necessary  Is there an OTC dose appropriate for the pt to take to equal to 71376 units daily? Please review or send in appropriate Rx to [facility]  Thank you!

## 2018-10-31 NOTE — TELEPHONE ENCOUNTER
Discussed with Dr Neftali Sullivan, please advise patient to  To her research to try to find a vitamin-D supplementation without blue dye the, patient may take up to 78143 International Units weekly  If patient unable to find vitamin-D supplementation advised her to supplement her diet with vitamin-D foods and beverages  Please call notify patient  Patient may be able to find supplementation at a naturopath store

## 2018-11-07 NOTE — TELEPHONE ENCOUNTER
Called pt she is no longer interested in scheduling at our practice  She ended up seeing another doc

## 2018-11-09 DIAGNOSIS — K59.00 CONSTIPATION, UNSPECIFIED CONSTIPATION TYPE: ICD-10-CM

## 2018-11-12 DIAGNOSIS — E55.9 VITAMIN D DEFICIENCY: ICD-10-CM

## 2018-11-12 RX ORDER — ERGOCALCIFEROL 1.25 MG/1
50000 CAPSULE ORAL WEEKLY
Qty: 12 CAPSULE | Refills: 0 | Status: SHIPPED | OUTPATIENT
Start: 2018-11-12 | End: 2018-11-13 | Stop reason: SDUPTHER

## 2018-11-13 DIAGNOSIS — I69.398 DEPRESSION AS LATE EFFECT OF CEREBROVASCULAR ACCIDENT (CVA): ICD-10-CM

## 2018-11-13 DIAGNOSIS — I63.9 CEREBROVASCULAR ACCIDENT (CVA), UNSPECIFIED MECHANISM (HCC): Primary | ICD-10-CM

## 2018-11-13 DIAGNOSIS — K59.00 CONSTIPATION, UNSPECIFIED CONSTIPATION TYPE: ICD-10-CM

## 2018-11-13 DIAGNOSIS — E55.9 VITAMIN D DEFICIENCY: ICD-10-CM

## 2018-11-13 DIAGNOSIS — F06.31 DEPRESSION AS LATE EFFECT OF CEREBROVASCULAR ACCIDENT (CVA): ICD-10-CM

## 2018-11-13 RX ORDER — ERGOCALCIFEROL 1.25 MG/1
CAPSULE ORAL
Qty: 12 CAPSULE | Refills: 1 | Status: SHIPPED | OUTPATIENT
Start: 2018-11-13 | End: 2020-08-15 | Stop reason: SDUPTHER

## 2018-11-13 RX ORDER — ESCITALOPRAM OXALATE 5 MG/1
5 TABLET ORAL DAILY
Qty: 90 TABLET | Refills: 1 | Status: SHIPPED | OUTPATIENT
Start: 2018-11-13 | End: 2019-05-03 | Stop reason: SDUPTHER

## 2018-11-13 NOTE — TELEPHONE ENCOUNTER
Based on what she told the eye doctor, it does appear she has overall improvement  Concerns are her subjective sypmptoms - she continues to feel symptoms in the car as a passenger and having headaches  Does she really want to drive with these symptoms? I think a fitness to drive program should be initiated when these symptoms NO longer exist   She may need to see her PCP or neurology for worsening headaches too  I do not treat headaches  When these symptoms improve, she can do the fitness to drive evaluatoin  I will place the order today to use for the future        Dr Lizzie Marie

## 2018-11-13 NOTE — TELEPHONE ENCOUNTER
Patient calling to speak to Dr Lizzie Marie regarding her recommendations  She states she saw the neuro-ophthalmologist 10/22, please review their note, it's available under notes tab dated 10/22 [facility]  She wants to know if she is supposed to get the drive eval, since she was told not to drive

## 2018-11-14 NOTE — TELEPHONE ENCOUNTER
Informed patient, per Dr Cowan Organ note  She is aware that Dr Lizzie Marie does not think patient should drive until her sxs have resolved and should not complete the FTD until sxs have improved, patient verbalized understanding  She states she will discuss further at her f/u with Dr Lizzie Marie on 12/12  Mailed FTD referral to patient as requested

## 2018-12-03 ENCOUNTER — TELEPHONE (OUTPATIENT)
Dept: CARDIOLOGY CLINIC | Facility: CLINIC | Age: 53
End: 2018-12-03
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
## 2018-12-03 NOTE — TELEPHONE ENCOUNTER
Records request from [facility] faxed to [facility] on 11/30/18

## 2018-12-04 ENCOUNTER — REMOTE DEVICE CLINIC VISIT (OUTPATIENT)
Dept: CARDIOLOGY CLINIC | Facility: CLINIC | Age: 53
End: 2018-12-04
Payer: COMMERCIAL

## 2018-12-04 ENCOUNTER — OFFICE VISIT (OUTPATIENT)
Dept: OCCUPATIONAL THERAPY | Facility: CLINIC | Age: 53
End: 2018-12-04
Payer: COMMERCIAL

## 2018-12-04 DIAGNOSIS — I63.9 CEREBROVASCULAR ACCIDENT (CVA), UNSPECIFIED MECHANISM (HCC): Primary | ICD-10-CM

## 2018-12-04 DIAGNOSIS — Z95.818 PRESENCE OF OTHER CARDIAC IMPLANTS AND GRAFTS: ICD-10-CM

## 2018-12-04 DIAGNOSIS — I63.9 CRYPTOGENIC STROKE (HCC): Primary | ICD-10-CM

## 2018-12-04 PROCEDURE — G8992 OTHER PT/OT  D/C STATUS: HCPCS

## 2018-12-04 PROCEDURE — G8991 OTHER PT/OT GOAL STATUS: HCPCS

## 2018-12-04 PROCEDURE — G8990 OTHER PT/OT CURRENT STATUS: HCPCS

## 2018-12-04 PROCEDURE — 93299 PR REM INTERROG ICPMS/SCRMS <30 D TECH REVIEW: CPT | Performed by: INTERNAL MEDICINE

## 2018-12-04 PROCEDURE — 93298 REM INTERROG DEV EVAL SCRMS: CPT | Performed by: INTERNAL MEDICINE

## 2018-12-04 PROCEDURE — 97165 OT EVAL LOW COMPLEX 30 MIN: CPT

## 2018-12-04 PROCEDURE — 96125 COGNITIVE TEST BY HC PRO: CPT

## 2018-12-05 NOTE — PROGRESS NOTES
Results for orders placed or performed in visit on 12/04/18   Cardiac EP device report    Narrative    SJM LOOP MERLIN TRANSMISSION: BATTERY STATUS "OK"  NO DEVICE DETECTED & NO PT ACTIVATED EPISODES  NORMAL DEVICE FUNCTION   GV

## 2018-12-10 ENCOUNTER — TELEPHONE (OUTPATIENT)
Dept: CARDIOLOGY CLINIC | Facility: CLINIC | Age: 53
End: 2018-12-10

## 2018-12-10 NOTE — TELEPHONE ENCOUNTER
Pt called-has an MRI on Friday and has a Loop recorder  I called pt and left a message with her spouse that she should send a recording before she goes for her MRI to download all the info  Also,advised him to have her call if any further questions

## 2018-12-11 NOTE — PROGRESS NOTES
Physical Medicine & Rehabilitation Follow-up Note  Bhavin Storey Isabella 46 y o  female      ASSESSMENT/PLAN:     Mitchell Carroll was seen today for follow-up post right MCA stroke and sequelae sustained in April of 2018  Residual functional deficits include mild visual deficits  Patient was last seen by me on 10/10/18  Since her last visit, patient has completed all OT and SLP therapies  She is continued in vision therapy and she also continues to see her eye doctor Dr Dayanara Reis for her visual deficits  She denies headache today  She overall feels well  She at times feels like she has trouble concentrating  She would like to return to work eventually, but would like her vision to be better  Patient did complete a fitness to drive evaluation, and had a very good result to clear her for driving  However due to her persistent visual deficits, I have advised her to be officially cleared by her eye doctor, Dr Dayanara Reis, who is managing her visual deficits  Dr Dayanara Reis will also determine when patient is ready to return to work  From a rehabilitation standpoint, patient has completed all of her therapies and is doing very well  Her neurologic examination today is within normal limits  From my standpoint, patient should be cleared when her visual deficits have improved to part-time work at first and then to full time work thereafter  I am happy to assist in the paperwork if needed  Medical update:  Patient had difficulty getting an appointment with the [facility] for evaluation of her lower back pain  She was referred by her primary care physician to see Dr Bunny Shine at [facility] for headaches as well as her back pain  She reports she was recently placed on Amitryptyline and  Maxalt for headaches  She is also considering medical marijuana for her chronic back pain           Diagnoses and all orders for this visit:    Impaired mobility and activities of daily living    History of ischemic right MCA stroke    Depression as late effect of cerebrovascular accident (CVA)    Other orders  -     amitriptyline (ELAVIL) 25 mg tablet;   -     rizatriptan (MAXALT-MLT) 5 MG disintegrating tablet;       *I have spent 30 minutes with Patient and family today in which greater than 50% of this time was spent in counseling/coordination of care      HPI:   Diana Weinstein 47 y o  female right handed, with  has a past medical history of Acute pain of right knee; Anemia; Anxiety; Cervical disc disorder with radiculopathy; Chronic pain; Constipation; CVA (cerebral vascular accident) ([facility] [address] ); H/O ischemic right MCA stroke; Hematuria; High cholesterol; Lumbar radiculopathy; Lumbar stenosis; Other chronic pain; Other muscle spasm; PONV (postoperative nausea and vomiting); Spondylosis of cervical spine; Spondylosis of lumbosacral region; Stroke [facility]); and Urinary incontinence         Patient was hospitalized from 2/9/18 after suffering a right MCA ischemic stroke, patient s/p IV TPA  MRI confirmed multiple infarcts in the right cerebral hemisphere in the distribution of the right MCA, as well as deep infarctions in the basal ganglia and cortical infarctions in the frontal and parietal regions; there was also noted to be a petechial hemorrhage in the infarction noted in the right lentiform nucleus  CTA was performed which demonstrated a right M1 occlusion, for which she underwent an endovascular thrombectomy  Patient had a loop recorder placed on 2/13/18  Patient was admitted to inpatient rehabilitation at the [facility] from 2/14/18 through 2/18/18  Patient then reported back to the hospital from 4/25/18 - 4/27/18 for recurrent stroke-like symptoms of dysarthria and left sided weakness  Symptoms improved while in the ICU  Patient was started on Warfarin upon discharge for secondary stroke prophylaxis and continued on aspirin/statin  She is now switched to Eliquis        Expanded Social History:   Patient lives with spouse in a single family home with 1 steps to enter  Patient is employed  Patient was working at [employer] full time, but is currently not working due to stroke recovery  Patient was a   Imaging: I have personally reviewed imaging with results as follows:  X-ray left shoulder:   Suggestive evidence of calcific tendinitis in the left shoulder  X-ray lumbar spine:  Minimal anterolistheses of L4 with respect to L5  Mild scoliosis convexity to the right      Review of Systems   Constitutional: Negative  HENT: Negative  Eyes: Negative  Respiratory: Negative  Cardiovascular: Negative  Gastrointestinal: Negative  Endocrine: Negative  Genitourinary: Negative  Musculoskeletal: Negative  Skin: Negative  Allergic/Immunologic: Negative  Neurological: Negative  Hematological: Negative  Psychiatric/Behavioral: Negative  OBJECTIVE:   /64 (Patient Position: Sitting, Cuff Size: Standard)   Pulse 81   Ht 5' 1" (1 549 m)   Wt 65 8 kg (145 lb)   LMP  (LMP Unknown)   BMI 27 40 kg/m²     Physical Exam  Physical Exam   Constitutional: She appears well-developed and well-nourished  HENT:   Head: Normocephalic and atraumatic  Eyes: Pupils are equal, round, and reactive to light  EOM are normal    Extra ocular movements intact with no nystagmus today   Cardiovascular: Normal rate and regular rhythm  Pulmonary/Chest: Breath sounds normal  She has no wheezes  She has no rales  Abdominal: Soft  Bowel sounds are normal  She exhibits no distension  There is no tenderness  Neurological:   Motor strength is 5/5 throughout, negative Romberg  Gait is within normal limits step through gait pattern without assistive device or loss of balance  Skin: Skin is warm  Psychiatric: She has a normal mood and affect  Nursing note and vitals reviewed          Current Outpatient Prescriptions:     amitriptyline (ELAVIL) 25 mg tablet, , Disp: , Rfl:     aspirin 81 mg chewable tablet, Chew 1 tablet (81 mg total) daily, Disp: 30 tablet, Rfl: 0    cycloSPORINE (RESTASIS MULTIDOSE) 0 05 % ophthalmic emulsion, , Disp: , Rfl:     diazepam (VALIUM) 5 mg tablet, Take 2 tablets (10 mg total) by mouth daily as needed for anxiety, Disp: 90 tablet, Rfl: 1    ELIQUIS 5 MG, TAKE 1 TABLET (5 MG TOTAL) BY MOUTH 2 (TWO) TIMES A DAY, Disp: 60 tablet, Rfl: 3    ergocalciferol (VITAMIN D2) 50,000 units, Must be compounded without dyes due to allergies- one per week, Disp: 12 capsule, Rfl: 1    escitalopram (LEXAPRO) 5 mg tablet, Take 1 tablet (5 mg total) by mouth daily, Disp: 90 tablet, Rfl: 1    lactulose 10 g/15 mL, , Disp: , Rfl:     Linaclotide (LINZESS) 145 MCG CAPS, Take 1 capsule (145 mcg total) by mouth daily, Disp: 90 capsule, Rfl: 1    loteprednol etabonate (LOTEMAX) 0 5 % ophthalmic suspension, , Disp: , Rfl:     Magnesium 500 MG CAPS, Take by mouth, Disp: , Rfl:     Omega-3 Fatty Acids (FISH OIL) 1200 MG CAPS, Take by mouth, Disp: , Rfl:     oxyCODONE (ROXICODONE) 5 mg immediate release tablet, Take 1 tablet (5 mg total) by mouth every 6 (six) hours as needed for moderate pain Max Daily Amount: 20 mg, Disp: 120 tablet, Rfl: 0    pravastatin (PRAVACHOL) 40 mg tablet, Take 1 tablet (40 mg total) by mouth daily, Disp: 90 tablet, Rfl: 1    pregabalin (LYRICA) 75 mg capsule, Take 1 capsule (75 mg total) by mouth 2 (two) times a day, Disp: 60 capsule, Rfl: 5    rizatriptan (MAXALT-MLT) 5 MG disintegrating tablet, , Disp: , Rfl:     Past Medical History:   Diagnosis Date    Acute pain of right knee     last assessed - [id number]    Anemia     Anxiety     Cervical disc disorder with radiculopathy     Chronic pain     Constipation     CVA (cere[facility]) ([facility] [address] )     H/O ischemic right MCA stroke     Hematuria     last assessed - [id number]    High cholesterol     Lumbar radiculopathy     Lumbar stenosis     Other chronic pain     last assessed - [id number]    Other muscle spasm     last assessed - [id number]    PONV (postoperative nausea and vomiting)     must have premed    Spondylosis of cervical spine     Spondylosis of lumbosacral region     Stroke [facility])     Urinary incontinence     Resolved - [id number]     Patient Active Problem List    Diagnosis Date Noted    Slow transit constipation 10/30/2018    Refused influenza vaccine 10/30/2018    Idiopathic scoliosis of lumbar spine 07/24/2018    Tobacco abuse 06/05/2018    History of ischemic right MCA stroke 04/26/2018    CVA (cerebral vascular accident) ([facility] [address] ) 04/09/2018    Chronic back pain 02/15/2018    Depression as late effect of cerebrovascular accident (CVA) 02/15/2018    Acute ischemic right MCA stroke ([facility] [address] ) 02/09/2018    Fall 02/09/2018    Chronic low back pain 07/05/2016    Cervical post-laminectomy syndrome 07/05/2016    Chronic pain disorder 07/05/2016    Lumbar degenerative disc disease 07/05/2016    Anxiety 09/19/2014    Hypercholesterolemia 02/18/2014    Cervical radiculopathy 08/13/2013    Dependence on nicotine from cigarettes 08/09/2012

## 2018-12-12 ENCOUNTER — DOCUMENTATION (OUTPATIENT)
Dept: NEUROLOGY | Facility: CLINIC | Age: 53
End: 2018-12-12

## 2018-12-12 ENCOUNTER — OFFICE VISIT (OUTPATIENT)
Dept: NEUROLOGY | Facility: CLINIC | Age: 53
End: 2018-12-12
Payer: COMMERCIAL

## 2018-12-12 VITALS
HEIGHT: 61 IN | SYSTOLIC BLOOD PRESSURE: 108 MMHG | WEIGHT: 145 LBS | BODY MASS INDEX: 27.38 KG/M2 | HEART RATE: 81 BPM | DIASTOLIC BLOOD PRESSURE: 64 MMHG

## 2018-12-12 DIAGNOSIS — I69.398 DEPRESSION AS LATE EFFECT OF CEREBROVASCULAR ACCIDENT (CVA): ICD-10-CM

## 2018-12-12 DIAGNOSIS — Z86.73 HISTORY OF ISCHEMIC RIGHT MCA STROKE: ICD-10-CM

## 2018-12-12 DIAGNOSIS — F06.31 DEPRESSION AS LATE EFFECT OF CEREBROVASCULAR ACCIDENT (CVA): ICD-10-CM

## 2018-12-12 DIAGNOSIS — Z78.9 IMPAIRED MOBILITY AND ACTIVITIES OF DAILY LIVING: Primary | ICD-10-CM

## 2018-12-12 DIAGNOSIS — Z74.09 IMPAIRED MOBILITY AND ACTIVITIES OF DAILY LIVING: Primary | ICD-10-CM

## 2018-12-12 PROCEDURE — 99214 OFFICE O/P EST MOD 30 MIN: CPT | Performed by: PHYSICAL MEDICINE & REHABILITATION

## 2018-12-12 RX ORDER — RIZATRIPTAN BENZOATE 5 MG/1
TABLET, ORALLY DISINTEGRATING ORAL
COMMUNITY
End: 2022-04-14 | Stop reason: SDUPTHER

## 2018-12-12 RX ORDER — AMITRIPTYLINE HYDROCHLORIDE 25 MG/1
25 TABLET, FILM COATED ORAL
COMMUNITY
Start: 2018-11-06 | End: 2020-01-06

## 2018-12-12 NOTE — PROGRESS NOTES
Daily Speech Treatment Note    Today's date: 2018   Patients name: Harry Mari  : 1965  MRN: [id number]  Safety measures: depression, stroke  Referring provider: Angus Mcknight MD    Primary Diagnosis/Billing code: [id number]  Secondary Diagnosis/ Billing code: [phone], R48 8    Visit Tracking:   -Referring provider: Epic  -Billing guidelines: AMA  -Visit #15 (90 visits combined with PT and OT )  -CBC  -RE due 2018  Subjective/Behavioral:  -Patient reported that she had a HA upon arrival to  (pain: 3/10)  Patient reported that she self-administered medication at home to ease HA pain  Objective/Assessment:  -Patient's family member/caregiver was present during today's session   -Reviewed patient's home exercises/activities completed since last appointment  (Scattergories completed with 99% acc with min verbal assistance from )    Short-term goals:  1  Patient will complete complex auditory attention processing tasks (e g , sentence unscramble, ranking numbers/words, etc ) with 80% accuracy, to be achieved in 4-6 weeks  -- PARTIALLY MET  -Auditory direction following: ([email or web address] Book page 1, 2, 4, 5, 6, 8) Patient answered general comprehension questions regarding pictured scenes  Task completed in /36 opp (86% acc) independently, increasing to 36/36 opp (100% acc) with min verbal repetition cues  2  Patient will complete thought organization tasks (e g , sequencing, deduction puzzles, etc ) with 80% accuracy to facilitate increased executive functioning skills, to be achieved in 4-6 weeks  -- PARTIALLY MET     6  Patient will complete checkbook management tasks using learned strategies, to be achieved in 4-6 weeks   -- PARTIALLY MET     7  (NEW GOAL) Patient will complete clock drawing/reading tasks with 80% accuracy to improve executive functioning skills (to be achieved in 4-6 weeks)       8  (NEW GOAL) Patient will define idioms with 80% accuracy to facilitate Was the patient seen in the last year in this department? Yes    Does patient have an active prescription for medications requested? No     Received Request Via: Pharmacy   improved semantic language skills (to be achieved in 4-6 weeks)  9  (NEW GOAL) Patient will provide at least two similarities and two differences between words/objects with 80% accuracy to facilitate improved semantic language skills (to be achieved in 4-6 weeks)  -Similarities/differences: Patient was presented with picture pairs and asked to determine at least 2 ways the objects are alike and 2 way they are different (e g , car & truck, necklace & bracelet  Naming similarities task completed in 14/15 opp (93% acc) independently, increasing to 15/15 opp (100% acc) with min semantic cues  Naming differences task completed in 14/15 opp (98% acc) independently, increasing to 15/15 opp (100% acc) with min semantic cues  Plan:  -Patient was provided with home exercises/activities to target goals in plan of care at the end of today's session   -Continue with current plan of care

## 2018-12-12 NOTE — PROGRESS NOTES
Disability forms received 12/11/18  Per pt her last day of work was 2/8/18  She does not have an appt with Dr Mali Lantigua yet, per Dr Valiente Found note, she would like him to determine when she can return to work, and she will assist with the paperwork  Pt aware of 2 week turn around time, and fee for forms  Forms printed and placed in bin for completion

## 2018-12-13 ENCOUNTER — TELEPHONE (OUTPATIENT)
Dept: INTERNAL MEDICINE CLINIC | Facility: CLINIC | Age: 53
End: 2018-12-13

## 2018-12-13 NOTE — TELEPHONE ENCOUNTER
I spoke to the patient about her MRI cervical spine  She stated to me that she is going to PT of her back  The was nothing in her chart that stated she was seeing PT  I did call and leave a message with [facility] [facility] to have those records sent over

## 2018-12-13 NOTE — TELEPHONE ENCOUNTER
Dr Johanne Calero had ordered a MRI of Lumbar Spine for this patient back in 12/29/2017  The patient just scheduled this test on Monday  I did a prior authorization for this test and clinical review came back to state that the last time she was seen 0/30/2018 and the note does not state any current finding for this conditionsand no physical therapy has been done either  They are are asking for a peer to peer to be done  I have sent all office notes from 2016 that deal with the lumbar spine/back pain, and the 2 MRI cervical spine that were done  The phone # to call is [phone]

## 2018-12-17 ENCOUNTER — TELEPHONE (OUTPATIENT)
Dept: NEUROLOGY | Facility: CLINIC | Age: 53
End: 2018-12-17

## 2018-12-17 NOTE — TELEPHONE ENCOUNTER
To be addressed per Dr Abdiel Shelby upon return-   I called pt re forms, please see documentation note 12/12  Pt will be calling Dr Rocío Hermosillo on Thursday for f/u appt, pt will call back w/update    Pt requesting we fill out forms as best we can on our end, Dr Abdiel Shelby can review and sign if agreeable upon return 1/7/19  per office note, pt must be cleared to actually return to work per Dr Rocío Hermosillo, pt aware agreeable    Forms must be received by 1/10  Also attached is a [employer] form previously dropped off and located in media

## 2018-12-17 NOTE — TELEPHONE ENCOUNTER
The patients MRI lumbar spine has been denied  A peer to peer was not done in a timely manner to have this test approved  What next step would you like to do for this patient  She stated that she really wanted to get this test done by the end of the year

## 2018-12-17 NOTE — TELEPHONE ENCOUNTER
Chart reviewed  Recent consult from dr Wendy Nunez- physical medicine and rehab noted  At this time, I would defer to Dr Wendy Nunez from [facility] or Dr Whitney Funez from neurology for any additional imaging

## 2018-12-18 NOTE — TELEPHONE ENCOUNTER
Forms completed  Located in clinical team bin for review/sign when Dr Sravani Merida returns 1/7/19  Must fax asap  Pt aware  A/w pt callback w/appt date w/Dr Arora, eye

## 2018-12-19 DIAGNOSIS — I63.511 ACUTE ISCHEMIC RIGHT MCA STROKE (HCC): ICD-10-CM

## 2018-12-21 NOTE — TELEPHONE ENCOUNTER
Forms are Dr Sherrell Aaron folder for review/sign when she returns 1/7    [facility] [phone]

## 2018-12-22 ENCOUNTER — APPOINTMENT (OUTPATIENT)
Dept: LAB | Age: 53
End: 2018-12-22
Payer: COMMERCIAL

## 2018-12-22 DIAGNOSIS — I63.511 ACUTE ISCHEMIC RIGHT MCA STROKE (HCC): ICD-10-CM

## 2018-12-22 DIAGNOSIS — F41.9 ANXIETY: ICD-10-CM

## 2018-12-22 DIAGNOSIS — E55.9 VITAMIN D DEFICIENCY: ICD-10-CM

## 2018-12-22 DIAGNOSIS — E78.00 HYPERCHOLESTEROLEMIA: ICD-10-CM

## 2018-12-22 DIAGNOSIS — G89.4 CHRONIC PAIN DISORDER: ICD-10-CM

## 2018-12-22 LAB
25(OH)D3 SERPL-MCNC: 52 NG/ML (ref 30–100)
ALBUMIN SERPL BCP-MCNC: 3.8 G/DL (ref 3.5–5)
ALP SERPL-CCNC: 81 U/L (ref 46–116)
ALT SERPL W P-5'-P-CCNC: 36 U/L (ref 12–78)
ANION GAP SERPL CALCULATED.3IONS-SCNC: 6 MMOL/L (ref 4–13)
AST SERPL W P-5'-P-CCNC: 20 U/L (ref 5–45)
BILIRUB SERPL-MCNC: 0.52 MG/DL (ref 0.2–1)
BUN SERPL-MCNC: 14 MG/DL (ref 5–25)
CALCIUM SERPL-MCNC: 9.4 MG/DL (ref 8.3–10.1)
CHLORIDE SERPL-SCNC: 105 MMOL/L (ref 100–108)
CHOLEST SERPL-MCNC: 235 MG/DL (ref 50–200)
CK SERPL-CCNC: 122 U/L (ref 26–192)
CO2 SERPL-SCNC: 27 MMOL/L (ref 21–32)
CREAT SERPL-MCNC: 0.85 MG/DL (ref 0.6–1.3)
GFR SERPL CREATININE-BSD FRML MDRD: 78 ML/MIN/1.73SQ M
GLUCOSE P FAST SERPL-MCNC: 90 MG/DL (ref 65–99)
HDLC SERPL-MCNC: 65 MG/DL (ref 40–60)
LDLC SERPL CALC-MCNC: 151 MG/DL (ref 0–100)
MAGNESIUM SERPL-MCNC: 2.1 MG/DL (ref 1.6–2.6)
NONHDLC SERPL-MCNC: 170 MG/DL
POTASSIUM SERPL-SCNC: 4.1 MMOL/L (ref 3.5–5.3)
PROT SERPL-MCNC: 7.2 G/DL (ref 6.4–8.2)
SODIUM SERPL-SCNC: 138 MMOL/L (ref 136–145)
TRIGL SERPL-MCNC: 96 MG/DL

## 2018-12-22 PROCEDURE — 80053 COMPREHEN METABOLIC PANEL: CPT

## 2018-12-22 PROCEDURE — 83735 ASSAY OF MAGNESIUM: CPT

## 2018-12-22 PROCEDURE — 82306 VITAMIN D 25 HYDROXY: CPT

## 2018-12-22 PROCEDURE — 82550 ASSAY OF CK (CPK): CPT

## 2018-12-22 PROCEDURE — 80061 LIPID PANEL: CPT

## 2018-12-22 PROCEDURE — 36415 COLL VENOUS BLD VENIPUNCTURE: CPT

## 2019-01-04 NOTE — TELEPHONE ENCOUNTER
Pt calls to state that she saw Dr Josy Lora today  His office will be faxing over information to us for Dr Cheryal Curling to review

## 2019-01-06 DIAGNOSIS — T14.8XXA NERVE DAMAGE: ICD-10-CM

## 2019-01-07 NOTE — TELEPHONE ENCOUNTER
Pt called and states that she just faxed office note to [phone]  I made her aware that it may take 1-2 days before we would see this fax in her chart  She will fax note to alt fax number

## 2019-01-07 NOTE — TELEPHONE ENCOUNTER
Charge placed into [employer]  Once forms have been signed, please contact patient to coordinate payment and pickup and please scan into chart  Thanks

## 2019-01-08 DIAGNOSIS — M54.12 CERVICAL RADICULOPATHY: Primary | ICD-10-CM

## 2019-01-08 DIAGNOSIS — T14.8XXA NERVE DAMAGE: ICD-10-CM

## 2019-01-08 RX ORDER — OXYCODONE HYDROCHLORIDE 5 MG/1
5 TABLET ORAL EVERY 6 HOURS PRN
Qty: 120 TABLET | Refills: 0 | Status: CANCELLED | OUTPATIENT
Start: 2019-01-08

## 2019-01-08 RX ORDER — OXYCODONE HYDROCHLORIDE 5 MG/1
5 TABLET ORAL EVERY 6 HOURS PRN
Qty: 120 TABLET | Refills: 0 | Status: SHIPPED | OUTPATIENT
Start: 2019-01-08 | End: 2019-07-15 | Stop reason: SDUPTHER

## 2019-01-08 NOTE — TELEPHONE ENCOUNTER
From: Celena Mason  Sent: 1/6/2019 5:41 PM EST  Subject: Medication Renewal Request    Yanira Marlow would like a refill of the following medications:     oxyCODONE (ROXICODONE) 5 mg immediate release tablet Dung Maria, DO]   Patient Comment: this is [facility], please send me an e-mail when this is fulfilled   THANK-YOU    Preferred pharmacy: [facility]

## 2019-01-08 NOTE — TELEPHONE ENCOUNTER
[employer] form received and also in media now dated 1/8  Copies of both faxes printed and placed with forms that need to be reviewed and signed for your review

## 2019-01-08 NOTE — TELEPHONE ENCOUNTER
I called pt to make sure that we received the correct fax  I did not receive anything on the alt fax from yesterday  There is an office note form dr villar under media dated 1/4  She will fax over a copy of [employer] form that dr villar completed for review  She will fax to alt fax  She is requesting a call back once we receive this fax

## 2019-01-11 NOTE — TELEPHONE ENCOUNTER
Completed form scanned into chart  Contacted pt for payment and   Pt stated she mailed a check out today for the $15 fee  Per Katiana Sanchez, forms were faxed and mailed home to pt

## 2019-01-11 NOTE — TELEPHONE ENCOUNTER
Viki Hidalgo please have Dr Luis A Hathaway sign and then fax to [employer], have completed form scanned into MEDIA, and mail home original to patient      Thank you

## 2019-02-05 ENCOUNTER — TELEPHONE (OUTPATIENT)
Dept: NEUROLOGY | Facility: CLINIC | Age: 54
End: 2019-02-05

## 2019-02-05 NOTE — TELEPHONE ENCOUNTER
pt called reqeusting a CT or MRI  she states that she has had a headache for over a month  noisy headaches, if she doesn't have any noise around her she can hear the headaches, a "woosing sound"  pain locted on whole r side of her head  8-10/10   she wakes up with headache and goes to bed with it, she really doesn't get any relief during the day  no n/v/light/sound sensitivity  rizatriptan 5mg prn-taking 1 daily for the past month-this is prescribed by dr Saldivar Him with [facility]-she sees him for her back pain  amitriptyline 25mg 1 tab at hs    she states that dr Saldivar Him asked her to discuss CT or MRI with you    Please advise  [phone]

## 2019-02-06 NOTE — TELEPHONE ENCOUNTER
Patient will need to come in for evaluation either by Neurology or me for further assessment and evaluation to see if she needs imaging  If she feels her headaches are severe she should present to ER      Dr Karli Meredith

## 2019-02-07 NOTE — SOCIAL WORK
" 19      Jb Verde (Dcsd. Male)     Date of Birth Social Security Number Address Home Phone MRN    [id number]   [id number]  [facility] [address] [phone] [id number]    Mosque Marital Status          Other        Admission Date Admission Type Admitting Provider Attending Provider Department, Room/Bed    19 Emergency Dillan Tapia DO  [facility] 4B, 404/1    Discharge Date Discharge Disposition Discharge Destination        2019               Attending Provider:  (none)   Allergies:  Aspirin    Isolation:  None   Infection:  None   Code Status:  Prior    Ht:  162.6 cm (64\")   Wt:  55.1 kg (121 lb 6.4 oz)    Admission Cmt:  None   Principal Problem:  Acute respiratory failure with hypoxia (CMS/HCC) [J96.01]                 Active Insurance as of 2019     Primary Coverage     Payor Plan Insurance Group Employer/Plan Group    [employer] [id number]      Payor Plan Address Payor Plan Phone Number Payor Plan Fax Number Effective Dates    [employer]  [phone]  2019 - None Entered    [id number] [facility] [id number]       Subscriber Name Subscriber Birth Date Member ID       JB VERDE 1935 [id number]                 Emergency Contacts      (Rel.) Home Phone Work Phone Mobile Phone    Hanh Verde (Daughter) [phone] -- [phone]               History & Physical      Mario Tran DO at 2019  4:16 PM              [facility] Medicine Services  HISTORY AND PHYSICAL    Date of Admission: 2019  Primary Care Physician: Tamiko Vizcaino MD    Subjective     Chief Complaint: shortness of breath, respiratory failure    History of Present Illness  This is an 83-year-old  gentleman who presents by EMS from his home.  He lives in [place], [place].  He was found to be in severe respiratory distress once they arrived at his home.  He required intubation " CM met with pt and  - Juan at bedside  Pt confirmed the following information is accurate -     Pt lives with her  in a 2 story house with 1 MAGDALENA and 1st floor setup  Pt independent with ADLs and ambulation PTA  Pt currently not driving and her  is providing transportation  Pt's PCP is Dr Moriah Colbert  Pt has a shower chair at home  Pt has no hx of HHC  Pt was recently d/c from [facility] 2/19  Pt currently receiving OP OT  Pt recently d/c from OP PT  Pt reports no having no hx of mental health issues or drug use  Per pt's medical record and progress note from Dr Jaimee Mar, pt has major depression and anxiety  Pt has a prescription plan and uses [facility] for her prescriptions  CM to follow  CM reviewed d/c planning process including the following: identifying help at home, patient preference for d/c planning needs, Discharge Lounge, [facility] Meds to Bed program, availability of treatment team to discuss questions or concerns patient and/or family may have regarding understanding medications and recognizing signs and symptoms once discharged  CM also encouraged patient to follow up with all recommended appointments after discharge  Patient advised of importance for patient and family to participate in managing patients medical well being  there.  He was tachycardic and hypotensive in the field.  He received fluids and Levophed.    In the emergency department, he has had a central line placed.  He has been bolused considerable fluid and has also been given sodium bicarbonate.  He remains with a severe acidosis.    His daughter provides the history.  She tells me that he was admitted a few weeks ago to [facility] in [place], [place].  He was apparently treated for pneumonia at that point in time.  He was then discharged for rehab at [facility] and did not do very well.  She ultimately elected to take him home with home health and he has been back at home with her for around 10-12 days.    She states that over the last couple days he has been more lethargic.  He has seemed more short of breath.  He has not been eating well.  She states that she believes him to also have some difficulty with swallowing and does not recall them checking this out when he was in the hospital recently.  She is unaware of any fevers, sweats, or chills.  She denies that he has been having problems with nausea or vomiting or loose stool.  She states that he has not been complaining of any pain.    He became more acutely short of breath this morning causing her to request EMS.    Review of Systems   Patient unable to provide given intubated and sedated state.  Discussed with family.    Past Medical History:   Past Medical History:   Diagnosis Date   • Acid reflux    • Allergic rhinitis    • Arthritis    • Monique esophagus    • Carotid atherosclerosis    • Cognitive impairment    • Delirium    • Folic acid deficiency    • Hyperlipemia    • Hypertension    • Kidney stone    • Nail dystrophy    • Osteoarthritis of knee    • Peripheral vascular disease (CMS/HCC)    • Polyp of colon    • Transient cerebral ischemia    • Traumatic amputation of thumb    • Urinary tract infection    • Vitamin B12 deficiency    • Vitamin D deficiency      Past Surgical  History:  Past Surgical History:   Procedure Laterality Date   • CYSTOSCOPY URETEROSCOPY LASER LITHOTRIPSY Left 12/5/2016    Procedure: CYSTOSCOPY URETEROSCOPY LASER LITHOTRIPSY;  Surgeon: Rolf Stanley MD;  Location:  [facility] OR;  Service:    • CYSTOSCOPY W/ URETERAL STENT PLACEMENT Left 11/16/2016    Procedure: CYSTOSCOPY WITH LEFT DOUBLE J STENT INSERTION WITH LEFT EXTRACORPOREAL SHOCKWAVE LITHOTRIPSY;  Surgeon: Rolf Stanley MD;  Location:  [facility] OR;  Service:    • GALLBLADDER SURGERY     • KIDNEY STONE SURGERY     • OTHER SURGICAL HISTORY Right 1958    R/T TRAUMATIC INJURY AT WORK      Social History:  reports that he has quit smoking. he has never used smokeless tobacco. He reports that he does not drink alcohol or use drugs.    Family History: family history includes Breast cancer in his mother; Colon cancer in his maternal aunt.       Allergies:  Allergies   Allergen Reactions   • Aspirin      Other reaction(s): GI BLEEDING     Medications:  Prior to Admission medications    Medication Sig Start Date End Date Taking? Authorizing Provider   atorvastatin (LIPITOR) 80 MG tablet Take 80 mg by mouth Daily.    Jas Gorman MD   capsicum (ZOSTRIX) 0.075 % topical cream Apply 1 application topically 4 (Four) Times a Day. TO KNEE JOINTS FOR PAIN    Jas Gorman MD   carboxymethylcellulose (REFRESH PLUS) 0.5 % solution Administer 1 drop to both eyes 4 (Four) Times a Day As Needed for dry eyes (AS NEEDED DRY EYES).    Jas Gorman MD   Cholecalciferol (VITAMIN D PO) Take 2,000 Units by mouth Daily.    Jas Gorman MD   docusate sodium (COLACE) 100 MG capsule Take 100 mg by mouth 2 (Two) Times a Day.    Jas Gorman MD   finasteride (PROSCAR) 5 MG tablet Take 5 mg by mouth Daily.    Jas Gorman MD   galantamine ER (RAZADYNE ER) 8 MG 24 hr capsule Take 8 mg by mouth Daily With Breakfast.    Jas Gorman MD   HYDROcodone-acetaminophen (NORCO)   "MG per tablet Take 1 tablet by mouth 3 (Three) Times a Day As Needed for moderate pain (4-6). AS NEEDED FOR PAIN 12/5/16   Rolf Stanley MD   ibuprofen (ADVIL,MOTRIN) 600 MG tablet Take 600 mg by mouth 3 (Three) Times a Day.    Jas Gorman MD   loratadine (CLARITIN) 10 MG tablet Take 10 mg by mouth Daily.    Jas Gorman MD   mirtazapine (REMERON) 30 MG tablet Take 30 mg by mouth Every Night. TAKES 1/2 TAB    Jas Gorman MD   Multiple Vitamins-Minerals (MULTIVITAMIN ADULT PO) Take 1 tablet by mouth Daily.    Jas Gorman MD   polyethylene glycol (MIRALAX) packet Take 17 g by mouth Daily.    Jas Gorman MD   potassium chloride (KLOR-CON) 20 MEQ packet Take 20 mEq by mouth Daily. PT TAKING 1/2 20 MEQ TABLET    Jas Gorman MD   senna (SENOKOT) 8.6 MG tablet Take 1 tablet by mouth 2 (Two) Times a Day.    Jas Gorman MD   sertraline (ZOLOFT) 50 MG tablet Take 50 mg by mouth Daily. TAKES 1/2 TAB    Jas Gorman MD   tamsulosin (FLOMAX) 0.4 MG capsule 24 hr capsule Take 0.4 mg by mouth Daily. TAKES 2 CAPSULES    Jas Gorman MD   dexlansoprazole (DEXILANT) 60 MG capsule Take 60 mg by mouth Daily.  2/2/19  Jas Gorman MD     Objective     Vital Signs: BP 91/63   Pulse 113   Temp 96 °F (35.6 °C) (Rectal)   Resp 20   Ht 182.9 cm (72\")   Wt 52 kg (114 lb 11.2 oz)   SpO2 100%   BMI 15.56 kg/m²    Physical Exam   Constitutional:   Acutely ill-appearing 83-year-old  gentleman currently on ventilatory support and sedated.  He is underweight and appears cachectic.  His daughter and 1 of his friends are present with him.  Dr. Carias and I saw the patient together.  Seen and discussed with his nurse, Katherine.   HENT:   Head: Normocephalic and atraumatic.   Eyes: Conjunctivae are normal. Pupils are equal, round, and reactive to light.   Neck: Neck supple. No JVD present.   Endotracheal tube secure.   Cardiovascular: " "Normal rate, regular rhythm, normal heart sounds and intact distal pulses. Exam reveals no gallop and no friction rub.   No murmur heard.  Pulmonary/Chest:   Ventilated, coarse throughout.   Abdominal: Soft. Bowel sounds are normal. He exhibits no distension. There is no tenderness. There is no rebound.   Genitourinary:   Genitourinary Comments: We will Place Perez catheter.   Musculoskeletal: Normal range of motion. He exhibits no edema, tenderness or deformity.   Right femoral central venous catheter placed by Dr. Carias in the emergency department.  He has some drainage that is serous coming from his right upper extremity where he was bitten by dog a few months ago.  His daughter has been \"doctoring it.\"  This does not appear cellulitic and I do not see an abscess.   Neurological: He displays normal reflexes. No cranial nerve deficit. He exhibits normal muscle tone.   Sedate.   Skin: Skin is dry. Capillary refill takes 2 to 3 seconds. No rash noted. There is pallor.   Mottling, cool. Looks hypovolemic.     Results Reviewed:  Lab Results (last 24 hours)     Procedure Component Value Units Date/Time    Blood Gas, Arterial [[id number]]  (Abnormal) Collected:  02/02/19 1625    Specimen:  Arterial Blood Updated:  02/02/19 1634     Site Left Brachial     Amarjit's Test N/A     pH, Arterial 6.970 pH units      Comment: 85 Value below critical limit        pCO2, Arterial 28.7 mm Hg      Comment: 84 Value below reference range        pO2, Arterial 52.8 mm Hg      Comment: 85 Value below critical limit        HCO3, Arterial 6.6 mmol/L      Comment: 84 Value below reference range        Base Excess, Arterial -22.8 mmol/L      Comment: 84 Value below reference range        O2 Saturation, Arterial 67.9 %      Comment: 84 Value below reference range        Temperature 37.0 C      Barometric Pressure for Blood Gas 753 mmHg      Modality Ventilator     FIO2 100 %      Ventilator Mode AC     Set Tidal Volume 440     Set [facility] Resp " Rate 20.0     PEEP 5.0     Notified Who dr vera     Notified By [id number]     Notified Time 02/02/2019 16:37     Collected by [id number]     Comment: Meter: [id number]     :  [id number]       Blood Culture - Blood, Leg, Right [[id number]] Collected:  02/02/19 1453    Specimen:  Blood from Leg, Right Updated:  02/02/19 1602    Troponin [[id number]]  (Abnormal) Collected:  02/02/19 1453    Specimen:  Blood Updated:  02/02/19 1556     Troponin I 0.057 ng/mL     Blood Culture - Blood, Wrist, Left [[id number]] Collected:  02/02/19 1453    Specimen:  Blood from Wrist, Left Updated:  02/02/19 1547    Urinalysis With Culture If Indicated - Urine, Catheter In/Out [[id number]]  (Abnormal) Collected:  02/02/19 1509    Specimen:  Urine, Catheter In/Out Updated:  02/02/19 1545     Color, UA Dark Yellow     Appearance, UA Turbid     pH, UA 5.5     Specific Gravity, UA 1.021     Glucose, UA Negative     Ketones, UA Trace     Bilirubin, UA Small (1+)     Blood, UA Moderate (2+)     Protein,  mg/dL (2+)     Leuk Esterase, UA Large (3+)     Nitrite, UA Negative     Urobilinogen, UA 1.0 E.U./dL    Urinalysis, Microscopic Only - Urine, Catheter In/Out [[id number]]  (Abnormal) Collected:  02/02/19 1509    Specimen:  Urine, Catheter In/Out Updated:  02/02/19 1545     RBC, UA 3-5 /HPF      WBC, UA 31-50 /HPF      Bacteria, UA 3+ /HPF      Squamous Epithelial Cells, UA None Seen /HPF      Hyaline Casts, UA None Seen /LPF      Methodology Manual Light Microscopy    Urine Culture - Urine, Urine, Catheter In/Out [[id number]] Collected:  02/02/19 1509    Specimen:  Urine, Catheter In/Out Updated:  02/02/19 1545    CBC & Differential [[id number]] Collected:  02/02/19 1453    Specimen:  Blood Updated:  02/02/19 1533    Narrative:       The following orders were created for panel order CBC & Differential.  Procedure                               Abnormality         Status                     ---------                                -----------         ------                     Manual Differential[[id number]]          Abnormal            Final result               CBC Auto Differential[[id number]]        Abnormal            Final result                 Please view results for these tests on the individual orders.    CBC Auto Differential [[id number]]  (Abnormal) Collected:  02/02/19 1453    Specimen:  Blood Updated:  02/02/19 1533     WBC 21.92 10*3/mm3      RBC 5.02 10*6/mm3      Hemoglobin 14.5 g/dL      Hematocrit 48.2 %      MCV 96.0 fL      MCH 28.9 pg      MCHC 30.1 g/dL      RDW 15.6 %      RDW-SD 54.2 fl      MPV 11.6 fL      Platelets 267 10*3/mm3     Manual Differential [[id number]]  (Abnormal) Collected:  02/02/19 1453    Specimen:  Blood Updated:  02/02/19 1533     Neutrophil % 66.7 %      Lymphocyte % 18.2 %      Monocyte % 1.0 %      Bands %  14.1 %      Neutrophils Absolute 17.71 10*3/mm3      Lymphocytes Absolute 3.99 10*3/mm3      Monocytes Absolute 0.22 10*3/mm3      Poikilocytes Slight/1+     WBC Morphology Normal     Platelet Morphology Normal    Comprehensive Metabolic Panel [[id number]]  (Abnormal) Collected:  02/02/19 1453    Specimen:  Blood Updated:  02/02/19 1533     Glucose 202 mg/dL      BUN 21 mg/dL      Creatinine 1.57 mg/dL      Sodium 154 mmol/L      Potassium 4.2 mmol/L      Chloride 124 mmol/L      CO2 18.0 mmol/L      Calcium 8.4 mg/dL      Total Protein 6.0 g/dL      Albumin 3.10 g/dL      ALT (SGPT) 52 U/L      AST (SGOT) 39 U/L      Alkaline Phosphatase 85 U/L      Total Bilirubin 1.0 mg/dL      eGFR Non African Amer 42 mL/min/1.73      Globulin 2.9 gm/dL      A/G Ratio 1.1 g/dL      BUN/Creatinine Ratio 13.4     Anion Gap 12.0 mmol/L     Narrative:       The MDRD GFR formula is only valid for adults with stable renal function between ages 18 and 70.    Lactic Acid, Plasma [[id number]]  (Abnormal) Collected:  02/02/19 1453    Specimen:  Blood Updated:  02/02/19 1532     Lactate 8.2 mmol/L     Lactic Acid, Reflex  Timer (This will reflex a repeat order 3-3:15 hours after ordered.) [[id number]] Collected:  02/02/19 1453    Specimen:  Blood Updated:  02/02/19 1532    BNP [[id number]]  (Abnormal) Collected:  02/02/19 1453    Specimen:  Blood Updated:  02/02/19 1527     proBNP 1,810.0 pg/mL     Protime-INR [[id number]]  (Abnormal) Collected:  02/02/19 1453    Specimen:  Blood Updated:  02/02/19 1526     Protime 18.6 Seconds      INR 1.50    Blood Gas, Arterial With Co-Ox [[id number]]  (Abnormal) Collected:  02/02/19 1440    Specimen:  Arterial Blood Updated:  02/02/19 1455     Site Left Radial     Amarjit's Test Positive     pH, Arterial 6.986 pH units      Comment: 85 Value below critical limit        pCO2, Arterial 69.9 mm Hg      Comment: 86 Value above critical limit        pO2, Arterial 69.6 mm Hg      Comment: 84 Value below reference range        HCO3, Arterial 16.7 mmol/L      Comment: 84 Value below reference range        Base Excess, Arterial -16.1 mmol/L      Comment: 84 Value below reference range        O2 Saturation, Arterial 80.6 %      Comment: 84 Value below reference range        Hemoglobin, Blood Gas 15.5 g/dL      Hematocrit, Blood Gas 47.5 %      Oxyhemoglobin 79.3 %      Comment: 84 Value below reference range        Methemoglobin 1.00 %      Carboxyhemoglobin 0.6 %      A-a Gradiant -- mmHg      Comment: UNABLE TO CALCULATE        Temperature 37.0 C      Sodium, Arterial 161 mmol/L      Comment: 86 Value above critical limit        Potassium, Arterial 4.2 mmol/L      Barometric Pressure for Blood Gas 754 mmHg      Modality Ventilator     FIO2 100 %      Ventilator Mode AC     Set Tidal Volume 440     Set [facility] Resp Rate 14.0     PEEP 5.0     Note --     Notified Who dr vera     Notified By [id number]     Notified Time 02/02/2019 14:58     Collected by [id number]     Comment: Meter: [id number]     :  [id number]        pH, Temp Corrected -- pH Units      pCO2, Temperature Corrected -- mm Hg      pO2,  Temperature Corrected -- mm Hg         Imaging Results (last 24 hours)     Procedure Component Value Units Date/Time    XR Chest 1 View [[id number]] Collected:  02/02/19 1453     Updated:  02/02/19 1458    Narrative:       EXAMINATION:  XR CHEST 1 VW-  2/2/2019 2:49 PM CST     HISTORY: Patient was intubated.     COMPARISON: 12/5/2016.     FINDINGS:  The endotracheal tube tip is at the T4 level. There is  minimal linear infiltrate in the left lung base medially. There is  minimal patchy infiltrate in the right upper and right lower lung  fields. The heart is normal in size.       Impression:       1. Endotracheal tube tip is at the T4 level.  2. Patchy infiltrates bilaterally may represent minimal pneumonia or  atelectasis.        This report was finalized on 02/02/2019 14:55 by Dr. Dexter Ernandez MD.        I have personally reviewed and interpreted the radiology studies and ECG obtained at time of admission.     Assessment / Plan     Assessment:   Active Hospital Problems    Diagnosis   • **Acute respiratory failure with hypoxia (CMS/HCC)   • Severe sepsis (CMS/HCC)   • Shock, septic (CMS/HCC)   • Bilateral pneumonia   • Lactic acidosis   • Leukocytosis   • Sinus tachycardia   • Elevated troponin   • Acute renal failure (ARF) (CMS/HCC)   • Hypernatremia   • Acute UTI (urinary tract infection)     Plan:   The patient presents in extremis secondary to severe sepsis and septic shock from community-acquired pneumonia and a urinary tract infection.  He will be admitted to my service here at [facility] in the intensive care unit.    The ER has ordered a total of 4560 mL's of normal saline to be given thus far.  Levophed has been administered.  Dr. Carias also gave him 2 ampoules of sodium bicarbonate for his severe systemic acidosis.    He has been administered vancomycin and Zosyn.  We will continue Zosyn and ask pharmacy to continue to dose vancomycin.  Blood cultures have been drawn.  Obtain a sputum culture.   Check Legionella and streptococcal urinary antigens.  Swab for influenza.  He has a severe lactic acidosis which will be reassessed after sepsis bolus.    Consult pulmonary to assist in his critical illness and ventilatory support.  Routine vent orders.  Transition sedation to Diprivan rather than Versed.    He does have a slight troponin elevation.  We will trend this at present.  No cardiology consultation or further workup at present.  He may ultimately require an echocardiogram.    Check urine electrolytes.  Baseline renal ultrasound.  He does have a history of nephrolithiasis and has had to see Dr. Stanley for this in the past.    Hold all home oral medications at present.  Nothing on his home medication list is absolutely necessary in his care at this point in time.    Lovenox for DVT prophylaxis.  Pepcid for peptic ulcer prophylaxis while on ventilatory support.    Code Status: Full per his family.      I discussed the patient's findings and my recommendations with the patient's family and Dr. Carias in the ER.     The family has been made aware by myself and Dr. Carias that he has an extremely high risk of mortality at this point in time.  They understand, but still would like for us to exhaust all measures in his care at this point in time.    Estimated length of stay is indeterminate at present.     Mario Tran,    02/02/19   4:46 PM     Approximately 60 minutes of critical care time were spent managing the patient exclusive of billable procedures.    Dr. Carias and I just gave him another 2 amps of sodium bicarbonate based on his most recent gas.    Results from last 7 days   Lab Units 02/02/19  1625   PH, ARTERIAL pH units 6.970*   PO2 ART mm Hg 52.8*   PCO2, ARTERIAL mm Hg 28.7*   HCO3 ART mmol/L 6.6*     If he needs any additional fluid, this will need to be in the form of lactated Ringer's given his hypernatremia and how much crystalloid he has already gotten.    Mario Tran,   02/02/19  4:51  PM                    Electronically signed by Mario Tran DO at 2/2/2019  5:02 PM          Discharge Summary      Dillan Tapia DO at 2/6/2019  4:47 PM            [facility] Medicine Services  DEATH SUMMARY       Date of Admission: 2/2/2019  Date of Death:  2/6/2019 at [id number]  Primary Care Physician: Tamiko Vizcaino MD    Presenting Problem/History of Present Illness:  Respiratory distress [R06.03]     Final Death Diagnoses:  Active Hospital Problems    Diagnosis   • **Acute respiratory failure with hypoxia (CMS/HCC)   • Hypokalemia   • Hypophosphatemia   • Hypomagnesemia   • Severe sepsis (CMS/HCC)   • Shock, septic (CMS/HCC)   • Bilateral pneumonia   • Lactic acidosis   • Leukocytosis   • Sinus tachycardia   • Elevated troponin   • Acute renal failure (ARF) (CMS/HCC)   • Hypernatremia   • Acute UTI (urinary tract infection)       Cause of death:  Respiratory failure with hypoxia secondary to  Bilateral pneumonia    Contributing conditions:  Septic shock  Acute renal failure  Urinary tract infection    Consults:   Pulmonology:  PCCM Problems  Severe metabolic acidosis  Acute respiratory failure, vent  Shock, possibly septic  Acute kidney injury  Severe dehydration  Severe protein calorie malnutrition        Plan of Treatment  Severe metabolic acidosis of unknown etiology.  Chest x-ray does not look impressive.  Bowel ischemia must be suspected.  We will await the next lactate level and if persistent high will order CT of the abdomen.     Bicarbonate drip has been ordered by me.  Watch out for deterioration in renal function.  Acute kidney injury noted.  May require renal consult if his lactate levels remains high.     Acute respiratory failure noted.  Ventilator settings adjusted.  Permit large minute ventilation as very severe acidosis.     Shock therefore on pressors.  Partly due to volume depletion.  Possibly also septic.  Antibiotics as ordered by you.   We will get fluids as well.     Severe protein calorie malnutrition and general poor health.  Agree with nutrition consult.      Palliative care:  Impression/Problem List:     1.  Alzheimer's dementia-FAST 7F  2.  Acute respiratory failure with hypoxia  3.  Severe sepsis with septic shock  4.  Bilateral pneumonia  5.  Urinary tract infection  6.  Severe protein calorie malnutrition  7.  Dysphagia  8.  Debility-bedbound     Recommendations/Plan:  1. plan: Goals of care include To be determined.  Currently CODE STATUS CPR\full interventions.   -Planned family meeting with patient's daughter at 2 PM this afternoon.  @2 PM patient's daughter has elected to transition to comfort care.  Daughter is aware patient will has a terminal diagnosis of end-stage dementia and this hospitalization will likely result in the patient's death.  We briefly discussed discharge plans including home with hospice versus skilled nursing facility and further conversations will to be dependent on how patient progresses overnight.  Outcomes  Code Status After Consult: DNR/DNI  Number of Family Meetings: 1  Number of Days Until Referral Purpose Met/Improved: 1  Other Outcomes: code status clarified     2.  Comfort care  -Morphine concentrated solution sublingual as needed.  -Ativan concentrated solution 0.5 mg sublingual every 8 hours and as needed anxiety.     Thank you for this consult and allowing us to participate in patient's plan of care. Palliative Care Team will continue to follow patient.      Time spent: 105 minutes spent reviewing medical and medication records, assessing and examining patient, discussing with family, answering questions, providing some guidance about a plan and documentation of care, and coordinating care with other healthcare members, with > 50% time spent face to face.   60 minutes spent on advance care planning.     SONIYA Lomas      Pertinent Test Results:   Lab Results (last 7 days)     Procedure  Component Value Units Date/Time    Blood Culture - Blood, Leg, Right [[id number]] Collected:  02/02/19 1453    Specimen:  Blood from Leg, Right Updated:  02/06/19 1615     Blood Culture No growth at 4 days    Blood Culture - Blood, Wrist, Left [[id number]]  (Abnormal)  (Susceptibility) Collected:  02/02/19 1453    Specimen:  Blood from Wrist, Left Updated:  02/06/19 1034     Blood Culture Haemophilus influenzae     Isolated from Pediatric Bottle     BETA LACTAMASE Positive     Gram Stain Gram negative bacilli    Narrative:       No anaerobic bottle collected.     Susceptibility      Haemophilus influenzae     DISK DIFFUSION     Cefotaxime Susceptible     Ciprofloxacin Susceptible     Trimethoprim + Sulfamethoxazole Resistant                    POC Glucose Once [[id number]]  (Abnormal) Collected:  02/04/19 1157    Specimen:  Blood Updated:  02/04/19 1208     Glucose 136 mg/dL      Comment: : [id number] DEMANDITMeter ID: [id number]       POC Glucose Once [[id number]]  (Abnormal) Collected:  02/04/19 1133    Specimen:  Blood Updated:  02/04/19 1205     Glucose 52 mg/dL      Comment: : [id number] DEMANDITMeter ID: [id number]       POC Glucose Once [[id number]]  (Abnormal) Collected:  02/04/19 1112    Specimen:  Blood Updated:  02/04/19 1123     Glucose 54 mg/dL      Comment: : [id number] DEMANDITMeter ID: [id number]       Blood Culture ID, PCR - Blood, Wrist, Left [[id number]]  (Abnormal) Collected:  02/02/19 1453    Specimen:  Blood from Wrist, Left Updated:  02/04/19 0716     BCID, PCR Haemophilus influenzae. Identification by BCID PCR.    Urine Culture - Urine, Urine, Catheter In/Out [[id number]]  (Abnormal)  (Susceptibility) Collected:  02/02/19 1509    Specimen:  Urine, Catheter In/Out Updated:  02/04/19 0713     Urine Culture >100,000 CFU/mL Klebsiella pneumoniae ssp pneumoniae    Narrative:       Cefazolin results may be used to predict the potential effectiveness of oral cephalosporins  for treating uncomplicated urinary tract infections.    Susceptibility      Klebsiella pneumoniae ssp pneumoniae     KAYLIN     Ampicillin Resistant     Ampicillin + Sulbactam Susceptible     Cefazolin Susceptible     Cefepime Susceptible     Ceftriaxone Susceptible     Ertapenem Susceptible     ESBL Confirmation Test Negative     Gentamicin Susceptible     Levofloxacin Susceptible     Meropenem Susceptible     Nitrofurantoin Resistant     Piperacillin + Tazobactam Susceptible     Trimethoprim + Sulfamethoxazole Susceptible                    Blood Gas, Arterial [[id number]]  (Abnormal) Collected:  02/04/19 0418    Specimen:  Arterial Blood Updated:  02/04/19 0421     Site Left Radial     Amarjit's Test Positive     pH, Arterial 7.531 pH units      Comment: 83 Value above reference range        pCO2, Arterial 32.4 mm Hg      Comment: 84 Value below reference range        pO2, Arterial 95.0 mm Hg      HCO3, Arterial 27.1 mmol/L      Comment: 83 Value above reference range        Base Excess, Arterial 4.6 mmol/L      Comment: 83 Value above reference range        O2 Saturation, Arterial 97.9 %      Temperature 37.0 C      Barometric Pressure for Blood Gas 747 mmHg      Modality BiPap     FIO2 70 %      Ventilator Mode NA     IPAP 12     Comment: Meter: [id number]     :  [id number]        EPAP 5     Collected by [id number]    Comprehensive Metabolic Panel [[id number]]  (Abnormal) Collected:  02/04/19 0241    Specimen:  Blood Updated:  02/04/19 0316     Glucose 85 mg/dL      BUN 20 mg/dL      Creatinine 0.92 mg/dL      Sodium 151 mmol/L      Potassium 3.1 mmol/L      Chloride 121 mmol/L      CO2 24.0 mmol/L      Calcium 6.9 mg/dL      Total Protein 4.4 g/dL      Albumin 2.30 g/dL      ALT (SGPT) 41 U/L      AST (SGOT) 39 U/L      Alkaline Phosphatase 69 U/L      Total Bilirubin 1.1 mg/dL      eGFR Non African Amer 78 mL/min/1.73      Globulin 2.1 gm/dL      A/G Ratio 1.1 g/dL      BUN/Creatinine Ratio 21.7     Anion  Gap 6.0 mmol/L     Narrative:       The MDRD GFR formula is only valid for adults with stable renal function between ages 18 and 70.    Magnesium [[id number]]  (Normal) Collected:  02/04/19 0241    Specimen:  Blood Updated:  02/04/19 0316     Magnesium 1.9 mg/dL     Phosphorus [[id number]]  (Normal) Collected:  02/04/19 0241    Specimen:  Blood Updated:  02/04/19 0316     Phosphorus 3.0 mg/dL     Lactic Acid, Plasma [[id number]]  (Abnormal) Collected:  02/04/19 0241    Specimen:  Blood Updated:  02/04/19 0316     Lactate 3.4 mmol/L     Blood Gas, Arterial [[id number]]  (Abnormal) Collected:  02/04/19 0300    Specimen:  Arterial Blood Updated:  02/04/19 0309     Site Left Radial     Amarjit's Test Positive     pH, Arterial 7.543 pH units      Comment: 83 Value above reference range        pCO2, Arterial 31.5 mm Hg      Comment: 84 Value below reference range        pO2, Arterial 46.7 mm Hg      Comment: 85 Value below critical limit        HCO3, Arterial 27.1 mmol/L      Comment: 83 Value above reference range        Base Excess, Arterial 4.9 mmol/L      Comment: 83 Value above reference range        O2 Saturation, Arterial 85.8 %      Comment: 84 Value below reference range        Temperature 37.0 C      Barometric Pressure for Blood Gas 747 mmHg      Modality Nasal Cannula     Flow Rate 4.0 lpm      Ventilator Mode NA     Notified [facility] [id number]     Notified By [id number]     Notified Time 02/04/2019 03:09     Collected by [id number]     Comment: Meter: [id number]     :  [id number]       CBC (No Diff) [[id number]]  (Abnormal) Collected:  02/04/19 0241    Specimen:  Blood Updated:  02/04/19 0258     WBC 15.17 10*3/mm3      RBC 4.28 10*6/mm3      Hemoglobin 12.4 g/dL      Hematocrit 38.3 %      MCV 89.5 fL      MCH 29.0 pg      MCHC 32.4 g/dL      RDW 15.9 %      RDW-SD 51.2 fl      MPV 12.1 fL      Platelets 126 10*3/mm3     Lactic Acid, Plasma [[id number]]  (Abnormal) Collected:  02/03/19 2152    Specimen:  Blood Updated:   02/03/19 2229     Lactate 4.7 mmol/L     Lactic Acid, Plasma [[id number]]  (Abnormal) Collected:  02/03/19 1831    Specimen:  Blood Updated:  02/03/19 1850     Lactate 6.4 mmol/L     Basic Metabolic Panel [[id number]]  (Abnormal) Collected:  02/03/19 1450    Specimen:  Blood Updated:  02/03/19 1513     Glucose 106 mg/dL      BUN 23 mg/dL      Creatinine 1.09 mg/dL      Sodium 154 mmol/L      Potassium 3.3 mmol/L      Chloride 117 mmol/L      CO2 27.0 mmol/L      Calcium 7.3 mg/dL      eGFR Non African Amer 64 mL/min/1.73      BUN/Creatinine Ratio 21.1     Anion Gap 10.0 mmol/L     Narrative:       The MDRD GFR formula is only valid for adults with stable renal function between ages 18 and 70.    Magnesium [[id number]]  (Normal) Collected:  02/03/19 1450    Specimen:  Blood Updated:  02/03/19 1513     Magnesium 1.8 mg/dL     Lactic Acid, Plasma [[id number]]  (Abnormal) Collected:  02/03/19 1450    Specimen:  Blood Updated:  02/03/19 1512     Lactate 7.2 mmol/L     Blood Gas, Arterial [[id number]]  (Abnormal) Collected:  02/03/19 1313    Specimen:  Arterial Blood Updated:  02/03/19 1319     Site Left Radial     Amarjit's Test Positive     pH, Arterial 7.545 pH units      Comment: 83 Value above reference range        pCO2, Arterial 29.7 mm Hg      Comment: 84 Value below reference range        pO2, Arterial 63.8 mm Hg      Comment: 84 Value below reference range        HCO3, Arterial 25.6 mmol/L      Base Excess, Arterial 3.8 mmol/L      Comment: 83 Value above reference range        O2 Saturation, Arterial 94.5 %      Temperature 37.0 C      Barometric Pressure for Blood Gas 749 mmHg      Modality Ventilator     FIO2 30 %      Ventilator Mode PSV     PEEP 5.0     PSV 5.0 cmH2O      Collected by [id number]     Comment: Meter: [id number]     :  [id number]       Procalcitonin [[id number]]  (Abnormal) Collected:  02/03/19 1050    Specimen:  Blood Updated:  02/03/19 1240     Procalcitonin 7.89 ng/mL     Narrative:        SIRS, sepsis, severe sepsis, and septic shock are categorized according to the criteria of the consensus conference of the American College of Chest Physicians/Society of Critical Care Medicine.    PCT < 0.5 ng/mL     Systemic infection (sepsis) is not likely.    PCT >0.5 and < 2.0 ng/mL Systemic infection (sepsis) is possible, but other conditions are known to elevate PCT as well.    PCT > 2.0 ng/mL     Systemic infection (sepsis) is likely, unless other causes are known.      PCT > 10.0 ng/mL    Important systemic inflammatory response, almost exclusively due to severe bacterial sepsis or septic shock.    PCT values of < 0.5 ng/mL do not exclude an infection, because localized infections (without systemic signs) may be associated with such low concentrations, or a systemic infection in its initial stages (<6 hours).  Increased PCT can occur without infection.  PCT concentrations between 0.5 and 2.0 ng/mL should be interpreted taking into account the patients history.  It is recommended to retest PCT within 6-24 hours if any concentrations < 2.0 ng/mL are obtained.    Lactic Acid, Plasma [[id number]]  (Abnormal) Collected:  02/03/19 1050    Specimen:  Blood Updated:  02/03/19 1126     Lactate 5.7 mmol/L     Magnesium [[id number]]  (Normal) Collected:  02/03/19 0532    Specimen:  Blood Updated:  02/03/19 0658     Magnesium 1.4 mg/dL     CBC & Differential [[id number]] Collected:  02/03/19 0620    Specimen:  Blood Updated:  02/03/19 0653    Narrative:       The following orders were created for panel order CBC & Differential.  Procedure                               Abnormality         Status                     ---------                               -----------         ------                     Manual Differential[[id number]]          Abnormal            Final result               CBC Auto Differential[[id number]]        Abnormal            Final result                 Please view results for these tests on the  individual orders.    CBC Auto Differential [[id number]]  (Abnormal) Collected:  02/03/19 0620    Specimen:  Blood Updated:  02/03/19 0653     WBC 9.61 10*3/mm3      RBC 4.26 10*6/mm3      Hemoglobin 12.2 g/dL      Hematocrit 38.2 %      MCV 89.7 fL      MCH 28.6 pg      MCHC 31.9 g/dL      RDW 15.8 %      RDW-SD 51.0 fl      MPV 11.8 fL      Platelets 153 10*3/mm3     Manual Differential [[id number]]  (Abnormal) Collected:  02/03/19 0620    Specimen:  Blood Updated:  02/03/19 0653     Neutrophil % 58.6 %      Lymphocyte % 6.1 %      Monocyte % 3.0 %      Bands %  24.2 %      Metamyelocyte % 3.0 %      Myelocyte % 2.0 %      Atypical Lymphocyte % 3.0 %      Neutrophils Absolute 7.96 10*3/mm3      Lymphocytes Absolute 0.59 10*3/mm3      Monocytes Absolute 0.29 10*3/mm3      Crenated RBC's Mod/2+     Poikilocytes Slight/1+     Polychromasia Slight/1+     WBC Morphology Normal     Platelet Morphology Normal    Lactic Acid, Plasma [[id number]]  (Abnormal) Collected:  02/03/19 0532    Specimen:  Blood Updated:  02/03/19 0610     Lactate 6.0 mmol/L     Comprehensive Metabolic Panel [[id number]]  (Abnormal) Collected:  02/03/19 0532    Specimen:  Blood Updated:  02/03/19 0610     Glucose 180 mg/dL      BUN 22 mg/dL      Creatinine 1.15 mg/dL      Sodium 153 mmol/L      Potassium 2.8 mmol/L      Chloride 119 mmol/L      CO2 27.0 mmol/L      Calcium 7.0 mg/dL      Total Protein 4.6 g/dL      Albumin 2.30 g/dL      ALT (SGPT) 45 U/L      AST (SGOT) 41 U/L      Alkaline Phosphatase 51 U/L      Total Bilirubin 1.1 mg/dL      eGFR Non African Amer 61 mL/min/1.73      Globulin 2.3 gm/dL      A/G Ratio 1.0 g/dL      BUN/Creatinine Ratio 19.1     Anion Gap 7.0 mmol/L     Narrative:       The MDRD GFR formula is only valid for adults with stable renal function between ages 18 and 70.    CBC & Differential [[id number]] Collected:  02/03/19 0430    Specimen:  Blood Updated:  02/03/19 0551    Narrative:       The following orders were  created for panel order CBC & Differential.  Procedure                               Abnormality         Status                     ---------                               -----------         ------                     Manual Differential[[id number]]          Abnormal            Final result               CBC Auto Differential[[id number]]        Abnormal            Final result                 Please view results for these tests on the individual orders.    CBC Auto Differential [[id number]]  (Abnormal) Collected:  02/03/19 0430    Specimen:  Blood Updated:  02/03/19 0551     WBC 8.28 10*3/mm3      RBC 4.23 10*6/mm3      Hemoglobin 12.5 g/dL      Hematocrit 38.5 %      MCV 91.0 fL      MCH 29.6 pg      MCHC 32.5 g/dL      RDW 15.9 %      RDW-SD 51.6 fl      MPV 11.6 fL      Platelets 159 10*3/mm3     Manual Differential [[id number]]  (Abnormal) Collected:  02/03/19 0430    Specimen:  Blood Updated:  02/03/19 0551     Neutrophil % 59.0 %      Lymphocyte % 6.0 %      Monocyte % 1.0 %      Bands %  23.0 %      Metamyelocyte % 5.0 %      Atypical Lymphocyte % 6.0 %      Neutrophils Absolute 6.79 10*3/mm3      Lymphocytes Absolute 0.50 10*3/mm3      Monocytes Absolute 0.08 10*3/mm3      Crenated RBC's Slight/1+     Poikilocytes Slight/1+     Polychromasia Slight/1+     WBC Morphology Normal     Clumped Platelets Present    Troponin [[id number]]  (Abnormal) Collected:  02/03/19 0430    Specimen:  Blood Updated:  02/03/19 0536     Troponin I 0.413 ng/mL     Magnesium [[id number]]  (Abnormal) Collected:  02/03/19 0430    Specimen:  Blood Updated:  02/03/19 0524     Magnesium 1.3 mg/dL     Phosphorus [[id number]]  (Abnormal) Collected:  02/03/19 0430    Specimen:  Blood Updated:  02/03/19 0524     Phosphorus 2.1 mg/dL     Protime-INR [[id number]]  (Abnormal) Collected:  02/03/19 0430    Specimen:  Blood Updated:  02/03/19 0458     Protime 22.5 Seconds      INR 1.90    aPTT [[id number]]  (Abnormal) Collected:  02/03/19 0430     Specimen:  Blood Updated:  02/03/19 0458     PTT 52.2 seconds     Blood Gas, Arterial [[id number]]  (Abnormal) Collected:  02/03/19 0257    Specimen:  Arterial Blood Updated:  02/03/19 0257     Site Right Brachial     Amarjit's Test N/A     pH, Arterial 7.518 pH units      Comment: 83 Value above reference range        pCO2, Arterial 30.4 mm Hg      Comment: 84 Value below reference range        pO2, Arterial 93.9 mm Hg      HCO3, Arterial 24.7 mmol/L      Base Excess, Arterial 2.5 mmol/L      Comment: 83 Value above reference range        O2 Saturation, Arterial 98.3 %      Temperature 37.0 C      Barometric Pressure for Blood Gas 751 mmHg      Modality Ventilator     FIO2 60 %      Ventilator Mode PC     Set Mech Resp Rate 12.0     PEEP 5.0     PIP 20 cmH2O      Comment: Meter: [id number]     :  [id number]        Collected by [id number]    Lactic Acid, Plasma [[id number]]  (Abnormal) Collected:  02/02/19 2143    Specimen:  Blood Updated:  02/02/19 2257     Lactate 7.9 mmol/L     Troponin [[id number]]  (Abnormal) Collected:  02/02/19 2143    Specimen:  Blood Updated:  02/02/19 2212     Troponin I 0.093 ng/mL     Lactic Acid, Reflex [[id number]]  (Abnormal) Collected:  02/02/19 1908    Specimen:  Blood Updated:  02/02/19 1942     Lactate 9.0 mmol/L     Blood Gas, Arterial [[id number]]  (Abnormal) Collected:  02/02/19 1920    Specimen:  Arterial Blood Updated:  02/02/19 1927     Site Right Brachial     Amarjit's Test N/A     pH, Arterial 7.324 pH units      Comment: 84 Value below reference range        pCO2, Arterial 29.6 mm Hg      Comment: 84 Value below reference range        pO2, Arterial 71.0 mm Hg      Comment: 84 Value below reference range        HCO3, Arterial 15.4 mmol/L      Comment: 84 Value below reference range        Base Excess, Arterial -9.3 mmol/L      Comment: 84 Value below reference range        O2 Saturation, Arterial 93.4 %      Comment: 84 Value below reference range        Temperature 37.0 C       Barometric Pressure for Blood Gas 752 mmHg      Modality Ventilator     FIO2 60 %      Ventilator Mode AC     Set Tidal Volume 500     Set Mech Resp Rate 12.0     PEEP 5.0     Collected by [id number]     Comment: Meter: [id number]     :  [id number]       Lactic Acid, Reflex Timer (This will reflex a repeat order 3-3:15 hours after ordered.) [[id number]] Collected:  02/02/19 1453    Specimen:  Blood Updated:  02/02/19 1845     Extra Tube Hold for add-ons.     Comment: Auto resulted.       S. Pneumo Ag Urine or CSF - Urine, Urine, Catheter [[id number]]  (Normal) Collected:  02/02/19 1803    Specimen:  Urine, Catheter Updated:  02/02/19 1842     Strep Pneumo Ag Negative    Legionella Antigen, Urine - Urine, Urine, Catheter [[id number]]  (Normal) Collected:  02/02/19 1803    Specimen:  Urine, Catheter Updated:  02/02/19 1842     LEGIONELLA ANTIGEN, URINE Negative    Influenza Antigen, Rapid - Swab, Nasopharynx [[id number]]  (Normal) Collected:  02/02/19 1757    Specimen:  Swab from Nasopharynx Updated:  02/02/19 1834     Influenza A Ag, EIA Negative     Influenza B Ag, EIA Negative    Narrative:       Recommend confirmation of negative results by viral culture or molecular assay.    Blood Gas, Arterial [[id number]]  (Abnormal) Collected:  02/02/19 1625    Specimen:  Arterial Blood Updated:  02/02/19 1634     Site Left Brachial     Amarjit's Test N/A     pH, Arterial 6.970 pH units      Comment: 85 Value below critical limit        pCO2, Arterial 28.7 mm Hg      Comment: 84 Value below reference range        pO2, Arterial 52.8 mm Hg      Comment: 85 Value below critical limit        HCO3, Arterial 6.6 mmol/L      Comment: 84 Value below reference range        Base Excess, Arterial -22.8 mmol/L      Comment: 84 Value below reference range        O2 Saturation, Arterial 67.9 %      Comment: 84 Value below reference range        Temperature 37.0 C      Barometric Pressure for Blood Gas 753 mmHg      Modality Ventilator      FIO2 100 %      Ventilator Mode AC     Set Tidal Volume 440     Set [facility] Resp Rate 20.0     PEEP 5.0     Notified Who dr vera     Notified By [id number]     Notified Time 02/02/2019 16:37     Collected by [id number]     Comment: Meter: [id number]     :  [id number]       Troponin [[id number]]  (Abnormal) Collected:  02/02/19 1453    Specimen:  Blood Updated:  02/02/19 1556     Troponin I 0.057 ng/mL     Urinalysis With Culture If Indicated - Urine, Catheter In/Out [[id number]]  (Abnormal) Collected:  02/02/19 1509    Specimen:  Urine, Catheter In/Out Updated:  02/02/19 1545     Color, UA Dark Yellow     Appearance, UA Turbid     pH, UA 5.5     Specific Gravity, UA 1.021     Glucose, UA Negative     Ketones, UA Trace     Bilirubin, UA Small (1+)     Blood, UA Moderate (2+)     Protein,  mg/dL (2+)     Leuk Esterase, UA Large (3+)     Nitrite, UA Negative     Urobilinogen, UA 1.0 E.U./dL    Urinalysis, Microscopic Only - Urine, Catheter In/Out [[id number]]  (Abnormal) Collected:  02/02/19 1509    Specimen:  Urine, Catheter In/Out Updated:  02/02/19 1545     RBC, UA 3-5 /HPF      WBC, UA 31-50 /HPF      Bacteria, UA 3+ /HPF      Squamous Epithelial Cells, UA None Seen /HPF      Hyaline Casts, UA None Seen /LPF      Methodology Manual Light Microscopy    CBC & Differential [[id number]] Collected:  02/02/19 1453    Specimen:  Blood Updated:  02/02/19 1533    Narrative:       The following orders were created for panel order CBC & Differential.  Procedure                               Abnormality         Status                     ---------                               -----------         ------                     Manual Differential[[id number]]          Abnormal            Final result               CBC Auto Differential[[id number]]        Abnormal            Final result                 Please view results for these tests on the individual orders.    CBC Auto Differential [[id number]]  (Abnormal) Collected:   02/02/19 1453    Specimen:  Blood Updated:  02/02/19 1533     WBC 21.92 10*3/mm3      RBC 5.02 10*6/mm3      Hemoglobin 14.5 g/dL      Hematocrit 48.2 %      MCV 96.0 fL      MCH 28.9 pg      MCHC 30.1 g/dL      RDW 15.6 %      RDW-SD 54.2 fl      MPV 11.6 fL      Platelets 267 10*3/mm3     Manual Differential [[id number]]  (Abnormal) Collected:  02/02/19 1453    Specimen:  Blood Updated:  02/02/19 1533     Neutrophil % 66.7 %      Lymphocyte % 18.2 %      Monocyte % 1.0 %      Bands %  14.1 %      Neutrophils Absolute 17.71 10*3/mm3      Lymphocytes Absolute 3.99 10*3/mm3      Monocytes Absolute 0.22 10*3/mm3      Poikilocytes Slight/1+     WBC Morphology Normal     Platelet Morphology Normal    Comprehensive Metabolic Panel [[id number]]  (Abnormal) Collected:  02/02/19 1453    Specimen:  Blood Updated:  02/02/19 1533     Glucose 202 mg/dL      BUN 21 mg/dL      Creatinine 1.57 mg/dL      Sodium 154 mmol/L      Potassium 4.2 mmol/L      Chloride 124 mmol/L      CO2 18.0 mmol/L      Calcium 8.4 mg/dL      Total Protein 6.0 g/dL      Albumin 3.10 g/dL      ALT (SGPT) 52 U/L      AST (SGOT) 39 U/L      Alkaline Phosphatase 85 U/L      Total Bilirubin 1.0 mg/dL      eGFR Non African Amer 42 mL/min/1.73      Globulin 2.9 gm/dL      A/G Ratio 1.1 g/dL      BUN/Creatinine Ratio 13.4     Anion Gap 12.0 mmol/L     Narrative:       The MDRD GFR formula is only valid for adults with stable renal function between ages 18 and 70.    Lactic Acid, Plasma [[id number]]  (Abnormal) Collected:  02/02/19 1453    Specimen:  Blood Updated:  02/02/19 1532     Lactate 8.2 mmol/L     BNP [[id number]]  (Abnormal) Collected:  02/02/19 1453    Specimen:  Blood Updated:  02/02/19 1527     proBNP 1,810.0 pg/mL     Protime-INR [[id number]]  (Abnormal) Collected:  02/02/19 1453    Specimen:  Blood Updated:  02/02/19 1526     Protime 18.6 Seconds      INR 1.50    Blood Gas, Arterial With Co-Ox [[id number]]  (Abnormal) Collected:  02/02/19 1440     Specimen:  Arterial Blood Updated:  02/02/19 1455     Site Left Radial     Amarjit's Test Positive     pH, Arterial 6.986 pH units      Comment: 85 Value below critical limit        pCO2, Arterial 69.9 mm Hg      Comment: 86 Value above critical limit        pO2, Arterial 69.6 mm Hg      Comment: 84 Value below reference range        HCO3, Arterial 16.7 mmol/L      Comment: 84 Value below reference range        Base Excess, Arterial -16.1 mmol/L      Comment: 84 Value below reference range        O2 Saturation, Arterial 80.6 %      Comment: 84 Value below reference range        Hemoglobin, Blood Gas 15.5 g/dL      Hematocrit, Blood Gas 47.5 %      Oxyhemoglobin 79.3 %      Comment: 84 Value below reference range        Methemoglobin 1.00 %      Carboxyhemoglobin 0.6 %      A-a Gradiant -- mmHg      Comment: UNABLE TO CALCULATE        Temperature 37.0 C      Sodium, Arterial 161 mmol/L      Comment: 86 Value above critical limit        Potassium, Arterial 4.2 mmol/L      Barometric Pressure for Blood Gas 754 mmHg      Modality Ventilator     FIO2 100 %      Ventilator Mode AC     Set Tidal Volume 440     Set Mech Resp Rate 14.0     PEEP 5.0     Note --     Notified Who dr vera     Notified By [id number]     Notified Time 02/02/2019 14:58     Collected by [id number]     Comment: Meter: [id number]     :  [id number]        pH, Temp Corrected -- pH Units      pCO2, Temperature Corrected -- mm Hg      pO2, Temperature Corrected -- mm Hg         Imaging Results (last 7 days)     Procedure Component Value Units Date/Time    XR Chest 1 View [[id number]] Collected:  02/04/19 1523     Updated:  02/04/19 1527    Narrative:       EXAMINATION:   XR CHEST 1 VW-  2/4/2019 3:23 PM CST     HISTORY: PIC catheter     Frontal upright radiograph of the chest 2/4/2019 12:48 PM CST     COMPARISON: February 3, 2019.     FINDINGS:   Interstitial infiltrates now present in the right lung base. Left  diaphragms indistinct. This most likely  represents atelectasis left  lower lobe.. Cardiac silhouette is normal. Left PIC catheters present a  cardiac monitoring lead projects over the distal tip of the PICC  catheter but appears satisfactorily position..      The osseous structures and surrounding soft tissues demonstrate no acute  abnormality.       Impression:       1. New interstitial infiltrate right lung base. This may be atelectasis  or possibly an inflammatory process.  2. Left diaphragm is indistinct this most likely represents atelectasis  left lower lobe.        This report was finalized on 02/04/2019 15:24 by Dr. Julius Truong MD.    XR Chest 1 View [[id number]] Collected:  02/04/19 0718     Updated:  02/04/19 0723    Narrative:       EXAMINATION:  XR CHEST 1 VW-  2/4/2019 4:37 AM CST     HISTORY: R06.03-Acute respiratory distress; A41.9-Sepsis, unspecified  organism; N17.9-Acute kidney failure, unspecified; E87.2-Acidosis     COMPARISON: 2/3/2019.     FINDINGS:  The patient is now extubated. There are new bilateral  infiltrates. There is dense consolidation at the left lung base. The  inspiration is not as deep on the current study. Heart size is upper  limits of normal. There is mild blunting of the costophrenic angles  bilaterally.       Impression:       1. Hypoventilation. Patient extubated.  2. Dense consolidation in the left lung base consistent with known  pneumonia.  3. Worsening bilateral perihilar infiltrates with indistinct vessels  centrally. This is concerning for pulmonary edema.  4. Mild blunting of the costophrenic angles. There may be small pleural  effusions.        This report was finalized on 02/04/2019 07:20 by Dr. Dexter Ernandez MD.    XR Chest 1 View [[id number]] Collected:  02/03/19 0823     Updated:  02/03/19 0827    Narrative:       EXAMINATION:  XR CHEST 1 VW-  2/3/2019 3:30 AM CST     HISTORY: Patient intubated. Infiltrates.     COMPARISON: 2/2/2019.     FINDINGS:  There is now worsening consolidation at the left  lung base.  There is patchy infiltrate in the right perihilar region and right lung  base. The heart is normal in size. Endotracheal tube tip is at the T4  level.       Impression:       1. Worsening infiltrate at the left lung base. Probable pneumonia.  2. Patchy infiltrates in the right perihilar region and right lung base  are not significantly changed.        This report was finalized on 02/03/2019 08:23 by Dr. Dexter Ernandez MD.    CT Abdomen Pelvis Without Contrast [[id number]] Collected:  02/02/19 2126     Updated:  02/02/19 2132    Narrative:       EXAMINATION: CT ABDOMEN PELVIS WO CONTRAST-      2/2/2019 9:00 PM CST     HISTORY: Abd pain, gastroenteritis or colitis suspected; R06.03-Acute  respiratory distress; A41.9-Sepsis, unspecified organism; N17.9-Acute  kidney failure, unspecified; E87.2-Acidosis     In order to have a CT radiation dose as low as reasonably achievable  Automated Exposure Control was utilized for adjustment of the mA and/or  KV according to patient size.     DLP in mGycm= 146.     Large hiatal hernia with partially intrathoracic stomach.     Dependent bibasilar pneumonia.  Normal heart size.     Cholecystectomy clips.  Normal noncontrast appearance of the liver and spleen.  The pancreas shows no abnormality.     There is a 4.7 cm cyst at the upper right kidney. The kidneys show small  nonobstructing stones. There is no hydronephrosis and no ureteral stone  is seen.     Rectal fecal impaction is present.     There is no bowel obstruction or free fluid.     No aortic aneurysm.     Summary:  1. Intrathoracic stomach.  2. Bibasilar pneumonia.  3. Rectal fecal impaction.  4. No mass or abscess.                                   This report was finalized on 02/02/2019 21:28 by Dr. Salvador Perez MD.    CT Head Without Contrast [[id number]] Collected:  02/02/19 2125     Updated:  02/02/19 2129    Narrative:       EXAMINATION: CT HEAD WO CONTRAST-      2/2/2019 9:00 PM CST     HISTORY: pupils  uneven; R06.03-Acute respiratory distress; A41.9-Sepsis,  unspecified organism; N17.9-Acute kidney failure, unspecified;  E87.2-Acidosis     In order to have a CT radiation dose as low as reasonably achievable  Automated Exposure Control was utilized for adjustment of the mA and/or  KV according to patient size.     DLP in mGycm= 743.     Atrophy and small vessel disease.  No hemorrhage or mass effect.  No infarct is seen.     There is some patchy mucosal thickening within the left frontal sinus  and within left ethmoid air cells. Patchy mucosal thickening also seen  within the left side of the sphenoid sinus.     Mastoid air cells are clear.     No skull abnormality is seen.     Summary:  1. Age-related changes with atrophy and small vessel disease.  2. Sinus inflammatory disease.  3. No acute intracranial abnormality.                                   This report was finalized on 02/02/2019 21:26 by Dr. Salvador Perez MD.    US Renal Bilateral [[id number]] Collected:  02/02/19 1844     Updated:  02/02/19 1848    Narrative:       EXAMINATION: US RENAL BILATERAL-     2/2/2019 6:12 PM CST     HISTORY: acute renal failure, history of nephrolithiasis; R06.03-Acute  respiratory distress; A41.9-Sepsis, unspecified organism; N17.9-Acute  kidney failure, unspecified; E87.2-Acidosis.     Grayscale and color flow ultrasound evaluation of the kidneys compared  to 1/17/2017.     Left kidney = 9.9 x 5.9 x 4.8 cm.     Right kidney = 11.7 x 5.6 x 5.6 cm.     Nonobstructing calcification within the midportion of the left kidney.     Lower pole right renal cyst measures 1.1 x 0.9 cm.  Upper pole right renal cyst measures 4.8 x 5.4 cm.  Nonobstructing lower pole right renal calcification.     No hydronephrosis.     Summary:  1. Renal cysts with no sign of obstruction.  This report was finalized on 02/02/2019 18:45 by Dr. Salvador Perez MD.    XR Chest 1 View [[id number]] Collected:  02/02/19 1453     Updated:  02/02/19 1458    Narrative:        EXAMINATION:  XR CHEST 1 VW-  2/2/2019 2:49 PM CST     HISTORY: Patient was intubated.     COMPARISON: 12/5/2016.     FINDINGS:  The endotracheal tube tip is at the T4 level. There is  minimal linear infiltrate in the left lung base medially. There is  minimal patchy infiltrate in the right upper and right lower lung  fields. The heart is normal in size.       Impression:       1. Endotracheal tube tip is at the T4 level.  2. Patchy infiltrates bilaterally may represent minimal pneumonia or  atelectasis.        This report was finalized on 02/02/2019 14:55 by Dr. Dexter Ernandez MD.            Hospital Course:  This is an 83-year-old  gentleman who presents by EMS from his home.  He lives in [place], [place].  He was found to be in severe respiratory distress once they arrived at his home.  He required intubation there.  He was tachycardic and hypotensive in the field.  He received fluids and Levophed.     In the emergency department, he has had a central line placed.  He has been bolused considerable fluid and has also been given sodium bicarbonate.  He remains with a severe acidosis.     His daughter provides the history.  She tells me that he was admitted a few weeks ago to [facility] in [place], [place].  He was apparently treated for pneumonia at that point in time.  He was then discharged for rehab at [facility] and did not do very well.  She ultimately elected to take him home with home health and he has been back at home with her for around 10-12 days.     She states that over the last couple days he has been more lethargic.  He has seemed more short of breath.  He has not been eating well.  She states that she believes him to also have some difficulty with swallowing and does not recall them checking this out when he was in the hospital recently.  She is unaware of any fevers, sweats, or chills.  She denies that he has been having problems with nausea or vomiting or  loose stool.  She states that he has not been complaining of any pain.     He became more acutely short of breath this morning causing her to request EMS.    Plan:   The patient presents in extremis secondary to severe sepsis and septic shock from community-acquired pneumonia and a urinary tract infection.  He will be admitted to my service here at [facility] in the intensive care unit.     The ER has ordered a total of 4560 mL's of normal saline to be given thus far.  Levophed has been administered.  Dr. Carias also gave him 2 ampoules of sodium bicarbonate for his severe systemic acidosis.     He has been administered vancomycin and Zosyn.  We will continue Zosyn and ask pharmacy to continue to dose vancomycin.  Blood cultures have been drawn.  Obtain a sputum culture.  Check Legionella and streptococcal urinary antigens.  Swab for influenza.  He has a severe lactic acidosis which will be reassessed after sepsis bolus.     Consult pulmonary to assist in his critical illness and ventilatory support.  Routine vent orders.  Transition sedation to Diprivan rather than Versed.     He does have a slight troponin elevation.  We will trend this at present.  No cardiology consultation or further workup at present.  He may ultimately require an echocardiogram.     Check urine electrolytes.  Baseline renal ultrasound.  He does have a history of nephrolithiasis and has had to see Dr. Stanley for this in the past.     Hold all home oral medications at present.  Nothing on his home medication list is absolutely necessary in his care at this point in time.     Lovenox for DVT prophylaxis.  Pepcid for peptic ulcer prophylaxis while on ventilatory support.     Code Status: Full per his family.      I discussed the patient's findings and my recommendations with the patient's family and Dr. Carias in the ER.      The family has been made aware by myself and Dr. Carias that he has an extremely high risk of mortality at this  point in time.  They understand, but still would like for us to exhaust all measures in his care at this point in time.    Admission the patient's septic shock has improved significantly overnight.  Renal function was improved and lactate was declined.  Urine output was adequate.  He remained on broad-spectrum IV Lasix and IV fluids.  CT scan of the abdomen and pelvis was performed on the night after admission showing intrathoracic stomach and bibasilar pneumonia.  The patient was subsequently extubated successfully and is tolerating nasal cannula but transition to BiPAP.  Blood cultures revealed Haemophilus and urine cultures revealed Klebsiella, both sensitive to Rocephin. Vancomycin and Zosyn were discontinued and Rocephin was initiated.  He was mildly hypotensive and saline boluses were given to maintain blood pressure.  Central femoral venous line was discontinued on day 3 and a PICC line was inserted.  Palliative care was consulted and after speaking with the patient's daughter, she elected to transition the patient to comfort care.  She was aware that the patient has a terminal diagnosis and this hospitalization will likely result in his death.  Patient continued on comfort measures throughout the remainder of his hospital stay comfortable with sublingual and morphine.  He subsequently passed away at 1545 hrs. on 2/6/19.            Dillan Tapia DO  02/06/19  4:47 PM    Time: Over 30        Electronically signed by Dillan Tapia DO at 2/6/2019  4:58 PM

## 2019-02-13 ENCOUNTER — TELEPHONE (OUTPATIENT)
Dept: NEUROLOGY | Facility: CLINIC | Age: 54
End: 2019-02-13

## 2019-02-13 ENCOUNTER — OFFICE VISIT (OUTPATIENT)
Dept: NEUROLOGY | Facility: CLINIC | Age: 54
End: 2019-02-13
Payer: COMMERCIAL

## 2019-02-13 VITALS
RESPIRATION RATE: 12 BRPM | BODY MASS INDEX: 28.55 KG/M2 | HEIGHT: 61 IN | WEIGHT: 151.2 LBS | DIASTOLIC BLOOD PRESSURE: 68 MMHG | SYSTOLIC BLOOD PRESSURE: 106 MMHG | HEART RATE: 79 BPM

## 2019-02-13 DIAGNOSIS — Z86.73 HISTORY OF ISCHEMIC RIGHT MCA STROKE: ICD-10-CM

## 2019-02-13 DIAGNOSIS — G44.89 OTHER HEADACHE SYNDROME: Primary | ICD-10-CM

## 2019-02-13 DIAGNOSIS — H93.231 HEARING ABNORMALLY ACUTE, RIGHT: ICD-10-CM

## 2019-02-13 PROCEDURE — 99214 OFFICE O/P EST MOD 30 MIN: CPT | Performed by: PHYSICAL MEDICINE & REHABILITATION

## 2019-02-13 NOTE — PROGRESS NOTES
Physical Medicine & Rehabilitation Follow-up Note  Graeme Mason 46 y o  female      ASSESSMENT/PLAN:     Jazmyne Barnes was seen today for follow-up post right MCA stroke and sequelae sustained in April of 2018  Residual functional deficits include mild visual deficits  Patient was last seen by me on 12/12/18  Since her last visit, patient has completed all OT and SLP therapies  She is continued in vision therapy and she also continues to see her eye doctor Dr Nola Mercado for her visual deficits  She denies headache today  She overall feels well  She at times feels like she has trouble concentrating  She would like to return to work eventually, but would like her vision to be better  Patient did complete a fitness to drive evaluation, and had a very good result to clear her for driving  However due to her persistent visual deficits, I have advised her to be officially cleared by her eye doctor, Dr Nola Mercado, who is managing her visual deficits  Dr Nola Mercado will also determine when patient is ready to return to work  From a rehabilitation standpoint, patient has completed all of her therapies and is doing very well  Her neurologic examination today is within normal limits  From my standpoint, patient should be cleared when her visual deficits have improved to part-time work at first and then to full time work thereafter  I am happy to assist in the paperwork if needed  Medical update:  Patient had difficulty getting an appointment with the [facility] for evaluation of her lower back pain  She was referred by her primary care physician to see Dr Sanchez Hernandez at [facility] for headaches as well as her back pain  She reports she was recently placed on Amitryptyline and  Maxalt for headaches  She is also considering medical marijuana for her chronic back pain           Diagnoses and all orders for this visit:    History of ischemic right MCA stroke      *I have spent 30 minutes with Patient and family today in which greater than 50% of this time was spent in counseling/coordination of care      HPI:   Uma Daigle 47 y o  female right handed, with  has a past medical history of Acute pain of right knee; Anemia; Anxiety; Cervical disc disorder with radiculopathy; Chronic pain; Constipation; CVA (cerebral vascular accident) ([facility] [address] ); H/O ischemic right MCA stroke; Hematuria; High cholesterol; Lumbar radiculopathy; Lumbar stenosis; Other chronic pain; Other muscle spasm; PONV (postoperative nausea and vomiting); Spondylosis of cervical spine; Spondylosis of lumbosacral region; Stroke [facility] and Urinary incontinence         Patient was hospitalized from 2/9/18 after suffering a right MCA ischemic stroke, patient s/p IV TPA  MRI confirmed multiple infarcts in the right cerebral hemisphere in the distribution of the right MCA, as well as deep infarctions in the basal ganglia and cortical infarctions in the frontal and parietal regions; there was also noted to be a petechial hemorrhage in the infarction noted in the right lentiform nucleus  CTA was performed which demonstrated a right M1 occlusion, for which she underwent an endovascular thrombectomy  Patient had a loop recorder placed on 2/13/18  Patient was admitted to inpatient rehabilitation at the [facility] from 2/14/18 through 2/18/18  Patient then reported back to the hospital from 4/25/18 - 4/27/18 for recurrent stroke-like symptoms of dysarthria and left sided weakness  Symptoms improved while in the ICU  Patient was started on Warfarin upon discharge for secondary stroke prophylaxis and continued on aspirin/statin  She is now switched to Eliquis  Expanded Social History:   Patient lives with spouse in a single family home with 1 steps to enter  Patient is employed  Patient was working at [employer] full time, but is currently not working due to stroke recovery  Patient was a           Imaging: I have personally reviewed imaging with results as follows:  X-ray left shoulder:   Suggestive evidence of calcific tendinitis in the left shoulder  X-ray lumbar spine:  Minimal anterolistheses of L4 with respect to L5  Mild scoliosis convexity to the right      Review of Systems   Constitutional: Negative  HENT: Negative  Eyes: Negative  Respiratory: Negative  Cardiovascular: Negative  Gastrointestinal: Negative  Endocrine: Negative  Genitourinary: Negative  Musculoskeletal: Negative  Skin: Negative  Allergic/Immunologic: Negative  Neurological: Negative  Hematological: Negative  Psychiatric/Behavioral: Negative  OBJECTIVE:   LMP  (LMP Unknown)     Physical Exam  Physical Exam   Constitutional: She appears well-developed and well-nourished  HENT:   Head: Normocephalic and atraumatic  Eyes: Pupils are equal, round, and reactive to light  EOM are normal    Extra ocular movements intact with no nystagmus today   Cardiovascular: Normal rate and regular rhythm  Pulmonary/Chest: Breath sounds normal  She has no wheezes  She has no rales  Abdominal: Soft  Bowel sounds are normal  She exhibits no distension  There is no tenderness  Neurological:   Motor strength is 5/5 throughout, negative Romberg  Gait is within normal limits step through gait pattern without assistive device or loss of balance  Skin: Skin is warm  Psychiatric: She has a normal mood and affect  Nursing note and vitals reviewed          Current Outpatient Medications:     amitriptyline (ELAVIL) 25 mg tablet, , Disp: , Rfl:     apixaban (ELIQUIS) 5 mg, Take 1 tablet (5 mg total) by mouth 2 (two) times a day, Disp: 180 tablet, Rfl: 1    apixaban (ELIQUIS) 5 mg, Take 1 tablet (5 mg total) by mouth 2 (two) times a day, Disp: 180 tablet, Rfl: 1    aspirin 81 mg chewable tablet, Chew 1 tablet (81 mg total) daily, Disp: 30 tablet, Rfl: 0    cycloSPORINE (RESTASIS MULTIDOSE) 0 05 % ophthalmic emulsion, , Disp: , Rfl:     diazepam (VALIUM) 5 mg tablet, Take 2 tablets (10 mg total) by mouth daily as needed for anxiety, Disp: 90 tablet, Rfl: 1    ergocalciferol (VITAMIN D2) 50,000 units, Must be compounded without dyes due to allergies- one per week, Disp: 12 capsule, Rfl: 1    escitalopram (LEXAPRO) 5 mg tablet, Take 1 tablet (5 mg total) by mouth daily, Disp: 90 tablet, Rfl: 1    lactulose 10 g/15 mL, , Disp: , Rfl:     Linaclotide (LINZESS) 145 MCG CAPS, Take 1 capsule (145 mcg total) by mouth daily, Disp: 90 capsule, Rfl: 1    loteprednol etabonate (LOTEMAX) 0 5 % ophthalmic suspension, , Disp: , Rfl:     Magnesium 500 MG CAPS, Take by mouth, Disp: , Rfl:     Omega-3 Fatty Acids (FISH OIL) 1200 MG CAPS, Take by mouth, Disp: , Rfl:     oxyCODONE (ROXICODONE) 5 mg immediate release tablet, Take 1 tablet (5 mg total) by mouth every 6 (six) hours as needed for moderate pain Max Daily Amount: 20 mg, Disp: 120 tablet, Rfl: 0    pravastatin (PRAVACHOL) 40 mg tablet, Take 1 tablet (40 mg total) by mouth daily, Disp: 90 tablet, Rfl: 1    pregabalin (LYRICA) 75 mg capsule, Take 1 capsule (75 mg total) by mouth 2 (two) times a day, Disp: 60 capsule, Rfl: 5    rizatriptan (MAXALT-MLT) 5 MG disintegrating tablet, , Disp: , Rfl:     Past Medical History:   Diagnosis Date    Acute pain of right knee     last assessed - [id number]    Anemia     Anxiety     Cervical disc disorder with radiculopathy     Chronic pain     Constipation     [facility]cere[facility] accident) ([facility] [address] )     H/O ischemic right MCA stroke     Hematuria     last assessed - [id number]    High cholesterol     Lumbar radiculopathy     Lumbar stenosis     Other chronic pain     last assessed - [id number]    Other muscle spasm     last assessed - [id number]    PONV (postoperative nausea and vomiting)     must have premed    Spondylosis of cervical spine     Spondylosis of lumbosacral region     Stroke [facility])     Urinary incontinence     Resolved - [id number]     Patient Active Problem List    Diagnosis Date Noted    Slow transit constipation 10/30/2018    Refused influenza vaccine 10/30/2018    Idiopathic scoliosis of lumbar spine 07/24/2018    Tobacco abuse 06/05/2018    History of ischemic right MCA stroke 04/26/2018    CVA (cerebral vascular accident) ([facility] [address] ) 04/09/2018    Chronic back pain 02/15/2018    Depression as late effect of cerebrovascular accident (CVA) 02/15/2018    Acute ischemic right MCA stroke ([facility] [address] ) 02/09/2018    Fall 02/09/2018    Chronic low back pain 07/05/2016    Cervical post-laminectomy syndrome 07/05/2016    Chronic pain disorder 07/05/2016    Lumbar degenerative disc disease 07/05/2016    Anxiety 09/19/2014    Hypercholesterolemia 02/18/2014    Cervical radiculopathy 08/13/2013    Dependence on nicotine from cigarettes 08/09/2012

## 2019-02-13 NOTE — PROGRESS NOTES
Physical Medicine & Rehabilitation Follow-up Note  Kevin Mason 46 y o  female    ASSESSMENT/PLAN:     Patient is a 18-year-old female with history of right MCA stroke (April 2018) now on Eliquis, tobacco use, migraines chronic back pain,  lumbar stenosis, cervical stenosis s/p ACDF C5-C7, who presents today with a chief complaint of acute to subacute headache  I have been following patient for post stroke functional deficits  Her residual functional deficits status post stroke include visual deficits  Her motor deficits have fully resolved  Patient continues in vision therapy and does see Dr Carlos Stephens  She has a left eye glass prism additionally  Her driving and return to work is currently being determined by Dr Carlos Stephens due to vision deficits  Functionally, she has no restrictions from my standpoint other than her vision deficits  Today she reports a 3-4 week gradual onset unilateral right-sided headache which she rates as moderate  She does have radiation into her forehead and near her right eye  She also reports difficulty with hearing which she thinks is worsening  She does have a history of chronic migraines, however feels the characteristic of this headache is different  She is concerned given her stroke last year  On examination, patient does not have any acute new neurologic changes  Plan:  Nontraumatic Right-sided headache:  Noncontrast CT head order placed  Patient instructed if headache worsens or is accompanied by any other new changes, in vision, speech, weakness numbness to report to the emergency room immediately for further evaluation  *Referral made to headache team:  Either Dr Niru Hood or Dr Sendy Sosa for first available appt  Diagnoses and all orders for this visit:    Other headache syndrome  -     CT head wo contrast; Future  -     Ambulatory referral to Neurology;  Future    History of ischemic right MCA stroke  -     CT head wo contrast; Future    Hearing abnormally acute, right      *I have spent 30 minutes with Patient and family today in which greater than 50% of this time was spent in counseling/coordination of care regarding Patient and family education and Impressions  HPI/SUBJECTIVE:  Patient presents today in the office with chief complaint of right sided head pain  No trauma  Gradual onset  Patient is being treated by Dr Jon Red for Migraines  Occurs frequently almost constantly during the day and does have some relief with her migraine medication  Patient is also waking up with headaches around 2am    Patient does have headaches at night and these do wake her up from sleep  This has been going on for about 3-4 weeks  Patient rates her headaches moderate  Patient describes them as throbbing  Radiating towards her forehead and right eye  She also feels her hearing is worse and she has trouble hearing things as she did before  Patient has chronic neck pain but does not feel her neck pain is worse or worsening her headaches  Denies new weakness, numbness, difficulty speaking, swallowing, walking   denies changes in her cognition  Imaging: I have personally reviewed imaging with results as follows:  [address] [place] [address] 5/3/18: IMPRESSION:  No acute intracranial abnormality  Footprint of ischemic disease right MCA distribution  No hemorrhage or mass effect  [facility] MRI Brain 4/25/18: IMPRESSION:  1  Evolving areas of acute infarction within the right temporal and parietal lobes, consistent with the CTA findings of right MCA occlusion  2   Areas of subacute infarction right basal ganglia  3   Sequela of chronic right MCA territory infarction  Function:   Current Level of Function:   Independent with mobility and self-care     Review of Systems   Constitutional: Negative  HENT: Positive for hearing loss  Eyes: Negative  Respiratory: Negative  Cardiovascular: Negative  Gastrointestinal: Negative  Endocrine: Negative  Genitourinary: Negative  Musculoskeletal: Negative  Skin: Negative  Allergic/Immunologic: Negative  Neurological: Positive for headaches  Hematological: Negative  Psychiatric/Behavioral: Negative  OBJECTIVE:   /68 (BP Location: Right arm, Patient Position: Sitting, Cuff Size: Standard)   Pulse 79   Resp 12   Ht 5' 1" (1 549 m)   Wt 68 6 kg (151 lb 3 2 oz)   LMP  (LMP Unknown)   BMI 28 57 kg/m²       Physical Exam   Constitutional: She appears well-developed and well-nourished  HENT:   Head: Normocephalic and atraumatic  Eyes: Pupils are equal, round, and reactive to light  EOM are normal    Cardiovascular: Normal rate and regular rhythm  Pulmonary/Chest: Breath sounds normal  She has no wheezes  She has no rales  Abdominal: Soft  Bowel sounds are normal  She exhibits no distension  There is no tenderness  Skin: Skin is warm  Psychiatric: She has a normal mood and affect  Nursing note and vitals reviewed  Neuro:  AAOx3  CNII-XII grossly intact  Sensation to light touch grossly intact x 4  Reflexes: 2+ symmetric bilateral biceps, brachioradialis, patella, achilles  Neg Hoffmans  Neg Babinski  Coordination: WNL  Gait: WNL  Patient can heel and toe walk, tandem gait is fair   Romberg NEGATIVE  Motor Exam:   Right Left Site Right Left Site   5 5 S Ab:  Shoulder Abductors 5 5 HF:  Hip Flexors   5 5 EF:  Elbow Flexors 5 5 H Ab:   Hip Abductors   5 5 EE:  Elbow Extensors 5 5 KF: Knee Flexors   5 5 WE:  Wrist Extensors 5 5 KE:  Knee Extensors   5 5 FF:  Finger Flexors 5 5 DR:  Dorsi Flexors     HI:  Hand Intrinsics   EHL: Ext Carlson Longus   5 5  5 5 PF:  Plantar Flexors         Current Outpatient Medications:     amitriptyline (ELAVIL) 25 mg tablet, , Disp: , Rfl:     apixaban (ELIQUIS) 5 mg, Take 1 tablet (5 mg total) by mouth 2 (two) times a day, Disp: 180 tablet, Rfl: 1    apixaban (ELIQUIS) 5 mg, Take 1 tablet (5 mg total) by mouth 2 (two) times a day, Disp: 180 tablet, Rfl: 1    aspirin 81 mg chewable tablet, Chew 1 tablet (81 mg total) daily, Disp: 30 tablet, Rfl: 0    cycloSPORINE (RESTASIS MULTIDOSE) 0 05 % ophthalmic emulsion, , Disp: , Rfl:     diazepam (VALIUM) 5 mg tablet, Take 2 tablets (10 mg total) by mouth daily as needed for anxiety, Disp: 90 tablet, Rfl: 1    ergocalciferol (VITAMIN D2) 50,000 units, Must be compounded without dyes due to allergies- one per week, Disp: 12 capsule, Rfl: 1    escitalopram (LEXAPRO) 5 mg tablet, Take 1 tablet (5 mg total) by mouth daily, Disp: 90 tablet, Rfl: 1    lactulose 10 g/15 mL, , Disp: , Rfl:     Linaclotide (LINZESS) 145 MCG CAPS, Take 1 capsule (145 mcg total) by mouth daily, Disp: 90 capsule, Rfl: 1    loteprednol etabonate (LOTEMAX) 0 5 % ophthalmic suspension, , Disp: , Rfl:     Magnesium 500 MG CAPS, Take by mouth, Disp: , Rfl:     Omega-3 Fatty Acids (FISH OIL) 1200 MG CAPS, Take by mouth, Disp: , Rfl:     oxyCODONE (ROXICODONE) 5 mg immediate release tablet, Take 1 tablet (5 mg total) by mouth every 6 (six) hours as needed for moderate pain Max Daily Amount: 20 mg, Disp: 120 tablet, Rfl: 0    pravastatin (PRAVACHOL) 40 mg tablet, Take 1 tablet (40 mg total) by mouth daily, Disp: 90 tablet, Rfl: 1    pregabalin (LYRICA) 75 mg capsule, Take 1 capsule (75 mg total) by mouth 2 (two) times a day, Disp: 60 capsule, Rfl: 5    rizatriptan (MAXALT-MLT) 5 MG disintegrating tablet, , Disp: , Rfl:     Past Surgical History:   Procedure Laterality Date    BLADDER SURGERY      BLADDER SURGERY  2014    CERVICAL SPINE SURGERY      KNEE ARTHROSCOPY Left     LIPECTOMY      of thigh    LIPOMA RESECTION      NECK SURGERY      OH COLONOSCOPY FLX DX W/COLLJ SPEC WHEN PFRMD N/A 2/10/2017    Procedure: COLONOSCOPY;  Surgeon: Lillian Alatorre MD;  Location: [facility];   Service: Gastroenterology    OH KNEE SCOPE,MED/LAT MENISECTOMY Left 3/21/2016    Procedure: ARTHROSCOPY W/ PARTIAL MEDIAL MENISCECTOMY;  Surgeon: Dominick Castillo MD;  [facility];  Service: Orthopedics    TOTAL ABDOMINAL HYSTERECTOMY      TUBAL LIGATION         Patient Active Problem List    Diagnosis Date Noted    Slow transit constipation 10/30/2018    Refused influenza vaccine 10/30/2018    Idiopathic scoliosis of lumbar spine 07/24/2018    Tobacco abuse 06/05/2018    History of ischemic right MCA stroke 04/26/2018    CVA [facility]cerebral vascular accident) ([facility] [address] ) 04/09/2018    Chronic back pain 02/15/2018    Depression as late effect of cerebrovascular accident (CVA) 02/15/2018    Acute ischemic right MCA stroke ([facility] [address] ) 02/09/2018    Fall 02/09/2018    Chronic low back pain 07/05/2016    Cervical post-laminectomy syndrome 07/05/2016    Chronic pain disorder 07/05/2016    Lumbar degenerative disc disease 07/05/2016    Anxiety 09/19/2014    Hypercholesterolemia 02/18/2014    Cervical radiculopathy 08/13/2013    Dependence on nicotine from cigarettes 08/09/2012

## 2019-02-13 NOTE — TELEPHONE ENCOUNTER
Dr Chantal Toledo is requesting pt to be seen ASAP with Dr Mayank Gordon returning in March  Pt needs to be seen before then  Put on waitlist   Please call pt to schedule  : Thank you

## 2019-02-14 ENCOUNTER — TELEPHONE (OUTPATIENT)
Dept: INTERNAL MEDICINE CLINIC | Facility: CLINIC | Age: 54
End: 2019-02-14

## 2019-02-14 NOTE — TELEPHONE ENCOUNTER
As an [place], Per patients insurance is giving us this number in case a  is need it for the patient, because they do have the benefit to have a  [phone]

## 2019-02-14 NOTE — TELEPHONE ENCOUNTER
Spoke with Dr Theodore Temple in regards to this patient- she states she read through Dr Sarina Spatz note and does not see any mention of patient needing to be seen by her or that this is urgent  Dr Theodore Temple will reach out to Dr Lara Medley for further information  Will touch base with this on Monday when she is back in the office

## 2019-02-18 NOTE — TELEPHONE ENCOUNTER
Silvino Grimaldo you   Dr Evans Allison had stated when she walked patient out to my desk she did not want the pt to wait until March for Dr Randolph Mariee to return from leave    Again, thank you

## 2019-02-20 NOTE — TELEPHONE ENCOUNTER
Yes, I am seeing her for poorly controlled headaches, I will see her next available appointment, she can be on the cancellation list if that is far out    Thanks

## 2019-02-21 NOTE — TELEPHONE ENCOUNTER
Called patient- lmom awaiting a call back  I was calling to see if the patient would like to come in to see Dr Polina Arevalo tomorrow 2/22/19 at 11:30 am or 7:30 am (1130am spot on hold, can use for the patient)  I also have availability with her next Tuesday 2/26/19 at 3:30 pm (spot is also on hold)  If patient agreeable- can schedule on either day         Thank you

## 2019-02-22 NOTE — TELEPHONE ENCOUNTER
lmom awaiting a call back- when patient calls back please see notes below for reason for call      Thank you

## 2019-02-22 NOTE — TELEPHONE ENCOUNTER
Called and spoke with the patient- she is scheduled with Dr Tera Langley on 3/5/19 at the [facility] at 10:30 am

## 2019-02-22 NOTE — TELEPHONE ENCOUNTER
Pt can not make either of the appts, states she was trying to call since 9 am, if we answered the phone she could have, pt asking for an alternative appt       Pt asking to call [phone]

## 2019-02-27 ENCOUNTER — HOSPITAL ENCOUNTER (OUTPATIENT)
Dept: RADIOLOGY | Age: 54
Discharge: HOME | End: 2019-02-27
Payer: COMMERCIAL

## 2019-02-27 ENCOUNTER — TRANSCRIBE ORDERS (OUTPATIENT)
Dept: ADMINISTRATIVE | Age: 54
End: 2019-02-27

## 2019-02-27 DIAGNOSIS — Z86.73 HISTORY OF ISCHEMIC RIGHT MCA STROKE: ICD-10-CM

## 2019-02-27 DIAGNOSIS — G44.89 OTHER HEADACHE SYNDROME: ICD-10-CM

## 2019-02-27 PROCEDURE — 70450 CT HEAD/BRAIN W/O DYE: CPT

## 2019-02-28 NOTE — RESULT ENCOUNTER NOTE
Please relay that patient's recent CT Head was NOT showing any new stroke or pathology  She should follow with headache team - Neurology for evaluation of her persistent headaches    Aron Bream Dr Jerone Sandhoff

## 2019-03-01 ENCOUNTER — TELEPHONE (OUTPATIENT)
Dept: NEUROLOGY | Facility: CLINIC | Age: 54
End: 2019-03-01

## 2019-03-01 NOTE — TELEPHONE ENCOUNTER
----- Message from [facility], RN sent at 2/28/2019  4:06 PM EST -----      ----- Message -----  From: Eli Francis MD  Sent: 2/28/2019  12:31 PM  To: Neurology [place] [facility]    Please relay that patient's recent CT Head was NOT showing any new stroke or pathology  She should follow with headache team - Neurology for evaluation of her persistent headaches    Dannie Sy

## 2019-03-08 ENCOUNTER — OFFICE VISIT (OUTPATIENT)
Dept: INTERNAL MEDICINE CLINIC | Age: 54
End: 2019-03-08

## 2019-03-08 ENCOUNTER — TELEPHONE (OUTPATIENT)
Dept: INTERNAL MEDICINE CLINIC | Age: 54
End: 2019-03-08

## 2019-03-08 VITALS
OXYGEN SATURATION: 98 % | HEART RATE: 76 BPM | SYSTOLIC BLOOD PRESSURE: 96 MMHG | BODY MASS INDEX: 29.1 KG/M2 | DIASTOLIC BLOOD PRESSURE: 58 MMHG | TEMPERATURE: 98 F | WEIGHT: 154 LBS

## 2019-03-08 DIAGNOSIS — R79.89 HIGH SERUM HIGH DENSITY LIPOPROTEIN (HDL): ICD-10-CM

## 2019-03-08 DIAGNOSIS — G89.4 CHRONIC PAIN DISORDER: ICD-10-CM

## 2019-03-08 DIAGNOSIS — Z12.31 ENCOUNTER FOR SCREENING MAMMOGRAM FOR MALIGNANT NEOPLASM OF BREAST: Primary | ICD-10-CM

## 2019-03-08 DIAGNOSIS — M96.1 CERVICAL POST-LAMINECTOMY SYNDROME: ICD-10-CM

## 2019-03-08 DIAGNOSIS — M51.36 LUMBAR DEGENERATIVE DISC DISEASE: ICD-10-CM

## 2019-03-08 DIAGNOSIS — Z72.0 TOBACCO ABUSE: ICD-10-CM

## 2019-03-08 DIAGNOSIS — E78.00 HYPERCHOLESTEROLEMIA: ICD-10-CM

## 2019-03-08 DIAGNOSIS — K59.01 SLOW TRANSIT CONSTIPATION: ICD-10-CM

## 2019-03-08 DIAGNOSIS — M41.26 OTHER IDIOPATHIC SCOLIOSIS, LUMBAR REGION: ICD-10-CM
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
## 2019-03-08 PROCEDURE — 99212 OFFICE O/P EST SF 10 MIN: CPT | Performed by: FAMILY MEDICINE

## 2019-03-08 NOTE — TELEPHONE ENCOUNTER
[facility] and return to them  She would like a call when this is done  Thanks

## 2019-03-08 NOTE — PROGRESS NOTES
BMI Counseling: Body mass index is 29 1 kg/m²  Discussed the patient's BMI with her  The BMI is in the acceptable range  Assessment/Plan:    No problem-specific Assessment & Plan notes found for this encounter  Problem List Items Addressed This Visit        Digestive    Slow transit constipation       Musculoskeletal and Integument    Lumbar degenerative disc disease    Idiopathic scoliosis of lumbar spine       Other    Hypercholesterolemia    Cervical post-laminectomy syndrome    Tobacco abuse    Chronic pain disorder    High serum high density lipoprotein (HDL)      Other Visit Diagnoses     Encounter for screening mammogram for malignant neoplasm of breast    -  Primary              Cont with present medications  Check labs for next visit, decrease Lyrica to daily and then stop and pills are completed  Advised to stop smoking  Feels tired with lexapro but does not want to stop it, or want an increase  Discussed good Rx with patient  Stop Linzess due to cost as well as in deficiency  Recommend MiraLax daily with Colace in the morning and Senokot at night as a daily regimen  Also recommend magnesium citrate for the next 2-3 days  Recommend smoking cessation - and risks of not stopping  Refused influenza vaccine today  Patient here with   All questions and concerns addressed  Continue follow-up with Physical Medicine and Rehab as well as management of her medical marijuana  Return in approximately 4 months without laboratory data        Subjective:      Patient ID: Fredy Mack is a 46 y o  female  HPI  Patient lost her medical insurance at the end of the year and is now having difficulty with her medications and testings and Dr Kathleen Esposito  Depression:  Feeling much better with low dose lexapro  But has some fatigue side effects  Has not been able to work due to stroke and is having some difficulty with exercising due to chronic back pain          Hyperlipidemia (Follow-Up): The patient states her hyperlipidemia has been under good control since the last visit  She has no significant interval events  Symptoms: denies chest pain-- and-- denies intermittent leg claudication  Associated symptoms include no focal neurologic deficits-- and-- no memory loss  Medications: the patient is not adherent with her medication       Hyperlipidemia (Follow-Up): The patient states her hyperlipidemia has been under good control since the last visit  She has no significant interval events  Symptoms: denies chest pain-- and-- denies intermittent leg claudication  Associated symptoms include no focal neurologic deficits-- and-- no memory loss  Medications: the patient is not adherent with her medication regimen  Chronic pain, trying to wean off oxycodone and seeing Physical Medicine and Rehab doctor  Started on medical marijuana which has significantly improved her symptoms and she has been able to decrease her oxycodone however she is in some financial difficulty due to not having a job and no medical coverage now and finds it difficult to pay cash for her medical marijuana      CVA, on Eliquis and she is applying for medical assistance for the medications since she lost her medical insurance at the end of the year  Is compliant with her medication  Stroke was ischemic right MCA    Tobacco abuse, patient restarted smoking when 1 of her providers at the hospital indicated that her stroke was not from smoking      The following portions of the patient's history were reviewed and updated as appropriate: allergies, current medications, past family history, past medical history, past social history, past surgical history and problem list     Review of Systems      Constitutional:  Denies fever or chills , + weight gain  Eyes:  Denies change in visual acuity   HENT:  Denies nasal congestion or sore throat +allergies  Respiratory:  Denies cough or shortness of breath or wheezing  Cardiovascular:  Denies palpitations or chest pain  GI:  Denies abdominal pain, nausea, or vomiting  Integument:  Denies rash   Neurologic:  +  headache  No  focal weakness        Objective:      BP 96/58 (BP Location: Left arm, Patient Position: Sitting, Cuff Size: Standard)   Pulse 76   Temp 98 °F (36 7 °C) (Tympanic)   Wt 69 9 kg (154 lb) Comment: boots on  LMP  (LMP Unknown)   SpO2 98%   BMI 29 10 kg/m²          Physical Exam      Constitutional:  Well developed, well nourished, no acute distress, non-toxic appearance   Eyes:  PERRL, conjunctiva normal , non icteric sclera  HENT:  Atraumatic, oropharynx moist  Neck-  supple   Respiratory:  CTA b/l, normal breath sounds, no rales, no wheezing   Cardiovascular:  RRR, no murmurs, no LE edema b/l  GI:  Soft, nondistended, normal bowel sounds x 4, nontender, no organomegaly, no mass, no rebound, no guarding   Neurologic:  no focal deficits noted   Psychiatric:  Speech and behavior appropriate , AAO x 3

## 2019-03-11 ENCOUNTER — REMOTE DEVICE CLINIC VISIT (OUTPATIENT)
Dept: CARDIOLOGY CLINIC | Facility: CLINIC | Age: 54
End: 2019-03-11
Payer: COMMERCIAL

## 2019-03-11 DIAGNOSIS — I63.9 CRYPTOGENIC STROKE (HCC): Primary | ICD-10-CM

## 2019-03-11 DIAGNOSIS — Z95.818 PRESENCE OF OTHER CARDIAC IMPLANTS AND GRAFTS: ICD-10-CM

## 2019-03-11 PROCEDURE — 93299 PR REM INTERROG ICPMS/SCRMS <30 D TECH REVIEW: CPT | Performed by: INTERNAL MEDICINE

## 2019-03-11 PROCEDURE — 93298 REM INTERROG DEV EVAL SCRMS: CPT | Performed by: INTERNAL MEDICINE

## 2019-03-11 NOTE — PROGRESS NOTES
Results for orders placed or performed in visit on 03/11/19   Cardiac EP device report    Narrative    SJM LOOP  MERLIN TRANSMISSION LOOP: BATTERY STATUS "OK"  PRESENTING RHYTHM: NSR @ 106 BPM   NO PATIENT OR DEVICE ACTIVATED EPISODES  NORMAL DEVICE FUNCTION    [facility]

## 2019-03-18 ENCOUNTER — TELEPHONE (OUTPATIENT)
Dept: INTERNAL MEDICINE CLINIC | Age: 54
End: 2019-03-18

## 2019-03-18 DIAGNOSIS — I63.511 ACUTE ISCHEMIC RIGHT MCA STROKE (HCC): ICD-10-CM

## 2019-03-25 NOTE — TELEPHONE ENCOUNTER
3/25/19 called [employer] pt assistance [phone]- pt is denied due to  Financial criteria--household size of 2, and income of  $52,000- per Eliquis rep- if pt files her tax return and  It is lower than $50,000 for 2 people then she should send that in- called pt gave her message ds

## 2019-04-16 DIAGNOSIS — E78.00 HYPERCHOLESTEROLEMIA: ICD-10-CM

## 2019-04-16 RX ORDER — PRAVASTATIN SODIUM 40 MG
40 TABLET ORAL DAILY
Qty: 90 TABLET | Refills: 1 | Status: SHIPPED | OUTPATIENT
Start: 2019-04-16 | End: 2019-07-15

## 2019-05-03 ENCOUNTER — OFFICE VISIT (OUTPATIENT)
Dept: NEUROLOGY | Facility: CLINIC | Age: 54
End: 2019-05-03
Payer: COMMERCIAL

## 2019-05-03 VITALS
RESPIRATION RATE: 12 BRPM | HEIGHT: 61 IN | DIASTOLIC BLOOD PRESSURE: 72 MMHG | BODY MASS INDEX: 28.77 KG/M2 | SYSTOLIC BLOOD PRESSURE: 112 MMHG | WEIGHT: 152.4 LBS

## 2019-05-03 DIAGNOSIS — I69.398 DEPRESSION AS LATE EFFECT OF CEREBROVASCULAR ACCIDENT (CVA): ICD-10-CM

## 2019-05-03 DIAGNOSIS — F06.31 DEPRESSION AS LATE EFFECT OF CEREBROVASCULAR ACCIDENT (CVA): ICD-10-CM

## 2019-05-03 PROCEDURE — 99214 OFFICE O/P EST MOD 30 MIN: CPT | Performed by: PHYSICAL MEDICINE & REHABILITATION

## 2019-05-03 RX ORDER — ESCITALOPRAM OXALATE 5 MG/1
5 TABLET ORAL DAILY
Qty: 30 TABLET | Refills: 6 | Status: SHIPPED | OUTPATIENT
Start: 2019-05-03 | End: 2020-11-06

## 2019-05-06 ENCOUNTER — TELEPHONE (OUTPATIENT)
Dept: NEUROLOGY | Facility: CLINIC | Age: 54
End: 2019-05-06

## 2019-06-06 ENCOUNTER — TELEPHONE (OUTPATIENT)
Dept: INTERNAL MEDICINE CLINIC | Facility: CLINIC | Age: 54
End: 2019-06-06

## 2019-06-06 RX ORDER — CYCLOBENZAPRINE HCL 5 MG
TABLET ORAL
Refills: 1 | COMMUNITY
Start: 2019-06-05 | End: 2022-04-14

## 2019-06-10 ENCOUNTER — REMOTE DEVICE CLINIC VISIT (OUTPATIENT)
Dept: CARDIOLOGY CLINIC | Facility: CLINIC | Age: 54
End: 2019-06-10
Payer: COMMERCIAL

## 2019-06-10 DIAGNOSIS — Z95.818 PRESENCE OF CARDIAC DEVICE: ICD-10-CM

## 2019-06-10 DIAGNOSIS — Z86.73 PERSONAL HISTORY OF TIA (TRANSIENT ISCHEMIC ATTACK): Primary | ICD-10-CM

## 2019-06-10 PROCEDURE — 93299 PR REM INTERROG ICPMS/SCRMS <30 D TECH REVIEW: CPT | Performed by: INTERNAL MEDICINE

## 2019-06-10 PROCEDURE — 93298 REM INTERROG DEV EVAL SCRMS: CPT | Performed by: INTERNAL MEDICINE

## 2019-06-28 ENCOUNTER — TELEPHONE (OUTPATIENT)
Dept: NEUROLOGY | Facility: CLINIC | Age: 54
End: 2019-06-28

## 2019-06-28 NOTE — TELEPHONE ENCOUNTER
Received [employer] STD form  1 page  $15 charge  I inform pt of charge and turn around  States [employer] carlos[employer] has been paying fees for all paperwork  [facility], please drop charge and contact [employer] for payment  Contact info on from in media  Dr Dominic Hayward please advise as we filled out STD  paperwork 1/10/19  Per your 5/3 office note, pt to return if sx worsen or fail to improve  Additionally, pt was to see [facility] team 5/14 however pt canceled appt

## 2019-07-01 NOTE — TELEPHONE ENCOUNTER
Charge of $15 dropped  Contacted [employer] [phone] and inquired about payment for forms  [employer] is requesting a invoice to be sent to them via fax [phone] with the Claim # [id number]  on the invoice

## 2019-07-03 ENCOUNTER — TRANSCRIBE ORDERS (OUTPATIENT)
Dept: ADMINISTRATIVE | Age: 54
End: 2019-07-03

## 2019-07-15 ENCOUNTER — APPOINTMENT (OUTPATIENT)
Dept: LAB | Age: 54
End: 2019-07-15
Payer: COMMERCIAL

## 2019-07-15 ENCOUNTER — OFFICE VISIT (OUTPATIENT)
Dept: INTERNAL MEDICINE CLINIC | Age: 54
End: 2019-07-15
Payer: COMMERCIAL

## 2019-07-15 VITALS
HEART RATE: 82 BPM | DIASTOLIC BLOOD PRESSURE: 80 MMHG | WEIGHT: 148.2 LBS | OXYGEN SATURATION: 96 % | BODY MASS INDEX: 28 KG/M2 | TEMPERATURE: 96.8 F | SYSTOLIC BLOOD PRESSURE: 116 MMHG

## 2019-07-15 DIAGNOSIS — I63.511 ACUTE ISCHEMIC RIGHT MCA STROKE (HCC): ICD-10-CM

## 2019-07-15 DIAGNOSIS — I48.0 PAF (PAROXYSMAL ATRIAL FIBRILLATION) (HCC): ICD-10-CM

## 2019-07-15 DIAGNOSIS — E78.00 HYPERCHOLESTEROLEMIA: ICD-10-CM

## 2019-07-15 DIAGNOSIS — K59.01 SLOW TRANSIT CONSTIPATION: ICD-10-CM

## 2019-07-15 DIAGNOSIS — R79.89 HIGH SERUM HIGH DENSITY LIPOPROTEIN (HDL): ICD-10-CM

## 2019-07-15 DIAGNOSIS — F17.210 CIGARETTE NICOTINE DEPENDENCE WITHOUT COMPLICATION: ICD-10-CM

## 2019-07-15 DIAGNOSIS — Z72.0 TOBACCO ABUSE: ICD-10-CM

## 2019-07-15 DIAGNOSIS — M54.12 CERVICAL RADICULOPATHY: ICD-10-CM

## 2019-07-15 DIAGNOSIS — Z90.710 H/O: HYSTERECTOMY: ICD-10-CM

## 2019-07-15 DIAGNOSIS — I69.398 DEPRESSION AS LATE EFFECT OF CEREBROVASCULAR ACCIDENT (CVA): ICD-10-CM

## 2019-07-15 DIAGNOSIS — F06.31 DEPRESSION AS LATE EFFECT OF CEREBROVASCULAR ACCIDENT (CVA): ICD-10-CM

## 2019-07-15 DIAGNOSIS — F41.9 ANXIETY: Primary | ICD-10-CM

## 2019-07-15 DIAGNOSIS — G89.4 CHRONIC PAIN DISORDER: ICD-10-CM

## 2019-07-15 DIAGNOSIS — T14.8XXA NERVE DAMAGE: ICD-10-CM

## 2019-07-15 LAB
CHOLEST SERPL-MCNC: 240 MG/DL (ref 50–200)
HDLC SERPL-MCNC: 74 MG/DL (ref 40–60)
LDLC SERPL CALC-MCNC: 146 MG/DL (ref 0–100)
NONHDLC SERPL-MCNC: 166 MG/DL
TRIGL SERPL-MCNC: 101 MG/DL

## 2019-07-15 PROCEDURE — 80061 LIPID PANEL: CPT

## 2019-07-15 PROCEDURE — 36415 COLL VENOUS BLD VENIPUNCTURE: CPT

## 2019-07-15 PROCEDURE — 99214 OFFICE O/P EST MOD 30 MIN: CPT | Performed by: FAMILY MEDICINE

## 2019-07-15 RX ORDER — OXYCODONE HYDROCHLORIDE 5 MG/1
5 TABLET ORAL EVERY 6 HOURS PRN
Qty: 120 TABLET | Refills: 0 | Status: SHIPPED | OUTPATIENT
Start: 2019-07-15 | End: 2020-08-15 | Stop reason: SDUPTHER

## 2019-07-15 RX ORDER — ATORVASTATIN CALCIUM 40 MG/1
40 TABLET, FILM COATED ORAL DAILY
Qty: 90 TABLET | Refills: 1 | Status: SHIPPED | OUTPATIENT
Start: 2019-07-15 | End: 2020-01-06 | Stop reason: SDUPTHER

## 2019-07-15 NOTE — PROGRESS NOTES
BMI Counseling: Body mass index is 28 kg/m²  Discussed the patient's BMI with her  The BMI is above average  BMI counseling and education was provided to the patient  Nutrition recommendations include reducing portion sizes  Assessment/Plan:    No problem-specific Assessment & Plan notes found for this encounter  Problem List Items Addressed This Visit        Digestive    Slow transit constipation       Cardiovascular and Mediastinum    Acute ischemic right MCA stroke (HCC)    PAF (paroxysmal atrial fibrillation) (HCC)       Nervous and Auditory    Cervical radiculopathy       Other    Depression as late effect of cerebrovascular accident (CVA)    Hypercholesterolemia    Relevant Medications    atorvastatin (LIPITOR) 40 mg tablet    Other Relevant Orders    Lipid panel    Comprehensive metabolic panel    CK    Dependence on nicotine from cigarettes    Tobacco abuse    Anxiety - Primary    Chronic pain disorder    High serum high density lipoprotein (HDL)    Relevant Medications    atorvastatin (LIPITOR) 40 mg tablet    H/O: hysterectomy      Other Visit Diagnoses     Nerve damage        Relevant Medications    oxyCODONE (ROXICODONE) 5 mg immediate release tablet              Cont with present medications  Check labs for next visit, continue with present dose Lyrica since it is helping  Advised to stop smoking  Feels tired with lexapro but does not want to stop it, or want an increase  Discussed good Rx with patient  Stop Linzess due to cost as well as in deficiency  Recommend MiraLax daily with Colace in the morning and Senokot at night as a daily regimen  Also recommend magnesium citrate for the next 2-3 days  Recommend smoking cessation - and risks of not stopping  Refused influenza vaccine today  Started and over-the-counter colon cleanse  All questions and concerns addressed  Continue follow-up with Physical Medicine and Rehab as well as management of her medical marijuana    Laboratory data reviewed  Increasing cholesterol noted  Patient asking to switch from pravastatin to Lipitor  Advised to check cholesterol levels this week and then change to Lipitor  Last levels were elevated  Subjective: Follow-up stroke and hyperlipidemia     Patient ID: Cintia Duncan is a 46 y o  female  Neurologic Problem   The patient's primary symptoms include memory loss  This is a recurrent problem  The current episode started more than 1 year ago  The neurological problem developed suddenly  The problem has been waxing and waning since onset  There was no focality noted  Associated symptoms include abdominal pain, an auditory change, an aura, back pain, bladder incontinence, confusion, diaphoresis, fatigue, headaches and neck pain  Pertinent negatives include no bowel incontinence, chest pain, dizziness, fever, light-headedness, nausea, palpitations, shortness of breath or vomiting  Past treatments include medication, neck support, position change, sleep and walking  The treatment provided mild relief  Patient lost her medical insurance at the end of the year and is now having difficulty with her medications and testings and Dr Luciano Mcdonough  July 2019, has now bought private insurance  Is looking to apply for a job  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 and is doing well  Continues with physical therapy    Chronic back and cervical pain with radiculoapthy-not new, getting therapy and feels a normal level of pain on a daily basis      Depression:  Feeling much better with low dose lexapro  But has some fatigue side effects  Has not been able to work due to stroke and is having some difficulty with exercising due to chronic back pain  July 2019 stable        Hyperlipidemia (Follow-Up): The patient states her hyperlipidemia has been under good control since the last visit  She has no significant interval events  Symptoms: denies chest pain-- and-- denies intermittent leg claudication  Associated symptoms include no focal neurologic deficits-- and-- no memory loss  Medications: the patient is not adherent with her medication   July 2019: Last lab work done in December  On pravastatin and tolerating well however she has a preference to Lipitor which she was on before and is asking for change  No recent labs today but last lab work was elevated  Chronic pain, trying to wean off oxycodone and seeing Physical Medicine and Rehab doctor  Started on medical marijuana which has significantly improved her symptoms and she has been able to decrease her oxycodone however she is in some financial difficulty due to not having a job and no medical coverage now and finds it difficult to pay cash for her medical marijuana      CVA, on Eliquis and she is applying for medical assistance for the medications since she lost her medical insurance at the end of the year  Is compliant with her medication    Stroke was ischemic right MCA    Tobacco abuse, patient restarted smoking when 1 of her providers at the hospital indicated that her stroke was not from smoking  july 2019: Tobacco abuse:  Still smoking and not willing to quit is thinking about Chantix which was successful in the past   She states she is not confident that she will be able to quit since she has failed several times in the past      The following portions of the patient's history were reviewed and updated as appropriate: allergies, current medications, past family history, past medical history, past social history, past surgical history and problem list        Constitutional:  Denies fever or chills , no significant weight changes  Eyes:  Denies change in visual acuity   HENT:  Denies nasal congestion or sore throat +allergies  Respiratory:  Denies cough or shortness of breath or wheezing  Cardiovascular:  Denies palpitations or chest pain  GI:  Denies abdominal pain, nausea, or vomiting, no heartburn  Integument:  Denies rash   Neurologic: +  headache  No  focal weakness, no new deficits        Objective:      /80 (BP Location: Left arm, Patient Position: Sitting, Cuff Size: Standard)   Pulse 82   Temp (!) 96 8 °F (36 °C) (Tympanic)   Wt 67 2 kg (148 lb 3 2 oz)   LMP  (LMP Unknown)   SpO2 96%   BMI 28 00 kg/m²          Physical Exam      Constitutional:  Well developed, well nourished, no acute distress, non-toxic appearance   Eyes:  PERRL, conjunctiva normal , non icteric sclera  HENT:  Atraumatic, oropharynx moist  Neck-  supple   Respiratory:  CTA b/l, normal breath sounds, no rales, no wheezing   Cardiovascular:  RRR, no murmurs, no LE edema b/l  GI:  Soft, nondistended, normal bowel sounds x 4, nontender, no organomegaly, no mass, no rebound, no guarding   Neurologic:  no focal deficits noted   Psychiatric:  Speech and behavior appropriate , AAO x 3

## 2019-07-18 NOTE — TELEPHONE ENCOUNTER
Noted  Please inform clinical team once payment is received so we can complete paperwork  Thank you

## 2019-07-18 NOTE — TELEPHONE ENCOUNTER
No payment noted  Contacted [employer] to inquire about the status of payment $15 for forms - They stated that they did not receive our invoice   Re-faxed to [phone]

## 2019-07-19 ENCOUNTER — TELEPHONE (OUTPATIENT)
Dept: INTERNAL MEDICINE CLINIC | Age: 54
End: 2019-07-19

## 2019-07-19 NOTE — TELEPHONE ENCOUNTER
Started PA for oxycodone 5 mg through [facility] [phone]  Will know determination within 24-72 hours

## 2019-07-22 NOTE — TELEPHONE ENCOUNTER
PA for oxycodone denied because current opioids IR MME Limit and post limit criteria does not allow coverage of additional quantities  Insurance will cover 30 tablets every 25 days

## 2019-07-22 NOTE — TELEPHONE ENCOUNTER
Spoke to Dr Kevan Lagos pt  Can pay out of pocket at local pharmacy if she would like or will change script to what insurance is requesting  For 30 tablets every 25 days  Made pt  Aware stated she will call when due for refill and fill as insurance is requesting  Told pt  To mention quantity change when calls for refill  Pt  Titus Lam

## 2019-08-05 ENCOUNTER — TELEPHONE (OUTPATIENT)
Dept: INTERNAL MEDICINE CLINIC | Facility: CLINIC | Age: 54
End: 2019-08-05

## 2019-08-05 NOTE — TELEPHONE ENCOUNTER
Received request in mail for record request from [facility]   Faxed to [facility]

## 2019-08-13 NOTE — TELEPHONE ENCOUNTER
Called [employer]  They received invoice and mailed payment to [address] [place] [address] box [id number] on 7/29  Also let staff know that we also received Capacity Questionnaire for patient and we do not complete this form as our office does not assess functional capacity  He will let the  know this and they will follow up if anything else is needed  Called [employer] at [phone], payment has been received on 8/1

## 2019-08-13 NOTE — TELEPHONE ENCOUNTER
Patient states she has not worked since 2/1/18, she is currently applying for part time work  Reports that her biggest limitations with working are back pain and trouble focusing  She reports she did not see headache team because she lost her insurance, she now has a high copay and cannot afford visit at this time  Patient was also made aware we cannot fill out functional capacity form, she verbalizes understanding  States we can discard this form  Form completed and placed in Dr Brie Powell folder for signature tomorrow  Please review and sign if agreeable, thanks! Records request sent to [facility]

## 2019-08-15 NOTE — TELEPHONE ENCOUNTER
Varun Amador from [place] calling to check on the status of the disability form that was scanned in from 6 28 2019  Informed him that the form was awaiting a signature from the doctor and as soon as it is completed, then the office will back from to [address], questioned about Capacity Questionnaire - informed him that the office does not complete this form  Varun Amador stated that he is not sure why this form was sent to our office  He stated that he does not need the Capacity Questionnaire

## 2019-08-23 DIAGNOSIS — I63.511 ACUTE ISCHEMIC RIGHT MCA STROKE (HCC): ICD-10-CM

## 2019-08-26 RX ORDER — APIXABAN 5 MG/1
TABLET, FILM COATED ORAL
Qty: 180 TABLET | Refills: 1 | Status: SHIPPED | OUTPATIENT
Start: 2019-08-26 | End: 2020-06-24 | Stop reason: SDUPTHER

## 2019-08-28 ENCOUNTER — TRANSCRIBE ORDERS (OUTPATIENT)
Dept: ADMINISTRATIVE | Facility: HOSPITAL | Age: 54
End: 2019-08-28

## 2019-08-28 DIAGNOSIS — M47.26 OTHER SPONDYLOSIS WITH RADICULOPATHY, LUMBAR REGION: Primary | ICD-10-CM

## 2019-09-05 ENCOUNTER — HOSPITAL ENCOUNTER (OUTPATIENT)
Dept: MRI IMAGING | Facility: HOSPITAL | Age: 54
Discharge: HOME | End: 2019-09-05
Payer: COMMERCIAL

## 2019-09-05 DIAGNOSIS — M47.26 OTHER SPONDYLOSIS WITH RADICULOPATHY, LUMBAR REGION: ICD-10-CM

## 2019-09-05 PROCEDURE — 72148 MRI LUMBAR SPINE W/O DYE: CPT

## 2019-09-10 ENCOUNTER — REMOTE DEVICE CLINIC VISIT (OUTPATIENT)
Dept: CARDIOLOGY CLINIC | Facility: CLINIC | Age: 54
End: 2019-09-10
Payer: COMMERCIAL

## 2019-09-10 DIAGNOSIS — Z95.818 PRESENCE OF OTHER CARDIAC IMPLANTS AND GRAFTS: Primary | ICD-10-CM

## 2019-09-10 PROCEDURE — 93299 PR REM INTERROG ICPMS/SCRMS <30 D TECH REVIEW: CPT | Performed by: INTERNAL MEDICINE

## 2019-09-10 PROCEDURE — 93298 REM INTERROG DEV EVAL SCRMS: CPT | Performed by: INTERNAL MEDICINE

## 2019-09-27 ENCOUNTER — TELEPHONE (OUTPATIENT)
Dept: NEUROLOGY | Facility: CLINIC | Age: 54
End: 2019-09-27

## 2019-09-27 NOTE — TELEPHONE ENCOUNTER
Received fax from [facility] stating patient has been recommended to schedule a transforaminal epidural steroid injection  They are asking if the patient may hold Eliquis for 2 days prior to the procedure  It appears patient's PCP has been prescribing this  Faxed to PCP Dr Kevan Lagos at [phone]

## 2019-10-04 NOTE — LETTER
October 4, 2019    Re:Venessa CALDERON Isabella  [facility] [id number]    To whom it May concern:    Ms Cortez Liter visit patient of our practice and is under our care  She is on Eliquis for anticoagulation for paroxysmal atrial fibrillation with subsequent history of CVA (02/2018)  Overall risk of recurrence of thrombosis is low-moderate, therefore she may hold anticoagulation 2 days prior to procedure  She is to restart Eliquis immediately following procedure  If you have any questions or concerns, please don't hesitate to call      Sincerely,             Kavitha Londono MD        CC: No Recipients

## 2019-10-04 NOTE — PROGRESS NOTES
Received a fax from [facility] requesting patient to have her Eliquis anticoagulation held for 2 days prior to undergoing a transforaminal epidural steroid injection  Reviewed medical records to which she is anticoagulated for history of proximal atrial fibrillation with history of CVA (02/2018)  Overall, she is a low-to-moderate risk of really thrombosis  She is also on aspirin 81 mg daily  She may hold Eliquis 2 days prior to procedure and is advised to restart immediately after procedure

## 2019-12-11 ENCOUNTER — REMOTE DEVICE CLINIC VISIT (OUTPATIENT)
Dept: CARDIOLOGY CLINIC | Facility: CLINIC | Age: 54
End: 2019-12-11
Payer: COMMERCIAL

## 2019-12-11 DIAGNOSIS — Z95.818 PRESENCE OF OTHER CARDIAC IMPLANTS AND GRAFTS: Primary | ICD-10-CM

## 2019-12-11 PROCEDURE — 93298 REM INTERROG DEV EVAL SCRMS: CPT | Performed by: INTERNAL MEDICINE

## 2019-12-11 PROCEDURE — 93299 PR REM INTERROG ICPMS/SCRMS <30 D TECH REVIEW: CPT | Performed by: INTERNAL MEDICINE

## 2019-12-11 NOTE — PROGRESS NOTES
Results for orders placed or performed in visit on 12/11/19   Cardiac EP device report    Narrative    SJM LOOP  MERLIN TRANSMISSION: BATTERY STATUS "LOW"  7 AF NOTED  PT ON ELIQUIS  AVAIL EGRAMS PRESENT AS AF  NO PT ACTIVATED EPISODES  NORMAL DEVICE FUNCTION   NC

## 2020-01-04 ENCOUNTER — TRANSCRIBE ORDERS (OUTPATIENT)
Dept: ADMINISTRATIVE | Age: 55
End: 2020-01-04

## 2020-01-04 ENCOUNTER — APPOINTMENT (OUTPATIENT)
Dept: LAB | Age: 55
End: 2020-01-04
Payer: COMMERCIAL

## 2020-01-04 DIAGNOSIS — E78.00 PURE HYPERCHOLESTEROLEMIA: ICD-10-CM

## 2020-01-04 DIAGNOSIS — E78.00 PURE HYPERCHOLESTEROLEMIA: Primary | ICD-10-CM

## 2020-01-04 LAB
ALBUMIN SERPL BCP-MCNC: 3.8 G/DL (ref 3.5–5)
ALP SERPL-CCNC: 52 U/L (ref 46–116)
ALT SERPL W P-5'-P-CCNC: 33 U/L (ref 12–78)
ANION GAP SERPL CALCULATED.3IONS-SCNC: 5 MMOL/L (ref 4–13)
AST SERPL W P-5'-P-CCNC: 12 U/L (ref 5–45)
BILIRUB SERPL-MCNC: 0.6 MG/DL (ref 0.2–1)
BUN SERPL-MCNC: 19 MG/DL (ref 5–25)
CALCIUM SERPL-MCNC: 9.6 MG/DL (ref 8.3–10.1)
CHLORIDE SERPL-SCNC: 111 MMOL/L (ref 100–108)
CHOLEST SERPL-MCNC: 184 MG/DL (ref 50–200)
CK SERPL-CCNC: 64 U/L (ref 26–192)
CO2 SERPL-SCNC: 24 MMOL/L (ref 21–32)
CREAT SERPL-MCNC: 0.85 MG/DL (ref 0.6–1.3)
GFR SERPL CREATININE-BSD FRML MDRD: 77 ML/MIN/1.73SQ M
GLUCOSE P FAST SERPL-MCNC: 83 MG/DL (ref 65–99)
HDLC SERPL-MCNC: 63 MG/DL
LDLC SERPL CALC-MCNC: 103 MG/DL (ref 0–100)
NONHDLC SERPL-MCNC: 121 MG/DL
POTASSIUM SERPL-SCNC: 3.7 MMOL/L (ref 3.5–5.3)
PROT SERPL-MCNC: 6.7 G/DL (ref 6.4–8.2)
SODIUM SERPL-SCNC: 140 MMOL/L (ref 136–145)
TRIGL SERPL-MCNC: 92 MG/DL

## 2020-01-04 PROCEDURE — 80053 COMPREHEN METABOLIC PANEL: CPT

## 2020-01-04 PROCEDURE — 36415 COLL VENOUS BLD VENIPUNCTURE: CPT

## 2020-01-04 PROCEDURE — 80061 LIPID PANEL: CPT

## 2020-01-04 PROCEDURE — 82550 ASSAY OF CK (CPK): CPT

## 2020-01-06 ENCOUNTER — OFFICE VISIT (OUTPATIENT)
Dept: INTERNAL MEDICINE CLINIC | Age: 55
End: 2020-01-06
Payer: COMMERCIAL

## 2020-01-06 VITALS
TEMPERATURE: 98.2 F | DIASTOLIC BLOOD PRESSURE: 78 MMHG | HEART RATE: 81 BPM | SYSTOLIC BLOOD PRESSURE: 124 MMHG | OXYGEN SATURATION: 100 % | WEIGHT: 137.2 LBS | BODY MASS INDEX: 25.92 KG/M2

## 2020-01-06 DIAGNOSIS — R79.89 HIGH SERUM HIGH DENSITY LIPOPROTEIN (HDL): ICD-10-CM

## 2020-01-06 DIAGNOSIS — M54.12 CERVICAL RADICULOPATHY: ICD-10-CM

## 2020-01-06 DIAGNOSIS — Z72.0 TOBACCO ABUSE: ICD-10-CM

## 2020-01-06 DIAGNOSIS — I63.511 ACUTE ISCHEMIC RIGHT MCA STROKE (HCC): ICD-10-CM

## 2020-01-06 DIAGNOSIS — K59.01 SLOW TRANSIT CONSTIPATION: ICD-10-CM

## 2020-01-06 DIAGNOSIS — E78.00 HYPERCHOLESTEROLEMIA: ICD-10-CM

## 2020-01-06 DIAGNOSIS — E55.9 VITAMIN D DEFICIENCY: ICD-10-CM

## 2020-01-06 DIAGNOSIS — Z28.21 REFUSED INFLUENZA VACCINE: ICD-10-CM

## 2020-01-06 DIAGNOSIS — I48.0 PAF (PAROXYSMAL ATRIAL FIBRILLATION) (HCC): ICD-10-CM

## 2020-01-06 DIAGNOSIS — Z12.39 SCREENING FOR BREAST CANCER: Primary | ICD-10-CM

## 2020-01-06 DIAGNOSIS — F41.9 ANXIETY: ICD-10-CM

## 2020-01-06 DIAGNOSIS — M41.26 OTHER IDIOPATHIC SCOLIOSIS, LUMBAR REGION: ICD-10-CM

## 2020-01-06 PROBLEM — K59.03 DRUG-INDUCED CONSTIPATION: Status: ACTIVE | Noted: 2020-01-06

## 2020-01-06 PROCEDURE — 99214 OFFICE O/P EST MOD 30 MIN: CPT | Performed by: FAMILY MEDICINE

## 2020-01-06 RX ORDER — ATORVASTATIN CALCIUM 40 MG/1
40 TABLET, FILM COATED ORAL DAILY
Qty: 90 TABLET | Refills: 1 | Status: SHIPPED | OUTPATIENT
Start: 2020-01-06 | End: 2020-08-15 | Stop reason: SDUPTHER

## 2020-01-06 NOTE — OCCUPATIONAL THERAPY NOTE
[address]  Evaluation     Patient Name: Dylan Amor  TMYYS'W Date: 4/26/2018  Problem List  Patient Active Problem List   Diagnosis    Acute ischemic right MCA stroke [facility])    Fall    Chronic back pain    Major depression    [facility]cere[facility] vascular [facility] [address] [facility]    Hypercholesterolemia    Chronic low back pain    Cervical post-laminectomy syndrome    Cervical radiculopathy    History of ischemic right MCA stroke     Past Medical History  Past Medical History:   Diagnosis Date    Acute pain of right knee     last assessed - [id number]    Anemia     Anxiety     Cervical disc disorder with radiculopathy     Chronic pain     Constipation     CVA [facility]cerebral vascular accident) ([facility] [address] )     H/O ischemic right MCA stroke     Hematuria     last assessed - [id number]    High cholesterol     Lumbar radiculopathy     Lumbar stenosis     Other chronic pain     last assessed - [id number]    Other muscle spasm     last assessed - [id number]    PONV (postoperative nausea and vomiting)     must have premed    Spondylosis of cervical spine     Spondylosis of lumbosacral region     Stroke [facility])     Urinary incontinence     Resolved - [id number]     Past Surgical History  Past Surgical History:   Procedure Laterality Date    BLADDER SURGERY      BLADDER SURGERY  2014    CERVICAL SPINE SURGERY      KNEE ARTHROSCOPY Left     LIPECTOMY      of thigh    LIPOMA RESECTION      NECK SURGERY      GA COLONOSCOPY FLX DX W/COLLJ SPEC WHEN PFRMD N/A 2/10/2017    Procedure: COLONOSCOPY;  Surgeon: Jun Lundberg MD;  Location: [facility];   Service: Gastroenterology    GA KNEE SCOPE,MED/LAT MENISECTOMY Left 3/21/2016    Procedure: ARTHROSCOPY W/ PARTIAL MEDIAL MENISCECTOMY;  Surgeon: Taqueria Goldman MD;  Location:  MAIN OR;  Service: Orthopedics    TOTAL ABDOMINAL HYSTERECTOMY      TUBAL LIGATION           04/26/18 1033   Note Type   Note type Eval/Treat   Restrictions/Precautions   Weight Bearing Pt is returning call to the office regarding his handicap placard please call pt at phone number [phone] Precautions Per Order No   Other Precautions Telemetry;Multiple lines; Fall Risk;Pain   Pain Assessment   Pain Assessment 0-10   Pain Score 4   Pain Type Acute pain   Pain Location (eye)   Pain Orientation Right   Pain Descriptors Aching;Pressure   Pain Frequency Constant/continuous   Pain Onset Ongoing   Patient's Stated Pain Goal No pain   Hospital Pain Intervention(s) Emotional support;Distraction   Diversional Activities (conversation)   Response to Interventions tolerated well   Home Living   Type of [address] One level   Bathroom Shower/Tub Tub/shower unit   Bathroom Toilet Standard   Bathroom Equipment (denies)   [address] (denies)   Prior Function   Level of [place] Independent with ADLs and functional mobility   Lives With Spouse   Receives Help From Family   ADL Assistance Independent   IADLs Independent   Vocational (used to work at [employer], does not want to return)   Lifestyle   Autonomy I in ADL/IADL - driving,  A with IADLs as needed   Reciprocal Relationships supportive  who works day shift   Service to Others used to work at [employer], does not want to return   [address] being with family   Psychosocial   Psychosocial (WDL) X   Patient Behaviors/Mood Flat affect   Subjective   Subjective "I would like to go back to see Leti Khoury (OP OT)  She is very helpful and so beautiful!"   ADL   Grooming Assistance 5  Supervision/Setup   Grooming Deficit Supervision/safety; Wash/dry hands  (standing at sink)   UB Dressing Assistance 4  Minimal Assistance   UB Dressing Deficit Fasteners;Setup   LB Dressing Assistance 5  Supervision/Setup   LB Dressing Deficit Setup;Don/doff R sock; Don/doff L sock   Toileting Assistance  5  Supervision/Setup   Toileting Deficit Supervison/safety; Clothing management up;Clothing management down;Perineal hygiene   Bed Mobility   Additional Comments Pt   OOB at start of eval   Transfers   Sit to Stand 5 Supervision   Additional items Armrests   Stand to Sit 5  Supervision   Additional items Armrests   Toilet transfer 5  Supervision   Additional items Standard toilet   Functional Mobility   Functional Mobility 4  Minimal assistance  (CGA)   Additional Comments ~150', without AD, occasionally used IV pole for steadying   Balance   Static Sitting Good   Dynamic Sitting Fair +   Static Standing Fair   Dynamic Standing Fair   Ambulatory Fair -   Activity Tolerance   Activity Tolerance Patient tolerated treatment well   Nurse Made Aware yes, ARMANDO Paz cleared for session   RUE Assessment   RUE Assessment WFL   LUE Assessment   LUE Assessment WFL   Hand Function   Gross Motor Coordination Functional   Fine Motor Coordination Functional   Sensation   Light Touch No apparent deficits   Cognition   Overall Cognitive Status (decreased high level tasks and attn noted, pt  w/ good insig)   Arousal/Participation Alert; Cooperative   Attention Attends with cues to redirect   Orientation Level Oriented X4   Memory Within functional limits   Following Commands Follows multistep commands without difficulty   Assessment   Limitation Decreased cognition;Decreased endurance;Decreased high-level ADLs  (vision, see assessment for details)   Prognosis Good   Assessment Pt  is a 47 y o  female who was admitted to [facility] on 4/25/2018 with acute R ischemic stroke  Pt  w/a significant PMH of hx  Of stroke, anemia, anxiety, lumbar radiculopathy and stenosis, chronic pain, spinal spondylosis  Pt  currently lives in a [address] [place] [address] with spouse  PTA, pt  (I) in all ADLs/IADLs  Pt did not use AD for functional mobility  Currently, pt  requires min A for functional mobility and (S) transfers, min A-s/u for UB ADLs and (S)-s/u for LB ADLs   A/S is needed d/t the following impairments: decreased functional activity tolerance, deconditioning, decreased balance, decreased convergence, decrease in smooth pursuits, decreased ability to multitask,  Increased distractibility  However, pt  Only needed physical A during ADLs for gown fasteners and expect pt  Performance to improve with street clothing  Pt  Also with good insight into deficits and self reports on decreased cognitive abilities  Therefore, do not believe that pt  Has acute care OT needs at this time however, rec  Pt  Return to OP OT to address high level cognitive deficits, visual deficits and activity tolerance  Also rec  Pt  Continue to participate in ADLs/mobility with RN and restorative staff while in acute care  Defer further eval of mobility/transfers/need for AD to PT at this time  Recommend OP OT once medically stable     Goals   Patient Goals to to go to the bathroom   Plan   OT Frequency Eval only   Recommendation   OT Discharge Recommendation Outpatient OT   OT - OK to Discharge Yes   Barthel Index   Feeding 10   Bathing 0   Grooming Score 5   Dressing Score 10   Bladder Score 10   Bowels Score 10   Toilet Use Score 10   Transfers (Bed/Chair) Score 15   Mobility (Level Surface) Score 10   Stairs Score 0   Barthel Index Score 80   Modified [place] Scale   Modified [place] Scale 2     Zhao Marsh, MOT, OTR/L

## 2020-01-06 NOTE — PROGRESS NOTES
Assessment/Plan:    1  Screening for breast cancer  -     Mammo screening bilateral w cad; Future; Expected date: 01/06/2020    2  Slow transit constipation    3  Tobacco abuse    4  Other idiopathic scoliosis, lumbar region    5  Hypercholesterolemia  -     Lipid panel; Future  -     CK; Future  -     Comprehensive metabolic panel; Future  -     atorvastatin (LIPITOR) 40 mg tablet; Take 1 tablet (40 mg total) by mouth daily    6  High serum high density lipoprotein (HDL)  -     atorvastatin (LIPITOR) 40 mg tablet; Take 1 tablet (40 mg total) by mouth daily    7  PAF (paroxysmal atrial fibrillation) ([facility] [address] )    8  Cervical radiculopathy    9  Anxiety    10  Acute ischemic right MCA stroke (HCC)  -     apixaban (ELIQUIS) 5 mg; Take 1 tablet (5 mg total) by mouth 2 (two) times a day    11  Vitamin D deficiency  -     Vitamin D 25 hydroxy; Future    12  Refused influenza vaccine      BMI Counseling: Body mass index is 25 92 kg/m²  The BMI is above normal  Nutrition recommendations include moderation in carbohydrate intake  Exercise recommendations include exercising 3-5 times per week  No pharmacotherapy was ordered  Laboratory data reviewed  I advised neural ocular therapy at [facility] in [facility] and referral given to her today  Advised her to continue with medical marijuana  Advised increase water intake for improvement in constipation and okay to take over-the-counter laxatives including MiraLax on a regular basis once or twice a day as directed  She is to repeat her blood work in 6 months but call me sooner if she has any problems  There are no Patient Instructions on file for this visit  No follow-ups on file  Subjective:      Patient ID: Monse Redman is a 53 y o  female  Chief Complaint   Patient presents with    Follow-up     Pt is here today for a 5 month follow-up  HPI    Neurologic Problem   The patient's primary symptoms include memory loss   This is a recurrent problem  The current episode started more than 1 year ago  The neurological problem developed suddenly  The problem has been waxing and waning since onset  There was no focality noted  Associated symptoms include abdominal pain, an auditory change, an aura, back pain, bladder incontinence, confusion, diaphoresis, fatigue, headaches and neck pain  Pertinent negatives include no bowel incontinence, chest pain, dizziness, fever, light-headedness, nausea, palpitations, shortness of breath or vomiting  Past treatments include medication, neck support, position change, sleep and walking  The treatment provided mild relief  January 2020, was told that these issues could take up to 5 year to show improvement and she is very frustrated with that  She has not been declining      Patient lost her medical insurance at the end of the year and is now having difficulty with her medications and testings and Dr Reid Mirza  July 2019, has now bought private insurance  Is looking to apply for a job  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 and is doing well  Continues with physical therapy  January 2020, is following with Physical Medicine and rehab at [facility] and with pain management doctor more to stop the   Received injections from both places but is not sure that she will continue with pain management  They recommended a x-ray mental therapy that she is not agreeable to at this time  She has completed physical therapy  She is getting medical marijuana through Dr Viktor Brooks but she does not use it on a regular basis since she has started to work now  She is happy that she is working and loves the people she works with however 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 which she finds boring    She needs to have ocular therapy done which she had once in [facility] but did not feel that was helpful and it was very expensive for her and she has difficulty getting there     Chronic back and cervical pain with radiculoapthy-not new, getting therapy and feels a normal level of pain on a daily basis        Depression:  Feeling much better with low dose lexapro  But has some fatigue side effects  Has not been able to work due to stroke and is having some difficulty with exercising due to chronic back pain  July 2019 stable January 2020, stable with present medications and slightly better since she is working now         Hyperlipidemia (Follow-Up): The patient states her hyperlipidemia has been under good control since the last visit  She has no significant interval events     Symptoms: denies chest pain-- and-- denies intermittent leg claudication  Associated symptoms include no focal neurologic deficits-- and-- no memory loss       Medications: the patient is not adherent with her medication   July 2019: Last lab work done in December  On pravastatin and tolerating well however she has a preference to Lipitor which she was on before and is asking for change  No recent labs today but last lab work was elevated      Chronic pain, trying to wean off oxycodone and seeing Physical Medicine and Rehab doctor    Started on medical marijuana which has significantly improved her symptoms and she has been able to decrease her oxycodone however she is in some financial difficulty due to not having a job and no medical coverage now and finds it difficult to pay cash for her medical marijuana   January 2020, not using oxycodone since it makes her constipation worse       The following portions of the patient's history were reviewed and updated as appropriate: allergies, current medications, past family history, past medical history, past social history, past surgical history and problem list     Review of Systems      Constitutional:  Denies fever or chills , 15 lb intentional weight loss since 6 months  Eyes:  Denies double or blurry vision,   HENT:  Denies nasal congestion or sore throat   Respiratory:  Denies cough or shortness of breath or wheezing  Cardiovascular:  Denies palpitations or chest pain  GI:  Denies abdominal pain, nausea, or vomiting, + constipation  Integument:  Denies rash , no open areas  Neurologic:  Denies headache or focal weakness      Current Outpatient Medications   Medication Sig Dispense Refill    apixaban (ELIQUIS) 5 mg Take 1 tablet (5 mg total) by mouth 2 (two) times a day 180 tablet 3    aspirin 81 mg chewable tablet Chew 1 tablet (81 mg total) daily 30 tablet 0    atorvastatin (LIPITOR) 40 mg tablet Take 1 tablet (40 mg total) by mouth daily 90 tablet 1    cyclobenzaprine (FLEXERIL) 5 mg tablet TAKE ONE TABLET EVERY 8 HOURS AS NEEDED FOR 15 DAYS  1    cycloSPORINE (RESTASIS MULTIDOSE) 0 05 % ophthalmic emulsion Administer 1 drop to both eyes every 12 (twelve) hours       diazepam (VALIUM) 5 mg tablet Take 2 tablets (10 mg total) by mouth daily as needed for anxiety 90 tablet 1    ELIQUIS 5 MG TAKE 1 TABLET (5 MG TOTAL) BY MOUTH 2 (TWO) TIMES A  tablet 1    ergocalciferol (VITAMIN D2) 50,000 units Must be compounded without dyes due to allergies- one per week 12 capsule 1    escitalopram (LEXAPRO) 5 mg tablet Take 1 tablet (5 mg total) by mouth daily 30 tablet 6    loteprednol etabonate (LOTEMAX) 0 5 % ophthalmic suspension Administer 1 drop to both eyes 2 (two) times a day       Magnesium 500 MG CAPS Take 1 capsule by mouth daily       Omega-3 Fatty Acids (FISH OIL) 1200 MG CAPS Take 1 capsule by mouth daily       oxyCODONE (ROXICODONE) 5 mg immediate release tablet Take 1 tablet (5 mg total) by mouth every 6 (six) hours as needed for moderate painMax Daily Amount: 20 mg 120 tablet 0    Potassium (POTASSIMIN PO) Take 550 mg by mouth      pregabalin (LYRICA) 75 mg capsule Take 1 capsule (75 mg total) by mouth 2 (two) times a day 60 capsule 5    rizatriptan (MAXALT-MLT) 5 MG disintegrating tablet        No current facility-administered medications for this visit          Objective:    /78 (BP Location: Left arm, Patient Position: Sitting, Cuff Size: Adult)   Pulse 81   Temp 98 2 °F (36 8 °C) (Tympanic)   Wt 62 2 kg (137 lb 3 2 oz)   LMP  (LMP Unknown)   SpO2 100%   BMI 25 92 kg/m²        Physical Exam       Constitutional:  Well developed, well nourished, no acute distress, non-toxic appearance   Eyes:  PERRL, conjunctiva normal , non icteric sclera  HENT:  Atraumatic, oropharynx moist  Neck-  supple   Respiratory:  CTA b/l, normal breath sounds, no rales, no wheezing   Cardiovascular:  RRR, no murmurs, no LE edema b/l  GI:  Soft, nondistended, normal bowel sounds x 4, nontender, no organomegaly, no mass, no rebound, no guarding   Neurologic:  no focal deficits noted   Psychiatric:  Speech and behavior appropriate , AAO x 3        Marlyse Anon, DO

## 2020-01-08 DIAGNOSIS — Z12.39 SCREENING FOR BREAST CANCER: ICD-10-CM

## 2020-02-01 NOTE — PROGRESS NOTES
Daily Note     Today's date: 2018  Patient name: Harry Mari  : 1965  MRN: [id number]  Referring provider: Angus Mcknight MD  Dx:   Encounter Diagnosis   Name Primary?  Acute ischemic right MCA stroke (HCC) Yes                  Subjective: "I go to the vision therapist for prism therapy on Mondayembedded word task "  Objective: See treatment diary below      Assessment: Tolerated treatment well  Patient would benefit from continued OT   Pt engaged in embedded word task focusing on positional changes, tracking and L midline shift  Activity placed to right of pt on vertical surface in front and back of pt to facilitate full body turn scanning in  HR plane from left to right  Pt located about 75% of words w/o assist   Pt completed card match board pattern focusing on dynamic balance, midline shift and scanning  Pt balanced on foam discs while at same time completing card pattern w/card board placed to left and pattern assembled to right using reacher to retrieve cards and place on low surface  Pt sustained balance throughout task w/min difficulty to accurately place cards  Plan: Continued skilled OT per POC  And pattern assembled to the right using reacher       INTERVENTION COMMENTS:  Diagnosis: Acute ischemic right MCA stroke ([facility] [address] ) [I63 [id number]]  Precautions: fall risk, depression, suicidal ideations, tobacco use/abuse  5 of 8 visits, PN due  Graft Cartilage Fenestration Text: The cartilage was fenestrated with a 2mm punch biopsy to help facilitate graft survival and healing.

## 2020-03-12 ENCOUNTER — REMOTE DEVICE CLINIC VISIT (OUTPATIENT)
Dept: CARDIOLOGY CLINIC | Facility: CLINIC | Age: 55
End: 2020-03-12

## 2020-03-12 DIAGNOSIS — Z95.818 PRESENCE OF CARDIAC DEVICE: Primary | ICD-10-CM

## 2020-03-12 PROCEDURE — 93298 REM INTERROG DEV EVAL SCRMS: CPT | Performed by: INTERNAL MEDICINE

## 2020-03-12 PROCEDURE — G2066 INTER DEVC REMOTE 30D: HCPCS | Performed by: INTERNAL MEDICINE

## 2020-03-12 NOTE — PROGRESS NOTES
Results for orders placed or performed in visit on 03/12/20   Cardiac EP device report    Narrative    SJM LOOP  MERLIN TRANSMISSION: BATTERY STATUS "OK"  1 AF EPISODE WITH NO E-GRAM AVAILABLE (UP TO 9:44 MNTS)  PT  TAKES ASA 81 & ELIQUIS  NO OTHER EPISODES NOTED  NORMAL DEVICE FUNCTION   PL

## 2020-06-24 ENCOUNTER — TRANSCRIBE ORDERS (OUTPATIENT)
Dept: ADMINISTRATIVE | Age: 55
End: 2020-06-24

## 2020-06-24 ENCOUNTER — OFFICE VISIT (OUTPATIENT)
Dept: CARDIOLOGY CLINIC | Facility: CLINIC | Age: 55
End: 2020-06-24
Payer: COMMERCIAL

## 2020-06-24 VITALS
SYSTOLIC BLOOD PRESSURE: 110 MMHG | WEIGHT: 135.8 LBS | HEART RATE: 85 BPM | BODY MASS INDEX: 25.64 KG/M2 | HEIGHT: 61 IN | DIASTOLIC BLOOD PRESSURE: 70 MMHG

## 2020-06-24 DIAGNOSIS — I48.0 PAF (PAROXYSMAL ATRIAL FIBRILLATION) (HCC): Primary | ICD-10-CM

## 2020-06-24 DIAGNOSIS — Z79.01 ANTICOAGULANT LONG-TERM USE: ICD-10-CM

## 2020-06-24 DIAGNOSIS — I63.89 CEREBROVASCULAR ACCIDENT (CVA) DUE TO OTHER MECHANISM (HCC): ICD-10-CM

## 2020-06-24 PROBLEM — I63.9 CEREBROVASCULAR ACCIDENT (HCC): Status: ACTIVE | Noted: 2020-06-24

## 2020-06-24 PROCEDURE — 99213 OFFICE O/P EST LOW 20 MIN: CPT | Performed by: INTERNAL MEDICINE

## 2020-06-24 PROCEDURE — 93000 ELECTROCARDIOGRAM COMPLETE: CPT | Performed by: INTERNAL MEDICINE

## 2020-06-24 PROCEDURE — 3008F BODY MASS INDEX DOCD: CPT | Performed by: INTERNAL MEDICINE

## 2020-06-26 ENCOUNTER — TELEPHONE (OUTPATIENT)
Dept: CARDIOLOGY CLINIC | Facility: CLINIC | Age: 55
End: 2020-06-26

## 2020-06-29 ENCOUNTER — OFFICE VISIT (OUTPATIENT)
Dept: INTERNAL MEDICINE CLINIC | Age: 55
End: 2020-06-29
Payer: COMMERCIAL

## 2020-06-29 VITALS
HEIGHT: 61 IN | BODY MASS INDEX: 25.68 KG/M2 | OXYGEN SATURATION: 97 % | SYSTOLIC BLOOD PRESSURE: 110 MMHG | WEIGHT: 136 LBS | HEART RATE: 95 BPM | DIASTOLIC BLOOD PRESSURE: 70 MMHG | TEMPERATURE: 97.5 F

## 2020-06-29 DIAGNOSIS — I48.0 PAF (PAROXYSMAL ATRIAL FIBRILLATION) (HCC): ICD-10-CM

## 2020-06-29 DIAGNOSIS — E55.9 VITAMIN D DEFICIENCY: ICD-10-CM

## 2020-06-29 DIAGNOSIS — K59.01 SLOW TRANSIT CONSTIPATION: ICD-10-CM

## 2020-06-29 DIAGNOSIS — M48.061 SPINAL STENOSIS OF LUMBAR REGION WITHOUT NEUROGENIC CLAUDICATION: ICD-10-CM

## 2020-06-29 DIAGNOSIS — Z12.11 SCREENING FOR COLON CANCER: Primary | ICD-10-CM

## 2020-06-29 DIAGNOSIS — E78.00 HYPERCHOLESTEROLEMIA: ICD-10-CM

## 2020-06-29 DIAGNOSIS — M54.30 SCIATIC LEG PAIN: ICD-10-CM

## 2020-06-29 DIAGNOSIS — M51.36 LUMBAR DEGENERATIVE DISC DISEASE: ICD-10-CM

## 2020-06-29 DIAGNOSIS — Z72.0 TOBACCO ABUSE: ICD-10-CM

## 2020-06-29 DIAGNOSIS — M41.26 OTHER IDIOPATHIC SCOLIOSIS, LUMBAR REGION: ICD-10-CM

## 2020-06-29 DIAGNOSIS — Z79.01 ANTICOAGULANT LONG-TERM USE: ICD-10-CM

## 2020-06-29 PROCEDURE — 99214 OFFICE O/P EST MOD 30 MIN: CPT | Performed by: FAMILY MEDICINE

## 2020-06-29 RX ORDER — PREDNISONE 20 MG/1
40 TABLET ORAL DAILY
Qty: 10 TABLET | Refills: 0 | Status: SHIPPED | OUTPATIENT
Start: 2020-06-29 | End: 2020-07-04

## 2020-06-29 NOTE — PROGRESS NOTES
Assessment/Plan:    1  Screening for colon cancer  -     Cologuard; Future    2  Spinal stenosis of lumbar region without neurogenic claudication  -     Ambulatory referral to Neurosurgery; Future  -     predniSONE 20 mg tablet; Take 2 tablets (40 mg total) by mouth daily for 5 days    3  Slow transit constipation    4  Tobacco abuse    5  Hypercholesterolemia  -     Lipid panel; Future  -     Comprehensive metabolic panel; Future    6  Other idiopathic scoliosis, lumbar region    7  Lumbar degenerative disc disease    8  PAF (paroxysmal atrial fibrillation) ([facility] [address] )    9  Anticoagulant long-term use    10  Vitamin D deficiency  -     Vitamin D 25 hydroxy; Future    11  Sciatic leg pain        Tobacco Cessation Counseling: Medication options and side effects of medication discussed  Patient refused medication  There are no Patient Instructions on file for this visit  No follow-ups on file  Subjective:      Patient ID: Mikayla Mckinley is a 53 y o  female  Chief Complaint   Patient presents with    Follow-up     Pt is here today for 6 month follow-up  HPI     Hyperlipidemia (Follow-Up): The patient states her hyperlipidemia has been under good control since the last visit  She has no significant interval events     Symptoms: denies chest pain-- and-- denies intermittent leg claudication  Associated symptoms include no focal neurologic deficits-- and-- no memory loss       Medications: the patient is not adherent with her medication   July 2019: Last lab work done in December   On pravastatin and tolerating well however she has a preference to Lipitor which she was on before and is asking for change   No recent labs today but last lab work was elevated    June 2020 well controlled lipid levels, on statin and tolerating well    Depression:  Feeling much better with low dose lexapro   But has some fatigue side effects   Has not been able to work due to stroke and is having some difficulty with exercising due to chronic back pain   July 2019 stable January 2020, stable with present medications and slightly better since she is working now  [employer], relatively well controlled and no suicidal homicide ideations    She is working now and feels tired but relatively stable        Chronic pain, trying to wean off oxycodone and seeing Physical Medicine and Rehab doctor   Started on medical marijuana which has significantly improved her symptoms and she has been able to decrease her oxycodone however she is in some financial difficulty due to not having a job and no medical coverage now and finds it difficult to pay cash for her medical marijuana   January 2020, not using oxycodone since it makes her constipation worse   June 2020, stable    The following portions of the patient's history were reviewed and updated as appropriate: allergies, current medications, past family history, past medical history, past social history, past surgical history and problem list     Review of Systems      Constitutional:  Denies fever or chills no significant weight issues  Eyes:  Denies double or blurry vision  HENT:  Denies nasal congestion or sore throat no ear pain  Respiratory:  Denies cough or shortness of breath or wheezing  Cardiovascular:  Denies palpitations or chest pain  GI:  Denies abdominal pain, nausea, or vomiting no heartburn  Integument:  Denies rash , no open areas  Neurologic:  Denies headache or focal weakness no weakness        Current Outpatient Medications   Medication Sig Dispense Refill    apixaban (ELIQUIS) 5 mg Take 1 tablet (5 mg total) by mouth 2 (two) times a day 180 tablet 3    aspirin 81 mg chewable tablet Chew 1 tablet (81 mg total) daily 30 tablet 0    atorvastatin (LIPITOR) 40 mg tablet Take 1 tablet (40 mg total) by mouth daily 90 tablet 1    cyclobenzaprine (FLEXERIL) 5 mg tablet TAKE ONE TABLET EVERY 8 HOURS AS NEEDED FOR 15 DAYS  1    cycloSPORINE (RESTASIS MULTIDOSE) 0 05 % ophthalmic emulsion Administer 1 drop to both eyes every 12 (twelve) hours       diazepam (VALIUM) 5 mg tablet Take 2 tablets (10 mg total) by mouth daily as needed for anxiety 90 tablet 1    ergocalciferol (VITAMIN D2) 50,000 units Must be compounded without dyes due to allergies- one per week 12 capsule 1    escitalopram (LEXAPRO) 5 mg tablet Take 1 tablet (5 mg total) by mouth daily 30 tablet 6    loteprednol etabonate (LOTEMAX) 0 5 % ophthalmic suspension Administer 1 drop to both eyes 2 (two) times a day       Magnesium 500 MG CAPS Take 1 capsule by mouth daily       Omega-3 Fatty Acids (FISH OIL) 1200 MG CAPS Take 1 capsule by mouth daily       oxyCODONE (ROXICODONE) 5 mg immediate release tablet Take 1 tablet (5 mg total) by mouth every 6 (six) hours as needed for moderate painMax Daily Amount: 20 mg 120 tablet 0    Potassium (POTASSIMIN PO) Take 550 mg by mouth      rizatriptan (MAXALT-MLT) 5 MG disintegrating tablet       predniSONE 20 mg tablet Take 2 tablets (40 mg total) by mouth daily for 5 days 10 tablet 0     No current facility-administered medications for this visit          Objective:    /70 (BP Location: Left arm, Patient Position: Sitting, Cuff Size: Adult)   Pulse 95   Temp 97 5 °F (36 4 °C) (Tympanic)   Ht 5' 1" (1 549 m)   Wt 61 7 kg (136 lb)   LMP  (LMP Unknown)   SpO2 97%   BMI 25 70 kg/m²        Physical Exam         Constitutional:  Well developed, well nourished, no acute distress, non-toxic appearance   Eyes:  PERRL, conjunctiva normal , non icteric sclera  HENT:  Atraumatic, oropharynx moist  Neck-  supple   Respiratory:  CTA b/l, normal breath sounds, no rales, no wheezing   Cardiovascular:  RRR, no murmurs, no LE edema b/l  GI:  Soft, nondistended, normal bowel sounds x 4, nontender, no organomegaly, no mass, no rebound, no guarding   Neurologic:  no focal deficits noted   Psychiatric:  Speech and behavior appropriate , AAO x 3      Velta [place], DO

## 2020-07-27 DIAGNOSIS — I48.0 PAF (PAROXYSMAL ATRIAL FIBRILLATION) (HCC): Primary | ICD-10-CM

## 2020-08-14 ENCOUNTER — CONSULT (OUTPATIENT)
Dept: NEUROSURGERY | Facility: CLINIC | Age: 55
End: 2020-08-14
Payer: COMMERCIAL

## 2020-08-14 VITALS
SYSTOLIC BLOOD PRESSURE: 130 MMHG | BODY MASS INDEX: 25.68 KG/M2 | DIASTOLIC BLOOD PRESSURE: 70 MMHG | HEIGHT: 61 IN | WEIGHT: 136 LBS

## 2020-08-14 DIAGNOSIS — I63.511 ACUTE ISCHEMIC RIGHT MCA STROKE (HCC): ICD-10-CM

## 2020-08-14 DIAGNOSIS — M48.061 SPINAL STENOSIS OF LUMBAR REGION WITHOUT NEUROGENIC CLAUDICATION: ICD-10-CM

## 2020-08-14 DIAGNOSIS — G89.29 CHRONIC BILATERAL LOW BACK PAIN WITH LEFT-SIDED SCIATICA: Primary | ICD-10-CM

## 2020-08-14 DIAGNOSIS — M54.42 CHRONIC BILATERAL LOW BACK PAIN WITH LEFT-SIDED SCIATICA: Primary | ICD-10-CM

## 2020-08-14 DIAGNOSIS — I48.0 PAF (PAROXYSMAL ATRIAL FIBRILLATION) (HCC): ICD-10-CM

## 2020-08-14 PROCEDURE — 3008F BODY MASS INDEX DOCD: CPT | Performed by: NEUROLOGICAL SURGERY

## 2020-08-14 PROCEDURE — 99244 OFF/OP CNSLTJ NEW/EST MOD 40: CPT | Performed by: NEUROLOGICAL SURGERY

## 2020-08-14 NOTE — PROGRESS NOTES
Office Note - Neurosurgery   Michaela Wyatt 53 y o  female MRN: [id number]      Assessment:    Patient is gradually worsening  67-year-old woman with chronic pain syndrome  She has more acute lower back and left-sided leg pain for which she has not significantly changed her more chronic pain regimen  Would recommend she exhausted nonsurgical pain management strategies  May wish to consider an additional epidural steroid injection  Physical therapy may be helpful as well  Her imaging is out of date but does demonstrate anterolisthesis  I explained any surgical intervention for this would likely involve a lumbar decompression fusion  Up-to-date imaging would be required for more definitive surgical planning  However, at present she does not feel that she is interested in surgery  I reviewed the signs and symptoms of progressive lumbar radiculopathy and cauda equina syndrome and asked her to contact this office should any concerns arise  Otherwise she will continue to work with her PCP and pain specialist   She will follow up through this office on a p r n  Basis  History, physical examination and diagnostic tests were reviewed and questions answered  Diagnosis, care plan and treatment options were discussed  The patient and spouse/SO understand instructions and will follow up as directed  Plan:    Follow-up: prn    Problem List Items Addressed This Visit        Cardiovascular and Mediastinum    Acute ischemic right MCA stroke (HCC)    PAF (paroxysmal atrial fibrillation) (HCC)       Other    Chronic low back pain - Primary    Spinal stenosis of lumbar region without neurogenic claudication          Subjective/Objective     Chief Complaint    Low back and left leg pain  HPI    Pleasant 67-year-old woman was longstanding history of lower back and right-sided leg pain  In May of 2020, without inciting event, she developed increased lower back pain and left-sided leg pain    She continues to have intermittent right-sided leg pain which is not bothersome  She denies any weakness or numbness in the right leg  She denies any perineal numbness or fecal incontinence but does have what sounds like stress urinary incontinence which was exacerbated after bladder surgery  She describes lower back pain which radiates into the buttock in the outer aspect of her left leg and into her foot  There is no associated weakness or numbness unless the pain is very severe  Lying down and sitting on the most painful and moving from sitting to standing is very painful as well  The pain improved to some extent with walking  Aside from a course of oral steroid, she has not tried any additional injections or pain medication or physical therapy for this new or pain  She is contemplating trying and inversion table  Her current pain medication regimen, which seems to be a chronic regimen is listed below  She presents today with an out of date MRI of the lumbar spine  BRANDY NAVA personally reviewed and updated  Review of Systems   Constitutional: Negative  HENT: Negative  Eyes: Negative  Respiratory: Negative  Cardiovascular: Negative  Gastrointestinal: Negative  Incontinence    Endocrine: Negative  Genitourinary: Negative  Musculoskeletal: Positive for arthralgias (left leg ), back pain (LBP Left side ) and gait problem (painful to walk )  Allergic/Immunologic: Negative  Neurological: Positive for numbness  Hematological: Negative  Psychiatric/Behavioral: Negative          Family History    Family History   Problem Relation Age of Onset    Stroke Mother         46s    Cancer Mother     Hypertension Mother     Pulmonary embolism Other         In mid 35s    Stroke Sister         46s    Stroke Brother         46s    Prostate cancer Father     Cancer Daughter     Stroke Family        Social History    Social History     Socioeconomic History    Marital status: /Civil [employer]     Spouse name: Not on file    Number of children: Not on file    Years of education: Not on file    Highest education level: Not on file   Occupational History    Not on file   Social Needs    Financial resource strain: Not on file    Food insecurity     Worry: Not on file     Inability: Not on file    Transportation needs     Medical: Not on file     Non-medical: Not on file   Tobacco Use    Smoking status: Current Every Day Smoker     Packs/day: 0 25     Years: 40 00     Pack years: 10 00     Types: Cigarettes    Smokeless tobacco: Never Used    Tobacco comment: one half pack a day   Substance and Sexual Activity    Alcohol use: Yes     Comment: social drinker    Drug use: Not Currently     Types: Marijuana     Comment: medical marijuana (liquid form only)    Sexual activity: Not on file   Lifestyle    Physical activity     Days per week: Not on file     Minutes per session: Not on file    Stress: Not on file   Relationships    Social connections     Talks on phone: Not on file     Gets together: Not on file     Attends Gnosticism service: Not on file     Active member of club or organization: Not on file     Attends meetings of clubs or organizations: Not on file     Relationship status: Not on file    Intimate partner violence     Fear of current or ex partner: Not on file     Emotionally abused: Not on file     Physically abused: Not on file     Forced sexual activity: Not on file   Other Topics Concern    Not on file   Social History Narrative    ** Merged History Encounter **            Past Medical History    Past Medical History:   Diagnosis Date    Acute pain of right knee     last assessed - [id number]    Anemia     Anxiety     Cervical disc disorder with radiculopathy     Chronic pain     Constipation     CVA [facility]cere[facility] vascular [facility]) [facility] [address] [facility]     H/O ischemic right MCA stroke     H/O: hysterectomy     Hematuria     last assessed - [id number]   Carmen Harris cholesterol     Lumbar radiculopathy     Lumbar stenosis     Other chronic pain     last assessed - [id number]    Other muscle spasm     last assessed - [id number]    PONV (postoperative nausea and vomiting)     must have premed    Spondylosis of cervical spine     Spondylosis of lumbosacral region     Stroke [facility])     Urinary incontinence     Resolved - [id number]       Surgical History    Past Surgical History:   Procedure Laterality Date    BLADDER SURGERY      BLADDER SURGERY  2014    CERVICAL SPINE SURGERY      KNEE ARTHROSCOPY Left     LIPECTOMY      of thigh    LIPOMA RESECTION      NECK SURGERY      MT COLONOSCOPY FLX DX W/COLLJ SPEC WHEN PFRMD N/A 2/10/2017    Procedure: COLONOSCOPY;  Surgeon: Stephania Lamb MD;  Location: [facility];   Service: Gastroenterology    MT KNEE SCOPE,MED/LAT MENISECTOMY Left 3/21/2016    Procedure: ARTHROSCOPY W/ PARTIAL MEDIAL MENISCECTOMY;  Surgeon: Jasmin Cain MD;  Location:  MAIN OR;  Service: Orthopedics    TOTAL ABDOMINAL HYSTERECTOMY      TUBAL LIGATION         Medications      Current Outpatient Medications:     apixaban (ELIQUIS) 5 mg, Take 1 tablet (5 mg total) by mouth 2 (two) times a day, Disp: 180 tablet, Rfl: 3    aspirin 81 mg chewable tablet, Chew 1 tablet (81 mg total) daily, Disp: 30 tablet, Rfl: 0    atorvastatin (LIPITOR) 40 mg tablet, Take 1 tablet (40 mg total) by mouth daily, Disp: 90 tablet, Rfl: 1    cyclobenzaprine (FLEXERIL) 5 mg tablet, TAKE ONE TABLET EVERY 8 HOURS AS NEEDED FOR 15 DAYS, Disp: , Rfl: 1    cycloSPORINE (RESTASIS MULTIDOSE) 0 05 % ophthalmic emulsion, Administer 1 drop to both eyes every 12 (twelve) hours , Disp: , Rfl:     diazepam (VALIUM) 5 mg tablet, Take 2 tablets (10 mg total) by mouth daily as needed for anxiety, Disp: 90 tablet, Rfl: 1    ergocalciferol (VITAMIN D2) 50,000 units, Must be compounded without dyes due to allergies- one per week, Disp: 12 capsule, Rfl: 1    escitalopram (LEXAPRO) 5 mg tablet, Take 1 tablet (5 mg total) by mouth daily, Disp: 30 tablet, Rfl: 6    loteprednol etabonate (LOTEMAX) 0 5 % ophthalmic suspension, Administer 1 drop to both eyes 2 (two) times a day , Disp: , Rfl:     Magnesium 500 MG CAPS, Take 1 capsule by mouth daily , Disp: , Rfl:     Misc Natural Products (OSTEO BI-FLEX TRIPLE STRENGTH PO), Take by mouth, Disp: , Rfl:     Omega-3 Fatty Acids (FISH OIL) 1200 MG CAPS, Take 1 capsule by mouth daily , Disp: , Rfl:     oxyCODONE (ROXICODONE) 5 mg immediate release tablet, Take 1 tablet (5 mg total) by mouth every 6 (six) hours as needed for moderate painMax Daily Amount: 20 mg, Disp: 120 tablet, Rfl: 0    Potassium (POTASSIMIN PO), Take 550 mg by mouth, Disp: , Rfl:     rizatriptan (MAXALT-MLT) 5 MG disintegrating tablet, , Disp: , Rfl:     Allergies    Allergies   Allergen Reactions    Blue Dyes (Parenteral) Anaphylaxis    Gabapentin Anaphylaxis and Nausea Only     Anaphylaxis    Penicillins Rash and Vomiting    Anesthesia S-I-60     Nitrofurantoin GI Intolerance     vomiting    Tramadol GI Intolerance     Other reaction(s): GI upset  Vomiting  Vomiting      Blue Dyes (Parenteral) Rash       The following portions of the patient's history were reviewed and updated as appropriate: allergies, current medications, past family history, past medical history, past social history, past surgical history and problem list     Investigations    I personally reviewed the MRI results with the patient:    MRI of the lumbar spine without contrast dated September 5th, 2019  Grade 1 degenerative anterolisthesis at L4-5  This produces mild central and biforaminal stenosis  There is moderate bilateral lateral recess stenosis  No other areas of significant neural compression  Degenerative changes at L5-S1  The contents unremarkable  Physical Exam    Vitals:  Blood pressure 130/70, height 5' 1" (1 549 m), weight 61 7 kg (136 lb)  ,Body mass index is 25 7 kg/m²  Physical Exam  Vitals signs reviewed  Constitutional:       Appearance: Normal appearance  Eyes:      Extraocular Movements: Extraocular movements intact  Pulmonary:      Effort: Pulmonary effort is normal  No respiratory distress  Musculoskeletal:         General: No deformity  Comments: Full range of motion on flexion-extension lumbar spine, though extension does produce a sensation of pressure in the lower back  Skin:     General: Skin is warm and dry  Neurological:      Mental Status: She is oriented to person, place, and time  Comments: 5/5 power in lower extremities  Reports normal light touch and pinprick sensation lower extremities  Deep tendon reflexes 2+ at both patella and Achilles  Walks with a steady gait  No clonus  Straight leg raise negative bilaterally  Psychiatric:         Mood and Affect: Mood normal          Behavior: Behavior normal        Neurologic Exam     Mental Status   Oriented to person, place, and time

## 2020-08-15 DIAGNOSIS — E78.00 HYPERCHOLESTEROLEMIA: ICD-10-CM

## 2020-08-15 DIAGNOSIS — R79.89 HIGH SERUM HIGH DENSITY LIPOPROTEIN (HDL): ICD-10-CM

## 2020-08-15 DIAGNOSIS — T14.8XXA NERVE DAMAGE: ICD-10-CM

## 2020-08-15 DIAGNOSIS — E55.9 VITAMIN D DEFICIENCY: ICD-10-CM

## 2020-08-17 ENCOUNTER — APPOINTMENT (OUTPATIENT)
Dept: LAB | Age: 55
End: 2020-08-17
Payer: COMMERCIAL

## 2020-08-17 DIAGNOSIS — E55.9 VITAMIN D DEFICIENCY: ICD-10-CM

## 2020-08-17 DIAGNOSIS — E78.00 HYPERCHOLESTEROLEMIA: ICD-10-CM

## 2020-08-17 LAB
25(OH)D3 SERPL-MCNC: 61 NG/ML (ref 30–100)
ALBUMIN SERPL BCP-MCNC: 3.8 G/DL (ref 3.5–5)
ALP SERPL-CCNC: 66 U/L (ref 46–116)
ALT SERPL W P-5'-P-CCNC: 28 U/L (ref 12–78)
ANION GAP SERPL CALCULATED.3IONS-SCNC: 5 MMOL/L (ref 4–13)
AST SERPL W P-5'-P-CCNC: 19 U/L (ref 5–45)
BASOPHILS # BLD AUTO: 0.02 THOUSANDS/ΜL (ref 0–0.1)
BASOPHILS NFR BLD AUTO: 0 % (ref 0–1)
BILIRUB SERPL-MCNC: 0.53 MG/DL (ref 0.2–1)
BUN SERPL-MCNC: 10 MG/DL (ref 5–25)
CALCIUM SERPL-MCNC: 9.1 MG/DL (ref 8.3–10.1)
CHLORIDE SERPL-SCNC: 113 MMOL/L (ref 100–108)
CHOLEST SERPL-MCNC: 157 MG/DL (ref 50–200)
CO2 SERPL-SCNC: 28 MMOL/L (ref 21–32)
CREAT SERPL-MCNC: 0.8 MG/DL (ref 0.6–1.3)
EOSINOPHIL # BLD AUTO: 0.06 THOUSAND/ΜL (ref 0–0.61)
EOSINOPHIL NFR BLD AUTO: 1 % (ref 0–6)
ERYTHROCYTE [DISTWIDTH] IN BLOOD BY AUTOMATED COUNT: 14 % (ref 11.6–15.1)
GFR SERPL CREATININE-BSD FRML MDRD: 83 ML/MIN/1.73SQ M
GLUCOSE P FAST SERPL-MCNC: 97 MG/DL (ref 65–99)
HCT VFR BLD AUTO: 40.8 % (ref 34.8–46.1)
HDLC SERPL-MCNC: 68 MG/DL
HGB BLD-MCNC: 13.7 G/DL (ref 11.5–15.4)
IMM GRANULOCYTES # BLD AUTO: 0.01 THOUSAND/UL (ref 0–0.2)
IMM GRANULOCYTES NFR BLD AUTO: 0 % (ref 0–2)
LDLC SERPL CALC-MCNC: 78 MG/DL (ref 0–100)
LYMPHOCYTES # BLD AUTO: 2.31 THOUSANDS/ΜL (ref 0.6–4.47)
LYMPHOCYTES NFR BLD AUTO: 30 % (ref 14–44)
MCH RBC QN AUTO: 33.4 PG (ref 26.8–34.3)
MCHC RBC AUTO-ENTMCNC: 33.6 G/DL (ref 31.4–37.4)
MCV RBC AUTO: 100 FL (ref 82–98)
MONOCYTES # BLD AUTO: 0.56 THOUSAND/ΜL (ref 0.17–1.22)
MONOCYTES NFR BLD AUTO: 7 % (ref 4–12)
NEUTROPHILS # BLD AUTO: 4.76 THOUSANDS/ΜL (ref 1.85–7.62)
NEUTS SEG NFR BLD AUTO: 62 % (ref 43–75)
NONHDLC SERPL-MCNC: 89 MG/DL
NRBC BLD AUTO-RTO: 0 /100 WBCS
PLATELET # BLD AUTO: 269 THOUSANDS/UL (ref 149–390)
PMV BLD AUTO: 9.4 FL (ref 8.9–12.7)
POTASSIUM SERPL-SCNC: 4.1 MMOL/L (ref 3.5–5.3)
PROT SERPL-MCNC: 6.7 G/DL (ref 6.4–8.2)
RBC # BLD AUTO: 4.1 MILLION/UL (ref 3.81–5.12)
SODIUM SERPL-SCNC: 146 MMOL/L (ref 136–145)
TRIGL SERPL-MCNC: 57 MG/DL
WBC # BLD AUTO: 7.72 THOUSAND/UL (ref 4.31–10.16)

## 2020-08-17 PROCEDURE — 80061 LIPID PANEL: CPT

## 2020-08-17 PROCEDURE — 85025 COMPLETE CBC W/AUTO DIFF WBC: CPT

## 2020-08-17 PROCEDURE — 82306 VITAMIN D 25 HYDROXY: CPT

## 2020-08-17 PROCEDURE — 36415 COLL VENOUS BLD VENIPUNCTURE: CPT

## 2020-08-17 PROCEDURE — 80053 COMPREHEN METABOLIC PANEL: CPT

## 2020-08-17 RX ORDER — OXYCODONE HYDROCHLORIDE 5 MG/1
5 TABLET ORAL EVERY 6 HOURS PRN
Qty: 120 TABLET | Refills: 0 | Status: SHIPPED | OUTPATIENT
Start: 2020-08-17 | End: 2022-04-18 | Stop reason: SDUPTHER

## 2020-08-17 RX ORDER — ATORVASTATIN CALCIUM 40 MG/1
40 TABLET, FILM COATED ORAL DAILY
Qty: 90 TABLET | Refills: 1 | Status: SHIPPED | OUTPATIENT
Start: 2020-08-17 | End: 2021-02-23 | Stop reason: SDUPTHER

## 2020-08-17 RX ORDER — ERGOCALCIFEROL 1.25 MG/1
CAPSULE ORAL
Qty: 12 CAPSULE | Refills: 1 | Status: SHIPPED | OUTPATIENT
Start: 2020-08-17 | End: 2021-01-25 | Stop reason: SDUPTHER

## 2020-08-26 ENCOUNTER — TELEPHONE (OUTPATIENT)
Dept: INPATIENT UNIT | Facility: HOSPITAL | Age: 55
End: 2020-08-26

## 2020-08-27 ENCOUNTER — HOSPITAL ENCOUNTER (OUTPATIENT)
Dept: NON INVASIVE DIAGNOSTICS | Facility: HOSPITAL | Age: 55
Discharge: HOME | End: 2020-08-27
Attending: INTERNAL MEDICINE | Admitting: INTERNAL MEDICINE
Payer: COMMERCIAL

## 2020-08-27 VITALS
HEIGHT: 61 IN | DIASTOLIC BLOOD PRESSURE: 45 MMHG | TEMPERATURE: 97.6 F | SYSTOLIC BLOOD PRESSURE: 107 MMHG | OXYGEN SATURATION: 97 % | RESPIRATION RATE: 16 BRPM | WEIGHT: 136.02 LBS | HEART RATE: 75 BPM | BODY MASS INDEX: 25.68 KG/M2

## 2020-08-27 DIAGNOSIS — Z45.09 ENCOUNTER FOR LOOP RECORDER AT END OF BATTERY LIFE: Primary | ICD-10-CM

## 2020-08-27 DIAGNOSIS — I48.0 PAF (PAROXYSMAL ATRIAL FIBRILLATION) (HCC): ICD-10-CM

## 2020-08-27 PROCEDURE — NC001 PR NO CHARGE: Performed by: INTERNAL MEDICINE

## 2020-08-27 PROCEDURE — 33286 RMVL SUBQ CAR RHYTHM MNTR: CPT | Performed by: INTERNAL MEDICINE

## 2020-08-27 PROCEDURE — 33286 RMVL SUBQ CAR RHYTHM MNTR: CPT

## 2020-08-27 RX ORDER — LIDOCAINE HYDROCHLORIDE AND EPINEPHRINE 10; 10 MG/ML; UG/ML
INJECTION, SOLUTION INFILTRATION; PERINEURAL CODE/TRAUMA/SEDATION MEDICATION
Status: COMPLETED | OUTPATIENT
Start: 2020-08-27 | End: 2020-08-27

## 2020-08-27 RX ADMIN — LIDOCAINE HYDROCHLORIDE,EPINEPHRINE BITARTRATE 10 ML: 10; .01 INJECTION, SOLUTION INFILTRATION; PERINEURAL at 08:44

## 2020-08-27 NOTE — H&P
H&P Exam - Cardiology   Bhavin Mason 53 y o  female MRN: [id number]  Unit/Bed#: [facility] Encounter: [id number]    Assessment/Plan     Assessment:  1  Medtr[employer] loop recorder at end of life  2  Paroxysmal atrial fibrillation, maintained on Eliquis anticoagulation  3  Preserved LV systolic function with EF 60% per JONATAN 02/2018  4  History of CVA  5  Hyperlipidemia  6  Chronic pain syndrome    Plan:  Loop recorder explantation, continuation of oral anticoagulation  History of Present Illness   HPI:  Caitlyn Paul is a 53y o  year old female with prior CVA, preserved LV systolic function, hyperlipidemia, and chronic pain syndrome  She has a history of CVA, and underwent loop recorder implantation afterwards  She was then found to have paroxysmal atrial fibrillation and was placed on Eliquis anticoagulation  Through routine device interrogations she was found have reached end of life, and thus loop recorder explantation was recommended  She presents today to undergo that procedure  She denies cardiac complaints, no significant changes since she was last seen by Dr Farhan Hoff  Review of Systems  ROS as noted above, otherwise 12 point review of systems was performed and is negative         Historical Information   Past Medical History:   Diagnosis Date    Acute pain of right knee     last assessed - [id number]    Anemia     Anxiety     Cervical disc disorder with radiculopathy     Chronic pain     Constipation     [facility]A [facility]cerebral vascular accident) ([facility] [address] )     H/O ischemic right MCA stroke     H/O: hysterectomy     Hematuria     last assessed - [id number]    High cholesterol     Lumbar radiculopathy     Lumbar stenosis     Other chronic pain     last assessed - [id number]    Other muscle spasm     last assessed - [id number]    PONV (postoperative nausea and vomiting)     must have premed    Spondylosis of cervical spine     Spondylosis of lumbosacral region     [facility])     Urinary incontinence     Resolved - [id number]     Past Surgical History:   Procedure Laterality Date    BLADDER SURGERY      BLADDER SURGERY  2014    CERVICAL SPINE SURGERY      KNEE ARTHROSCOPY Left     LIPECTOMY      of thigh    LIPOMA RESECTION      NECK SURGERY      [place] COLONOSCOPY FLX DX W/COLLJ SPEC WHEN PFRMD N/A 2/10/2017    Procedure: COLONOSCOPY;  Surgeon: Kaykay Ndiaye MD;  Location: [facility];   Service: Gastroenterology    [place] KNEE SCOPE,MED/LAT MENISECTOMY Left 3/21/2016    Procedure: ARTHROSCOPY W/ PARTIAL MEDIAL MENISCECTOMY;  Surgeon: Macr Leroy MD;  Location:  MAIN OR;  Service: Orthopedics    TOTAL ABDOMINAL HYSTERECTOMY      TUBAL LIGATION       Family History:   Family History   Problem Relation Age of Onset    Stroke Mother         46s    Cancer Mother     Hypertension Mother     Pulmonary embolism Other         In mid 35s    Stroke Sister         46s    Stroke Brother         46s    Prostate cancer Father     Cancer Daughter     Stroke Family        Social History   Social History     Substance and Sexual Activity   Alcohol Use Yes    Comment: social drinker     Social History     Substance and Sexual Activity   Drug Use Not Currently    Types: Marijuana    Comment: medical marijuana (liquid form only)     Social History     Tobacco Use   Smoking Status Current Every Day Smoker    Packs/day: 0 25    Years: 40 00    Pack years: 10 00    Types: Cigarettes   Smokeless Tobacco Never Used   Tobacco Comment    one half pack a day         Meds/Allergies   all medications and allergies reviewed  Home Medications:   Medications Prior to Admission   Medication    apixaban (ELIQUIS) 5 mg    aspirin 81 mg chewable tablet    atorvastatin (LIPITOR) 40 mg tablet    cyclobenzaprine (FLEXERIL) 5 mg tablet    cycloSPORINE (RESTASIS MULTIDOSE) 0 05 % ophthalmic emulsion    diazepam (VALIUM) 5 mg tablet    ergocalciferol (VITAMIN D2) 50,000 units    escitalopram (LEXAPRO) 5 mg tablet    loteprednol etabonate (LOTEMAX) 0 5 % ophthalmic suspension    Magnesium 500 MG CAPS    Misc Natural Products (OSTEO BI-FLEX TRIPLE STRENGTH PO)    Omega-3 Fatty Acids (FISH OIL) 1200 MG CAPS    oxyCODONE (ROXICODONE) 5 mg immediate release tablet    Potassium (POTASSIMIN PO)    rizatriptan (MAXALT-MLT) 5 MG disintegrating tablet       Allergies   Allergen Reactions    Blue Dyes (Parenteral) Anaphylaxis    Gabapentin Anaphylaxis and Nausea Only     Anaphylaxis    Penicillins Rash and Vomiting    Anesthesia S-I-60     Nitrofurantoin GI Intolerance     vomiting    Tramadol GI Intolerance     Other reaction(s): GI upset  Vomiting  Vomiting      Blue Dyes (Parenteral) Rash       Objective   Vitals: Blood pressure (!) 107/45, pulse 75, temperature 97 6 °F (36 4 °C), temperature source Tympanic, resp  rate 16, height 5' 1" (1 549 m), weight 61 7 kg (136 lb 0 4 oz), SpO2 97 %  Orthostatic Blood Pressures      Most Recent Value   Blood Pressure  (!) 107/45 filed at 08/27/2020 0747          No intake or output data in the 24 hours ending 08/27/20 0819    Invasive Devices     Peripheral Intravenous Line            Peripheral IV 04/26/18 Left Forearm 853 days                Physical Exam  GEN: NAD, alert and oriented, well appearing  SKIN: dry without significant lesions or rashes  HEENT: NCAT, PERRL, EOMs intact  NECK: No JVD appreciated  CARDIOVASCULAR: RRR, normal S1, S2 without murmurs, rubs, or gallops appreciated  LUNGS: Clear to auscultation bilaterally without wheezes, rhonchi, or rales  ABDOMEN: Soft, nontender, nondistended  EXTREMITIES/VASCULAR: perfused without clubbing, cyanosis, or edema b/l  PSYCH: Normal mood and affect  NEURO: CN ll-Xll grossly intact      Lab Results: I have personally reviewed pertinent lab results  Invalid input(s): LABGLOM              Imaging: I have personally reviewed pertinent reports      Results for orders placed during the hospital encounter of 18   Echo complete with contrast if indicated    Narrative [address]  [facility], [address] [facility]  [phone]    Transthoracic Echocardiogram  2D, M-mode, Doppler, and Color Doppler    Study date:  2018    Patient: Enriqueta Berumen  MR number: [id number]  Account number: [de-identified]  : 1965  Age: 47 years  Gender: Female  Status: Inpatient  Location: Bedside  Height: 61 in  Weight: 125 lb  BP: 76/ 48 mmHg    Indications: Arterial embolism  Diagnoses: I74 9 - Embolism and thrombosis of unspecified artery    Sonographer:  Anurag Brooks RDCS  Referring Physician:  Kaykay Leong MD  Group:  [id number]  Interpreting Physician:  DO ROM Burns    LEFT VENTRICLE:  Systolic function was normal  Ejection fraction was estimated to be 65 %  There were no regional wall motion abnormalities  Left ventricular diastolic function parameters were normal     RIGHT VENTRICLE:  The size was normal   Systolic function was normal     TRICUSPID VALVE:  There was mild regurgitation  HISTORY: PRIOR HISTORY: Patient has no history of cardiovascular disease  PROCEDURE: The procedure was performed at the bedside  This was a routine study  The transthoracic approach was used  The study included complete 2D imaging, M-mode, complete spectral Doppler, and color Doppler  The heart rate was 60 bpm,  at the start of the study  Images were obtained from the parasternal, apical, subcostal, and suprasternal notch acoustic windows  Image quality was adequate  LEFT VENTRICLE: Size was normal  Systolic function was normal  Ejection fraction was estimated to be 65 %  There were no regional wall motion abnormalities   Wall thickness was normal  DOPPLER: Left ventricular diastolic function parameters  were normal     RIGHT VENTRICLE: The size was normal  Systolic function was normal  Wall thickness was normal     LEFT ATRIUM: Size was normal     RIGHT ATRIUM: Size was normal     MITRAL VALVE: Valve structure was normal  There was normal leaflet separation  DOPPLER: The transmitral velocity was within the normal range  There was no evidence for stenosis  There was trace regurgitation  AORTIC VALVE: The valve was trileaflet  Leaflets exhibited normal thickness and normal cuspal separation  DOPPLER: Transaortic velocity was within the normal range  There was no evidence for stenosis  There was no regurgitation  TRICUSPID VALVE: The valve structure was normal  There was normal leaflet separation  DOPPLER: The transtricuspid velocity was within the normal range  There was no evidence for stenosis  There was mild regurgitation  Pulmonary artery  systolic pressure was within the normal range  PULMONIC VALVE: Leaflets exhibited normal thickness, no calcification, and normal cuspal separation  DOPPLER: The transpulmonic velocity was within the normal range  There was trace regurgitation  PERICARDIUM: There was no pericardial effusion  The pericardium was normal in appearance  AORTA: The root exhibited normal size  SYSTEMIC VEINS: IVC: The inferior vena cava was normal in size and course  Respirophasic changes were normal     SYSTEM MEASUREMENT TABLES    2D mode  AV Diam: 2 2 cm  LA Diam (2D): 2 3 cm  IVSd (2D): 0 81 cm  LVIDd (2D): 2 91 cm  LVIDs (2D): 2 02 cm  LVPWd (2D): 0 7 cm  RVIDd (2D): 2 73 cm    Apical four chamber  LVEF MOD A4C: 59 2 %  SV MOD A4C: 31 cm3    Tissue Doppler Imaging  LV Peak Early Teixeira Tissue Fred; Medial MA (TDI): 101 mm/s    Unspecified Scan Mode  MV Peak Fred/LV Peak Tissue Fred E-Wave; Medial MA: 7 9  DT; Antegrade Flow: 211 ms  DT; Mean; Antegrade Flow: 211 ms  MV E/A Ratio: 1 5  MV Peak A Fred: 523 mm/s  MV Peak E Fred; Mean;  Antegrade Flow: 801 mm/s  Peak Grad; Mean; Regurgitant Flow: 17 mm[Hg]  Vmax; Mean; Regurgitant Flow: 2080 mm/s  Vmax; Regurgitant Flow: 2060 mm/s    [facility] Accredited Echocardi[facility]    Prepared and electronically signed by    Moisés Lara DO  Signed 09-Feb-2018 16:08:50             Code Status: Prior

## 2020-09-10 ENCOUNTER — IN-CLINIC DEVICE VISIT (OUTPATIENT)
Dept: CARDIOLOGY CLINIC | Facility: CLINIC | Age: 55
End: 2020-09-10

## 2020-09-10 DIAGNOSIS — Z95.818 PRESENCE OF CARDIAC DEVICE: Primary | ICD-10-CM

## 2020-09-10 PROCEDURE — 99024 POSTOP FOLLOW-UP VISIT: CPT | Performed by: INTERNAL MEDICINE

## 2020-09-10 NOTE — PROGRESS NOTES
Results for orders placed or performed in visit on 09/10/20   Cardiac EP device report    Narrative    SJM LOOP  WOUND CHECK: INCISION CLEAN AND DRY WITH EDGES APPROXIMATED; SUTURES REMOVED; WOUND CARE AND RESTRICTIONS REVIEWED WITH PATIENT   PL

## 2020-11-06 ENCOUNTER — OFFICE VISIT (OUTPATIENT)
Dept: INTERNAL MEDICINE CLINIC | Age: 55
End: 2020-11-06
Payer: COMMERCIAL

## 2020-11-06 VITALS
WEIGHT: 121.8 LBS | DIASTOLIC BLOOD PRESSURE: 68 MMHG | TEMPERATURE: 97.7 F | OXYGEN SATURATION: 97 % | HEIGHT: 61 IN | SYSTOLIC BLOOD PRESSURE: 104 MMHG | HEART RATE: 80 BPM | BODY MASS INDEX: 23 KG/M2

## 2020-11-06 DIAGNOSIS — Z11.59 NEED FOR HEPATITIS C SCREENING TEST: Primary | ICD-10-CM

## 2020-11-06 DIAGNOSIS — G47.00 INSOMNIA, UNSPECIFIED TYPE: ICD-10-CM

## 2020-11-06 DIAGNOSIS — M54.42 CHRONIC BILATERAL LOW BACK PAIN WITH LEFT-SIDED SCIATICA: ICD-10-CM

## 2020-11-06 DIAGNOSIS — F41.9 ANXIETY: ICD-10-CM

## 2020-11-06 DIAGNOSIS — Z79.01 ANTICOAGULANT LONG-TERM USE: ICD-10-CM

## 2020-11-06 DIAGNOSIS — G89.29 CHRONIC BILATERAL LOW BACK PAIN WITH LEFT-SIDED SCIATICA: ICD-10-CM

## 2020-11-06 DIAGNOSIS — Z12.11 ENCOUNTER FOR SCREENING FOR MALIGNANT NEOPLASM OF COLON: ICD-10-CM

## 2020-11-06 DIAGNOSIS — E78.00 HYPERCHOLESTEROLEMIA: ICD-10-CM

## 2020-11-06 DIAGNOSIS — I63.89 CEREBROVASCULAR ACCIDENT (CVA) DUE TO OTHER MECHANISM (HCC): ICD-10-CM

## 2020-11-06 DIAGNOSIS — T14.8XXA NERVE DAMAGE: ICD-10-CM

## 2020-11-06 DIAGNOSIS — K59.03 DRUG-INDUCED CONSTIPATION: ICD-10-CM

## 2020-11-06 DIAGNOSIS — Z12.11 SPECIAL SCREENING FOR MALIGNANT NEOPLASM OF COLON: ICD-10-CM

## 2020-11-06 DIAGNOSIS — R79.89 HIGH SERUM HIGH DENSITY LIPOPROTEIN (HDL): ICD-10-CM

## 2020-11-06 PROCEDURE — 99214 OFFICE O/P EST MOD 30 MIN: CPT | Performed by: FAMILY MEDICINE

## 2020-11-06 PROCEDURE — 3008F BODY MASS INDEX DOCD: CPT | Performed by: FAMILY MEDICINE

## 2020-11-06 RX ORDER — DIAZEPAM 5 MG/1
10 TABLET ORAL DAILY PRN
Qty: 90 TABLET | Refills: 1 | Status: SHIPPED | OUTPATIENT
Start: 2020-11-06 | End: 2022-04-18 | Stop reason: SDUPTHER

## 2020-11-06 RX ORDER — NALOXONE HYDROCHLORIDE 4 MG/.1ML
SPRAY NASAL
Qty: 1 EACH | Refills: 1 | Status: SHIPPED | OUTPATIENT
Start: 2020-11-06

## 2021-01-25 DIAGNOSIS — E55.9 VITAMIN D DEFICIENCY: ICD-10-CM

## 2021-01-26 DIAGNOSIS — E55.9 VITAMIN D DEFICIENCY: ICD-10-CM

## 2021-01-26 RX ORDER — ERGOCALCIFEROL 1.25 MG/1
CAPSULE ORAL
Qty: 12 CAPSULE | Refills: 1 | OUTPATIENT
Start: 2021-01-26

## 2021-01-27 RX ORDER — ERGOCALCIFEROL 1.25 MG/1
CAPSULE ORAL
Qty: 12 CAPSULE | Refills: 1 | Status: SHIPPED | OUTPATIENT
Start: 2021-01-27 | End: 2021-09-07

## 2021-02-23 DIAGNOSIS — R79.89 HIGH SERUM HIGH DENSITY LIPOPROTEIN (HDL): ICD-10-CM

## 2021-02-23 DIAGNOSIS — E78.00 HYPERCHOLESTEROLEMIA: ICD-10-CM

## 2021-02-24 DIAGNOSIS — I63.511 ACUTE ISCHEMIC RIGHT MCA STROKE (HCC): ICD-10-CM

## 2021-02-24 RX ORDER — ATORVASTATIN CALCIUM 40 MG/1
40 TABLET, FILM COATED ORAL DAILY
Qty: 90 TABLET | Refills: 1 | Status: SHIPPED | OUTPATIENT
Start: 2021-02-24 | End: 2021-09-28 | Stop reason: SDUPTHER

## 2021-03-02 ENCOUNTER — TELEPHONE (OUTPATIENT)
Dept: INTERNAL MEDICINE CLINIC | Age: 56
End: 2021-03-02

## 2021-03-02 NOTE — TELEPHONE ENCOUNTER
Spoke with the patient reports she never received the cologuard kit in the mail     However the patient doesn't it to be sent to her as she currently has no insurance

## 2021-07-09 ENCOUNTER — TELEPHONE (OUTPATIENT)
Dept: CARDIOLOGY CLINIC | Facility: CLINIC | Age: 56
End: 2021-07-09

## 2021-07-13 ENCOUNTER — TELEPHONE (OUTPATIENT)
Dept: CARDIOLOGY CLINIC | Facility: CLINIC | Age: 56
End: 2021-07-13

## 2021-07-13 DIAGNOSIS — I63.511 ACUTE ISCHEMIC RIGHT MCA STROKE (HCC): ICD-10-CM

## 2021-07-13 NOTE — TELEPHONE ENCOUNTER
Do you want to offer warfarin? Cannot afford Eliquis  Xarelto will be similar in cost as on same Tier  However, every time warfarin dose is adjusted, patient gets billed as well, in addition, frequent blood work

## 2021-07-13 NOTE — TELEPHONE ENCOUNTER
Patient called for samples  She has a PA on file  Her Deductible  is 2,800  They will not do price cut till deductible is made  Spoke with patient &  her income is over the limit for Pace-   All anticoagulant are the same price    Her co-pay is $482  She doesn't want to pay this

## 2021-07-14 DIAGNOSIS — I63.511 ACUTE ISCHEMIC RIGHT MCA STROKE (HCC): ICD-10-CM

## 2021-07-15 DIAGNOSIS — I63.511 ACUTE ISCHEMIC RIGHT MCA STROKE (HCC): ICD-10-CM

## 2021-08-29 ENCOUNTER — APPOINTMENT (OUTPATIENT)
Dept: LAB | Age: 56
End: 2021-08-29
Payer: COMMERCIAL

## 2021-08-29 DIAGNOSIS — E78.00 HYPERCHOLESTEROLEMIA: ICD-10-CM

## 2021-08-29 DIAGNOSIS — Z11.59 NEED FOR HEPATITIS C SCREENING TEST: ICD-10-CM

## 2021-08-29 LAB
ALBUMIN SERPL BCP-MCNC: 3.9 G/DL (ref 3.5–5)
ALP SERPL-CCNC: 73 U/L (ref 46–116)
ALT SERPL W P-5'-P-CCNC: 30 U/L (ref 12–78)
ANION GAP SERPL CALCULATED.3IONS-SCNC: 5 MMOL/L (ref 4–13)
AST SERPL W P-5'-P-CCNC: 25 U/L (ref 5–45)
BILIRUB SERPL-MCNC: 0.83 MG/DL (ref 0.2–1)
BUN SERPL-MCNC: 14 MG/DL (ref 5–25)
CALCIUM SERPL-MCNC: 9.5 MG/DL (ref 8.3–10.1)
CHLORIDE SERPL-SCNC: 109 MMOL/L (ref 100–108)
CHOLEST SERPL-MCNC: 143 MG/DL (ref 50–200)
CO2 SERPL-SCNC: 26 MMOL/L (ref 21–32)
CREAT SERPL-MCNC: 0.79 MG/DL (ref 0.6–1.3)
GFR SERPL CREATININE-BSD FRML MDRD: 84 ML/MIN/1.73SQ M
GLUCOSE P FAST SERPL-MCNC: 78 MG/DL (ref 65–99)
HCV AB SER QL: NORMAL
HDLC SERPL-MCNC: 61 MG/DL
LDLC SERPL CALC-MCNC: 71 MG/DL (ref 0–100)
NONHDLC SERPL-MCNC: 82 MG/DL
POTASSIUM SERPL-SCNC: 4.2 MMOL/L (ref 3.5–5.3)
PROT SERPL-MCNC: 7.4 G/DL (ref 6.4–8.2)
SODIUM SERPL-SCNC: 140 MMOL/L (ref 136–145)
TRIGL SERPL-MCNC: 53 MG/DL

## 2021-08-29 PROCEDURE — 86803 HEPATITIS C AB TEST: CPT

## 2021-08-29 PROCEDURE — 80061 LIPID PANEL: CPT

## 2021-08-29 PROCEDURE — 80053 COMPREHEN METABOLIC PANEL: CPT

## 2021-08-29 PROCEDURE — 36415 COLL VENOUS BLD VENIPUNCTURE: CPT

## 2021-09-01 ENCOUNTER — RA CDI HCC (OUTPATIENT)
Dept: OTHER | Facility: HOSPITAL | Age: 56
End: 2021-09-01

## 2021-09-01 NOTE — PROGRESS NOTES
[facility]  coding opportunities          Number of diagnosis code(s) already on the problem list added to FYI fla               Number of suggestions used: 1         Patients insurance company: [employer] (Medicare Advantage and Commercial)     Visit status: Patient arrived for their scheduled appointment        [place] [address]  coding opportunities          Number of diagnosis code(s) already on the problem list added to Cyndi Tatum fla                     Patients insurance company: [employer] [address] [place] [address] ([employer])           Please review if this is current for  - found in problem list   I48 0: PAF (paroxysmal atrial fibrillation) ([place] [address] )

## 2021-09-03 ENCOUNTER — TELEPHONE (OUTPATIENT)
Dept: INTERNAL MEDICINE CLINIC | Age: 56
End: 2021-09-03

## 2021-09-03 NOTE — TELEPHONE ENCOUNTER
Spoke with the patient no longer wants to do cologuard kit is willing to do Fit test, will get when comes for her follow up appointment

## 2021-09-07 ENCOUNTER — OFFICE VISIT (OUTPATIENT)
Dept: INTERNAL MEDICINE CLINIC | Facility: CLINIC | Age: 56
End: 2021-09-07
Payer: COMMERCIAL

## 2021-09-07 VITALS
TEMPERATURE: 98.1 F | HEIGHT: 61 IN | DIASTOLIC BLOOD PRESSURE: 50 MMHG | SYSTOLIC BLOOD PRESSURE: 90 MMHG | OXYGEN SATURATION: 98 % | WEIGHT: 128 LBS | BODY MASS INDEX: 24.17 KG/M2 | HEART RATE: 78 BPM

## 2021-09-07 DIAGNOSIS — F41.9 ANXIETY: ICD-10-CM

## 2021-09-07 DIAGNOSIS — E55.9 VITAMIN D DEFICIENCY: ICD-10-CM

## 2021-09-07 DIAGNOSIS — E78.00 HYPERCHOLESTEROLEMIA: ICD-10-CM

## 2021-09-07 DIAGNOSIS — Z12.31 BREAST CANCER SCREENING BY MAMMOGRAM: Primary | ICD-10-CM

## 2021-09-07 DIAGNOSIS — R79.89 HIGH SERUM HIGH DENSITY LIPOPROTEIN (HDL): ICD-10-CM

## 2021-09-07 DIAGNOSIS — M48.061 SPINAL STENOSIS OF LUMBAR REGION WITHOUT NEUROGENIC CLAUDICATION: ICD-10-CM

## 2021-09-07 DIAGNOSIS — I63.511 ACUTE ISCHEMIC RIGHT MCA STROKE (HCC): ICD-10-CM

## 2021-09-07 DIAGNOSIS — I48.0 PAF (PAROXYSMAL ATRIAL FIBRILLATION) (HCC): ICD-10-CM

## 2021-09-07 DIAGNOSIS — Z79.01 ANTICOAGULANT LONG-TERM USE: ICD-10-CM

## 2021-09-07 DIAGNOSIS — K59.03 DRUG-INDUCED CONSTIPATION: ICD-10-CM

## 2021-09-07 PROCEDURE — 3725F SCREEN DEPRESSION PERFORMED: CPT | Performed by: FAMILY MEDICINE

## 2021-09-07 PROCEDURE — 3008F BODY MASS INDEX DOCD: CPT | Performed by: FAMILY MEDICINE

## 2021-09-07 PROCEDURE — 99214 OFFICE O/P EST MOD 30 MIN: CPT | Performed by: FAMILY MEDICINE

## 2021-09-07 RX ORDER — MAGNESIUM GLUCONATE 27 MG(500)
TABLET ORAL EVERY 24 HOURS
COMMUNITY
End: 2022-04-14 | Stop reason: ALTCHOICE

## 2021-09-07 RX ORDER — UBIDECARENONE 30 MG
CAPSULE ORAL
COMMUNITY
End: 2022-04-14 | Stop reason: SDUPTHER

## 2021-09-07 RX ORDER — CA/D3/MAG OX/ZINC/COP/MANG/BOR 600 MG-800
TABLET,CHEWABLE ORAL DAILY
COMMUNITY

## 2021-09-07 RX ORDER — FREMANEZUMAB-VFRM 225 MG/1.5ML
1.5 INJECTION SUBCUTANEOUS
COMMUNITY
End: 2022-04-14

## 2021-09-07 NOTE — PROGRESS NOTES
Assessment/Plan:    1  Breast cancer screening by mammogram  -     Mammo screening bilateral w cad; Future; Expected date: 09/07/2021    2  Hypercholesterolemia  -     Lipid panel; Future  -     Comprehensive metabolic panel; Future    3  High serum high density lipoprotein (HDL)    4  PAF (paroxysmal atrial fibrillation) ([email or web address] )    5  Spinal stenosis of lumbar region without neurogenic claudication    6  Anticoagulant long-term use    7  Anxiety    8  Acute ischemic right MCA stroke ([email or web address] 75 )    9  Vitamin D deficiency  -     Vitamin D 25 hydroxy; Future    10  Drug-induced constipation  -     XR abdomen 1 view kub; Future; Expected date: 09/07/2021    Check x-ray, start magnesium citrate daily for 2 days  Start Colace b i d  And Dulcolax in the morning and Senokot at night  Advised to increase meals to at least 3 times per day  Increase water intake  Call in 2 weeks if situation is not resolved  There are no Patient Instructions on file for this visit  Return in about 6 months (around 3/7/2022) for Recheck  Subjective:      Patient ID: Niki Shine is a 63 y o  female  Chief Complaint   Patient presents with    Follow-up     Follow up chronic conditions  Labs done 8/29/21  Fit test kit and instructions given to patient  HPI   Hyperlipidemia (Follow-Up): The patient states her hyperlipidemia has been under good control since the last visit  She has no significant interval events     Symptoms: denies chest pain-- and-- denies intermittent leg claudication  Associated symptoms include no focal neurologic deficits-- and-- no memory loss       Medications: the patient is not adherent with her medication   July 2019: Last lab work done in December   On pravastatin and tolerating well however she has a preference to Lipitor which she was on before and is asking for change   No recent labs today but last lab work was elevated    June 2020 well controlled lipid levels, on statin and tolerating well  November 2020, doing well no problems  Sept 2021: on lipitor well controlled levels, LDL 71        Depression:  Feeling much better with low dose lexapro   But has some fatigue side effects   Has not been able to work due to stroke and is having some difficulty with exercising due to chronic back pain   July 2019 stable January 2020, stable with present medications and slightly better since she is working now  [address], relatively well controlled and no suicidal homicide ideations  Cari Serrano is working now and feels tired but relatively stable    nov 2020:  [address] [place] new job and is on night shift  She chose night shift but feels overwhelmed that her training was not as extensive as she has like did to be  Since her brain injury she has been slower to learn new techniques but really enjoys the company and her coworkers  Sept 2021: no SI or HI, no panic or breakthrough symptoms, does not sleep well, re started working     Chronic pain, trying to wean off oxycodone and seeing Physical Medicine and Rehab doctor   Started on medical marijuana which has significantly improved her symptoms and she has been able to decrease her oxycodone however she is in some financial difficulty due to not having a job and no medical coverage now and finds it difficult to pay cash for her medical marijuana   January 2020, not using oxycodone since it makes her constipation worse   June 2020, stable   nov 2020 stable  sept 2021: on oxy, aakash  Has some constipation and N/V     Severe constipation:   failed Linzess, use to eat salads and yogurt on a daily basis however admits to not eating at least 3 times per day  She does drink plenty of water  Has a bowel movement that is hard once every 3 weeks  Has been experiencing some nausea and intermittent vomiting    Does not like a lot of over-the-counter laxatives because it makes her cramps and gassy          The following portions of the patient's history were reviewed and updated as appropriate: allergies, current medications, past family history, past medical history, past social history, past surgical history and problem list     Review of Systems      Constitutional:  Denies fever or chills , + weight gain  Eyes:  Denies double or blurry vision  HENT:  Denies nasal congestion or sore throat   Respiratory:  Denies cough or shortness of breath or wheezing  Cardiovascular:  Denies palpitations or chest pain  GI:  Denies abdominal pain,  no loose stools, no reflux, + intermittent N/V  Integument:  Denies rash , no open areas  Neurologic:  Denies headache or focal weakness, no dizziness  : no dysuria      Current Outpatient Medications   Medication Sig Dispense Refill    apixaban (Eliquis) 5 mg Take 1 tablet (5 mg total) by mouth 2 (two) times a day 56 tablet 0    aspirin 81 mg chewable tablet Chew 1 tablet (81 mg total) daily 30 tablet 0    atorvastatin (LIPITOR) 40 mg tablet Take 1 tablet (40 mg total) by mouth daily 90 tablet 1    cyclobenzaprine (FLEXERIL) 5 mg tablet TAKE ONE TABLET EVERY 8 HOURS AS NEEDED FOR 15 DAYS  1    cycloSPORINE (RESTASIS MULTIDOSE) 0 05 % ophthalmic emulsion Administer 1 drop to both eyes every 12 (twelve) hours       diazepam (VALIUM) 5 mg tablet Take 2 tablets (10 mg total) by mouth daily as needed for anxiety 90 tablet 1    loteprednol etabonate (LOTEMAX) 0 5 % ophthalmic suspension Administer 1 drop to both eyes 2 (two) times a day       Magnesium 500 MG CAPS Take 1 capsule by mouth daily       Misc Natural Products (OSTEO BI-FLEX TRIPLE STRENGTH PO) Take by mouth daily       naloxone (NARCAN) 4 mg/0 1 mL nasal spray Administer 1 spray into a nostril  If breathing does not return to normal or if breathing difficulty resumes after 2-3 minutes, give another dose in the other nostril using a new spray   1 each 1    oxyCODONE (ROXICODONE) 5 mg immediate release tablet Take 1 tablet (5 mg total) by mouth every 6 (six) hours as needed for moderate painMax Daily Amount: 20 mg 120 tablet 0    Potassium (POTASSIMIN PO) Take 550 mg by mouth      Probiotic Product (Probiotic Advanced) CAPS Take by mouth daily      rizatriptan (MAXALT-MLT) 5 MG disintegrating tablet       fremanezumab-vfrm (Ajovy) 225 MG/1 5ML prefilled syringe 1 5 mL      magnesium gluconate (MAGONATE) 500 MG tablet every 24 hours      Omega-3 Fatty Acids (FISH OIL) 1200 MG CAPS Take 1 capsule by mouth daily  (Patient not taking: Reported on 9/7/2021)      Potassium Gluconate 550 MG TABS potassium gluconate 550 mg (90 mg) tablet   Take 1 tablet (550mg) by mouth once Daily  No current facility-administered medications for this visit         Objective:    BP 90/50 (BP Location: Left arm, Patient Position: Sitting, Cuff Size: Adult)   Pulse 78   Temp 98 1 °F (36 7 °C) (Tympanic)   Ht 5' 1" (1 549 m)   Wt 58 1 kg (128 lb)   LMP  (LMP Unknown)   SpO2 98% Comment: Room Air  BMI 24 19 kg/m²        Physical Exam       Constitutional:  Well developed, well nourished, no acute distress, non-toxic appearance   Eyes:  PERRL, conjunctiva normal , non icteric sclera  HENT:  Atraumatic, oropharynx moist  Neck-  supple   Respiratory:  CTA b/l, normal breath sounds, no rales, no wheezing   Cardiovascular:  RRR, no murmurs, no LE edema b/l  GI:  Soft, nondistended, normal bowel sounds x 4, nontender, no organomegaly, no mass, no rebound, no guarding   Neurologic:  no focal deficits noted   Psychiatric:  Speech and behavior appropriate , AAO x 3        Rai Grissom DO

## 2021-09-12 ENCOUNTER — APPOINTMENT (OUTPATIENT)
Dept: RADIOLOGY | Age: 56
End: 2021-09-12
Payer: COMMERCIAL

## 2021-09-12 DIAGNOSIS — K59.03 DRUG-INDUCED CONSTIPATION: ICD-10-CM

## 2021-09-12 PROCEDURE — 74018 RADEX ABDOMEN 1 VIEW: CPT

## 2021-09-28 DIAGNOSIS — E78.00 HYPERCHOLESTEROLEMIA: ICD-10-CM

## 2021-09-28 DIAGNOSIS — R79.89 HIGH SERUM HIGH DENSITY LIPOPROTEIN (HDL): ICD-10-CM

## 2021-09-29 RX ORDER — ATORVASTATIN CALCIUM 40 MG/1
40 TABLET, FILM COATED ORAL DAILY
Qty: 90 TABLET | Refills: 1 | Status: SHIPPED | OUTPATIENT
Start: 2021-09-29 | End: 2022-05-22 | Stop reason: SDUPTHER

## 2021-12-22 ENCOUNTER — TELEPHONE (OUTPATIENT)
Dept: INTERNAL MEDICINE CLINIC | Age: 56
End: 2021-12-22

## 2022-03-19 DIAGNOSIS — I63.511 ACUTE ISCHEMIC RIGHT MCA STROKE (HCC): ICD-10-CM

## 2022-03-20 RX ORDER — APIXABAN 5 MG/1
TABLET, FILM COATED ORAL
Qty: 180 TABLET | Refills: 5 | Status: SHIPPED | OUTPATIENT
Start: 2022-03-20

## 2022-04-08 ENCOUNTER — APPOINTMENT (OUTPATIENT)
Dept: LAB | Age: 57
End: 2022-04-08
Payer: COMMERCIAL

## 2022-04-08 DIAGNOSIS — E55.9 VITAMIN D DEFICIENCY: ICD-10-CM

## 2022-04-08 DIAGNOSIS — E78.00 HYPERCHOLESTEROLEMIA: ICD-10-CM

## 2022-04-08 LAB
25(OH)D3 SERPL-MCNC: 38.9 NG/ML (ref 30–100)
ALBUMIN SERPL BCP-MCNC: 3.9 G/DL (ref 3.5–5)
ALP SERPL-CCNC: 62 U/L (ref 46–116)
ALT SERPL W P-5'-P-CCNC: 30 U/L (ref 12–78)
ANION GAP SERPL CALCULATED.3IONS-SCNC: 7 MMOL/L (ref 4–13)
AST SERPL W P-5'-P-CCNC: 28 U/L (ref 5–45)
BILIRUB SERPL-MCNC: 0.62 MG/DL (ref 0.2–1)
BUN SERPL-MCNC: 14 MG/DL (ref 5–25)
CALCIUM SERPL-MCNC: 9 MG/DL (ref 8.3–10.1)
CHLORIDE SERPL-SCNC: 108 MMOL/L (ref 100–108)
CHOLEST SERPL-MCNC: 136 MG/DL
CO2 SERPL-SCNC: 25 MMOL/L (ref 21–32)
CREAT SERPL-MCNC: 0.82 MG/DL (ref 0.6–1.3)
GFR SERPL CREATININE-BSD FRML MDRD: 79 ML/MIN/1.73SQ M
GLUCOSE P FAST SERPL-MCNC: 73 MG/DL (ref 65–99)
HDLC SERPL-MCNC: 72 MG/DL
LDLC SERPL CALC-MCNC: 55 MG/DL (ref 0–100)
NONHDLC SERPL-MCNC: 64 MG/DL
POTASSIUM SERPL-SCNC: 3.8 MMOL/L (ref 3.5–5.3)
PROT SERPL-MCNC: 6.5 G/DL (ref 6.4–8.2)
SODIUM SERPL-SCNC: 140 MMOL/L (ref 136–145)
TRIGL SERPL-MCNC: 45 MG/DL

## 2022-04-08 PROCEDURE — 80061 LIPID PANEL: CPT

## 2022-04-08 PROCEDURE — 36415 COLL VENOUS BLD VENIPUNCTURE: CPT

## 2022-04-08 PROCEDURE — 80053 COMPREHEN METABOLIC PANEL: CPT

## 2022-04-08 PROCEDURE — 82306 VITAMIN D 25 HYDROXY: CPT

## 2022-04-14 ENCOUNTER — OFFICE VISIT (OUTPATIENT)
Dept: INTERNAL MEDICINE CLINIC | Age: 57
End: 2022-04-14
Payer: COMMERCIAL

## 2022-04-14 ENCOUNTER — TELEPHONE (OUTPATIENT)
Dept: ADMINISTRATIVE | Facility: OTHER | Age: 57
End: 2022-04-14

## 2022-04-14 VITALS
BODY MASS INDEX: 23.37 KG/M2 | WEIGHT: 123.8 LBS | HEIGHT: 61 IN | DIASTOLIC BLOOD PRESSURE: 62 MMHG | TEMPERATURE: 98 F | HEART RATE: 81 BPM | SYSTOLIC BLOOD PRESSURE: 100 MMHG | OXYGEN SATURATION: 97 %

## 2022-04-14 DIAGNOSIS — F33.9 DEPRESSION, RECURRENT (HCC): ICD-10-CM

## 2022-04-14 DIAGNOSIS — Z79.01 ANTICOAGULANT LONG-TERM USE: ICD-10-CM

## 2022-04-14 DIAGNOSIS — F17.210 CIGARETTE NICOTINE DEPENDENCE WITHOUT COMPLICATION: ICD-10-CM

## 2022-04-14 DIAGNOSIS — I63.511 ACUTE ISCHEMIC RIGHT MCA STROKE (HCC): ICD-10-CM

## 2022-04-14 DIAGNOSIS — G47.00 INSOMNIA, UNSPECIFIED TYPE: ICD-10-CM

## 2022-04-14 DIAGNOSIS — I63.89 CEREBROVASCULAR ACCIDENT (CVA) DUE TO OTHER MECHANISM (HCC): ICD-10-CM

## 2022-04-14 DIAGNOSIS — L30.9 DERMATITIS: ICD-10-CM

## 2022-04-14 DIAGNOSIS — K59.01 SLOW TRANSIT CONSTIPATION: ICD-10-CM

## 2022-04-14 DIAGNOSIS — M48.061 SPINAL STENOSIS OF LUMBAR REGION WITHOUT NEUROGENIC CLAUDICATION: ICD-10-CM

## 2022-04-14 DIAGNOSIS — I48.0 PAF (PAROXYSMAL ATRIAL FIBRILLATION) (HCC): ICD-10-CM

## 2022-04-14 DIAGNOSIS — R51.9 CHRONIC INTRACTABLE HEADACHE, UNSPECIFIED HEADACHE TYPE: Primary | ICD-10-CM

## 2022-04-14 DIAGNOSIS — G89.29 CHRONIC INTRACTABLE HEADACHE, UNSPECIFIED HEADACHE TYPE: Primary | ICD-10-CM

## 2022-04-14 DIAGNOSIS — T14.8XXA NERVE DAMAGE: ICD-10-CM

## 2022-04-14 DIAGNOSIS — E78.00 HYPERCHOLESTEROLEMIA: ICD-10-CM

## 2022-04-14 DIAGNOSIS — Z72.0 TOBACCO ABUSE: ICD-10-CM

## 2022-04-14 DIAGNOSIS — K59.03 DRUG-INDUCED CONSTIPATION: ICD-10-CM

## 2022-04-14 DIAGNOSIS — F11.20 CONTINUOUS OPIOID DEPENDENCE (HCC): ICD-10-CM

## 2022-04-14 DIAGNOSIS — M41.26 OTHER IDIOPATHIC SCOLIOSIS, LUMBAR REGION: ICD-10-CM

## 2022-04-14 DIAGNOSIS — R79.89 HIGH SERUM HIGH DENSITY LIPOPROTEIN (HDL): ICD-10-CM

## 2022-04-14 PROBLEM — M54.9 CHRONIC BACK PAIN: Status: RESOLVED | Noted: 2018-02-15 | Resolved: 2022-04-14

## 2022-04-14 PROBLEM — I63.9 CVA (CEREBRAL VASCULAR ACCIDENT) (HCC): Status: RESOLVED | Noted: 2018-04-09 | Resolved: 2022-04-14

## 2022-04-14 PROCEDURE — 99214 OFFICE O/P EST MOD 30 MIN: CPT | Performed by: FAMILY MEDICINE

## 2022-04-14 RX ORDER — RIZATRIPTAN BENZOATE 5 MG/1
5 TABLET, ORALLY DISINTEGRATING ORAL ONCE AS NEEDED
Qty: 9 TABLET | Refills: 1 | Status: SHIPPED | OUTPATIENT
Start: 2022-04-14 | End: 2022-05-22 | Stop reason: SDUPTHER

## 2022-04-14 NOTE — TELEPHONE ENCOUNTER
Upon review of the In Basket request we were able to locate, review, and update the patient chart as requested for Mammogram     Any additional questions or concerns should be emailed to the Practice Liaisons via [email or web address] email, please do not reply via In Basket      Thank you  Venus Shane

## 2022-04-14 NOTE — TELEPHONE ENCOUNTER
----- Message from Ozzie Wyatt MA sent at 4/13/2022  3:58 PM EDT -----  Regarding: mammogram  04/13/22 3:58 PM    Hello, our patient Yanira Castillo has had Mammogram completed/performed  Please assist in updating the patient chart by pulling the Care Everywhere (CE) document  The date of service is 01/14/2022       Thank you,  Ozzie Wyatt MA  [facility]

## 2022-04-14 NOTE — TELEPHONE ENCOUNTER
----- Message from Aakash Lomeli RN sent at 4/14/2022  8:42 AM EDT -----  Regarding: Mammogram  04/14/22 8:43 AM    Hello, our patient Monse Redman has had Mammogram completed/performed  Please assist in updating the patient chart by pulling the Care Everywhere (CE) document  The date of service is 1/14/22       Thank you,  Aakash Lomeli RN  [place] [id number]

## 2022-04-14 NOTE — PROGRESS NOTES
Assessment/Plan:    1  Chronic intractable headache, unspecified headache type  -     rizatriptan (MAXALT-MLT) 5 MG disintegrating tablet; Take 1 tablet (5 mg total) by mouth once as needed for migraine for up to 1 dose    2  Continuous opioid dependence ([facility] )    3  PAF (paroxysmal atrial fibrillation) (HCC)  -     CBC and differential; Future  -     Comprehensive metabolic panel; Future    4  Cerebrovascular accident (CVA) due to other mechanism ([facility] )    5  Anticoagulant long-term use    6  Cigarette nicotine dependence without complication    7  Hypercholesterolemia  -     Comprehensive metabolic panel; Future  -     Lipid panel; Future  -     TSH, 3rd generation with Free T4 reflex; Future    8  Tobacco abuse    9  Slow transit constipation    10  Other idiopathic scoliosis, lumbar region    11  High serum high density lipoprotein (HDL)    12  Acute ischemic right MCA stroke ([facility] )    13  Spinal stenosis of lumbar region without neurogenic claudication  -     diclofenac sodium (VOLTAREN) 50 mg EC tablet; Take 1 tablet (50 mg total) by mouth daily as needed (pain)    14  Nerve damage    15  Insomnia, unspecified type            There are no Patient Instructions on file for this visit  Return in about 6 months (around 10/14/2022) for Recheck  Subjective:      Patient ID: Anne Valera is a 61 y o  female  Chief Complaint   Patient presents with    Follow-up     Follow up chronic conditions  Labs done 4/8/22   [facility]     cologaurd will be done  Mammogram done in december at [facility]          HPI    The following portions of the patient's history were reviewed and updated as appropriate: allergies, current medications, past family history, past medical history, past social history, past surgical history and problem list     Review of Systems      Constitutional:  Denies fever or chills   Eyes:  Denies double , blurry vision or eye pain  HENT:  Denies nasal congestion or sore throat Respiratory:  Denies cough or shortness of breath or wheezing  Cardiovascular:  Denies palpitations or chest pain  GI:  Denies abdominal pain, nausea, or vomiting, no loose stools, no reflux  Integument:  Denies rash , no open areas  Neurologic:  Denies headache or focal weakness, no dizziness  : no dysuria, or hematuria        Current Outpatient Medications   Medication Sig Dispense Refill    aspirin 81 mg chewable tablet Chew 1 tablet (81 mg total) daily 30 tablet 0    atorvastatin (LIPITOR) 40 mg tablet Take 1 tablet (40 mg total) by mouth daily 90 tablet 1    cycloSPORINE (RESTASIS MULTIDOSE) 0 05 % ophthalmic emulsion Administer 1 drop to both eyes every 12 (twelve) hours       diazepam (VALIUM) 5 mg tablet Take 2 tablets (10 mg total) by mouth daily as needed for anxiety 90 tablet 1    Eliquis 5 MG TAKE 1 TABLET BY MOUTH TWICE A  tablet 5    oxyCODONE (ROXICODONE) 5 mg immediate release tablet Take 1 tablet (5 mg total) by mouth every 6 (six) hours as needed for moderate painMax Daily Amount: 20 mg 120 tablet 0    Probiotic Product (Probiotic Advanced) CAPS Take by mouth daily      rizatriptan (MAXALT-MLT) 5 MG disintegrating tablet Take 1 tablet (5 mg total) by mouth once as needed for migraine for up to 1 dose 9 tablet 1    diclofenac sodium (VOLTAREN) 50 mg EC tablet Take 1 tablet (50 mg total) by mouth daily as needed (pain) 30 tablet 3    loteprednol etabonate (LOTEMAX) 0 5 % ophthalmic suspension Administer 1 drop to both eyes 2 (two) times a day       naloxone (NARCAN) 4 mg/0 1 mL nasal spray Administer 1 spray into a nostril  If breathing does not return to normal or if breathing difficulty resumes after 2-3 minutes, give another dose in the other nostril using a new spray  1 each 1     No current facility-administered medications for this visit         Objective:    /62 (BP Location: Right arm, Patient Position: Sitting, Cuff Size: Adult)   Pulse 81   Temp 98 °F (36 7 °C) (Temporal)   Ht 5' 1" (1 549 m)   Wt 56 2 kg (123 lb 12 8 oz)   LMP  (LMP Unknown)   SpO2 97% Comment: room air  BMI 23 39 kg/m²        Physical Exam       Constitutional:  Well developed, well nourished, no acute distress, non-toxic appearance   Eyes:  PERRL, conjunctiva normal , non icteric sclera  HENT:  Atraumatic, oropharynx moist  Neck-  supple   Respiratory:  CTA b/l, normal breath sounds, no rales, no wheezing   Cardiovascular:  RRR, no murmurs, no LE edema b/l  GI:  Soft, nondistended, normal bowel sounds x 4, nontender, no organomegaly, no mass, no rebound, no guarding   Neurologic:  no focal deficits noted   Psychiatric:  Speech and behavior appropriate , AAO x 3        Michelle Locks, DO

## 2022-04-18 DIAGNOSIS — T14.8XXA NERVE DAMAGE: ICD-10-CM

## 2022-04-18 DIAGNOSIS — G47.00 INSOMNIA, UNSPECIFIED TYPE: ICD-10-CM

## 2022-04-18 NOTE — TELEPHONE ENCOUNTER
Upon review of the In Basket request we were able to note that no further action is required  The patient chart is up to date as a result of a previous request       Any additional questions or concerns should be emailed to the Practice Liaisons via [email or web address] email, please do not reply via In Basket      Thank you  Kishore Gerber

## 2022-04-19 ENCOUNTER — TELEPHONE (OUTPATIENT)
Dept: INTERNAL MEDICINE CLINIC | Facility: OTHER | Age: 57
End: 2022-04-19

## 2022-04-19 RX ORDER — DIAZEPAM 5 MG/1
10 TABLET ORAL DAILY PRN
Qty: 90 TABLET | Refills: 0 | Status: SHIPPED | OUTPATIENT
Start: 2022-04-19

## 2022-04-19 RX ORDER — OXYCODONE HYDROCHLORIDE 5 MG/1
5 TABLET ORAL EVERY 6 HOURS PRN
Qty: 120 TABLET | Refills: 0 | Status: SHIPPED | OUTPATIENT
Start: 2022-04-19 | End: 2022-05-22 | Stop reason: SDUPTHER

## 2022-04-19 NOTE — TELEPHONE ENCOUNTER
[facility] called about her  Oxycodone  It seems that she has a   allergy with OxyContin  Also need to  Verify with being  opoid dominique  [facility] recomends a  7 day supply 28 tablets since she has not been on them            # [phone]  [id number]

## 2022-04-19 NOTE — TELEPHONE ENCOUNTER
Last ov 4/14/22  Next ov 10/17/22    Patient said she requested refills of Diazepam and Oxycodone to be sent to [facility] during last office visit but it was not done

## 2022-04-29 NOTE — TELEPHONE ENCOUNTER
4/29/22 Cortes Lee can you please reach out to [facility]- she called very late in the day and I could not get to- I was only scheduled until 4:00 thanks, ds

## 2022-05-22 DIAGNOSIS — R51.9 CHRONIC INTRACTABLE HEADACHE, UNSPECIFIED HEADACHE TYPE: ICD-10-CM

## 2022-05-22 DIAGNOSIS — G89.29 CHRONIC INTRACTABLE HEADACHE, UNSPECIFIED HEADACHE TYPE: ICD-10-CM

## 2022-05-22 DIAGNOSIS — R79.89 HIGH SERUM HIGH DENSITY LIPOPROTEIN (HDL): ICD-10-CM

## 2022-05-22 DIAGNOSIS — E78.00 HYPERCHOLESTEROLEMIA: ICD-10-CM

## 2022-05-22 DIAGNOSIS — T14.8XXA NERVE DAMAGE: ICD-10-CM

## 2022-05-24 RX ORDER — RIZATRIPTAN BENZOATE 5 MG/1
5 TABLET, ORALLY DISINTEGRATING ORAL ONCE AS NEEDED
Qty: 9 TABLET | Refills: 5 | Status: SHIPPED | OUTPATIENT
Start: 2022-05-24

## 2022-05-24 RX ORDER — OXYCODONE HYDROCHLORIDE 5 MG/1
5 TABLET ORAL EVERY 6 HOURS PRN
Qty: 120 TABLET | Refills: 0 | Status: SHIPPED | OUTPATIENT
Start: 2022-05-24

## 2022-05-24 RX ORDER — ATORVASTATIN CALCIUM 40 MG/1
40 TABLET, FILM COATED ORAL DAILY
Qty: 90 TABLET | Refills: 1 | Status: SHIPPED | OUTPATIENT
Start: 2022-05-24

## 2022-05-25 NOTE — TELEPHONE ENCOUNTER
Dr Hammad De La Rosa will only fill Qty of 28 for a period of 7 days since the last time this was filled was in 2020 and a qty of 120 tabs is inappropriate  Pt has been filling this med for the 120 tabs approximately yearly

## 2022-05-25 NOTE — TELEPHONE ENCOUNTER
Derrek Harrell,  Oxycodone is most likely not showing up in [address] because it was sent to [facility] in [place] so it's outside of [place]

## 2022-07-01 ENCOUNTER — DOCUMENTATION (OUTPATIENT)
Dept: CARDIOLOGY CLINIC | Facility: CLINIC | Age: 57
End: 2022-07-01

## 2022-07-01 NOTE — PROGRESS NOTES
Rec'd [place] request from [facility] for Eliquis  Key # [id number]    Submitted to Cover My Meds online-  "This request has been approved using information available on the patient's profile  GHNIFN:[id number];GHAFRQ:[id number]; Review Type:Prior Auth; Coverage Start Date:06/01/2022; Coverage End Date:07/01/2023"

## 2022-07-28 PROBLEM — F33.9 DEPRESSION, RECURRENT (HCC): Status: ACTIVE | Noted: 2022-07-28

## 2022-07-28 RX ORDER — OXYCODONE HYDROCHLORIDE 5 MG/1
5 TABLET ORAL DAILY PRN
Qty: 30 TABLET | Refills: 0 | Status: SHIPPED | OUTPATIENT
Start: 2022-07-28

## 2022-07-28 RX ORDER — DIAZEPAM 5 MG/1
5 TABLET ORAL
Qty: 90 TABLET | Refills: 0 | Status: SHIPPED | OUTPATIENT
Start: 2022-07-28

## 2022-08-26 DIAGNOSIS — M48.061 SPINAL STENOSIS OF LUMBAR REGION WITHOUT NEUROGENIC CLAUDICATION: ICD-10-CM

## 2022-10-08 ENCOUNTER — APPOINTMENT (OUTPATIENT)
Dept: LAB | Age: 57
End: 2022-10-08
Payer: COMMERCIAL

## 2022-10-08 DIAGNOSIS — I48.0 PAF (PAROXYSMAL ATRIAL FIBRILLATION) (HCC): ICD-10-CM

## 2022-10-08 DIAGNOSIS — E78.00 HYPERCHOLESTEROLEMIA: ICD-10-CM

## 2022-10-08 LAB
ALBUMIN SERPL BCP-MCNC: 3.7 G/DL (ref 3.5–5)
ALP SERPL-CCNC: 76 U/L (ref 46–116)
ALT SERPL W P-5'-P-CCNC: 33 U/L (ref 12–78)
ANION GAP SERPL CALCULATED.3IONS-SCNC: 5 MMOL/L (ref 4–13)
AST SERPL W P-5'-P-CCNC: 22 U/L (ref 5–45)
BASOPHILS # BLD AUTO: 0.04 THOUSANDS/ΜL (ref 0–0.1)
BASOPHILS NFR BLD AUTO: 0 % (ref 0–1)
BILIRUB SERPL-MCNC: 0.85 MG/DL (ref 0.2–1)
BUN SERPL-MCNC: 12 MG/DL (ref 5–25)
CALCIUM SERPL-MCNC: 9.3 MG/DL (ref 8.3–10.1)
CHLORIDE SERPL-SCNC: 109 MMOL/L (ref 96–108)
CHOLEST SERPL-MCNC: 179 MG/DL
CO2 SERPL-SCNC: 27 MMOL/L (ref 21–32)
CREAT SERPL-MCNC: 0.84 MG/DL (ref 0.6–1.3)
EOSINOPHIL # BLD AUTO: 0.19 THOUSAND/ΜL (ref 0–0.61)
EOSINOPHIL NFR BLD AUTO: 2 % (ref 0–6)
ERYTHROCYTE [DISTWIDTH] IN BLOOD BY AUTOMATED COUNT: 13.5 % (ref 11.6–15.1)
GFR SERPL CREATININE-BSD FRML MDRD: 77 ML/MIN/1.73SQ M
GLUCOSE P FAST SERPL-MCNC: 83 MG/DL (ref 65–99)
HCT VFR BLD AUTO: 40.3 % (ref 34.8–46.1)
HDLC SERPL-MCNC: 66 MG/DL
HGB BLD-MCNC: 13.1 G/DL (ref 11.5–15.4)
IMM GRANULOCYTES # BLD AUTO: 0.03 THOUSAND/UL (ref 0–0.2)
IMM GRANULOCYTES NFR BLD AUTO: 0 % (ref 0–2)
LDLC SERPL CALC-MCNC: 95 MG/DL (ref 0–100)
LYMPHOCYTES # BLD AUTO: 3.36 THOUSANDS/ΜL (ref 0.6–4.47)
LYMPHOCYTES NFR BLD AUTO: 33 % (ref 14–44)
MCH RBC QN AUTO: 32.1 PG (ref 26.8–34.3)
MCHC RBC AUTO-ENTMCNC: 32.5 G/DL (ref 31.4–37.4)
MCV RBC AUTO: 99 FL (ref 82–98)
MONOCYTES # BLD AUTO: 0.66 THOUSAND/ΜL (ref 0.17–1.22)
MONOCYTES NFR BLD AUTO: 7 % (ref 4–12)
NEUTROPHILS # BLD AUTO: 5.9 THOUSANDS/ΜL (ref 1.85–7.62)
NEUTS SEG NFR BLD AUTO: 58 % (ref 43–75)
NONHDLC SERPL-MCNC: 113 MG/DL
NRBC BLD AUTO-RTO: 0 /100 WBCS
PLATELET # BLD AUTO: 327 THOUSANDS/UL (ref 149–390)
PMV BLD AUTO: 9.1 FL (ref 8.9–12.7)
POTASSIUM SERPL-SCNC: 4.5 MMOL/L (ref 3.5–5.3)
PROT SERPL-MCNC: 7 G/DL (ref 6.4–8.4)
RBC # BLD AUTO: 4.08 MILLION/UL (ref 3.81–5.12)
SODIUM SERPL-SCNC: 141 MMOL/L (ref 135–147)
TRIGL SERPL-MCNC: 89 MG/DL
TSH SERPL DL<=0.05 MIU/L-ACNC: 1.37 UIU/ML (ref 0.45–4.5)
WBC # BLD AUTO: 10.18 THOUSAND/UL (ref 4.31–10.16)

## 2022-10-08 PROCEDURE — 84443 ASSAY THYROID STIM HORMONE: CPT

## 2022-10-08 PROCEDURE — 80061 LIPID PANEL: CPT

## 2022-10-08 PROCEDURE — 85025 COMPLETE CBC W/AUTO DIFF WBC: CPT

## 2022-10-08 PROCEDURE — 36415 COLL VENOUS BLD VENIPUNCTURE: CPT

## 2022-10-08 PROCEDURE — 80053 COMPREHEN METABOLIC PANEL: CPT

## 2022-10-21 ENCOUNTER — OFFICE VISIT (OUTPATIENT)
Dept: UROLOGY | Facility: MEDICAL CENTER | Age: 57
End: 2022-10-21
Payer: COMMERCIAL

## 2022-10-21 VITALS
HEART RATE: 85 BPM | OXYGEN SATURATION: 98 % | WEIGHT: 128 LBS | SYSTOLIC BLOOD PRESSURE: 132 MMHG | BODY MASS INDEX: 24.17 KG/M2 | HEIGHT: 61 IN | DIASTOLIC BLOOD PRESSURE: 82 MMHG

## 2022-10-21 DIAGNOSIS — N81.10 VAGINAL PROLAPSE: Primary | ICD-10-CM

## 2022-10-21 DIAGNOSIS — N39.46 MIXED STRESS AND URGE URINARY INCONTINENCE: ICD-10-CM

## 2022-10-21 LAB
BACTERIA UR QL AUTO: ABNORMAL /HPF
BILIRUB UR QL STRIP: NEGATIVE
CLARITY UR: CLEAR
COLOR UR: COLORLESS
GLUCOSE UR STRIP-MCNC: NEGATIVE MG/DL
HGB UR QL STRIP.AUTO: ABNORMAL
KETONES UR STRIP-MCNC: NEGATIVE MG/DL
LEUKOCYTE ESTERASE UR QL STRIP: NEGATIVE
MUCOUS THREADS UR QL AUTO: ABNORMAL
NITRITE UR QL STRIP: NEGATIVE
NON-SQ EPI CELLS URNS QL MICRO: ABNORMAL /HPF
PH UR STRIP.AUTO: 6 [PH]
POST-VOID RESIDUAL VOLUME, ML POC: 50 ML
PROT UR STRIP-MCNC: NEGATIVE MG/DL
RBC #/AREA URNS AUTO: ABNORMAL /HPF
SP GR UR STRIP.AUTO: 1.01 (ref 1–1.03)
UROBILINOGEN UR STRIP-ACNC: <2 MG/DL
WBC #/AREA URNS AUTO: ABNORMAL /HPF

## 2022-10-21 PROCEDURE — 99204 OFFICE O/P NEW MOD 45 MIN: CPT | Performed by: STUDENT IN AN ORGANIZED HEALTH CARE EDUCATION/TRAINING PROGRAM

## 2022-10-21 PROCEDURE — 51798 US URINE CAPACITY MEASURE: CPT | Performed by: STUDENT IN AN ORGANIZED HEALTH CARE EDUCATION/TRAINING PROGRAM

## 2022-10-21 PROCEDURE — 81001 URINALYSIS AUTO W/SCOPE: CPT | Performed by: STUDENT IN AN ORGANIZED HEALTH CARE EDUCATION/TRAINING PROGRAM

## 2022-10-21 PROCEDURE — 87086 URINE CULTURE/COLONY COUNT: CPT | Performed by: STUDENT IN AN ORGANIZED HEALTH CARE EDUCATION/TRAINING PROGRAM

## 2022-10-21 RX ORDER — OXYBUTYNIN CHLORIDE 10 MG/1
10 TABLET, EXTENDED RELEASE ORAL
Qty: 30 TABLET | Refills: 2 | Status: SHIPPED | OUTPATIENT
Start: 2022-10-21 | End: 2022-11-20

## 2022-10-21 RX ORDER — MELATONIN
2000 DAILY
COMMUNITY

## 2022-10-21 NOTE — PROGRESS NOTES
UROLOGY OUTPATIENT CONSULT NOTE     Name: Piter Cain  : 1965  MRN: [id number]  Date of Service: 10/21/2022      CHIEF COMPLAINT   Incontinence    History of Present Illness:     Piter Cain is a 61 y o  female presenting for evaluation of urinary incontinence  The patient saw a urogynecologist in  and states that she had a surgery to lift her bladder  She knows that she did not have mesh implanted during the surgery  Unfortunately I am unable to view the records in Care everywhere  She developed incontinence after surgery and has had it ever since  She feels like her bladder is always full  She reports urinary urgency, frequency, and nocturia 3-4 times overnight  She only sleeps about 4-6 hours  She denies leakage with cough, sneeze, or increased intra-abdominal pressure  She does report some leakage with standing  She states when the urine stream starts she is unable to stop it  She also complains of a bulge that gets in the way when she was  She feels like something has been falling out for the past 6 months  She has passed stones in the past but has never had surgery for stones  She has never seen a urologist before  She denies gross hematuria  She is a current smoker and has smoked 6 cigarettes per day "forever " She reports having a partial hysterectomy  She is supposed to see her gynecologist in December but she felt she could not wait until this appointment to discuss her issues      Bladder scan PVR 50cc    PAST MEDICAL HISTORY:     Past Medical History:   Diagnosis Date   • Acute pain of right knee     last assessed -    • Anemia    • Anxiety    • Cervical disc disorder with radiculopathy    • Chronic pain    • Constipation    • CVA (cerebral vascular accident) ([facility] [address] )    • H/O ischemic right MCA stroke    • H/O: hysterectomy    • Hematuria     last assessed - 2017   • High cholesterol    • Lumbar radiculopathy    • Lumbar stenosis    • Other chronic pain     last assessed - [id number]   • Other muscle spasm     last assessed - [id number]   • PONV (postoperative nausea and vomiting)     must have premed   • Spondylosis of cervical spine    • Spondylosis of lumbosacral region    • Stroke [facility])    • Urinary incontinence     Resolved - [id number]       PAST SURGICAL HISTORY:     Past Surgical History:   Procedure Laterality Date   • BLADDER SURGERY     • BLADDER SURGERY  2014   • CARDIAC LOOP RECORDER  08/2020    removal    • CERVICAL SPINE SURGERY     • KNEE ARTHROSCOPY Left    • LIPECTOMY      of thigh   • LIPOMA RESECTION     • NECK SURGERY     • PA COLONOSCOPY FLX DX W/COLLJ SPEC WHEN PFRMD N/A 2/10/2017    Procedure: COLONOSCOPY;  Surgeon: Keith Mcdonald MD;  Location: [facility] GI [facility]; Service: Gastroenterology   • PA KNEE SCOPE,MED/LAT MENISECTOMY Left 3/21/2016    Procedure: ARTHROSCOPY W/ PARTIAL MEDIAL MENISCECTOMY;  Surgeon: Lalitha Santos MD;  Location:  MAIN OR;  Service: Orthopedics   • TOTAL ABDOMINAL HYSTERECTOMY     • TUBAL LIGATION         CURRENT MEDICATIONS:     Current Outpatient Medications   Medication Sig Dispense Refill   • aspirin 81 mg chewable tablet Chew 1 tablet (81 mg total) daily 30 tablet 0   • atorvastatin (LIPITOR) 40 mg tablet Take 1 tablet (40 mg total) by mouth in the morning   90 tablet 1   • cholecalciferol (VITAMIN D3) 1,000 units tablet Take 2,000 Units by mouth daily     • diazepam (VALIUM) 5 mg tablet Take 1 tablet (5 mg total) by mouth daily at bedtime as needed for sleep 90 tablet 0   • diclofenac sodium (VOLTAREN) 50 mg EC tablet Take 1 tablet (50 mg total) by mouth daily as needed (pain) 90 tablet 1   • Eliquis 5 MG TAKE 1 TABLET BY MOUTH TWICE A  tablet 5   • loteprednol etabonate (LOTEMAX) 0 5 % ophthalmic suspension Administer 1 drop to both eyes 2 (two) times a day      • oxyCODONE (ROXICODONE) 5 immediate release tablet Take 1 tablet (5 mg total) by mouth daily as needed for moderate pain Max Daily Amount: 5 mg 30 tablet 0   • Probiotic Product (Probiotic Advanced) CAPS Take by mouth daily     • rizatriptan (MAXALT-MLT) 5 MG disintegrating tablet Take 1 tablet (5 mg total) by mouth once as needed for migraine 9 tablet 5   • vitamin E 600 UNIT capsule Take 2,400 Units by mouth daily     • cycloSPORINE (RESTASIS) 0 05 % ophthalmic emulsion Administer 1 drop to both eyes every 12 (twelve) hours  (Patient not taking: Reported on 10/21/2022)     • diazepam (VALIUM) 5 mg tablet Take 2 tablets (10 mg total) by mouth daily as needed for anxiety (Patient not taking: No sig reported) 90 tablet 0   • naloxone (NARCAN) 4 mg/0 1 mL nasal spray Administer 1 spray into a nostril  If breathing does not return to normal or if breathing difficulty resumes after 2-3 minutes, give another dose in the other nostril using a new spray  1 each 1   • oxyCODONE (ROXICODONE) 5 immediate release tablet Take 1 tablet (5 mg total) by mouth every 6 (six) hours as needed for moderate pain Max Daily Amount: 20 mg (Patient not taking: No sig reported) 120 tablet 0     No current facility-administered medications for this visit         ALLERGIES:     Allergies   Allergen Reactions   • Blue Dyes (Parenteral) - Food Allergy Anaphylaxis   • Gabapentin Anaphylaxis and Nausea Only     Anaphylaxis   • Penicillins Rash and Vomiting   • Nitrofurantoin GI Intolerance     vomiting   • Propofol    • Tramadol GI Intolerance     Other reaction(s): GI upset  Vomiting  Vomiting     • Blue Dyes (Parenteral) - Food Allergy Rash       SOCIAL HISTORY:     Social History     Socioeconomic History   • Marital status: /Civil Union     Spouse name: None   • Number of children: None   • Years of education: None   • Highest education level: None   Occupational History   • None   Tobacco Use   • Smoking status: Current Every Day Smoker     Packs/day: 0 25     Years: 40 00     Pack years: 10 00     Types: Cigarettes   • Smokeless tobacco: Never Used   • Tobacco comment: one half pack a day   Vaping Use   • Vaping Use: Never used   Substance and Sexual Activity   • Alcohol use: Yes     Comment: social drinker special events only   • Drug use: Not Currently     Types: Marijuana     Comment: medical marijuana (liquid form only)   • Sexual activity: None   Other Topics Concern   • None   Social History Narrative    ** Merged History Encounter **          Social Determinants of Health     Financial Resource Strain: Not on file   Food Insecurity: Not on file   Transportation Needs: Not on file   Physical Activity: Not on file   Stress: Not on file   Social Connections: Not on file   Intimate Partner Violence: Not on file   Housing Stability: Not on file       FAMILY HISTORY:     Family History   Problem Relation Age of Onset   • Stroke Mother         46s   • Cancer Mother    • Hypertension Mother    • Pulmonary embolism Other         In mid 35s   • Stroke Sister         46s   • Stroke Brother         46s   • Prostate cancer Father    • Cancer Daughter    • Stroke Family        REVIEW OF SYSTEMS:     Review of Systems   Constitutional: Negative for chills, fever and unexpected weight change  HENT: Negative for hearing loss and sore throat  Eyes: Negative for redness and visual disturbance  Respiratory: Negative for cough and shortness of breath  Cardiovascular: Negative for chest pain, palpitations and leg swelling  Gastrointestinal: Negative for blood in stool, constipation, diarrhea, nausea and vomiting  Endocrine: Negative for cold intolerance and heat intolerance  Genitourinary:        Per HPI   Musculoskeletal: Negative for arthralgias, back pain and myalgias  Skin: Negative for color change and rash  Neurological: Negative for dizziness, seizures and headaches  Hematological: Does not bruise/bleed easily           PHYSICAL EXAM:     /82 (BP Location: Left arm, Patient Position: Sitting, Cuff Size: Adult)   Pulse 85   Ht 5' 1" (1 549 m)   Wt 58 1 kg (128 lb) LMP  (LMP Unknown)   SpO2 98%   BMI 24 19 kg/m²     General:  Healthy appearing female in no acute distress  Head:  Normocephalic, atraumatic  Eyes: no scleral icterus  ENMT: Nares patent, moist mucous membranes  Cardiovascular:  Regular rate  Respiratory:  Patient has unlabored respirations  Abdomen:  Abdomen nondistended  Musculoskeletal: Normal gait  Neurological: Grossly intact  Psych: Normal affect  Genitourinary:  Stage II apical prolapse  No appreciable anterior or posterior wall prolapse      LABS:     CBC:   Lab Results   Component Value Date    WBC 10 18 (H) 10/08/2022    HGB 13 1 10/08/2022    HCT 40 3 10/08/2022    MCV 99 (H) 10/08/2022     10/08/2022       BMP:   Lab Results   Component Value Date    GLUCOSE 102 04/25/2018    CALCIUM 9 3 10/08/2022    K 4 5 10/08/2022    CO2 27 10/08/2022     (H) 10/08/2022    BUN 12 10/08/2022    CREATININE 0 84 10/08/2022     ASSESSMENT:     61 y o  female with stage 2 apical wall prolapse and urgency urinary incontinence  No stress incontinence was elicited during my exam though her bladder was not very full  Her history is consistent with urgency urinary incontinence, possible mixed urinary incontinence  I advised that there are several options I can offer her for urgency urinary incontinence but I do not perform prolapse repairs  I will refer her to Urogynecology for discussion of her options  I explained to her that Urogynecology can also address her incontinence but I am happy to start some therapy today  PLAN:     Urinalysis and urine culture to rule out infection or micro hematuria  Initiate oxybutynin 10 mg q h s  Referral to urogynecology to discuss prolapse repair  Return to clinic in 6 weeks     Carlo Salcido for Urology    This note was written using fluency dictation software  Please excuse any resulting minor grammatical errors

## 2022-10-22 LAB — BACTERIA UR CULT: NORMAL

## 2022-10-28 ENCOUNTER — OFFICE VISIT (OUTPATIENT)
Dept: INTERNAL MEDICINE CLINIC | Age: 57
End: 2022-10-28

## 2022-10-28 VITALS
BODY MASS INDEX: 23.96 KG/M2 | WEIGHT: 126.9 LBS | DIASTOLIC BLOOD PRESSURE: 78 MMHG | OXYGEN SATURATION: 98 % | TEMPERATURE: 97.8 F | HEART RATE: 74 BPM | HEIGHT: 61 IN | SYSTOLIC BLOOD PRESSURE: 122 MMHG

## 2022-10-28 DIAGNOSIS — Z28.21 REFUSED INFLUENZA VACCINE: ICD-10-CM

## 2022-10-28 DIAGNOSIS — K59.01 SLOW TRANSIT CONSTIPATION: ICD-10-CM

## 2022-10-28 DIAGNOSIS — F11.20 CONTINUOUS OPIOID DEPENDENCE (HCC): ICD-10-CM

## 2022-10-28 DIAGNOSIS — E78.00 HYPERCHOLESTEROLEMIA: ICD-10-CM

## 2022-10-28 DIAGNOSIS — I48.0 PAF (PAROXYSMAL ATRIAL FIBRILLATION) (HCC): ICD-10-CM

## 2022-10-28 DIAGNOSIS — R79.89 HIGH SERUM HIGH DENSITY LIPOPROTEIN (HDL): ICD-10-CM

## 2022-10-28 DIAGNOSIS — F33.9 DEPRESSION, RECURRENT (HCC): Primary | ICD-10-CM

## 2022-10-28 DIAGNOSIS — Z86.73 HISTORY OF ISCHEMIC RIGHT MCA STROKE: ICD-10-CM

## 2022-10-28 DIAGNOSIS — Z72.0 TOBACCO ABUSE: ICD-10-CM

## 2022-10-28 PROBLEM — L30.9 DERMATITIS: Status: RESOLVED | Noted: 2022-04-14 | Resolved: 2022-10-28

## 2022-10-28 RX ORDER — ATORVASTATIN CALCIUM 40 MG/1
60 TABLET, FILM COATED ORAL DAILY
Qty: 135 TABLET | Refills: 1 | Status: SHIPPED | OUTPATIENT
Start: 2022-10-28

## 2022-10-28 NOTE — PROGRESS NOTES
Assessment/Plan:    1  Depression, recurrent ([facility] )  -     Vitamin D 25 hydroxy; Future; Expected date: 04/28/2023  -     Comprehensive metabolic panel; Future; Expected date: 04/28/2023  -     CBC and differential; Future; Expected date: 04/28/2023    2  High serum high density lipoprotein (HDL)  -     atorvastatin (LIPITOR) 40 mg tablet; Take 1 5 tablets (60 mg total) by mouth daily    3  History of ischemic right MCA stroke    4  Refused influenza vaccine    5  Slow transit constipation    6  Tobacco abuse    7  Hypercholesterolemia  -     Lipid Panel with Direct LDL reflex; Future; Expected date: 04/28/2023  -     atorvastatin (LIPITOR) 40 mg tablet; Take 1 5 tablets (60 mg total) by mouth daily    8  PAF (paroxysmal atrial fibrillation) ([facility] )    9  Continuous opioid dependence (HCC)       increase liptior to 60 mg daily, repeat labs in 4months, increase activity  She has just gotten a new position at work which will give her lot of activity and she thinks that will help  Discussed over-the-counter fiber supplements stool softeners and laxatives  Increased bowel movement to daily or every other day  Call if any issues  There are no Patient Instructions on file for this visit  Return in about 4 months (around 2/28/2023) for Recheck  Subjective:      Patient ID: Robert Taylor is a 61 y o  female  Chief Complaint   Patient presents with   • Follow-up     Review labs    • Health maint     Pt  Has cologuard kit at home       HPI     Hyperlipidemia (Follow-Up): The patient states her hyperlipidemia has been under good control since the last visit  She has no significant interval events     Symptoms: denies chest pain-- and-- denies intermittent leg claudication   Associated symptoms include no focal neurologic deficits-- and-- no memory loss       Medications: the patient is not adherent with her medication   July 2019: Last lab work done in December   On pravastatin and tolerating well however she has a preference to Lipitor which she was on before and is asking for change   No recent labs today but last lab work was elevated  June 2020 well controlled lipid levels, on statin and tolerating well  November 2020, doing well no problems  Sept 2021: on lipitor well controlled levels, LDL 71  October 2022, increase in LDL to 95  She has not changed anything however her current position at work is sedentary and she says her activity has significantly decreased         Depression:  Feeling much better with low dose lexapro   But has some fatigue side effects   Has not been able to work due to stroke and is having some difficulty with exercising due to chronic back pain   July 2019 stable January 2020, stable with present medications and slightly better since she is working now  [employer], relatively well controlled and no suicidal homicide ideations  [employer] is working now and feels tired but relatively stable    nov 2020:  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   She chose night shift but feels overwhelmed that her training was not as extensive as she has like did to be  Jaime Loya her brain injury she has been slower to learn new techniques but really enjoys the company and her coworkers  Sept 2021: no SI or HI, no panic or breakthrough symptoms, does not sleep well, re started working  October 2022, no issues  No HI or SI, medications are working well    She is still working night shift so her sleep is not the best      Chronic pain, trying to wean off oxycodone and seeing Physical Medicine and Rehab doctor   Started on medical marijuana which has significantly improved her symptoms and she has been able to decrease her oxycodone however she is in some financial difficulty due to not having a job and no medical coverage now and finds it difficult to pay cash for her medical marijuana   January 2020, not using oxycodone since it makes her constipation worse   June 2020, stable   nov 2020 stable  sept 2021: on oxy, stable  Has some constipation and N/V  October 2022, stable, no issues      Severe constipation:   failed Linzess, use to eat salads and yogurt on a daily basis however admits to not eating at least 3 times per day  She does drink plenty of water  Has a bowel movement that is hard once every 3 weeks  Has been experiencing some nausea and intermittent vomiting  Does not like a lot of over-the-counter laxatives because it makes her cramps and gassy   October 2022, sometimes better and sometimes not  Waxes and wanes  Most recently due to her job she has not been able to drink the right amount of water           The following portions of the patient's history were reviewed and updated as appropriate: allergies, current medications, past family history, past medical history, past social history, past surgical history and problem list     Review of Systems      Constitutional:  Denies fever or chills   Eyes:  Denies double , blurry vision or eye pain  HENT:  Denies nasal congestion or sore throat   Respiratory:  Denies cough or shortness of breath or wheezing  Cardiovascular:  Denies palpitations or chest pain  GI:  Denies abdominal pain, nausea, or vomiting, no loose stools, no reflux  Integument:  Denies rash , no open areas  Neurologic:  Denies headache or focal weakness, no dizziness  : no dysuria, or hematuria        Current Outpatient Medications   Medication Sig Dispense Refill   • aspirin 81 mg chewable tablet Chew 1 tablet (81 mg total) daily 30 tablet 0   • atorvastatin (LIPITOR) 40 mg tablet Take 1 5 tablets (60 mg total) by mouth daily 135 tablet 1   • cholecalciferol (VITAMIN D3) 1,000 units tablet Take 2,000 Units by mouth daily     • diclofenac sodium (VOLTAREN) 50 mg EC tablet Take 1 tablet (50 mg total) by mouth daily as needed (pain) 90 tablet 1   • Eliquis 5 MG TAKE 1 TABLET BY MOUTH TWICE A  tablet 5   • naloxone (NARCAN) 4 mg/0 1 mL nasal spray Administer 1 spray into a nostril   If breathing does not return to normal or if breathing difficulty resumes after 2-3 minutes, give another dose in the other nostril using a new spray  1 each 1   • oxybutynin (DITROPAN-XL) 10 MG 24 hr tablet Take 1 tablet (10 mg total) by mouth daily at bedtime 30 tablet 2   • Probiotic Product (Probiotic Advanced) CAPS Take by mouth daily     • rizatriptan (MAXALT-MLT) 5 MG disintegrating tablet Take 1 tablet (5 mg total) by mouth once as needed for migraine 9 tablet 5   • vitamin E 600 UNIT capsule Take 2,400 Units by mouth daily     • cycloSPORINE (RESTASIS) 0 05 % ophthalmic emulsion Administer 1 drop to both eyes every 12 (twelve) hours  (Patient not taking: No sig reported)     • diazepam (VALIUM) 5 mg tablet Take 1 tablet (5 mg total) by mouth daily at bedtime as needed for sleep 90 tablet 0   • loteprednol etabonate (LOTEMAX) 0 5 % ophthalmic suspension Administer 1 drop to both eyes 2 (two) times a day  (Patient not taking: Reported on 10/28/2022)     • oxyCODONE (ROXICODONE) 5 immediate release tablet Take 1 tablet (5 mg total) by mouth daily as needed for moderate pain Max Daily Amount: 5 mg 30 tablet 0     No current facility-administered medications for this visit         Objective:    /78 (BP Location: Left arm, Patient Position: Sitting, Cuff Size: Standard)   Pulse 74   Temp 97 8 °F (36 6 °C) (Temporal)   Ht 5' 1" (1 549 m)   Wt 57 6 kg (126 lb 14 4 oz)   LMP  (LMP Unknown)   SpO2 98%   BMI 23 98 kg/m²        Physical Exam      Constitutional:  Well developed, well nourished, no acute distress, non-toxic appearance   Eyes:  PERRL, conjunctiva normal , non icteric sclera  HENT:  Atraumatic, oropharynx moist  Neck-  supple   Respiratory:  CTA b/l, normal breath sounds, no rales, no wheezing   Cardiovascular:  RRR, no murmurs, no LE edema b/l  GI:  Soft, nondistended, normal bowel sounds x 4, nontender, no organomegaly, no mass, no rebound, no guarding   Neurologic:  no focal deficits noted Psychiatric:  Speech and behavior appropriate , AAO x 3           Joe Vela

## 2022-11-22 DIAGNOSIS — N39.46 MIXED STRESS AND URGE URINARY INCONTINENCE: ICD-10-CM

## 2022-11-23 RX ORDER — OXYBUTYNIN CHLORIDE 10 MG/1
10 TABLET, EXTENDED RELEASE ORAL
Qty: 90 TABLET | Refills: 3 | Status: SHIPPED | OUTPATIENT
Start: 2022-11-23

## 2022-11-23 NOTE — TELEPHONE ENCOUNTER
An Auto-fax Refill Request for Oxybutynin ER 10mg was received from [facility] mail order [facility]  Patient needs script to be written for 90 day supply for maximum cost-savings  The patient was last seen on 10/21/22 by Dr Jennifer Mckeon in the [facility] location; continuation of the medication was authorized at that time    Request for same, 90 day supply with 3 refills was queued and forwarded to the Advanced Practitioner covering the [facility] location for approval

## 2022-12-20 ENCOUNTER — VBI (OUTPATIENT)
Dept: ADMINISTRATIVE | Facility: OTHER | Age: 57
End: 2022-12-20

## 2023-01-05 ENCOUNTER — EVALUATION (OUTPATIENT)
Dept: PHYSICAL THERAPY | Facility: REHABILITATION | Age: 58
End: 2023-01-05

## 2023-01-05 DIAGNOSIS — N39.46 MIXED INCONTINENCE URGE AND STRESS: ICD-10-CM

## 2023-01-05 DIAGNOSIS — M79.18 MYOFASCIAL PAIN DYSFUNCTION SYNDROME: Primary | ICD-10-CM

## 2023-01-05 DIAGNOSIS — R35.0 URINARY FREQUENCY: ICD-10-CM

## 2023-01-05 NOTE — LETTER
2023    Ulises Ruvalcaba   [address]    Patient: Michaela Wyatt   YOB: 1965   Date of Visit: 2023     Encounter Diagnosis     ICD-10-CM    1  Myofascial pain dysfunction syndrome  M79 18       2  Mixed incontinence urge and stress  N39 46       3  Urinary frequency  R35 0           Dear Dr Dilan Martinez:    Thank you for your recent referral of Michaela Wyatt  Please review the attached evaluation summary from Venessa's recent visit  Please verify that you agree with the plan of care by signing the attached order  If you have any questions or concerns, please do not hesitate to call  I sincerely appreciate the opportunity to share in the care of one of your patients and hope to have another opportunity to work with you in the near future  Sincerely,    Aisha Escalera, PT      Referring Provider:      I certify that I have read the below Plan of Care and certify the need for these services furnished under this plan of treatment while under my care  Panda Mars MD  [id number]  [address]  Via Fax: [phone]          PT Evaluation     Today's date: 2023  Patient name: Michaela Wyatt  : 1965  MRN: [id number]  Referring provider: Dio Parkre  Dx:   Encounter Diagnosis     ICD-10-CM    1  Myofascial pain dysfunction syndrome  M79 18       2  Mixed incontinence urge and stress  N39 46       3  Urinary frequency  R35 0           Start Time: 0830  Stop Time: 0915  Total time in clinic (min): 45 minutes    Assessment  Assessment details: The patient is a 61year old female with complaints of urinary urgency, frequency and incontinence  Unable to do pelvic floor muscle examination this visit secondary to time constraints   Based on subjective intake will likely focus initially on managing severe symptoms of constipation that may be affecting bladder function and contributing to incontinence, frequency and urgency  She would benefit from pelvic floor physical therapy to help reduce/manage symptoms, address impairments and maximize function and quality of life upon discharge  Therapeutic activities performed upon examination included education regarding pelvic floor anatomy, explanation of exam technique, explanation of exam findings and discussion of treatment plan as well as expectations of the patient to emphasize the importance of compliance and adherence to physical therapy visits  She will be given updated HEP throughout episode of care  Patient was provided with bladder and bowel diary to be completed for next visit  Education provided today:   Pelvic floor anatomy and function  Physiology/relationship of abdominal canister and pelvis/pelvic organs/pelvic floor muscles  Diaphragm and Diaphragmatic breathing  Bowel and Bladder anatomy and function  Constipation Education  PT exam and course of treatment  Bladder and Bowel diary               Impairments: abnormal muscle tone and activity intolerance    Goals  STG:  FURTHER GOALS TO BE ESTABLISH FOLLOWING PELVIC FLOOR EXAM    LT  The patient will be independent with HEP upon discharge  2  The patient will control urinary urgency and minimize urge incontinence upon discharge  3  The patient will reduce urinary frequency to once every 2 hours upon discharge  Plan  Plan details: Thank you for opportunity to participate in the care of [facility]      Patient would benefit from: skilled physical therapy, women's health eval and PT eval  Planned therapy interventions: abdominal trunk stabilization, behavior modification, body mechanics training, home exercise program, breathing training, therapeutic exercise, therapeutic activities, stretching, strengthening, postural training, patient education, neuromuscular re-education and manual therapy  Frequency: 1x week  Duration in visits: 8  Duration in weeks: 8  Plan of Care beginning date: 1/5/2023  Plan of Care expiration date: 3/2/2023  Treatment plan discussed with: patient        PT Pelvic Floor Subjective:   History of Present Illness:   Patient saw urology and was referred back to urogynecology, Dr Mari Romero, who referred to PT  Patient reports since her bladder surgery in 2014 with Dr Cece Carroll she has been having increased urinary urgency, frequency and leakage  She had the surgery secondary to feeling like she couldn't empty her bladder  She has to put panty liner and underwear on after getting out of the shower because she can leak right away stating "it just happens " She gets up at night every 2-3 hours to empty her bladder  She reports it has been progressive and happening more often  She was put on Gemtesa but notes it has not been helping and feels it may be worsening her constipation  She reports being diagnosed with a prolapse with Dr Cirilo Kingston  Prolapse has been about 6 months  She has been having pain on the lower right side of the groin for about 6 months ago as well that comes and goes  She does have a history of ovarian cysts        Social Support:     Lives with:  Spouse    Relationship status: /committed    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[employer])  Diet and Exercise:      Exercise type: no activity    Not exercising due to: bladder incontinence  Co-morbidities:    Low back pain began in 1998 has had "tons of therapy and injections"    OB/ gyn History    Gestational History:     Prior Pregnancy: Yes      Number of prior pregnancies: 2    Number of term pregnancies: 2    Delivery Type: vaginal delivery      Number of vaginal deliveries: 2    Menstrual History:      Menopausal: menopause  Partial hysterectomy in 2004 secondary to menstrual irregularities and ovarian cysts - had multiple D&Cs  Bladder Function:     Voiding Difficulties positive for: urgency, frequent urination and incomplete emptying      Voiding Difficulties negative for: straining      Voiding Difficulties comments:     Voiding frequency: every 15-30 minutes    Urinary leakage: urine leakage    Urinary leakage aggravated by: coughing (lifting), sneezing, standing up, walking to the bathroom and post-void dribble    Nocturia (episodes per night): 2 and 3    Painful urination: No      Fluid Intake Type: Water    Intake (ounces):      Intake (ounces) comment: Water: work days no more than 40 oz and non work days 80 oz   Coffee: 12 oz caffeinated     Incontinence Management:     Pads/Diaper Use:  24 hours    Pads/Diapers Additional Comments: poise panty liner 6-8/day  Bowel Function:     Voiding DIfficulties: painful defecating and constipation      Bowel Function comments:  Sometimes can go 1x/week and sometimes can go 3-4 weeks and can start to vomit   Hasn't had a bowel movement in 2 weeks  Has not seen GI specialist    Bowel frequency: more than every 4 days    [place] Stool Scale: type 1    Stool softener use: stool softeners (1x/day colace)    Uses "squatty potty": no Squatty Potty  Sexual Function:     Sexually Active:  Not sexually active  Pain:     Current pain ratin    At best pain rating:  3    At worst pain rating:  10    Location:  Right groin once every 2 weeks, LBP constant   Treatments:     Current treatment: medication and physical therapy    Patient Goals:     Patient goals for therapy:  Fully empty bladder or bowels, improved bladder or bowel function, improved comfort, improved pain management, improved quality of life, improved sleep and return to sport/leisure activities    Other patient goals:  Not having to wear a panty liner anymore and not have to go to the bathroom every 15 minutes, be able to get out of her driveway without having to pee      Objective             Precautions: partial hysterectomy 2004, CVA 2019, anxiety, chronic LBP, constipation  [employer] HEP:    Manuals /            Pelvic floor muscle exam  nv +CONSENT                                                   Neuro Re-Ed             Pelvic Floor Muscle Isolation:             PFMC Quick Flicks             PFMC Slow Holds             Prone PFMC             Quadruped PFMC             Seated PFMC             Seated PFMC with pelvic tilt             Standing PFMC             TA ADIM             TA + PFMC             TA ADIM + hip abd isometrics             TA ADIM + hip adduction isometrics             PFMC + ecc bridge             Ther Ex             PPT             LTR             TA + clamshells             TA + bridge             Bridge + SLR             Bridge + marching             TA + Toe taps             Tband low rows + TA + PFMC                                                                 Ther Activity             Bladder/Bowel Diary Review and Counseling             Education             PFMC + sit to stand             PFMC + reaching             PFMC + lift             PFMC + cough             PFMC + step ups                          Gait Training                                       Modalities

## 2023-01-05 NOTE — PROGRESS NOTES
PT Evaluation     Today's date: 2023  Patient name: Essie Cho  : 1965  MRN: [id number]  Referring provider: Yeimi Mathew  Dx:   Encounter Diagnosis     ICD-10-CM    1  Myofascial pain dysfunction syndrome  M79 18       2  Mixed incontinence urge and stress  N39 46       3  Urinary frequency  R35 0           Start Time: 0830  Stop Time: 0915  Total time in clinic (min): 45 minutes    Assessment  Assessment details: The patient is a 61year old female with complaints of urinary urgency, frequency and incontinence  Unable to do pelvic floor muscle examination this visit secondary to time constraints  Based on subjective intake will likely focus initially on managing severe symptoms of constipation that may be affecting bladder function and contributing to incontinence, frequency and urgency  She would benefit from pelvic floor physical therapy to help reduce/manage symptoms, address impairments and maximize function and quality of life upon discharge  Therapeutic activities performed upon examination included education regarding pelvic floor anatomy, explanation of exam technique, explanation of exam findings and discussion of treatment plan as well as expectations of the patient to emphasize the importance of compliance and adherence to physical therapy visits  She will be given updated HEP throughout episode of care  Patient was provided with bladder and bowel diary to be completed for next visit  Education provided today:   Pelvic floor anatomy and function  Physiology/relationship of abdominal canister and pelvis/pelvic organs/pelvic floor muscles  Diaphragm and Diaphragmatic breathing  Bowel and Bladder anatomy and function  Constipation Education  PT exam and course of treatment  Bladder and Bowel diary               Impairments: abnormal muscle tone and activity intolerance    Goals  STG:  FURTHER GOALS TO BE ESTABLISH FOLLOWING PELVIC FLOOR EXAM    LT  The patient will be independent with HEP upon discharge  2  The patient will control urinary urgency and minimize urge incontinence upon discharge  3  The patient will reduce urinary frequency to once every 2 hours upon discharge  Plan  Plan details: Thank you for opportunity to participate in the care of Roman Rudolph  Patient would benefit from: skilled physical therapy, women's health eval and PT eval  Planned therapy interventions: abdominal trunk stabilization, behavior modification, body mechanics training, home exercise program, breathing training, therapeutic exercise, therapeutic activities, stretching, strengthening, postural training, patient education, neuromuscular re-education and manual therapy  Frequency: 1x week  Duration in visits: 8  Duration in weeks: 8  Plan of Care beginning date: 1/5/2023  Plan of Care expiration date: 3/2/2023  Treatment plan discussed with: patient        PT Pelvic Floor Subjective:   History of Present Illness:   Patient saw urology and was referred back to urogynecology, Dr Osmin Simmons, who referred to PT  Patient reports since her bladder surgery in 2014 with Dr Bubba Chavarria she has been having increased urinary urgency, frequency and leakage  She had the surgery secondary to feeling like she couldn't empty her bladder  She has to put panty liner and underwear on after getting out of the shower because she can leak right away stating "it just happens " She gets up at night every 2-3 hours to empty her bladder  She reports it has been progressive and happening more often  She was put on Gemtesa but notes it has not been helping and feels it may be worsening her constipation  She reports being diagnosed with a prolapse with Dr Ryann Shore  Prolapse has been about 6 months  She has been having pain on the lower right side of the groin for about 6 months ago as well that comes and goes  She does have a history of ovarian cysts        Social Support:     Lives with:  Spouse Relationship status: /committed    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[employer])  Diet and Exercise:      Exercise type: no activity    Not exercising due to: bladder incontinence  Co-morbidities:    Low back pain began in  has had "tons of therapy and injections"    OB/ gyn History    Gestational History:     Prior Pregnancy: Yes      Number of prior pregnancies: 2    Number of term pregnancies: 2    Delivery Type: vaginal delivery      Number of vaginal deliveries: 2    Menstrual History:      Menopausal: menopause  Partial hysterectomy in  secondary to menstrual irregularities and ovarian cysts - had multiple D&Cs  Bladder Function:     Voiding Difficulties positive for: urgency, frequent urination and incomplete emptying      Voiding Difficulties negative for: straining      Voiding Difficulties comments:     Voiding frequency: every 15-30 minutes    Urinary leakage: urine leakage    Urinary leakage aggravated by: coughing (lifting), sneezing, standing up, walking to the bathroom and post-void dribble    Nocturia (episodes per night): 2 and 3    Painful urination: No      Fluid Intake Type: Water    Intake (ounces):      Intake (ounces) comment: Water: work days no more than 40 oz and non work days 80 oz   Coffee: 12 oz caffeinated     Incontinence Management:     Pads/Diaper Use:  24 hours    Pads/Diapers Additional Comments: poise panty liner 6-8/day  Bowel Function:     Voiding DIfficulties: painful defecating and constipation      Bowel Function comments:  Sometimes can go 1x/week and sometimes can go 3-4 weeks and can start to vomit   Hasn't had a bowel movement in 2 weeks  Has not seen GI specialist    Bowel frequency: more than every 4 days    [place] Stool Scale: type 1    Stool softener use: stool softeners (1x/day colace)    Uses "squatty potty": no Squatty Potty  Sexual Function:     Sexually Active:  Not sexually active  Pain:     Current pain ratin    At best pain rating:  3    At worst pain rating:  10    Location:  Right groin once every 2 weeks, LBP constant   Treatments:     Current treatment: medication and physical therapy    Patient Goals:     Patient goals for therapy:  Fully empty bladder or bowels, improved bladder or bowel function, improved comfort, improved pain management, improved quality of life, improved sleep and return to sport/leisure activities    Other patient goals:  Not having to wear a panty liner anymore and not have to go to the bathroom every 15 minutes, be able to get out of her driveway without having to pee      Objective               Precautions: partial hysterectomy 2004, CVA 2019, anxiety, chronic LBP, constipation  [employer] HEP:    Manuals 1/5            Pelvic floor muscle exam  nv +CONSENT                                                   Neuro Re-Ed             Pelvic Floor Muscle Isolation:             PFMC Quick Flicks             PFMC Slow Holds             Prone PFMC             Quadruped PFMC             Seated PFMC             Seated PFMC with pelvic tilt             Standing PFMC             TA ADIM             TA + PFMC             TA ADIM + hip abd isometrics             TA ADIM + hip adduction isometrics             PFMC + ecc bridge             Ther Ex             PPT             LTR             TA + clamshells             TA + bridge             Bridge + SLR             Bridge + marching             TA + Toe taps             Tband low rows + TA + PFMC                                                                 Ther Activity             Bladder/Bowel Diary Review and Counseling             Education             PFMC + sit to stand             PFMC + reaching             PFMC + lift             PFMC + cough             PFMC + step ups                          Gait Training                                       Modalities

## 2023-01-12 ENCOUNTER — APPOINTMENT (OUTPATIENT)
Dept: PHYSICAL THERAPY | Facility: REHABILITATION | Age: 58
End: 2023-01-12

## 2023-01-17 ENCOUNTER — OFFICE VISIT (OUTPATIENT)
Dept: PHYSICAL THERAPY | Facility: REHABILITATION | Age: 58
End: 2023-01-17

## 2023-01-17 DIAGNOSIS — R35.0 URINARY FREQUENCY: ICD-10-CM

## 2023-01-17 DIAGNOSIS — N39.46 MIXED INCONTINENCE URGE AND STRESS: ICD-10-CM

## 2023-01-17 DIAGNOSIS — M79.18 MYOFASCIAL PAIN DYSFUNCTION SYNDROME: Primary | ICD-10-CM

## 2023-01-17 NOTE — PROGRESS NOTES
Daily Note     Today's date: 2023  Patient name: Candido White  : 1965  MRN: [id number]  Referring provider: Karlene Patterson  Dx:   Encounter Diagnosis     ICD-10-CM    1  Myofascial pain dysfunction syndrome  M79 18       2  Mixed incontinence urge and stress  N39 46       3  Urinary frequency  R35 0           Start Time: 9556  Stop Time: 0932  Total time in clinic (min): 50 minutes    Subjective: Patient reports she did not complete the bladder diary  She stopped taking the Gemtesa because it made her constipation worse  She reports everything is the same since IE  She had urodynamic testing done last week and does not know the results yet  Objective: See treatment diary below  Objective   Pelvic Floor Exam     General Perineum Exam:   perineum intact  Positive for descent and perianal erythema     Negative for swelling, lesion, rectal irritation and gaping introitus    Visual Inspection of Perineum:   Excursion of perineal body in cephalad direction with contraction of pelvic floor muscles (PFM): fair   Excursion of perineal body in caudal direction with relaxation of pelvic floor muscles (PFM): fair   PFM Contraction Comments: Pelvic floor verbal consent and written consent signed and in chart    Education provided today:   Pelvic floor anatomy and function  Physiology/relationship of abdominal canister and pelvis/pelvic organs/pelvic floor muscles  Diaphragm and Diaphragmatic breathing  Bowel and Bladder anatomy and function  Constipation Education  PT exam and course of treatment        [place] swab test: non-tender  Sensation: intact    Pelvic Organ Prolapse   At rest: mild and mild  With bearing down: mild (>1cm from hymenal remnants)  Location: anterior    Pelvic Floor Muscle Exam     Palpation   No increased muscle tension in the bulbospongiosus, ischiocavernosus, super transverse perineal, puborectalis, pubococcygeus, iIliococcygeus and coccygeus  No tenderness on right in the bulbospongiosus, ischiocavernosus, super transverse perineal, puborectalis, pubococcygeus, iliococcygeus and coccygeus  No tenderness on left in the bulbospongiosus, ischiocavernosus, super transverse perineal, pubococcygeus, iliococcygeus and coccygeus  Minimal tenderness on left in the puborectalis    Muscle Contraction: well isolated  Breathing pattern with contraction: holding breath  Pelvic floor muscle relaxation is complete  PERFECT Score   Power right: 1+/5  Power left: 1+/5  Endurance (seconds to max): 1          Assessment: Tolerated treatment well  Initiated plan of care this visit  Performed pelvic muscle exam revealing overall adequate muscle tone, one localized area of tenderness to left puborectalis  Patient does present with anterior bladder descent into the vaginal canal at rest and with bearing down  She presents with evident deficits in pelvic floor muscle strength  Initiated diaphragmatic breathing this visit with patient demonstrating significant difficulty with coordination and effective contraction of diaphragm  Initiated additional pelvic floor stretches to promote muscle relaxation in coordination with diaphragmatic breathing and visualization  Educated patient on importance of diaphragmatic breathing to assist in peristalsis and management of constipation  Provided patient with updated HEP and recommendation for GI specialist secondary to chronic constipation  Patient would benefit from continued PT  Plan: Continue per plan of care           Precautions: partial hysterectomy 2004, CVA 2019, anxiety, chronic LBP, constipation  [facility] HEP:     Manuals 1/5 1/17           Pelvic floor muscle exam  nv +CONSENT Done           ILU massage             Fascial release to lower abdomen (GI)  nv                        Neuro Re-Ed             Diaphragmatic breathing  Done           DKTC w diaphragmatic breathing  3x30"           [place] pose w Diaphragmatic breathing  3x30" [address] Quick Flicks             PFMC Slow Holds             Seated PFMC             Standing PFMC             TA ADIM  nv           Ther Ex                                                                                                                                                                         Ther Activity             Belly big belly hard             Defecation mechanics             Bladder/Bowel Diary Review and Counseling  nv           Education  Done           [address] + sit to stand             [address] + reaching             PFMC + lift             PFMC + cough             PFMC + step ups                                                    Modalities

## 2023-01-17 NOTE — LETTER
2023    Ulises Adkins   [address]    Patient: Elbert Sam   YOB: 1965   Date of Visit: 2023     Encounter Diagnosis     ICD-10-CM    1  Myofascial pain dysfunction syndrome  M79 18       2  Mixed incontinence urge and stress  N39 46       3  Urinary frequency  R35 0           Dear Dr Hilda Closs:    Thank you for your recent referral of Elbert Sam  Please review the attached evaluation summary from Venessa's recent visit  Please verify that you agree with the plan of care by signing the attached order  If you have any questions or concerns, please do not hesitate to call  I sincerely appreciate the opportunity to share in the care of one of your patients and hope to have another opportunity to work with you in the near future  Sincerely,    Ramiro Negron PT      Referring Provider:      I certify that I have read the below Plan of Care and certify the need for these services furnished under this plan of treatment while under my care  Laura Damon MD  [id number]  [address]  Via Fax: [phone]          Daily Note     Today's date: 2023  Patient name: Elbert Sam  : 1965  MRN: [id number]  Referring provider: Najma Connor*  Dx:   Encounter Diagnosis     ICD-10-CM    1  Myofascial pain dysfunction syndrome  M79 18       2  Mixed incontinence urge and stress  N39 46       3  Urinary frequency  R35 0           Start Time: 6877  Stop Time: 0932  Total time in clinic (min): 50 minutes    Subjective: Patient reports she did not complete the bladder diary  She stopped taking the Gemtesa because it made her constipation worse  She reports everything is the same since IE  She had urodynamic testing done last week and does not know the results yet        Objective: See treatment diary below  Objective   Pelvic Floor Exam     General Perineum Exam:   perineum intact  Positive for descent and perianal erythema  Negative for swelling, lesion, rectal irritation and gaping introitus    Visual Inspection of Perineum:   Excursion of perineal body in cephalad direction with contraction of pelvic floor muscles (PFM): fair   Excursion of perineal body in caudal direction with relaxation of pelvic floor muscles (PFM): fair   PFM Contraction Comments: Pelvic floor verbal consent and written consent signed and in chart    Education provided today:   Pelvic floor anatomy and function  Physiology/relationship of abdominal canister and pelvis/pelvic organs/pelvic floor muscles  Diaphragm and Diaphragmatic breathing  Bowel and Bladder anatomy and function  Constipation Education  PT exam and course of treatment        Cotton swab test: non-tender  Sensation: intact    Pelvic Organ Prolapse   At rest: mild and mild  With bearing down: mild (>1cm from hymenal remnants)  Location: anterior    Pelvic Floor Muscle Exam     Palpation   No increased muscle tension in the bulbospongiosus, ischiocavernosus, super transverse perineal, puborectalis, pubococcygeus, iIliococcygeus and coccygeus  No tenderness on right in the bulbospongiosus, ischiocavernosus, super transverse perineal, puborectalis, pubococcygeus, iliococcygeus and coccygeus  No tenderness on left in the bulbospongiosus, ischiocavernosus, super transverse perineal, pubococcygeus, iliococcygeus and coccygeus  Minimal tenderness on left in the puborectalis    Muscle Contraction: well isolated  Breathing pattern with contraction: holding breath  Pelvic floor muscle relaxation is complete  PERFECT Score   Power right: 1+/5  Power left: 1+/5  Endurance (seconds to max): 1          Assessment: Tolerated treatment well  Initiated plan of care this visit  Performed pelvic muscle exam revealing overall adequate muscle tone, one localized area of tenderness to left puborectalis   Patient does present with anterior bladder descent into the vaginal canal at rest and with bearing down  She presents with evident deficits in pelvic floor muscle strength  Initiated diaphragmatic breathing this visit with patient demonstrating significant difficulty with coordination and effective contraction of diaphragm  Initiated additional pelvic floor stretches to promote muscle relaxation in coordination with diaphragmatic breathing and visualization  Educated patient on importance of diaphragmatic breathing to assist in peristalsis and management of constipation  Provided patient with updated HEP and recommendation for GI specialist secondary to chronic constipation  Patient would benefit from continued PT  Plan: Continue per plan of care          Precautions: partial hysterectomy 2004, CVA 2019, anxiety, chronic LBP, constipation  [employer] HEP:     Manuals 1/5 1/17           Pelvic floor muscle exam  nv +CONSENT Done           ILU massage             Fascial release to lower abdomen (GI)  nv                        Neuro Re-Ed             Diaphragmatic breathing  Done           DKTC w diaphragmatic breathing  3x30"           Althea pose w Diaphragmatic breathing  3x30"           PFMC Quick Flicks             PFMC Slow Holds             Seated PFMC             Standing PFMC             TA ADIM  nv           Ther Ex                                                                                                                                                                         Ther Activity             Belly big belly hard             Defecation mechanics             Bladder/Bowel Diary Review and Counseling  nv           Education  Done           [facility] + sit to stand             PFMC + reaching             PFMC + lift             PFMC + cough             PFMC + step ups                                                    Modalities

## 2023-01-24 ENCOUNTER — OFFICE VISIT (OUTPATIENT)
Dept: PHYSICAL THERAPY | Facility: REHABILITATION | Age: 58
End: 2023-01-24

## 2023-01-24 DIAGNOSIS — N39.46 MIXED INCONTINENCE URGE AND STRESS: ICD-10-CM

## 2023-01-24 DIAGNOSIS — R35.0 URINARY FREQUENCY: ICD-10-CM

## 2023-01-24 DIAGNOSIS — M79.18 MYOFASCIAL PAIN DYSFUNCTION SYNDROME: Primary | ICD-10-CM

## 2023-01-24 NOTE — PROGRESS NOTES
Daily Note     Today's date: 2023  Patient name: Joan Canseco  : 1965  MRN: [id number]  Referring provider: Shane Cuevas  Dx:   Encounter Diagnosis     ICD-10-CM    1  Myofascial pain dysfunction syndrome  M79 18       2  Mixed incontinence urge and stress  N39 46       3  Urinary frequency  R35 0           Start Time: 9598  Stop Time: 09  Total time in clinic (min): 50 minutes    Subjective: Patient brought completed bladder and bowel diary  She reports over the weekend she had two bowel movements which is very good for her  Objective: See treatment diary below      Assessment: Tolerated treatment well  Fair carryover of diaphragmatic breathing from last session but continues to require cues for proper expansion and relaxation of abdominal muscles  Initiated gentle deep core engagement this visit with good tolerance  Also initiated gentle visceral fascia release and ILU massage to promote peristalsis and increased bowel movement  Bladder diary reveals significant amount of frequency with small amounts of urine voided each time and patient reporting a lot of "pressure " Patient exhibited good technique with therapeutic exercises and would benefit from continued PT  Plan: Continue per plan of care         Precautions: partial hysterectomy 2004, CVA , anxiety, chronic LBP, constipation  [employer] HEP:     Manuals           Pelvic floor muscle exam  nv +CONSENT Done           ILU massage   Done (HEP nv?)          Fascial release to lower abdomen (GI)  nv Done                       Neuro Re-Ed             Diaphragmatic breathing  Done Done           DKTC w diaphragmatic breathing  3x30" 3x5 breath rounds          Althea pose w Diaphragmatic breathing  3x30" 3x5 breath rounds          PFMC Quick Flicks             PFMC Slow Holds             Seated PFMC             Standing PFMC             TA ADIM  nv 10x5"          Ther Ex Ther Activity             Belly big belly hard             Defecation mechanics             Bladder/Bowel Diary Review and Counseling  nv Done          Education  Done Done          [facility] + sit to stand             [facility] + reaching             PFMC + lift             PFMC + cough             PFMC + step ups                                                    Modalities

## 2023-01-28 DIAGNOSIS — R51.9 CHRONIC INTRACTABLE HEADACHE, UNSPECIFIED HEADACHE TYPE: ICD-10-CM

## 2023-01-28 DIAGNOSIS — G89.29 CHRONIC INTRACTABLE HEADACHE, UNSPECIFIED HEADACHE TYPE: ICD-10-CM

## 2023-01-30 RX ORDER — RIZATRIPTAN BENZOATE 5 MG/1
5 TABLET, ORALLY DISINTEGRATING ORAL ONCE AS NEEDED
Qty: 9 TABLET | Refills: 1 | Status: SHIPPED | OUTPATIENT
Start: 2023-01-30

## 2023-03-24 ENCOUNTER — TELEPHONE (OUTPATIENT)
Dept: GASTROENTEROLOGY | Facility: AMBULARY SURGERY CENTER | Age: 58
End: 2023-03-24

## 2023-03-24 ENCOUNTER — OFFICE VISIT (OUTPATIENT)
Dept: GASTROENTEROLOGY | Facility: AMBULARY SURGERY CENTER | Age: 58
End: 2023-03-24

## 2023-03-24 ENCOUNTER — TELEPHONE (OUTPATIENT)
Dept: CARDIOLOGY CLINIC | Facility: CLINIC | Age: 58
End: 2023-03-24

## 2023-03-24 VITALS
SYSTOLIC BLOOD PRESSURE: 112 MMHG | RESPIRATION RATE: 18 BRPM | HEIGHT: 61 IN | WEIGHT: 125.8 LBS | OXYGEN SATURATION: 100 % | BODY MASS INDEX: 23.75 KG/M2 | DIASTOLIC BLOOD PRESSURE: 70 MMHG | HEART RATE: 78 BPM

## 2023-03-24 DIAGNOSIS — Z86.010 HISTORY OF COLON POLYPS: ICD-10-CM

## 2023-03-24 DIAGNOSIS — K21.9 GASTROESOPHAGEAL REFLUX DISEASE, UNSPECIFIED WHETHER ESOPHAGITIS PRESENT: Primary | ICD-10-CM

## 2023-03-24 DIAGNOSIS — K90.9 STEATORRHEA: ICD-10-CM

## 2023-03-24 DIAGNOSIS — K59.00 CONSTIPATION, UNSPECIFIED CONSTIPATION TYPE: ICD-10-CM

## 2023-03-24 RX ORDER — MAGNESIUM 30 MG
30 TABLET ORAL 2 TIMES DAILY
COMMUNITY

## 2023-03-24 RX ORDER — CHLORAL HYDRATE 500 MG
1 CAPSULE ORAL DAILY
COMMUNITY

## 2023-03-24 RX ORDER — BISACODYL 5 MG/1
10 TABLET, DELAYED RELEASE ORAL ONCE
Qty: 2 TABLET | Refills: 0 | Status: SHIPPED | OUTPATIENT
Start: 2023-03-24 | End: 2023-03-24

## 2023-03-24 RX ORDER — PANTOPRAZOLE SODIUM 40 MG/1
40 TABLET, DELAYED RELEASE ORAL DAILY
Qty: 90 TABLET | Refills: 1 | Status: SHIPPED | OUTPATIENT
Start: 2023-03-24 | End: 2023-09-20

## 2023-03-24 RX ORDER — LUBIPROSTONE 8 UG/1
8 CAPSULE ORAL 2 TIMES DAILY WITH MEALS
Qty: 60 CAPSULE | Refills: 5 | Status: SHIPPED | OUTPATIENT
Start: 2023-03-24

## 2023-03-24 NOTE — PROGRESS NOTES
[facility] - Outpatient Consultation  Tank Spence 61 y o  female MRN: [id number]  Encounter: [id number]          ASSESSMENT AND PLAN:      #1   Chronic constipation, worsening over the last several years, suspect to be multifactorial in the setting of history of stroke, chronic back pain, ambulatory difficulties and occasional use of opioids, other medications that can contribute to constipation; however need to exclude luminal pathology, she also has history of colon polyps and had only fair prep on her last colonoscopy about 6 years ago  Also endorses some steatorrhea frequently; low suspicion but consider pancreatic insufficiency    -We will schedule patient for colonoscopy at the same time as EGD    -Procedures were explained in detail to the patient at this time including associated risks and benefits, risks including but not limited to infection, perforation and bleeding    -Prescription and instructions provided for colonoscopy prep    -We will also start patient on Amitiza 8 mg twice daily, given her previous failures on Linzess and MiraLAX and fiber supplementation    -Encouraged regular fluid intake, physical activity and fiber intake within patient's capabilities    -We will also check stool pancreatic elastase      2    GERD, patient reports frequent issues of epigastric burning for which she takes Tums, is not on any acid reducers, has never had EGD, rule out Contreras's esophagus, hiatal hernia or malignancy    -We will schedule patient for EGD at the same time as colonoscopy; again procedures were explained in detail to the patient at this time    -Start Protonix 40 mg once daily in the meantime    -Advised patient about dietary and lifestyle modification strategies for the mitigation of GERD  ______________________________________________________________________    HPI: 59-year-old female with history of acute right MCA stroke in 2018, chronic back pain who presents for evaluation  She says that she has had lifelong issues with constipation although these have been worse over the last 10 to 15 years  Has occasional issues with memory and word finding so her  who accompanies her helps provide some history  She says she can go "up to 8 weeks" without a bowel movement, although does clarify that she will sometimes have small hard incomplete bowel movements during those timeframes  For example her last bowel movement was 2 days ago, which was small  She occasionally will have diarrhea  Denies any rectal bleeding, loss of appetite or unintentional weight loss  She thinks she tried Mordecai Blazing in the past and thinks that either did not work, or she had an adverse reaction, she is not sure  She does remember trying Metamucil and MiraLAX on regular schedules in the past which did not seem to have any significant effect  She says that she does take oxycodone on an as-needed basis for back pain but takes it less than prescribed, she says generally she will take it less than once a week, also takes Voltaren but only about twice a month on average  She also endorses fairly frequent issues of epigastric burning sensation, she believes is acid reflux, takes Tums occasionally which is sometimes effective and sometimes not  Denies use of a PPI or H2 blocker at this time  She has never had an EGD  Last colonoscopy was done in February 2017 with Dr Torres seeing the removal of a single polyp, for which she was recommended follow-up in 3 years, as only fair prep was noted  REVIEW OF SYSTEMS:    CONSTITUTIONAL: Denies any fever, chills, rigors, and weight loss  HEENT: No earache or tinnitus  Denies hearing loss or visual disturbances  CARDIOVASCULAR: No chest pain or palpitations  RESPIRATORY: Denies any cough, hemoptysis, shortness of breath or dyspnea on exertion  GASTROINTESTINAL: As noted in the History of Present Illness  GENITOURINARY: No problems with urination  Denies any hematuria or dysuria  NEUROLOGIC: No dizziness or vertigo, denies headaches  MUSCULOSKELETAL: Denies any muscle or joint pain  SKIN: Denies skin rashes or itching  ENDOCRINE: Denies excessive thirst  Denies intolerance to heat or cold  PSYCHOSOCIAL: Denies depression or anxiety  Denies any recent memory loss  Historical Information   Past Medical History:   Diagnosis Date   • Acute pain of right knee     last assessed - [id number]   • Anemia    • Anxiety    • Cervical disc disorder with radiculopathy    • Chronic pain    • Constipation    • CVA (cerebral vascular accident) ([facility] [address] )    • H/O ischemic right MCA stroke    • H/O: hysterectomy    • Hematuria     last assessed - [id number]   • High cholesterol    • Lumbar radiculopathy    • Lumbar stenosis    • Other chronic pain     last assessed - [id number]   • Other muscle spasm     last assessed - [id number]   • PONV (postoperative nausea and vomiting)     must have premed   • Spondylosis of cervical spine    • Spondylosis of lumbosacral region    • Stroke [facility])    • Urinary incontinence     Resolved - [id number]     Past Surgical History:   Procedure Laterality Date   • BLADDER SURGERY     • BLADDER SURGERY  2014   • CARDIAC LOOP RECORDER  08/2020    removal    • CERVICAL SPINE SURGERY     • DENTAL SURGERY     • KNEE ARTHROSCOPY Left    • LIPECTOMY      of thigh   • LIPOMA RESECTION     • NECK SURGERY     • OR COLONOSCOPY FLX DX W/COLLJ SPEC WHEN PFRMD N/A 02/10/2017    Procedure: COLONOSCOPY;  Surgeon: Janett Parker MD;  Location: [facility];   Service: Gastroenterology   • OR KNEE SCOPE,MED/LAT MENISECTOMY Left 03/21/2016    Procedure: ARTHROSCOPY W/ PARTIAL MEDIAL MENISCECTOMY;  Surgeon: Brian Hammer MD;  Location:  MAIN OR;  Service: Orthopedics   • TOTAL ABDOMINAL HYSTERECTOMY     • TUBAL LIGATION       Social History   Social History     Substance and Sexual Activity   Alcohol Use Yes    Comment: social drinker special events only Social History     Substance and Sexual Activity   Drug Use Not Currently   • Types: Marijuana    Comment: medical marijuana (liquid form only)     Social History     Tobacco Use   Smoking Status Every Day   • Packs/day: 0 25   • Years: 40 00   • Pack years: 10 00   • Types: Cigarettes   Smokeless Tobacco Never   Tobacco Comments    one half pack a day     Family History   Problem Relation Age of Onset   • Stroke Mother         46s   • Cancer Mother    • Hypertension Mother    • Pulmonary embolism Other         In mid 35s   • Stroke Sister         46s   • Stroke Brother         46s   • Prostate cancer Father    • Cancer Daughter    • Stroke Family        Meds/Allergies       Current Outpatient Medications:   •  aspirin 81 mg chewable tablet  •  atorvastatin (LIPITOR) 40 mg tablet  •  cholecalciferol (VITAMIN D3) 1,000 units tablet  •  cycloSPORINE (RESTASIS) 0 05 % ophthalmic emulsion  •  diazepam (VALIUM) 5 mg tablet  •  diclofenac sodium (VOLTAREN) 50 mg EC tablet  •  Eliquis 5 MG  •  loteprednol etabonate (LOTEMAX) 0 5 % ophthalmic suspension  •  naloxone (NARCAN) 4 mg/0 1 mL nasal spray  •  oxybutynin (DITROPAN-XL) 10 MG 24 hr tablet  •  oxyCODONE (ROXICODONE) 5 immediate release tablet  •  Probiotic Product (Probiotic Advanced) CAPS  •  rizatriptan (MAXALT-MLT) 5 mg disintegrating tablet  •  vitamin E 600 UNIT capsule    Allergies   Allergen Reactions   • Blue Dyes (Parenteral) - Food Allergy Anaphylaxis   • Gabapentin Anaphylaxis and Nausea Only     Anaphylaxis   • Penicillins Rash and Vomiting   • Nitrofurantoin GI Intolerance     vomiting   • Propofol    • Tramadol GI Intolerance     Other reaction(s): GI upset  Vomiting  Vomiting     • Blue Dyes (Parenteral) - Food Allergy Rash           Objective     There were no vitals taken for this visit  There is no height or weight on file to calculate BMI          PHYSICAL EXAM:      General Appearance:   Alert, cooperative, no distress   HEENT:   Normocephalic, atraumatic, anicteric      Neck:  Supple, symmetrical, trachea midline   Lungs:   Clear to auscultation bilaterally; no rales, rhonchi or wheezing; respirations unlabored    Heart[de-identified]   Regular rate and rhythm; no murmur, rub, or gallop  Abdomen:   Soft, non-tender, non-distended; normal bowel sounds; no masses, no organomegaly    Genitalia:   Deferred    Rectal:   Deferred    Extremities:  No cyanosis, clubbing or edema    Pulses:  2+ and symmetric    Skin:  No jaundice, rashes, or lesions    Lymph nodes:  No palpable cervical lymphadenopathy        Lab Results:   No visits with results within 1 Day(s) from this visit  Latest known visit with results is:   Office Visit on 10/21/2022   Component Date Value   • Color, UA 10/21/2022 Colorless    • Clarity, UA 10/21/2022 Clear    • Specific Gravity, UA 10/21/2022 1 007    • pH, UA 10/21/2022 6 0    • Leukocytes, UA 10/21/2022 Negative    • Nitrite, UA 10/21/2022 Negative    • Protein, UA 10/21/2022 Negative    • Glucose, UA 10/21/2022 Negative    • Ketones, UA 10/21/2022 Negative    • Urobilinogen, UA 10/21/2022 <2 0    • Bilirubin, UA 10/21/2022 Negative    • Occult Blood, UA 10/21/2022 Moderate (A)    • RBC, UA 10/21/2022 1-2    • WBC, UA 10/21/2022 1-2    • Epithelial Cells 10/21/2022 Occasional    • Bacteria, UA 10/21/2022 Occasional    • MUCUS THREADS 10/21/2022 Occasional (A)    • Urine Culture 10/21/2022 No Growth <1000 cfu/mL    • POST-VOID RESIDUAL VOLUM* 10/21/2022 50          Radiology Results:   No results found

## 2023-03-24 NOTE — TELEPHONE ENCOUNTER
Left message for patient   Procedure will need to be done in the hospital  Advised patient to call back to confirm if Monday 6/12/23 and [facility] will work with patient's schedule

## 2023-03-24 NOTE — TELEPHONE ENCOUNTER
Our mutual patient is scheduled for procedure: colonoscopy and EGD    On: June 16 , 2023     With: Dr Nahid Wade MD    He/She is taking the following blood thinner: Eliquis (Apixaban)    Can this be stopped  2 days prior to the procedure    Physician Approving clearance:

## 2023-03-24 NOTE — TELEPHONE ENCOUNTER
Skinny Mccollum 11 minutes ago (1:27 PM)     TM  Our mutual patient is scheduled for procedure: colonoscopy and EGD     On: June 16 , 2023      With: Dr Efren Stephens MD     He/She is taking the following blood thinner: Eliquis (Apixaban)     Can this be stopped  2 days prior to the procedure

## 2023-03-27 NOTE — TELEPHONE ENCOUNTER
Dr Sophia Carrizales,   [facility] haven't seen this pt since 2020  Ok to hold eliquis?

## 2023-03-28 ENCOUNTER — APPOINTMENT (OUTPATIENT)
Dept: LAB | Age: 58
End: 2023-03-28

## 2023-03-28 DIAGNOSIS — R39.15 URGENCY OF URINATION: ICD-10-CM

## 2023-03-28 DIAGNOSIS — Z01.812 PRE-OPERATIVE LABORATORY EXAMINATION: ICD-10-CM

## 2023-03-28 DIAGNOSIS — F33.9 DEPRESSION, RECURRENT (HCC): ICD-10-CM

## 2023-03-28 DIAGNOSIS — E78.00 HYPERCHOLESTEROLEMIA: ICD-10-CM

## 2023-03-28 DIAGNOSIS — K90.9 STEATORRHEA: ICD-10-CM

## 2023-03-28 LAB
25(OH)D3 SERPL-MCNC: 33.2 NG/ML (ref 30–100)
ALBUMIN SERPL BCP-MCNC: 4 G/DL (ref 3.5–5)
ALP SERPL-CCNC: 70 U/L (ref 46–116)
ALT SERPL W P-5'-P-CCNC: 36 U/L (ref 12–78)
ANION GAP SERPL CALCULATED.3IONS-SCNC: 2 MMOL/L (ref 4–13)
AST SERPL W P-5'-P-CCNC: 24 U/L (ref 5–45)
BASOPHILS # BLD AUTO: 0.04 THOUSANDS/ÂΜL (ref 0–0.1)
BASOPHILS NFR BLD AUTO: 0 % (ref 0–1)
BILIRUB SERPL-MCNC: 0.59 MG/DL (ref 0.2–1)
BUN SERPL-MCNC: 12 MG/DL (ref 5–25)
CALCIUM SERPL-MCNC: 10.1 MG/DL (ref 8.3–10.1)
CHLORIDE SERPL-SCNC: 105 MMOL/L (ref 96–108)
CHOLEST SERPL-MCNC: 158 MG/DL
CO2 SERPL-SCNC: 26 MMOL/L (ref 21–32)
CREAT SERPL-MCNC: 0.85 MG/DL (ref 0.6–1.3)
EOSINOPHIL # BLD AUTO: 0.35 THOUSAND/ÂΜL (ref 0–0.61)
EOSINOPHIL NFR BLD AUTO: 4 % (ref 0–6)
ERYTHROCYTE [DISTWIDTH] IN BLOOD BY AUTOMATED COUNT: 13.4 % (ref 11.6–15.1)
GFR SERPL CREATININE-BSD FRML MDRD: 75 ML/MIN/1.73SQ M
GLUCOSE P FAST SERPL-MCNC: 89 MG/DL (ref 65–99)
HCT VFR BLD AUTO: 39.2 % (ref 34.8–46.1)
HDLC SERPL-MCNC: 61 MG/DL
HGB BLD-MCNC: 13.3 G/DL (ref 11.5–15.4)
IMM GRANULOCYTES # BLD AUTO: 0.02 THOUSAND/UL (ref 0–0.2)
IMM GRANULOCYTES NFR BLD AUTO: 0 % (ref 0–2)
LDLC SERPL CALC-MCNC: 79 MG/DL (ref 0–100)
LYMPHOCYTES # BLD AUTO: 4.4 THOUSANDS/ÂΜL (ref 0.6–4.47)
LYMPHOCYTES NFR BLD AUTO: 48 % (ref 14–44)
MCH RBC QN AUTO: 32.4 PG (ref 26.8–34.3)
MCHC RBC AUTO-ENTMCNC: 33.9 G/DL (ref 31.4–37.4)
MCV RBC AUTO: 95 FL (ref 82–98)
MONOCYTES # BLD AUTO: 0.54 THOUSAND/ÂΜL (ref 0.17–1.22)
MONOCYTES NFR BLD AUTO: 6 % (ref 4–12)
NEUTROPHILS # BLD AUTO: 3.91 THOUSANDS/ÂΜL (ref 1.85–7.62)
NEUTS SEG NFR BLD AUTO: 42 % (ref 43–75)
NRBC BLD AUTO-RTO: 0 /100 WBCS
PLATELET # BLD AUTO: 301 THOUSANDS/UL (ref 149–390)
PMV BLD AUTO: 9.4 FL (ref 8.9–12.7)
POTASSIUM SERPL-SCNC: 4 MMOL/L (ref 3.5–5.3)
PROT SERPL-MCNC: 6.9 G/DL (ref 6.4–8.4)
RBC # BLD AUTO: 4.11 MILLION/UL (ref 3.81–5.12)
SODIUM SERPL-SCNC: 133 MMOL/L (ref 135–147)
TRIGL SERPL-MCNC: 90 MG/DL
WBC # BLD AUTO: 9.26 THOUSAND/UL (ref 4.31–10.16)

## 2023-03-29 ENCOUNTER — APPOINTMENT (OUTPATIENT)
Dept: LAB | Age: 58
End: 2023-03-29

## 2023-03-29 DIAGNOSIS — K90.9 STEATORRHEA: ICD-10-CM

## 2023-03-31 ENCOUNTER — TELEPHONE (OUTPATIENT)
Dept: INTERNAL MEDICINE CLINIC | Age: 58
End: 2023-03-31

## 2023-03-31 ENCOUNTER — OFFICE VISIT (OUTPATIENT)
Dept: INTERNAL MEDICINE CLINIC | Age: 58
End: 2023-03-31

## 2023-03-31 ENCOUNTER — TELEPHONE (OUTPATIENT)
Dept: ADMINISTRATIVE | Facility: OTHER | Age: 58
End: 2023-03-31

## 2023-03-31 VITALS
SYSTOLIC BLOOD PRESSURE: 136 MMHG | WEIGHT: 127.7 LBS | HEIGHT: 61 IN | HEART RATE: 86 BPM | DIASTOLIC BLOOD PRESSURE: 80 MMHG | BODY MASS INDEX: 24.11 KG/M2 | TEMPERATURE: 98.6 F | OXYGEN SATURATION: 97 %

## 2023-03-31 DIAGNOSIS — E78.00 HYPERCHOLESTEROLEMIA: ICD-10-CM

## 2023-03-31 DIAGNOSIS — I63.89 CEREBROVASCULAR ACCIDENT (CVA) DUE TO OTHER MECHANISM (HCC): Primary | ICD-10-CM

## 2023-03-31 DIAGNOSIS — Z28.21 REFUSED INFLUENZA VACCINE: ICD-10-CM

## 2023-03-31 DIAGNOSIS — F17.210 CIGARETTE NICOTINE DEPENDENCE WITHOUT COMPLICATION: ICD-10-CM

## 2023-03-31 DIAGNOSIS — G89.29 CHRONIC INTRACTABLE HEADACHE, UNSPECIFIED HEADACHE TYPE: ICD-10-CM

## 2023-03-31 DIAGNOSIS — F11.20 CONTINUOUS OPIOID DEPENDENCE (HCC): ICD-10-CM

## 2023-03-31 DIAGNOSIS — Z82.49 FAMILY HISTORY OF EARLY CAD: ICD-10-CM

## 2023-03-31 DIAGNOSIS — R51.9 CHRONIC INTRACTABLE HEADACHE, UNSPECIFIED HEADACHE TYPE: ICD-10-CM

## 2023-03-31 DIAGNOSIS — I48.0 PAF (PAROXYSMAL ATRIAL FIBRILLATION) (HCC): ICD-10-CM

## 2023-03-31 DIAGNOSIS — I63.511 ACUTE ISCHEMIC RIGHT MCA STROKE (HCC): ICD-10-CM

## 2023-03-31 DIAGNOSIS — F33.9 DEPRESSION, RECURRENT (HCC): ICD-10-CM

## 2023-03-31 DIAGNOSIS — Z72.0 TOBACCO ABUSE: ICD-10-CM

## 2023-03-31 PROBLEM — M54.30 SCIATIC LEG PAIN: Status: RESOLVED | Noted: 2020-06-29 | Resolved: 2023-03-31

## 2023-03-31 RX ORDER — CHLORHEXIDINE GLUCONATE 0.12 MG/ML
15 RINSE ORAL 2 TIMES DAILY
COMMUNITY
Start: 2023-03-27

## 2023-03-31 RX ORDER — RIZATRIPTAN BENZOATE 5 MG/1
5 TABLET, ORALLY DISINTEGRATING ORAL ONCE AS NEEDED
Qty: 9 TABLET | Refills: 1 | Status: SHIPPED | OUTPATIENT
Start: 2023-03-31 | End: 2023-04-03 | Stop reason: SDUPTHER

## 2023-03-31 RX ORDER — APIXABAN 5 MG/1
5 TABLET, FILM COATED ORAL 2 TIMES DAILY
Qty: 180 TABLET | Refills: 5 | Status: SHIPPED | OUTPATIENT
Start: 2023-03-31

## 2023-03-31 NOTE — TELEPHONE ENCOUNTER
Patient called she would like 54 tablets of Maxalt to go to [facility]        Please advise if ok to change quantity to 54 tablets    Patient stated the script from 3/31/23 was canceled     Thank you

## 2023-03-31 NOTE — PROGRESS NOTES
Assessment/Plan:    1  Cerebrovascular accident (CVA) due to other mechanism ([place] [address] )    2  Chronic intractable headache, unspecified headache type  -     rizatriptan (MAXALT-MLT) 5 mg disintegrating tablet; Take 1 tablet (5 mg total) by mouth once as needed for migraine    3  Continuous opioid dependence ([place] [address] )    4  Cigarette nicotine dependence without complication  -     CT lung screening program; Future; Expected date: 03/31/2023    5  PAF (paroxysmal atrial fibrillation) ([place] [address] )    6  Refused influenza vaccine    7  Hypercholesterolemia  -     CT coronary calcium score; Future; Expected date: 03/31/2023  -     Lipid Panel with Direct LDL reflex; Future  -     Comprehensive metabolic panel; Future  -     CBC and differential; Future    8  Tobacco abuse  -     CT lung screening program; Future; Expected date: 03/31/2023    9  Depression, recurrent ([place] [address] )    10  Family history of early CAD    6  Acute ischemic right MCA stroke (HCC)  -     Eliquis 5 MG; Take 1 tablet (5 mg total) by mouth 2 (two) times a day            There are no Patient Instructions on file for this visit  Return in about 6 months (around 9/30/2023) for Recheck  Subjective:      Patient ID: Apple Pate is a 61 y o  female  Chief Complaint   Patient presents with   • Follow-up     Depression, recurrent  Review labs        HPI   Hyperlipidemia (Follow-Up): The patient states her hyperlipidemia has been under good control since the last visit  She has no significant interval events     Symptoms: denies chest pain-- and-- denies intermittent leg claudication  Associated symptoms include no focal neurologic deficits-- and-- no memory loss       Medications: the patient is not adherent with her medication   July 2019: Last lab work done in December   On pravastatin and tolerating well however she has a preference to Lipitor which she was on before and is asking for change   No recent labs today but last lab work was elevated    June 2020 well controlled lipid levels, on statin and tolerating well  November 2020, doing well no problems  Sept 2021: on lipitor well controlled levels, LDL 71  October 2022, increase in LDL to 95  She has not changed anything however her current position at work is sedentary and she says her activity has significantly decreased  March 2023, well controlled, no issues  Works overnight     Depression:  Feeling much better with low dose Franciscomuel Epley has some fatigue side effects   Has not been able to work due to stroke and is having some difficulty with exercising due to chronic back pain   July 2019 stable January 2020, stable with present medications and slightly better since she is working now  [employer], relatively well controlled and no suicidal homicide ideations  Kosta Hart is working now and feels tired but relatively stable    nov 2020:  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   She chose night shift but feels overwhelmed that her training was not as extensive as she has like did to be  Michelle Boga her brain injury she has been slower to learn new techniques but really enjoys the company and her coworkers  Sept 2021: no SI or HI, no panic or breakthrough symptoms, does not sleep well, re started working  October 2022, no issues  No HI or SI, medications are working well  She is still working night shift so her sleep is not the best   March 2023, no HI or SI, feels well, no issues    Chronic pain, trying to wean off oxycodone and seeing Physical Medicine and Rehab doctor   Started on medical marijuana which has significantly improved her symptoms and she has been able to decrease her oxycodone however she is in some financial difficulty due to not having a job and no medical coverage now and finds it difficult to pay cash for her medical marijuana   January 2020, not using oxycodone since it makes her constipation worse   June 2020, stable   nov 2020 stable  sept 2021: on oxy, aakash   Has some constipation and N/V  October 2022, stable, no issues  March 2023, stable, still on medications      Severe constipation:   failed Linzess, use to eat salads and yogurt on a daily basis however admits to not eating at least 3 times per day  May [place] does drink plenty of water   Has a bowel movement that is hard once every 3 weeks   Has been experiencing some nausea and intermittent vomiting   Does not like a lot of over-the-counter laxatives because it makes her cramps and gassy   October 2022, sometimes better and sometimes not  Waxes and wanes  Most recently due to her job she has not been able to drink the right amount of water  March 2023, following with GI, recently started on Amitiza that she just got yesterday    She takes a stool softener on a regular basis, failed MiraLAX due to bloating and no regular bowel movements         The following portions of the patient's history were reviewed and updated as appropriate: allergies, current medications, past family history, past medical history, past social history, past surgical history and problem list     Review of Systems      Constitutional:  Denies fever or chills   Eyes:  Denies double , blurry vision or eye pain  HENT:  Denies nasal congestion, sore throat or new hearing issues  Respiratory:  Denies cough or shortness of breath or wheezing  Cardiovascular:  Denies palpitations or chest pain  GI:  Denies abdominal pain, nausea, or vomiting, no loose stools, no reflux  Integument:  Denies rash , no open areas  Neurologic:  Denies headache or focal weakness, no dizziness  : no dysuria, or hematuria        Current Outpatient Medications   Medication Sig Dispense Refill   • aspirin 81 mg chewable tablet Chew 1 tablet (81 mg total) daily 30 tablet 0   • atorvastatin (LIPITOR) 40 mg tablet Take 1 5 tablets (60 mg total) by mouth daily 135 tablet 1   • chlorhexidine (PERIDEX) 0 12 % solution Apply 15 mL to the mouth or throat 2 (two) times a day     • cholecalciferol (VITAMIN D3) 1,000 units tablet Take 2,000 Units by mouth daily     • diazepam (VALIUM) 5 mg tablet Take 1 tablet (5 mg total) by mouth daily at bedtime as needed for sleep 90 tablet 0   • diclofenac sodium (VOLTAREN) 50 mg EC tablet Take 1 tablet (50 mg total) by mouth daily as needed (pain) 90 tablet 1   • Eliquis 5 MG Take 1 tablet (5 mg total) by mouth 2 (two) times a day 180 tablet 5   • lubiprostone (AMITIZA) 8 mcg capsule Take 1 capsule (8 mcg total) by mouth 2 (two) times a day with meals 60 capsule 5   • magnesium 30 MG tablet Take 30 mg by mouth 2 (two) times a day     • naloxone (NARCAN) 4 mg/0 1 mL nasal spray Administer 1 spray into a nostril  If breathing does not return to normal or if breathing difficulty resumes after 2-3 minutes, give another dose in the other nostril using a new spray   1 each 1   • Omega-3 1000 MG CAPS Take 1 capsule by mouth in the morning     • oxyCODONE (ROXICODONE) 5 immediate release tablet Take 1 tablet (5 mg total) by mouth daily as needed for moderate pain Max Daily Amount: 5 mg 30 tablet 0   • pantoprazole (PROTONIX) 40 mg tablet Take 1 tablet (40 mg total) by mouth daily 90 tablet 1   • Probiotic Product (Probiotic Advanced) CAPS Take by mouth daily     • rizatriptan (MAXALT-MLT) 5 mg disintegrating tablet Take 1 tablet (5 mg total) by mouth once as needed for migraine 9 tablet 1   • bisacodyl (DULCOLAX) 5 mg EC tablet Take 2 tablets (10 mg total) by mouth once for 1 dose (Patient not taking: Reported on 3/31/2023) 2 tablet 0   • cycloSPORINE (RESTASIS) 0 05 % ophthalmic emulsion Administer 1 drop to both eyes every 12 (twelve) hours  (Patient not taking: Reported on 10/21/2022)     • loteprednol etabonate (LOTEMAX) 0 5 % ophthalmic suspension Administer 1 drop to both eyes 2 (two) times a day  (Patient not taking: Reported on 10/28/2022)     • polyethylene glycol (GOLYTELY) 4000 mL solution Take 4,000 mL by mouth once for 1 dose (Patient not taking: Reported on 3/31/2023) 4000 mL 0 "    No current facility-administered medications for this visit         Objective:    /80 (BP Location: Left arm, Patient Position: Sitting, Cuff Size: Standard)   Pulse 86   Temp 98 6 °F (37 °C) (Temporal)   Ht 5' 1\" (1 549 m)   Wt 57 9 kg (127 lb 11 2 oz)   LMP  (LMP Unknown)   SpO2 97%   BMI 24 13 kg/m²        Physical Exam      Constitutional:  Well developed, well nourished, no acute distress, non-toxic appearance   Eyes:  PERRL, conjunctiva normal , non icteric sclera  HENT:  Atraumatic, oropharynx moist  Neck-  supple   Respiratory:  CTA b/l, normal breath sounds, no rales, no wheezing   Cardiovascular:  RRR, no murmurs, no LE edema b/l  GI:  Soft, nondistended, normal bowel sounds x 4, nontender, no organomegaly, no mass, no rebound, no guarding   Neurologic:  no focal deficits noted   Psychiatric:  Speech and behavior appropriate , AAO x 3           Donya Rubio, DO  "

## 2023-03-31 NOTE — TELEPHONE ENCOUNTER
----- Message from Dionisio Young MA sent at 3/31/2023  8:21 AM EDT -----  Regarding: Mammogram  03/31/23 8:21 AM    Hello, our patient Luis Eduardo Grewal has had Mammogram completed/performed  Please assist in updating the patient chart by pulling the Care Everywhere (CE) document  The date of service is 01/20/2023       Thank you,  Dionisio Young MA  [facility]

## 2023-04-03 RX ORDER — RIZATRIPTAN BENZOATE 5 MG/1
5 TABLET, ORALLY DISINTEGRATING ORAL ONCE AS NEEDED
Qty: 54 TABLET | Refills: 1 | Status: SHIPPED | OUTPATIENT
Start: 2023-04-03

## 2023-04-03 NOTE — TELEPHONE ENCOUNTER
Upon review of the In Basket request we were able to locate, review, and update the patient chart as requested for Mammogram     Any additional questions or concerns should be emailed to the Practice Liaisons via the appropriate education email address, please do not reply via In Basket      Thank you  Aarti Pina

## 2023-04-05 DIAGNOSIS — K86.89 PANCREATIC INSUFFICIENCY: Primary | ICD-10-CM

## 2023-04-05 LAB — ELASTASE PANC STL-MCNT: 114 UG ELAST./G

## 2023-04-07 NOTE — PROGRESS NOTES
Results for orders placed or performed in visit on 09/10/19   Cardiac EP device report    Narrative    SJM LOOP  MERLIN TRANSMISSION LOOP: BATTERY STATUS "OK"  PRESENTING RHYTHM: NSR @ 72 BPM   NO PATIENT OR DEVICE ACTIVATED EPISODES  NORMAL DEVICE FUNCTION   [address] [place] [address] Xolair Counseling:  Patient informed of potential adverse effects including but not limited to fever, muscle aches, rash and allergic reactions.  The patient verbalized understanding of the proper use and possible adverse effects of Xolair.  All of the patient's questions and concerns were addressed.

## 2023-04-08 DIAGNOSIS — K86.89 PANCREATIC INSUFFICIENCY: Primary | ICD-10-CM

## 2023-04-25 DIAGNOSIS — M51.36 LUMBAR DEGENERATIVE DISC DISEASE: ICD-10-CM

## 2023-04-25 DIAGNOSIS — F11.20 CONTINUOUS OPIOID DEPENDENCE (HCC): Primary | ICD-10-CM

## 2023-04-25 DIAGNOSIS — M41.26 OTHER IDIOPATHIC SCOLIOSIS, LUMBAR REGION: ICD-10-CM

## 2023-04-25 DIAGNOSIS — Z78.0 POST-MENOPAUSAL: ICD-10-CM

## 2023-04-25 DIAGNOSIS — M48.061 SPINAL STENOSIS OF LUMBAR REGION WITHOUT NEUROGENIC CLAUDICATION: ICD-10-CM

## 2023-04-25 DIAGNOSIS — E55.9 VITAMIN D DEFICIENCY: ICD-10-CM

## 2023-05-01 DIAGNOSIS — K86.89 PANCREATIC INSUFFICIENCY: Primary | ICD-10-CM

## 2023-05-03 ENCOUNTER — TELEPHONE (OUTPATIENT)
Dept: OTHER | Facility: OTHER | Age: 58
End: 2023-05-03

## 2023-05-03 NOTE — TELEPHONE ENCOUNTER
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

## 2023-05-04 NOTE — TELEPHONE ENCOUNTER
Tried to call patient x3 and it rings twice picks up and then no connection  If patient calls in please transfer to me

## 2023-05-08 NOTE — TELEPHONE ENCOUNTER
Spoke with patient, advised her MRI was denied and to have [address] done first  Provided her with CS number and advised her to cancel MRI and schedule US  She verbalized understanding no further questions

## 2023-05-20 ENCOUNTER — HOSPITAL ENCOUNTER (OUTPATIENT)
Dept: ULTRASOUND IMAGING | Facility: HOSPITAL | Age: 58
Discharge: HOME | End: 2023-05-20

## 2023-05-20 DIAGNOSIS — K86.89 PANCREATIC INSUFFICIENCY: ICD-10-CM

## 2023-05-28 DIAGNOSIS — E78.00 HYPERCHOLESTEROLEMIA: ICD-10-CM

## 2023-05-28 DIAGNOSIS — R79.89 HIGH SERUM HIGH DENSITY LIPOPROTEIN (HDL): ICD-10-CM

## 2023-05-30 RX ORDER — ATORVASTATIN CALCIUM 40 MG/1
60 TABLET, FILM COATED ORAL DAILY
Qty: 135 TABLET | Refills: 1 | Status: SHIPPED | OUTPATIENT
Start: 2023-05-30

## 2023-05-31 ENCOUNTER — TELEPHONE (OUTPATIENT)
Dept: GASTROENTEROLOGY | Facility: AMBULARY SURGERY CENTER | Age: 58
End: 2023-05-31

## 2023-05-31 DIAGNOSIS — K86.89 PANCREATIC INSUFFICIENCY: ICD-10-CM

## 2023-05-31 NOTE — TELEPHONE ENCOUNTER
----- Message from Magdalena Ramos sent at 5/30/2023  7:26 PM EDT -----  Regarding: Results  Contact: [phone]  I need a script for this medication sent to [address] [place] [address] (ASAP) or I will discontinue use of this medication, as it is to costly at my local pharmacy     Thank-you Radha Donaldson

## 2023-06-01 DIAGNOSIS — K86.89 PANCREATIC INSUFFICIENCY: ICD-10-CM

## 2023-06-01 DIAGNOSIS — K83.8 DILATED CBD, ACQUIRED: Primary | ICD-10-CM

## 2023-06-01 NOTE — PROGRESS NOTES
Cardiology Consultation     Pato Read  [id number]  1965  HEART & VASCULAR [facility] [address] [address]    1  Pre-operative cardiovascular examination  POCT ECG           Ms Haider Jimenez presents to our office for cardiac clearance  She will undergo an EGD and colonoscopy by Dr Cato Brunner on 6/12/23  Nita will undergo Robotic assisted laparoscopic sacral colpopexy, PUBO vaginal sling and cystoscopy  be done by Dr Cricket Flores on 7/26/23  Nita is able to go up and down a short ladder 4 times a day and clean her home without symptoms of chest pain or shortness of breath  Medical History   Primary Cardiologist Dr Lopez Perez  Paroxysmal atrial fibrillation CHADS2VASC=3 ( Female and stroke) continues on Eliquis 5mg BID   Hypercholesterolemia  Hx of loop in plant and explant on 9/10/20  Hx of Right MCA CVA with residual vision difficulties wearing special glasses      Patient Active Problem List   Diagnosis   • Acute ischemic right MCA stroke ([facility] [address] )   • Depression as late effect of cerebrovascular accident (CVA)   • Hypercholesterolemia   • Cervical post-laminectomy syndrome   • Cervical radiculopathy   • History of ischemic right MCA stroke   • Dependence on nicotine from cigarettes   • Tobacco abuse   • Anxiety   • Chronic pain disorder   • Lumbar degenerative disc disease   • Idiopathic scoliosis of lumbar spine   • Slow transit constipation   • Refused influenza vaccine   • High serum high density lipoprotein (HDL)   • PAF (paroxysmal atrial fibrillation) ([address] )   • H/O: hysterectomy   • Drug-induced constipation   • Cerebrovascular accident [facility])   • Anticoagulant long-term use   • Spinal stenosis of lumbar region without neurogenic claudication   • Encounter for loop recorder at end of battery life   • Continuous opioid dependence ([address] )   • Depression, recurrent ([address] )   • Family history of early CAD Past Medical History:   Diagnosis Date   • Acute pain of right knee     last assessed - [id number]   • Anemia    • Anxiety    • Cervical disc disorder with radiculopathy    • Chronic pain    • Constipation    • CVA (cerebral vascular accident) ([facility] [address] )    • H/O ischemic right MCA stroke    • H/O: hysterectomy    • Hematuria     last assessed - [id number]   • High cholesterol    • Lumbar radiculopathy    • Lumbar stenosis    • Other chronic pain     last assessed - [id number]   • Other muscle spasm     last assessed - [id number]   • PONV (postoperative nausea and vomiting)     must have premed   • Sciatic leg pain 6/29/2020   • Spondylosis of cervical spine    • Spondylosis of lumbosacral region    • Stroke [facility])    • Urinary incontinence     Resolved - [id number]     Social History     Socioeconomic History   • Marital status: /Civil Union     Spouse name: Not on file   • Number of children: Not on file   • Years of education: Not on file   • Highest education level: Not on file   Occupational History   • Not on file   Tobacco Use   • Smoking status: Every Day     Packs/day: 0 25     Years: 40 00     Total pack years: 10 00     Types: Cigarettes     Start date: 64   • Smokeless tobacco: Never   • Tobacco comments:     one half pack a day   Vaping Use   • Vaping Use: Never used   Substance and Sexual Activity   • Alcohol use: Yes     Comment: social drinker special events only   • Drug use: Not Currently     Types: Marijuana     Comment: medical marijuana (liquid form only)   • Sexual activity: Not on file   Other Topics Concern   • Not on file   Social History Narrative    ** Merged History Encounter **          Social Determinants of Health     Financial Resource Strain: Not on file   Food Insecurity: Not on file   Transportation Needs: Not on file   Physical Activity: Not on file   Stress: Not on file   Social Connections: Not on file   Intimate Partner Violence: Not on file   Housing Stability: Not on file Family History   Problem Relation Age of Onset   • Stroke Mother         46s   • Cancer Mother    • Hypertension Mother    • Pulmonary embolism Other         In mid 35s   • Stroke Sister         46s   • Stroke Brother         46s   • Prostate cancer Father    • Cancer Daughter    • Stroke Family      Past Surgical History:   Procedure Laterality Date   • BLADDER SURGERY     • BLADDER SURGERY  2014   • CARDIAC LOOP RECORDER  08/2020    removal    • CERVICAL SPINE SURGERY     • COLONOSCOPY     • DENTAL SURGERY     • KNEE ARTHROSCOPY Left    • LIPECTOMY      of thigh   • LIPOMA RESECTION     • NECK SURGERY     • KS ARTHRS KNE SURG W/MENISCECTOMY MED/LAT W/SHVG Left 03/21/2016    Procedure: ARTHROSCOPY W/ PARTIAL MEDIAL MENISCECTOMY;  Surgeon: Desmond Schultz MD;  Location:  MAIN OR;  Service: Orthopedics   • KS COLONOSCOPY FLX DX W/COLLJ SPEC WHEN PFRMD N/A 02/10/2017    Procedure: COLONOSCOPY;  Surgeon: Rody Shaw MD;  Location: [facility] GI LAB;   Service: Gastroenterology   • TOTAL ABDOMINAL HYSTERECTOMY     • TUBAL LIGATION         Current Outpatient Medications:   •  aspirin 81 mg chewable tablet, Chew 1 tablet (81 mg total) daily, Disp: 30 tablet, Rfl: 0  •  atorvastatin (LIPITOR) 40 mg tablet, Take 1 5 tablets (60 mg total) by mouth daily, Disp: 135 tablet, Rfl: 1  •  bisacodyl (DULCOLAX) 5 mg EC tablet, Take 2 tablets (10 mg total) by mouth once for 1 dose (Patient not taking: Reported on 3/31/2023), Disp: 2 tablet, Rfl: 0  •  chlorhexidine (PERIDEX) 0 12 % solution, Apply 15 mL to the mouth or throat 2 (two) times a day, Disp: , Rfl:   •  cholecalciferol (VITAMIN D3) 1,000 units tablet, Take 2,000 Units by mouth daily, Disp: , Rfl:   •  cycloSPORINE (RESTASIS) 0 05 % ophthalmic emulsion, Administer 1 drop to both eyes every 12 (twelve) hours  (Patient not taking: Reported on 10/21/2022), Disp: , Rfl:   •  diazepam (VALIUM) 5 mg tablet, Take 1 tablet (5 mg total) by mouth daily at bedtime as needed for sleep, Disp: 90 tablet, Rfl: 0  •  diclofenac sodium (VOLTAREN) 50 mg EC tablet, Take 1 tablet (50 mg total) by mouth daily as needed (pain), Disp: 90 tablet, Rfl: 1  •  Eliquis 5 MG, Take 1 tablet (5 mg total) by mouth 2 (two) times a day, Disp: 180 tablet, Rfl: 5  •  loteprednol etabonate (LOTEMAX) 0 5 % ophthalmic suspension, Administer 1 drop to both eyes 2 (two) times a day  (Patient not taking: Reported on 10/28/2022), Disp: , Rfl:   •  lubiprostone (AMITIZA) 8 mcg capsule, Take 1 capsule (8 mcg total) by mouth 2 (two) times a day with meals, Disp: 60 capsule, Rfl: 5  •  magnesium 30 MG tablet, Take 30 mg by mouth 2 (two) times a day, Disp: , Rfl:   •  naloxone (NARCAN) 4 mg/0 1 mL nasal spray, Administer 1 spray into a nostril   If breathing does not return to normal or if breathing difficulty resumes after 2-3 minutes, give another dose in the other nostril using a new spray , Disp: 1 each, Rfl: 1  •  Omega-3 1000 MG CAPS, Take 1 capsule by mouth in the morning, Disp: , Rfl:   •  oxyCODONE (ROXICODONE) 5 immediate release tablet, Take 1 tablet (5 mg total) by mouth daily as needed for moderate pain Max Daily Amount: 5 mg, Disp: 30 tablet, Rfl: 0  •  pancrelipase, Lip-Prot-Amyl, (CREON) 24,000 units, Take 48,000 units of lipase by mouth 3 (three) times a day with meals 2 capsules 3 times daily with meals, Disp: 180 capsule, Rfl: 5  •  pantoprazole (PROTONIX) 40 mg tablet, Take 1 tablet (40 mg total) by mouth daily, Disp: 90 tablet, Rfl: 1  •  polyethylene glycol (GOLYTELY) 4000 mL solution, Take 4,000 mL by mouth once for 1 dose (Patient not taking: Reported on 3/31/2023), Disp: 4000 mL, Rfl: 0  •  Probiotic Product (Probiotic Advanced) CAPS, Take by mouth daily, Disp: , Rfl:   •  rizatriptan (MAXALT-MLT) 5 mg disintegrating tablet, Take 1 tablet (5 mg total) by mouth once as needed for migraine, Disp: 54 tablet, Rfl: 1  Allergies   Allergen Reactions   • Blue Dyes (Parenteral) - Food Allergy Anaphylaxis   • Gabapentin Anaphylaxis and Nausea Only     Anaphylaxis   • Penicillins Rash and Vomiting   • Lidocaine Vomiting   • Nitrofurantoin GI Intolerance     vomiting   • Propofol    • Tramadol GI Intolerance     Other reaction(s): GI upset  Vomiting  Vomiting     • Blue Dyes (Parenteral) - Food Allergy Rash     There were no vitals filed for this visit  Labs:  No visits with results within 2 Month(s) from this visit     Latest known visit with results is:   Appointment on 03/28/2023   Component Date Value   • Cholesterol 03/28/2023 158    • Triglycerides 03/28/2023 90    • HDL, Direct 03/28/2023 61    • LDL Calculated 03/28/2023 79    • Vit D, 25-Hydroxy 03/28/2023 33 2    • Sodium 03/28/2023 133 (L)    • Potassium 03/28/2023 4 0    • Chloride 03/28/2023 105    • CO2 03/28/2023 26    • ANION GAP 03/28/2023 2 (L)    • BUN 03/28/2023 12    • Creatinine 03/28/2023 0 85    • Glucose, Fasting 03/28/2023 89    • Calcium 03/28/2023 10 1    • AST 03/28/2023 24    • ALT 03/28/2023 36    • Alkaline Phosphatase 03/28/2023 70    • Total Protein 03/28/2023 6 9    • Albumin 03/28/2023 4 0    • Total Bilirubin 03/28/2023 0 59    • eGFR 03/28/2023 75    • WBC 03/28/2023 9 26    • RBC 03/28/2023 4 11    • Hemoglobin 03/28/2023 13 3    • Hematocrit 03/28/2023 39 2    • MCV 03/28/2023 95    • MCH 03/28/2023 32 4    • MCHC 03/28/2023 33 9    • RDW 03/28/2023 13 4    • MPV 03/28/2023 9 4    • Platelets 22/53/2442 301    • nRBC 03/28/2023 0    • Neutrophils Relative 03/28/2023 42 (L)    • Immat GRANS % 03/28/2023 0    • Lymphocytes Relative 03/28/2023 48 (H)    • Monocytes Relative 03/28/2023 6    • Eosinophils Relative 03/28/2023 4    • Basophils Relative 03/28/2023 0    • Neutrophils Absolute 03/28/2023 3 91    • Immature Grans Absolute 03/28/2023 0 02    • Lymphocytes Absolute 03/28/2023 4 40    • Monocytes Absolute 03/28/2023 0 54    • Eosinophils Absolute 03/28/2023 0 35    • Basophils Absolute 03/28/2023 0 04    • Pancreatic Elastase-1 03/29/2023 114 (L)      Imaging: US right upper quadrant    Result Date: 5/24/2023  Narrative: RIGHT UPPER QUADRANT ULTRASOUND INDICATION:     K86 89: Other specified diseases of pancreas  COMPARISON: Multiple priors most recently radiographs 9/12/2021 TECHNIQUE:   Real-time ultrasound of the right upper quadrant was performed with a curvilinear transducer with both volumetric sweeps and still imaging techniques  FINDINGS: PANCREAS:  Visualized portions of the pancreas are within normal limits  AORTA AND IVC:  Visualized portions are normal for patient age  LIVER: Size:  Within normal range  The liver measures 14 8 cm in the midclavicular line  Contour:  Surface contour is smooth  Parenchyma:  Echogenicity and echotexture are within normal limits  No liver mass identified  Limited imaging of the main portal vein shows it to be patent and hepatopetal  BILIARY: No gallbladder findings  No intrahepatic biliary dilatation  CBD is mildly dilated, measuring up to 7 mm proximally, but tapers distally to 3 mm  No choledocholithiasis  KIDNEY: Right kidney measures 8 7 x 5 2 x 4 5 cm  Volume 107 2 mL Kidney within normal limits  Benign-appearing cysts present  ASCITES:   None  Impression: No acute abnormality or suspicious mass  Workstation performed: [id number]       Review of Systems:  Review of Systems   Eyes: Positive for visual disturbance  Wears corrective glasses    All other systems reviewed and are negative  Physical Exam:  Physical Exam  Vitals reviewed  Constitutional:       Appearance: Normal appearance  Cardiovascular:      Rate and Rhythm: Normal rate and regular rhythm  Pulses: Normal pulses  Heart sounds: Normal heart sounds  Pulmonary:      Effort: Pulmonary effort is normal       Breath sounds: Normal breath sounds  Musculoskeletal:         General: Normal range of motion  Cervical back: Normal range of motion and neck supple  Right lower leg: No edema        Left lower leg: No edema  Skin:     General: Skin is warm and dry  Capillary Refill: Capillary refill takes less than 2 seconds  Neurological:      General: No focal deficit present  Mental Status: She is alert and oriented to person, place, and time  Psychiatric:         Mood and Affect: Mood normal          Behavior: Behavior normal          Discussion/Summary:  1  Pre operative cardiovascular examination for planned EGD and colonoscopy by Dr Mary Wilkins on 6/12/23  Nita will undergo Robotic assisted laparoscopic sacral colpopexy, PUBO vaginal sling and cystoscopy to be done by Dr Jessica Schulz on 7/26/23  Nita is able to go up and down a short ladder 4 times a day and clean her home without symptoms of chest pain or shortness of breath  EKG NSR 69 BPM   She is on Eliquis 5mg BID  Hold 2 days prior to EGD and Colonoscopy and Hold 3 days prior to Mesh in plant for bladder prolapse  Eliquis to be resumed the day after the procedure

## 2023-06-05 ENCOUNTER — TELEPHONE (OUTPATIENT)
Dept: CARDIOLOGY CLINIC | Facility: CLINIC | Age: 58
End: 2023-06-05

## 2023-06-05 ENCOUNTER — OFFICE VISIT (OUTPATIENT)
Dept: CARDIOLOGY CLINIC | Facility: CLINIC | Age: 58
End: 2023-06-05
Payer: COMMERCIAL

## 2023-06-05 VITALS
DIASTOLIC BLOOD PRESSURE: 60 MMHG | BODY MASS INDEX: 24.56 KG/M2 | OXYGEN SATURATION: 96 % | SYSTOLIC BLOOD PRESSURE: 100 MMHG | HEIGHT: 61 IN | HEART RATE: 69 BPM | WEIGHT: 130.1 LBS

## 2023-06-05 DIAGNOSIS — Z01.810 PRE-OPERATIVE CARDIOVASCULAR EXAMINATION: Primary | ICD-10-CM

## 2023-06-05 PROCEDURE — 93000 ELECTROCARDIOGRAM COMPLETE: CPT | Performed by: NURSE PRACTITIONER

## 2023-06-05 PROCEDURE — 99243 OFF/OP CNSLTJ NEW/EST LOW 30: CPT | Performed by: NURSE PRACTITIONER

## 2023-06-05 NOTE — TELEPHONE ENCOUNTER
Hi, my name is [facility]  I am returning a call from [place]  She wanted to know what procedure she's having done with us  She's having surgery with us on July 26th and the procedure that she's having is a robotic assisted laparoscopic sacral Colpopexy-COLPOPEXY  She's also having a PUBO vaginal sling and then a cystoscopy  If you guys have any questions, you can give me a call back  The number to reach me is [phone], option 4 for the nurse  And again, my name is [facility]  Thank you   Inge vega

## 2023-06-12 ENCOUNTER — ANESTHESIA (OUTPATIENT)
Dept: GASTROENTEROLOGY | Facility: HOSPITAL | Age: 58
End: 2023-06-12

## 2023-06-12 ENCOUNTER — HOSPITAL ENCOUNTER (OUTPATIENT)
Dept: GASTROENTEROLOGY | Facility: HOSPITAL | Age: 58
Setting detail: OUTPATIENT SURGERY
Discharge: HOME | End: 2023-06-12
Payer: COMMERCIAL

## 2023-06-12 ENCOUNTER — ANESTHESIA EVENT (OUTPATIENT)
Dept: GASTROENTEROLOGY | Facility: HOSPITAL | Age: 58
End: 2023-06-12

## 2023-06-12 VITALS
SYSTOLIC BLOOD PRESSURE: 111 MMHG | BODY MASS INDEX: 24.55 KG/M2 | RESPIRATION RATE: 16 BRPM | HEART RATE: 58 BPM | TEMPERATURE: 97.7 F | DIASTOLIC BLOOD PRESSURE: 65 MMHG | OXYGEN SATURATION: 99 % | HEIGHT: 61 IN | WEIGHT: 130 LBS

## 2023-06-12 DIAGNOSIS — K25.3 ACUTE GASTRIC ULCER WITHOUT HEMORRHAGE OR PERFORATION: Primary | ICD-10-CM

## 2023-06-12 DIAGNOSIS — K21.9 GASTROESOPHAGEAL REFLUX DISEASE, UNSPECIFIED WHETHER ESOPHAGITIS PRESENT: ICD-10-CM

## 2023-06-12 DIAGNOSIS — Z86.010 HISTORY OF COLON POLYPS: ICD-10-CM

## 2023-06-12 DIAGNOSIS — K59.00 CONSTIPATION, UNSPECIFIED CONSTIPATION TYPE: ICD-10-CM

## 2023-06-12 PROBLEM — R11.2 PONV (POSTOPERATIVE NAUSEA AND VOMITING): Status: ACTIVE | Noted: 2023-06-12

## 2023-06-12 PROBLEM — Z98.890 PONV (POSTOPERATIVE NAUSEA AND VOMITING): Status: ACTIVE | Noted: 2023-06-12

## 2023-06-12 PROCEDURE — 88305 TISSUE EXAM BY PATHOLOGIST: CPT | Performed by: PATHOLOGY

## 2023-06-12 RX ORDER — OMEPRAZOLE 40 MG/1
40 CAPSULE, DELAYED RELEASE ORAL 2 TIMES DAILY
Qty: 60 CAPSULE | Refills: 3 | Status: SHIPPED | OUTPATIENT
Start: 2023-06-12

## 2023-06-12 RX ORDER — PROPOFOL 10 MG/ML
INJECTION, EMULSION INTRAVENOUS AS NEEDED
Status: DISCONTINUED | OUTPATIENT
Start: 2023-06-12 | End: 2023-06-12

## 2023-06-12 RX ORDER — SODIUM CHLORIDE, SODIUM LACTATE, POTASSIUM CHLORIDE, CALCIUM CHLORIDE 600; 310; 30; 20 MG/100ML; MG/100ML; MG/100ML; MG/100ML
INJECTION, SOLUTION INTRAVENOUS CONTINUOUS PRN
Status: DISCONTINUED | OUTPATIENT
Start: 2023-06-12 | End: 2023-06-12

## 2023-06-12 RX ADMIN — PROPOFOL 30 MG: 10 INJECTION, EMULSION INTRAVENOUS at 10:14

## 2023-06-12 RX ADMIN — PROPOFOL 100 MG: 10 INJECTION, EMULSION INTRAVENOUS at 09:57

## 2023-06-12 RX ADMIN — PROPOFOL 30 MG: 10 INJECTION, EMULSION INTRAVENOUS at 10:01

## 2023-06-12 RX ADMIN — PROPOFOL 30 MG: 10 INJECTION, EMULSION INTRAVENOUS at 10:03

## 2023-06-12 RX ADMIN — SODIUM CHLORIDE, SODIUM LACTATE, POTASSIUM CHLORIDE, AND CALCIUM CHLORIDE: .6; .31; .03; .02 INJECTION, SOLUTION INTRAVENOUS at 09:48

## 2023-06-12 RX ADMIN — PROPOFOL 30 MG: 10 INJECTION, EMULSION INTRAVENOUS at 09:59

## 2023-06-12 RX ADMIN — PROPOFOL 30 MG: 10 INJECTION, EMULSION INTRAVENOUS at 10:17

## 2023-06-12 RX ADMIN — PROPOFOL 30 MG: 10 INJECTION, EMULSION INTRAVENOUS at 10:07

## 2023-06-12 RX ADMIN — PROPOFOL 30 MG: 10 INJECTION, EMULSION INTRAVENOUS at 10:12

## 2023-06-12 NOTE — H&P
History and Physical -  [facility]  Vickie Mason 61 y o  female MRN: [id number]                  HPI: Amarjit Rose is a 61y o  year old female who presents for gerd, change in bowel habits  REVIEW OF SYSTEMS: Per the HPI, and otherwise unremarkable  Historical Information   Past Medical History:   Diagnosis Date   • Acute pain of right knee     last assessed - [id number]   • Anemia    • Anxiety    • Cervical disc disorder with radiculopathy    • Chronic pain    • Colon polyp    • Constipation    • CVA (cerebral vascular accident) ([facility] [address] )    • H/O ischemic right MCA stroke    • H/O: hysterectomy    • Hematuria     last assessed - 20Dec2017   • High cholesterol    • Lumbar radiculopathy    • Lumbar stenosis    • Other chronic pain     last assessed - 09Aug2013   • Other muscle spasm     last assessed - [id number]   • PONV (postoperative nausea and vomiting)     must have premed   • Sciatic leg pain 06/29/2020   • Spondylosis of cervical spine    • Spondylosis of lumbosacral region    • Stroke [facility])    • Urinary incontinence     Resolved - [id number]     Past Surgical History:   Procedure Laterality Date   • BLADDER SURGERY     • BLADDER SURGERY  2014   • CARDIAC LOOP RECORDER  08/2020    removal    • CERVICAL SPINE SURGERY     • COLONOSCOPY     • DENTAL SURGERY     • FL VCUG VOIDING URETHROCYSTOGRAM  12/22/2014   • KNEE ARTHROSCOPY Left    • LIPECTOMY      of thigh   • LIPOMA RESECTION     • NECK SURGERY     • [place] ARTHRS KNE SURG W/MENISCECTOMY MED/LAT W/SHVG Left 03/21/2016    Procedure: ARTHROSCOPY W/ PARTIAL MEDIAL MENISCECTOMY;  Surgeon: Dominick Edwards MD;  Location:  MAIN OR;  Service: Orthopedics   • NC COLONOSCOPY FLX DX W/COLLJ SPEC WHEN PFRMD N/A 02/10/2017    Procedure: COLONOSCOPY;  Surgeon: Bubba Caraballo MD;  Location: [facility] GI LAB;   Service: Gastroenterology   • TOTAL ABDOMINAL HYSTERECTOMY     • TUBAL LIGATION       Social History   Social History     Substance and Sexual Activity   Alcohol Use Yes    Comment: social drinker special events only     Social History     Substance and Sexual Activity   Drug Use Not Currently   • Types: Marijuana    Comment: medical marijuana (liquid form only)     Social History     Tobacco Use   Smoking Status Every Day   • Packs/day: 0 25   • Years: 40 00   • Total pack years: 10 00   • Types: Cigarettes   • Start date: 64   Smokeless Tobacco Never   Tobacco Comments    one half pack a day     Family History   Problem Relation Age of Onset   • Stroke Mother         46s   • Cancer Mother    • Hypertension Mother    • Pulmonary embolism Other         In mid 35s   • Stroke Sister         46s   • Stroke Brother         46s   • Prostate cancer Father    • Cancer Daughter    • Stroke Family        Meds/Allergies       Current Outpatient Medications:   •  atorvastatin (LIPITOR) 40 mg tablet  •  cholecalciferol (VITAMIN D3) 1,000 units tablet  •  diclofenac sodium (VOLTAREN) 50 mg EC tablet  •  magnesium 30 MG tablet  •  Omega-3 1000 MG CAPS  •  pancrelipase, Lip-Prot-Amyl, (CREON) 24,000 units  •  rizatriptan (MAXALT-MLT) 5 mg disintegrating tablet  •  aspirin 81 mg chewable tablet  •  chlorhexidine (PERIDEX) 0 12 % solution  •  cycloSPORINE (RESTASIS) 0 05 % ophthalmic emulsion  •  diazepam (VALIUM) 5 mg tablet  •  Eliquis 5 MG  •  loteprednol etabonate (LOTEMAX) 0 5 % ophthalmic suspension  •  lubiprostone (AMITIZA) 8 mcg capsule  •  oxyCODONE (ROXICODONE) 5 immediate release tablet  •  pantoprazole (PROTONIX) 40 mg tablet    Allergies   Allergen Reactions   • Blue Dyes (Parenteral) - Food Allergy Anaphylaxis   • Gabapentin Anaphylaxis and Nausea Only     Anaphylaxis   • Penicillins Rash and Vomiting   • Lidocaine Vomiting   • Nitrofurantoin GI Intolerance     vomiting   • Propofol    • Tramadol GI Intolerance     Other reaction(s): GI upset  Vomiting  Vomiting     • Blue Dyes (Parenteral) - Food Allergy Rash       Objective     /56   Pulse "65   Temp (!) 96 6 °F (35 9 °C) (Temporal)   Resp 18   Ht 5' 1\" (1 549 m)   Wt 59 kg (130 lb)   LMP  (LMP Unknown)   SpO2 97%   BMI 24 56 kg/m²       PHYSICAL EXAM    Gen: NAD  Head: NCAT  CV: RRR  CHEST: Clear  ABD: soft, NT/ND  EXT: no edema      ASSESSMENT/PLAN:  This is a 61y o  year old female here for EGD & colonoscopy, and she is stable and optimized for her procedure          "

## 2023-06-12 NOTE — ANESTHESIA PREPROCEDURE EVALUATION
Procedure:  COLONOSCOPY  EGD    Relevant Problems   ANESTHESIA   (+) PONV (postoperative nausea and vomiting)      CARDIO   (+) Hypercholesterolemia   (+) PAF (paroxysmal atrial fibrillation) (HCC)      MUSCULOSKELETAL   (+) Idiopathic scoliosis of lumbar spine   (+) Lumbar degenerative disc disease      NEURO/PSYCH   (+) Acute ischemic right MCA stroke (HCC)   (+) Anxiety   (+) Cerebrovascular accident ([facility] [address] )   (+) Chronic pain disorder   (+) Continuous opioid dependence ([facility] [address] )   (+) Depression as late effect of cerebrovascular accident (CVA)   (+) Depression, recurrent (HCC)      Digestive   (+) Slow transit constipation      Other   (+) History of ischemic right MCA stroke   (+) Spinal stenosis of lumbar region without neurogenic claudication   (+) Tobacco abuse        Physical Exam    Airway    Mallampati score: II  TM Distance: >3 FB  Neck ROM: full     Dental   No notable dental hx     Cardiovascular      Pulmonary      Other Findings        Anesthesia Plan  ASA Score- 3     Anesthesia Type- IV sedation with anesthesia with ASA Monitors  Additional Monitors:   Airway Plan:     Comment: Plan Ondansetron   Plan Factors-Exercise tolerance (METS): >4 METS  Chart reviewed  EKG reviewed  Existing labs reviewed  Patient summary reviewed  Patient is a current smoker  Patient smoked on day of surgery  Induction-     Postoperative Plan-     Informed Consent- Anesthetic plan and risks discussed with patient  I personally reviewed this patient with the CRNA  Discussed and agreed on the Anesthesia Plan with the CRNA  Judson Eagle

## 2023-06-12 NOTE — ANESTHESIA POSTPROCEDURE EVALUATION
Post-Op Assessment Note    CV Status:  Stable  Pain Score: 0    Pain management: adequate     Mental Status:  Alert and awake   Hydration Status:  Euvolemic and stable   PONV Controlled:  Controlled   Airway Patency:  Patent and adequate      Post Op Vitals Reviewed: Yes      Staff: CRNA         No notable events documented      BP  104/53    Temp     Pulse 67   Resp 16   SpO2 95%

## 2023-06-15 PROCEDURE — 88305 TISSUE EXAM BY PATHOLOGIST: CPT | Performed by: PATHOLOGY

## 2023-06-23 DIAGNOSIS — K25.3 ACUTE GASTRIC ULCER WITHOUT HEMORRHAGE OR PERFORATION: ICD-10-CM

## 2023-06-26 RX ORDER — OMEPRAZOLE 40 MG/1
40 CAPSULE, DELAYED RELEASE ORAL 2 TIMES DAILY
Qty: 180 CAPSULE | Refills: 1 | Status: SHIPPED | OUTPATIENT
Start: 2023-06-26

## 2023-07-03 NOTE — PROGRESS NOTES
Daily Note     Today's date: 2018  Patient name: Sandie Pepe  : 1965  MRN: [id number]  Referring provider: Vero Gonzalez MD  Dx:   Encounter Diagnosis   Name Primary?  Acute ischemic right MCA stroke (HCC) Yes                  Subjective: "Are we going to travel today?"      Objective: See treatment diary below      Assessment: Tolerated treatment well  Patient would benefit from continued OT  Pt did not report HA prior to OT session, stating eyes felt fine  Pt engaged in map task focusing on sustained convergence,  midline shft and saccades  Map placed on lower HR surface to facilitate scanning below horizon with answer sheet placed to right to facilitate positional change during task  Monocular taping: L eye, 10 min, R eye, 10 min, peripheral taping, 10 min  Pt reported "stinging," stabbing pain and fatigue to L eye during task  Ball roll with peripheral field taped to facilitate sustained attention  Decrease in symptoms reported post session  Plan: Continued skilled OT per POC  Myalgia Monitoring: I explained this is common when taking isotretinoin. If this worsens they will contact us.

## 2023-07-06 DIAGNOSIS — I63.511 ACUTE ISCHEMIC RIGHT MCA STROKE (HCC): ICD-10-CM

## 2023-07-07 RX ORDER — APIXABAN 5 MG/1
5 TABLET, FILM COATED ORAL 2 TIMES DAILY
Qty: 180 TABLET | Refills: 1 | Status: SHIPPED | OUTPATIENT
Start: 2023-07-07

## 2023-07-12 ENCOUNTER — TELEPHONE (OUTPATIENT)
Dept: INTERNAL MEDICINE CLINIC | Age: 58
End: 2023-07-12

## 2023-07-12 NOTE — TELEPHONE ENCOUNTER
7/12/23 did on cover my meds-prior authorization for eliquis 5 mg bid approved- called [facility]- it will be  539 dollars with deductible- [facility] phamacist - David states he will use coupon and it will only be $10.00- called patient with the above info ds

## 2023-07-15 ENCOUNTER — APPOINTMENT (OUTPATIENT)
Dept: LAB | Age: 58
End: 2023-07-15
Payer: COMMERCIAL

## 2023-07-15 DIAGNOSIS — Z01.812 PRE-OPERATIVE LABORATORY EXAMINATION: ICD-10-CM

## 2023-07-15 DIAGNOSIS — R39.15 URGENCY OF URINATION: ICD-10-CM

## 2023-07-15 LAB
ANION GAP SERPL CALCULATED.3IONS-SCNC: 2 MMOL/L
BASOPHILS # BLD AUTO: 0.05 THOUSANDS/ÂΜL (ref 0–0.1)
BASOPHILS NFR BLD AUTO: 1 % (ref 0–1)
BUN SERPL-MCNC: 9 MG/DL (ref 5–25)
CALCIUM SERPL-MCNC: 9.5 MG/DL (ref 8.3–10.1)
CHLORIDE SERPL-SCNC: 110 MMOL/L (ref 96–108)
CO2 SERPL-SCNC: 27 MMOL/L (ref 21–32)
CREAT SERPL-MCNC: 1.02 MG/DL (ref 0.6–1.3)
EOSINOPHIL # BLD AUTO: 0.12 THOUSAND/ÂΜL (ref 0–0.61)
EOSINOPHIL NFR BLD AUTO: 1 % (ref 0–6)
ERYTHROCYTE [DISTWIDTH] IN BLOOD BY AUTOMATED COUNT: 13.2 % (ref 11.6–15.1)
GFR SERPL CREATININE-BSD FRML MDRD: 60 ML/MIN/1.73SQ M
GLUCOSE P FAST SERPL-MCNC: 92 MG/DL (ref 65–99)
HCT VFR BLD AUTO: 39 % (ref 34.8–46.1)
HGB BLD-MCNC: 13.2 G/DL (ref 11.5–15.4)
IMM GRANULOCYTES # BLD AUTO: 0.02 THOUSAND/UL (ref 0–0.2)
IMM GRANULOCYTES NFR BLD AUTO: 0 % (ref 0–2)
LYMPHOCYTES # BLD AUTO: 4.39 THOUSANDS/ÂΜL (ref 0.6–4.47)
LYMPHOCYTES NFR BLD AUTO: 45 % (ref 14–44)
MCH RBC QN AUTO: 32.7 PG (ref 26.8–34.3)
MCHC RBC AUTO-ENTMCNC: 33.8 G/DL (ref 31.4–37.4)
MCV RBC AUTO: 97 FL (ref 82–98)
MONOCYTES # BLD AUTO: 0.66 THOUSAND/ÂΜL (ref 0.17–1.22)
MONOCYTES NFR BLD AUTO: 7 % (ref 4–12)
NEUTROPHILS # BLD AUTO: 4.47 THOUSANDS/ÂΜL (ref 1.85–7.62)
NEUTS SEG NFR BLD AUTO: 46 % (ref 43–75)
NRBC BLD AUTO-RTO: 0 /100 WBCS
PLATELET # BLD AUTO: 300 THOUSANDS/UL (ref 149–390)
PMV BLD AUTO: 9.8 FL (ref 8.9–12.7)
POTASSIUM SERPL-SCNC: 4 MMOL/L (ref 3.5–5.3)
RBC # BLD AUTO: 4.04 MILLION/UL (ref 3.81–5.12)
SODIUM SERPL-SCNC: 139 MMOL/L (ref 135–147)
WBC # BLD AUTO: 9.71 THOUSAND/UL (ref 4.31–10.16)

## 2023-07-15 PROCEDURE — 85025 COMPLETE CBC W/AUTO DIFF WBC: CPT

## 2023-07-15 PROCEDURE — 36415 COLL VENOUS BLD VENIPUNCTURE: CPT

## 2023-07-15 PROCEDURE — 80048 BASIC METABOLIC PNL TOTAL CA: CPT

## 2023-07-20 NOTE — PRE-PROCEDURE INSTRUCTIONS
Pre-Surgery Instructions:   Medication Instructions   • aspirin 81 mg chewable tablet Per cardiology pt is to hold 3 days prior to sx. • atorvastatin (LIPITOR) 40 mg tablet Take day of surgery. • chlorhexidine (PERIDEX) 0.12 % solution Take night before surgery   • cholecalciferol (VITAMIN D3) 1,000 units tablet Stop taking 7 days prior to surgery. • diazepam (VALIUM) 5 mg tablet Take night before surgery   • diclofenac sodium (VOLTAREN) 50 mg EC tablet Stop taking 7 days prior to surgery. • Eliquis 5 MG Per cardiology pt is to hold 3 days prior to sx. • magnesium 30 MG tablet Stop taking 7 days prior to surgery. • Omega-3 1000 MG CAPS Stop taking 7 days prior to surgery. • omeprazole (PriLOSEC) 40 MG capsule Take day of surgery. • oxyCODONE (ROXICODONE) 5 immediate release tablet Uses PRN- OK to take day of surgery   • pancrelipase, Lip-Prot-Amyl, (CREON) 24,000 units Hold day of surgery. • rizatriptan (MAXALT-MLT) 5 mg disintegrating tablet Uses PRN- OK to take day of surgery    Medication instructions for day surgery reviewed. Please use only a sip of water to take your instructed medications. Avoid all over the counter vitamins, supplements and NSAIDS for one week prior to surgery per anesthesia guidelines. Tylenol is ok to take as needed. You will receive a call one business day prior to surgery with an arrival time and hospital directions. If your surgery is scheduled on a Monday, the hospital will be calling you on the Friday prior to your surgery. If you have not heard from anyone by 8pm, please call the hospital supervisor through the hospital  at [phone]. [facility] [phone]). Do not eat or drink anything after midnight the night before your surgery, including candy, mints, lifesavers, or chewing gum. Do not drink alcohol 24hrs before your surgery. Try not to smoke at least 24hrs before your surgery.        Follow the pre surgery showering instructions as listed in the [facility] Surgical Experience Booklet” or otherwise provided by your surgeon's office. Do not shave the surgical area 24 hours before surgery. Do not apply any lotions, creams, including makeup, cologne, deodorant, or perfumes after showering on the day of your surgery. No contact lenses, eye make-up, or artificial eyelashes. Remove nail polish, including gel polish, and any artificial, gel, or acrylic nails if possible. Remove all jewelry including rings and body piercing jewelry. Wear causal clothing that is easy to take on and off. Consider your type of surgery. Keep any valuables, jewelry, piercings at home. Please bring any specially ordered equipment (sling, braces) if indicated. Arrange for a responsible person to drive you to and from the hospital on the day of your surgery. Visitor Guidelines discussed. Call the surgeon's office with any new illnesses, exposures, or additional questions prior to surgery. Please reference your [facility] Surgical Experience Booklet” for additional information to prepare for your upcoming surgery.

## 2023-07-25 ENCOUNTER — ANESTHESIA EVENT (OUTPATIENT)
Dept: PERIOP | Facility: HOSPITAL | Age: 58
End: 2023-07-25
Payer: COMMERCIAL

## 2023-07-25 RX ORDER — BUPIVACAINE HYDROCHLORIDE AND EPINEPHRINE 2.5; 5 MG/ML; UG/ML
60 INJECTION, SOLUTION EPIDURAL; INFILTRATION; INTRACAUDAL; PERINEURAL ONCE
Status: CANCELLED | OUTPATIENT
Start: 2023-07-26 | End: 2023-07-25

## 2023-07-26 ENCOUNTER — HOSPITAL ENCOUNTER (OUTPATIENT)
Facility: HOSPITAL | Age: 58
Setting detail: OUTPATIENT SURGERY
Discharge: HOME | End: 2023-07-26
Attending: OBSTETRICS & GYNECOLOGY | Admitting: OBSTETRICS & GYNECOLOGY
Payer: COMMERCIAL

## 2023-07-26 ENCOUNTER — ANESTHESIA (OUTPATIENT)
Dept: PERIOP | Facility: HOSPITAL | Age: 58
End: 2023-07-26
Payer: COMMERCIAL

## 2023-07-26 VITALS
OXYGEN SATURATION: 96 % | DIASTOLIC BLOOD PRESSURE: 57 MMHG | WEIGHT: 125 LBS | BODY MASS INDEX: 23.6 KG/M2 | HEIGHT: 61 IN | HEART RATE: 75 BPM | TEMPERATURE: 97.9 F | RESPIRATION RATE: 12 BRPM | SYSTOLIC BLOOD PRESSURE: 115 MMHG

## 2023-07-26 DIAGNOSIS — N81.6 RECTOCELE: ICD-10-CM

## 2023-07-26 DIAGNOSIS — N99.3 PROLAPSE OF VAGINAL VAULT AFTER HYSTERECTOMY: Primary | ICD-10-CM

## 2023-07-26 DIAGNOSIS — N39.3 STRESS INCONTINENCE (FEMALE) (MALE): ICD-10-CM

## 2023-07-26 LAB
ABO GROUP BLD: NORMAL
BLD GP AB SCN SERPL QL: NEGATIVE
RH BLD: POSITIVE
SPECIMEN EXPIRATION DATE: NORMAL

## 2023-07-26 PROCEDURE — 86901 BLOOD TYPING SEROLOGIC RH(D): CPT | Performed by: OBSTETRICS & GYNECOLOGY

## 2023-07-26 PROCEDURE — C1781 MESH (IMPLANTABLE): HCPCS | Performed by: OBSTETRICS & GYNECOLOGY

## 2023-07-26 PROCEDURE — 58661 LAPAROSCOPY REMOVE ADNEXA: CPT | Performed by: PHYSICIAN ASSISTANT

## 2023-07-26 PROCEDURE — 57288 REPAIR BLADDER DEFECT: CPT | Performed by: PHYSICIAN ASSISTANT

## 2023-07-26 PROCEDURE — 57425 LAPAROSCOPY SURG COLPOPEXY: CPT | Performed by: PHYSICIAN ASSISTANT

## 2023-07-26 PROCEDURE — 88305 TISSUE EXAM BY PATHOLOGIST: CPT | Performed by: SPECIALIST

## 2023-07-26 PROCEDURE — 86850 RBC ANTIBODY SCREEN: CPT | Performed by: OBSTETRICS & GYNECOLOGY

## 2023-07-26 PROCEDURE — C1771 REP DEV, URINARY, W/SLING: HCPCS | Performed by: OBSTETRICS & GYNECOLOGY

## 2023-07-26 PROCEDURE — 86900 BLOOD TYPING SEROLOGIC ABO: CPT | Performed by: OBSTETRICS & GYNECOLOGY

## 2023-07-26 DEVICE — POLYPROPYLENE MESH FOR SACROCOLPOSUSPENSION/SACROCOLPOPEXY - L
Type: IMPLANTABLE DEVICE | Site: URINARY BLADDER | Status: FUNCTIONAL
Brand: RESTORELLE

## 2023-07-26 DEVICE — SINGLE INCISION SLING SYSTEM
Type: IMPLANTABLE DEVICE | Site: VAGINA | Status: FUNCTIONAL
Brand: ALTIS

## 2023-07-26 RX ORDER — ONDANSETRON 2 MG/ML
4 INJECTION INTRAMUSCULAR; INTRAVENOUS ONCE AS NEEDED
Status: DISCONTINUED | OUTPATIENT
Start: 2023-07-26 | End: 2023-07-26 | Stop reason: HOSPADM

## 2023-07-26 RX ORDER — MAGNESIUM HYDROXIDE 1200 MG/15ML
LIQUID ORAL AS NEEDED
Status: DISCONTINUED | OUTPATIENT
Start: 2023-07-26 | End: 2023-07-26 | Stop reason: HOSPADM

## 2023-07-26 RX ORDER — LIDOCAINE HYDROCHLORIDE AND EPINEPHRINE 10; 10 MG/ML; UG/ML
INJECTION, SOLUTION INFILTRATION; PERINEURAL AS NEEDED
Status: DISCONTINUED | OUTPATIENT
Start: 2023-07-26 | End: 2023-07-26 | Stop reason: HOSPADM

## 2023-07-26 RX ORDER — PHENAZOPYRIDINE HYDROCHLORIDE 100 MG/1
100 TABLET, FILM COATED ORAL
Status: DISCONTINUED | OUTPATIENT
Start: 2023-07-26 | End: 2023-07-26 | Stop reason: HOSPADM

## 2023-07-26 RX ORDER — DEXAMETHASONE SODIUM PHOSPHATE 10 MG/ML
INJECTION, SOLUTION INTRAMUSCULAR; INTRAVENOUS AS NEEDED
Status: DISCONTINUED | OUTPATIENT
Start: 2023-07-26 | End: 2023-07-26

## 2023-07-26 RX ORDER — IBUPROFEN 600 MG/1
600 TABLET ORAL EVERY 6 HOURS SCHEDULED
Qty: 30 TABLET | Refills: 0 | Status: SHIPPED | OUTPATIENT
Start: 2023-07-26

## 2023-07-26 RX ORDER — OXYCODONE HYDROCHLORIDE 5 MG/1
5 TABLET ORAL EVERY 4 HOURS PRN
Status: DISCONTINUED | OUTPATIENT
Start: 2023-07-26 | End: 2023-07-26 | Stop reason: HOSPADM

## 2023-07-26 RX ORDER — ONDANSETRON 2 MG/ML
4 INJECTION INTRAMUSCULAR; INTRAVENOUS EVERY 6 HOURS PRN
Status: DISCONTINUED | OUTPATIENT
Start: 2023-07-26 | End: 2023-07-26 | Stop reason: HOSPADM

## 2023-07-26 RX ORDER — CEFAZOLIN SODIUM 2 G/50ML
2000 SOLUTION INTRAVENOUS ONCE
Status: COMPLETED | OUTPATIENT
Start: 2023-07-26 | End: 2023-07-26

## 2023-07-26 RX ORDER — SCOLOPAMINE TRANSDERMAL SYSTEM 1 MG/1
1 PATCH, EXTENDED RELEASE TRANSDERMAL ONCE AS NEEDED
Status: COMPLETED | OUTPATIENT
Start: 2023-07-26 | End: 2023-07-26

## 2023-07-26 RX ORDER — ACETAMINOPHEN 325 MG/1
975 TABLET ORAL EVERY 6 HOURS PRN
Refills: 0
Start: 2023-07-26

## 2023-07-26 RX ORDER — SODIUM CHLORIDE 9 MG/ML
INJECTION INTRAVENOUS AS NEEDED
Status: DISCONTINUED | OUTPATIENT
Start: 2023-07-26 | End: 2023-07-26 | Stop reason: HOSPADM

## 2023-07-26 RX ORDER — PROPOFOL 10 MG/ML
INJECTION, EMULSION INTRAVENOUS AS NEEDED
Status: DISCONTINUED | OUTPATIENT
Start: 2023-07-26 | End: 2023-07-26

## 2023-07-26 RX ORDER — FENTANYL CITRATE 50 UG/ML
INJECTION, SOLUTION INTRAMUSCULAR; INTRAVENOUS AS NEEDED
Status: DISCONTINUED | OUTPATIENT
Start: 2023-07-26 | End: 2023-07-26

## 2023-07-26 RX ORDER — MIDAZOLAM HYDROCHLORIDE 2 MG/2ML
INJECTION, SOLUTION INTRAMUSCULAR; INTRAVENOUS AS NEEDED
Status: DISCONTINUED | OUTPATIENT
Start: 2023-07-26 | End: 2023-07-26

## 2023-07-26 RX ORDER — OXYCODONE HYDROCHLORIDE 5 MG/1
10 TABLET ORAL EVERY 4 HOURS PRN
Status: DISCONTINUED | OUTPATIENT
Start: 2023-07-26 | End: 2023-07-26 | Stop reason: HOSPADM

## 2023-07-26 RX ORDER — FENTANYL CITRATE/PF 50 MCG/ML
50 SYRINGE (ML) INJECTION
Status: DISCONTINUED | OUTPATIENT
Start: 2023-07-26 | End: 2023-07-26 | Stop reason: HOSPADM

## 2023-07-26 RX ORDER — KETAMINE HYDROCHLORIDE 100 MG/ML
INJECTION INTRAMUSCULAR; INTRAVENOUS AS NEEDED
Status: DISCONTINUED | OUTPATIENT
Start: 2023-07-26 | End: 2023-07-26

## 2023-07-26 RX ORDER — BUPIVACAINE HYDROCHLORIDE 5 MG/ML
INJECTION, SOLUTION EPIDURAL; INTRACAUDAL AS NEEDED
Status: DISCONTINUED | OUTPATIENT
Start: 2023-07-26 | End: 2023-07-26 | Stop reason: HOSPADM

## 2023-07-26 RX ORDER — SODIUM CHLORIDE, SODIUM LACTATE, POTASSIUM CHLORIDE, CALCIUM CHLORIDE 600; 310; 30; 20 MG/100ML; MG/100ML; MG/100ML; MG/100ML
125 INJECTION, SOLUTION INTRAVENOUS CONTINUOUS
Status: DISCONTINUED | OUTPATIENT
Start: 2023-07-26 | End: 2023-07-26 | Stop reason: HOSPADM

## 2023-07-26 RX ORDER — ROCURONIUM BROMIDE 10 MG/ML
INJECTION, SOLUTION INTRAVENOUS AS NEEDED
Status: DISCONTINUED | OUTPATIENT
Start: 2023-07-26 | End: 2023-07-26

## 2023-07-26 RX ORDER — IBUPROFEN 600 MG/1
600 TABLET ORAL EVERY 6 HOURS PRN
Status: DISCONTINUED | OUTPATIENT
Start: 2023-07-26 | End: 2023-07-26 | Stop reason: HOSPADM

## 2023-07-26 RX ORDER — ACETAMINOPHEN 325 MG/1
975 TABLET ORAL ONCE
Status: COMPLETED | OUTPATIENT
Start: 2023-07-26 | End: 2023-07-26

## 2023-07-26 RX ORDER — SODIUM CHLORIDE 9 MG/ML
INJECTION, SOLUTION INTRAVENOUS CONTINUOUS PRN
Status: DISCONTINUED | OUTPATIENT
Start: 2023-07-26 | End: 2023-07-26

## 2023-07-26 RX ORDER — KETOROLAC TROMETHAMINE 30 MG/ML
INJECTION, SOLUTION INTRAMUSCULAR; INTRAVENOUS AS NEEDED
Status: DISCONTINUED | OUTPATIENT
Start: 2023-07-26 | End: 2023-07-26

## 2023-07-26 RX ORDER — ACETAMINOPHEN 325 MG/1
975 TABLET ORAL EVERY 6 HOURS PRN
Status: DISCONTINUED | OUTPATIENT
Start: 2023-07-26 | End: 2023-07-26 | Stop reason: HOSPADM

## 2023-07-26 RX ORDER — SODIUM CHLORIDE 9 MG/ML
125 INJECTION, SOLUTION INTRAVENOUS CONTINUOUS
Status: DISCONTINUED | OUTPATIENT
Start: 2023-07-26 | End: 2023-07-26

## 2023-07-26 RX ORDER — ONDANSETRON 2 MG/ML
INJECTION INTRAMUSCULAR; INTRAVENOUS AS NEEDED
Status: DISCONTINUED | OUTPATIENT
Start: 2023-07-26 | End: 2023-07-26

## 2023-07-26 RX ADMIN — PHENYLEPHRINE HYDROCHLORIDE 20 MCG/MIN: 10 INJECTION INTRAVENOUS at 07:38

## 2023-07-26 RX ADMIN — SODIUM CHLORIDE: 0.9 INJECTION, SOLUTION INTRAVENOUS at 09:39

## 2023-07-26 RX ADMIN — KETOROLAC TROMETHAMINE 15 MG: 30 INJECTION, SOLUTION INTRAMUSCULAR at 10:20

## 2023-07-26 RX ADMIN — PHENAZOPYRIDINE 100 MG: 100 TABLET ORAL at 07:02

## 2023-07-26 RX ADMIN — ROCURONIUM BROMIDE 10 MG: 10 INJECTION, SOLUTION INTRAVENOUS at 08:17

## 2023-07-26 RX ADMIN — FENTANYL CITRATE 25 MCG: 50 INJECTION INTRAMUSCULAR; INTRAVENOUS at 08:08

## 2023-07-26 RX ADMIN — FENTANYL CITRATE 50 MCG: 50 INJECTION INTRAMUSCULAR; INTRAVENOUS at 08:17

## 2023-07-26 RX ADMIN — ONDANSETRON 4 MG: 2 INJECTION INTRAMUSCULAR; INTRAVENOUS at 10:04

## 2023-07-26 RX ADMIN — SODIUM CHLORIDE, SODIUM LACTATE, POTASSIUM CHLORIDE, AND CALCIUM CHLORIDE 125 ML/HR: .6; .31; .03; .02 INJECTION, SOLUTION INTRAVENOUS at 11:00

## 2023-07-26 RX ADMIN — SUGAMMADEX 130 MG: 100 INJECTION, SOLUTION INTRAVENOUS at 10:22

## 2023-07-26 RX ADMIN — PROPOFOL 120 MG: 10 INJECTION, EMULSION INTRAVENOUS at 07:41

## 2023-07-26 RX ADMIN — KETAMINE HYDROCHLORIDE 20 MG: 100 INJECTION, SOLUTION, CONCENTRATE INTRAMUSCULAR; INTRAVENOUS at 07:41

## 2023-07-26 RX ADMIN — DEXAMETHASONE SODIUM PHOSPHATE 6 MG: 10 INJECTION INTRAMUSCULAR; INTRAVENOUS at 07:52

## 2023-07-26 RX ADMIN — KETAMINE HYDROCHLORIDE 10 MG: 100 INJECTION, SOLUTION, CONCENTRATE INTRAMUSCULAR; INTRAVENOUS at 09:20

## 2023-07-26 RX ADMIN — CEFAZOLIN SODIUM 2000 MG: 2 SOLUTION INTRAVENOUS at 07:29

## 2023-07-26 RX ADMIN — SODIUM CHLORIDE: 0.9 INJECTION, SOLUTION INTRAVENOUS at 07:31

## 2023-07-26 RX ADMIN — ACETAMINOPHEN 325MG 975 MG: 325 TABLET ORAL at 06:16

## 2023-07-26 RX ADMIN — OXYCODONE HYDROCHLORIDE 5 MG: 5 TABLET ORAL at 12:35

## 2023-07-26 RX ADMIN — ROCURONIUM BROMIDE 10 MG: 10 INJECTION, SOLUTION INTRAVENOUS at 09:12

## 2023-07-26 RX ADMIN — MIDAZOLAM 4 MG: 1 INJECTION INTRAMUSCULAR; INTRAVENOUS at 07:28

## 2023-07-26 RX ADMIN — ROCURONIUM BROMIDE 50 MG: 10 INJECTION, SOLUTION INTRAVENOUS at 07:41

## 2023-07-26 RX ADMIN — SCOPALAMINE 1 PATCH: 1 PATCH, EXTENDED RELEASE TRANSDERMAL at 07:30

## 2023-07-26 RX ADMIN — FENTANYL CITRATE 25 MCG: 50 INJECTION INTRAMUSCULAR; INTRAVENOUS at 10:22

## 2023-07-26 NOTE — ANESTHESIA POSTPROCEDURE EVALUATION
Post-Op Assessment Note    CV Status:  Stable    Pain management: adequate     Mental Status:  Alert and awake   Hydration Status:  Euvolemic   PONV Controlled:  Controlled   Airway Patency:  Patent      Post Op Vitals Reviewed: Yes      Staff: Anesthesiologist, CRNA         No notable events documented.     BP      Temp      Pulse     Resp      SpO2      /53   Pulse 74   Temp 98.7 °F (37.1 °C) (Temporal)   Resp 12   Ht 5' 1" (1.549 m)   Wt 56.7 kg (125 lb)   LMP  (LMP Unknown)   SpO2 94%   BMI 23.62 kg/m²

## 2023-07-26 NOTE — DISCHARGE INSTR - AVS FIRST PAGE
Sacrocolpopexy  WHAT YOU NEED TO KNOW:   Sacrocolpopexy is an operation to correct uterine prolapse or to correct vaginal prolapse in women who have had a hysterectomy. This surgery offers long-term treatment of apical prolapse with success rates greater than 80 percent. Avoid lifting anything that is too heavy to lift with one hand for six to eight weeks. DISCHARGE INSTRUCTIONS:   Medicines:   Pain medicine: You may need medicine to take away or decrease pain. Learn how to take your medicine. Ask what medicine and how much you should take. Be sure you know how, when, and how often to take it. Do not wait until the pain is severe before you take your medicine. Tell caregivers if your pain does not decrease. Pain medicine can make you dizzy or sleepy. Prevent falls by calling someone when you get out of bed or if you need help. Antibiotics: This medicine is given to fight or prevent an infection caused by bacteria. Always take your antibiotics exactly as ordered by your healthcare provider. Do not stop taking your medicine unless directed by your healthcare provider. Never save antibiotics or take leftover antibiotics that were given to you for another illness. Take your medicine as directed. Contact your healthcare provider if you think your medicine is not helping or if you have side effects. Tell him or her if you are allergic to any medicine. Keep a list of the medicines, vitamins, and herbs you take. Include the amounts, and when and why you take them. Bring the list or the pill bottles to follow-up visits. Carry your medicine list with you in case of an emergency. Follow up with your healthcare provider as directed:  Write down your questions so you remember to ask them during your visits. Self-care:   Sex:  Do not have sex until your healthcare provider says it is okay. Kegel exercises:   To do kegel exercises, squeeze your pelvic floor muscles for 5 to 10 seconds, then release. Regular kegel exercises will help your pelvic floor muscles become stronger. This will help prevent you from leaking urine. Ask your healthcare provider when to start these exercises and how often to do them. Sanitary pad:  Change your sanitary pad regularly. Keep track of how often you change the pad. Borges catheter:  Keep the bag below your waist. This will help prevent infection and other problems caused by urine flowing back into your bladder. Do not pull on the catheter because this can cause pain and bleeding, and the catheter could come out. Keep the catheter tubing free of kinks so your urine will flow into the bag. Your healthcare provider will remove the catheter as soon as possible, to help prevent infection. Wound care:  When you are allowed to bathe or shower, carefully wash your vaginal area with soap and water. Do not put pressure on your abdomen: This will help prevent damage to your surgery area. Do not strain, lift heavy objects, or stand for a very long time. Do not perform strenuous exercises, such as running and weight lifting. Activity:  You may need to start walking within a few days after your procedure. Ask your healthcare provider when to start and how long you should walk. Ask about any other exercises that may be right for you. Support socks: You may need to wear support socks. These are tight socks that help increase the circulation in your legs until you are more active. This helps prevent blood clots. Contact your healthcare provider if:   You soak a sanitary pad with blood every hour for 4 hours. You have vaginal pain that does not go away even after you take pain medicine. You have pus or a foul-smelling discharge from your genital area. You see blood in your urine. You have pain during sex. You have a fever, chills, a cough, or feel weak and achy. You have nausea and vomiting.     You have questions or concerns about your condition or care.  Seek care immediately or call 911 if: You feel something is bulging out into your vagina or rectum and not going back in. You cannot urinate. Your arm or leg feels warm, tender, and painful. It may look swollen and red. You suddenly feel lightheaded and short of breath. You have chest pain. You may have more pain when you take a deep breath or cough. You may cough up blood. © 2017 5 [facility] Information is for End User's use only and may not be sold, redistributed or otherwise used for commercial purposes. All illustrations and images included in CareNotes® are the copyrighted property of [employer]. or [employer]. The above information is an  only. It is not intended as medical advice for individual conditions or treatments. Talk to your doctor, nurse or pharmacist before following any medical regimen to see if it is safe and effective for you.

## 2023-07-26 NOTE — OP NOTE
OPERATIVE REPORT  PATIENT NAME: Harvinder Frost    :  1965  MRN: [id number]  Pt Location: [facility] OR ROOM 07    SURGERY DATE: 2023    Surgeon(s) and Role:     * Jessy Turner MD - Primary     * Gutierrez Wolf PA-C - PA Assisting at bedside for suture and instrument exchange    Preop Diagnosis:  Prolapse of vaginal vault after hysterectomy [N99.3]  Rectocele [N81.6]  Stress incontinence (female) [N39.3]    Postop Diagnosis:  Prolapse of vaginal vault after hysterectomy [N99.3]  Rectocele [N81.6]  Stress incontinence (female) [N39.3]    Procedure(s):  1 - Robotic assisted laparoscopic sacrocolpopexy  2 - Right oophorectomy  3 - Bilateral salpingectomy  4 - Pubovaginal sling (ALTIS)  5 - Cystoscopy    Specimen(s):  ID Type Source Tests Collected by Time Destination   1 : portions of bilateral fallopian tubes with right ovary Tissue Fallopian Tubes, Bilateral TISSUE EXAM Jessy Turner MD 2023 [id number]        Estimated Blood Loss:   20 mL    Drains:  Urethral Catheter Double-lumen;Non-latex 16 Fr. (Active)   Number of days: 0       Anesthesia Type:   General    Operative Indications:  Prolapse of vaginal vault after hysterectomy [N99.3]  Rectocele [N81.6]  Stress incontinence (female) [N39.3]    Operative Findings:  Right ovary with 3 cm cyst, normal appearing left ovary    Cystoscopy at the end of the procedure showed normal appearing urothelium with no suture, mesh, or other foreign body. Bilateral ureteral jets were seen. Complications:   None    Procedure and Technique:  Patient was brought back to the operating room and placed on operating table. Following administration of general anesthesia, she was prepped and draped in dorsal lithotomy position using Herbert stirrups in the normal sterile fashion. An oral gastric tube was in place. Antibiotics were given for surgical prophylaxis and SCD boots were on and activated DVT prophylaxis.  Following surgical timeout, a Borges catheter was placed to drain the bladder. The Veress needle was inserted safely and intraperitoneal placement confirmed. Opening pressures were appropriately low, and insufflation was carried out to an intra-abdominal pressure of 15 mmHg. The peritoneal cavity was entered safely under direct visualization using an 8 mm optical trocar above the umbilicus. The pelvis was inspected and no significant adhesions were noted. The patient’s liver, abdomen, and pelvis appeared normal. The epigastric vessels were visualized and two robotic 8 mm ports were placed on the patient's left under direct visualization and an robotic 8 mm port as well as an 8 mm assist port was placed on the patient’s right. The supraumbilical port was upgraded to a 12 mm port. The Stitch Kit was placed into the abdominal cavity through this port. Next, attention was turned to the single incision pubovaginal sling (using the ALTIS system). The abdomen was deflated for this portion of the case. Hydrodissection of the vaginal wall beneath the mid urethra and vaginal sulci was performed. A 2 cm midurethral incision was made and periurethral tunnels were created towards the obturator membranes at 2 and 10 o'clock. The sling trocar was inserted into the fixation stay of the mesh and the static end of the mesh was placed into the left periurethral tunnel with the bladder protected and the tip of the trocar against the  complex. The trocar was first pushed cephalad, then advanced laterally until a pop was appreciated, then the trocar was rotated 1/4 turn, then withdrawn, leaving the stay in place. This same procedure was repeated on the patient's right side with the adjustable end of the sling. Cystoscopy was performed. Brisk efflux was noted from both ureters. The urothelium appeared normal with no lesions, suture, sling, or other foreign body.   Using the Crede maneuver the sling was tightened until minimal urine was seen leaking from the urethra. The adjustment suture was cut. The vaginal incision was closed with 2-0 Vicryl suture. The Borges catheter was reinserted. We then began the sacrocolpopexy (using Restorelle L mesh cut in 2 pieces). The abdomen was insufflated again and the surgical robot was docked using standard protocol. A Tip-up, maryland bipolar, and monopolar scissors were inserted under direct visulization. The presacral space was carefully opened and the anterior longitudinal ligament exposed below the promontory. The bladder was sharply dissected from the anterior vagina down to the trigone and the posterior peritoneum was dissected to the level of the rectal reflection. A lightweight polypropylene mesh was sewn to the posterior and anterior vagina using 2 rows of Goretex sutures - 12 sutures were placed posteriorly and 10 anteriorly. Attention was again turned to the anterior longitudinal ligament and 2 Gortex sutures were placed through the ligament just below the promontory, then through the free end of the mesh so the vagina was elevated under an appropriate amount of tension. Excess mesh was excised. Excellent hemostasis was noted. The patient's right ovary had a 3 cm cyst. Given her post menopausal status, decision was made to perform right oophorectomy and bilateral salpingectomy as she still had portions of left and right fallopian tubes. The right ovary was retracted away from the right pelvic side wall and after identifying the course of the ureter the right IP was ligated and transected just lateral to the ovary. The right ovary and portion of right tube were placed in a specimen bag and removed from the 12 mm port at the end of the case. Portion of left tube was dissected off of the left pelvic side wall and removed as well. Cystoscopy was performed. The urothelium appeared normal and no suture or foreign body was noted. Brisk, spontaneous efflux of urine was noted from both ureters. The Borges was replaced. The robot was undocked and all instruments, Stitch Kit, specimens, and trocars were removed from the abdomen and pelvis, which was deflated of CO2. The fascia of the supra umbilical incision was closed using 0-Vicryl. All skin incisions were closed with 4-0 monocryl and sterile skin glue. At the termination of the case, all counts were reported to the surgeons as correct. Excellent hemostatsis was noted. The patient was returned to the supine position, roused from her anesthetic and transported to the recovery room having tolerated the procedure well. I spoke with the patient’s family after the case. A qualified resident was not available for the procedure. I was present for the entire procedure.     Patient Disposition:  PACU       SIGNATURE: Jessy Turner MD  DATE: July 26, 2023  TIME: 10:32 AM

## 2023-07-26 NOTE — ANESTHESIA PREPROCEDURE EVALUATION
Procedure:  ROBOTIC COLPOPEXY SACRAL (Abdomen)  PV SLING (Vagina )  CYSTO (Bladder)    Relevant Problems   ANESTHESIA   (+) PONV (postoperative nausea and vomiting)      CARDIO   (+) Hypercholesterolemia   (+) PAF (paroxysmal atrial fibrillation) (HCC)      MUSCULOSKELETAL   (+) Idiopathic scoliosis of lumbar spine   (+) Lumbar degenerative disc disease      NEURO/PSYCH   (+) Acute ischemic right MCA stroke (HCC)   (+) Anxiety   (+) Cerebrovascular accident ([address])   (+) Chronic pain disorder   (+) Continuous opioid dependence (HCC)   (+) Depression as late effect of cerebrovascular accident (CVA)   (+) Depression, recurrent (HCC)        Physical Exam    Airway    Mallampati score: I  TM Distance: >3 FB  Neck ROM: full     Dental       Cardiovascular  Rhythm: regular, Rate: normal, Cardiovascular exam normal    Pulmonary  Pulmonary exam normal     Other Findings        Anesthesia Plan  ASA Score- 3     Anesthesia Type- general with ASA Monitors. Additional Monitors:   Airway Plan: ETT. Plan Factors-Exercise tolerance (METS): >4 METS. Chart reviewed. Existing labs reviewed. Patient summary reviewed. Patient is not a current smoker. Induction- intravenous. Postoperative Plan-     Informed Consent- Anesthetic plan and risks discussed with patient.

## 2023-07-31 PROCEDURE — 88305 TISSUE EXAM BY PATHOLOGIST: CPT | Performed by: SPECIALIST

## 2023-08-01 DIAGNOSIS — K86.89 PANCREATIC INSUFFICIENCY: ICD-10-CM

## 2023-08-01 NOTE — TELEPHONE ENCOUNTER
Pharm Rima Osuna from [facility] called for refill of Creon 52655 units    Sending to Dr Ryan [address] office for clarification, pharmacy did not have directions for creon. Not sure this should be ordered with the same directions. Requesting 90 D supply with refills    Please review.

## 2023-08-02 ENCOUNTER — HOSPITAL ENCOUNTER (OUTPATIENT)
Dept: MRI IMAGING | Facility: HOSPITAL | Age: 58
Discharge: HOME | End: 2023-08-02
Payer: COMMERCIAL

## 2023-08-02 DIAGNOSIS — K86.89 PANCREATIC INSUFFICIENCY: ICD-10-CM

## 2023-08-02 DIAGNOSIS — K83.8 DILATED CBD, ACQUIRED: ICD-10-CM

## 2023-08-02 PROCEDURE — 74183 MRI ABD W/O CNTR FLWD CNTR: CPT

## 2023-08-02 PROCEDURE — A9585 GADOBUTROL INJECTION: HCPCS | Performed by: NURSE PRACTITIONER

## 2023-08-02 PROCEDURE — G1004 CDSM NDSC: HCPCS

## 2023-08-02 RX ADMIN — GADOBUTROL 5 ML: 604.72 INJECTION INTRAVENOUS at 11:59

## 2023-08-04 DIAGNOSIS — K86.89 PANCREATIC INSUFFICIENCY: ICD-10-CM

## 2023-08-21 ENCOUNTER — RA CDI HCC (OUTPATIENT)
Dept: OTHER | Facility: HOSPITAL | Age: 58
End: 2023-08-21

## 2023-08-21 NOTE — PROGRESS NOTES
[address] [facility] coding opportunities       Chart reviewed, no opportunity found: CHART REVIEWED, NO OPPORTUNITY FOUND        Patients Insurance        Commercial Insurance: To [employer]

## 2023-08-22 DIAGNOSIS — N39.46 MIXED STRESS AND URGE URINARY INCONTINENCE: ICD-10-CM

## 2023-08-22 RX ORDER — OXYBUTYNIN CHLORIDE 10 MG/1
10 TABLET, EXTENDED RELEASE ORAL
Qty: 30 TABLET | Refills: 1 | OUTPATIENT
Start: 2023-08-22 | End: 2023-09-21

## 2023-08-29 ENCOUNTER — OFFICE VISIT (OUTPATIENT)
Dept: CARDIOLOGY CLINIC | Facility: MEDICAL CENTER | Age: 58
End: 2023-08-29
Payer: COMMERCIAL

## 2023-08-29 VITALS
BODY MASS INDEX: 23.03 KG/M2 | HEART RATE: 80 BPM | HEIGHT: 61 IN | DIASTOLIC BLOOD PRESSURE: 66 MMHG | OXYGEN SATURATION: 96 % | WEIGHT: 122 LBS | SYSTOLIC BLOOD PRESSURE: 118 MMHG

## 2023-08-29 DIAGNOSIS — Z82.49 FAMILY HISTORY OF EARLY CAD: ICD-10-CM

## 2023-08-29 DIAGNOSIS — E78.00 HYPERCHOLESTEROLEMIA: ICD-10-CM

## 2023-08-29 DIAGNOSIS — I63.00 CEREBROVASCULAR ACCIDENT (CVA) DUE TO THROMBOSIS OF PRECEREBRAL ARTERY (HCC): ICD-10-CM

## 2023-08-29 DIAGNOSIS — I48.0 PAF (PAROXYSMAL ATRIAL FIBRILLATION) (HCC): Primary | ICD-10-CM

## 2023-08-29 DIAGNOSIS — I63.511 ACUTE ISCHEMIC RIGHT MCA STROKE (HCC): ICD-10-CM

## 2023-08-29 PROCEDURE — 99214 OFFICE O/P EST MOD 30 MIN: CPT | Performed by: INTERNAL MEDICINE

## 2023-08-29 RX ORDER — APIXABAN 5 MG/1
5 TABLET, FILM COATED ORAL 2 TIMES DAILY
Qty: 60 TABLET | Refills: 11 | Status: SHIPPED | OUTPATIENT
Start: 2023-08-29

## 2023-08-29 NOTE — PROGRESS NOTES
Cardiology Follow Up    Formerly [facility]  1965  [id number]  [facility]  [address] [facility] [address] [phone]    1. PAF (paroxysmal atrial fibrillation) ([address])        2. Cerebrovascular accident (CVA) due to thrombosis of precerebral artery ([address])        3. Acute ischemic right MCA stroke (HCC)  Eliquis 5 MG      4. Hypercholesterolemia        5. Family history of early CAD          Chief Complaint   Patient presents with   • New Patient Visit     Previous Dr. Vincent Beck patient, wants to establish with Dr. Shruthi Ann     Interval History: Patient feels well, without complaints. No reported chest pain, shortness of breath, palpitations, lightheadedness, syncope, LE edema, orthopnea, PND, or significant weight changes. Patient remains active without any increased fatigue out of the ordinary.         Patient Active Problem List   Diagnosis   • Acute ischemic right MCA stroke ([address])   • Depression as late effect of cerebrovascular accident (CVA)   • Hypercholesterolemia   • Cervical post-laminectomy syndrome   • Cervical radiculopathy   • History of ischemic right MCA stroke   • Dependence on nicotine from cigarettes   • Tobacco abuse   • Anxiety   • Chronic pain disorder   • Lumbar degenerative disc disease   • Idiopathic scoliosis of lumbar spine   • Slow transit constipation   • Refused influenza vaccine   • High serum high density lipoprotein (HDL)   • PAF (paroxysmal atrial fibrillation) ([address])   • H/O: hysterectomy   • Drug-induced constipation   • Cerebrovascular accident [facility])   • Anticoagulant long-term use   • Spinal stenosis of lumbar region without neurogenic claudication   • Encounter for loop recorder at end of battery life   • Continuous opioid dependence ([address])   • Depression, recurrent ([address])   • Family history of early CAD   • PONV (postoperative nausea and vomiting)   • Prolapse of vaginal vault after hysterectomy   • Rectocele   • Stress incontinence (female) (male)     Past Medical History:   Diagnosis Date   • Acute pain of right knee     last assessed - [id number]   • Anemia    • Anxiety    • Cervical disc disorder with radiculopathy    • Chronic pain    • Colon polyp    • Constipation    • CVA (cerebral vascular accident) ([address])    • GERD (gastroesophageal reflux disease)    • H/O ischemic right MCA stroke    • H/O: hysterectomy    • Hematuria     last assessed - [id number]   • High cholesterol    • Lumbar radiculopathy    • Lumbar stenosis    • Migraines    • Other chronic pain     last assessed - [id number]   • Other muscle spasm     last assessed - [id number]   • PONV (postoperative nausea and vomiting)     must have premed   • Rectocele    • Sciatic leg pain 06/29/2020   • Spondylosis of cervical spine    • Spondylosis of lumbosacral region    • Stroke [facility])    • DANY (stress urinary incontinence, female)    • Vaginal vault prolapse      Social History     Socioeconomic History   • Marital status: /Civil Union     Spouse name: Not on file   • Number of children: Not on file   • Years of education: Not on file   • Highest education level: Not on file   Occupational History   • Not on file   Tobacco Use   • Smoking status: Every Day     Packs/day: 0.25     Years: 40.00     Total pack years: 10.00     Types: Cigarettes     Start date: 1975   • Smokeless tobacco: Never   • Tobacco comments:     Last cigarette 0400 7/26 three total   Vaping Use   • Vaping Use: Never used   Substance and Sexual Activity   • Alcohol use: Yes     Comment: social drinker special events only   • Drug use: Never   • Sexual activity: Not on file   Other Topics Concern   • Not on file   Social History Narrative    ** Merged History Encounter **          Social Determinants of Health     Financial Resource Strain: Not on file   Food Insecurity: Not on file   Transportation Needs: Not on file   Physical Activity: Not on file   Stress: Not on file   Social Connections: Not on file   Intimate Partner Violence: Not on file   Housing Stability: Not on file      Family History   Problem Relation Age of Onset   • Stroke Mother         46s   • Cancer Mother    • Hypertension Mother    • Pulmonary embolism Other         In mid 35s   • Stroke Sister         46s   • Stroke Brother         46s   • Prostate cancer Father    • Cancer Daughter    • Stroke Family      Past Surgical History:   Procedure Laterality Date   • BLADDER SURGERY  2014   • CARDIAC LOOP RECORDER  08/2020    removal    • CERVICAL SPINE SURGERY     • COLONOSCOPY     • COLPOPEXY SACRAL LAPAROSCOPIC N/A 7/26/2023    Procedure: ROBOTIC COLPOPEXY SACRAL WITH BILATERAL SALPINGECTOMY AND RIGHT OOPHRECTOMY;  Surgeon: Shan Dominguez MD;  Location: [facility];  Service: UroGynecology          • DENTAL SURGERY     • FL VCUG VOIDING URETHROCYSTOGRAM  12/22/2014   • LIPECTOMY      of thigh   • LIPOMA RESECTION     • NECK SURGERY     • OK ARTHRS KNE SURG W/MENISCECTOMY MED/LAT W/SHVG Left 03/21/2016    Procedure: ARTHROSCOPY W/ PARTIAL MEDIAL MENISCECTOMY;  Surgeon: Jt Gee MD;  Location: [facility];  Service: Orthopedics   • OK COLONOSCOPY FLX DX W/COLLJ SPEC WHEN PFRMD N/A 02/10/2017    Procedure: COLONOSCOPY;  Surgeon: Danna Acevedo MD;  Location: [facility];   Service: Gastroenterology   • OK CYSTOURETHROSCOPY N/A 7/26/2023    Procedure: Noberto Course;  Surgeon: Shan Dominguez MD;  Location: [facility];  Service: UroGynecology          • OK SLING OPERATION STRESS INCONTINENCE N/A 7/26/2023    Procedure: PV SLING;  Surgeon: Shan Dominguez MD;  Location: [facility];  Service: UroGynecology          • TOTAL ABDOMINAL HYSTERECTOMY     • TUBAL LIGATION         Current Outpatient Medications:   •  acetaminophen (TYLENOL) 325 mg tablet, Take 3 tablets (975 mg total) by mouth every 6 (six) hours as needed for mild pain, Disp: , Rfl: 0  •  aspirin 81 mg chewable tablet, Chew 1 tablet (81 mg total) daily, Disp: 30 tablet, Rfl: 0  •  atorvastatin (LIPITOR) 40 mg tablet, Take 1.5 tablets (60 mg total) by mouth daily, Disp: 135 tablet, Rfl: 1  •  cholecalciferol (VITAMIN D3) 1,000 units tablet, Take 2,000 Units by mouth daily, Disp: , Rfl:   •  cycloSPORINE (RESTASIS) 0.05 % ophthalmic emulsion, Administer 1 drop to both eyes every 12 (twelve) hours, Disp: , Rfl:   •  diazepam (VALIUM) 5 mg tablet, Take 1 tablet (5 mg total) by mouth daily at bedtime as needed for sleep, Disp: 90 tablet, Rfl: 0  •  diclofenac sodium (VOLTAREN) 50 mg EC tablet, Take 1 tablet (50 mg total) by mouth daily as needed (pain), Disp: 90 tablet, Rfl: 1  •  Eliquis 5 MG, Take 1 tablet (5 mg total) by mouth 2 (two) times a day, Disp: 60 tablet, Rfl: 11  •  ibuprofen (MOTRIN) 600 mg tablet, Take 1 tablet (600 mg total) by mouth every 6 (six) hours, Disp: 30 tablet, Rfl: 0  •  loteprednol etabonate (LOTEMAX) 0.5 % ophthalmic suspension, Administer 1 drop to both eyes 2 (two) times a day, Disp: , Rfl:   •  magnesium 30 MG tablet, Take 30 mg by mouth 2 (two) times a day, Disp: , Rfl:   •  Omega-3 1000 MG CAPS, Take 1 capsule by mouth in the morning, Disp: , Rfl:   •  omeprazole (PriLOSEC) 40 MG capsule, Take 1 capsule (40 mg total) by mouth 2 (two) times a day, Disp: 180 capsule, Rfl: 1  •  oxyCODONE (ROXICODONE) 5 immediate release tablet, Take 1 tablet (5 mg total) by mouth daily as needed for moderate pain Max Daily Amount: 5 mg, Disp: 30 tablet, Rfl: 0  •  pancrelipase, Lip-Prot-Amyl, (CREON) 24,000 units, Take 48,000 units of lipase by mouth 3 (three) times a day with meals 2 capsules 3 times daily with meals, Disp: 540 capsule, Rfl: 2  •  rizatriptan (MAXALT-MLT) 5 mg disintegrating tablet, Take 1 tablet (5 mg total) by mouth once as needed for migraine, Disp: 54 tablet, Rfl: 1  •  chlorhexidine (PERIDEX) 0.12 % solution, Apply 15 mL to the mouth or throat 2 (two) times a day, Disp: , Rfl: Allergies   Allergen Reactions   • Blue Dyes (Parenteral) - Food Allergy Anaphylaxis   • Gabapentin Anaphylaxis   • Propofol Wheezing     Also congestion and "throat swelling" per pt   • Lidocaine Hives and Vomiting   • Penicillins Rash and Vomiting   • Tramadol GI Intolerance and Vomiting          • Nitrofurantoin Rash and Vomiting       Labs:  Admission on 07/26/2023, Discharged on 07/26/2023   Component Date Value   • ABO Grouping 07/26/2023 A    • Rh Factor 07/26/2023 Positive    • Antibody Screen 07/26/2023 Negative    • Specimen Expiration Date 07/26/2023 [id number]    • Case Report 07/26/2023                      Value:Surgical Pathology Report                         Case: [id number]                                   Authorizing Provider:  Lucille Beach,  Collected:           07/26/2023 0850                                     MD                                                                           Ordering Location:     [facility]        Received:            07/26/2023 [address] [place] [address] Operating Room                                                     Pathologist:           Carmen Pepe MD                                                     Specimen:    Ovary, Right, portions of bilateral fallopian tubes with right ovary                      • Final Diagnosis 07/26/2023                      Value: This result contains rich text formatting which cannot be displayed here. • Note 07/26/2023                      Value: This result contains rich text formatting which cannot be displayed here. • Additional Information 07/26/2023                      Value: This result contains rich text formatting which cannot be displayed here. • Gross Description 07/26/2023                      Value: This result contains rich text formatting which cannot be displayed here.    Appointment on 07/15/2023   Component Date Value   • WBC 07/15/2023 9.71    • RBC 07/15/2023 4.04    • Hemoglobin 07/15/2023 13.2    • Hematocrit 07/15/2023 39.0    • MCV 07/15/2023 97    • MCH 07/15/2023 32.7    • MCHC 07/15/2023 33.8    • RDW 07/15/2023 13.2    • MPV 07/15/2023 9.8    • Platelets 41/09/1221 300    • nRBC 07/15/2023 0    • Neutrophils Relative 07/15/2023 46    • Immat GRANS % 07/15/2023 0    • Lymphocytes Relative 07/15/2023 45 (H)    • Monocytes Relative 07/15/2023 7    • Eosinophils Relative 07/15/2023 1    • Basophils Relative 07/15/2023 1    • Neutrophils Absolute 07/15/2023 4.47    • Immature Grans Absolute 07/15/2023 0.02    • Lymphocytes Absolute 07/15/2023 4.39    • Monocytes Absolute 07/15/2023 0.66    • Eosinophils Absolute 07/15/2023 0.12    • Basophils Absolute 07/15/2023 0.05    • Sodium 07/15/2023 139    • Potassium 07/15/2023 4.0    • Chloride 07/15/2023 110 (H)    • CO2 07/15/2023 27    • ANION GAP 07/15/2023 2    • BUN 07/15/2023 9    • Creatinine 07/15/2023 1.02    • Glucose, Fasting 07/15/2023 92    • Calcium 07/15/2023 9.5    • eGFR 07/15/2023 [address] Outpatient Visit on 06/12/2023   Component Date Value   • Case Report 06/12/2023                      Value:Surgical Pathology Report                         Case: [id number]                                   Authorizing Provider:  Byron Mayo MD          Collected:           06/12/2023 1002              Ordering Location:     [facility]        Received:            06/12/2023 2057                                     [facility]                                                           Pathologist:           Yael Sevilla MD                                                                    Specimens:   A) - Duodenum, bx duodenum for duodenitis                                                           B) - Stomach, bx somach antrum r/o h pylori                                                         C) - Colon, rectal polyp cold snare                                                       • Final Diagnosis 06/12/2023                      Value: This result contains rich text formatting which cannot be displayed here. • Additional Information 06/12/2023                      Value: This result contains rich text formatting which cannot be displayed here. • Synoptic Checklist 06/12/2023                      Value:                            COLON/RECTUM POLYP FORM - GI - All Specimens                                                                                     :    Adenoma(s)     • Gross Description 06/12/2023                      Value: This result contains rich text formatting which cannot be displayed here.    • Clinical Information 06/12/2023                      Value:Per EGD,  INDICATION:  Gastroesophageal reflux disease, unspecified whether esophagitis present     FINDINGS:  · Regular Z-line 34 cm from the incisors  · Small hiatal hernia  · Multiple superficial, round ulcers in the body of the stomach, antrum and pylorus with clean base (Cody III); performed cold forceps biopsy  · Mild, patchy erythematous mucosa with erosion in the duodenal bulb and 2nd part of the duodenum; performed cold forceps biopsy     Appointment on 03/28/2023   Component Date Value   • Cholesterol 03/28/2023 158    • Triglycerides 03/28/2023 90    • HDL, Direct 03/28/2023 61    • LDL Calculated 03/28/2023 79    • Vit D, 25-Hydroxy 03/28/2023 33.2    • Sodium 03/28/2023 133 (L)    • Potassium 03/28/2023 4.0    • Chloride 03/28/2023 105    • CO2 03/28/2023 26    • ANION GAP 03/28/2023 2 (L)    • BUN 03/28/2023 12    • Creatinine 03/28/2023 0.85    • Glucose, Fasting 03/28/2023 89    • Calcium 03/28/2023 10.1    • AST 03/28/2023 24    • ALT 03/28/2023 36    • Alkaline Phosphatase 03/28/2023 70    • Total Protein 03/28/2023 6.9    • Albumin 03/28/2023 4.0    • Total Bilirubin 03/28/2023 0.59    • eGFR 03/28/2023 75    • WBC 03/28/2023 9.26    • RBC 03/28/2023 4.11    • Hemoglobin 03/28/2023 13.3    • Hematocrit 03/28/2023 39.2    • MCV 03/28/2023 95    • MCH 03/28/2023 32.4    • MCHC 03/28/2023 33.9    • RDW 03/28/2023 13.4    • MPV 03/28/2023 9.4    • Platelets 23/08/8523 301    • nRBC 03/28/2023 0    • Neutrophils Relative 03/28/2023 42 (L)    • Immat GRANS % 03/28/2023 0    • Lymphocytes Relative 03/28/2023 48 (H)    • Monocytes Relative 03/28/2023 6    • Eosinophils Relative 03/28/2023 4    • Basophils Relative 03/28/2023 0    • Neutrophils Absolute 03/28/2023 3.91    • Immature Grans Absolute 03/28/2023 0.02    • Lymphocytes Absolute 03/28/2023 4.40    • Monocytes Absolute 03/28/2023 0.54    • Eosinophils Absolute 03/28/2023 0.35    • Basophils Absolute 03/28/2023 0.04    • Pancreatic Elastase-1 03/29/2023 114 (L)      Lab Results   Component Value Date    TRIG 90 03/28/2023    HDL 61 03/28/2023     Imaging: MRI abdomen w wo contrast and mrcp    Result Date: 8/10/2023  Narrative: MRI OF THE ABDOMEN WITH AND WITHOUT CONTRAST WITH MRCP INDICATION: 61 years / Female  K83.8: Other specified diseases of biliary tract K86.89: Other specified diseases of pancreas. Common bile duct dilatation, pancreatic insufficiency. COMPARISON: Abdomen ultrasound from 5/20/2023. Chest, abdomen, pelvic CT from 4/26/2018. TECHNIQUE:  Multiplanar/multisequence MRI of the abdomen with 3D MRCP was performed before and after administration of contrast. IV Contrast:  5 mL of Gadobutrol injection (SINGLE-DOSE) FINDINGS: LOWER CHEST:   Unremarkable. LIVER: Normal in size and configuration. Numerous tiny simple cysts in the liver. No suspicious mass. The hepatic veins and portal veins are patent. BILE DUCTS: Mildly prominent proximal common hepatic duct at 7 mm, tapering smoothly to 3 mm, without obstructing mass or stone. No choledocholithiasis, biliary stricture or suspicious mass. GALLBLADDER:  Normal. PANCREAS:  Unremarkable. ADRENAL GLANDS:  Unremarkable. SPLEEN:  Normal. KIDNEYS/PROXIMAL URETERS:  No hydroureteronephrosis. No suspicious renal mass. Several tiny simple renal cysts bilaterally. Tiny hemorrhagic cyst in the right kidney midpole (series 12 image 45.) BOWEL:   No dilated loops of bowel. PERITONEUM/RETROPERITONEUM:  No ascites. LYMPH NODES:  No abdominal lymphadenopathy. VASCULAR STRUCTURES:  No aneurysm. ABDOMINAL WALL:  Unremarkable. OSSEOUS STRUCTURES:  No suspicious osseous lesion. Impression: Normal pancreatic parenchyma. No pancreatic ductal dilatation. Mildly prominent proximal common hepatic duct at 7 mm, tapering smoothly to 3 mm, without obstructing mass or stone. This is unlikely of any clinical significance. Workstation performed: [id number]       Review of Systems:  Review of Systems   Constitutional: Negative for activity change, appetite change, chills, diaphoresis, fatigue and unexpected weight change. HENT: Negative for hearing loss, nosebleeds and sore throat. Eyes: Negative for photophobia and visual disturbance. Respiratory: Negative for cough, chest tightness, shortness of breath and wheezing. Cardiovascular: Negative for chest pain, palpitations and leg swelling. Gastrointestinal: Negative for abdominal pain, diarrhea, nausea and vomiting. Endocrine: Negative for polyuria. Genitourinary: Negative for dysuria, frequency and hematuria. Musculoskeletal: Negative for arthralgias, back pain, gait problem and neck pain. Skin: Negative for pallor and rash. Neurological: Negative for dizziness, syncope and headaches. Hematological: Does not bruise/bleed easily. Psychiatric/Behavioral: Negative for behavioral problems and confusion. Physical Exam:  Physical Exam  Vitals reviewed. Constitutional:       Appearance: She is well-developed. She is not diaphoretic. HENT:      Head: Normocephalic and atraumatic. Nose: Nose normal.   Eyes:      General: No scleral icterus. Pupils: Pupils are equal, round, and reactive to light. Neck:      Vascular: No JVD.    Cardiovascular:      Rate and Rhythm: Normal rate and regular rhythm. Heart sounds: Normal heart sounds. No murmur heard. No friction rub. No gallop. Pulmonary:      Effort: Pulmonary effort is normal. No respiratory distress. Breath sounds: Normal breath sounds. No wheezing or rales. Abdominal:      General: Bowel sounds are normal. There is no distension. Palpations: Abdomen is soft. Tenderness: There is no abdominal tenderness. Musculoskeletal:         General: No deformity. Normal range of motion. Cervical back: Normal range of motion and neck supple. Skin:     General: Skin is warm and dry. Findings: No rash. Neurological:      Mental Status: She is alert and oriented to person, place, and time. Cranial Nerves: No cranial nerve deficit. Psychiatric:         Behavior: Behavior normal.       Blood pressure 118/66, pulse 80, height 5' 1" (1.549 m), weight 55.3 kg (122 lb), SpO2 96 %. Discussion/Summary:  PAF: With prior ischemic right MCA stroke. Continued on Eliquis for anticoagulation and currently on no rate controlling medications. No symptoms to suggest recurrence of rapid A-fib. Family history of premature coronary disease: She does have a CT coronary calcium score ordered by her primary care doctor. I did encourage her to complete the study in order to further restratify her.

## 2023-09-18 DIAGNOSIS — M48.061 SPINAL STENOSIS OF LUMBAR REGION WITHOUT NEUROGENIC CLAUDICATION: ICD-10-CM

## 2023-09-18 DIAGNOSIS — T14.8XXA NERVE DAMAGE: ICD-10-CM

## 2023-09-18 DIAGNOSIS — G47.00 INSOMNIA, UNSPECIFIED TYPE: ICD-10-CM

## 2023-09-19 DIAGNOSIS — K25.3 ACUTE GASTRIC ULCER WITHOUT HEMORRHAGE OR PERFORATION: ICD-10-CM

## 2023-09-19 DIAGNOSIS — K86.89 PANCREATIC INSUFFICIENCY: ICD-10-CM

## 2023-09-19 RX ORDER — OMEPRAZOLE 40 MG/1
40 CAPSULE, DELAYED RELEASE ORAL 2 TIMES DAILY
Qty: 180 CAPSULE | Refills: 1 | Status: SHIPPED | OUTPATIENT
Start: 2023-09-19

## 2023-09-19 RX ORDER — OXYCODONE HYDROCHLORIDE 5 MG/1
5 TABLET ORAL DAILY PRN
Qty: 30 TABLET | Refills: 0 | Status: SHIPPED | OUTPATIENT
Start: 2023-09-19

## 2023-09-19 RX ORDER — OXYCODONE HYDROCHLORIDE 5 MG/1
5 TABLET ORAL DAILY PRN
Qty: 30 TABLET | Refills: 0 | Status: SHIPPED | OUTPATIENT
Start: 2023-09-19 | End: 2023-09-19 | Stop reason: SDUPTHER

## 2023-09-19 RX ORDER — OMEPRAZOLE 40 MG/1
40 CAPSULE, DELAYED RELEASE ORAL 2 TIMES DAILY
Qty: 180 CAPSULE | Refills: 1 | Status: SHIPPED | OUTPATIENT
Start: 2023-09-19 | End: 2023-09-19 | Stop reason: SDUPTHER

## 2023-09-19 RX ORDER — DIAZEPAM 5 MG/1
5 TABLET ORAL
Qty: 90 TABLET | Refills: 0 | Status: SHIPPED | OUTPATIENT
Start: 2023-09-19

## 2023-09-19 NOTE — TELEPHONE ENCOUNTER
[facility] called to recommend that the oxycodone be sent to retail pharmacy since they can only fill 7 tabs due to how long it has been since last fill. Please resend to retail.

## 2023-10-13 ENCOUNTER — TELEPHONE (OUTPATIENT)
Dept: INTERNAL MEDICINE CLINIC | Age: 58
End: 2023-10-13

## 2023-10-13 NOTE — LETTER
Date: 10/13/2023    Jeaneth Mason  [address] [place] [phone]    Dear Terrance Adames,      We have attempted to reach you regarding your upcoming appointment on 11/21/2023 with Aylin Erickson DO. Unfortunately, due to a change in the provider's schedule we need to change your appointment. Please call our office as soon as possible so we can reschedule your appointment. We apologize for any inconvenience this may cause. Thank you in advance for your cooperation and assistance.           Sincerely,        [facility]

## 2023-10-30 ENCOUNTER — HOSPITAL ENCOUNTER (OUTPATIENT)
Dept: RADIOLOGY | Facility: MEDICAL CENTER | Age: 58
Discharge: HOME | End: 2023-10-30
Payer: COMMERCIAL

## 2023-10-30 DIAGNOSIS — E55.9 VITAMIN D DEFICIENCY: ICD-10-CM

## 2023-10-30 DIAGNOSIS — M51.36 LUMBAR DEGENERATIVE DISC DISEASE: ICD-10-CM

## 2023-10-30 DIAGNOSIS — M48.061 SPINAL STENOSIS OF LUMBAR REGION WITHOUT NEUROGENIC CLAUDICATION: ICD-10-CM

## 2023-10-30 DIAGNOSIS — F11.20 CONTINUOUS OPIOID DEPENDENCE (HCC): ICD-10-CM

## 2023-10-30 DIAGNOSIS — Z13.820 SCREENING FOR OSTEOPOROSIS: ICD-10-CM

## 2023-10-30 DIAGNOSIS — Z72.0 TOBACCO ABUSE: ICD-10-CM

## 2023-10-30 DIAGNOSIS — Z78.0 POST-MENOPAUSAL: ICD-10-CM

## 2023-10-30 DIAGNOSIS — M41.26 OTHER IDIOPATHIC SCOLIOSIS, LUMBAR REGION: ICD-10-CM

## 2023-10-30 DIAGNOSIS — F17.210 CIGARETTE NICOTINE DEPENDENCE WITHOUT COMPLICATION: ICD-10-CM

## 2023-10-30 PROCEDURE — 71271 CT THORAX LUNG CANCER SCR C-: CPT

## 2023-10-30 PROCEDURE — 77080 DXA BONE DENSITY AXIAL: CPT

## 2023-10-31 DIAGNOSIS — F41.9 ANXIETY: Primary | ICD-10-CM

## 2023-11-09 DIAGNOSIS — T14.8XXA NERVE DAMAGE: ICD-10-CM

## 2023-11-09 DIAGNOSIS — R79.89 HIGH SERUM HIGH DENSITY LIPOPROTEIN (HDL): ICD-10-CM

## 2023-11-09 DIAGNOSIS — E78.00 HYPERCHOLESTEROLEMIA: ICD-10-CM

## 2023-11-10 RX ORDER — OXYCODONE HYDROCHLORIDE 5 MG/1
5 TABLET ORAL DAILY PRN
Qty: 30 TABLET | Refills: 0 | Status: SHIPPED | OUTPATIENT
Start: 2023-11-10

## 2023-11-10 RX ORDER — ATORVASTATIN CALCIUM 40 MG/1
60 TABLET, FILM COATED ORAL DAILY
Qty: 135 TABLET | Refills: 0 | Status: SHIPPED | OUTPATIENT
Start: 2023-11-10

## 2023-12-03 DIAGNOSIS — G89.29 CHRONIC INTRACTABLE HEADACHE, UNSPECIFIED HEADACHE TYPE: ICD-10-CM

## 2023-12-03 DIAGNOSIS — M48.061 SPINAL STENOSIS OF LUMBAR REGION WITHOUT NEUROGENIC CLAUDICATION: ICD-10-CM

## 2023-12-03 DIAGNOSIS — R51.9 CHRONIC INTRACTABLE HEADACHE, UNSPECIFIED HEADACHE TYPE: ICD-10-CM

## 2023-12-04 RX ORDER — RIZATRIPTAN BENZOATE 5 MG/1
5 TABLET, ORALLY DISINTEGRATING ORAL ONCE AS NEEDED
Qty: 54 TABLET | Refills: 0 | Status: SHIPPED | OUTPATIENT
Start: 2023-12-04

## 2023-12-05 DIAGNOSIS — G47.00 INSOMNIA, UNSPECIFIED TYPE: ICD-10-CM

## 2023-12-05 DIAGNOSIS — T14.8XXA NERVE DAMAGE: ICD-10-CM

## 2023-12-07 RX ORDER — DIAZEPAM 5 MG/1
5 TABLET ORAL
Qty: 90 TABLET | Refills: 0 | Status: SHIPPED | OUTPATIENT
Start: 2023-12-07

## 2023-12-15 ENCOUNTER — APPOINTMENT (OUTPATIENT)
Dept: LAB | Age: 58
End: 2023-12-15
Payer: COMMERCIAL

## 2023-12-15 DIAGNOSIS — F41.9 ANXIETY: ICD-10-CM

## 2023-12-15 DIAGNOSIS — E78.00 HYPERCHOLESTEROLEMIA: ICD-10-CM

## 2023-12-15 LAB
ALBUMIN SERPL BCP-MCNC: 4.5 G/DL (ref 3.5–5)
ALP SERPL-CCNC: 63 U/L (ref 34–104)
ALT SERPL W P-5'-P-CCNC: 33 U/L (ref 7–52)
ANION GAP SERPL CALCULATED.3IONS-SCNC: 9 MMOL/L
AST SERPL W P-5'-P-CCNC: 33 U/L (ref 13–39)
BASOPHILS # BLD MANUAL: 0 THOUSAND/UL (ref 0–0.1)
BASOPHILS NFR MAR MANUAL: 0 % (ref 0–1)
BILIRUB SERPL-MCNC: 0.6 MG/DL (ref 0.2–1)
BUN SERPL-MCNC: 15 MG/DL (ref 5–25)
CALCIUM SERPL-MCNC: 9.7 MG/DL (ref 8.4–10.2)
CHLORIDE SERPL-SCNC: 103 MMOL/L (ref 96–108)
CHOLEST SERPL-MCNC: 156 MG/DL
CO2 SERPL-SCNC: 28 MMOL/L (ref 21–32)
CREAT SERPL-MCNC: 0.88 MG/DL (ref 0.6–1.3)
EOSINOPHIL # BLD MANUAL: 0.22 THOUSAND/UL (ref 0–0.4)
EOSINOPHIL NFR BLD MANUAL: 2 % (ref 0–6)
ERYTHROCYTE [DISTWIDTH] IN BLOOD BY AUTOMATED COUNT: 14 % (ref 11.6–15.1)
GFR SERPL CREATININE-BSD FRML MDRD: 72 ML/MIN/1.73SQ M
GLUCOSE P FAST SERPL-MCNC: 82 MG/DL (ref 65–99)
HCT VFR BLD AUTO: 39.4 % (ref 34.8–46.1)
HDLC SERPL-MCNC: 65 MG/DL
HGB BLD-MCNC: 13.2 G/DL (ref 11.5–15.4)
LDLC SERPL CALC-MCNC: 78 MG/DL (ref 0–100)
LYMPHOCYTES # BLD AUTO: 48 % (ref 14–44)
LYMPHOCYTES # BLD AUTO: 5.46 THOUSAND/UL (ref 0.6–4.47)
MCH RBC QN AUTO: 33.3 PG (ref 26.8–34.3)
MCHC RBC AUTO-ENTMCNC: 33.5 G/DL (ref 31.4–37.4)
MCV RBC AUTO: 100 FL (ref 82–98)
MONOCYTES # BLD AUTO: 0.45 THOUSAND/UL (ref 0–1.22)
MONOCYTES NFR BLD: 4 % (ref 4–12)
NEUTROPHILS # BLD MANUAL: 5.01 THOUSAND/UL (ref 1.85–7.62)
NEUTS BAND NFR BLD MANUAL: 26 % (ref 0–8)
NEUTS SEG NFR BLD AUTO: 19 % (ref 43–75)
PLATELET # BLD AUTO: 319 THOUSANDS/UL (ref 149–390)
PLATELET BLD QL SMEAR: ADEQUATE
PMV BLD AUTO: 9.9 FL (ref 8.9–12.7)
POTASSIUM SERPL-SCNC: 4.3 MMOL/L (ref 3.5–5.3)
PROT SERPL-MCNC: 6.3 G/DL (ref 6.4–8.4)
RBC # BLD AUTO: 3.96 MILLION/UL (ref 3.81–5.12)
RBC MORPH BLD: NORMAL
SODIUM SERPL-SCNC: 140 MMOL/L (ref 135–147)
TRIGL SERPL-MCNC: 65 MG/DL
TSH SERPL DL<=0.05 MIU/L-ACNC: 1.84 UIU/ML (ref 0.45–4.5)
VARIANT LYMPHS # BLD AUTO: 1 %
WBC # BLD AUTO: 11.14 THOUSAND/UL (ref 4.31–10.16)

## 2023-12-15 PROCEDURE — 80053 COMPREHEN METABOLIC PANEL: CPT

## 2023-12-15 PROCEDURE — 85027 COMPLETE CBC AUTOMATED: CPT

## 2023-12-15 PROCEDURE — 80061 LIPID PANEL: CPT

## 2023-12-15 PROCEDURE — 36415 COLL VENOUS BLD VENIPUNCTURE: CPT

## 2023-12-15 PROCEDURE — 84443 ASSAY THYROID STIM HORMONE: CPT

## 2023-12-15 PROCEDURE — 85007 BL SMEAR W/DIFF WBC COUNT: CPT

## 2023-12-18 DIAGNOSIS — R79.89 ABNORMAL CBC: Primary | ICD-10-CM

## 2023-12-20 ENCOUNTER — APPOINTMENT (OUTPATIENT)
Dept: LAB | Age: 58
End: 2023-12-20
Payer: COMMERCIAL

## 2023-12-20 DIAGNOSIS — R79.89 ABNORMAL CBC: ICD-10-CM

## 2023-12-20 LAB
BASOPHILS # BLD AUTO: 0.04 THOUSANDS/ÂΜL (ref 0–0.1)
BASOPHILS NFR BLD AUTO: 0 % (ref 0–1)
EOSINOPHIL # BLD AUTO: 0.14 THOUSAND/ÂΜL (ref 0–0.61)
EOSINOPHIL NFR BLD AUTO: 1 % (ref 0–6)
ERYTHROCYTE [DISTWIDTH] IN BLOOD BY AUTOMATED COUNT: 13.9 % (ref 11.6–15.1)
HCT VFR BLD AUTO: 39.4 % (ref 34.8–46.1)
HGB BLD-MCNC: 13 G/DL (ref 11.5–15.4)
IMM GRANULOCYTES # BLD AUTO: 0.02 THOUSAND/UL (ref 0–0.2)
IMM GRANULOCYTES NFR BLD AUTO: 0 % (ref 0–2)
LYMPHOCYTES # BLD AUTO: 5.19 THOUSANDS/ÂΜL (ref 0.6–4.47)
LYMPHOCYTES NFR BLD AUTO: 50 % (ref 14–44)
MCH RBC QN AUTO: 32.7 PG (ref 26.8–34.3)
MCHC RBC AUTO-ENTMCNC: 33 G/DL (ref 31.4–37.4)
MCV RBC AUTO: 99 FL (ref 82–98)
MONOCYTES # BLD AUTO: 0.78 THOUSAND/ÂΜL (ref 0.17–1.22)
MONOCYTES NFR BLD AUTO: 8 % (ref 4–12)
NEUTROPHILS # BLD AUTO: 4.22 THOUSANDS/ÂΜL (ref 1.85–7.62)
NEUTS SEG NFR BLD AUTO: 41 % (ref 43–75)
NRBC BLD AUTO-RTO: 0 /100 WBCS
PLATELET # BLD AUTO: 343 THOUSANDS/UL (ref 149–390)
PMV BLD AUTO: 9.2 FL (ref 8.9–12.7)
RBC # BLD AUTO: 3.97 MILLION/UL (ref 3.81–5.12)
WBC # BLD AUTO: 10.39 THOUSAND/UL (ref 4.31–10.16)

## 2023-12-20 PROCEDURE — 85025 COMPLETE CBC W/AUTO DIFF WBC: CPT

## 2023-12-20 PROCEDURE — 36415 COLL VENOUS BLD VENIPUNCTURE: CPT

## 2023-12-26 ENCOUNTER — TELEPHONE (OUTPATIENT)
Dept: INTERNAL MEDICINE CLINIC | Age: 58
End: 2023-12-26

## 2024-01-11 DIAGNOSIS — E78.00 HYPERCHOLESTEROLEMIA: ICD-10-CM

## 2024-01-11 DIAGNOSIS — R79.89 HIGH SERUM HIGH DENSITY LIPOPROTEIN (HDL): ICD-10-CM

## 2024-01-12 RX ORDER — ATORVASTATIN CALCIUM 40 MG/1
60 TABLET, FILM COATED ORAL DAILY
Qty: 135 TABLET | Refills: 0 | Status: SHIPPED | OUTPATIENT
Start: 2024-01-12

## 2024-01-30 DIAGNOSIS — Z12.31 BREAST CANCER SCREENING BY MAMMOGRAM: ICD-10-CM

## 2024-02-16 ENCOUNTER — HOSPITAL ENCOUNTER (EMERGENCY)
Facility: HOSPITAL | Age: 59
Discharge: HOME | End: 2024-02-16
Attending: EMERGENCY MEDICINE
Payer: COMMERCIAL

## 2024-02-16 ENCOUNTER — APPOINTMENT (EMERGENCY)
Dept: RADIOLOGY | Facility: HOSPITAL | Age: 59
End: 2024-02-16
Payer: COMMERCIAL

## 2024-02-16 VITALS
DIASTOLIC BLOOD PRESSURE: 78 MMHG | TEMPERATURE: 98.1 F | HEART RATE: 67 BPM | RESPIRATION RATE: 18 BRPM | SYSTOLIC BLOOD PRESSURE: 132 MMHG | OXYGEN SATURATION: 96 %

## 2024-02-16 DIAGNOSIS — M94.0 COSTOCHONDRITIS, ACUTE: Primary | ICD-10-CM

## 2024-02-16 PROCEDURE — 93005 ELECTROCARDIOGRAM TRACING: CPT

## 2024-02-16 PROCEDURE — 71045 X-RAY EXAM CHEST 1 VIEW: CPT

## 2024-02-16 PROCEDURE — 99285 EMERGENCY DEPT VISIT HI MDM: CPT

## 2024-02-16 PROCEDURE — 99284 EMERGENCY DEPT VISIT MOD MDM: CPT | Performed by: EMERGENCY MEDICINE

## 2024-02-16 RX ADMIN — DICLOFENAC SODIUM TOPICAL GEL, 1% 2 G: 10 GEL TOPICAL at 16:02

## 2024-02-16 NOTE — ED PROVIDER NOTES
History  Chief Complaint   Patient presents with    Chest Pain     Pt reports chest pain since Tuesday, located on center left of chest. Pt reports some SOB, abd pain she relates to constipation.        History provided by:  Patient  Chest Pain  Chest pain location: left sternal boarder over rib 4 and 5.  Pain quality: aching    Pain radiates to:  Does not radiate  Pain radiates to the back: no    Pain severity:  Moderate  Onset quality:  Sudden  Duration:  3 days  Timing:  Constant  Progression:  Unchanged  Chronicity:  New  Context comment:  3 days ago patient then felt a sharp pop on her rib, since then has been tender over her fourth and fifth ribs on her left side right at the sternal border much worse with palpation of that side also worse with deep inhalation  Relieved by:  Certain positions  Worsened by:  Certain positions, deep breathing and coughing (Palpation of the area)  Ineffective treatments:  None tried  Associated symptoms: anxiety    Associated symptoms: no abdominal pain, no cough, no diaphoresis, no dizziness, no fever, no headache, no nausea, no numbness, no palpitations, no shortness of breath and not vomiting        Prior to Admission Medications   Prescriptions Last Dose Informant Patient Reported? Taking?   Eliquis 5 MG   No No   Sig: Take 1 tablet (5 mg total) by mouth 2 (two) times a day   Omega-3 1000 MG CAPS  Self Yes No   Sig: Take 1 capsule by mouth in the morning   acetaminophen (TYLENOL) 325 mg tablet  Self No No   Sig: Take 3 tablets (975 mg total) by mouth every 6 (six) hours as needed for mild pain   aspirin 81 mg chewable tablet  Self No No   Sig: Chew 1 tablet (81 mg total) daily   atorvastatin (LIPITOR) 40 mg tablet   No No   Sig: Take 1.5 tablets (60 mg total) by mouth daily   chlorhexidine (PERIDEX) 0.12 % solution  Self Yes No   Sig: Apply 15 mL to the mouth or throat 2 (two) times a day   cholecalciferol (VITAMIN D3) 1,000 units tablet  Self Yes No   Sig: Take 2,000 Units  by mouth daily   cycloSPORINE (RESTASIS) 0.05 % ophthalmic emulsion  Self Yes No   Sig: Administer 1 drop to both eyes every 12 (twelve) hours   diazepam (VALIUM) 5 mg tablet   No No   Sig: Take 1 tablet (5 mg total) by mouth daily at bedtime as needed for sleep   diclofenac sodium (VOLTAREN) 50 mg EC tablet   No No   Sig: Take 1 tablet (50 mg total) by mouth daily as needed (pain)   ibuprofen (MOTRIN) 600 mg tablet  Self No No   Sig: Take 1 tablet (600 mg total) by mouth every 6 (six) hours   loteprednol etabonate (LOTEMAX) 0.5 % ophthalmic suspension  Self Yes No   Sig: Administer 1 drop to both eyes 2 (two) times a day   magnesium 30 MG tablet  Self Yes No   Sig: Take 30 mg by mouth 2 (two) times a day   omeprazole (PriLOSEC) 40 MG capsule   No No   Sig: Take 1 capsule (40 mg total) by mouth 2 (two) times a day   oxyCODONE (ROXICODONE) 5 immediate release tablet   No No   Sig: Take 1 tablet (5 mg total) by mouth daily as needed for moderate pain Max Daily Amount: 5 mg   pancrelipase, Lip-Prot-Amyl, (CREON) 24,000 units   No No   Sig: Take 48,000 units of lipase by mouth 3 (three) times a day with meals 2 capsules 3 times daily with meals   rizatriptan (MAXALT-MLT) 5 mg disintegrating tablet   No No   Sig: Take 1 tablet (5 mg total) by mouth once as needed for migraine      Facility-Administered Medications: None       Past Medical History:   Diagnosis Date    Acute pain of right knee     last assessed - [id number]    Anemia     Anxiety     Cervical disc disorder with radiculopathy     Chronic pain     Colon polyp     Constipation     CVA (cerebral vascular accident) ([facility])     GERD (gastroesophageal reflux disease)     H/O ischemic right MCA stroke     H/O: hysterectomy     Hematuria     last assessed - [id number]    High cholesterol     Lumbar radiculopathy     Lumbar stenosis     Migraines     Other chronic pain     last assessed - [id number]    Other muscle spasm     last assessed - [id number]    PONV  (postoperative nausea and vomiting)     must have premed    Rectocele     Sciatic leg pain 06/29/2020    Spondylosis of cervical spine     Spondylosis of lumbosacral region     Stroke (HCC)     DANY (stress urinary incontinence, female)     Vaginal vault prolapse        Past Surgical History:   Procedure Laterality Date    BLADDER SURGERY  2014    CARDIAC LOOP RECORDER  08/2020    removal     CERVICAL SPINE SURGERY      COLONOSCOPY      COLPOPEXY SACRAL LAPAROSCOPIC N/A 7/26/2023    Procedure: ROBOTIC COLPOPEXY SACRAL WITH BILATERAL SALPINGECTOMY AND RIGHT OOPHRECTOMY;  Surgeon: Glenn Fleming MD;  Location: [facility];  Service: UroGynecology           DENTAL SURGERY      FL VCUG VOIDING URETHROCYSTOGRAM  12/22/2014    LIPECTOMY      of thigh    LIPOMA RESECTION      NECK SURGERY      LA ARTHRS KNE SURG W/MENISCECTOMY MED/LAT W/SHVG Left 03/21/2016    Procedure: ARTHROSCOPY W/ PARTIAL MEDIAL MENISCECTOMY;  Surgeon: Giovanny Jorgensen MD;  Location:  MAIN OR;  Service: Orthopedics    LA COLONOSCOPY FLX DX W/COLLJ SPEC WHEN PFRMD N/A 02/10/2017    Procedure: COLONOSCOPY;  Surgeon: Alfonso Devries MD;  Location: [facility];  Service: Gastroenterology    LA CYSTOURETHROSCOPY N/A 7/26/2023    Procedure: CYSTO;  Surgeon: Glenn Fleming MD;  Location: [facility];  Service: UroGynecology           LA SLING OPERATION STRESS INCONTINENCE N/A 7/26/2023    Procedure: PV SLING;  Surgeon: Glenn Fleming MD;  Location: [facility];  Service: UroGynecology           TOTAL ABDOMINAL HYSTERECTOMY      TUBAL LIGATION         Family History   Problem Relation Age of Onset    Stroke Mother         50s    Cancer Mother     Hypertension Mother     Pulmonary embolism Other         In mid 30s    Stroke Sister         50s    Stroke Brother         50s    Prostate cancer Father     Cancer Daughter     Stroke Family      I have reviewed and agree with the history as documented.    E-Cigarette/Vaping    E-Cigarette  Use Never User      E-Cigarette/Vaping Substances    Nicotine No     THC No     CBD No     Flavoring No     Other No     Unknown No      Social History     Tobacco Use    Smoking status: Every Day     Current packs/day: 0.25     Average packs/day: 0.2 packs/day for 49.1 years (12.3 ttl pk-yrs)     Types: Cigarettes     Start date: 1975    Smokeless tobacco: Never    Tobacco comments:     Last cigarette 0400 7/26 three total   Vaping Use    Vaping status: Never Used   Substance Use Topics    Alcohol use: Yes     Comment: social drinker special events only    Drug use: Never       Review of Systems   Constitutional:  Negative for activity change, chills, diaphoresis and fever.   HENT:  Negative for congestion, sinus pressure and sore throat.    Eyes:  Negative for pain and visual disturbance.   Respiratory:  Negative for cough, chest tightness, shortness of breath, wheezing and stridor.    Cardiovascular:  Positive for chest pain. Negative for palpitations.   Gastrointestinal:  Negative for abdominal distention, abdominal pain, constipation, diarrhea, nausea and vomiting.   Genitourinary:  Negative for dysuria and frequency.   Musculoskeletal:  Negative for neck pain and neck stiffness.   Skin:  Negative for rash.   Neurological:  Negative for dizziness, speech difficulty, light-headedness, numbness and headaches.       Physical Exam  Physical Exam  Vitals reviewed.   Constitutional:       General: She is not in acute distress.     Appearance: She is well-developed. She is not diaphoretic.   HENT:      Head: Normocephalic and atraumatic.      Right Ear: External ear normal.      Left Ear: External ear normal.      Nose: Nose normal.   Eyes:      General:         Right eye: No discharge.         Left eye: No discharge.      Pupils: Pupils are equal, round, and reactive to light.   Neck:      Trachea: No tracheal deviation.   Cardiovascular:      Rate and Rhythm: Normal rate and regular rhythm.      Heart sounds: Normal  heart sounds. No murmur heard.  Pulmonary:      Effort: Pulmonary effort is normal. No respiratory distress.      Breath sounds: Normal breath sounds. No stridor.      Comments:   Lungs clear to auscultation bilaterally    Fully reproducible left rib tenderness.,  Point tender over fourth and fifth ribs of the left sternal border, pressing on these ribs fully reproduces patient's pain  Chest:      Chest wall: Tenderness present.   Abdominal:      General: There is no distension.      Palpations: Abdomen is soft.      Tenderness: There is no abdominal tenderness. There is no guarding or rebound.   Musculoskeletal:         General: Normal range of motion.      Cervical back: Normal range of motion and neck supple.   Skin:     General: Skin is warm and dry.      Coloration: Skin is not pale.      Findings: No erythema.   Neurological:      General: No focal deficit present.      Mental Status: She is alert and oriented to person, place, and time.         Vital Signs  ED Triage Vitals [02/16/24 1505]   Temperature Pulse Respirations Blood Pressure SpO2   98.1 °F (36.7 °C) 76 19 143/71 98 %      Temp Source Heart Rate Source Patient Position - Orthostatic VS BP Location FiO2 (%)   Oral Monitor Lying Right arm --      Pain Score       --           Vitals:    02/16/24 1505   BP: 143/71   Pulse: 76   Patient Position - Orthostatic VS: Lying         Visual Acuity      ED Medications  Medications   Diclofenac Sodium (VOLTAREN) 1 % topical gel 2 g (has no administration in time range)       Diagnostic Studies  Results Reviewed       None                   XR chest 1 view portable   ED Interpretation by Dameon North DO (02/16 8145)   No acute pathology, specifically no pneumothorax                 Procedures  Procedures         ED Course                                             Medical Decision Making        Initial ED assessment:     58-year-old female, reproducible left anterior rib/chest pain.    Initial DDx  includes but is not limited to:   Costochondritis, doubt rib fracture or dislocation, doubt pneumothorax doubt ACS    Initial ED plan:   EKG, chest x-ray, likely lidocaine patch and discharge and recommend NSAIDs        Final ED summary/disposition:   After evaluation and workup in the emergency department,    no acute pathology found, patient discharged diagnosis costochondritis    Amount and/or Complexity of Data Reviewed  Radiology: ordered and independent interpretation performed.             Disposition  Final diagnoses:   Costochondritis, acute     Time reflects when diagnosis was documented in both MDM as applicable and the Disposition within this note       Time User Action Codes Description Comment    2/16/2024  3:54 PM Dameon North Add [M94.0] Costochondritis, acute           ED Disposition       ED Disposition   Discharge    Condition   Stable    Date/Time   Fri Feb 16, 2024  3:54 PM    Comment   Venessa Mason discharge to home/self care.                   Follow-up Information    None         Patient's Medications   Discharge Prescriptions    DICLOFENAC SODIUM (VOLTAREN) 1 %    Apply 2 g topically 4 (four) times a day       Start Date: 2/16/2024 End Date: --       Order Dose: 2 g       Quantity: 50 g    Refills: 0       No discharge procedures on file.    PDMP Review       None            ED Provider  Electronically Signed by             Dameon North DO  02/16/24 2904

## 2024-02-18 LAB
ATRIAL RATE: 67 BPM
P AXIS: 52 DEGREES
PR INTERVAL: 178 MS
QRS AXIS: 87 DEGREES
QRSD INTERVAL: 74 MS
QT INTERVAL: 378 MS
QTC INTERVAL: 399 MS
T WAVE AXIS: 80 DEGREES
VENTRICULAR RATE: 67 BPM

## 2024-02-18 PROCEDURE — 93010 ELECTROCARDIOGRAM REPORT: CPT | Performed by: INTERNAL MEDICINE

## 2024-02-22 ENCOUNTER — OFFICE VISIT (OUTPATIENT)
Dept: INTERNAL MEDICINE CLINIC | Age: 59
End: 2024-02-22
Payer: COMMERCIAL

## 2024-02-22 ENCOUNTER — TELEPHONE (OUTPATIENT)
Dept: INTERNAL MEDICINE CLINIC | Age: 59
End: 2024-02-22

## 2024-02-22 VITALS
HEIGHT: 61 IN | SYSTOLIC BLOOD PRESSURE: 120 MMHG | HEART RATE: 84 BPM | DIASTOLIC BLOOD PRESSURE: 78 MMHG | OXYGEN SATURATION: 99 % | BODY MASS INDEX: 21.71 KG/M2 | TEMPERATURE: 98.3 F | WEIGHT: 115 LBS

## 2024-02-22 DIAGNOSIS — F11.20 CONTINUOUS OPIOID DEPENDENCE (HCC): ICD-10-CM

## 2024-02-22 DIAGNOSIS — M94.0 COSTOCHONDRITIS, ACUTE: ICD-10-CM

## 2024-02-22 DIAGNOSIS — I48.0 PAF (PAROXYSMAL ATRIAL FIBRILLATION) (HCC): ICD-10-CM

## 2024-02-22 DIAGNOSIS — L65.9 HAIR LOSS: ICD-10-CM

## 2024-02-22 DIAGNOSIS — E78.00 HYPERCHOLESTEROLEMIA: Primary | ICD-10-CM

## 2024-02-22 DIAGNOSIS — Z72.0 TOBACCO ABUSE: ICD-10-CM

## 2024-02-22 DIAGNOSIS — I63.511 ACUTE ISCHEMIC RIGHT MCA STROKE (HCC): ICD-10-CM

## 2024-02-22 DIAGNOSIS — M85.89 OSTEOPENIA OF MULTIPLE SITES: ICD-10-CM

## 2024-02-22 PROBLEM — R11.2 PONV (POSTOPERATIVE NAUSEA AND VOMITING): Status: RESOLVED | Noted: 2023-06-12 | Resolved: 2024-02-22

## 2024-02-22 PROBLEM — Z98.890 PONV (POSTOPERATIVE NAUSEA AND VOMITING): Status: RESOLVED | Noted: 2023-06-12 | Resolved: 2024-02-22

## 2024-02-22 PROCEDURE — 99214 OFFICE O/P EST MOD 30 MIN: CPT | Performed by: FAMILY MEDICINE

## 2024-02-22 RX ORDER — METHOCARBAMOL 500 MG/1
500 TABLET, FILM COATED ORAL
Qty: 30 TABLET | Refills: 0 | Status: SHIPPED | OUTPATIENT
Start: 2024-02-22

## 2024-02-22 RX ORDER — PREDNISONE 20 MG/1
20 TABLET ORAL DAILY
Qty: 5 TABLET | Refills: 0 | Status: SHIPPED | OUTPATIENT
Start: 2024-02-22 | End: 2024-02-27

## 2024-02-22 NOTE — PROGRESS NOTES
Assessment/Plan:    1. Hypercholesterolemia  -     Lipid Panel with Direct LDL reflex; Future  -     Comprehensive metabolic panel; Future  -     CBC and differential; Future    2. PAF (paroxysmal atrial fibrillation) (HCC)    3. Acute ischemic right MCA stroke (HCC)    4. Tobacco abuse    5. Costochondritis, acute  -     predniSONE 20 mg tablet; Take 1 tablet (20 mg total) by mouth daily for 5 days  -     methocarbamol (ROBAXIN) 500 mg tablet; Take 1 tablet (500 mg total) by mouth daily at bedtime as needed for muscle spasms    6. Osteopenia of multiple sites  -     Vitamin D 25 hydroxy; Future    7. Hair loss  -     TSH, 3rd generation with Free T4 reflex; Future    8. Continuous opioid dependence (HCC)            There are no Patient Instructions on file for this visit.    Return in about 6 months (around 8/22/2024).    Subjective:      Patient ID: Venessa Mason is a 58 y.o. female.    Chief Complaint   Patient presents with    Follow-up     Follow up exam   Forms to be filled out   Costochondritises        HPI    Chest discomfort, picked up something heavy and felt a pop in the middle of her chest.  2 days later went to the emergency department and was diagnosed with costochondritis given Voltaren gel, no NSAIDs due to her blood thinners.  She has missed 1 week of work and needs forms filled.  She states her pain is slightly better but worse with deep inspiration, no skin issues no ecchymosis, and is otherwise feeling well.  She states she has had no upper respiratory issues but if she coughs or sneezes the pain is exquisite.  EKG was done and was normal.  Chest x-ray was also normal.       Hyperlipidemia (Follow-Up): The patient states her hyperlipidemia has been under good control since the last visit. She has no significant interval events.    Symptoms: denies chest pain-- and-- denies intermittent leg claudication. Associated symptoms include no focal neurologic deficits-- and-- no memory loss.       Medications: the patient is not adherent with her medication   July 2019: Last lab work done in December.  On pravastatin and tolerating well however she has a preference to Lipitor which she was on before and is asking for change.  No recent labs today but last lab work was elevated.  June 2020 well controlled lipid levels, on statin and tolerating well  November 2020, doing well no problems  Sept 2021: on lipitor well controlled levels, LDL 71  October 2022, increase in LDL to 95.  She has not changed anything however her current position at work is sedentary and she says her activity has significantly decreased.  March 2023, well controlled, no issues.  Works overnight  February 2024, last labs were acceptable.  No issues      Depression:  Feeling much better with low dose lexapro.  But has some fatigue side effects.  Has not been able to work due to stroke and is having some difficulty with exercising due to chronic back pain.  July 2019 stable January 2020, stable with present medications and slightly better since she is working now   June 2020, relatively well controlled and no suicidal homicide ideations.  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  She chose night shift but feels overwhelmed that her training was not as extensive as she has like did to be.  Since her brain injury she has been slower to learn new techniques but really enjoys the company and her coworkers.  Sept 2021: no SI or HI, no panic or breakthrough symptoms, does not sleep well, re started working  October 2022, no issues.  No HI or SI, medications are working well.  She is still working night shift so her sleep is not the best.  March 2023, no HI or SI, feels well, no issues  February 2024, had in exacerbation in fall 2023 but now doing well.  No HI or SI  Chronic pain, trying to wean off oxycodone and seeing Physical Medicine and Rehab doctor.  Started on medical  marijuana which has significantly improved her symptoms and she has been able to decrease her oxycodone however she is in some financial difficulty due to not having a job and no medical coverage now and finds it difficult to pay cash for her medical marijuana   January 2020, not using oxycodone since it makes her constipation worse   June 2020, stable   nov 2020 stable  sept 2021: on oxy, stable. Has some constipation and N/V  October 2022, stable, no issues.  March 2023, stable, still on medications.  February 2024, stable     Severe constipation:   failed Linzess, use to eat salads and yogurt on a daily basis however admits to not eating at least 3 times per day.  She does drink plenty of water.  Has a bowel movement that is hard once every 3 weeks.  Has been experiencing some nausea and intermittent vomiting.  Does not like a lot of over-the-counter laxatives because it makes her cramps and gassy   October 2022, sometimes better and sometimes not.  Waxes and wanes.  Most recently due to her job she has not been able to drink the right amount of water.  March 2023, following with GI, recently started on Amitiza that she just got yesterday.  She takes a stool softener on a regular basis, failed MiraLAX due to bloating and no regular bowel movements  February 2024, relatively stable at this time         The following portions of the patient's history were reviewed and updated as appropriate: allergies, current medications, past family history, past medical history, past social history, past surgical history and problem list.    Review of Systems        Constitutional:  Denies fever or chills   Eyes:  Denies double , blurry vision or eye pain  HENT:  Denies nasal congestion, sore throat or new hearing issues  Respiratory:  Denies cough or shortness of breath or wheezing  Cardiovascular:  Denies palpitations or chest pain  GI:  Denies abdominal pain, nausea, or vomiting, no loose stools, no reflux  Integument:   Denies rash , no open areas  Neurologic:  Denies headache or focal weakness, no dizziness  : no dysuria, or hematuria    Current Outpatient Medications   Medication Sig Dispense Refill    aspirin 81 mg chewable tablet Chew 1 tablet (81 mg total) daily 30 tablet 0    atorvastatin (LIPITOR) 40 mg tablet Take 1.5 tablets (60 mg total) by mouth daily 135 tablet 0    cycloSPORINE (RESTASIS) 0.05 % ophthalmic emulsion Administer 1 drop to both eyes every 12 (twelve) hours      diazepam (VALIUM) 5 mg tablet Take 1 tablet (5 mg total) by mouth daily at bedtime as needed for sleep 90 tablet 0    Diclofenac Sodium (VOLTAREN) 1 % Apply 2 g topically 4 (four) times a day 50 g 0    diclofenac sodium (VOLTAREN) 50 mg EC tablet Take 1 tablet (50 mg total) by mouth daily as needed (pain) 90 tablet 0    Eliquis 5 MG Take 1 tablet (5 mg total) by mouth 2 (two) times a day 60 tablet 11    magnesium 30 MG tablet Take 30 mg by mouth 2 (two) times a day      methocarbamol (ROBAXIN) 500 mg tablet Take 1 tablet (500 mg total) by mouth daily at bedtime as needed for muscle spasms 30 tablet 0    omeprazole (PriLOSEC) 40 MG capsule Take 1 capsule (40 mg total) by mouth 2 (two) times a day 180 capsule 1    oxyCODONE (ROXICODONE) 5 immediate release tablet Take 1 tablet (5 mg total) by mouth daily as needed for moderate pain Max Daily Amount: 5 mg 30 tablet 0    pancrelipase, Lip-Prot-Amyl, (CREON) 24,000 units Take 48,000 units of lipase by mouth 3 (three) times a day with meals 2 capsules 3 times daily with meals 540 capsule 2    predniSONE 20 mg tablet Take 1 tablet (20 mg total) by mouth daily for 5 days 5 tablet 0    rizatriptan (MAXALT-MLT) 5 mg disintegrating tablet Take 1 tablet (5 mg total) by mouth once as needed for migraine 54 tablet 0     No current facility-administered medications for this visit.       Objective:    /78 (BP Location: Left arm, Patient Position: Sitting, Cuff Size: Adult)   Pulse 84   Temp 98.3 °F (36.8  "°C)   Ht 5' 0.5\" (1.537 m)   Wt 52.2 kg (115 lb)   LMP  (LMP Unknown)   SpO2 99%   BMI 22.09 kg/m²        Physical Exam       Constitutional:  Well developed, well nourished, no acute distress, non-toxic appearance   Eyes:  PERRL, conjunctiva normal , non icteric sclera  HENT:  Atraumatic, oropharynx moist. Neck-  supple   Respiratory:  CTA b/l, normal breath sounds, no rales, no wheezing   Cardiovascular:  RRR, no murmurs, no LE edema b/l  GI:  Soft, nondistended, normal bowel sounds x 4, nontender, no organomegaly, no mass, no rebound, no guarding   Neurologic:  no focal deficits noted   Psychiatric:  Speech and behavior appropriate , AAO x 3  Musculoskeletal, tender to palpation left-sided mid sternal area.  Slight deformity noted.  No open areas or rashes    Axel Tim, DO  "

## 2024-02-22 NOTE — TELEPHONE ENCOUNTER
Patient was in office for an appointment and Dr Tim will be completing [place] paperwork(did not finish at time of checkout), patient already paid the 30$ form fee.

## 2024-02-23 ENCOUNTER — PATIENT MESSAGE (OUTPATIENT)
Dept: INTERNAL MEDICINE CLINIC | Age: 59
End: 2024-02-23

## 2024-02-23 NOTE — PATIENT COMMUNICATION
Spoke with patient and let her know forms need to be signed by her first. She will be stopping by [facility] to sign forms.

## 2024-03-12 DIAGNOSIS — E78.00 HYPERCHOLESTEROLEMIA: ICD-10-CM

## 2024-03-12 DIAGNOSIS — R79.89 HIGH SERUM HIGH DENSITY LIPOPROTEIN (HDL): ICD-10-CM

## 2024-03-12 DIAGNOSIS — M48.061 SPINAL STENOSIS OF LUMBAR REGION WITHOUT NEUROGENIC CLAUDICATION: ICD-10-CM

## 2024-03-12 RX ORDER — ATORVASTATIN CALCIUM 40 MG/1
60 TABLET, FILM COATED ORAL DAILY
Qty: 135 TABLET | Refills: 0 | Status: SHIPPED | OUTPATIENT
Start: 2024-03-12

## 2024-04-17 ENCOUNTER — VBI (OUTPATIENT)
Dept: ADMINISTRATIVE | Facility: OTHER | Age: 59
End: 2024-04-17

## 2024-04-17 NOTE — TELEPHONE ENCOUNTER
04/17/24 10:24 AM     [facility] [employer] SmartForm used to document caregap status.    Sydnee Bartholomew

## 2024-05-13 DIAGNOSIS — R79.89 HIGH SERUM HIGH DENSITY LIPOPROTEIN (HDL): ICD-10-CM

## 2024-05-13 DIAGNOSIS — E78.00 HYPERCHOLESTEROLEMIA: ICD-10-CM

## 2024-05-14 RX ORDER — ATORVASTATIN CALCIUM 40 MG/1
60 TABLET, FILM COATED ORAL DAILY
Qty: 135 TABLET | Refills: 1 | Status: SHIPPED | OUTPATIENT
Start: 2024-05-14

## 2024-05-29 DIAGNOSIS — R51.9 CHRONIC INTRACTABLE HEADACHE, UNSPECIFIED HEADACHE TYPE: ICD-10-CM

## 2024-05-29 DIAGNOSIS — G47.00 INSOMNIA, UNSPECIFIED TYPE: ICD-10-CM

## 2024-05-29 DIAGNOSIS — M48.061 SPINAL STENOSIS OF LUMBAR REGION WITHOUT NEUROGENIC CLAUDICATION: ICD-10-CM

## 2024-05-29 DIAGNOSIS — G89.29 CHRONIC INTRACTABLE HEADACHE, UNSPECIFIED HEADACHE TYPE: ICD-10-CM

## 2024-05-29 DIAGNOSIS — T14.8XXA NERVE DAMAGE: ICD-10-CM

## 2024-05-30 RX ORDER — RIZATRIPTAN BENZOATE 5 MG/1
TABLET, ORALLY DISINTEGRATING ORAL
Qty: 54 TABLET | Refills: 4 | Status: SHIPPED | OUTPATIENT
Start: 2024-05-30

## 2024-05-30 RX ORDER — DIAZEPAM 5 MG/1
TABLET ORAL
Qty: 90 TABLET | Refills: 0 | Status: SHIPPED | OUTPATIENT
Start: 2024-05-30

## 2024-06-04 ENCOUNTER — TELEPHONE (OUTPATIENT)
Age: 59
End: 2024-06-04

## 2024-06-04 NOTE — TELEPHONE ENCOUNTER
Patient states Father in Law recently passed, now has custody of Sister with special needs. She is dealing with mood swings and depression. States she did discuss this with Dr. Tim at her last office visit. Would like to know if she can be prescribed medication for depression. 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 No thoughts as of today 06/04/2024 of hurting herself or others.

## 2024-06-04 NOTE — TELEPHONE ENCOUNTER
Spoke with patient she cannot come in for an appointment due to no transportation, she also does not have access to a device with a web cam to connect her for a virtual appointment. She can only do a phone call and explained to patient, insurance will not cover a phone call only appointment.

## 2024-06-06 ENCOUNTER — OFFICE VISIT (OUTPATIENT)
Age: 59
End: 2024-06-06
Payer: COMMERCIAL

## 2024-06-06 VITALS
TEMPERATURE: 97.9 F | OXYGEN SATURATION: 97 % | DIASTOLIC BLOOD PRESSURE: 74 MMHG | HEART RATE: 82 BPM | BODY MASS INDEX: 22.05 KG/M2 | SYSTOLIC BLOOD PRESSURE: 150 MMHG | WEIGHT: 116.8 LBS | HEIGHT: 61 IN

## 2024-06-06 DIAGNOSIS — F41.9 ANXIETY: Primary | ICD-10-CM

## 2024-06-06 PROCEDURE — 99213 OFFICE O/P EST LOW 20 MIN: CPT | Performed by: FAMILY MEDICINE

## 2024-06-06 RX ORDER — CITALOPRAM HYDROBROMIDE 10 MG/1
5 TABLET ORAL DAILY
Qty: 30 TABLET | Refills: 1 | Status: SHIPPED | OUTPATIENT
Start: 2024-06-06

## 2024-06-06 NOTE — PROGRESS NOTES
Assessment/Plan:    1. Anxiety  -     citalopram (CeleXA) 10 mg tablet; Take 0.5 tablets (5 mg total) by mouth daily          There are no Patient Instructions on file for this visit.    Return for Next scheduled follow up.    Subjective:      Patient ID: Venessa Mason is a 58 y.o. female.    Chief Complaint   Patient presents with    Follow-up     Patient having increased anxiety,depression and anger       HPI  Patient's father-in-law passed away about 1 to 2 months ago and his daughter who is her 's sister lives with him.  She is mentally disabled and now she lives with her and her .  They are the primary caretakers.  Patient works at night and in the daytime she takes care of her.  She needs constant supervision and help with eating and drinking dressing and bathing activities of daily living.  Patient is feeling overwhelmed with his additional responsibility and is getting more alienated from her own family as she has missed several week and celebrations with them due to this.  She is not getting enough sleep not able to eat and drink properly and her anxiety levels are increasing.  She is not able to put her in a  or permanent living situation due to her own personal promise to her mother-in-law.  No HI or SI.  Patient is able to work and drive and she has not missed any days.  She would like something to help with her feelings of caretaker stress and being overwhelmed.        The following portions of the patient's history were reviewed and updated as appropriate: allergies, current medications, past family history, past medical history, past social history, past surgical history and problem list.    Review of Systems      Constitutional:  Denies fever or chills   Eyes:  Denies double , blurry vision or eye pain  HENT:  Denies nasal congestion, sore throat or new hearing issues  Respiratory:  Denies cough or shortness of breath or wheezing  Cardiovascular:  Denies palpitations or  chest pain  GI:  Denies abdominal pain, nausea, or vomiting, no loose stools, no reflux  Integument:  Denies rash , no open areas  Neurologic:  Denies headache or focal weakness, no dizziness  : no dysuria, or hematuria      Current Outpatient Medications   Medication Sig Dispense Refill    aspirin 81 mg chewable tablet Chew 1 tablet (81 mg total) daily 30 tablet 0    atorvastatin (LIPITOR) 40 mg tablet TAKE ONE AND ONE-HALF TABLETS DAILY 135 tablet 1    citalopram (CeleXA) 10 mg tablet Take 0.5 tablets (5 mg total) by mouth daily 30 tablet 1    cycloSPORINE (RESTASIS) 0.05 % ophthalmic emulsion Administer 1 drop to both eyes every 12 (twelve) hours      diazepam (VALIUM) 5 mg tablet TAKE 1 TABLET DAILY AT BEDTIME AS NEEDED FOR SLEEP 90 tablet 0    diclofenac sodium (VOLTAREN) 50 mg EC tablet TAKE 1 TABLET DAILY AS NEEDED FOR PAIN 90 tablet 3    Eliquis 5 MG Take 1 tablet (5 mg total) by mouth 2 (two) times a day 60 tablet 11    methocarbamol (ROBAXIN) 500 mg tablet Take 1 tablet (500 mg total) by mouth daily at bedtime as needed for muscle spasms 30 tablet 0    omeprazole (PriLOSEC) 40 MG capsule Take 1 capsule (40 mg total) by mouth 2 (two) times a day 180 capsule 1    oxyCODONE (ROXICODONE) 5 immediate release tablet Take 1 tablet (5 mg total) by mouth daily as needed for moderate pain Max Daily Amount: 5 mg 30 tablet 0    pancrelipase, Lip-Prot-Amyl, (CREON) 24,000 units Take 48,000 units of lipase by mouth 3 (three) times a day with meals 2 capsules 3 times daily with meals 540 capsule 2    rizatriptan (MAXALT-MLT) 5 mg disintegrating tablet PLACE 1 TABLET ON TONGUE ONCE AS NEEDED FOR MIGRAINE 54 tablet 4    Diclofenac Sodium (VOLTAREN) 1 % Apply 2 g topically 4 (four) times a day 50 g 0     No current facility-administered medications for this visit.       Objective:    /74 (BP Location: Left arm, Patient Position: Sitting, Cuff Size: Standard)   Pulse 82   Temp 97.9 °F (36.6 °C) (Temporal)   Ht 5'  "1\" (1.549 m)   Wt 53 kg (116 lb 12.8 oz)   LMP  (LMP Unknown)   SpO2 97%   BMI 22.07 kg/m²        Physical Exam       Constitutional:  Well developed, well nourished, no acute distress, non-toxic appearance   Eyes:  PERRL, conjunctiva normal , non icteric sclera  HENT:  Atraumatic, oropharynx moist. Neck-  supple   Respiratory:  CTA b/l, normal breath sounds, no rales, no wheezing   Cardiovascular:  RRR, no murmurs, no LE edema b/l  GI:  Soft, nondistended, normal bowel sounds x 4, nontender, no organomegaly, no mass, no rebound, no guarding   Neurologic:  no focal deficits noted   Psychiatric:  Speech and behavior appropriate , AAO x 3      Axel Tim DO  "

## 2024-07-23 DIAGNOSIS — F41.9 ANXIETY: ICD-10-CM

## 2024-07-23 RX ORDER — CITALOPRAM HYDROBROMIDE 10 MG/1
5 TABLET ORAL DAILY
Qty: 30 TABLET | Refills: 5 | Status: SHIPPED | OUTPATIENT
Start: 2024-07-23

## 2024-07-29 ENCOUNTER — RA CDI HCC (OUTPATIENT)
Dept: OTHER | Facility: HOSPITAL | Age: 59
End: 2024-07-29

## 2024-07-29 NOTE — PROGRESS NOTES
Depression, recurrent (HCC)  Noted 7/28/2022  [F33.9]    NOT on the BPA- please assess using MEAT for 2024 billing    HCC coding opportunities          Chart Reviewed number of suggestions sent to Provider: 1     Patients Insurance        Commercial Insurance: [employer]

## 2024-08-01 ENCOUNTER — APPOINTMENT (OUTPATIENT)
Dept: LAB | Age: 59
End: 2024-08-01
Payer: COMMERCIAL

## 2024-08-01 DIAGNOSIS — E78.00 HYPERCHOLESTEROLEMIA: ICD-10-CM

## 2024-08-01 DIAGNOSIS — M85.89 OSTEOPENIA OF MULTIPLE SITES: ICD-10-CM

## 2024-08-01 DIAGNOSIS — L65.9 HAIR LOSS: ICD-10-CM

## 2024-08-01 LAB
25(OH)D3 SERPL-MCNC: 34.7 NG/ML (ref 30–100)
ALBUMIN SERPL BCG-MCNC: 4.1 G/DL (ref 3.5–5)
ALP SERPL-CCNC: 46 U/L (ref 34–104)
ALT SERPL W P-5'-P-CCNC: 34 U/L (ref 7–52)
ANION GAP SERPL CALCULATED.3IONS-SCNC: 12 MMOL/L (ref 4–13)
AST SERPL W P-5'-P-CCNC: 28 U/L (ref 13–39)
BASOPHILS # BLD AUTO: 0.03 THOUSANDS/ΜL (ref 0–0.1)
BASOPHILS NFR BLD AUTO: 0 % (ref 0–1)
BILIRUB SERPL-MCNC: 0.59 MG/DL (ref 0.2–1)
BUN SERPL-MCNC: 14 MG/DL (ref 5–25)
CALCIUM SERPL-MCNC: 9.5 MG/DL (ref 8.4–10.2)
CHLORIDE SERPL-SCNC: 105 MMOL/L (ref 96–108)
CHOLEST SERPL-MCNC: 138 MG/DL
CO2 SERPL-SCNC: 23 MMOL/L (ref 21–32)
CREAT SERPL-MCNC: 0.88 MG/DL (ref 0.6–1.3)
EOSINOPHIL # BLD AUTO: 0.11 THOUSAND/ΜL (ref 0–0.61)
EOSINOPHIL NFR BLD AUTO: 1 % (ref 0–6)
ERYTHROCYTE [DISTWIDTH] IN BLOOD BY AUTOMATED COUNT: 13.3 % (ref 11.6–15.1)
GFR SERPL CREATININE-BSD FRML MDRD: 72 ML/MIN/1.73SQ M
GLUCOSE P FAST SERPL-MCNC: 75 MG/DL (ref 65–99)
HCT VFR BLD AUTO: 40.5 % (ref 34.8–46.1)
HDLC SERPL-MCNC: 62 MG/DL
HGB BLD-MCNC: 13.4 G/DL (ref 11.5–15.4)
IMM GRANULOCYTES # BLD AUTO: 0.02 THOUSAND/UL (ref 0–0.2)
IMM GRANULOCYTES NFR BLD AUTO: 0 % (ref 0–2)
LDLC SERPL CALC-MCNC: 64 MG/DL (ref 0–100)
LYMPHOCYTES # BLD AUTO: 4.61 THOUSANDS/ΜL (ref 0.6–4.47)
LYMPHOCYTES NFR BLD AUTO: 49 % (ref 14–44)
MCH RBC QN AUTO: 33.4 PG (ref 26.8–34.3)
MCHC RBC AUTO-ENTMCNC: 33.1 G/DL (ref 31.4–37.4)
MCV RBC AUTO: 101 FL (ref 82–98)
MONOCYTES # BLD AUTO: 0.7 THOUSAND/ΜL (ref 0.17–1.22)
MONOCYTES NFR BLD AUTO: 7 % (ref 4–12)
NEUTROPHILS # BLD AUTO: 4.13 THOUSANDS/ΜL (ref 1.85–7.62)
NEUTS SEG NFR BLD AUTO: 43 % (ref 43–75)
NRBC BLD AUTO-RTO: 0 /100 WBCS
PLATELET # BLD AUTO: 278 THOUSANDS/UL (ref 149–390)
PMV BLD AUTO: 9.4 FL (ref 8.9–12.7)
POTASSIUM SERPL-SCNC: 4.1 MMOL/L (ref 3.5–5.3)
PROT SERPL-MCNC: 6.4 G/DL (ref 6.4–8.4)
RBC # BLD AUTO: 4.01 MILLION/UL (ref 3.81–5.12)
SODIUM SERPL-SCNC: 140 MMOL/L (ref 135–147)
TRIGL SERPL-MCNC: 60 MG/DL
TSH SERPL DL<=0.05 MIU/L-ACNC: 1.45 UIU/ML (ref 0.45–4.5)
WBC # BLD AUTO: 9.6 THOUSAND/UL (ref 4.31–10.16)

## 2024-08-01 PROCEDURE — 80053 COMPREHEN METABOLIC PANEL: CPT

## 2024-08-01 PROCEDURE — 84443 ASSAY THYROID STIM HORMONE: CPT

## 2024-08-01 PROCEDURE — 36415 COLL VENOUS BLD VENIPUNCTURE: CPT

## 2024-08-01 PROCEDURE — 80061 LIPID PANEL: CPT

## 2024-08-01 PROCEDURE — 82306 VITAMIN D 25 HYDROXY: CPT

## 2024-08-01 PROCEDURE — 85025 COMPLETE CBC W/AUTO DIFF WBC: CPT

## 2024-08-09 ENCOUNTER — OFFICE VISIT (OUTPATIENT)
Dept: INTERNAL MEDICINE CLINIC | Facility: OTHER | Age: 59
End: 2024-08-09
Payer: COMMERCIAL

## 2024-08-09 VITALS
HEIGHT: 61 IN | BODY MASS INDEX: 21.71 KG/M2 | WEIGHT: 115 LBS | TEMPERATURE: 97.9 F | SYSTOLIC BLOOD PRESSURE: 110 MMHG | OXYGEN SATURATION: 97 % | HEART RATE: 98 BPM | DIASTOLIC BLOOD PRESSURE: 70 MMHG

## 2024-08-09 DIAGNOSIS — I63.511 ACUTE ISCHEMIC RIGHT MCA STROKE (HCC): ICD-10-CM

## 2024-08-09 DIAGNOSIS — E78.00 HYPERCHOLESTEROLEMIA: Primary | ICD-10-CM

## 2024-08-09 DIAGNOSIS — K13.21 TONGUE LEUKOPLAKIA: ICD-10-CM

## 2024-08-09 DIAGNOSIS — R10.31 RIGHT LOWER QUADRANT ABDOMINAL PAIN: ICD-10-CM

## 2024-08-09 DIAGNOSIS — R10.2 PELVIC PAIN: ICD-10-CM

## 2024-08-09 DIAGNOSIS — K43.9 HERNIA OF ANTERIOR ABDOMINAL WALL: ICD-10-CM

## 2024-08-09 DIAGNOSIS — F17.210 SMOKING GREATER THAN 20 PACK YEARS: ICD-10-CM

## 2024-08-09 PROCEDURE — 99214 OFFICE O/P EST MOD 30 MIN: CPT | Performed by: FAMILY MEDICINE

## 2024-08-09 RX ORDER — CLOTRIMAZOLE 10 MG/1
10 LOZENGE ORAL
Qty: 30 TABLET | Refills: 0 | Status: SHIPPED | OUTPATIENT
Start: 2024-08-09

## 2024-08-09 NOTE — PROGRESS NOTES
Assessment/Plan:    1. Hypercholesterolemia  -     Lipid Panel with Direct LDL reflex; Future  -     Comprehensive metabolic panel; Future  -     CBC and differential; Future  2. Acute ischemic right MCA stroke (HCC)  3. Smoking greater than 20 pack years  -     CT lung screening program; Future; Expected date: 10/31/2024  4. Tongue leukoplakia  -     clotrimazole (MYCELEX) 10 mg marvin; Take 1 tablet (10 mg total) by mouth 5 (five) times a day  5. Right lower quadrant abdominal pain  -     CT abdomen pelvis w contrast; Future; Expected date: 08/09/2024  6. Hernia of anterior abdominal wall  -     CT abdomen pelvis w contrast; Future; Expected date: 08/09/2024  7. Pelvic pain  -     CT abdomen pelvis w contrast; Future; Expected date: 08/09/2024          There are no Patient Instructions on file for this visit.    Return in about 6 months (around 2/9/2025).    Subjective:      Patient ID: Venessa Mason is a 58 y.o. female.    Chief Complaint   Patient presents with    Follow-up     6 month follow up   Labs 8/1          Depression screening     Hernia       HPI    Patient's father-in-law passed away about 1 to 2 months ago and his daughter who is her 's sister lives with him.  She is mentally disabled and now she lives with her and her .  They are the primary caretakers.  Patient works at night and in the daytime she takes care of her.  She needs constant supervision and help with eating and drinking dressing and bathing activities of daily living.  Patient is feeling overwhelmed with his additional responsibility and is getting more alienated from her own family as she has missed several week and celebrations with them due to this.  She is not getting enough sleep not able to eat and drink properly and her anxiety levels are increasing.  She is not able to put her in a  or permanent living situation due to her own personal promise to her mother-in-law.  No HI or SI.  Patient is able to  work and drive and she has not missed any days.  She would like something to help with her feelings of caretaker stress and being overwhelmed.  August 2024, medication has helped significantly.  No HI or SI, patient works night shift and daytime takes care of her disabled sister-in-law.  She is not getting enough rest and no sleep and complains of fatigue     Hyperlipidemia (Follow-Up): The patient states her hyperlipidemia has been under good control since the last visit. She has no significant interval events.    Symptoms: denies chest pain-- and-- denies intermittent leg claudication. Associated symptoms include no focal neurologic deficits-- and-- no memory loss.      Medications: the patient is not adherent with her medication   July 2019: Last lab work done in December.  On pravastatin and tolerating well however she has a preference to Lipitor which she was on before and is asking for change.  No recent labs today but last lab work was elevated.  June 2020 well controlled lipid levels, on statin and tolerating well  November 2020, doing well no problems  Sept 2021: on lipitor well controlled levels, LDL 71  October 2022, increase in LDL to 95.  She has not changed anything however her current position at work is sedentary and she says her activity has significantly decreased.  March 2023, well controlled, no issues.  Works overnight  February 2024, last labs were acceptable.  No issues  August 2024, lipid levels improved since last visit     Depression:  Feeling much better with low dose lexapro.  But has some fatigue side effects.  Has not been able to work due to stroke and is having some difficulty with exercising due to chronic back pain.  July 2019 stable January 2020, stable with present medications and slightly better since she is working now   June 2020, relatively well controlled and no suicidal homicide ideations.  She is working now and feels tired but relatively stable    nov 2020:  Got a new job  and is on night shift.  She chose night shift but feels overwhelmed that her training was not as extensive as she has like did to be.  Since her brain injury she has been slower to learn new techniques but really enjoys the company and her coworkers.  Sept 2021: no SI or HI, no panic or breakthrough symptoms, does not sleep well, re started working  October 2022, no issues.  No HI or SI, medications are working well.  She is still working night shift so her sleep is not the best.  March 2023, no HI or SI, feels well, no issues  February 2024, had in exacerbation in fall 2023 but now doing well.  No HI or SI  August 2024, please see above anxiety, on Celexa now, no HI or SI    Chronic pain, trying to wean off oxycodone and seeing Physical Medicine and Rehab doctor.  Started on medical marijuana which has significantly improved her symptoms and she has been able to decrease her oxycodone however she is in some financial difficulty due to not having a job and no medical coverage now and finds it difficult to pay cash for her medical marijuana   January 2020, not using oxycodone since it makes her constipation worse   June 2020, stable   nov 2020 stable  sept 2021: on oxy, stable. Has some constipation and N/V  October 2022, stable, no issues.  March 2023, stable, still on medications.  February 2024, stable  August 2024, please see above anxiety, on Celexa now, no HI or SI     Severe constipation:   failed Linzess, use to eat salads and yogurt on a daily basis however admits to not eating at least 3 times per day.  She does drink plenty of water.  Has a bowel movement that is hard once every 3 weeks.  Has been experiencing some nausea and intermittent vomiting.  Does not like a lot of over-the-counter laxatives because it makes her cramps and gassy   October 2022, sometimes better and sometimes not.  Waxes and wanes.  Most recently due to her job she has not been able to drink the right amount of water.  March 2023,  following with GI, recently started on Amitiza that she just got yesterday.  She takes a stool softener on a regular basis, failed MiraLAX due to bloating and no regular bowel movements  February 2024, relatively stable at this time  August 2024, improved in general              The following portions of the patient's history were reviewed and updated as appropriate: allergies, current medications, past family history, past medical history, past social history, past surgical history and problem list.    Review of Systems      Constitutional:  Denies fever or chills   Eyes:  Denies double , blurry vision or eye pain  HENT:  Denies nasal congestion, sore throat or new hearing issues  Respiratory:  Denies cough or shortness of breath or wheezing  Cardiovascular:  Denies palpitations or chest pain  GI:  Denies abdominal pain, nausea, or vomiting, no loose stools, no reflux  Integument:  Denies rash , no open areas  Neurologic:  Denies headache or focal weakness, no dizziness  : no dysuria, or hematuria      Current Outpatient Medications   Medication Sig Dispense Refill    aspirin 81 mg chewable tablet Chew 1 tablet (81 mg total) daily 30 tablet 0    atorvastatin (LIPITOR) 40 mg tablet TAKE ONE AND ONE-HALF TABLETS DAILY 135 tablet 1    citalopram (CeleXA) 10 mg tablet Take 0.5 tablets (5 mg total) by mouth daily 30 tablet 5    clotrimazole (MYCELEX) 10 mg marvin Take 1 tablet (10 mg total) by mouth 5 (five) times a day 30 tablet 0    cycloSPORINE (RESTASIS) 0.05 % ophthalmic emulsion Administer 1 drop to both eyes every 12 (twelve) hours      diazepam (VALIUM) 5 mg tablet TAKE 1 TABLET DAILY AT BEDTIME AS NEEDED FOR SLEEP 90 tablet 0    Eliquis 5 MG Take 1 tablet (5 mg total) by mouth 2 (two) times a day 60 tablet 11    methocarbamol (ROBAXIN) 500 mg tablet Take 1 tablet (500 mg total) by mouth daily at bedtime as needed for muscle spasms 30 tablet 0    oxyCODONE (ROXICODONE) 5 immediate release tablet Take 1 tablet  "(5 mg total) by mouth daily as needed for moderate pain Max Daily Amount: 5 mg 30 tablet 0    pancrelipase, Lip-Prot-Amyl, (CREON) 24,000 units Take 48,000 units of lipase by mouth 3 (three) times a day with meals 2 capsules 3 times daily with meals 540 capsule 2    rizatriptan (MAXALT-MLT) 5 mg disintegrating tablet PLACE 1 TABLET ON TONGUE ONCE AS NEEDED FOR MIGRAINE 54 tablet 4     No current facility-administered medications for this visit.       Objective:    /70 (BP Location: Left arm, Patient Position: Sitting, Cuff Size: Standard)   Pulse 98   Temp 97.9 °F (36.6 °C) (Temporal)   Ht 5' 1\" (1.549 m)   Wt 52.2 kg (115 lb)   LMP  (LMP Unknown)   SpO2 97%   BMI 21.73 kg/m²        Physical Exam       Constitutional:  Well developed, well nourished, no acute distress, non-toxic appearance   Eyes:  PERRL, conjunctiva normal , non icteric sclera  HENT:  Atraumatic, oropharynx moist. Neck-  supple   Respiratory:  CTA b/l, normal breath sounds, no rales, no wheezing   Cardiovascular:  RRR, no murmurs, no LE edema b/l  GI:  Soft, nondistended, normal bowel sounds x 4, nontender, no organomegaly, no mass, no rebound, no guarding   Neurologic:  no focal deficits noted   Psychiatric:  Speech and behavior appropriate , AAO x 3      Axel Tim DO  "

## 2024-08-12 ENCOUNTER — TELEPHONE (OUTPATIENT)
Age: 59
End: 2024-08-12

## 2024-08-20 ENCOUNTER — VBI (OUTPATIENT)
Dept: ADMINISTRATIVE | Facility: OTHER | Age: 59
End: 2024-08-20

## 2024-08-20 NOTE — TELEPHONE ENCOUNTER
08/20/24 9:50 AM     Chart reviewed for Pap Smear (HPV) aka Cervical Cancer Screening ; nothing is submitted to the patient's insurance at this time.     ROSANNE FLORES MA   [facility]

## 2024-09-04 DIAGNOSIS — I63.511 ACUTE ISCHEMIC RIGHT MCA STROKE (HCC): ICD-10-CM

## 2024-09-04 RX ORDER — APIXABAN 5 MG/1
5 TABLET, FILM COATED ORAL 2 TIMES DAILY
Qty: 60 TABLET | Refills: 0 | Status: SHIPPED | OUTPATIENT
Start: 2024-09-04

## 2024-09-06 ENCOUNTER — APPOINTMENT (OUTPATIENT)
Dept: LAB | Age: 59
End: 2024-09-06

## 2024-09-06 DIAGNOSIS — R10.84 GENERALIZED ABDOMINAL PAIN: Primary | ICD-10-CM

## 2024-09-10 ENCOUNTER — HOSPITAL ENCOUNTER (OUTPATIENT)
Dept: RADIOLOGY | Age: 59
Discharge: HOME | End: 2024-09-10
Payer: COMMERCIAL

## 2024-09-10 DIAGNOSIS — R10.84 GENERALIZED ABDOMINAL PAIN: ICD-10-CM

## 2024-09-10 PROCEDURE — 76700 US EXAM ABDOM COMPLETE: CPT

## 2024-09-16 DIAGNOSIS — T14.8XXA NERVE DAMAGE: ICD-10-CM

## 2024-09-16 DIAGNOSIS — G47.00 INSOMNIA, UNSPECIFIED TYPE: ICD-10-CM

## 2024-09-17 RX ORDER — DIAZEPAM 5 MG
TABLET ORAL
Qty: 90 TABLET | Refills: 0 | Status: SHIPPED | OUTPATIENT
Start: 2024-09-17

## 2024-09-19 DIAGNOSIS — K80.50 GALL STONES, COMMON BILE DUCT: Primary | ICD-10-CM

## 2024-10-03 DIAGNOSIS — I63.511 ACUTE ISCHEMIC RIGHT MCA STROKE (HCC): ICD-10-CM

## 2024-10-03 RX ORDER — APIXABAN 5 MG/1
5 TABLET, FILM COATED ORAL 2 TIMES DAILY
Qty: 60 TABLET | Refills: 0 | Status: SHIPPED | OUTPATIENT
Start: 2024-10-03

## 2024-10-03 NOTE — TELEPHONE ENCOUNTER
Medication: eliquis 5mg tablet    Dose/Frequency: 1 tablet twice a day    Quantity: 60 tablets    Pharmacy: [facility]    Office:   [] PCP/Provider -   [x] Speciality/Provider -     Does the patient have enough for 3 days?   [] Yes   [x] No - Send as HP to POD

## 2024-10-30 ENCOUNTER — OFFICE VISIT (OUTPATIENT)
Dept: CARDIOLOGY CLINIC | Facility: MEDICAL CENTER | Age: 59
End: 2024-10-30
Payer: COMMERCIAL

## 2024-10-30 VITALS
OXYGEN SATURATION: 97 % | WEIGHT: 120 LBS | HEART RATE: 70 BPM | SYSTOLIC BLOOD PRESSURE: 108 MMHG | DIASTOLIC BLOOD PRESSURE: 68 MMHG | BODY MASS INDEX: 22.66 KG/M2 | HEIGHT: 61 IN

## 2024-10-30 DIAGNOSIS — I63.511 ACUTE ISCHEMIC RIGHT MCA STROKE (HCC): ICD-10-CM

## 2024-10-30 DIAGNOSIS — I48.0 PAF (PAROXYSMAL ATRIAL FIBRILLATION) (HCC): Primary | ICD-10-CM

## 2024-10-30 DIAGNOSIS — Z82.49 FAMILY HISTORY OF EARLY CAD: ICD-10-CM

## 2024-10-30 DIAGNOSIS — E78.00 HYPERCHOLESTEROLEMIA: ICD-10-CM

## 2024-10-30 DIAGNOSIS — I63.00 CEREBROVASCULAR ACCIDENT (CVA) DUE TO THROMBOSIS OF PRECEREBRAL ARTERY (HCC): ICD-10-CM

## 2024-10-30 PROCEDURE — 99214 OFFICE O/P EST MOD 30 MIN: CPT | Performed by: INTERNAL MEDICINE

## 2024-10-30 RX ORDER — APIXABAN 5 MG/1
5 TABLET, FILM COATED ORAL 2 TIMES DAILY
Qty: 60 TABLET | Refills: 11 | Status: SHIPPED | OUTPATIENT
Start: 2024-10-30

## 2024-10-30 NOTE — ASSESSMENT & PLAN NOTE
With prior ischemic right MCA stroke.    Continued on Eliquis for anticoagulation and currently on no rate controlling medications.    No symptoms to suggest recurrence of rapid A-fib.  Continue current regimen for the time being

## 2024-10-30 NOTE — ASSESSMENT & PLAN NOTE
In the right MCA distribution  Continue anticoagulation with the assumption that this was from afib

## 2024-10-30 NOTE — ASSESSMENT & PLAN NOTE
She does have a CT coronary calcium score ordered by her primary care doctor.    I did encourage her to complete the study in order to further risk stratify her.  She is continued on aspirin 81 mg daily

## 2024-10-30 NOTE — PROGRESS NOTES
"Cardiology Follow Up    Venessa Mason  1965  [id number]  [facility] [place]  [address]  [address]  [facility] [address] [phone]      Assessment & Plan  PAF (paroxysmal atrial fibrillation) (HCC)  With prior ischemic right MCA stroke.    Continued on Eliquis for anticoagulation and currently on no rate controlling medications.    No symptoms to suggest recurrence of rapid A-fib.  Continue current regimen for the time being  Cerebrovascular accident (CVA) due to thrombosis of precerebral artery (HCC)  In the right MCA distribution  Continue anticoagulation with the assumption that this was from afib  Family history of early CAD  She does have a CT coronary calcium score ordered by her primary care doctor.    I did encourage her to complete the study in order to further risk stratify her.  She is continued on aspirin 81 mg daily  Hypercholesterolemia  LDL 64 in August 2024  Continued on atorvastatin 40 mg daily  Acute ischemic right MCA stroke (HCC)  As above    Chief Complaint   Patient presents with    Follow-up     1 year f/up. No complaints.       Interval History: Patient feels well, without complaints.  No reported chest pain, shortness of breath, palpitations, lightheadedness, syncope, LE edema, orthopnea, PND, or significant weight changes.  Patient remains active without any increased fatigue out of the ordinary.        Blood pressure 108/68, pulse 70, height 5' 1\" (1.549 m), weight 54.4 kg (120 lb), SpO2 97%.    Review of Systems:  Review of Systems   Constitutional:  Negative for activity change, appetite change, chills, diaphoresis, fatigue and unexpected weight change.   HENT:  Negative for hearing loss, nosebleeds and sore throat.    Eyes:  Negative for photophobia and visual disturbance.   Respiratory:  Negative for cough, chest tightness, shortness of breath and wheezing.    Cardiovascular:  Negative for chest pain, palpitations and leg " swelling.   Gastrointestinal:  Negative for abdominal pain, diarrhea, nausea and vomiting.   Endocrine: Negative for polyuria.   Genitourinary:  Negative for dysuria, frequency and hematuria.   Musculoskeletal:  Negative for arthralgias, back pain, gait problem and neck pain.   Skin:  Negative for pallor and rash.   Neurological:  Negative for dizziness, syncope and headaches.   Hematological:  Does not bruise/bleed easily.   Psychiatric/Behavioral:  Negative for behavioral problems and confusion.        Physical Exam:  Physical Exam  Vitals reviewed.   Constitutional:       Appearance: Normal appearance. She is well-developed. She is not ill-appearing or diaphoretic.   HENT:      Head: Normocephalic and atraumatic.      Nose: Nose normal.   Eyes:      General: No scleral icterus.     Extraocular Movements: Extraocular movements intact.      Pupils: Pupils are equal, round, and reactive to light.   Neck:      Vascular: No JVD.   Cardiovascular:      Rate and Rhythm: Normal rate and regular rhythm.      Heart sounds: Normal heart sounds. No murmur heard.     No friction rub. No gallop.   Pulmonary:      Effort: Pulmonary effort is normal. No respiratory distress.      Breath sounds: Normal breath sounds. No wheezing or rales.   Abdominal:      General: Bowel sounds are normal. There is no distension.      Palpations: Abdomen is soft.      Tenderness: There is no abdominal tenderness.   Musculoskeletal:         General: No deformity. Normal range of motion.      Cervical back: Normal range of motion and neck supple.      Right lower leg: No edema.      Left lower leg: No edema.   Skin:     General: Skin is warm and dry.      Findings: No rash.   Neurological:      Mental Status: She is alert and oriented to person, place, and time.      Cranial Nerves: No cranial nerve deficit.   Psychiatric:         Mood and Affect: Mood normal.         Behavior: Behavior normal.         Patient Active Problem List   Diagnosis     Acute ischemic right MCA stroke (HCC)    Depression as late effect of cerebrovascular accident (CVA)    Hypercholesterolemia    Cervical post-laminectomy syndrome    Cervical radiculopathy    History of ischemic right MCA stroke    Dependence on nicotine from cigarettes    Tobacco abuse    Anxiety    Chronic pain disorder    Lumbar degenerative disc disease    Idiopathic scoliosis of lumbar spine    Slow transit constipation    Refused influenza vaccine    High serum high density lipoprotein (HDL)    PAF (paroxysmal atrial fibrillation) ([facility])    H/O: hysterectomy    Drug-induced constipation    Cerebrovascular accident ([facility])    Anticoagulant long-term use    Spinal stenosis of lumbar region without neurogenic claudication    Encounter for loop recorder at end of battery life    Continuous opioid dependence ([facility])    Depression, recurrent ([facility])    Family history of early CAD    Prolapse of vaginal vault after hysterectomy    Rectocele    Stress incontinence (female) (male)    Costochondritis, acute     Past Medical History:   Diagnosis Date    Acute pain of right knee     last assessed - [id number]    Anemia     Anxiety     Arthritis     Cervical disc disorder with radiculopathy     Chronic pain     Colon polyp     Constipation     CVA (cerebral vascular accident) ([facility])     GERD (gastroesophageal reflux disease)     H/O ischemic right MCA stroke     H/O: hysterectomy     Headache(784.0) 2011    Hematuria     last assessed - [id number]    High cholesterol     Lumbar radiculopathy     Lumbar stenosis     Memory loss 2018    Migraines     Other chronic pain     last assessed - [id number]    Other muscle spasm     last assessed - [id number]    PONV (postoperative nausea and vomiting)     must have premed    Rectocele     Sciatic leg pain 06/29/2020    Scoliosis 2021    Shingles 2011    Spondylosis of cervical spine     Spondylosis of lumbosacral region     Stroke ([facility])     DANY (stress urinary incontinence, female)     Vaginal  vault prolapse     Visual impairment 2018     Social History     Socioeconomic History    Marital status: /Civil Union     Spouse name: Not on file    Number of children: Not on file    Years of education: Not on file    Highest education level: Not on file   Occupational History    Not on file   Tobacco Use    Smoking status: Light Smoker     Current packs/day: 0.25     Average packs/day: 0.3 packs/day for 49.8 years (12.5 ttl pk-yrs)     Types: Cigarettes     Start date: 1975    Smokeless tobacco: Never    Tobacco comments:     Last cigarette 0400 7/26 three total   Vaping Use    Vaping status: Never Used   Substance and Sexual Activity    Alcohol use: Yes     Comment: Social drinker    Drug use: Not Currently    Sexual activity: Not Currently     Partners: Male     Birth control/protection: Female Sterilization     Comment: hysterectomy   Other Topics Concern    Not on file   Social History Narrative    ** Merged History Encounter **          Social Determinants of Health     Financial Resource Strain: Not on file   Food Insecurity: Not on file   Transportation Needs: Not on file   Physical Activity: Not on file   Stress: Not on file   Social Connections: Not on file   Intimate Partner Violence: Not on file   Housing Stability: Not on file      Family History   Problem Relation Age of Onset    Stroke Mother         50s    Cancer Mother     Hypertension Mother     Pulmonary embolism Other         In mid 30s    Stroke Sister         50s    Depression Sister     Stroke Brother         50s    Heart disease Brother     OCD Brother     Prostate cancer Father     Cancer Daughter     Stroke Family     Depression Sister     Arthritis Sister     OCD Sister     Suicide Attempts Sister     Depression Sister      Past Surgical History:   Procedure Laterality Date    BLADDER SURGERY  2014    CARDIAC LOOP RECORDER  08/2020    removal     CERVICAL SPINE SURGERY      COLONOSCOPY      COLPOPEXY SACRAL LAPAROSCOPIC N/A  07/26/2023    Procedure: ROBOTIC COLPOPEXY SACRAL WITH BILATERAL SALPINGECTOMY AND RIGHT OOPHRECTOMY;  Surgeon: Glenn Fleming MD;  Location: [facility];  Service: UroGynecology           DENTAL SURGERY      FL VCUG VOIDING URETHROCYSTOGRAM  12/22/2014    KNEE SURGERY  02/2016    LIPECTOMY      of thigh    LIPOMA RESECTION      NECK SURGERY      DE ARTHRS KNE SURG W/MENISCECTOMY MED/LAT W/SHVG Left 03/21/2016    Procedure: ARTHROSCOPY W/ PARTIAL MEDIAL MENISCECTOMY;  Surgeon: Giovanny Jorgensen MD;  Location:  MAIN OR;  Service: Orthopedics    DE COLONOSCOPY FLX DX W/COLLJ SPEC WHEN PFRMD N/A 02/10/2017    Procedure: COLONOSCOPY;  Surgeon: Alfonso Devries MD;  Location: [facility] GI LAB;  Service: Gastroenterology    DE CYSTOURETHROSCOPY N/A 07/26/2023    Procedure: CYSTO;  Surgeon: Glenn Fleming MD;  Location: [facility];  Service: UroGynecology           DE SLING OPERATION STRESS INCONTINENCE N/A 07/26/2023    Procedure: PV SLING;  Surgeon: Glenn Fleming MD;  Location: [facility];  Service: UroGynecology           TOTAL ABDOMINAL HYSTERECTOMY      TUBAL LIGATION         Current Outpatient Medications:     aspirin 81 mg chewable tablet, Chew 1 tablet (81 mg total) daily, Disp: 30 tablet, Rfl: 0    atorvastatin (LIPITOR) 40 mg tablet, TAKE ONE AND ONE-HALF TABLETS DAILY, Disp: 135 tablet, Rfl: 1    citalopram (CeleXA) 10 mg tablet, Take 0.5 tablets (5 mg total) by mouth daily, Disp: 30 tablet, Rfl: 5    cycloSPORINE (RESTASIS) 0.05 % ophthalmic emulsion, Administer 1 drop to both eyes every 12 (twelve) hours, Disp: , Rfl:     diazepam (VALIUM) 5 mg tablet, TAKE 1 TABLET DAILY AT BEDTIME AS NEEDED FOR SLEEP, Disp: 90 tablet, Rfl: 0    Eliquis 5 MG, Take 1 tablet (5 mg total) by mouth 2 (two) times a day, Disp: 60 tablet, Rfl: 11    methocarbamol (ROBAXIN) 500 mg tablet, Take 1 tablet (500 mg total) by mouth daily at bedtime as needed for muscle spasms, Disp: 30 tablet, Rfl: 0    oxyCODONE  "(ROXICODONE) 5 immediate release tablet, Take 1 tablet (5 mg total) by mouth daily as needed for moderate pain Max Daily Amount: 5 mg, Disp: 30 tablet, Rfl: 0    rizatriptan (MAXALT-MLT) 5 mg disintegrating tablet, PLACE 1 TABLET ON TONGUE ONCE AS NEEDED FOR MIGRAINE, Disp: 54 tablet, Rfl: 4    clotrimazole (MYCELEX) 10 mg marvin, Take 1 tablet (10 mg total) by mouth 5 (five) times a day, Disp: 30 tablet, Rfl: 0    pancrelipase, Lip-Prot-Amyl, (CREON) 24,000 units, Take 48,000 units of lipase by mouth 3 (three) times a day with meals 2 capsules 3 times daily with meals, Disp: 540 capsule, Rfl: 2  Allergies   Allergen Reactions    Blue Dyes (Parenteral) - Food Allergy Anaphylaxis    Gabapentin Anaphylaxis    Propofol Wheezing     Also congestion and \"throat swelling\" per pt    Lidocaine Hives and Vomiting    Penicillins Rash and Vomiting    Tramadol GI Intolerance and Vomiting           Nitrofurantoin Rash and Vomiting       Labs:  Appointment on 08/01/2024   Component Date Value    Cholesterol 08/01/2024 138     Triglycerides 08/01/2024 60     HDL, Direct 08/01/2024 62     LDL Calculated 08/01/2024 64     Sodium 08/01/2024 140     Potassium 08/01/2024 4.1     Chloride 08/01/2024 105     CO2 08/01/2024 23     ANION GAP 08/01/2024 12     BUN 08/01/2024 14     Creatinine 08/01/2024 0.88     Glucose, Fasting 08/01/2024 75     Calcium 08/01/2024 9.5     AST 08/01/2024 28     ALT 08/01/2024 34     Alkaline Phosphatase 08/01/2024 46     Total Protein 08/01/2024 6.4     Albumin 08/01/2024 4.1     Total Bilirubin 08/01/2024 0.59     eGFR 08/01/2024 72     WBC 08/01/2024 9.60     RBC 08/01/2024 4.01     Hemoglobin 08/01/2024 13.4     Hematocrit 08/01/2024 40.5     MCV 08/01/2024 101 (H)     MCH 08/01/2024 33.4     MCHC 08/01/2024 33.1     RDW 08/01/2024 13.3     MPV 08/01/2024 9.4     Platelets 08/01/2024 278     nRBC 08/01/2024 0     Segmented % 08/01/2024 43     Immature Grans % 08/01/2024 0     Lymphocytes % 08/01/2024 49 " (H)     Monocytes % 08/01/2024 7     Eosinophils Relative 08/01/2024 1     Basophils Relative 08/01/2024 0     Absolute Neutrophils 08/01/2024 4.13     Absolute Immature Grans 08/01/2024 0.02     Absolute Lymphocytes 08/01/2024 4.61 (H)     Absolute Monocytes 08/01/2024 0.70     Eosinophils Absolute 08/01/2024 0.11     Basophils Absolute 08/01/2024 0.03     Vit D, 25-Hydroxy 08/01/2024 34.7     TSH 3RD GENERATON 08/01/2024 1.449      Lab Results   Component Value Date    TRIG 60 08/01/2024    HDL 62 08/01/2024     Imaging: No results found.

## 2024-11-07 DIAGNOSIS — E78.00 HYPERCHOLESTEROLEMIA: ICD-10-CM

## 2024-11-07 DIAGNOSIS — R79.89 HIGH SERUM HIGH DENSITY LIPOPROTEIN (HDL): ICD-10-CM

## 2024-11-07 RX ORDER — ATORVASTATIN CALCIUM 40 MG/1
60 TABLET, FILM COATED ORAL DAILY
Qty: 135 TABLET | Refills: 1 | Status: SHIPPED | OUTPATIENT
Start: 2024-11-07

## 2024-11-12 ENCOUNTER — TELEPHONE (OUTPATIENT)
Age: 59
End: 2024-11-12

## 2024-11-12 DIAGNOSIS — Z91.041 RADIOGRAPHIC DYE ALLERGY STATUS: Primary | ICD-10-CM

## 2024-11-12 RX ORDER — PREDNISONE 50 MG/1
TABLET ORAL
Qty: 3 TABLET | Refills: 0 | Status: SHIPPED | OUTPATIENT
Start: 2024-11-12

## 2024-11-18 NOTE — NURSING NOTE
Called patient for clarification of allergies, received an e-mail with notice of potential allergy. Per patient she in the 90's she received Naveen and had a reaction of hives. Noted in chart that in August of 2023 she had an MRI of abdomen with contrast and upon asking her if she received a prep she stated no and she also had no reaction. Informed patient that I will be placing her allergy in the charta and that she will need to receive an allergy prep if she were to get a MRI with contrast in the future. She expressed understanding. Her MD did order a prednisone prep for her upcoming MRI with contrast.

## 2024-11-27 NOTE — NURSING NOTE
Patient is scheduled on 12/1/24 for a MRI with IV contrast. Per patients Epic records it states they have an allergy to the IV contrast. Called patient to verify allergy to contrast, in the past she had an episode of hives post contrast administration. Ordering provider did order the allergy prep and patient was given the time of the scan and the times to take  Prednisone (1845,0045,0645)and Benadryl (0645)per policy. Patient was also reminded that she will require a  for the potential sedative effects of benadryl. Patient verbalized understanding of all instructions and expressed appreciation for call. Also sent information via verified email and my chart account, see message below.   Upcoming Radiology appointment at [facility]  Good morning Mrs. Mason,               Your upcoming appointment at [facility] for a MRI is scheduled for 12/1/24 @ 7:45 am. You are coming to [address] [place] [address]. [facility] MRI will be on the right side of the circular driveway.  Due to your allergy history of having a reaction of hives to the IV contrast that is to be used in this study, your ordering provider has ordered the following allergy prep for you.     Please take all of the medication as directed.       Prednisone 50 mg PO 13 hours prior to procedure (11/30/24 @ 6:45 pm)  Prednisone 50 mg PO  7 hours prior to procedure  (12/1/24 @ 12:45 am)  Prednisone 50 mg PO  1 hours prior to procedure (12/1/24 @ 6:45 am)  Benadryl 50 mg  PO 1 hour prior to procedure       (12/1/24 @ 6:45 am)            One of the  side effects of Benadryl is drowsiness so you must come to the appointment with a  for your safety.    If any questions please feel free to respond or call me. Thank you for your time and attention to this matter.   May you have a pleasant day,  Chiquita Ambrosio RN  [facility] Radiology RN  [address] [place] [address], [place] [address]  [phone]  (Office)  [phone] (Fax)  [email or web address]

## 2024-12-01 ENCOUNTER — HOSPITAL ENCOUNTER (OUTPATIENT)
Dept: RADIOLOGY | Facility: HOSPITAL | Age: 59
Discharge: HOME | End: 2024-12-01
Attending: FAMILY MEDICINE
Payer: COMMERCIAL

## 2024-12-01 DIAGNOSIS — K80.50 GALL STONES, COMMON BILE DUCT: ICD-10-CM

## 2024-12-01 PROCEDURE — A9585 GADOBUTROL INJECTION: HCPCS | Performed by: FAMILY MEDICINE

## 2024-12-01 PROCEDURE — 74183 MRI ABD W/O CNTR FLWD CNTR: CPT

## 2024-12-01 RX ORDER — GADOBUTROL 604.72 MG/ML
5 INJECTION INTRAVENOUS
Status: COMPLETED | OUTPATIENT
Start: 2024-12-01 | End: 2024-12-01

## 2024-12-01 RX ADMIN — GADOBUTROL 5 ML: 604.72 INJECTION INTRAVENOUS at 08:54

## 2024-12-12 ENCOUNTER — VBI (OUTPATIENT)
Dept: ADMINISTRATIVE | Facility: OTHER | Age: 59
End: 2024-12-12

## 2024-12-12 NOTE — TELEPHONE ENCOUNTER
12/12/24 7:42 AM     Chart reviewed for   Cervical Cancer Screening    ; nothing is submitted to the patient's insurance at this time.     ROSANNE FLORES MA   [facility]

## 2025-03-02 LAB
ALBUMIN SERPL-MCNC: 4.5 G/DL (ref 3.6–5.1)
ALBUMIN/GLOB SERPL: 2.1 (CALC) (ref 1–2.5)
ALP SERPL-CCNC: 51 U/L (ref 37–153)
ALT SERPL-CCNC: 30 U/L (ref 6–29)
AST SERPL-CCNC: 41 U/L (ref 10–35)
BASOPHILS # BLD AUTO: 18 CELLS/UL (ref 0–200)
BASOPHILS NFR BLD AUTO: 0.4 %
BILIRUB SERPL-MCNC: 0.4 MG/DL (ref 0.2–1.2)
BUN SERPL-MCNC: 16 MG/DL (ref 7–25)
BUN/CREAT SERPL: ABNORMAL (CALC) (ref 6–22)
CALCIUM SERPL-MCNC: 9 MG/DL (ref 8.6–10.4)
CHLORIDE SERPL-SCNC: 99 MMOL/L (ref 98–110)
CHOLEST SERPL-MCNC: 170 MG/DL
CHOLEST/HDLC SERPL: 2.6 (CALC)
CO2 SERPL-SCNC: 26 MMOL/L (ref 20–32)
CREAT SERPL-MCNC: 1.05 MG/DL (ref 0.5–1.05)
EOSINOPHIL # BLD AUTO: 9 CELLS/UL (ref 15–500)
EOSINOPHIL NFR BLD AUTO: 0.2 %
ERYTHROCYTE [DISTWIDTH] IN BLOOD BY AUTOMATED COUNT: 12.1 % (ref 11–15)
GFR/BSA.PRED SERPLBLD CYS-BASED-ARV: 61 ML/MIN/1.73M2
GLOBULIN SER CALC-MCNC: 2.1 G/DL (CALC) (ref 1.9–3.7)
GLUCOSE SERPL-MCNC: 94 MG/DL (ref 65–99)
HCT VFR BLD AUTO: 41.5 % (ref 35–45)
HDLC SERPL-MCNC: 65 MG/DL
HGB BLD-MCNC: 13.9 G/DL (ref 11.7–15.5)
LDLC SERPL CALC-MCNC: 91 MG/DL (CALC)
LYMPHOCYTES # BLD AUTO: 1426 CELLS/UL (ref 850–3900)
LYMPHOCYTES NFR BLD AUTO: 31 %
MCH RBC QN AUTO: 33.4 PG (ref 27–33)
MCHC RBC AUTO-ENTMCNC: 33.5 G/DL (ref 32–36)
MCV RBC AUTO: 99.8 FL (ref 80–100)
MONOCYTES # BLD AUTO: 865 CELLS/UL (ref 200–950)
MONOCYTES NFR BLD AUTO: 18.8 %
NEUTROPHILS # BLD AUTO: 2282 CELLS/UL (ref 1500–7800)
NEUTROPHILS NFR BLD AUTO: 49.6 %
NONHDLC SERPL-MCNC: 105 MG/DL (CALC)
PLATELET # BLD AUTO: 234 THOUSAND/UL (ref 140–400)
PMV BLD REES-ECKER: 9.6 FL (ref 7.5–12.5)
POTASSIUM SERPL-SCNC: 4.4 MMOL/L (ref 3.5–5.3)
PROT SERPL-MCNC: 6.6 G/DL (ref 6.1–8.1)
RBC # BLD AUTO: 4.16 MILLION/UL (ref 3.8–5.1)
SODIUM SERPL-SCNC: 133 MMOL/L (ref 135–146)
TRIGL SERPL-MCNC: 58 MG/DL
WBC # BLD AUTO: 4.6 THOUSAND/UL (ref 3.8–10.8)

## 2025-03-07 ENCOUNTER — OFFICE VISIT (OUTPATIENT)
Dept: INTERNAL MEDICINE CLINIC | Age: 60
End: 2025-03-07
Payer: COMMERCIAL

## 2025-03-07 VITALS
HEART RATE: 67 BPM | HEIGHT: 61 IN | OXYGEN SATURATION: 97 % | WEIGHT: 125 LBS | BODY MASS INDEX: 23.6 KG/M2 | SYSTOLIC BLOOD PRESSURE: 120 MMHG | TEMPERATURE: 98.6 F | DIASTOLIC BLOOD PRESSURE: 60 MMHG

## 2025-03-07 DIAGNOSIS — Z79.01 ANTICOAGULANT LONG-TERM USE: ICD-10-CM

## 2025-03-07 DIAGNOSIS — K40.91 UNILATERAL RECURRENT INGUINAL HERNIA WITHOUT OBSTRUCTION OR GANGRENE: ICD-10-CM

## 2025-03-07 DIAGNOSIS — E78.00 HYPERCHOLESTEROLEMIA: Primary | ICD-10-CM

## 2025-03-07 DIAGNOSIS — Z72.0 TOBACCO ABUSE: ICD-10-CM

## 2025-03-07 DIAGNOSIS — K59.03 DRUG-INDUCED CONSTIPATION: ICD-10-CM

## 2025-03-07 DIAGNOSIS — I48.0 PAF (PAROXYSMAL ATRIAL FIBRILLATION) (HCC): ICD-10-CM

## 2025-03-07 DIAGNOSIS — F17.210 CIGARETTE NICOTINE DEPENDENCE WITHOUT COMPLICATION: ICD-10-CM

## 2025-03-07 DIAGNOSIS — M48.061 SPINAL STENOSIS OF LUMBAR REGION WITHOUT NEUROGENIC CLAUDICATION: ICD-10-CM

## 2025-03-07 DIAGNOSIS — Z28.21 REFUSED INFLUENZA VACCINE: ICD-10-CM

## 2025-03-07 DIAGNOSIS — R74.8 ABNORMAL LIVER ENZYMES: ICD-10-CM

## 2025-03-07 DIAGNOSIS — F41.9 ANXIETY: ICD-10-CM

## 2025-03-07 PROBLEM — I63.511 ACUTE ISCHEMIC RIGHT MCA STROKE (HCC): Status: RESOLVED | Noted: 2018-02-09 | Resolved: 2025-03-07

## 2025-03-07 PROCEDURE — 99214 OFFICE O/P EST MOD 30 MIN: CPT | Performed by: FAMILY MEDICINE

## 2025-03-07 RX ORDER — BUPROPION HYDROCHLORIDE 150 MG/1
150 TABLET ORAL DAILY
Qty: 90 TABLET | Refills: 1 | Status: SHIPPED | OUTPATIENT
Start: 2025-03-07 | End: 2025-09-03

## 2025-03-07 NOTE — PROGRESS NOTES
Assessment/Plan:    1. Hypercholesterolemia  -     Lipid Panel with Direct LDL reflex; Future; Expected date: 09/07/2025  -     Comprehensive metabolic panel; Future; Expected date: 09/07/2025  -     CBC and differential; Future; Expected date: 09/07/2025  -     TSH, 3rd generation with Free T4 reflex; Future; Expected date: 09/07/2025  -     Lipid Panel with Direct LDL reflex  -     Comprehensive metabolic panel  -     CBC and differential  -     TSH, 3rd generation with Free T4 reflex  2. Anticoagulant long-term use  3. PAF (paroxysmal atrial fibrillation) (HCC)  4. Drug-induced constipation  5. Cigarette nicotine dependence without complication  6. Refused influenza vaccine  7. Spinal stenosis of lumbar region without neurogenic claudication  8. Tobacco abuse  9. Anxiety  -     Comprehensive metabolic panel; Future; Expected date: 09/07/2025  -     CBC and differential; Future; Expected date: 09/07/2025  -     TSH, 3rd generation with Free T4 reflex; Future; Expected date: 09/07/2025  -     Comprehensive metabolic panel  -     CBC and differential  -     TSH, 3rd generation with Free T4 reflex  -     buPROPion (WELLBUTRIN XL) 150 mg 24 hr tablet; Take 1 tablet (150 mg total) by mouth daily  10. Abnormal liver enzymes  -     Hepatic function panel; Future; Expected date: 04/07/2025  -     Hepatic function panel  -     Comprehensive metabolic panel; Future; Expected date: 09/07/2025  -     Comprehensive metabolic panel  11. Unilateral recurrent inguinal hernia without obstruction or gangrene  -     Ambulatory Referral to General Surgery; Future          There are no Patient Instructions on file for this visit.    Return in about 6 months (around 9/7/2025).    Subjective:      Patient ID: Venessa Mason is a 59 y.o. female.    Chief Complaint   Patient presents with    Follow-up     6 m f/u visit for hypercholesterolemia, review labs done 3/1/25. Review MRI and mammogram.  PHQ9 score is 12, needs f/u plan.        HPI  Patient's father-in-law passed away about 1 to 2 months ago and his daughter who is her 's sister lives with him.  She is mentally disabled and now she lives with her and her .  They are the primary caretakers.  Patient works at night and in the daytime she takes care of her.  She needs constant supervision and help with eating and drinking dressing and bathing activities of daily living.  Patient is feeling overwhelmed with his additional responsibility and is getting more alienated from her own family as she has missed several week and celebrations with them due to this.  She is not getting enough sleep not able to eat and drink properly and her anxiety levels are increasing.  She is not able to put her in a  or permanent living situation due to her own personal promise to her mother-in-law.  No HI or SI.  Patient is able to work and drive and she has not missed any days.  She would like something to help with her feelings of caretaker stress and being overwhelmed.  August 2024, medication has helped significantly.  No HI or SI, patient works night shift and daytime takes care of her disabled sister-in-law.  She is not getting enough rest and no sleep and complains of fatigue  March 2025, no new issues, things are better in the winter when her  is laid off regularly.  She has been getting more rest.     Hyperlipidemia (Follow-Up): The patient states her hyperlipidemia has been under good control since the last visit. She has no significant interval events.    Symptoms: denies chest pain-- and-- denies intermittent leg claudication. Associated symptoms include no focal neurologic deficits-- and-- no memory loss.      Medications: the patient is not adherent with her medication   July 2019: Last lab work done in December.  On pravastatin and tolerating well however she has a preference to Lipitor which she was on before and is asking for change.  No recent labs today but last  lab work was elevated.  June 2020 well controlled lipid levels, on statin and tolerating well  November 2020, doing well no problems  Sept 2021: on lipitor well controlled levels, LDL 71  October 2022, increase in LDL to 95.  She has not changed anything however her current position at work is sedentary and she says her activity has significantly decreased.  March 2023, well controlled, no issues.  Works overnight  February 2024, last labs were acceptable.  No issues  August 2024, lipid levels improved since last visit  March 2025, lipid levels in range but slightly worse than last visit.  Still working overnight but getting more rest in the wintertime since her  is laid off    Depression:  Feeling much better with low dose lexapro.  But has some fatigue side effects.  Has not been able to work due to stroke and is having some difficulty with exercising due to chronic back pain.  July 2019 stable January 2020, stable with present medications and slightly better since she is working now   June 2020, relatively well controlled and no suicidal homicide ideations.  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  She chose night shift but feels overwhelmed that her training was not as extensive as she has like did to be.  Since her brain injury she has been slower to learn new techniques but really enjoys the company and her coworkers.  Sept 2021: no SI or HI, no panic or breakthrough symptoms, does not sleep well, re started working  October 2022, no issues.  No HI or SI, medications are working well.  She is still working night shift so her sleep is not the best.  March 2023, no HI or SI, feels well, no issues  February 2024, had in exacerbation in fall 2023 but now doing well.  No HI or SI  August 2024, please see above anxiety, on Celexa now, no HI or SI  March 2025, would like a different antianxiety medication as she feels it is not working any  longer and may actually be giving her some depression symptoms.    Chronic pain, trying to wean off oxycodone and seeing Physical Medicine and Rehab doctor.  Started on medical marijuana which has significantly improved her symptoms and she has been able to decrease her oxycodone however she is in some financial difficulty due to not having a job and no medical coverage now and finds it difficult to pay cash for her medical marijuana   January 2020, not using oxycodone since it makes her constipation worse   June 2020, stable   nov 2020 stable  sept 2021: on oxy, stable. Has some constipation and N/V  October 2022, stable, no issues.  March 2023, stable, still on medications.  February 2024, stable  August 2024, please see above anxiety, on Celexa now, no HI or SI  March 2025, not taking any pain medications at the moment     Severe constipation:   failed Linzess, use to eat salads and yogurt on a daily basis however admits to not eating at least 3 times per day.  She does drink plenty of water.  Has a bowel movement that is hard once every 3 weeks.  Has been experiencing some nausea and intermittent vomiting.  Does not like a lot of over-the-counter laxatives because it makes her cramps and gassy   October 2022, sometimes better and sometimes not.  Waxes and wanes.  Most recently due to her job she has not been able to drink the right amount of water.  March 2023, following with GI, recently started on Amitiza that she just got yesterday.  She takes a stool softener on a regular basis, failed MiraLAX due to bloating and no regular bowel movements  February 2024, relatively stable at this time  August 2024, improved in general  March 2025, improved    Small right inguinal hernia, intermittent discomfort, seen on imaging.        The following portions of the patient's history were reviewed and updated as appropriate: allergies, current medications, past family history, past medical history, past social history, past  "surgical history and problem list.    Review of Systems        Constitutional:  Denies fever or chills , + 10 lb weight gain  Eyes:  Denies double , blurry vision or eye pain  HENT:  Denies nasal congestion, sore throat or new hearing issues  Respiratory:  Denies cough or shortness of breath or wheezing  Cardiovascular:  Denies palpitations or chest pain  GI:  Denies abdominal pain, nausea, or vomiting, no loose stools, no reflux  Integument:  Denies rash , no open areas  Neurologic:  Denies headache or focal weakness, no dizziness  : no dysuria, or hematuria      Current Outpatient Medications   Medication Sig Dispense Refill    aspirin 81 mg chewable tablet Chew 1 tablet (81 mg total) daily 30 tablet 0    atorvastatin (LIPITOR) 40 mg tablet TAKE ONE AND ONE-HALF TABLETS DAILY 135 tablet 1    buPROPion (WELLBUTRIN XL) 150 mg 24 hr tablet Take 1 tablet (150 mg total) by mouth daily 90 tablet 1    citalopram (CeleXA) 10 mg tablet Take 0.5 tablets (5 mg total) by mouth daily 30 tablet 5    cycloSPORINE (RESTASIS) 0.05 % ophthalmic emulsion Administer 1 drop to both eyes every 12 (twelve) hours      diazepam (VALIUM) 5 mg tablet TAKE 1 TABLET DAILY AT BEDTIME AS NEEDED FOR SLEEP 90 tablet 0    Eliquis 5 MG Take 1 tablet (5 mg total) by mouth 2 (two) times a day 60 tablet 11    methocarbamol (ROBAXIN) 500 mg tablet Take 1 tablet (500 mg total) by mouth daily at bedtime as needed for muscle spasms 30 tablet 0    rizatriptan (MAXALT-MLT) 5 mg disintegrating tablet PLACE 1 TABLET ON TONGUE ONCE AS NEEDED FOR MIGRAINE 54 tablet 4     No current facility-administered medications for this visit.       Objective:    /60 (BP Location: Left arm, Patient Position: Sitting, Cuff Size: Standard)   Pulse 67   Temp 98.6 °F (37 °C) (Temporal)   Ht 5' 1\" (1.549 m)   Wt 56.7 kg (125 lb)   LMP  (LMP Unknown)   SpO2 97%   BMI 23.62 kg/m²        Physical Exam         Constitutional:  Well developed, well nourished, no acute " distress, non-toxic appearance   Eyes:  PERRL, conjunctiva normal , non icteric sclera  HENT:  Atraumatic, oropharynx moist. Neck-  supple   Respiratory:  CTA b/l, normal breath sounds, no rales, no wheezing   Cardiovascular:  RRR, no murmurs, no LE edema b/l  GI:  Soft, nondistended, normal bowel sounds x 4, nontender, no organomegaly, no mass, no rebound, no guarding   Neurologic:  no focal deficits noted   Psychiatric:  Speech and behavior appropriate , AAO x 3        Depression Screening Follow-up Plan: Patient's depression screening was positive with a PHQ-9 score of 12. Patient assessed for underlying major depression. They have no active suicidal ideations. Brief counseling provided and recommend additional follow-up/re-evaluation next office visit.      Axel Tim DO

## 2025-04-04 ENCOUNTER — VBI (OUTPATIENT)
Dept: ADMINISTRATIVE | Facility: OTHER | Age: 60
End: 2025-04-04

## 2025-04-04 NOTE — TELEPHONE ENCOUNTER
04/04/25 8:46 AM     Chart reviewed for   Cervical Cancer Screening    ; nothing is submitted to the patient's insurance at this time.     ROSANNE FLORES MA   [facility]

## 2025-04-06 LAB
ALBUMIN SERPL-MCNC: 4.5 G/DL (ref 3.6–5.1)
ALBUMIN/GLOB SERPL: 2 (CALC) (ref 1–2.5)
ALP SERPL-CCNC: 48 U/L (ref 37–153)
ALT SERPL-CCNC: 23 U/L (ref 6–29)
AST SERPL-CCNC: 27 U/L (ref 10–35)
BILIRUB DIRECT SERPL-MCNC: 0.1 MG/DL
BILIRUB INDIRECT SERPL-MCNC: 0.6 MG/DL (CALC) (ref 0.2–1.2)
BILIRUB SERPL-MCNC: 0.7 MG/DL (ref 0.2–1.2)
GLOBULIN SER CALC-MCNC: 2.2 G/DL (CALC) (ref 1.9–3.7)
PROT SERPL-MCNC: 6.7 G/DL (ref 6.1–8.1)

## 2025-05-06 DIAGNOSIS — E78.00 HYPERCHOLESTEROLEMIA: ICD-10-CM

## 2025-05-06 DIAGNOSIS — R79.89 HIGH SERUM HIGH DENSITY LIPOPROTEIN (HDL): ICD-10-CM

## 2025-05-06 RX ORDER — ATORVASTATIN CALCIUM 40 MG/1
60 TABLET, FILM COATED ORAL DAILY
Qty: 90 TABLET | Refills: 1 | Status: SHIPPED | OUTPATIENT
Start: 2025-05-06

## 2025-06-02 DIAGNOSIS — I63.511 ACUTE ISCHEMIC RIGHT MCA STROKE (HCC): ICD-10-CM

## 2025-06-03 RX ORDER — APIXABAN 5 MG/1
5 TABLET, FILM COATED ORAL 2 TIMES DAILY
Qty: 60 TABLET | Refills: 0 | OUTPATIENT
Start: 2025-06-03

## 2025-06-03 RX ORDER — APIXABAN 5 MG/1
5 TABLET, FILM COATED ORAL 2 TIMES DAILY
Qty: 60 TABLET | Refills: 5 | Status: SHIPPED | OUTPATIENT
Start: 2025-06-03

## 2025-06-04 NOTE — TELEPHONE ENCOUNTER
Patient called requesting refill for eliquis. Patient made aware medication was refilled on 6/3/25 for 60 with 5 refills to [facility]. Patient instructed to contact the pharmacy and speak with someone directly to obtain refills of medication. Patient advised to call back for refill if their pharmacy is unable to assist them. Patient verbalized understanding.

## 2025-08-01 DIAGNOSIS — E78.00 HYPERCHOLESTEROLEMIA: ICD-10-CM

## 2025-08-01 DIAGNOSIS — R79.89 HIGH SERUM HIGH DENSITY LIPOPROTEIN (HDL): ICD-10-CM

## 2025-08-04 RX ORDER — ATORVASTATIN CALCIUM 40 MG/1
60 TABLET, FILM COATED ORAL DAILY
Qty: 90 TABLET | Refills: 0 | OUTPATIENT
Start: 2025-08-04

## 2025-08-11 ENCOUNTER — OFFICE VISIT (OUTPATIENT)
Age: 60
End: 2025-08-11
Payer: COMMERCIAL

## 2025-08-18 ENCOUNTER — APPOINTMENT (OUTPATIENT)
Dept: LAB | Age: 60
End: 2025-08-18
Attending: SURGERY
Payer: COMMERCIAL

## 2025-08-18 ENCOUNTER — TELEPHONE (OUTPATIENT)
Age: 60
End: 2025-08-18

## 2025-08-18 DIAGNOSIS — K40.90 RIGHT INGUINAL HERNIA: ICD-10-CM

## 2025-08-18 LAB
ALBUMIN SERPL-MCNC: 4.6 G/DL (ref 3.6–5.1)
ALBUMIN/GLOB SERPL: 2.2 (CALC) (ref 1–2.5)
ALP SERPL-CCNC: 54 U/L (ref 37–153)
ALT SERPL-CCNC: 23 U/L (ref 6–29)
AST SERPL-CCNC: 23 U/L (ref 10–35)
ATRIAL RATE: 66 BPM
BASOPHILS # BLD AUTO: 40 CELLS/UL (ref 0–200)
BASOPHILS NFR BLD AUTO: 0.4 %
BILIRUB SERPL-MCNC: 0.9 MG/DL (ref 0.2–1.2)
BUN SERPL-MCNC: 14 MG/DL (ref 7–25)
BUN/CREAT SERPL: 12 (CALC) (ref 6–22)
CALCIUM SERPL-MCNC: 10 MG/DL (ref 8.6–10.4)
CHLORIDE SERPL-SCNC: 106 MMOL/L (ref 98–110)
CHOLEST SERPL-MCNC: 176 MG/DL
CHOLEST/HDLC SERPL: 2.4 (CALC)
CO2 SERPL-SCNC: 25 MMOL/L (ref 20–32)
CREAT SERPL-MCNC: 1.13 MG/DL (ref 0.5–1.05)
EOSINOPHIL # BLD AUTO: 100 CELLS/UL (ref 15–500)
EOSINOPHIL NFR BLD AUTO: 1 %
ERYTHROCYTE [DISTWIDTH] IN BLOOD BY AUTOMATED COUNT: 12.5 % (ref 11–15)
GFR/BSA.PRED SERPLBLD CYS-BASED-ARV: 56 ML/MIN/1.73M2
GLOBULIN SER CALC-MCNC: 2.1 G/DL (CALC) (ref 1.9–3.7)
GLUCOSE SERPL-MCNC: 96 MG/DL (ref 65–99)
HCT VFR BLD AUTO: 45.4 % (ref 35–45)
HDLC SERPL-MCNC: 73 MG/DL
HGB BLD-MCNC: 15.1 G/DL (ref 11.7–15.5)
LDLC SERPL CALC-MCNC: 85 MG/DL (CALC)
LYMPHOCYTES # BLD AUTO: 3760 CELLS/UL (ref 850–3900)
LYMPHOCYTES NFR BLD AUTO: 37.6 %
MCH RBC QN AUTO: 33.4 PG (ref 27–33)
MCHC RBC AUTO-ENTMCNC: 33.3 G/DL (ref 32–36)
MCV RBC AUTO: 100.4 FL (ref 80–100)
MONOCYTES # BLD AUTO: 700 CELLS/UL (ref 200–950)
MONOCYTES NFR BLD AUTO: 7 %
NEUTROPHILS # BLD AUTO: 5400 CELLS/UL (ref 1500–7800)
NEUTROPHILS NFR BLD AUTO: 54 %
NONHDLC SERPL-MCNC: 103 MG/DL (CALC)
P AXIS: 29 DEGREES
PLATELET # BLD AUTO: 323 THOUSAND/UL (ref 140–400)
PMV BLD REES-ECKER: 9.5 FL (ref 7.5–12.5)
POTASSIUM SERPL-SCNC: 4.3 MMOL/L (ref 3.5–5.3)
PR INTERVAL: 186 MS
PROT SERPL-MCNC: 6.7 G/DL (ref 6.1–8.1)
QRS AXIS: 65 DEGREES
QRSD INTERVAL: 72 MS
QT INTERVAL: 376 MS
QTC INTERVAL: 394 MS
RBC # BLD AUTO: 4.52 MILLION/UL (ref 3.8–5.1)
SODIUM SERPL-SCNC: 140 MMOL/L (ref 135–146)
T WAVE AXIS: 68 DEGREES
TRIGL SERPL-MCNC: 85 MG/DL
TSH SERPL-ACNC: 1.95 MIU/L (ref 0.4–4.5)
VENTRICULAR RATE: 66 BPM
WBC # BLD AUTO: 10 THOUSAND/UL (ref 3.8–10.8)

## 2025-08-18 PROCEDURE — 93010 ELECTROCARDIOGRAM REPORT: CPT | Performed by: INTERNAL MEDICINE

## 2025-08-19 ENCOUNTER — ANESTHESIA EVENT (OUTPATIENT)
Dept: PERIOP | Facility: HOSPITAL | Age: 60
End: 2025-08-19
Payer: COMMERCIAL

## 2025-08-20 ENCOUNTER — ANESTHESIA (OUTPATIENT)
Dept: PERIOP | Facility: HOSPITAL | Age: 60
End: 2025-08-20
Payer: COMMERCIAL

## 2025-08-20 ENCOUNTER — HOSPITAL ENCOUNTER (OUTPATIENT)
Facility: HOSPITAL | Age: 60
Setting detail: OUTPATIENT SURGERY
Discharge: HOME | End: 2025-08-20
Attending: SURGERY | Admitting: SURGERY
Payer: COMMERCIAL

## 2025-08-20 VITALS
OXYGEN SATURATION: 97 % | HEIGHT: 59 IN | SYSTOLIC BLOOD PRESSURE: 110 MMHG | DIASTOLIC BLOOD PRESSURE: 58 MMHG | TEMPERATURE: 97.6 F | WEIGHT: 121.6 LBS | HEART RATE: 68 BPM | BODY MASS INDEX: 24.52 KG/M2 | RESPIRATION RATE: 18 BRPM

## 2025-08-20 DIAGNOSIS — Z87.19 S/P RIGHT INGUINAL HERNIA REPAIR: Primary | ICD-10-CM

## 2025-08-20 DIAGNOSIS — K40.90 RIGHT INGUINAL HERNIA: ICD-10-CM

## 2025-08-20 DIAGNOSIS — Z98.890 S/P RIGHT INGUINAL HERNIA REPAIR: Primary | ICD-10-CM

## 2025-08-20 PROCEDURE — C1781 MESH (IMPLANTABLE): HCPCS | Performed by: SURGERY

## 2025-08-20 DEVICE — IMPLANTABLE DEVICE: Type: IMPLANTABLE DEVICE | Site: INGUINAL | Status: FUNCTIONAL

## 2025-08-20 RX ORDER — SODIUM CHLORIDE, SODIUM LACTATE, POTASSIUM CHLORIDE, CALCIUM CHLORIDE 600; 310; 30; 20 MG/100ML; MG/100ML; MG/100ML; MG/100ML
20 INJECTION, SOLUTION INTRAVENOUS CONTINUOUS
Status: DISCONTINUED | OUTPATIENT
Start: 2025-08-20 | End: 2025-08-20 | Stop reason: HOSPADM

## 2025-08-20 RX ORDER — KETOROLAC TROMETHAMINE 30 MG/ML
INJECTION, SOLUTION INTRAMUSCULAR; INTRAVENOUS AS NEEDED
Status: DISCONTINUED | OUTPATIENT
Start: 2025-08-20 | End: 2025-08-20

## 2025-08-20 RX ORDER — BUPIVACAINE HYDROCHLORIDE AND EPINEPHRINE 2.5; 5 MG/ML; UG/ML
INJECTION, SOLUTION EPIDURAL; INFILTRATION; INTRACAUDAL; PERINEURAL AS NEEDED
Status: DISCONTINUED | OUTPATIENT
Start: 2025-08-20 | End: 2025-08-20 | Stop reason: HOSPADM

## 2025-08-20 RX ORDER — DEXAMETHASONE SODIUM PHOSPHATE 10 MG/ML
INJECTION, SOLUTION INTRAMUSCULAR; INTRAVENOUS AS NEEDED
Status: DISCONTINUED | OUTPATIENT
Start: 2025-08-20 | End: 2025-08-20

## 2025-08-20 RX ORDER — OXYCODONE HYDROCHLORIDE 5 MG/1
5 TABLET ORAL EVERY 6 HOURS PRN
Qty: 12 TABLET | Refills: 0 | Status: SHIPPED | OUTPATIENT
Start: 2025-08-20

## 2025-08-20 RX ORDER — FENTANYL CITRATE/PF 50 MCG/ML
25 SYRINGE (ML) INJECTION
Refills: 0 | Status: DISCONTINUED | OUTPATIENT
Start: 2025-08-20 | End: 2025-08-20 | Stop reason: HOSPADM

## 2025-08-20 RX ORDER — PROPOFOL 10 MG/ML
INJECTION, EMULSION INTRAVENOUS CONTINUOUS PRN
Status: DISCONTINUED | OUTPATIENT
Start: 2025-08-20 | End: 2025-08-20

## 2025-08-20 RX ORDER — MIDAZOLAM HYDROCHLORIDE 2 MG/2ML
INJECTION, SOLUTION INTRAMUSCULAR; INTRAVENOUS AS NEEDED
Status: DISCONTINUED | OUTPATIENT
Start: 2025-08-20 | End: 2025-08-20

## 2025-08-20 RX ORDER — ONDANSETRON 2 MG/ML
INJECTION INTRAMUSCULAR; INTRAVENOUS AS NEEDED
Status: DISCONTINUED | OUTPATIENT
Start: 2025-08-20 | End: 2025-08-20

## 2025-08-20 RX ORDER — FENTANYL CITRATE 50 UG/ML
INJECTION, SOLUTION INTRAMUSCULAR; INTRAVENOUS AS NEEDED
Status: DISCONTINUED | OUTPATIENT
Start: 2025-08-20 | End: 2025-08-20

## 2025-08-20 RX ORDER — ONDANSETRON 2 MG/ML
4 INJECTION INTRAMUSCULAR; INTRAVENOUS ONCE AS NEEDED
Status: DISCONTINUED | OUTPATIENT
Start: 2025-08-20 | End: 2025-08-20 | Stop reason: HOSPADM

## 2025-08-20 RX ORDER — OXYCODONE HYDROCHLORIDE 5 MG/1
5 TABLET ORAL EVERY 6 HOURS PRN
Refills: 0 | Status: DISCONTINUED | OUTPATIENT
Start: 2025-08-20 | End: 2025-08-20 | Stop reason: HOSPADM

## 2025-08-20 RX ORDER — PROPOFOL 10 MG/ML
INJECTION, EMULSION INTRAVENOUS AS NEEDED
Status: DISCONTINUED | OUTPATIENT
Start: 2025-08-20 | End: 2025-08-20

## 2025-08-20 RX ORDER — HYDROMORPHONE HCL/PF 1 MG/ML
0.25 SYRINGE (ML) INJECTION
Refills: 0 | Status: DISCONTINUED | OUTPATIENT
Start: 2025-08-20 | End: 2025-08-20 | Stop reason: HOSPADM

## 2025-08-20 RX ORDER — CEFAZOLIN SODIUM 2 G/50ML
2000 SOLUTION INTRAVENOUS ONCE
Status: COMPLETED | OUTPATIENT
Start: 2025-08-20 | End: 2025-08-20

## 2025-08-20 RX ORDER — PHENYLEPHRINE HCL IN 0.9% NACL 1 MG/10 ML
SYRINGE (ML) INTRAVENOUS AS NEEDED
Status: DISCONTINUED | OUTPATIENT
Start: 2025-08-20 | End: 2025-08-20

## 2025-08-20 RX ORDER — SODIUM CHLORIDE, SODIUM LACTATE, POTASSIUM CHLORIDE, CALCIUM CHLORIDE 600; 310; 30; 20 MG/100ML; MG/100ML; MG/100ML; MG/100ML
INJECTION, SOLUTION INTRAVENOUS CONTINUOUS PRN
Status: DISCONTINUED | OUTPATIENT
Start: 2025-08-20 | End: 2025-08-20

## 2025-08-20 RX ORDER — ACETAMINOPHEN 325 MG/1
975 TABLET ORAL EVERY 6 HOURS PRN
Status: DISCONTINUED | OUTPATIENT
Start: 2025-08-20 | End: 2025-08-20 | Stop reason: HOSPADM

## 2025-08-20 RX ADMIN — PROPOFOL 150 MG: 10 INJECTION, EMULSION INTRAVENOUS at 10:04

## 2025-08-20 RX ADMIN — CEFAZOLIN SODIUM 2000 MG: 2 SOLUTION INTRAVENOUS at 09:56

## 2025-08-20 RX ADMIN — MIDAZOLAM HYDROCHLORIDE 2 MG: 1 INJECTION, SOLUTION INTRAMUSCULAR; INTRAVENOUS at 09:53

## 2025-08-20 RX ADMIN — DEXAMETHASONE SODIUM PHOSPHATE 10 MG: 10 INJECTION, SOLUTION INTRAMUSCULAR; INTRAVENOUS at 10:04

## 2025-08-20 RX ADMIN — SODIUM CHLORIDE, SODIUM LACTATE, POTASSIUM CHLORIDE, AND CALCIUM CHLORIDE: .6; .31; .03; .02 INJECTION, SOLUTION INTRAVENOUS at 09:56

## 2025-08-20 RX ADMIN — FENTANYL CITRATE 50 MCG: 50 INJECTION, SOLUTION INTRAMUSCULAR; INTRAVENOUS at 10:04

## 2025-08-20 RX ADMIN — FENTANYL CITRATE 25 MCG: 50 INJECTION INTRAMUSCULAR; INTRAVENOUS at 11:19

## 2025-08-20 RX ADMIN — SODIUM CHLORIDE, SODIUM LACTATE, POTASSIUM CHLORIDE, AND CALCIUM CHLORIDE: .6; .31; .03; .02 INJECTION, SOLUTION INTRAVENOUS at 10:53

## 2025-08-20 RX ADMIN — PROPOFOL 150 MCG/KG/MIN: 10 INJECTION, EMULSION INTRAVENOUS at 10:04

## 2025-08-20 RX ADMIN — ONDANSETRON 4 MG: 2 INJECTION INTRAMUSCULAR; INTRAVENOUS at 10:04

## 2025-08-20 RX ADMIN — FENTANYL CITRATE 25 MCG: 50 INJECTION, SOLUTION INTRAMUSCULAR; INTRAVENOUS at 11:07

## 2025-08-20 RX ADMIN — FENTANYL CITRATE 25 MCG: 50 INJECTION, SOLUTION INTRAMUSCULAR; INTRAVENOUS at 10:54

## 2025-08-20 RX ADMIN — KETOROLAC TROMETHAMINE 30 MG: 30 INJECTION INTRAMUSCULAR; INTRAVENOUS at 10:54

## 2025-08-20 RX ADMIN — FENTANYL CITRATE 25 MCG: 50 INJECTION INTRAMUSCULAR; INTRAVENOUS at 11:24

## 2025-08-20 RX ADMIN — Medication 200 MCG: at 10:15

## (undated) DEVICE — UNDER BUTTOCKS DRAPE W/FLUID CONTROL POUCH: Brand: CONVERTORS

## (undated) DEVICE — M-CLOSE KIT

## (undated) DEVICE — 3000CC GUARDIAN II: Brand: GUARDIAN

## (undated) DEVICE — TISSUE RETRIEVAL SYSTEM: Brand: INZII RETRIEVAL SYSTEM

## (undated) DEVICE — TIP-UP FENESTRATED GRASPER: Brand: ENDOWRIST

## (undated) DEVICE — SUT MONOCRYL 4-0 PS-2 27 IN Y426H

## (undated) DEVICE — Device

## (undated) DEVICE — SUT VICRYL 0 UR-6 27 IN J603H

## (undated) DEVICE — CANNULA SEAL

## (undated) DEVICE — PACK PBDS GENERAL MINOR RF

## (undated) DEVICE — SUT VICRYL 3-0 SH 27 IN J416H

## (undated) DEVICE — SUT VICRYL 2-0 SH 27 IN UNDYED J417H

## (undated) DEVICE — ENDOPATH PNEUMONEEDLE INSUFFLATION NEEDLES WITH LUER LOCK CONNECTORS 120MM: Brand: ENDOPATH

## (undated) DEVICE — CATH FOLEY 18FR 5ML 2 WAY UNCOATED SILICONE

## (undated) DEVICE — SEAL

## (undated) DEVICE — KIT, BETHLEHEM ROBOTIC PROST: Brand: CARDINAL HEALTH

## (undated) DEVICE — LARGE SUTURE CUT NEEDLE DRIVER: Brand: ENDOWRIST

## (undated) DEVICE — SPECIMEN CONTAINER STERILE PEEL PACK

## (undated) DEVICE — BLUE HEAT SCOPE WARMER

## (undated) DEVICE — PAD GROUNDING DUAL ADULT

## (undated) DEVICE — TUBING SMOKE EVAC W/FILTRATION DEVICE PLUMEPORT ACTIV

## (undated) DEVICE — REDUCER: Brand: ENDOWRIST

## (undated) DEVICE — PAD GROUNDING ADULT

## (undated) DEVICE — ADHESIVE SKIN HIGH VISCOSITY EXOFIN 1ML

## (undated) DEVICE — 40595 XL TRENDELENBURG POSITIONING KIT: Brand: 40595 XL TRENDELENBURG POSITIONING KIT

## (undated) DEVICE — EXIDINE 4 PCT

## (undated) DEVICE — NEEDLE HYPO 22G X 1-1/2 IN

## (undated) DEVICE — SUT VICRYL 2-0 18 IN J911T

## (undated) DEVICE — COLUMN DRAPE

## (undated) DEVICE — TIP COVER ACCESSORY

## (undated) DEVICE — BASIC SINGLE BASIN 2-LF: Brand: MEDLINE INDUSTRIES, INC.

## (undated) DEVICE — GLOVE PI ULTRA TOUCH SZ.7.0

## (undated) DEVICE — CYSTO TUBING SINGLE IRRIGATION

## (undated) DEVICE — ELECTRO LUBE IS A SINGLE PATIENT USE DEVICE THAT IS INTENDED TO BE USED ON ELECTROSURGICAL ELECTRODES TO REDUCE STICKING.: Brand: KEY SURGICAL ELECTRO LUBE

## (undated) DEVICE — GAMMEX® NON-LATEX SENSITIVE SIZE 7.5, STERILE NEOPRENE POWDER-FREE SURGICAL GLOVE: Brand: GAMMEX

## (undated) DEVICE — DRAPE EQUIPMENT RF WAND

## (undated) DEVICE — ALLENTOWN DR  LUCENTE S LAP PK: Brand: CARDINAL HEALTH

## (undated) DEVICE — MONOPOLAR CURVED SCISSORS: Brand: ENDOWRIST

## (undated) DEVICE — MARYLAND BIPOLAR FORCEPS: Brand: ENDOWRIST

## (undated) DEVICE — ARM DRAPE

## (undated) DEVICE — TROCAR PORT ACCESS 8 X100MML W BLDLS OPTICAL TIP AIRSEAL

## (undated) DEVICE — STITCHKIT EPTFE SUTURE DELIVERY CANISTER: Brand: STITCHKIT

## (undated) DEVICE — TRAY FOLEY 16FR URIMETER SURESTEP

## (undated) DEVICE — PREMIUM DRY TRAY LF: Brand: MEDLINE INDUSTRIES, INC.

## (undated) DEVICE — VISUALIZATION SYSTEM: Brand: CLEARIFY

## (undated) DEVICE — IV FLUSH NSS 10ML POSIFLUSH

## (undated) DEVICE — ASTOUND STANDARD SURGICAL GOWN, XL: Brand: CONVERTORS

## (undated) DEVICE — INTENDED FOR TISSUE SEPARATION, AND OTHER PROCEDURES THAT REQUIRE A SHARP SURGICAL BLADE TO PUNCTURE OR CUT.: Brand: BARD-PARKER SAFETY BLADES SIZE 11, STERILE

## (undated) DEVICE — TROCARS: Brand: KII® OPTICAL ACCESS SYSTEM

## (undated) DEVICE — AIRSEAL TUBE SMOKE EVAC LUMENX3 FILTERED

## (undated) DEVICE — BLADELESS OBTURATOR: Brand: WECK VISTA

## (undated) DEVICE — SYRINGE 20ML LL

## (undated) DEVICE — LARGE NEEDLE DRIVER: Brand: ENDOWRIST